# Patient Record
Sex: FEMALE | Race: WHITE | NOT HISPANIC OR LATINO
[De-identification: names, ages, dates, MRNs, and addresses within clinical notes are randomized per-mention and may not be internally consistent; named-entity substitution may affect disease eponyms.]

---

## 2017-05-14 ENCOUNTER — FORM ENCOUNTER (OUTPATIENT)
Age: 67
End: 2017-05-14

## 2017-05-15 ENCOUNTER — APPOINTMENT (OUTPATIENT)
Dept: ULTRASOUND IMAGING | Facility: IMAGING CENTER | Age: 67
End: 2017-05-15

## 2017-05-15 ENCOUNTER — APPOINTMENT (OUTPATIENT)
Dept: UROLOGY | Facility: CLINIC | Age: 67
End: 2017-05-15

## 2017-05-15 ENCOUNTER — OUTPATIENT (OUTPATIENT)
Dept: OUTPATIENT SERVICES | Facility: HOSPITAL | Age: 67
LOS: 1 days | End: 2017-05-15
Payer: MEDICARE

## 2017-05-15 VITALS
SYSTOLIC BLOOD PRESSURE: 91 MMHG | RESPIRATION RATE: 17 BRPM | HEIGHT: 62 IN | BODY MASS INDEX: 27.6 KG/M2 | DIASTOLIC BLOOD PRESSURE: 59 MMHG | HEART RATE: 97 BPM | WEIGHT: 150 LBS | TEMPERATURE: 98 F

## 2017-05-15 DIAGNOSIS — N20.0 CALCULUS OF KIDNEY: ICD-10-CM

## 2017-05-15 DIAGNOSIS — N18.9 CHRONIC KIDNEY DISEASE, UNSPECIFIED: ICD-10-CM

## 2017-05-15 PROCEDURE — 76770 US EXAM ABDO BACK WALL COMP: CPT

## 2018-05-14 ENCOUNTER — APPOINTMENT (OUTPATIENT)
Dept: UROLOGY | Facility: CLINIC | Age: 68
End: 2018-05-14
Payer: MEDICARE

## 2018-05-14 ENCOUNTER — OUTPATIENT (OUTPATIENT)
Dept: OUTPATIENT SERVICES | Facility: HOSPITAL | Age: 68
LOS: 1 days | End: 2018-05-14
Payer: MEDICARE

## 2018-05-14 VITALS
SYSTOLIC BLOOD PRESSURE: 199 MMHG | DIASTOLIC BLOOD PRESSURE: 112 MMHG | HEIGHT: 62 IN | HEART RATE: 73 BPM | TEMPERATURE: 97.9 F | RESPIRATION RATE: 17 BRPM | BODY MASS INDEX: 27.6 KG/M2 | WEIGHT: 150 LBS

## 2018-05-14 DIAGNOSIS — R82.99 OTHER ABNORMAL FINDINGS IN URINE: ICD-10-CM

## 2018-05-14 DIAGNOSIS — E21.3 HYPERPARATHYROIDISM, UNSPECIFIED: ICD-10-CM

## 2018-05-14 DIAGNOSIS — N20.0 CALCULUS OF KIDNEY: ICD-10-CM

## 2018-05-14 DIAGNOSIS — R35.0 FREQUENCY OF MICTURITION: ICD-10-CM

## 2018-05-14 PROCEDURE — 76775 US EXAM ABDO BACK WALL LIM: CPT

## 2018-05-14 PROCEDURE — 99214 OFFICE O/P EST MOD 30 MIN: CPT | Mod: 25

## 2018-05-14 PROCEDURE — 76775 US EXAM ABDO BACK WALL LIM: CPT | Mod: 26

## 2018-05-17 DIAGNOSIS — N20.0 CALCULUS OF KIDNEY: ICD-10-CM

## 2018-05-17 DIAGNOSIS — R82.99 OTHER ABNORMAL FINDINGS IN URINE: ICD-10-CM

## 2018-05-17 DIAGNOSIS — N18.9 CHRONIC KIDNEY DISEASE, UNSPECIFIED: ICD-10-CM

## 2018-05-17 DIAGNOSIS — E21.3 HYPERPARATHYROIDISM, UNSPECIFIED: ICD-10-CM

## 2019-05-15 ENCOUNTER — APPOINTMENT (OUTPATIENT)
Dept: UROLOGY | Facility: CLINIC | Age: 69
End: 2019-05-15

## 2024-03-26 DIAGNOSIS — Z00.00 ENCOUNTER FOR GENERAL ADULT MEDICAL EXAMINATION W/OUT ABNORMAL FINDINGS: ICD-10-CM

## 2024-04-02 ENCOUNTER — APPOINTMENT (OUTPATIENT)
Dept: CT IMAGING | Facility: CLINIC | Age: 74
End: 2024-04-02
Payer: MEDICARE

## 2024-04-02 PROCEDURE — 74176 CT ABD & PELVIS W/O CONTRAST: CPT

## 2024-04-08 ENCOUNTER — INPATIENT (INPATIENT)
Facility: HOSPITAL | Age: 74
LOS: 22 days | Discharge: HOME HEALTH | DRG: 684 | End: 2024-05-01
Attending: INTERNAL MEDICINE | Admitting: UROLOGY
Payer: MEDICARE

## 2024-04-08 VITALS
RESPIRATION RATE: 18 BRPM | DIASTOLIC BLOOD PRESSURE: 86 MMHG | OXYGEN SATURATION: 92 % | SYSTOLIC BLOOD PRESSURE: 134 MMHG | HEART RATE: 130 BPM | TEMPERATURE: 98 F

## 2024-04-08 DIAGNOSIS — N18.9 CHRONIC KIDNEY DISEASE, UNSPECIFIED: ICD-10-CM

## 2024-04-08 LAB
ALBUMIN SERPL ELPH-MCNC: 3.8 G/DL — SIGNIFICANT CHANGE UP (ref 3.3–5)
ALP SERPL-CCNC: 95 U/L — SIGNIFICANT CHANGE UP (ref 40–120)
ALT FLD-CCNC: 31 U/L — SIGNIFICANT CHANGE UP (ref 10–45)
ANION GAP SERPL CALC-SCNC: 18 MMOL/L — HIGH (ref 5–17)
APPEARANCE UR: ABNORMAL
APTT BLD: 25.9 SEC — SIGNIFICANT CHANGE UP (ref 24.5–35.6)
AST SERPL-CCNC: 20 U/L — SIGNIFICANT CHANGE UP (ref 10–40)
BACTERIA # UR AUTO: NEGATIVE /HPF — SIGNIFICANT CHANGE UP
BILIRUB SERPL-MCNC: 0.4 MG/DL — SIGNIFICANT CHANGE UP (ref 0.2–1.2)
BILIRUB UR-MCNC: NEGATIVE — SIGNIFICANT CHANGE UP
BUN SERPL-MCNC: 80 MG/DL — HIGH (ref 7–23)
CALCIUM SERPL-MCNC: 8.6 MG/DL — SIGNIFICANT CHANGE UP (ref 8.4–10.5)
CAST: 3 /LPF — SIGNIFICANT CHANGE UP (ref 0–4)
CHLORIDE SERPL-SCNC: 112 MMOL/L — HIGH (ref 96–108)
CK SERPL-CCNC: 51 U/L — SIGNIFICANT CHANGE UP (ref 25–170)
CO2 SERPL-SCNC: 13 MMOL/L — LOW (ref 22–31)
COLOR SPEC: YELLOW — SIGNIFICANT CHANGE UP
CREAT SERPL-MCNC: 4.73 MG/DL — HIGH (ref 0.5–1.3)
DIFF PNL FLD: ABNORMAL
EGFR: 9 ML/MIN/1.73M2 — LOW
GLUCOSE SERPL-MCNC: 93 MG/DL — SIGNIFICANT CHANGE UP (ref 70–99)
GLUCOSE UR QL: NEGATIVE MG/DL — SIGNIFICANT CHANGE UP
HCT VFR BLD CALC: 38.5 % — SIGNIFICANT CHANGE UP (ref 34.5–45)
HGB BLD-MCNC: 11.6 G/DL — SIGNIFICANT CHANGE UP (ref 11.5–15.5)
INR BLD: 1.07 RATIO — SIGNIFICANT CHANGE UP (ref 0.85–1.18)
KETONES UR-MCNC: NEGATIVE MG/DL — SIGNIFICANT CHANGE UP
LEUKOCYTE ESTERASE UR-ACNC: ABNORMAL
MCHC RBC-ENTMCNC: 29.9 PG — SIGNIFICANT CHANGE UP (ref 27–34)
MCHC RBC-ENTMCNC: 30.1 GM/DL — LOW (ref 32–36)
MCV RBC AUTO: 99.2 FL — SIGNIFICANT CHANGE UP (ref 80–100)
NITRITE UR-MCNC: NEGATIVE — SIGNIFICANT CHANGE UP
NRBC # BLD: 0 /100 WBCS — SIGNIFICANT CHANGE UP (ref 0–0)
PH UR: 6.5 — SIGNIFICANT CHANGE UP (ref 5–8)
PLATELET # BLD AUTO: 148 K/UL — LOW (ref 150–400)
POTASSIUM SERPL-MCNC: 4.6 MMOL/L — SIGNIFICANT CHANGE UP (ref 3.5–5.3)
POTASSIUM SERPL-SCNC: 4.6 MMOL/L — SIGNIFICANT CHANGE UP (ref 3.5–5.3)
PROT SERPL-MCNC: 6.2 G/DL — SIGNIFICANT CHANGE UP (ref 6–8.3)
PROT UR-MCNC: 100 MG/DL
PROTHROM AB SERPL-ACNC: 11.2 SEC — SIGNIFICANT CHANGE UP (ref 9.5–13)
RBC # BLD: 3.88 M/UL — SIGNIFICANT CHANGE UP (ref 3.8–5.2)
RBC # FLD: 14.8 % — HIGH (ref 10.3–14.5)
RBC CASTS # UR COMP ASSIST: 3 /HPF — SIGNIFICANT CHANGE UP (ref 0–4)
REVIEW: SIGNIFICANT CHANGE UP
SODIUM SERPL-SCNC: 143 MMOL/L — SIGNIFICANT CHANGE UP (ref 135–145)
SP GR SPEC: 1.01 — SIGNIFICANT CHANGE UP (ref 1–1.03)
SQUAMOUS # UR AUTO: 15 /HPF — HIGH (ref 0–5)
TROPONIN T, HIGH SENSITIVITY RESULT: 46 NG/L — SIGNIFICANT CHANGE UP (ref 0–51)
UROBILINOGEN FLD QL: 0.2 MG/DL — SIGNIFICANT CHANGE UP (ref 0.2–1)
WBC # BLD: 5.73 K/UL — SIGNIFICANT CHANGE UP (ref 3.8–10.5)
WBC # FLD AUTO: 5.73 K/UL — SIGNIFICANT CHANGE UP (ref 3.8–10.5)
WBC UR QL: 19 /HPF — HIGH (ref 0–5)

## 2024-04-08 PROCEDURE — 99223 1ST HOSP IP/OBS HIGH 75: CPT | Mod: GC

## 2024-04-08 PROCEDURE — 93010 ELECTROCARDIOGRAM REPORT: CPT | Mod: 76

## 2024-04-08 PROCEDURE — 99223 1ST HOSP IP/OBS HIGH 75: CPT

## 2024-04-08 PROCEDURE — 71045 X-RAY EXAM CHEST 1 VIEW: CPT | Mod: 26

## 2024-04-08 PROCEDURE — 93970 EXTREMITY STUDY: CPT | Mod: 26

## 2024-04-08 RX ORDER — HEPARIN SODIUM 5000 [USP'U]/ML
5000 INJECTION INTRAVENOUS; SUBCUTANEOUS EVERY 8 HOURS
Refills: 0 | Status: DISCONTINUED | OUTPATIENT
Start: 2024-04-08 | End: 2024-04-09

## 2024-04-08 RX ORDER — METOPROLOL TARTRATE 50 MG
25 TABLET ORAL
Refills: 0 | Status: DISCONTINUED | OUTPATIENT
Start: 2024-04-08 | End: 2024-04-10

## 2024-04-08 RX ORDER — ONDANSETRON 8 MG/1
4 TABLET, FILM COATED ORAL EVERY 6 HOURS
Refills: 0 | Status: DISCONTINUED | OUTPATIENT
Start: 2024-04-08 | End: 2024-05-01

## 2024-04-08 RX ORDER — METOPROLOL TARTRATE 50 MG
5 TABLET ORAL ONCE
Refills: 0 | Status: COMPLETED | OUTPATIENT
Start: 2024-04-08 | End: 2024-04-08

## 2024-04-08 RX ORDER — ACETAMINOPHEN 500 MG
650 TABLET ORAL EVERY 6 HOURS
Refills: 0 | Status: DISCONTINUED | OUTPATIENT
Start: 2024-04-08 | End: 2024-05-01

## 2024-04-08 RX ORDER — SENNA PLUS 8.6 MG/1
2 TABLET ORAL AT BEDTIME
Refills: 0 | Status: DISCONTINUED | OUTPATIENT
Start: 2024-04-08 | End: 2024-05-01

## 2024-04-08 RX ADMIN — Medication 5 MILLIGRAM(S): at 21:10

## 2024-04-08 RX ADMIN — HEPARIN SODIUM 5000 UNIT(S): 5000 INJECTION INTRAVENOUS; SUBCUTANEOUS at 21:10

## 2024-04-08 RX ADMIN — Medication 25 MILLIGRAM(S): at 21:43

## 2024-04-08 NOTE — H&P ADULT - HISTORY OF PRESENT ILLNESS
74yo F with PMHx of nephrolithiasis and HTN presents with complaint of BL LE swelling for past few months. Pt reports has been getting progressively worse and associated with some BLE soreness. Takes amlodipine for HTN. Otherwise in normal state of health. Denies fever/chills, nausea/vomiting, abd pain, flank pain, dysuria, hematuria, decreased UOP or other acute complaints.  72yo F with PMHx of nephrolithiasis and HTN presents with complaint of BL LE swelling for past few weeks. Pt reports has been getting progressively worse and associated with some BLE soreness. Takes amlodipine for HTN. Otherwise in normal state of health. Denies fever/chills, nausea/vomiting, abd pain, flank pain, dysuria, hematuria, decreased UOP or other acute complaints.

## 2024-04-08 NOTE — CONSULT NOTE ADULT - ASSESSMENT
EKG:   AFib 120s, no ischemic changes    Echo:   2009  1. Mitral annular calcification, and calcified mitral  leaflets with normal diastolic opening. Mild-moderate  mitral regurgitation.  2. Mildly calcified trileaflet aortic valve with normal  opening. Mild aortic regurgitation.  3. Moderate left atrial enlargement.  4. Normal left ventricular internal dimensions and wall  thicknesses.  5. Normal left ventricular systolic function. No segmental  wall motion abnormalities. EF=65%.  6. Normal right ventricular size and systolic function.  7. Mild-moderate tricuspid regurgitation. Estimated  pulmonary artery systolic pressure equals 35 mm Hg,  assuming right atrial pressure equals 10  mm Hg, consistent  with borderline pulmonary hypertension.    Imaging: Personally Reviewed    Interpretation of Telemetry: AFib at rate 90s    73F PMHx nephrolithiasis, HTN admitted with new onset bl LE swelling and renal dysfunction. Cardiology consulted for asymptomatic new-onset AFib w/ RVR. hsTrop wnl. TTE pending. Ddx for new-onset LE edema includes CHF although nephrotic syndrome is more concerning given new-onset renal dysfunction. Unclear cause of new-onset AFib, will need TTE to evaluate for structural causes but may be in setting of acute stressor and volume overload.     Recommendations:   - s/p metop 5mg IV x1  - start metoprolol tartrate 25mg BID, hold for SBP <100  - TTE in the AM  - send repeat hsTrop/CKMB, proBNP  - would send urine albumin, protein, and creatinine to evaluate for nephrotic syndrome, consider renal consult      Lita Wesotn MD  PGY-4, Cardiology  Available on TEAMS    For all new consults  [email or web address]  Login: [email or web address]     EKG:   AFib 120s, no ischemic changes    Echo:   2009  1. Mitral annular calcification, and calcified mitral  leaflets with normal diastolic opening. Mild-moderate  mitral regurgitation.  2. Mildly calcified trileaflet aortic valve with normal  opening. Mild aortic regurgitation.  3. Moderate left atrial enlargement.  4. Normal left ventricular internal dimensions and wall  thicknesses.  5. Normal left ventricular systolic function. No segmental  wall motion abnormalities. EF=65%.  6. Normal right ventricular size and systolic function.  7. Mild-moderate tricuspid regurgitation. Estimated  pulmonary artery systolic pressure equals 35 mm Hg,  assuming right atrial pressure equals 10  mm Hg, consistent  with borderline pulmonary hypertension.    Imaging: Personally Reviewed    Interpretation of Telemetry: AFib at rate 90s    73F PMHx nephrolithiasis, HTN admitted with new onset bl LE swelling and renal dysfunction. Cardiology consulted for asymptomatic new-onset AFib w/ RVR. hsTrop wnl. TTE pending. Ddx for new-onset LE edema includes CHF although nephrotic syndrome is more concerning given new-onset renal dysfunction. Unclear cause of new-onset AFib, will need TTE to evaluate for structural causes but may be in setting of acute stressor and volume overload.     Recommendations:   - s/p metop 5mg IV x1  - start metoprolol tartrate 25mg BID, hold for SBP <100  - TTE in the AM  - send repeat hsTrop/CKMB, proBNP  - CHADSVASC 4, consider initiation of AC for stroke prevention once not planning for procedures  - would send urine albumin, protein, and creatinine to evaluate for nephrotic syndrome, consider renal consult      Lita Weston MD  PGY-4, Cardiology  Available on TEAMS    For all new consults  [email or web address]  Login: [email or web address]     EKG:   AFib 120s, no ischemic changes    Echo:   2009  1. Mitral annular calcification, and calcified mitral  leaflets with normal diastolic opening. Mild-moderate  mitral regurgitation.  2. Mildly calcified trileaflet aortic valve with normal  opening. Mild aortic regurgitation.  3. Moderate left atrial enlargement.  4. Normal left ventricular internal dimensions and wall  thicknesses.  5. Normal left ventricular systolic function. No segmental  wall motion abnormalities. EF=65%.  6. Normal right ventricular size and systolic function.  7. Mild-moderate tricuspid regurgitation. Estimated  pulmonary artery systolic pressure equals 35 mm Hg,  assuming right atrial pressure equals 10  mm Hg, consistent  with borderline pulmonary hypertension.      73F PMHx nephrolithiasis, HTN admitted with new onset bl LE swelling and renal dysfunction. Cardiology consulted for asymptomatic new-onset AFib w/ RVR. hsTrop wnl. TTE pending. Ddx for new-onset LE edema includes CHF although nephrotic syndrome is more concerning given new-onset renal dysfunction. Unclear cause of new-onset AFib, will need TTE to evaluate for structural causes but may be in setting of acute stressor and volume overload.     Recommendations:   -increase metoprolol to 25mg q6h for rate control, goal <110   -check TTE   -will consider /DCCV as it is not clearly known how long she has been in atrial fibrillation   -given elevated CHADSVASC, would start AC with apixaban 5mg BID pending insurance coverage   -would not give amiodarone given unknown duration of atrial fibrillation     Please see attending attestation for final recommendations.          EKG:   AFib 120s, no ischemic changes    Echo:   2009  1. Mitral annular calcification, and calcified mitral  leaflets with normal diastolic opening. Mild-moderate  mitral regurgitation.  2. Mildly calcified trileaflet aortic valve with normal  opening. Mild aortic regurgitation.  3. Moderate left atrial enlargement.  4. Normal left ventricular internal dimensions and wall  thicknesses.  5. Normal left ventricular systolic function. No segmental  wall motion abnormalities. EF=65%.  6. Normal right ventricular size and systolic function.  7. Mild-moderate tricuspid regurgitation. Estimated  pulmonary artery systolic pressure equals 35 mm Hg,  assuming right atrial pressure equals 10  mm Hg, consistent  with borderline pulmonary hypertension.      73F PMHx nephrolithiasis, HTN admitted with new onset bl LE swelling and renal dysfunction. Cardiology consulted for asymptomatic new-onset AFib w/ RVR. hsTrop wnl. TTE pending. Ddx for new-onset LE edema includes CHF although nephrotic syndrome is more concerning given new-onset renal dysfunction. Unclear cause of new-onset AFib, will need TTE to evaluate for structural causes but may be in setting of acute stressor and volume overload.     Recommendations:   -increase metoprolol to 25mg q6h for rate control, goal <110   -check TTE   -given elevated CHADSVASC, would start AC with heparin pending procedures   -check TSH   -would not give amiodarone given unknown duration of atrial fibrillation     Please see attending attestation for final recommendations.

## 2024-04-08 NOTE — H&P ADULT - ATTENDING COMMENTS
As above  Uzair LE swelling, lethargic  NICO on CKD  CT shows minimal change in stone burden compared to CT from 2015  Cardiology and Medicine evaluation  May need nephrology evaluation as well

## 2024-04-08 NOTE — H&P ADULT - NSHPLABSRESULTS_GEN_ALL_CORE
LABS PENDING    Cr 3/26: 4.84  Urine 3/29: >100K CNS    CT 4/2: IMPRESSION:  Atrophic kidneys with multiple bilateral nonobstructing stones as well as renal cortical calcifications. No hydronephrosis.    Small right pleural effusion.    Nonspecific gallbladder wall thickening without associated inflammation or radiopaque stones. Nonemergent right upper quadrant ultrasound can be performed for further characterization if clinically indicated.

## 2024-04-08 NOTE — H&P ADULT - NSICDXPASTMEDICALHX_GEN_ALL_CORE_FT
PAST MEDICAL HISTORY:  Acute Renal Failure 9/2009    Collar Bone Fracture     Kidney Stone removal 3/15/2011    Renal Calculus     Rib Fractures     Ureteral Calculus     Wrist Fracture CYNTHIA

## 2024-04-08 NOTE — PATIENT PROFILE ADULT - FALL HARM RISK - UNIVERSAL INTERVENTIONS
Bed in lowest position, wheels locked, appropriate side rails in place/Call bell, personal items and telephone in reach/Instruct patient to call for assistance before getting out of bed or chair/Non-slip footwear when patient is out of bed/[place] to call system/Physically safe environment - no spills, clutter or unnecessary equipment/Purposeful Proactive Rounding/Room/bathroom lighting operational, light cord in reach

## 2024-04-08 NOTE — H&P ADULT - NSHPPHYSICALEXAM_GEN_ALL_CORE
ICU Vital Signs Last 24 Hrs  T(C): 36.8 (08 Apr 2024 15:50), Max: 36.8 (08 Apr 2024 15:50)  T(F): 98.3 (08 Apr 2024 15:50), Max: 98.3 (08 Apr 2024 15:50)  HR: 130 (08 Apr 2024 15:50) (130 - 130)  BP: 134/86 (08 Apr 2024 15:50) (134/86 - 134/86)  RR: 18 (08 Apr 2024 15:50) (18 - 18)  SpO2: 92% (08 Apr 2024 15:50) (92% - 92%)    O2 Parameters below as of 08 Apr 2024 15:50  Patient On (Oxygen Delivery Method): room air    Gen: NAD  Resp: NO respiratory distress  Abd: soft, nontender, nondistended. no CVAT   MSK: BLE swelling

## 2024-04-08 NOTE — H&P ADULT - ASSESSMENT
72yo F with PMHx of nephrolithiasis and HTN presents with complaint of BL LE swelling for past few months. CT outpatient shows atrophic kidneys with multiple BL nonobstructing stones as well as renal cortical calcification. No hydronephrosis. Cr 4.84 on outpatient labs. Urine Cx 3/26 with >100K CNS.    admit to Dr. Moreno  check labs, send urine culture  medicine consult  check EKG  check LE dopplers   72yo F with PMHx of nephrolithiasis and HTN presents with complaint of BL LE swelling for past few weeks. CT outpatient shows atrophic kidneys with multiple BL nonobstructing stones as well as renal cortical calcification. No hydronephrosis. Cr 4.84 on outpatient labs. Urine Cx 3/26 with >100K CNS.    admit to Dr. Moreno  check labs, send urine culture  medicine consult  check EKG  check LE dopplers

## 2024-04-08 NOTE — H&P ADULT - NSICDXPASTSURGICALHX_GEN_ALL_CORE_FT
PAST SURGICAL HISTORY:  C Section     Percutaneous Stone Extraction Right    S/P Left Percutaneuos Stone extraction 01/26/2010, 04/20/2010

## 2024-04-08 NOTE — CONSULT NOTE ADULT - SUBJECTIVE AND OBJECTIVE BOX
CARDIOLOGY FELLOW CONSULT NOTE    HPI:  73F PMHx nephrolithiasis, HTN admitted with 2 weeks of bl LE swelling and newly reduced renal function with multiple nonobstructive renal stones on CT. EP consulted for new-onset AFib w/ RVR noted on admission EKG. Patient reports asymptomatic, denies dizziness/lightheadedness, CP, SOB, palpitations. Denies any cardiac history. Received metoprolol 5mg IV x1 for AFib at rates 130s with improvement to 110s.         PMHx:   Renal Calculus    Ureteral Calculus    Acute Renal Failure    Wrist Fracture    Collar Bone Fracture    Rib Fractures    Kidney Stone removal        PSHx:   C Section    Percutaneous Stone Extraction    S/P Left Percutaneuos Stone extraction        Allergies:  No Known Allergies      Home Meds:  Amlodipine    Current Medications:   acetaminophen     Tablet .. 650 milliGRAM(s) Oral every 6 hours PRN  amoxicillin  250 milliGRAM(s)/clavulanate 1 Tablet(s) Oral every 12 hours  heparin   Injectable 5000 Unit(s) SubCutaneous every 8 hours  metoprolol tartrate 25 milliGRAM(s) Oral two times a day  ondansetron Injectable 4 milliGRAM(s) IV Push every 6 hours PRN  senna 2 Tablet(s) Oral at bedtime PRN      FAMILY HISTORY: No cardiac hx      Social History:  Smoking History: denies  Alcohol Use: occasional wine  Drug Use: denies    REVIEW OF SYSTEMS:  CONSTITUTIONAL: No weakness, fevers or chills  EYES/ENT: No visual changes;  No dysphagia  NECK: No pain or stiffness  RESPIRATORY: No cough, wheezing, hemoptysis; No shortness of breath  CARDIOVASCULAR: No chest pain or palpitations; No lower extremity edema  GASTROINTESTINAL: No abdominal or epigastric pain. No nausea, vomiting, or hematemesis; No diarrhea or constipation. No melena or hematochezia.  BACK: No back pain  GENITOURINARY: No dysuria, frequency or hematuria  NEUROLOGICAL: No numbness or weakness  SKIN: No itching, burning, rashes, or lesions   All other review of systems is negative unless indicated above.    Physical Exam:  T(F): 98.3 (04-08), Max: 98.3 (04-08)  HR: 130 (04-08) (130 - 130)  BP: 134/86 (04-08) (134/86 - 134/86)  RR: 18 (04-08)  SpO2: 92% (04-08)  GEN: comfortable appearing, lying in bed in NAD  HEENT: NCAT, MMM  CV: Regular S1, S2, no m/r/g  RESP: CTAB  ABD: Soft, NTND, +BS  EXT: 3+ bl pitting LE edema to the knees, WWP, pulses palpable throughout  NEURO: No focal deficits, AOx3  SKIN:  No rashes    Labs: Personally reviewed                        11.6   5.73  )-----------( 148      ( 08 Apr 2024 18:31 )             38.5     04-08    143  |  112<H>  |  80<H>  ----------------------------<  93  4.6   |  13<L>  |  4.73<H>    Ca    8.6      08 Apr 2024 18:41    TPro  6.2  /  Alb  3.8  /  TBili  0.4  /  DBili  x   /  AST  20  /  ALT  31  /  AlkPhos  95  04-08    PT/INR - ( 08 Apr 2024 18:31 )   PT: 11.2 sec;   INR: 1.07 ratio         PTT - ( 08 Apr 2024 18:31 )  PTT:25.9 sec    CARDIAC MARKERS ( 08 Apr 2024 18:41 )  46 ng/L / x     / x     / 51 U/L / x     / x                       CARDIOLOGY FELLOW CONSULT NOTE    HPI:  73F PMHx nephrolithiasis, HTN admitted with 2 weeks of bl LE swelling and newly reduced renal function with multiple nonobstructive renal stones on CT. EP consulted for new-onset AFib w/ RVR noted on admission EKG. Patient reports asymptomatic, denies dizziness/lightheadedness, CP, SOB, palpitations. Denies any cardiac history. Received metoprolol 5mg IV x1 for AFib at rates 130s with improvement to 110s.     Telemetry this AM notable for persistent atrial fibrillation with ventricular rates ranging . Patient denies palpitations, dizziness, nausea, vomiting, and lightheadedness. No known history of atrial fibrillation, but has not followed with a PCP for many years. Has never seen a Cardiologist. No hx of bleeding.         PMHx:   Renal Calculus  Ureteral Calculus  Acute Renal Failure  Wrist Fracture  Collar Bone Fracture  Rib Fractures  Kidney Stone removal        PSHx:   C Section  Percutaneous Stone Extraction  S/P Left Percutaneuos Stone extraction        Allergies:  No Known Allergies      Home Meds:  Amlodipine    Current Medications:   acetaminophen     Tablet .. 650 milliGRAM(s) Oral every 6 hours PRN  amoxicillin  250 milliGRAM(s)/clavulanate 1 Tablet(s) Oral every 12 hours  heparin   Injectable 5000 Unit(s) SubCutaneous every 8 hours  metoprolol tartrate 25 milliGRAM(s) Oral two times a day  ondansetron Injectable 4 milliGRAM(s) IV Push every 6 hours PRN  senna 2 Tablet(s) Oral at bedtime PRN      FAMILY HISTORY: No cardiac hx      Social History:  Smoking History: denies  Alcohol Use: occasional wine  Drug Use: denies    REVIEW OF SYSTEMS:  CONSTITUTIONAL: No weakness, fevers or chills  EYES/ENT: No visual changes;  No dysphagia  NECK: No pain or stiffness  RESPIRATORY: No cough, wheezing, hemoptysis; No shortness of breath  CARDIOVASCULAR: No chest pain or palpitations; No lower extremity edema  GASTROINTESTINAL: No abdominal or epigastric pain. No nausea, vomiting, or hematemesis; No diarrhea or constipation. No melena or hematochezia.  BACK: No back pain  GENITOURINARY: No dysuria, frequency or hematuria  NEUROLOGICAL: No numbness or weakness  SKIN: No itching, burning, rashes, or lesions   All other review of systems is negative unless indicated above.    Physical Exam:  T(F): 98.3 (04-08), Max: 98.3 (04-08)  HR: 130 (04-08) (130 - 130)  BP: 134/86 (04-08) (134/86 - 134/86)  RR: 18 (04-08)  SpO2: 92% (04-08)    GEN: comfortable appearing, lying in bed in NAD  HEENT: NCAT, MMM  CV: tachycardic, irregular, no m/r/g  RESP: CTAB  ABD: Soft, NTND, +BS  EXT: 3+ bl pitting LE edema to the knees, WWP, pulses palpable throughout  NEURO: No focal deficits, AOx3  SKIN:  No rashes    Labs: Personally reviewed                        11.6   5.73  )-----------( 148      ( 08 Apr 2024 18:31 )             38.5     04-08    143  |  112<H>  |  80<H>  ----------------------------<  93  4.6   |  13<L>  |  4.73<H>    Ca    8.6      08 Apr 2024 18:41    TPro  6.2  /  Alb  3.8  /  TBili  0.4  /  DBili  x   /  AST  20  /  ALT  31  /  AlkPhos  95  04-08    PT/INR - ( 08 Apr 2024 18:31 )   PT: 11.2 sec;   INR: 1.07 ratio         PTT - ( 08 Apr 2024 18:31 )  PTT:25.9 sec    CARDIAC MARKERS ( 08 Apr 2024 18:41 )  46 ng/L / x     / x     / 51 U/L / x     / x

## 2024-04-08 NOTE — CHART NOTE - NSCHARTNOTEFT_GEN_A_CORE
HR in 130s. EKG obtained. Shows AF with RVR,  Pt seen and examined. Reports feeling well. Denies cp, sob, palpitations, dizziness, acute pain or other acute complaints. Denies cardiac history or cardiologist. No family history of cardiac disease. No past arrhythmias.      Vital Signs Last 24 Hrs  T(C): 36.8 (08 Apr 2024 15:50), Max: 36.8 (08 Apr 2024 15:50)  T(F): 98.3 (08 Apr 2024 15:50), Max: 98.3 (08 Apr 2024 15:50)  HR: 130 (08 Apr 2024 15:50) (130 - 130)  BP: 134/86 (08 Apr 2024 15:50) (134/86 - 134/86)  RR: 18 (08 Apr 2024 15:50) (18 - 18)  SpO2: 92% (08 Apr 2024 15:50) (92% - 92%)    O2 Parameters below as of 08 Apr 2024 15:50  Patient On (Oxygen Delivery Method): room air    Gen: NAD  Resp: No respiratory distress  Abd: soft  MSK: BLE edema    Pt seen and examined with fellow Dr. Serra    EKG: AF with RVR                          11.6   5.73  )-----------( 148      ( 08 Apr 2024 18:31 )             38.5     04-08    143  |  112<H>  |  80<H>  ----------------------------<  93  4.6   |  13<L>  |  4.73<H>    Ca    8.6      08 Apr 2024 18:41    TPro  6.2  /  Alb  3.8  /  TBili  0.4  /  DBili  x   /  AST  20  /  ALT  31  /  AlkPhos  95  04-08    Troponin T, High Sensitivity (04.08.24 @ 18:41)    Troponin T, High Sensitivity Result: 46: *  *  Rapid upward or downward changes in high-sensitivity troponin levels  suggest acute myocardial injury. Renal impairment may cause sustained  troponin elevations.  Normal: <6 - 14 ng/L  Indeterminate: 15-51 ng/L  Elevated: > 51 ng/L  See http://labs/test/TROPTHS on the [facility] intranet for more  information ng/L    A/P: 74yo F with PMHx of CKD, HTN, nephrolithiasis admitted for workup of BLE swelling found to be in AF with RVR.    - 5mg metoprolol IVP x 1 given  - cardiology consult called -> recommended giving additional dose of metoprolol 25mg PO  - telemetry  - repeat labs to trend troponin at 12AM  - TTE ordered  - BL dopplers from earlier negative for DVT  - f/u CXR  - follow up cardiology recommendations regarding AC

## 2024-04-09 DIAGNOSIS — N19 UNSPECIFIED KIDNEY FAILURE: ICD-10-CM

## 2024-04-09 DIAGNOSIS — E87.20 ACIDOSIS, UNSPECIFIED: ICD-10-CM

## 2024-04-09 LAB
ALBUMIN SERPL ELPH-MCNC: 3.7 G/DL — SIGNIFICANT CHANGE UP (ref 3.3–5)
ALP SERPL-CCNC: 92 U/L — SIGNIFICANT CHANGE UP (ref 40–120)
ALT FLD-CCNC: 30 U/L — SIGNIFICANT CHANGE UP (ref 10–45)
ANION GAP SERPL CALC-SCNC: 17 MMOL/L — SIGNIFICANT CHANGE UP (ref 5–17)
ANION GAP SERPL CALC-SCNC: 20 MMOL/L — HIGH (ref 5–17)
AST SERPL-CCNC: 19 U/L — SIGNIFICANT CHANGE UP (ref 10–40)
BASE EXCESS BLDV CALC-SCNC: -14 MMOL/L — LOW (ref -2–3)
BILIRUB SERPL-MCNC: 0.3 MG/DL — SIGNIFICANT CHANGE UP (ref 0.2–1.2)
BUN SERPL-MCNC: 81 MG/DL — HIGH (ref 7–23)
BUN SERPL-MCNC: 84 MG/DL — HIGH (ref 7–23)
CA-I SERPL-SCNC: 1.15 MMOL/L — SIGNIFICANT CHANGE UP (ref 1.15–1.33)
CALCIUM SERPL-MCNC: 7.8 MG/DL — LOW (ref 8.4–10.5)
CALCIUM SERPL-MCNC: 8.3 MG/DL — LOW (ref 8.4–10.5)
CHLORIDE BLDV-SCNC: 109 MMOL/L — HIGH (ref 96–108)
CHLORIDE SERPL-SCNC: 111 MMOL/L — HIGH (ref 96–108)
CHLORIDE SERPL-SCNC: 118 MMOL/L — HIGH (ref 96–108)
CK MB BLD-MCNC: 6.8 % — HIGH (ref 0–3.5)
CK MB CFR SERPL CALC: 2.5 NG/ML — SIGNIFICANT CHANGE UP (ref 0–3.8)
CK MB CFR SERPL CALC: 2.5 NG/ML — SIGNIFICANT CHANGE UP (ref 0–3.8)
CK SERPL-CCNC: 37 U/L — SIGNIFICANT CHANGE UP (ref 25–170)
CK SERPL-CCNC: 43 U/L — SIGNIFICANT CHANGE UP (ref 25–170)
CO2 BLDV-SCNC: 16 MMOL/L — LOW (ref 22–26)
CO2 SERPL-SCNC: 13 MMOL/L — LOW (ref 22–31)
CO2 SERPL-SCNC: <10 MMOL/L — CRITICAL LOW (ref 22–31)
CREAT ?TM UR-MCNC: 35 MG/DL — SIGNIFICANT CHANGE UP
CREAT SERPL-MCNC: 4.72 MG/DL — HIGH (ref 0.5–1.3)
CREAT SERPL-MCNC: 4.77 MG/DL — HIGH (ref 0.5–1.3)
EGFR: 9 ML/MIN/1.73M2 — LOW
EGFR: 9 ML/MIN/1.73M2 — LOW
GAS PNL BLDA: SIGNIFICANT CHANGE UP
GAS PNL BLDV: 139 MMOL/L — SIGNIFICANT CHANGE UP (ref 136–145)
GAS PNL BLDV: SIGNIFICANT CHANGE UP
GAS PNL BLDV: SIGNIFICANT CHANGE UP
GLUCOSE BLDV-MCNC: 100 MG/DL — HIGH (ref 70–99)
GLUCOSE SERPL-MCNC: 102 MG/DL — HIGH (ref 70–99)
GLUCOSE SERPL-MCNC: 92 MG/DL — SIGNIFICANT CHANGE UP (ref 70–99)
HCO3 BLDV-SCNC: 15 MMOL/L — LOW (ref 22–29)
HCT VFR BLD CALC: 39.1 % — SIGNIFICANT CHANGE UP (ref 34.5–45)
HCT VFR BLDA CALC: 35 % — SIGNIFICANT CHANGE UP (ref 34.5–46.5)
HCV AB S/CO SERPL IA: 0.05 S/CO — SIGNIFICANT CHANGE UP (ref 0–0.99)
HCV AB SERPL-IMP: SIGNIFICANT CHANGE UP
HGB BLD CALC-MCNC: 11.6 G/DL — LOW (ref 11.7–16.1)
HGB BLD-MCNC: 11.3 G/DL — LOW (ref 11.5–15.5)
LACTATE BLDV-MCNC: 1.3 MMOL/L — SIGNIFICANT CHANGE UP (ref 0.5–2)
MCHC RBC-ENTMCNC: 28.9 GM/DL — LOW (ref 32–36)
MCHC RBC-ENTMCNC: 30 PG — SIGNIFICANT CHANGE UP (ref 27–34)
MCV RBC AUTO: 103.7 FL — HIGH (ref 80–100)
NRBC # BLD: 0 /100 WBCS — SIGNIFICANT CHANGE UP (ref 0–0)
NT-PROBNP SERPL-SCNC: HIGH PG/ML (ref 0–300)
NT-PROBNP SERPL-SCNC: HIGH PG/ML (ref 0–300)
PCO2 BLDV: 44 MMHG — HIGH (ref 39–42)
PH BLDV: 7.13 — CRITICAL LOW (ref 7.32–7.43)
PLATELET # BLD AUTO: 142 K/UL — LOW (ref 150–400)
PO2 BLDV: 28 MMHG — SIGNIFICANT CHANGE UP (ref 25–45)
POTASSIUM BLDV-SCNC: 4.3 MMOL/L — SIGNIFICANT CHANGE UP (ref 3.5–5.1)
POTASSIUM SERPL-MCNC: 4.2 MMOL/L — SIGNIFICANT CHANGE UP (ref 3.5–5.3)
POTASSIUM SERPL-MCNC: 4.7 MMOL/L — SIGNIFICANT CHANGE UP (ref 3.5–5.3)
POTASSIUM SERPL-SCNC: 4.2 MMOL/L — SIGNIFICANT CHANGE UP (ref 3.5–5.3)
POTASSIUM SERPL-SCNC: 4.7 MMOL/L — SIGNIFICANT CHANGE UP (ref 3.5–5.3)
PROT ?TM UR-MCNC: 40 MG/DL — HIGH (ref 0–12)
PROT SERPL-MCNC: 6 G/DL — SIGNIFICANT CHANGE UP (ref 6–8.3)
PROT/CREAT UR-RTO: 1.1 RATIO — HIGH (ref 0–0.2)
RBC # BLD: 3.77 M/UL — LOW (ref 3.8–5.2)
RBC # FLD: 14.6 % — HIGH (ref 10.3–14.5)
SAO2 % BLDV: 44.9 % — LOW (ref 67–88)
SODIUM SERPL-SCNC: 141 MMOL/L — SIGNIFICANT CHANGE UP (ref 135–145)
SODIUM SERPL-SCNC: 145 MMOL/L — SIGNIFICANT CHANGE UP (ref 135–145)
T4 AB SER-ACNC: 5.6 UG/DL — SIGNIFICANT CHANGE UP (ref 4.6–12)
TROPONIN T, HIGH SENSITIVITY RESULT: 41 NG/L — SIGNIFICANT CHANGE UP (ref 0–51)
TROPONIN T, HIGH SENSITIVITY RESULT: 56 NG/L — HIGH (ref 0–51)
TSH SERPL-MCNC: 0.65 UIU/ML — SIGNIFICANT CHANGE UP (ref 0.27–4.2)
WBC # BLD: 4.78 K/UL — SIGNIFICANT CHANGE UP (ref 3.8–10.5)
WBC # FLD AUTO: 4.78 K/UL — SIGNIFICANT CHANGE UP (ref 3.8–10.5)

## 2024-04-09 PROCEDURE — 76770 US EXAM ABDO BACK WALL COMP: CPT | Mod: 26

## 2024-04-09 PROCEDURE — 93010 ELECTROCARDIOGRAM REPORT: CPT

## 2024-04-09 PROCEDURE — 99223 1ST HOSP IP/OBS HIGH 75: CPT

## 2024-04-09 PROCEDURE — 99233 SBSQ HOSP IP/OBS HIGH 50: CPT

## 2024-04-09 PROCEDURE — 99233 SBSQ HOSP IP/OBS HIGH 50: CPT | Mod: GC

## 2024-04-09 RX ORDER — HEPARIN SODIUM 5000 [USP'U]/ML
2500 INJECTION INTRAVENOUS; SUBCUTANEOUS EVERY 6 HOURS
Refills: 0 | Status: DISCONTINUED | OUTPATIENT
Start: 2024-04-09 | End: 2024-04-25

## 2024-04-09 RX ORDER — HEPARIN SODIUM 5000 [USP'U]/ML
5500 INJECTION INTRAVENOUS; SUBCUTANEOUS EVERY 6 HOURS
Refills: 0 | Status: DISCONTINUED | OUTPATIENT
Start: 2024-04-09 | End: 2024-04-25

## 2024-04-09 RX ORDER — HEPARIN SODIUM 5000 [USP'U]/ML
INJECTION INTRAVENOUS; SUBCUTANEOUS
Qty: 25000 | Refills: 0 | Status: DISCONTINUED | OUTPATIENT
Start: 2024-04-09 | End: 2024-04-25

## 2024-04-09 RX ORDER — BUMETANIDE 0.25 MG/ML
2 INJECTION INTRAMUSCULAR; INTRAVENOUS EVERY 12 HOURS
Refills: 0 | Status: DISCONTINUED | OUTPATIENT
Start: 2024-04-09 | End: 2024-04-12

## 2024-04-09 RX ORDER — BUMETANIDE 0.25 MG/ML
2 INJECTION INTRAMUSCULAR; INTRAVENOUS ONCE
Refills: 0 | Status: COMPLETED | OUTPATIENT
Start: 2024-04-09 | End: 2024-04-09

## 2024-04-09 RX ORDER — SODIUM BICARBONATE 1 MEQ/ML
1950 SYRINGE (ML) INTRAVENOUS THREE TIMES A DAY
Refills: 0 | Status: DISCONTINUED | OUTPATIENT
Start: 2024-04-09 | End: 2024-04-15

## 2024-04-09 RX ORDER — SODIUM BICARBONATE 1 MEQ/ML
1300 SYRINGE (ML) INTRAVENOUS THREE TIMES A DAY
Refills: 0 | Status: DISCONTINUED | OUTPATIENT
Start: 2024-04-09 | End: 2024-04-09

## 2024-04-09 RX ADMIN — Medication 1 TABLET(S): at 19:46

## 2024-04-09 RX ADMIN — Medication 1 TABLET(S): at 05:18

## 2024-04-09 RX ADMIN — Medication 25 MILLIGRAM(S): at 19:46

## 2024-04-09 RX ADMIN — Medication 25 MILLIGRAM(S): at 05:23

## 2024-04-09 RX ADMIN — HEPARIN SODIUM 5000 UNIT(S): 5000 INJECTION INTRAVENOUS; SUBCUTANEOUS at 13:54

## 2024-04-09 RX ADMIN — Medication 1950 MILLIGRAM(S): at 22:21

## 2024-04-09 RX ADMIN — HEPARIN SODIUM 5000 UNIT(S): 5000 INJECTION INTRAVENOUS; SUBCUTANEOUS at 05:18

## 2024-04-09 RX ADMIN — BUMETANIDE 2 MILLIGRAM(S): 0.25 INJECTION INTRAMUSCULAR; INTRAVENOUS at 13:54

## 2024-04-09 NOTE — PROGRESS NOTE ADULT - SUBJECTIVE AND OBJECTIVE BOX
Subjective:  Afib with RVR overnight, 5mg of metoprolol IV given and cardiology called - recommended metoprolol 25 BID. BNP and troponin elevated.   Patient seen and examined at bedside with urology team during morning rounds. Patient feels well, C/o leg edema discomfort. denies fever/chills, CP, SOB. Offers no other complaints.    Exam:  GEN: comfortable appearing, sitting in chair in NAD  HEENT: NCAT   RESP: nonlabored respirations on RA  ABD: Soft, NTND, +BS  EXT: 2+ bl pitting LE edema to the knees, WWP, pulses palpable throughout  NEURO: No focal deficits, AOx3  SKIN:  No rashes        T(C): 36.3 (04-09-24 @ 05:17), Max: 36.8 (04-08-24 @ 15:50)  HR: 67 (04-09-24 @ 05:17) (67 - 130)  BP: 129/75 (04-09-24 @ 05:17) (129/75 - 140/88)  RR: 18 (04-09-24 @ 05:17) (18 - 18)  SpO2: 97% (04-09-24 @ 05:17) (92% - 100%)      04-08-24 @ 07:01  -  04-09-24 @ 07:00  --------------------------------------------------------  IN: 360 mL / OUT: 0 mL / NET: 360 mL        LABS:  04-09 @ 00:34 Creatine 43 U/L [25 - 170]  04-08 @ 18:41 Creatine 51 U/L [25 - 170]  cret                        11.6   5.73  )-----------( 148      ( 08 Apr 2024 18:31 )             38.5     04-08    143  |  112<H>  |  80<H>  ----------------------------<  93  4.6   |  13<L>  |  4.73<H>    Ca    8.6      08 Apr 2024 18:41    TPro  6.2  /  Alb  3.8  /  TBili  0.4  /  DBili  x   /  AST  20  /  ALT  31  /  AlkPhos  95  04-08    PT/INR - ( 08 Apr 2024 18:31 )   PT: 11.2 sec;   INR: 1.07 ratio         PTT - ( 08 Apr 2024 18:31 )  PTT:25.9 sec      acetaminophen     Tablet .. 650 milliGRAM(s) Oral every 6 hours PRN  amoxicillin  250 milliGRAM(s)/clavulanate 1 Tablet(s) Oral every 12 hours  heparin   Injectable 5000 Unit(s) SubCutaneous every 8 hours  metoprolol tartrate 25 milliGRAM(s) Oral two times a day  ondansetron Injectable 4 milliGRAM(s) IV Push every 6 hours PRN  senna 2 Tablet(s) Oral at bedtime PRN

## 2024-04-09 NOTE — CONSULT NOTE ADULT - PROBLEM SELECTOR RECOMMENDATION 9
Pt. with renal failure in the setting of fluid overload. On review of [place], SCr noted to be persistently elevated from 0400-0958. SCr was elevated at 1.79 on 5/4/2011. SCr initially during this admission was elevated at 4.73 (4/8), and remains elevated at 4.77 today. No interim labs available for review but pt. likely has underlying CKD. UA shows proteinuria and evidence of infection (although 15 epithelial cells). No renal imaging available for review. Recommend IV Bumex 2 mg BID. Check HBsAg, HCV Ab, HIV, serum free light chains, and SIFE. Obtain kidney sonogram and echocardiogram. Monitor labs and urine output. Avoid nephrotoxins. Dose medications as per eGFR. Pt. with renal failure in the setting of fluid overload. On review of [place], SCr noted to be persistently elevated from 0514-7266. SCr was elevated at 1.79 on 5/4/2011. SCr initially during this admission was elevated at 4.73 (4/8), and remains elevated at 4.77 today. No interim labs available for review but pt. likely has underlying CKD. UA shows proteinuria and evidence of infection (although 15 epithelial cells). No renal imaging available for review. Recommend IV Bumex 2 mg BID. Check UPCR, HBsAg, HCV Ab, HIV, serum free light chains, and SIFE. Obtain kidney sonogram and echocardiogram. Monitor labs and urine output. Avoid nephrotoxins. Dose medications as per eGFR.

## 2024-04-09 NOTE — CONSULT NOTE ADULT - ASSESSMENT
73F PMHx nephrolithiasis, HTN admitted with 2 weeks of bl LE swelling and newly reduced renal function with multiple nonobstructive renal stones on CT. EP consulted for new-onset AFib w/ RVR noted on admission EKG. Patient reports asymptomatic, denies dizziness/lightheadedness, CP, SOB, palpitations. Denies any cardiac history. Received metoprolol 5mg IV x1 for AFib at rates 130s with improvement to 110s.       Afib witgh  73F PMHx nephrolithiasis, HTN admitted with 2 weeks of bl LE swelling and newly reduced renal function with multiple nonobstructive renal stones on CT. EP consulted for new-onset AFib w/ RVR noted on admission EKG. Patient reports asymptomatic, denies dizziness/lightheadedness, CP, SOB, palpitations. Denies any cardiac history. Received metoprolol 5mg IV x1 for AFib at rates 130s with improvement to 110s.       Afib with rvr     EP eval appreciated   GHeparin drip   Follow up Echo     NICO on CKD   Metabolic Acidosis   Renal consulted   Bicarb drip

## 2024-04-09 NOTE — PROGRESS NOTE ADULT - ASSESSMENT
74yo F with PMHx of nephrolithiasis and HTN presents with complaint of BL LE swelling for past few weeks. CT outpatient shows atrophic kidneys with multiple BL nonobstructing stones as well as renal cortical calcification. No hydronephrosis. Cr 4.84 on outpatient labs. Urine Cx 3/26 with >100K CNS. Patient with Afib with RVR overnight, cardiology consulted. Pending neprhology eval. No acute urology surgical plan at this time.     Plan:  - f/u nephrology consult   - f/u TTE   - continue metoprolol 25 BID, appreciate cardiology recommendations   - regular diet   - continue Augmentin   - monitor vitals/labs/I&Os closely   - DVT ppx, encourage IS

## 2024-04-09 NOTE — CHART NOTE - NSCHARTNOTEFT_GEN_A_CORE
Patient seen and examined @ 11:10. Delay in documentation due to clinical duties.      Notified by RN for bicarb noted to be <10 on AM BMP. Pt seen and examined at bedside, offers no acute complaints.  ABG drawn at bedside by NEIDA Baker, assisted by me.    Nephrology called for consulted last night, and discussed with fellow Dr. Brunson today, to give Bumex 2mg IV BID, as well as start CKD work up, with UA, UPCR, renal ultrasound pending.  Cardiology also called consulted for management of BNP and EP following for afib with RVR.      Of note, after pt seen at bedside, transport came to bring patient to echo, however she refused because she was having visitors. Discussed risks/benefits with the patient, who demonstrates understanding of importance of the test.

## 2024-04-09 NOTE — CHART NOTE - NSCHARTNOTEFT_GEN_A_CORE
D/w with Dr. Lynne. No contraindication to start heparin gtt and d/c SubQ heparin. Monitor for signs of bleeding and f/u PTT. Will endorse primary team in the AM.     Keily Cuba PA-C  Department of Medicine

## 2024-04-09 NOTE — CONSULT NOTE ADULT - PROBLEM SELECTOR RECOMMENDATION 2
Pt. with metabolic acidosis in the setting of renal failure. pH is low at 7.23, and SCO2 decreased to <10. Recommend IV diuresis, as outlined above. Monitor pH, and SCO2.    If you have any questions, please feel free to contact me  Jamari Brunson  Nephrology Fellow  [phone] / [employer] Teams(Preferred)  (After 5pm or on weekends please page the on-call fellow) Pt. with metabolic acidosis in the setting of renal failure. pH is low at 7.23, and SCO2 decreased to <10. Recommend IV diuresis, as outlined above.   Na bicarb 1300 tid   Monitor pH, and SCO2.    If you have any questions, please feel free to contact me  Jamari Brunson  Nephrology Fellow  [phone] / [employer] Teams(Preferred)  (After 5pm or on weekends please page the on-call fellow)

## 2024-04-09 NOTE — CONSULT NOTE ADULT - SUBJECTIVE AND OBJECTIVE BOX
HPI:  72yo F with PMHx of nephrolithiasis and HTN presents with complaint of BL LE swelling for past few weeks. Pt reports has been getting progressively worse and associated with some BLE soreness. Takes amlodipine for HTN. Otherwise in normal state of health. Denies fever/chills, nausea/vomiting, abd pain, flank pain, dysuria, hematuria, decreased UOP or other acute complaints.  (08 Apr 2024 17:00)      PAST MEDICAL & SURGICAL HISTORY:  Renal Calculus      Ureteral Calculus      Acute Renal Failure  9/2009      Wrist Fracture  CYNTHIA      Collar Bone Fracture      Rib Fractures      Kidney Stone removal  3/15/2011      C Section      Percutaneous Stone Extraction  Right      S/P Left Percutaneuos Stone extraction  01/26/2010, 04/20/2010          Review of Systems:   CONSTITUTIONAL: No fever, weight loss, or fatigue  EYES: No eye pain, visual disturbances, or discharge  ENMT:  No difficulty hearing, tinnitus, vertigo; No sinus or throat pain  NECK: No pain or stiffness  BREASTS: No pain, masses, or nipple discharge  RESPIRATORY: No cough, wheezing, chills or hemoptysis; No shortness of breath  CARDIOVASCULAR: No chest pain, palpitations, dizziness, or leg swelling  GASTROINTESTINAL: No abdominal or epigastric pain. No nausea, vomiting, or hematemesis; No diarrhea or constipation. No melena or hematochezia.  GENITOURINARY: No dysuria, frequency, hematuria, or incontinence  NEUROLOGICAL: No headaches, memory loss, loss of strength, numbness, or tremors  SKIN: No itching, burning, rashes, or lesions   LYMPH NODES: No enlarged glands  ENDOCRINE: No heat or cold intolerance; No hair loss  MUSCULOSKELETAL: No joint pain or swelling; No muscle, back, or extremity pain  PSYCHIATRIC: No depression, anxiety, mood swings, or difficulty sleeping  HEME/LYMPH: No easy bruising, or bleeding gums  ALLERY AND IMMUNOLOGIC: No hives or eczema    Allergies    No Known Allergies    Intolerances        Social History:     FAMILY HISTORY:      MEDICATIONS  (STANDING):  amoxicillin  250 milliGRAM(s)/clavulanate 1 Tablet(s) Oral every 12 hours  buMETAnide Injectable 2 milliGRAM(s) IV Push once  heparin   Injectable 5000 Unit(s) SubCutaneous every 8 hours  metoprolol tartrate 25 milliGRAM(s) Oral two times a day    MEDICATIONS  (PRN):  acetaminophen     Tablet .. 650 milliGRAM(s) Oral every 6 hours PRN Temp greater or equal to 38C (100.4F), Mild Pain (1 - 3)  ondansetron Injectable 4 milliGRAM(s) IV Push every 6 hours PRN Nausea and/or Vomiting  senna 2 Tablet(s) Oral at bedtime PRN Constipation        CAPILLARY BLOOD GLUCOSE        I&O's Summary    08 Apr 2024 07:01  -  09 Apr 2024 07:00  --------------------------------------------------------  IN: 360 mL / OUT: 0 mL / NET: 360 mL    09 Apr 2024 07:01  -  09 Apr 2024 13:38  --------------------------------------------------------  IN: 240 mL / OUT: 0 mL / NET: 240 mL        PHYSICAL EXAM:  GENERAL: NAD, well-developed  HEAD:  Atraumatic, Normocephalic  EYES: EOMI, conjunctiva and sclera clear  NECK: Supple, No JVD  CHEST/LUNG: Clear to auscultation bilaterally; No wheeze  HEART: Regular rate and rhythm; No murmurs, rubs, or gallops  ABDOMEN: Soft, Nontender, Nondistended; Bowel sounds present  EXTREMITIES:  2+ Peripheral Pulses, No clubbing, cyanosis, or edema  PSYCH: AAOx3  NEUROLOGY: non-focal  SKIN: No rashes or lesions    LABS:                        11.3   4.78  )-----------( 142      ( 09 Apr 2024 07:25 )             39.1     04-09    145  |  118<H>  |  84<H>  ----------------------------<  92  4.7   |  <10<LL>  |  4.77<H>    Ca    7.8<L>      09 Apr 2024 07:25    TPro  6.2  /  Alb  3.8  /  TBili  0.4  /  DBili  x   /  AST  20  /  ALT  31  /  AlkPhos  95  04-08    PT/INR - ( 08 Apr 2024 18:31 )   PT: 11.2 sec;   INR: 1.07 ratio         PTT - ( 08 Apr 2024 18:31 )  PTT:25.9 sec  CARDIAC MARKERS ( 09 Apr 2024 09:59 )  x     / x     / 37 U/L / x     / 2.5 ng/mL  CARDIAC MARKERS ( 09 Apr 2024 00:34 )  x     / x     / 43 U/L / x     / 2.5 ng/mL  CARDIAC MARKERS ( 08 Apr 2024 18:41 )  x     / x     / 51 U/L / x     / x          Urinalysis Basic - ( 09 Apr 2024 07:25 )    Color: x / Appearance: x / SG: x / pH: x  Gluc: 92 mg/dL / Ketone: x  / Bili: x / Urobili: x   Blood: x / Protein: x / Nitrite: x   Leuk Esterase: x / RBC: x / WBC x   Sq Epi: x / Non Sq Epi: x / Bacteria: x        RADIOLOGY & ADDITIONAL TESTS:    Imaging Personally Reviewed:    Consultant(s) Notes Reviewed:      Care Discussed with Consultants/Other Providers:

## 2024-04-09 NOTE — CONSULT NOTE ADULT - SUBJECTIVE AND OBJECTIVE BOX
[facility] DIVISION OF KIDNEY DISEASES AND HYPERTENSION -- [phone]  -- INITIAL CONSULT NOTE  --------------------------------------------------------------------------------  HPI: 74 yo F with h/o nephrolithiasis, and HTN currently admitted for renal failure with fluid overload. Nephrology was consulted for renal failure. On review of [facility], SCr noted to be persistently elevated from 9812-8411. SCr was elevated at 1.79 on 5/4/2011. SCr initially during this admission was elevated at 4.73 (4/8), and remains elevated at 4.77 today. No interim labs available for review but pt. likely has underlying CKD.    Pt. was seen and evaluated at bedside this morning. Pt. reports BL LE edema worsening over the past couple of weeks. Follows with urology for nephrolithiasis but no recent intervention performed. Pt. denies any prior known history of kidney disease, has never seen a nephrologist in the past. Denies any NSAID use, herbal medication use, or OTC medication use. Denies any blood in urine, or foamy urine. Denies any headaches, fevers/chills, chest pain, SOB, and abdominal pain.    PAST HISTORY  --------------------------------------------------------------------------------  PAST MEDICAL & SURGICAL HISTORY:  Renal Calculus  Ureteral Calculus  Acute Renal Failure  9/2009  Wrist Fracture  CYNTHIA  Collar Bone Fracture  Rib Fractures  Kidney Stone removal  3/15/2011  C Section  Percutaneous Stone Extraction  Right  S/P Left Percutaneuos Stone extraction  01/26/2010, 04/20/2010      FAMILY HISTORY:    PAST SOCIAL HISTORY:    ALLERGIES & MEDICATIONS  --------------------------------------------------------------------------------  Allergies    No Known Allergies    Intolerances      Standing Inpatient Medications  amoxicillin  250 milliGRAM(s)/clavulanate 1 Tablet(s) Oral every 12 hours  buMETAnide Injectable 2 milliGRAM(s) IV Push once  heparin   Injectable 5000 Unit(s) SubCutaneous every 8 hours  metoprolol tartrate 25 milliGRAM(s) Oral two times a day    PRN Inpatient Medications  acetaminophen     Tablet .. 650 milliGRAM(s) Oral every 6 hours PRN  ondansetron Injectable 4 milliGRAM(s) IV Push every 6 hours PRN  senna 2 Tablet(s) Oral at bedtime PRN      REVIEW OF SYSTEMS  --------------------------------------------------------------------------------  See HPI    VITALS/PHYSICAL EXAM  --------------------------------------------------------------------------------  T(C): 36.3 (04-09-24 @ 09:22), Max: 36.8 (04-08-24 @ 15:50)  HR: 100 (04-09-24 @ 09:22) (67 - 130)  BP: 136/85 (04-09-24 @ 09:22) (129/75 - 140/88)  RR: 18 (04-09-24 @ 09:22) (18 - 18)  SpO2: 98% (04-09-24 @ 09:22) (92% - 100%)  Wt(kg): --    04-08-24 @ 07:01  -  04-09-24 @ 07:00  --------------------------------------------------------  IN: 360 mL / OUT: 0 mL / NET: 360 mL    04-09-24 @ 07:01  -  04-09-24 @ 13:45  --------------------------------------------------------  IN: 240 mL / OUT: 0 mL / NET: 240 mL    Physical Exam:  Gen: NAD  HEENT: MMM  Pulm: CTA B/L  CV: S1S2  Abd: Soft, +BS   Ext: +BL LE edema  Neuro: Awake and alert  Skin: Warm and dry  Vascular access: Peripheral IV line    LABS/STUDIES  --------------------------------------------------------------------------------              11.3   4.78  >-----------<  142      [04-09-24 @ 07:25]              39.1     145  |  118  |  84  ----------------------------<  92      [04-09-24 @ 07:25]  4.7   |  <10  |  4.77        Ca     7.8     [04-09-24 @ 07:25]    TPro  6.2  /  Alb  3.8  /  TBili  0.4  /  DBili  x   /  AST  20  /  ALT  31  /  AlkPhos  95  [04-08-24 @ 18:41]    PT/INR: PT 11.2 , INR 1.07       [04-08-24 @ 18:31]  PTT: 25.9       [04-08-24 @ 18:31]    CK 37      [04-09-24 @ 09:59]    Creatinine Trend:  SCr 4.77 [04-09 @ 07:25]  SCr 4.73 [04-08 @ 18:41]    TSH 0.65      [04-09-24 @ 00:27]

## 2024-04-10 LAB
APTT BLD: 26.7 SEC — SIGNIFICANT CHANGE UP (ref 24.5–35.6)
APTT BLD: 48.2 SEC — HIGH (ref 24.5–35.6)
APTT BLD: >200 SEC — CRITICAL HIGH (ref 24.5–35.6)
BASE EXCESS BLDV CALC-SCNC: -13.5 MMOL/L — LOW (ref -2–3)
CA-I SERPL-SCNC: 1.11 MMOL/L — LOW (ref 1.15–1.33)
CHLORIDE BLDV-SCNC: 109 MMOL/L — HIGH (ref 96–108)
CO2 BLDV-SCNC: 15 MMOL/L — LOW (ref 22–26)
GAS PNL BLDV: 141 MMOL/L — SIGNIFICANT CHANGE UP (ref 136–145)
GAS PNL BLDV: SIGNIFICANT CHANGE UP
GLUCOSE BLDV-MCNC: 81 MG/DL — SIGNIFICANT CHANGE UP (ref 70–99)
HBV SURFACE AG SER-ACNC: SIGNIFICANT CHANGE UP
HCO3 BLDV-SCNC: 14 MMOL/L — LOW (ref 22–29)
HCT VFR BLD CALC: 36 % — SIGNIFICANT CHANGE UP (ref 34.5–45)
HCT VFR BLDA CALC: 32 % — LOW (ref 34.5–46.5)
HGB BLD CALC-MCNC: 10.7 G/DL — LOW (ref 11.7–16.1)
HGB BLD-MCNC: 10.6 G/DL — LOW (ref 11.5–15.5)
HIV 1+2 AB+HIV1 P24 AG SERPL QL IA: SIGNIFICANT CHANGE UP
KAPPA LC SER QL IFE: 5.19 MG/DL — HIGH (ref 0.33–1.94)
KAPPA/LAMBDA FREE LIGHT CHAIN RATIO, SERUM: 1.85 RATIO — HIGH (ref 0.26–1.65)
LACTATE BLDV-MCNC: 1 MMOL/L — SIGNIFICANT CHANGE UP (ref 0.5–2)
LAMBDA LC SER QL IFE: 2.81 MG/DL — HIGH (ref 0.57–2.63)
MCHC RBC-ENTMCNC: 29.4 GM/DL — LOW (ref 32–36)
MCHC RBC-ENTMCNC: 29.7 PG — SIGNIFICANT CHANGE UP (ref 27–34)
MCV RBC AUTO: 100.8 FL — HIGH (ref 80–100)
NRBC # BLD: 0 /100 WBCS — SIGNIFICANT CHANGE UP (ref 0–0)
PCO2 BLDV: 39 MMHG — SIGNIFICANT CHANGE UP (ref 39–42)
PH BLDV: 7.17 — CRITICAL LOW (ref 7.32–7.43)
PLATELET # BLD AUTO: 135 K/UL — LOW (ref 150–400)
PO2 BLDV: 29 MMHG — SIGNIFICANT CHANGE UP (ref 25–45)
POTASSIUM BLDV-SCNC: 4 MMOL/L — SIGNIFICANT CHANGE UP (ref 3.5–5.1)
RBC # BLD: 3.57 M/UL — LOW (ref 3.8–5.2)
RBC # FLD: 14.6 % — HIGH (ref 10.3–14.5)
SAO2 % BLDV: 51.3 % — LOW (ref 67–88)
WBC # BLD: 4.74 K/UL — SIGNIFICANT CHANGE UP (ref 3.8–10.5)
WBC # FLD AUTO: 4.74 K/UL — SIGNIFICANT CHANGE UP (ref 3.8–10.5)

## 2024-04-10 PROCEDURE — 99233 SBSQ HOSP IP/OBS HIGH 50: CPT | Mod: GC

## 2024-04-10 PROCEDURE — 99233 SBSQ HOSP IP/OBS HIGH 50: CPT

## 2024-04-10 RX ORDER — METOPROLOL TARTRATE 50 MG
25 TABLET ORAL EVERY 8 HOURS
Refills: 0 | Status: DISCONTINUED | OUTPATIENT
Start: 2024-04-10 | End: 2024-04-13

## 2024-04-10 RX ADMIN — HEPARIN SODIUM 2500 UNIT(S): 5000 INJECTION INTRAVENOUS; SUBCUTANEOUS at 08:33

## 2024-04-10 RX ADMIN — HEPARIN SODIUM 1200 UNIT(S)/HR: 5000 INJECTION INTRAVENOUS; SUBCUTANEOUS at 00:51

## 2024-04-10 RX ADMIN — Medication 1950 MILLIGRAM(S): at 22:35

## 2024-04-10 RX ADMIN — HEPARIN SODIUM 0 UNIT(S)/HR: 5000 INJECTION INTRAVENOUS; SUBCUTANEOUS at 17:06

## 2024-04-10 RX ADMIN — BUMETANIDE 2 MILLIGRAM(S): 0.25 INJECTION INTRAMUSCULAR; INTRAVENOUS at 12:51

## 2024-04-10 RX ADMIN — Medication 1950 MILLIGRAM(S): at 14:35

## 2024-04-10 RX ADMIN — HEPARIN SODIUM 1100 UNIT(S)/HR: 5000 INJECTION INTRAVENOUS; SUBCUTANEOUS at 19:17

## 2024-04-10 RX ADMIN — HEPARIN SODIUM 1200 UNIT(S)/HR: 5000 INJECTION INTRAVENOUS; SUBCUTANEOUS at 07:37

## 2024-04-10 RX ADMIN — Medication 1 TABLET(S): at 06:52

## 2024-04-10 RX ADMIN — HEPARIN SODIUM 1100 UNIT(S)/HR: 5000 INJECTION INTRAVENOUS; SUBCUTANEOUS at 18:15

## 2024-04-10 RX ADMIN — Medication 25 MILLIGRAM(S): at 06:52

## 2024-04-10 RX ADMIN — HEPARIN SODIUM 1100 UNIT(S)/HR: 5000 INJECTION INTRAVENOUS; SUBCUTANEOUS at 22:38

## 2024-04-10 RX ADMIN — Medication 1950 MILLIGRAM(S): at 06:52

## 2024-04-10 RX ADMIN — BUMETANIDE 2 MILLIGRAM(S): 0.25 INJECTION INTRAMUSCULAR; INTRAVENOUS at 00:43

## 2024-04-10 RX ADMIN — Medication 1 TABLET(S): at 17:37

## 2024-04-10 RX ADMIN — HEPARIN SODIUM 1300 UNIT(S)/HR: 5000 INJECTION INTRAVENOUS; SUBCUTANEOUS at 08:10

## 2024-04-10 RX ADMIN — Medication 25 MILLIGRAM(S): at 22:35

## 2024-04-10 NOTE — PROGRESS NOTE ADULT - ASSESSMENT
73F PMHx nephrolithiasis, HTN admitted with 2 weeks of bl LE swelling and newly reduced renal function with multiple nonobstructive renal stones on CT. EP consulted for new-onset AFib w/ RVR noted on admission EKG. Patient reports asymptomatic, denies dizziness/lightheadedness, CP, SOB, palpitations. Denies any cardiac history. Received metoprolol 5mg IV x1 for AFib at rates 130s with improvement to 110s.       Afib with rvr     EP eval appreciated   GHeparin drip   Follow up Echo  Increased Metorprolol ID      NICO on CKD   Metabolic Acidosis   Renal consulted   Recommend IV diuresis, Continue Na bicarb 1300 tid

## 2024-04-10 NOTE — PROGRESS NOTE ADULT - SUBJECTIVE AND OBJECTIVE BOX
[facility] Division of Kidney Diseases & Hypertension  FOLLOW UP NOTE  [phone]--------------------------------------------------------------------------------    Chief Complaint: NICO on CKD vs progressive CKD    24 hour events/subjective: Pt. was seen and evaluated at bedside this morning. Pt. reports feeling well, offers no complaints. Endorses good uop. Denies any headaches, fevers/chills, chest pain, and SOB.    PAST HISTORY  --------------------------------------------------------------------------------  No significant changes to PMH, PSH, FHx, SHx, unless otherwise noted    ALLERGIES & MEDICATIONS  --------------------------------------------------------------------------------  Allergies    No Known Allergies    Intolerances      Standing Inpatient Medications  amoxicillin  250 milliGRAM(s)/clavulanate 1 Tablet(s) Oral every 12 hours  buMETAnide Injectable 2 milliGRAM(s) IV Push every 12 hours  heparin  Infusion.  Unit(s)/Hr IV Continuous <Continuous>  metoprolol tartrate 25 milliGRAM(s) Oral two times a day  sodium bicarbonate 1950 milliGRAM(s) Oral three times a day    PRN Inpatient Medications  acetaminophen     Tablet .. 650 milliGRAM(s) Oral every 6 hours PRN  heparin   Injectable 5500 Unit(s) IV Push every 6 hours PRN  heparin   Injectable 2500 Unit(s) IV Push every 6 hours PRN  ondansetron Injectable 4 milliGRAM(s) IV Push every 6 hours PRN  senna 2 Tablet(s) Oral at bedtime PRN      REVIEW OF SYSTEMS  --------------------------------------------------------------------------------  See HPI    VITALS/PHYSICAL EXAM  --------------------------------------------------------------------------------  T(C): 36.3 (04-10-24 @ 08:57), Max: 36.8 (04-10-24 @ 06:26)  HR: 98 (04-10-24 @ 08:57) (94 - 105)  BP: 118/78 (04-10-24 @ 08:57) (118/78 - 137/93)  RR: 18 (04-10-24 @ 08:57) (18 - 18)  SpO2: 100% (04-10-24 @ 08:57) (93% - 100%)  Wt(kg): --    Weight (kg): 66.6 (04-09-24 @ 22:30)      04-09-24 @ 07:01  -  04-10-24 @ 07:00  --------------------------------------------------------  IN: 1076 mL / OUT: 450 mL / NET: 626 mL    04-10-24 @ 07:01  -  04-10-24 @ 10:41  --------------------------------------------------------  IN: 39 mL / OUT: 250 mL / NET: -211 mL    Physical Exam:  Gen: NAD  HEENT: MMM  Pulm: CTA B/L  CV: S1S2  Abd: Soft, +BS   Ext: +BL LE edema  Neuro: Awake and alert  Skin: Warm and dry  Vascular access: Peripheral IV line    LABS/STUDIES  --------------------------------------------------------------------------------              10.6   4.74  >-----------<  135      [04-10-24 @ 07:11]              36.0     141  |  111  |  81  ----------------------------<  102      [04-09-24 @ 20:21]  4.2   |  13  |  4.72        Ca     8.3     [04-09-24 @ 20:21]    TPro  6.0  /  Alb  3.7  /  TBili  0.3  /  DBili  x   /  AST  19  /  ALT  30  /  AlkPhos  92  [04-09-24 @ 20:21]    PT/INR: PT 11.2 , INR 1.07       [04-08-24 @ 18:31]  PTT: 48.2       [04-10-24 @ 07:11]    CK 37      [04-09-24 @ 09:59]    Creatinine Trend:  SCr 4.72 [04-09 @ 20:21]  SCr 4.77 [04-09 @ 07:25]  SCr 4.73 [04-08 @ 18:41]    Urine Creatinine 35      [04-09-24 @ 22:41]  Urine Protein 40      [04-09-24 @ 22:41]    TSH 0.65      [04-09-24 @ 00:27]    HBsAg Nonreact      [04-10-24 @ 07:11]  HCV 0.05, Nonreact      [04-09-24 @ 07:25]  HIV Nonreact      [04-09-24 @ 20:21]

## 2024-04-10 NOTE — PROGRESS NOTE ADULT - SUBJECTIVE AND OBJECTIVE BOX
HPI:  74yo F with PMHx of nephrolithiasis and HTN presents with complaint of BL LE swelling for past few weeks. Pt reports has been getting progressively worse and associated with some BLE soreness. Takes amlodipine for HTN. Otherwise in normal state of health. Denies fever/chills, nausea/vomiting, abd pain, flank pain, dysuria, hematuria, decreased UOP or other acute complaints.  (08 Apr 2024 17:00)      PAST MEDICAL & SURGICAL HISTORY:  Renal Calculus      Ureteral Calculus      Acute Renal Failure  9/2009      Wrist Fracture  CNYTHIA      Collar Bone Fracture      Rib Fractures      Kidney Stone removal  3/15/2011      C Section      Percutaneous Stone Extraction  Right      S/P Left Percutaneuos Stone extraction  01/26/2010, 04/20/2010          Review of Systems:   CONSTITUTIONAL: No fever, weight loss, or fatigue  EYES: No eye pain, visual disturbances, or discharge  ENMT:  No difficulty hearing, tinnitus, vertigo; No sinus or throat pain  NECK: No pain or stiffness  BREASTS: No pain, masses, or nipple discharge  RESPIRATORY: No cough, wheezing, chills or hemoptysis; No shortness of breath  CARDIOVASCULAR: No chest pain, palpitations, dizziness, or leg swelling  GASTROINTESTINAL: No abdominal or epigastric pain. No nausea, vomiting, or hematemesis; No diarrhea or constipation. No melena or hematochezia.  GENITOURINARY: No dysuria, frequency, hematuria, or incontinence  NEUROLOGICAL: No headaches, memory loss, loss of strength, numbness, or tremors  SKIN: No itching, burning, rashes, or lesions   LYMPH NODES: No enlarged glands  ENDOCRINE: No heat or cold intolerance; No hair loss  MUSCULOSKELETAL: No joint pain or swelling; No muscle, back, or extremity pain  PSYCHIATRIC: No depression, anxiety, mood swings, or difficulty sleeping  HEME/LYMPH: No easy bruising, or bleeding gums  ALLERY AND IMMUNOLOGIC: No hives or eczema    Allergies    No Known Allergies    Intolerances        Social History:     FAMILY HISTORY:      MEDICATIONS  (STANDING):  amoxicillin  250 milliGRAM(s)/clavulanate 1 Tablet(s) Oral every 12 hours  buMETAnide Injectable 2 milliGRAM(s) IV Push once  heparin   Injectable 5000 Unit(s) SubCutaneous every 8 hours  metoprolol tartrate 25 milliGRAM(s) Oral two times a day    MEDICATIONS  (PRN):  acetaminophen     Tablet .. 650 milliGRAM(s) Oral every 6 hours PRN Temp greater or equal to 38C (100.4F), Mild Pain (1 - 3)  ondansetron Injectable 4 milliGRAM(s) IV Push every 6 hours PRN Nausea and/or Vomiting  senna 2 Tablet(s) Oral at bedtime PRN Constipation        CAPILLARY BLOOD GLUCOSE        I&O's Summary    08 Apr 2024 07:01  -  09 Apr 2024 07:00  --------------------------------------------------------  IN: 360 mL / OUT: 0 mL / NET: 360 mL    09 Apr 2024 07:01  -  09 Apr 2024 13:38  --------------------------------------------------------  IN: 240 mL / OUT: 0 mL / NET: 240 mL        PHYSICAL EXAM:  GENERAL: NAD, well-developed  HEAD:  Atraumatic, Normocephalic  EYES: EOMI, conjunctiva and sclera clear  NECK: Supple, No JVD  CHEST/LUNG: Clear to auscultation bilaterally; No wheeze  HEART: Regular rate and rhythm; No murmurs, rubs, or gallops  ABDOMEN: Soft, Nontender, Nondistended; Bowel sounds present  EXTREMITIES:  2+ Peripheral Pulses, No clubbing, cyanosis, or edema  PSYCH: AAOx3  NEUROLOGY: non-focal  SKIN: No rashes or lesions    LABS:                        11.3   4.78  )-----------( 142      ( 09 Apr 2024 07:25 )             39.1     04-09    145  |  118<H>  |  84<H>  ----------------------------<  92  4.7   |  <10<LL>  |  4.77<H>    Ca    7.8<L>      09 Apr 2024 07:25    TPro  6.2  /  Alb  3.8  /  TBili  0.4  /  DBili  x   /  AST  20  /  ALT  31  /  AlkPhos  95  04-08    PT/INR - ( 08 Apr 2024 18:31 )   PT: 11.2 sec;   INR: 1.07 ratio         PTT - ( 08 Apr 2024 18:31 )  PTT:25.9 sec  CARDIAC MARKERS ( 09 Apr 2024 09:59 )  x     / x     / 37 U/L / x     / 2.5 ng/mL  CARDIAC MARKERS ( 09 Apr 2024 00:34 )  x     / x     / 43 U/L / x     / 2.5 ng/mL  CARDIAC MARKERS ( 08 Apr 2024 18:41 )  x     / x     / 51 U/L / x     / x          Urinalysis Basic - ( 09 Apr 2024 07:25 )    Color: x / Appearance: x / SG: x / pH: x  Gluc: 92 mg/dL / Ketone: x  / Bili: x / Urobili: x   Blood: x / Protein: x / Nitrite: x   Leuk Esterase: x / RBC: x / WBC x   Sq Epi: x / Non Sq Epi: x / Bacteria: x        RADIOLOGY & ADDITIONAL TESTS:    Imaging Personally Reviewed:    Consultant(s) Notes Reviewed:      Care Discussed with Consultants/Other Providers:

## 2024-04-10 NOTE — PROGRESS NOTE ADULT - SUBJECTIVE AND OBJECTIVE BOX
INCOMPLETE    Patient seen and examined at bedside.    Overnight Events:     REVIEW OF SYSTEMS:  as above.        Current Meds:  acetaminophen     Tablet .. 650 milliGRAM(s) Oral every 6 hours PRN  amoxicillin  250 milliGRAM(s)/clavulanate 1 Tablet(s) Oral every 12 hours  buMETAnide Injectable 2 milliGRAM(s) IV Push every 12 hours  heparin   Injectable 2500 Unit(s) IV Push every 6 hours PRN  heparin   Injectable 5500 Unit(s) IV Push every 6 hours PRN  heparin  Infusion.  Unit(s)/Hr IV Continuous <Continuous>  metoprolol tartrate 25 milliGRAM(s) Oral two times a day  ondansetron Injectable 4 milliGRAM(s) IV Push every 6 hours PRN  senna 2 Tablet(s) Oral at bedtime PRN  sodium bicarbonate 1950 milliGRAM(s) Oral three times a day      Vitals:  T(F): 98.2 (04-10), Max: 98.2 (04-10)  HR: 94 (04-10) (94 - 105)  BP: 122/84 (04-10) (119/76 - 137/93)  RR: 18 (04-10)  SpO2: 99% (04-10)  I&O's Summary    09 Apr 2024 07:01  -  10 Apr 2024 07:00  --------------------------------------------------------  IN: 1076 mL / OUT: 450 mL / NET: 626 mL        Physical Exam:  GEN: comfortable appearing, lying in bed in NAD  HEENT: NCAT, MMM  CV: tachycardic, irregular, no m/r/g  RESP: CTAB  ABD: Soft, NTND, +BS  EXT: 3+ bl pitting LE edema to the knees, WWP, pulses palpable throughout  NEURO: No focal deficits, AOx3  SKIN:  No rashes                          10.6   4.74  )-----------( 135      ( 10 Apr 2024 07:11 )             36.0     04-09    141  |  111<H>  |  81<H>  ----------------------------<  102<H>  4.2   |  13<L>  |  4.72<H>    Ca    8.3<L>      09 Apr 2024 20:21    TPro  6.0  /  Alb  3.7  /  TBili  0.3  /  DBili  x   /  AST  19  /  ALT  30  /  AlkPhos  92  04-09    PT/INR - ( 08 Apr 2024 18:31 )   PT: 11.2 sec;   INR: 1.07 ratio         PTT - ( 10 Apr 2024 07:11 )  PTT:48.2 sec  CARDIAC MARKERS ( 09 Apr 2024 09:59 )  x     / x     / 37 U/L / x     / 2.5 ng/mL  CARDIAC MARKERS ( 09 Apr 2024 00:34 )  x     / x     / 43 U/L / x     / 2.5 ng/mL  CARDIAC MARKERS ( 08 Apr 2024 18:41 )  x     / x     / 51 U/L / x     / x            EKG:   AFib 120s, no ischemic changes    Echo:   2009  1. Mitral annular calcification, and calcified mitral  leaflets with normal diastolic opening. Mild-moderate  mitral regurgitation.  2. Mildly calcified trileaflet aortic valve with normal  opening. Mild aortic regurgitation.  3. Moderate left atrial enlargement.  4. Normal left ventricular internal dimensions and wall  thicknesses.  5. Normal left ventricular systolic function. No segmental  wall motion abnormalities. EF=65%.  6. Normal right ventricular size and systolic function.  7. Mild-moderate tricuspid regurgitation. Estimated  pulmonary artery systolic pressure equals 35 mm Hg,  assuming right atrial pressure equals 10  mm Hg, consistent  with borderline pulmonary hypertension.   Patient seen and examined at bedside.    Overnight Events:   JULISA   Remains in atrial fibrillation on telemetry. HR appears to be better controlled in the 90s.   Denies any acute complaints this AM. No chest pain, SOB, palpitations, diaphoresis, nausea, vomiting, and dizziness.   Pending TTE.   Now on heparin gtt.     REVIEW OF SYSTEMS:  as above.        Current Meds:  acetaminophen     Tablet .. 650 milliGRAM(s) Oral every 6 hours PRN  amoxicillin  250 milliGRAM(s)/clavulanate 1 Tablet(s) Oral every 12 hours  buMETAnide Injectable 2 milliGRAM(s) IV Push every 12 hours  heparin   Injectable 2500 Unit(s) IV Push every 6 hours PRN  heparin   Injectable 5500 Unit(s) IV Push every 6 hours PRN  heparin  Infusion.  Unit(s)/Hr IV Continuous <Continuous>  metoprolol tartrate 25 milliGRAM(s) Oral two times a day  ondansetron Injectable 4 milliGRAM(s) IV Push every 6 hours PRN  senna 2 Tablet(s) Oral at bedtime PRN  sodium bicarbonate 1950 milliGRAM(s) Oral three times a day      Vitals:  T(F): 98.2 (04-10), Max: 98.2 (04-10)  HR: 94 (04-10) (94 - 105)  BP: 122/84 (04-10) (119/76 - 137/93)  RR: 18 (04-10)  SpO2: 99% (04-10)  I&O's Summary    09 Apr 2024 07:01  -  10 Apr 2024 07:00  --------------------------------------------------------  IN: 1076 mL / OUT: 450 mL / NET: 626 mL        Physical Exam:  GEN: comfortable appearing, lying in bed in NAD  HEENT: NCAT, MMM  CV: tachycardic, irregular, no m/r/g  RESP: CTAB  ABD: Soft, NTND, +BS  EXT: 3+ bl pitting LE edema to the knees, WWP, pulses palpable throughout  NEURO: No focal deficits, AOx3  SKIN:  No rashes                          10.6   4.74  )-----------( 135      ( 10 Apr 2024 07:11 )             36.0     04-09    141  |  111<H>  |  81<H>  ----------------------------<  102<H>  4.2   |  13<L>  |  4.72<H>    Ca    8.3<L>      09 Apr 2024 20:21    TPro  6.0  /  Alb  3.7  /  TBili  0.3  /  DBili  x   /  AST  19  /  ALT  30  /  AlkPhos  92  04-09    PT/INR - ( 08 Apr 2024 18:31 )   PT: 11.2 sec;   INR: 1.07 ratio         PTT - ( 10 Apr 2024 07:11 )  PTT:48.2 sec  CARDIAC MARKERS ( 09 Apr 2024 09:59 )  x     / x     / 37 U/L / x     / 2.5 ng/mL  CARDIAC MARKERS ( 09 Apr 2024 00:34 )  x     / x     / 43 U/L / x     / 2.5 ng/mL  CARDIAC MARKERS ( 08 Apr 2024 18:41 )  x     / x     / 51 U/L / x     / x            EKG:   AFib 120s, no ischemic changes    Echo:   2009  1. Mitral annular calcification, and calcified mitral  leaflets with normal diastolic opening. Mild-moderate  mitral regurgitation.  2. Mildly calcified trileaflet aortic valve with normal  opening. Mild aortic regurgitation.  3. Moderate left atrial enlargement.  4. Normal left ventricular internal dimensions and wall  thicknesses.  5. Normal left ventricular systolic function. No segmental  wall motion abnormalities. EF=65%.  6. Normal right ventricular size and systolic function.  7. Mild-moderate tricuspid regurgitation. Estimated  pulmonary artery systolic pressure equals 35 mm Hg,  assuming right atrial pressure equals 10  mm Hg, consistent  with borderline pulmonary hypertension.

## 2024-04-10 NOTE — PROGRESS NOTE ADULT - ASSESSMENT
73F PMHx nephrolithiasis, HTN admitted with new onset bl LE swelling and renal dysfunction. Cardiology consulted for asymptomatic new-onset AFib w/ RVR. hsTrop wnl. TTE pending. Ddx for new-onset LE edema includes CHF although nephrotic syndrome is more concerning given new-onset renal dysfunction. Unclear cause of new-onset AFib, will need TTE to evaluate for structural causes but may be in setting of acute stressor and volume overload.     Recommendations:   -increase metoprolol to 25mg q8h for rate control, goal <110   -TTE pending    -given elevated CHADSVASC, maintain on heparin gtt for full AC   -would not give amiodarone given unknown duration of atrial fibrillation     Please see attending attestation for final recommendations.    73F PMHx nephrolithiasis, HTN admitted with new onset bl LE swelling and renal dysfunction. Cardiology consulted for asymptomatic new-onset AFib w/ RVR. hsTrop wnl. TTE pending.     Overall patient initially came in with volume overload likely in the setting of renal dysfunction that appears to be acute on chronic. She has no known heart failure, but has not been following regularly with a PCP. Renal on board to assist with etiology of renal disease. Given normal albumin and relatively low urine P/Cr I think nephrotic syndrome is less likely. Regardless, her atrial fibrillation is new this admission. Given that she is asymptomatic, it is unclear how long she has had it. Would recommend managing conservatively until she is medical stable from an acid base standpoint and the etiology of her renal disease is known.      Recommendations:   -increase metoprolol to 25mg q8h for rate control, goal <110   -TTE pending    -given elevated CHADSVASC, maintain on heparin gtt for full AC   -would not give amiodarone given unknown duration of atrial fibrillation     Please see attending attestation for final recommendations.     Dl Pruett MD  Cardiology Fellow

## 2024-04-10 NOTE — PROGRESS NOTE ADULT - PROBLEM SELECTOR PLAN 2
Pt. with metabolic acidosis in the setting of renal failure. pH remains low at 7.17, and SCO2 was low but improved to 13 yesterday. Recommend IV diuresis, as outlined above.   Continue Na bicarb 1300 tid   Monitor pH, and SCO2.    If you have any questions, please feel free to contact me  Jamari Brunson  Nephrology Fellow  [phone] / [employer] Teams(Preferred)  (After 5pm or on weekends please page the on-call fellow).

## 2024-04-10 NOTE — PROGRESS NOTE ADULT - PROBLEM SELECTOR PLAN 1
Pt. with renal failure in the setting of fluid overload. On review of [place], SCr noted to be persistently elevated from [id number]. SCr was elevated at 1.79 on 5/4/2011. SCr initially during this admission was elevated at 4.73 (4/8), and remains elevated/stable at 4.72 yesterday (4/9). No interim labs available for review but pt. likely has underlying CKD. Labs from today are pending. Documented urine output is 450 cc over the past 24 hours. UA showed proteinuria and evidence of infection (although 15 epithelial cells). UPCR is elevated at 1.1. Kidney sonogram showed evidence of medical renal disease, BL non-obstructing stones, and BL renal cysts. Recommend to continue IV Bumex 2 mg BID. Labs pending result: HBsAg, HCV Ab, HIV, serum free light chains, and SIFE. Monitor labs and urine output. Avoid nephrotoxins. Dose medications as per eGFR.

## 2024-04-11 ENCOUNTER — RESULT REVIEW (OUTPATIENT)
Age: 74
End: 2024-04-11

## 2024-04-11 LAB
-  AMOXICILLIN/CLAVULANIC ACID: SIGNIFICANT CHANGE UP
-  AMOXICILLIN/CLAVULANIC ACID: SIGNIFICANT CHANGE UP
-  AMPICILLIN/SULBACTAM: SIGNIFICANT CHANGE UP
-  AMPICILLIN/SULBACTAM: SIGNIFICANT CHANGE UP
-  AMPICILLIN: SIGNIFICANT CHANGE UP
-  AMPICILLIN: SIGNIFICANT CHANGE UP
-  AZTREONAM: SIGNIFICANT CHANGE UP
-  AZTREONAM: SIGNIFICANT CHANGE UP
-  CEFAZOLIN: SIGNIFICANT CHANGE UP
-  CEFAZOLIN: SIGNIFICANT CHANGE UP
-  CEFEPIME: SIGNIFICANT CHANGE UP
-  CEFEPIME: SIGNIFICANT CHANGE UP
-  CEFOXITIN: SIGNIFICANT CHANGE UP
-  CEFOXITIN: SIGNIFICANT CHANGE UP
-  CEFTRIAXONE: SIGNIFICANT CHANGE UP
-  CEFTRIAXONE: SIGNIFICANT CHANGE UP
-  CEFUROXIME: SIGNIFICANT CHANGE UP
-  CIPROFLOXACIN: SIGNIFICANT CHANGE UP
-  CIPROFLOXACIN: SIGNIFICANT CHANGE UP
-  ERTAPENEM: SIGNIFICANT CHANGE UP
-  ERTAPENEM: SIGNIFICANT CHANGE UP
-  GENTAMICIN: SIGNIFICANT CHANGE UP
-  GENTAMICIN: SIGNIFICANT CHANGE UP
-  IMIPENEM: SIGNIFICANT CHANGE UP
-  LEVOFLOXACIN: SIGNIFICANT CHANGE UP
-  LEVOFLOXACIN: SIGNIFICANT CHANGE UP
-  MEROPENEM: SIGNIFICANT CHANGE UP
-  MEROPENEM: SIGNIFICANT CHANGE UP
-  NITROFURANTOIN: SIGNIFICANT CHANGE UP
-  NITROFURANTOIN: SIGNIFICANT CHANGE UP
-  PIPERACILLIN/TAZOBACTAM: SIGNIFICANT CHANGE UP
-  PIPERACILLIN/TAZOBACTAM: SIGNIFICANT CHANGE UP
-  TOBRAMYCIN: SIGNIFICANT CHANGE UP
-  TOBRAMYCIN: SIGNIFICANT CHANGE UP
-  TRIMETHOPRIM/SULFAMETHOXAZOLE: SIGNIFICANT CHANGE UP
-  TRIMETHOPRIM/SULFAMETHOXAZOLE: SIGNIFICANT CHANGE UP
ANION GAP SERPL CALC-SCNC: 18 MMOL/L — HIGH (ref 5–17)
APTT BLD: 149.7 SEC — CRITICAL HIGH (ref 24.5–35.6)
APTT BLD: 47.7 SEC — HIGH (ref 24.5–35.6)
APTT BLD: >200 SEC — CRITICAL HIGH (ref 24.5–35.6)
BASE EXCESS BLDV CALC-SCNC: -8.2 MMOL/L — LOW (ref -2–3)
BUN SERPL-MCNC: 82 MG/DL — HIGH (ref 7–23)
CALCIUM SERPL-MCNC: 7.8 MG/DL — LOW (ref 8.4–10.5)
CHLORIDE SERPL-SCNC: 113 MMOL/L — HIGH (ref 96–108)
CO2 BLDV-SCNC: 21 MMOL/L — LOW (ref 22–26)
CO2 SERPL-SCNC: 16 MMOL/L — LOW (ref 22–31)
CREAT SERPL-MCNC: 4.65 MG/DL — HIGH (ref 0.5–1.3)
CULTURE RESULTS: ABNORMAL
EGFR: 9 ML/MIN/1.73M2 — LOW
GAS PNL BLDV: SIGNIFICANT CHANGE UP
GLUCOSE SERPL-MCNC: 105 MG/DL — HIGH (ref 70–99)
HCO3 BLDV-SCNC: 19 MMOL/L — LOW (ref 22–29)
HCT VFR BLD CALC: 34.3 % — LOW (ref 34.5–45)
HGB BLD-MCNC: 10.2 G/DL — LOW (ref 11.5–15.5)
MAGNESIUM SERPL-MCNC: 2 MG/DL — SIGNIFICANT CHANGE UP (ref 1.6–2.6)
MCHC RBC-ENTMCNC: 29.3 PG — SIGNIFICANT CHANGE UP (ref 27–34)
MCHC RBC-ENTMCNC: 29.7 GM/DL — LOW (ref 32–36)
MCV RBC AUTO: 98.6 FL — SIGNIFICANT CHANGE UP (ref 80–100)
METHOD TYPE: SIGNIFICANT CHANGE UP
METHOD TYPE: SIGNIFICANT CHANGE UP
NRBC # BLD: 0 /100 WBCS — SIGNIFICANT CHANGE UP (ref 0–0)
ORGANISM # SPEC MICROSCOPIC CNT: ABNORMAL
PCO2 BLDV: 46 MMHG — HIGH (ref 39–42)
PH BLDV: 7.23 — LOW (ref 7.32–7.43)
PHOSPHATE SERPL-MCNC: 4.9 MG/DL — HIGH (ref 2.5–4.5)
PLATELET # BLD AUTO: 142 K/UL — LOW (ref 150–400)
PO2 BLDV: 26 MMHG — SIGNIFICANT CHANGE UP (ref 25–45)
POTASSIUM SERPL-MCNC: 4.1 MMOL/L — SIGNIFICANT CHANGE UP (ref 3.5–5.3)
POTASSIUM SERPL-SCNC: 4.1 MMOL/L — SIGNIFICANT CHANGE UP (ref 3.5–5.3)
RBC # BLD: 3.48 M/UL — LOW (ref 3.8–5.2)
RBC # FLD: 14.5 % — SIGNIFICANT CHANGE UP (ref 10.3–14.5)
SAO2 % BLDV: 46.8 % — LOW (ref 67–88)
SODIUM SERPL-SCNC: 147 MMOL/L — HIGH (ref 135–145)
SPECIMEN SOURCE: SIGNIFICANT CHANGE UP
WBC # BLD: 4.49 K/UL — SIGNIFICANT CHANGE UP (ref 3.8–10.5)
WBC # FLD AUTO: 4.49 K/UL — SIGNIFICANT CHANGE UP (ref 3.8–10.5)

## 2024-04-11 PROCEDURE — 99232 SBSQ HOSP IP/OBS MODERATE 35: CPT | Mod: GC

## 2024-04-11 PROCEDURE — 93306 TTE W/DOPPLER COMPLETE: CPT | Mod: 26

## 2024-04-11 PROCEDURE — 99233 SBSQ HOSP IP/OBS HIGH 50: CPT

## 2024-04-11 RX ADMIN — Medication 25 MILLIGRAM(S): at 22:09

## 2024-04-11 RX ADMIN — HEPARIN SODIUM 0 UNIT(S)/HR: 5000 INJECTION INTRAVENOUS; SUBCUTANEOUS at 11:51

## 2024-04-11 RX ADMIN — HEPARIN SODIUM 900 UNIT(S)/HR: 5000 INJECTION INTRAVENOUS; SUBCUTANEOUS at 02:21

## 2024-04-11 RX ADMIN — Medication 1950 MILLIGRAM(S): at 05:40

## 2024-04-11 RX ADMIN — Medication 1 TABLET(S): at 05:40

## 2024-04-11 RX ADMIN — Medication 1950 MILLIGRAM(S): at 13:36

## 2024-04-11 RX ADMIN — HEPARIN SODIUM 800 UNIT(S)/HR: 5000 INJECTION INTRAVENOUS; SUBCUTANEOUS at 20:39

## 2024-04-11 RX ADMIN — Medication 25 MILLIGRAM(S): at 05:40

## 2024-04-11 RX ADMIN — HEPARIN SODIUM 2500 UNIT(S): 5000 INJECTION INTRAVENOUS; SUBCUTANEOUS at 20:55

## 2024-04-11 RX ADMIN — HEPARIN SODIUM 700 UNIT(S)/HR: 5000 INJECTION INTRAVENOUS; SUBCUTANEOUS at 13:24

## 2024-04-11 RX ADMIN — Medication 1950 MILLIGRAM(S): at 22:09

## 2024-04-11 RX ADMIN — HEPARIN SODIUM 0 UNIT(S)/HR: 5000 INJECTION INTRAVENOUS; SUBCUTANEOUS at 01:18

## 2024-04-11 RX ADMIN — Medication 1 TABLET(S): at 18:05

## 2024-04-11 RX ADMIN — Medication 25 MILLIGRAM(S): at 14:42

## 2024-04-11 RX ADMIN — BUMETANIDE 2 MILLIGRAM(S): 0.25 INJECTION INTRAMUSCULAR; INTRAVENOUS at 13:36

## 2024-04-11 RX ADMIN — BUMETANIDE 2 MILLIGRAM(S): 0.25 INJECTION INTRAMUSCULAR; INTRAVENOUS at 00:07

## 2024-04-11 NOTE — PROGRESS NOTE ADULT - PROBLEM SELECTOR PLAN 2
Pt. with metabolic acidosis in the setting of renal failure.  Recommend IV diuresis, as outlined above.   Continue Na bicarb 1300 tid   Monitor pH, and SCO2.  Please send labs today

## 2024-04-11 NOTE — PROGRESS NOTE ADULT - ASSESSMENT
73F PMHx nephrolithiasis, HTN admitted with new onset bl LE swelling and renal dysfunction. Cardiology consulted for asymptomatic new-onset AFib w/ RVR. hsTrop wnl. TTE pending.     Overall patient initially came in with volume overload likely in the setting of renal dysfunction that appears to be acute on chronic. She has no known heart failure, but has not been following regularly with a PCP. Renal on board to assist with etiology of renal disease. Regardless, her atrial fibrillation is new this admission. Given that she is asymptomatic, it is unclear how long she has had it. Would recommend managing conservatively until she is medical stable from an acid base standpoint and the etiology of her renal disease is known.      Recommendations:   -increase metoprolol to 25mg q8h for rate control, goal <110. Rates appear better controlled today   -TTE pending    -given elevated CHADSVASC, maintain on heparin gtt for full AC at this time until she can be placed on oral AC  -would not give amiodarone given unknown duration of atrial fibrillation to avoid chemical cardioversion 73F PMHx nephrolithiasis, HTN admitted with new onset bl LE swelling and renal dysfunction. Cardiology consulted for asymptomatic new-onset AFib w/ RVR. hsTrop wnl. TTE pending.     Overall patient initially came in with volume overload likely in the setting of renal dysfunction that appears to be acute on chronic. She has no known heart failure, but has not been following regularly with a PCP. Renal on board to assist with etiology of renal disease. Regardless, her atrial fibrillation is new this admission. Given that she is asymptomatic, it is unclear how long she has had it. Would recommend managing conservatively until she is medical stable from an acid base standpoint and the etiology of her renal disease is known.      Recommendations:   -increase metoprolol to 25mg q8h for rate control, goal <110. Rates appear better controlled today   -TTE pending    -given elevated CHADSVASC, maintain on heparin gtt for full AC at this time until she can be placed on oral AC  -would not give amiodarone given unknown duration of atrial fibrillation to avoid chemical cardioversion    Addendum:  Pt with LVEF 37%, severe MR, moderate TR, severely dilated LA/RA.  Cardiology consult called.  Will need optimization prior to rhythm control strategy noting her new dx of HFrEF and severe MR.  If pt is to undergo  for MR workup, could consider DCCV + amiodarone at the time for AF management.

## 2024-04-11 NOTE — PROVIDER CONTACT NOTE (CRITICAL VALUE NOTIFICATION) - ASSESSMENT
Pt VSS, Pt has no complaints
Pt has no complaints, VSS
A&Ox4. No active s/s of bleeding. Patient resting comfortably.
A&Ox4. No complaints of pain or discomfort.
Pt AxO4. Pt on tele in AFIB. Pt denies HA, dizziness, CP, arm pain, jaw pain, SOB, difficulty breathing.

## 2024-04-11 NOTE — PROGRESS NOTE ADULT - SUBJECTIVE AND OBJECTIVE BOX
[facility] DIVISION OF KIDNEY DISEASES AND HYPERTENSION --    24 hour events/subjective:    pt doing ok    PAST HISTORY  --------------------------------------------------------------------------------  No significant changes to PMH, PSH, FHx, SHx, unless otherwise noted    ALLERGIES & MEDICATIONS  --------------------------------------------------------------------------------  Allergies    No Known Allergies    Intolerances      Standing Inpatient Medications  amoxicillin  250 milliGRAM(s)/clavulanate 1 Tablet(s) Oral every 12 hours  buMETAnide Injectable 2 milliGRAM(s) IV Push every 12 hours  heparin  Infusion.  Unit(s)/Hr IV Continuous <Continuous>  metoprolol tartrate 25 milliGRAM(s) Oral every 8 hours  sodium bicarbonate 1950 milliGRAM(s) Oral three times a day    PRN Inpatient Medications  acetaminophen     Tablet .. 650 milliGRAM(s) Oral every 6 hours PRN  heparin   Injectable 2500 Unit(s) IV Push every 6 hours PRN  heparin   Injectable 5500 Unit(s) IV Push every 6 hours PRN  ondansetron Injectable 4 milliGRAM(s) IV Push every 6 hours PRN  senna 2 Tablet(s) Oral at bedtime PRN      REVIEW OF SYSTEMS  --------------------------------------------------------------------------------    All other systems were reviewed and are negative, except as noted.    VITALS/PHYSICAL EXAM  --------------------------------------------------------------------------------  T(C): 36.3 (04-11-24 @ 09:16), Max: 36.7 (04-10-24 @ 17:06)  HR: 100 (04-11-24 @ 09:16) (100 - 108)  BP: 115/84 (04-11-24 @ 09:16) (112/61 - 137/83)  RR: 16 (04-11-24 @ 09:16) (16 - 18)  SpO2: 98% (04-11-24 @ 09:16) (97% - 99%)  Wt(kg): --    Weight (kg): 66.6 (04-09-24 @ 22:30)      04-10-24 @ 07:01  -  04-11-24 @ 07:00  --------------------------------------------------------  IN: 826 mL / OUT: 800 mL / NET: 26 mL    04-11-24 @ 07:01  -  04-11-24 @ 10:48  --------------------------------------------------------  IN: 180 mL / OUT: 0 mL / NET: 180 mL      Physical Exam:  	Gen: NAD  	Pulm: CTA B/L  	CV: RRR, S1S2  	Abd: +BS, soft, nontender/nondistended  	LE: Warm, FROM,  edema  	Skin: Warm, without rashes      LABS/STUDIES  --------------------------------------------------------------------------------              10.2   4.49  >-----------<  142      [04-11-24 @ 09:44]              34.3     141  |  111  |  81  ----------------------------<  102      [04-09-24 @ 20:21]  4.2   |  13  |  4.72        Ca     8.3     [04-09-24 @ 20:21]    TPro  6.0  /  Alb  3.7  /  TBili  0.3  /  DBili  x   /  AST  19  /  ALT  30  /  AlkPhos  92  [04-09-24 @ 20:21]      PTT: 149.7      [04-11-24 @ 00:15]      Creatinine Trend:  SCr 4.72 [04-09 @ 20:21]  SCr 4.77 [04-09 @ 07:25]  SCr 4.73 [04-08 @ 18:41]    Urinalysis - [04-09-24 @ 20:21]      Color  / Appearance  / SG  / pH       Gluc 102 / Ketone   / Bili  / Urobili        Blood  / Protein  / Leuk Est  / Nitrite       RBC  / WBC  / Hyaline  / Gran  / Sq Epi  / Non Sq Epi  / Bacteria     Urine Creatinine 35      [04-09-24 @ 22:41]  Urine Protein 40      [04-09-24 @ 22:41]    TSH 0.65      [04-09-24 @ 00:27]    HBsAg Nonreact      [04-10-24 @ 07:11]  HCV 0.05, Nonreact      [04-09-24 @ 07:25]  HIV Nonreact      [04-09-24 @ 20:21]    Free Light Chains: kappa 5.19, lambda 2.81, ratio = 1.85      [04-10 @ 07:11]

## 2024-04-11 NOTE — PROVIDER CONTACT NOTE (CRITICAL VALUE NOTIFICATION) - PERSON GIVING RESULT:
Joseph blandon
Flores Ferguson/ Bárbara
Randall Andrew
Jairon Perez/ NAHUM
Joseph Granger
Tommy Cary - Bárbara
Joseph Manriquez [facility]

## 2024-04-11 NOTE — PROVIDER CONTACT NOTE (CRITICAL VALUE NOTIFICATION) - ACTION/TREATMENT ORDERED:
Provider notified. Remote tele monitoring continued.
Follow full anticoag nomogram
NEIDA Lagunas made aware. Heparin rate adjusted per nomogram.
NEIDA Bryan made aware, pH noted, they increased sodium bicarb dose already, continue to monitor.
Hold hep gtt for 1 hr. drop rate by 2ml/hr, new rate will be 11cc/hr
NEIDA Gay made aware, nephro consulted
NEIDA Hoskins made aware. Sodium bicarb dose increased.

## 2024-04-11 NOTE — PROGRESS NOTE ADULT - SUBJECTIVE AND OBJECTIVE BOX
HPI:  72yo F with PMHx of nephrolithiasis and HTN presents with complaint of BL LE swelling for past few weeks. Pt reports has been getting progressively worse and associated with some BLE soreness. Takes amlodipine for HTN. Otherwise in normal state of health. Denies fever/chills, nausea/vomiting, abd pain, flank pain, dysuria, hematuria, decreased UOP or other acute complaints.  (08 Apr 2024 17:00)    No events   PAST MEDICAL & SURGICAL HISTORY:  Renal Calculus      Ureteral Calculus      Acute Renal Failure  9/2009      Wrist Fracture  CYNTHIA      Collar Bone Fracture      Rib Fractures      Kidney Stone removal  3/15/2011      C Section      Percutaneous Stone Extraction  Right      S/P Left Percutaneuos Stone extraction  01/26/2010, 04/20/2010          Review of Systems:   CONSTITUTIONAL: No fever, weight loss, or fatigue  EYES: No eye pain, visual disturbances, or discharge  ENMT:  No difficulty hearing, tinnitus, vertigo; No sinus or throat pain  NECK: No pain or stiffness  BREASTS: No pain, masses, or nipple discharge  RESPIRATORY: No cough, wheezing, chills or hemoptysis; No shortness of breath  CARDIOVASCULAR: No chest pain, palpitations, dizziness, or leg swelling  GASTROINTESTINAL: No abdominal or epigastric pain. No nausea, vomiting, or hematemesis; No diarrhea or constipation. No melena or hematochezia.  GENITOURINARY: No dysuria, frequency, hematuria, or incontinence  NEUROLOGICAL: No headaches, memory loss, loss of strength, numbness, or tremors  SKIN: No itching, burning, rashes, or lesions   LYMPH NODES: No enlarged glands  ENDOCRINE: No heat or cold intolerance; No hair loss  MUSCULOSKELETAL: No joint pain or swelling; No muscle, back, or extremity pain  PSYCHIATRIC: No depression, anxiety, mood swings, or difficulty sleeping  HEME/LYMPH: No easy bruising, or bleeding gums  ALLERY AND IMMUNOLOGIC: No hives or eczema    Allergies    No Known Allergies    Intolerances        Social History:     FAMILY HISTORY:      MEDICATIONS  (STANDING):  amoxicillin  250 milliGRAM(s)/clavulanate 1 Tablet(s) Oral every 12 hours  buMETAnide Injectable 2 milliGRAM(s) IV Push once  heparin   Injectable 5000 Unit(s) SubCutaneous every 8 hours  metoprolol tartrate 25 milliGRAM(s) Oral two times a day    MEDICATIONS  (PRN):  acetaminophen     Tablet .. 650 milliGRAM(s) Oral every 6 hours PRN Temp greater or equal to 38C (100.4F), Mild Pain (1 - 3)  ondansetron Injectable 4 milliGRAM(s) IV Push every 6 hours PRN Nausea and/or Vomiting  senna 2 Tablet(s) Oral at bedtime PRN Constipation        CAPILLARY BLOOD GLUCOSE        I&O's Summary    08 Apr 2024 07:01  -  09 Apr 2024 07:00  --------------------------------------------------------  IN: 360 mL / OUT: 0 mL / NET: 360 mL    09 Apr 2024 07:01  -  09 Apr 2024 13:38  --------------------------------------------------------  IN: 240 mL / OUT: 0 mL / NET: 240 mL        PHYSICAL EXAM:  GENERAL: NAD, well-developed  HEAD:  Atraumatic, Normocephalic  EYES: EOMI, conjunctiva and sclera clear  NECK: Supple, No JVD  CHEST/LUNG: Clear to auscultation bilaterally; No wheeze  HEART: Regular rate and rhythm; No murmurs, rubs, or gallops  ABDOMEN: Soft, Nontender, Nondistended; Bowel sounds present  EXTREMITIES:  2+ Peripheral Pulses, No clubbing, cyanosis, or edema  PSYCH: AAOx3  NEUROLOGY: non-focal  SKIN: No rashes or lesions    LABS:                        11.3   4.78  )-----------( 142      ( 09 Apr 2024 07:25 )             39.1     04-09    145  |  118<H>  |  84<H>  ----------------------------<  92  4.7   |  <10<LL>  |  4.77<H>    Ca    7.8<L>      09 Apr 2024 07:25    TPro  6.2  /  Alb  3.8  /  TBili  0.4  /  DBili  x   /  AST  20  /  ALT  31  /  AlkPhos  95  04-08    PT/INR - ( 08 Apr 2024 18:31 )   PT: 11.2 sec;   INR: 1.07 ratio         PTT - ( 08 Apr 2024 18:31 )  PTT:25.9 sec  CARDIAC MARKERS ( 09 Apr 2024 09:59 )  x     / x     / 37 U/L / x     / 2.5 ng/mL  CARDIAC MARKERS ( 09 Apr 2024 00:34 )  x     / x     / 43 U/L / x     / 2.5 ng/mL  CARDIAC MARKERS ( 08 Apr 2024 18:41 )  x     / x     / 51 U/L / x     / x          Urinalysis Basic - ( 09 Apr 2024 07:25 )    Color: x / Appearance: x / SG: x / pH: x  Gluc: 92 mg/dL / Ketone: x  / Bili: x / Urobili: x   Blood: x / Protein: x / Nitrite: x   Leuk Esterase: x / RBC: x / WBC x   Sq Epi: x / Non Sq Epi: x / Bacteria: x        RADIOLOGY & ADDITIONAL TESTS:    Imaging Personally Reviewed:    Consultant(s) Notes Reviewed:      Care Discussed with Consultants/Other Providers:

## 2024-04-11 NOTE — PROVIDER CONTACT NOTE (CRITICAL VALUE NOTIFICATION) - BACKGROUND
PMH: HTN, nephrolithiasis
Patient admitted for b/l LE swelling.
PMH: nephrolithiasis, HTN  Pt came in for LE swelling and kidney stones
Patient admitted for b/l LE swelling.
Pt admitted for BL LE edema

## 2024-04-11 NOTE — PROVIDER CONTACT NOTE (CRITICAL VALUE NOTIFICATION) - RECOMMENDATIONS
Notify provider.
Notify provider. Remote tele monitoring. Repeat Troponin.
Follow full anticoag nomogram, pause for 1hr and decrease to 7ml/hr
Notify provider.
Notify Provider

## 2024-04-11 NOTE — PROGRESS NOTE ADULT - SUBJECTIVE AND OBJECTIVE BOX
24H hour events: Pt resting comfortably this AM, no acute complaints. AF rates 90s, very briefly at times up to 120-140s    MEDICATIONS:  buMETAnide Injectable 2 milliGRAM(s) IV Push every 12 hours  heparin   Injectable 5500 Unit(s) IV Push every 6 hours PRN  heparin   Injectable 2500 Unit(s) IV Push every 6 hours PRN  heparin  Infusion.  Unit(s)/Hr IV Continuous <Continuous>  metoprolol tartrate 25 milliGRAM(s) Oral every 8 hours    amoxicillin  250 milliGRAM(s)/clavulanate 1 Tablet(s) Oral every 12 hours      acetaminophen     Tablet .. 650 milliGRAM(s) Oral every 6 hours PRN  ondansetron Injectable 4 milliGRAM(s) IV Push every 6 hours PRN    senna 2 Tablet(s) Oral at bedtime PRN      sodium bicarbonate 1950 milliGRAM(s) Oral three times a day      REVIEW OF SYSTEMS:  See HPI, otherwise ROS negative.    PHYSICAL EXAM:  T(C): 36.7 (04-11-24 @ 05:38), Max: 36.7 (04-10-24 @ 17:06)  HR: 100 (04-11-24 @ 05:38) (100 - 108)  BP: 137/83 (04-11-24 @ 05:38) (112/61 - 137/83)  RR: 18 (04-11-24 @ 05:38) (18 - 18)  SpO2: 98% (04-11-24 @ 05:38) (97% - 99%)  Wt(kg): --  I&O's Summary    10 Apr 2024 07:01  -  11 Apr 2024 07:00  --------------------------------------------------------  IN: 826 mL / OUT: 800 mL / NET: 26 mL        Appearance: Alert. NAD	  HEENT:   NC/AT	  Cardiovascular: +S1S2 irregular  Respiratory: CTA B/L	  Psychiatry: A & O x 3, Mood & affect appropriate  Gastrointestinal:  Soft	  Skin: No rashes	  Neurologic: Non-focal  Extremities: + edema BLE      LABS:	 	    CBC Full  -  ( 10 Apr 2024 07:11 )  WBC Count : 4.74 K/uL  Hemoglobin : 10.6 g/dL  Hematocrit : 36.0 %  Platelet Count - Automated : 135 K/uL  Mean Cell Volume : 100.8 fl  Mean Cell Hemoglobin : 29.7 pg  Mean Cell Hemoglobin Concentration : 29.4 gm/dL  Auto Neutrophil # : x  Auto Lymphocyte # : x  Auto Monocyte # : x  Auto Eosinophil # : x  Auto Basophil # : x  Auto Neutrophil % : x  Auto Lymphocyte % : x  Auto Monocyte % : x  Auto Eosinophil % : x  Auto Basophil % : x    04-09    141  |  111<H>  |  81<H>  ----------------------------<  102<H>  4.2   |  13<L>  |  4.72<H>    Ca    8.3<L>      09 Apr 2024 20:21    TPro  6.0  /  Alb  3.7  /  TBili  0.3  /  DBili  x   /  AST  19  /  ALT  30  /  AlkPhos  92  04-09      proBNP: Pro-Brain Natriuretic Peptide (04.09.24 @ 09:59)    Pro-Brain Natriuretic Peptide: 59056 pg/mL    TSH: Thyroid Stimulating Hormone, Serum (04.09.24 @ 00:27)    Thyroid Stimulating Hormone, Serum: 0.65 uIU/mL            TELEMETRY: AF 90s, briefly up to 120-140s at times  	      	  ASSESSMENT/PLAN:

## 2024-04-11 NOTE — PROGRESS NOTE ADULT - PROBLEM SELECTOR PLAN 1
Pt. with renal failure in the setting of fluid overload. On review of [place], SCr noted to be persistently elevated from 1104-6867. SCr was elevated at 1.79 on 5/4/2011. SCr initially during this admission was elevated at 4.73 (4/8), and remains elevated/stable at 4.72 yesterday (4/9). No interim labs available for review but pt. likely has underlying CKD. Labs from today are pending. Documented urine output is 450 cc over the past 24 hours. UA showed proteinuria and evidence of infection (although 15 epithelial cells). UPCR is elevated at 1.1. Kidney sonogram showed evidence of medical renal disease, BL non-obstructing stones, and BL renal cysts. Recommend to continue IV Bumex 2 mg BID. Labs pending result: HBsAg, HCV Ab, HIV, serum free light chains, and SIFE. Monitor labs and urine output. Avoid nephrotoxins. Dose medications as per eGFR.

## 2024-04-12 LAB
ALBUMIN SERPL ELPH-MCNC: 3.6 G/DL — SIGNIFICANT CHANGE UP (ref 3.3–5)
ALP SERPL-CCNC: 78 U/L — SIGNIFICANT CHANGE UP (ref 40–120)
ALT FLD-CCNC: 18 U/L — SIGNIFICANT CHANGE UP (ref 10–45)
ANION GAP SERPL CALC-SCNC: 17 MMOL/L — SIGNIFICANT CHANGE UP (ref 5–17)
ANION GAP SERPL CALC-SCNC: 18 MMOL/L — HIGH (ref 5–17)
APTT BLD: 69.6 SEC — HIGH (ref 24.5–35.6)
APTT BLD: 97.2 SEC — HIGH (ref 24.5–35.6)
AST SERPL-CCNC: 9 U/L — LOW (ref 10–40)
BASOPHILS # BLD AUTO: 0.06 K/UL — SIGNIFICANT CHANGE UP (ref 0–0.2)
BASOPHILS NFR BLD AUTO: 1.1 % — SIGNIFICANT CHANGE UP (ref 0–2)
BILIRUB SERPL-MCNC: 0.4 MG/DL — SIGNIFICANT CHANGE UP (ref 0.2–1.2)
BUN SERPL-MCNC: 76 MG/DL — HIGH (ref 7–23)
BUN SERPL-MCNC: 79 MG/DL — HIGH (ref 7–23)
CALCIUM SERPL-MCNC: 7.7 MG/DL — LOW (ref 8.4–10.5)
CALCIUM SERPL-MCNC: 7.8 MG/DL — LOW (ref 8.4–10.5)
CHLORIDE SERPL-SCNC: 108 MMOL/L — SIGNIFICANT CHANGE UP (ref 96–108)
CHLORIDE SERPL-SCNC: 109 MMOL/L — HIGH (ref 96–108)
CO2 SERPL-SCNC: 18 MMOL/L — LOW (ref 22–31)
CO2 SERPL-SCNC: 18 MMOL/L — LOW (ref 22–31)
CREAT SERPL-MCNC: 4.62 MG/DL — HIGH (ref 0.5–1.3)
CREAT SERPL-MCNC: 4.7 MG/DL — HIGH (ref 0.5–1.3)
EGFR: 9 ML/MIN/1.73M2 — LOW
EGFR: 9 ML/MIN/1.73M2 — LOW
EOSINOPHIL # BLD AUTO: 0.13 K/UL — SIGNIFICANT CHANGE UP (ref 0–0.5)
EOSINOPHIL NFR BLD AUTO: 2.4 % — SIGNIFICANT CHANGE UP (ref 0–6)
GLUCOSE SERPL-MCNC: 103 MG/DL — HIGH (ref 70–99)
GLUCOSE SERPL-MCNC: 107 MG/DL — HIGH (ref 70–99)
HCT VFR BLD CALC: 35.3 % — SIGNIFICANT CHANGE UP (ref 34.5–45)
HGB BLD-MCNC: 11.1 G/DL — LOW (ref 11.5–15.5)
IMM GRANULOCYTES NFR BLD AUTO: 0.4 % — SIGNIFICANT CHANGE UP (ref 0–0.9)
LYMPHOCYTES # BLD AUTO: 1.34 K/UL — SIGNIFICANT CHANGE UP (ref 1–3.3)
LYMPHOCYTES # BLD AUTO: 24.7 % — SIGNIFICANT CHANGE UP (ref 13–44)
MAGNESIUM SERPL-MCNC: 1.9 MG/DL — SIGNIFICANT CHANGE UP (ref 1.6–2.6)
MCHC RBC-ENTMCNC: 30.2 PG — SIGNIFICANT CHANGE UP (ref 27–34)
MCHC RBC-ENTMCNC: 31.4 GM/DL — LOW (ref 32–36)
MCV RBC AUTO: 96.2 FL — SIGNIFICANT CHANGE UP (ref 80–100)
MONOCYTES # BLD AUTO: 0.47 K/UL — SIGNIFICANT CHANGE UP (ref 0–0.9)
MONOCYTES NFR BLD AUTO: 8.7 % — SIGNIFICANT CHANGE UP (ref 2–14)
NEUTROPHILS # BLD AUTO: 3.4 K/UL — SIGNIFICANT CHANGE UP (ref 1.8–7.4)
NEUTROPHILS NFR BLD AUTO: 62.7 % — SIGNIFICANT CHANGE UP (ref 43–77)
NRBC # BLD: 0 /100 WBCS — SIGNIFICANT CHANGE UP (ref 0–0)
PHOSPHATE SERPL-MCNC: 4.7 MG/DL — HIGH (ref 2.5–4.5)
PLATELET # BLD AUTO: 181 K/UL — SIGNIFICANT CHANGE UP (ref 150–400)
POTASSIUM SERPL-MCNC: 4.1 MMOL/L — SIGNIFICANT CHANGE UP (ref 3.5–5.3)
POTASSIUM SERPL-MCNC: 4.3 MMOL/L — SIGNIFICANT CHANGE UP (ref 3.5–5.3)
POTASSIUM SERPL-SCNC: 4.1 MMOL/L — SIGNIFICANT CHANGE UP (ref 3.5–5.3)
POTASSIUM SERPL-SCNC: 4.3 MMOL/L — SIGNIFICANT CHANGE UP (ref 3.5–5.3)
PROT SERPL-MCNC: 5.9 G/DL — LOW (ref 6–8.3)
RBC # BLD: 3.67 M/UL — LOW (ref 3.8–5.2)
RBC # FLD: 14.5 % — SIGNIFICANT CHANGE UP (ref 10.3–14.5)
SODIUM SERPL-SCNC: 143 MMOL/L — SIGNIFICANT CHANGE UP (ref 135–145)
SODIUM SERPL-SCNC: 145 MMOL/L — SIGNIFICANT CHANGE UP (ref 135–145)
WBC # BLD: 5.42 K/UL — SIGNIFICANT CHANGE UP (ref 3.8–10.5)
WBC # FLD AUTO: 5.42 K/UL — SIGNIFICANT CHANGE UP (ref 3.8–10.5)

## 2024-04-12 PROCEDURE — 99233 SBSQ HOSP IP/OBS HIGH 50: CPT

## 2024-04-12 PROCEDURE — 99221 1ST HOSP IP/OBS SF/LOW 40: CPT

## 2024-04-12 PROCEDURE — 99232 SBSQ HOSP IP/OBS MODERATE 35: CPT | Mod: GC

## 2024-04-12 RX ORDER — BUMETANIDE 0.25 MG/ML
2 INJECTION INTRAMUSCULAR; INTRAVENOUS
Qty: 20 | Refills: 0 | Status: DISCONTINUED | OUTPATIENT
Start: 2024-04-12 | End: 2024-04-20

## 2024-04-12 RX ORDER — METOPROLOL TARTRATE 50 MG
2.5 TABLET ORAL ONCE
Refills: 0 | Status: COMPLETED | OUTPATIENT
Start: 2024-04-12 | End: 2024-04-12

## 2024-04-12 RX ORDER — HYDRALAZINE HCL 50 MG
25 TABLET ORAL THREE TIMES A DAY
Refills: 0 | Status: DISCONTINUED | OUTPATIENT
Start: 2024-04-12 | End: 2024-04-13

## 2024-04-12 RX ORDER — ISOSORBIDE DINITRATE 5 MG/1
10 TABLET ORAL THREE TIMES A DAY
Refills: 0 | Status: DISCONTINUED | OUTPATIENT
Start: 2024-04-12 | End: 2024-04-23

## 2024-04-12 RX ADMIN — Medication 2.5 MILLIGRAM(S): at 00:31

## 2024-04-12 RX ADMIN — BUMETANIDE 10 MG/HR: 0.25 INJECTION INTRAMUSCULAR; INTRAVENOUS at 10:24

## 2024-04-12 RX ADMIN — HEPARIN SODIUM 800 UNIT(S)/HR: 5000 INJECTION INTRAVENOUS; SUBCUTANEOUS at 03:05

## 2024-04-12 RX ADMIN — Medication 25 MILLIGRAM(S): at 14:51

## 2024-04-12 RX ADMIN — Medication 25 MILLIGRAM(S): at 21:33

## 2024-04-12 RX ADMIN — Medication 1950 MILLIGRAM(S): at 14:51

## 2024-04-12 RX ADMIN — HEPARIN SODIUM 800 UNIT(S)/HR: 5000 INJECTION INTRAVENOUS; SUBCUTANEOUS at 10:56

## 2024-04-12 RX ADMIN — Medication 1950 MILLIGRAM(S): at 05:41

## 2024-04-12 RX ADMIN — HEPARIN SODIUM 800 UNIT(S)/HR: 5000 INJECTION INTRAVENOUS; SUBCUTANEOUS at 19:52

## 2024-04-12 RX ADMIN — Medication 25 MILLIGRAM(S): at 05:41

## 2024-04-12 RX ADMIN — BUMETANIDE 2 MILLIGRAM(S): 0.25 INJECTION INTRAMUSCULAR; INTRAVENOUS at 00:08

## 2024-04-12 RX ADMIN — Medication 1950 MILLIGRAM(S): at 21:33

## 2024-04-12 NOTE — PROGRESS NOTE ADULT - SUBJECTIVE AND OBJECTIVE BOX
INCOMPLETE      Patient seen and examined at bedside.    Overnight Events:     REVIEW OF SYSTEMS:  CONSTITUTIONAL: No weakness, fevers or chills  EYES/ENT: No visual changes;  No dysphagia  NECK: No pain or stiffness  RESPIRATORY: No cough, wheezing, hemoptysis; No shortness of breath  CARDIOVASCULAR: No chest pain or palpitations; No lower extremity edema  GASTROINTESTINAL: No abdominal or epigastric pain. No nausea, vomiting, or hematemesis; No diarrhea or constipation. No melena or hematochezia.  BACK: No back pain  GENITOURINARY: No dysuria, frequency or hematuria  NEUROLOGICAL: No numbness or weakness  SKIN: No itching, burning, rashes, or lesions   All other review of systems is negative unless indicated above.            Current Meds:  acetaminophen     Tablet .. 650 milliGRAM(s) Oral every 6 hours PRN  buMETAnide Injectable 2 milliGRAM(s) IV Push every 12 hours  heparin   Injectable 5500 Unit(s) IV Push every 6 hours PRN  heparin   Injectable 2500 Unit(s) IV Push every 6 hours PRN  heparin  Infusion.  Unit(s)/Hr IV Continuous <Continuous>  metoprolol tartrate 25 milliGRAM(s) Oral every 8 hours  ondansetron Injectable 4 milliGRAM(s) IV Push every 6 hours PRN  senna 2 Tablet(s) Oral at bedtime PRN  sodium bicarbonate 1950 milliGRAM(s) Oral three times a day      Vitals:  T(F): 97.5 (04-12), Max: 97.8 (04-12)  HR: 90 (04-12) (90 - 117)  BP: 126/84 (04-12) (110/80 - 136/78)  RR: 18 (04-12)  SpO2: 97% (04-12)  I&O's Summary    11 Apr 2024 07:01  -  12 Apr 2024 07:00  --------------------------------------------------------  IN: 1301 mL / OUT: 750 mL / NET: 551 mL        Physical Exam:  Appearance: No acute distress; well appearing  Eyes: PERRL, EOMI, pink conjunctiva  HEENT: Normal oral mucosa  Cardiovascular: RRR, S1, S2, no murmurs, rubs, or gallops; no edema; no JVD  Respiratory: Clear to auscultation bilaterally  Gastrointestinal: soft, non-tender, non-distended with normal bowel sounds  Musculoskeletal: No clubbing; no joint deformity   Neurologic: Non-focal  Lymphatic: No lymphadenopathy  Psychiatry: AAOx3, mood & affect appropriate  Skin: No rashes, ecchymoses, or cyanosis                          10.2   4.49  )-----------( 142      ( 11 Apr 2024 09:44 )             34.3     04-12    143  |  108  |  79<H>  ----------------------------<  107<H>  4.1   |  18<L>  |  4.70<H>    Ca    7.7<L>      12 Apr 2024 01:04  Phos  4.7     04-12  Mg     1.9     04-12      PTT - ( 12 Apr 2024 02:47 )  PTT:97.2 sec          TTE 4/11  CONCLUSIONS:   1. Left ventricular systolic function is moderately decreased with an ejection fraction of 37 % by Garcia's method of disks.   2. Analysis of left ventricular diastolic function and filling pressure is made challenging by the presence of severe mitral regurgitation.   3. Normal right ventricular cavity size, with normal wall thickness, and normal systolic function.   4. The left atrium is severely dilated.   5. The right atrium is severely dilated.   6. Severe mitral regurgitation.   7. Moderate tricuspid regurgitation.   8. Estimatedpulmonary artery systolic pressure is 41 mmHg.   9. No prior echocardiogram is available for comparison.     Patient seen and examined at bedside.    Overnight Events:   NAEO   Telemetry: atrial fibrillation with HR mainly in 110s. Lots of episodes of RVR overnight with HR 140s.   Denies chest pain, palpitations, nausea, vomiting, diaphoresis, and syncope.     REVIEW OF SYSTEMS:  as above.     Current Meds:  acetaminophen     Tablet .. 650 milliGRAM(s) Oral every 6 hours PRN  buMETAnide Injectable 2 milliGRAM(s) IV Push every 12 hours  heparin   Injectable 5500 Unit(s) IV Push every 6 hours PRN  heparin   Injectable 2500 Unit(s) IV Push every 6 hours PRN  heparin  Infusion.  Unit(s)/Hr IV Continuous <Continuous>  metoprolol tartrate 25 milliGRAM(s) Oral every 8 hours  ondansetron Injectable 4 milliGRAM(s) IV Push every 6 hours PRN  senna 2 Tablet(s) Oral at bedtime PRN  sodium bicarbonate 1950 milliGRAM(s) Oral three times a day      Vitals:  T(F): 97.5 (04-12), Max: 97.8 (04-12)  HR: 90 (04-12) (90 - 117)  BP: 126/84 (04-12) (110/80 - 136/78)  RR: 18 (04-12)  SpO2: 97% (04-12)  I&O's Summary    11 Apr 2024 07:01  -  12 Apr 2024 07:00  --------------------------------------------------------  IN: 1301 mL / OUT: 750 mL / NET: 551 mL        Physical Exam:  Appearance: No acute distress; well appearing  Cardiovascular: tachycardic, irregular, S1, S2, no murmurs, rubs, or gallops; no edema; no JVD  Respiratory: Clear to auscultation bilaterally  Gastrointestinal: soft, non-tender, non-distended with normal bowel sounds  Musculoskeletal: 3+ BLE edema   Neurologic: Non-focal  Lymphatic: No lymphadenopathy  Psychiatry: AAOx3, mood & affect appropriate  Skin: Bruising on L anterior shin                           10.2   4.49  )-----------( 142      ( 11 Apr 2024 09:44 )             34.3     04-12    143  |  108  |  79<H>  ----------------------------<  107<H>  4.1   |  18<L>  |  4.70<H>    Ca    7.7<L>      12 Apr 2024 01:04  Phos  4.7     04-12  Mg     1.9     04-12      PTT - ( 12 Apr 2024 02:47 )  PTT:97.2 sec          TTE 4/11  CONCLUSIONS:   1. Left ventricular systolic function is moderately decreased with an ejection fraction of 37 % by Garcia's method of disks.   2. Analysis of left ventricular diastolic function and filling pressure is made challenging by the presence of severe mitral regurgitation.   3. Normal right ventricular cavity size, with normal wall thickness, and normal systolic function.   4. The left atrium is severely dilated.   5. The right atrium is severely dilated.   6. Severe mitral regurgitation.   7. Moderate tricuspid regurgitation.   8. Estimatedpulmonary artery systolic pressure is 41 mmHg.   9. No prior echocardiogram is available for comparison.

## 2024-04-12 NOTE — CONSULT NOTE ADULT - ATTENDING COMMENTS
73F PMHx nephrolithiasis s/p multiple procedures, HTN admitted with new onset bl LE swelling and renal dysfunction. Cardiology consulted for asymptomatic new-onset AFib w/ RVR. Trop wnl. TTE -EF 37%, severe MR, normal RV size, function, an wall thickness. Dilated LA and RA, TR, pulm artery P 41mm Hg, no recent prior echo available.  Ddx for new-onset LE edema includes CHF although nephrotic syndrome is more concerning given new-onset renal dysfunction. Unclear cause of new-onset AFib, will  evaluate for structural causes but may be in setting of acute stressor and volume overload.     Etiology of the severe MR is unclear at this time (primary vs secondary) - will  diuresis w/Bumex 2mg/hr and start hydralazine/isordil for afterload reduction in setting of her CKD  Will c/w  metoprolol  25mg q6h for medical rate control, goal <110   Once better diuresed and off-loaded, will schedule for a TTE to assess the MR and rule out MARY clot  C/w hep gtt for the Afib - appreciate EP input
Await echo  agree with AC (IV heparin for now)  will consider for rhythm control when optimized
NICO on CKD vs progressive CKD advanced.  Has poor medical followup  Send serologies  Renal imaging  Acidosis Na bicarb replacement   Edema: IV diuresis and Echo      Kari Villalobos MD  Off: [phone]  contact me on teams    (After 5 pm or on weekends please page the on-call fellow/attending, can check [email or web address] for schedule. Login is dominick pierre, schedule under [facility] medicine, psych, derm)

## 2024-04-12 NOTE — CONSULT NOTE ADULT - ASSESSMENT
73F PMHx nephrolithiasis s/p multiple procedures, HTN admitted with new onset bl LE swelling and renal dysfunction. Cardiology consulted for asymptomatic new-onset AFib w/ RVR. Trop wnl. TTE -EF 37%, severe MR, normal RV size, function, an wall thickness. Dilated LA and RA, TR, pulm artery P 41mm Hg, no recent prior echo available.  Ddx for new-onset LE edema includes CHF although nephrotic syndrome is more concerning given new-onset renal dysfunction. Unclear cause of new-onset AFib, will  evaluate for structural causes but may be in setting of acute stressor and volume overload.     Recommendations:   -volume optimization  by diuresis w/Bumex 2mg/hr  -afterload reduction w/hydralazine 25 mg TID and isosorbide dinitrate 10   mg TID  -c/w metoprolol  25mg q6h for medical rate control, goal <110   -f/u  TTE when HR is controlled  -given elevated CHADSVASC, would start continue AC with heparin pending procedures   -proceed w/ischemic workup once net even and HR controlled  -would not give amiodarone given unknown duration of atrial fibrillation     Please see attending attestation for final recommendations.          Nsaids Counseling: NSAID Counseling: I discussed with the patient that NSAIDs should be taken with food. Prolonged use of NSAIDs can result in the development of stomach ulcers.  Patient advised to stop taking NSAIDs if abdominal pain occurs.  The patient verbalized understanding of the proper use and possible adverse effects of NSAIDs.  All of the patient's questions and concerns were addressed.

## 2024-04-12 NOTE — PROGRESS NOTE ADULT - PROBLEM SELECTOR PLAN 2
Pt. with metabolic acidosis in the setting of renal failure. SCO2 remains low but improved to 18. Recommend IV diuresis, as outlined above.   Continue Na bicarb 1300 tid   Monitor pH, and SCO2.    If you have any questions, please feel free to contact me  Jamari Brunson  Nephrology Fellow  [phone] / [employer] Teams(Preferred)  (After 5pm or on weekends please page the on-call fellow).

## 2024-04-12 NOTE — PROGRESS NOTE ADULT - PROBLEM SELECTOR PLAN 1
Pt. with renal failure in the setting of fluid overload. On review of [place], SCr noted to be persistently elevated from 0144-2371. SCr was elevated at 1.79 on 5/4/2011. SCr initially during this admission was elevated at 4.73 (4/8), and remains elevated/stable at 4.62 today. No interim labs available for review but pt. likely has underlying CKD. Documented urine output is 750 cc over the past 24 hours. UA showed proteinuria and evidence of infection (although 15 epithelial cells). UPCR is elevated at 1.1. Kidney sonogram showed evidence of medical renal disease, BL non-obstructing stones, and BL renal cysts. Recommend to start IV Bumex infusion at 2 mg/hr. Serologic workup thus far: HBsAg, HCV Ab, and HIV are non-reactive. Kappa/Lambda free light chain ratio is mildly elevated at 1.85 (acceptable range for degree of CKD). SIFE is pending result. Monitor labs and urine output. Avoid nephrotoxins. Dose medications as per eGFR. Pt. with renal failure in the setting of fluid overload. On review of [place], SCr noted to be persistently elevated from 2630-6404. SCr was elevated at 1.79 on 5/4/2011. SCr initially during this admission was elevated at 4.73 (4/8), and remains elevated/stable at 4.62 today. No interim labs available for review but pt. likely has underlying CKD. Documented urine output is 750 cc over the past 24 hours. UA showed proteinuria and evidence of infection (although 15 epithelial cells). UPCR is elevated at 1.1. Kidney sonogram showed evidence of medical renal disease, BL non-obstructing stones, and BL renal cysts.   Recommend to start IV Bumex infusion at 2 mg/hr. Serologic workup thus far: HBsAg, HCV Ab, and HIV are non-reactive. Kappa/Lambda free light chain ratio is mildly elevated at 1.85 (acceptable range for degree of CKD). SIFE is pending result. Monitor labs and urine output. Avoid nephrotoxins. Dose medications as per eGFR.

## 2024-04-12 NOTE — PROGRESS NOTE ADULT - SUBJECTIVE AND OBJECTIVE BOX
[facility] Division of Kidney Diseases & Hypertension  FOLLOW UP NOTE  [phone]--------------------------------------------------------------------------------    Chief Complaint: NICO on CKD vs progressive CKD    24 hour events/subjective: Pt. was seen and evaluated at bedside this morning. Pt. reports feeling well, offers no complaints. Endorses good uop. Denies any headaches, fevers/chills, chest pain, and SOB.    PAST HISTORY  --------------------------------------------------------------------------------  No significant changes to PMH, PSH, FHx, SHx, unless otherwise noted    ALLERGIES & MEDICATIONS  --------------------------------------------------------------------------------  Allergies    No Known Allergies    Intolerances    Standing Inpatient Medications  buMETAnide Infusion 2 mG/Hr IV Continuous <Continuous>  heparin  Infusion.  Unit(s)/Hr IV Continuous <Continuous>  metoprolol tartrate 25 milliGRAM(s) Oral every 8 hours  sodium bicarbonate 1950 milliGRAM(s) Oral three times a day    PRN Inpatient Medications  acetaminophen     Tablet .. 650 milliGRAM(s) Oral every 6 hours PRN  heparin   Injectable 5500 Unit(s) IV Push every 6 hours PRN  heparin   Injectable 2500 Unit(s) IV Push every 6 hours PRN  ondansetron Injectable 4 milliGRAM(s) IV Push every 6 hours PRN  senna 2 Tablet(s) Oral at bedtime PRN      REVIEW OF SYSTEMS  --------------------------------------------------------------------------------  See HPI    VITALS/PHYSICAL EXAM  --------------------------------------------------------------------------------  T(C): 36.2 (04-12-24 @ 09:26), Max: 36.6 (04-12-24 @ 00:05)  HR: 81 (04-12-24 @ 09:26) (81 - 117)  BP: 137/96 (04-12-24 @ 09:26) (110/80 - 137/96)  RR: 18 (04-12-24 @ 09:26) (16 - 18)  SpO2: 97% (04-12-24 @ 09:26) (94% - 98%)  Wt(kg): --    04-11-24 @ 07:01  -  04-12-24 @ 07:00  --------------------------------------------------------  IN: 1301 mL / OUT: 750 mL / NET: 551 mL      Physical Exam:  Gen: NAD  HEENT: MMM  Pulm: CTA B/L  CV: S1S2  Abd: Soft, +BS   Ext: +BL LE edema  Neuro: Awake and alert  Skin: Warm and dry  Vascular access: Peripheral IV line    LABS/STUDIES  --------------------------------------------------------------------------------              11.1   5.42  >-----------<  181      [04-12-24 @ 10:26]              35.3     145  |  109  |  76  ----------------------------<  103      [04-12-24 @ 10:26]  4.3   |  18  |  4.62        Ca     7.8     [04-12-24 @ 10:26]      Mg     1.9     [04-12-24 @ 01:04]      Phos  4.7     [04-12-24 @ 01:04]    TPro  5.9  /  Alb  3.6  /  TBili  0.4  /  DBili  x   /  AST  9   /  ALT  18  /  AlkPhos  78  [04-12-24 @ 10:26]      PTT: 69.6       [04-12-24 @ 10:26]      Creatinine Trend:  SCr 4.62 [04-12 @ 10:26]  SCr 4.70 [04-12 @ 01:04]  SCr 4.65 [04-11 @ 09:43]  SCr 4.72 [04-09 @ 20:21]  SCr 4.77 [04-09 @ 07:25]    Urine Creatinine 35      [04-09-24 @ 22:41]  Urine Protein 40      [04-09-24 @ 22:41]    TSH 0.65      [04-09-24 @ 00:27]    HBsAg Nonreact      [04-10-24 @ 07:11]  HCV 0.05, Nonreact      [04-09-24 @ 07:25]  HIV Nonreact      [04-09-24 @ 20:21]    Free Light Chains: kappa 5.19, lambda 2.81, ratio = 1.85      [04-10 @ 07:11]

## 2024-04-12 NOTE — CONSULT NOTE ADULT - SUBJECTIVE AND OBJECTIVE BOX
Cardiology consult note:  Authored by Dr. wSathi Knight  Patient is a 73y old  Female who presents with a chief complaint of BL LE swelling (12 Apr 2024 11:55)        OVERNIGHT EVENTS: pt w/AFib 's, then A fib w/RVR at 140's, lopressor 2.5 given, HR improved to 90's.    SUBJECTIVE: Patient was seen and examined at bedside this morning.   Denies any nausea/vomiting/diarrhea, headache, shortness of breath, abdominal pain or chest pain/palpitations.   Patient responding appropriately to questions and able to make needs known.   Vital signs/imaging/labs/telemetry events reviewed.   Pt is feeling well. Tolerating PO.  Stating she is concerned about bilateral LE swelling for the last 2 months.    All other ROS neg except as above       MEDICATIONS  (STANDING):  buMETAnide Infusion 2 mG/Hr (10 mL/Hr) IV Continuous <Continuous>  heparin  Infusion.  Unit(s)/Hr (12 mL/Hr) IV Continuous <Continuous>  metoprolol tartrate 25 milliGRAM(s) Oral every 8 hours  sodium bicarbonate 1950 milliGRAM(s) Oral three times a day    MEDICATIONS  (PRN):  acetaminophen     Tablet .. 650 milliGRAM(s) Oral every 6 hours PRN Temp greater or equal to 38C (100.4F), Mild Pain (1 - 3)  heparin   Injectable 5500 Unit(s) IV Push every 6 hours PRN For aPTT less than 40  heparin   Injectable 2500 Unit(s) IV Push every 6 hours PRN For aPTT between 40 - 57  ondansetron Injectable 4 milliGRAM(s) IV Push every 6 hours PRN Nausea and/or Vomiting  senna 2 Tablet(s) Oral at bedtime PRN Constipation      CAPILLARY BLOOD GLUCOSE        I&O's Summary    11 Apr 2024 07:01  -  12 Apr 2024 07:00  --------------------------------------------------------  IN: 1301 mL / OUT: 750 mL / NET: 551 mL        PHYSICAL EXAM:  Vital Signs Last 24 Hrs  T(C): 36.2 (12 Apr 2024 09:26), Max: 36.6 (12 Apr 2024 00:05)  T(F): 97.1 (12 Apr 2024 09:26), Max: 97.8 (12 Apr 2024 00:05)  HR: 81 (12 Apr 2024 09:26) (81 - 117)  BP: 137/96 (12 Apr 2024 09:26) (110/80 - 137/96)  BP(mean): --  RR: 18 (12 Apr 2024 09:26) (16 - 18)  SpO2: 97% (12 Apr 2024 09:26) (94% - 98%)    Parameters below as of 12 Apr 2024 09:26  Patient On (Oxygen Delivery Method): room air        PHYSICAL EXAM:     GENERAL: NAD  HEAD:  Atraumatic, Normocephalic  EYES: EOMI, conjunctiva and sclera clear, no nystagmus noted  ENT: Moist mucous membranes  CHEST/LUNG: normal work of breathing, Clear to auscultation bilaterally; No rales, rhonchi, wheezing, or rubs. Unlabored respirations  HEART:  irregular rhythm, tachycardic; No murmurs, rubs, or gallops, normal S1/S2  ABDOMEN: normal bowel sounds; Soft, nontender, nondistended, no organomegaly   EXTREMITIES:  +4 edema in b/l LE, 2+ Peripheral Pulses, brisk capillary refill. No clubbing, cyanosis  MSK: No gross deformities noted, ROM wnl   Neurological:  A&Ox3, no focal deficits   SKIN: warm and dry, extensive subcutaneous ecchymoses in all 4 extremitites  PSYCH: Normal mood, affect         LABS:                        11.1   5.42  )-----------( 181      ( 12 Apr 2024 10:26 )             35.3     04-12    145  |  109<H>  |  76<H>  ----------------------------<  103<H>  4.3   |  18<L>  |  4.62<H>    Ca    7.8<L>      12 Apr 2024 10:26  Phos  4.7     04-12  Mg     1.9     04-12    TPro  5.9<L>  /  Alb  3.6  /  TBili  0.4  /  DBili  x   /  AST  9<L>  /  ALT  18  /  AlkPhos  78  04-12    PTT - ( 12 Apr 2024 10:26 )  PTT:69.6 sec      EKG:   AFib 120s, no ischemic changes    Echo:   2009  1. Mitral annular calcification, and calcified mitral  leaflets with normal diastolic opening. Mild-moderate  mitral regurgitation.  2. Mildly calcified trileaflet aortic valve with normal  opening. Mild aortic regurgitation.  3. Moderate left atrial enlargement.  4. Normal left ventricular internal dimensions and wall  thicknesses.  5. Normal left ventricular systolic function. No segmental  wall motion abnormalities. EF=65%.  6. Normal right ventricular size and systolic function.  7. Mild-moderate tricuspid regurgitation. Estimated  pulmonary artery systolic pressure equals 35 mm Hg,  assuming right atrial pressure equals 10  mm Hg, consistent  with borderline pulmonary hypertension.    Tele Reviewed:    RADIOLOGY & ADDITIONAL TESTS:  Results Reviewed: yes  Imaging Personally Reviewed: yes  Electrocardiogram Personally Reviewed: yes

## 2024-04-12 NOTE — PHARMACOTHERAPY INTERVENTION NOTE - COMMENTS
Recommended to discontinuing amoxicillin-clavulanic acid and observing off antibiotics in a patient growing 10,000-49,000 cfu ESBL E. coli and Morganella morganii in the urine. Patient presents asymptomatic, afebrile, with no leukocytosis and no catheter in place. Susceptibility showed Morganella moranii was resistant to amoxicillin-clavulanic acid.      Molly Luevano, PharmD   Clinical Pharmacy Specialist, Infectious Diseases  Tele-Antimicrobial Stewardship Program (Tele-ASP)  Tele-ASP Phone: [phone]

## 2024-04-12 NOTE — PROGRESS NOTE ADULT - ASSESSMENT
73F PMHx nephrolithiasis, HTN admitted with 2 weeks of bl LE swelling and newly reduced renal function with multiple nonobstructive renal stones on CT. EP consulted for new-onset AFib w/ RVR noted on admission EKG. Patient reports asymptomatic, denies dizziness/lightheadedness, CP, SOB, palpitations. Denies any cardiac history. Received metoprolol 5mg IV x1 for AFib at rates 130s with improvement to 110s.       Afib with rvr     EP eval appreciated   Heparin drip   Follow up Echo  TTE -EF 37%, severe MR, normal RV size, function, an wall thickness. Dilated LA and RA, TR, pulm artery P 41mm Hg  Increased Metorprolol as per cards         NICO on CKD   Metabolic Acidosis   Renal consulted   Recommend IV diuresis, Continue Na bicarb 1300 tid

## 2024-04-12 NOTE — PROGRESS NOTE ADULT - SUBJECTIVE AND OBJECTIVE BOX
HPI:  74yo F with PMHx of nephrolithiasis and HTN presents with complaint of BL LE swelling for past few weeks. Pt reports has been getting progressively worse and associated with some BLE soreness. Takes amlodipine for HTN. Otherwise in normal state of health. Denies fever/chills, nausea/vomiting, abd pain, flank pain, dysuria, hematuria, decreased UOP or other acute complaints.  (08 Apr 2024 17:00)    No events   PAST MEDICAL & SURGICAL HISTORY:  Renal Calculus      Ureteral Calculus      Acute Renal Failure  9/2009      Wrist Fracture  CYNTHIA      Collar Bone Fracture      Rib Fractures      Kidney Stone removal  3/15/2011      C Section      Percutaneous Stone Extraction  Right      S/P Left Percutaneuos Stone extraction  01/26/2010, 04/20/2010          Review of Systems:   CONSTITUTIONAL: No fever, weight loss, or fatigue  EYES: No eye pain, visual disturbances, or discharge  ENMT:  No difficulty hearing, tinnitus, vertigo; No sinus or throat pain  NECK: No pain or stiffness  BREASTS: No pain, masses, or nipple discharge  RESPIRATORY: No cough, wheezing, chills or hemoptysis; No shortness of breath  CARDIOVASCULAR: No chest pain, palpitations, dizziness, or leg swelling  GASTROINTESTINAL: No abdominal or epigastric pain. No nausea, vomiting, or hematemesis; No diarrhea or constipation. No melena or hematochezia.  GENITOURINARY: No dysuria, frequency, hematuria, or incontinence  NEUROLOGICAL: No headaches, memory loss, loss of strength, numbness, or tremors  SKIN: No itching, burning, rashes, or lesions   LYMPH NODES: No enlarged glands  ENDOCRINE: No heat or cold intolerance; No hair loss  MUSCULOSKELETAL: No joint pain or swelling; No muscle, back, or extremity pain  PSYCHIATRIC: No depression, anxiety, mood swings, or difficulty sleeping  HEME/LYMPH: No easy bruising, or bleeding gums  ALLERY AND IMMUNOLOGIC: No hives or eczema    Allergies    No Known Allergies    Intolerances        Social History:     FAMILY HISTORY:      MEDICATIONS  (STANDING):  amoxicillin  250 milliGRAM(s)/clavulanate 1 Tablet(s) Oral every 12 hours  buMETAnide Injectable 2 milliGRAM(s) IV Push once  heparin   Injectable 5000 Unit(s) SubCutaneous every 8 hours  metoprolol tartrate 25 milliGRAM(s) Oral two times a day    MEDICATIONS  (PRN):  acetaminophen     Tablet .. 650 milliGRAM(s) Oral every 6 hours PRN Temp greater or equal to 38C (100.4F), Mild Pain (1 - 3)  ondansetron Injectable 4 milliGRAM(s) IV Push every 6 hours PRN Nausea and/or Vomiting  senna 2 Tablet(s) Oral at bedtime PRN Constipation        CAPILLARY BLOOD GLUCOSE        I&O's Summary    08 Apr 2024 07:01  -  09 Apr 2024 07:00  --------------------------------------------------------  IN: 360 mL / OUT: 0 mL / NET: 360 mL    09 Apr 2024 07:01  -  09 Apr 2024 13:38  --------------------------------------------------------  IN: 240 mL / OUT: 0 mL / NET: 240 mL        PHYSICAL EXAM:  GENERAL: NAD, well-developed  HEAD:  Atraumatic, Normocephalic  EYES: EOMI, conjunctiva and sclera clear  NECK: Supple, No JVD  CHEST/LUNG: Clear to auscultation bilaterally; No wheeze  HEART: Regular rate and rhythm; No murmurs, rubs, or gallops  ABDOMEN: Soft, Nontender, Nondistended; Bowel sounds present  EXTREMITIES:  2+ Peripheral Pulses, No clubbing, cyanosis, or edema  PSYCH: AAOx3  NEUROLOGY: non-focal  SKIN: No rashes or lesions    LABS:                        11.3   4.78  )-----------( 142      ( 09 Apr 2024 07:25 )             39.1     04-09    145  |  118<H>  |  84<H>  ----------------------------<  92  4.7   |  <10<LL>  |  4.77<H>    Ca    7.8<L>      09 Apr 2024 07:25    TPro  6.2  /  Alb  3.8  /  TBili  0.4  /  DBili  x   /  AST  20  /  ALT  31  /  AlkPhos  95  04-08    PT/INR - ( 08 Apr 2024 18:31 )   PT: 11.2 sec;   INR: 1.07 ratio         PTT - ( 08 Apr 2024 18:31 )  PTT:25.9 sec  CARDIAC MARKERS ( 09 Apr 2024 09:59 )  x     / x     / 37 U/L / x     / 2.5 ng/mL  CARDIAC MARKERS ( 09 Apr 2024 00:34 )  x     / x     / 43 U/L / x     / 2.5 ng/mL  CARDIAC MARKERS ( 08 Apr 2024 18:41 )  x     / x     / 51 U/L / x     / x          Urinalysis Basic - ( 09 Apr 2024 07:25 )    Color: x / Appearance: x / SG: x / pH: x  Gluc: 92 mg/dL / Ketone: x  / Bili: x / Urobili: x   Blood: x / Protein: x / Nitrite: x   Leuk Esterase: x / RBC: x / WBC x   Sq Epi: x / Non Sq Epi: x / Bacteria: x        RADIOLOGY & ADDITIONAL TESTS:    Imaging Personally Reviewed:    Consultant(s) Notes Reviewed:      Care Discussed with Consultants/Other Providers:

## 2024-04-12 NOTE — PROGRESS NOTE ADULT - ASSESSMENT
73F PMHx nephrolithiasis, HTN admitted with new onset bl LE swelling and renal dysfunction. Cardiology consulted for asymptomatic new-onset AFib w/ RVR. hsTrop wnl. TTE pending.     Overall patient initially came in with volume overload likely in the setting of renal dysfunction that appears to be acute on chronic. She has no known heart failure, but has not been following regularly with a PCP. Renal on board to assist with etiology of renal disease. Regardless, her atrial fibrillation is new this admission. Given that she is asymptomatic, it is unclear how long she has had it. Would recommend managing conservatively until she is medical stable from an acid base standpoint and the etiology of her renal disease is known.      TTE: LVEF 37%, severe MR, moderate TR, severely dilated LA/RA    Recommendations:   -increase metoprolol to 25mg q8h for rate control, goal <110. Rates appear better controlled today   -given elevated CHADSVASC, maintain on heparin gtt for full AC at this time until she can be placed on oral AC  -would not give amiodarone given unknown duration of atrial fibrillation to avoid chemical cardioversion    If pt is to undergo  for MR workup, could consider DCCV + amiodarone at the time for AF management.  Will need optimization prior to rhythm control strategy noting her new dx of HFrEF and severe MR.    Please see attending attestation for final recommendations.     Dl Pruett MD  Cardiology Fellow      73F PMHx nephrolithiasis, HTN admitted with new onset bl LE swelling and renal dysfunction. Cardiology consulted for asymptomatic new-onset AFib w/ RVR.     Overall patient initially came in with volume overload thought it initially to be related to renal dysfunction, but recent TTE shows significantly reduction in LV systolic function and severe MR that is also contributing. She will need further medical management of her newly reduced EF and work up for the etiology by General Cardiology.     Regardless, her atrial fibrillation is new this admission. Given that she is asymptomatic, it is unclear how long she has had it. Would recommend managing conservatively as we have not cleared the MARY and given that she still requires volume optimization, would defer cardioversion.     Recommendations:   -increase metoprolol to 25mg q6h for rate control, goal <110  -given elevated CHADSVASC, maintain on heparin gtt for full AC at this time until she can be placed on oral AC  -would not give amiodarone given unknown duration of atrial fibrillation to avoid chemical cardioversion    If pt is to undergo  for MR workup, could consider DCCV + amiodarone at the time for AF management.  Will need optimization prior to rhythm control strategy noting her new dx of HFrEF and severe MR.    At this time, we will sign off. Please call if you have any questions.     Please see attending attestation for final recommendations.     Dl Pruett MD  Cardiology Fellow

## 2024-04-13 LAB
ALBUMIN SERPL ELPH-MCNC: 3.8 G/DL — SIGNIFICANT CHANGE UP (ref 3.3–5)
ALP SERPL-CCNC: 79 U/L — SIGNIFICANT CHANGE UP (ref 40–120)
ALT FLD-CCNC: 15 U/L — SIGNIFICANT CHANGE UP (ref 10–45)
ANION GAP SERPL CALC-SCNC: 21 MMOL/L — HIGH (ref 5–17)
APTT BLD: 57.1 SEC — HIGH (ref 24.5–35.6)
APTT BLD: 66.2 SEC — HIGH (ref 24.5–35.6)
APTT BLD: 72.7 SEC — HIGH (ref 24.5–35.6)
AST SERPL-CCNC: 13 U/L — SIGNIFICANT CHANGE UP (ref 10–40)
BASOPHILS # BLD AUTO: 0.05 K/UL — SIGNIFICANT CHANGE UP (ref 0–0.2)
BASOPHILS NFR BLD AUTO: 1 % — SIGNIFICANT CHANGE UP (ref 0–2)
BILIRUB SERPL-MCNC: 0.3 MG/DL — SIGNIFICANT CHANGE UP (ref 0.2–1.2)
BUN SERPL-MCNC: 77 MG/DL — HIGH (ref 7–23)
CALCIUM SERPL-MCNC: 8.2 MG/DL — LOW (ref 8.4–10.5)
CHLORIDE SERPL-SCNC: 102 MMOL/L — SIGNIFICANT CHANGE UP (ref 96–108)
CO2 SERPL-SCNC: 19 MMOL/L — LOW (ref 22–31)
CREAT SERPL-MCNC: 5.07 MG/DL — HIGH (ref 0.5–1.3)
EGFR: 8 ML/MIN/1.73M2 — LOW
EOSINOPHIL # BLD AUTO: 0.11 K/UL — SIGNIFICANT CHANGE UP (ref 0–0.5)
EOSINOPHIL NFR BLD AUTO: 2.1 % — SIGNIFICANT CHANGE UP (ref 0–6)
GLUCOSE SERPL-MCNC: 112 MG/DL — HIGH (ref 70–99)
HCT VFR BLD CALC: 38.8 % — SIGNIFICANT CHANGE UP (ref 34.5–45)
HGB BLD-MCNC: 11.3 G/DL — LOW (ref 11.5–15.5)
IMM GRANULOCYTES NFR BLD AUTO: 0.2 % — SIGNIFICANT CHANGE UP (ref 0–0.9)
LYMPHOCYTES # BLD AUTO: 0.96 K/UL — LOW (ref 1–3.3)
LYMPHOCYTES # BLD AUTO: 18.7 % — SIGNIFICANT CHANGE UP (ref 13–44)
MCHC RBC-ENTMCNC: 29.1 GM/DL — LOW (ref 32–36)
MCHC RBC-ENTMCNC: 29.1 PG — SIGNIFICANT CHANGE UP (ref 27–34)
MCV RBC AUTO: 100 FL — SIGNIFICANT CHANGE UP (ref 80–100)
MONOCYTES # BLD AUTO: 0.46 K/UL — SIGNIFICANT CHANGE UP (ref 0–0.9)
MONOCYTES NFR BLD AUTO: 8.9 % — SIGNIFICANT CHANGE UP (ref 2–14)
NEUTROPHILS # BLD AUTO: 3.55 K/UL — SIGNIFICANT CHANGE UP (ref 1.8–7.4)
NEUTROPHILS NFR BLD AUTO: 69.1 % — SIGNIFICANT CHANGE UP (ref 43–77)
NRBC # BLD: 0 /100 WBCS — SIGNIFICANT CHANGE UP (ref 0–0)
ORGANISM # SPEC MICROSCOPIC CNT: ABNORMAL
ORGANISM # SPEC MICROSCOPIC CNT: ABNORMAL
PLATELET # BLD AUTO: 146 K/UL — LOW (ref 150–400)
POTASSIUM SERPL-MCNC: 3.9 MMOL/L — SIGNIFICANT CHANGE UP (ref 3.5–5.3)
POTASSIUM SERPL-SCNC: 3.9 MMOL/L — SIGNIFICANT CHANGE UP (ref 3.5–5.3)
PROT SERPL-MCNC: 6.3 G/DL — SIGNIFICANT CHANGE UP (ref 6–8.3)
RBC # BLD: 3.88 M/UL — SIGNIFICANT CHANGE UP (ref 3.8–5.2)
RBC # FLD: 14.5 % — SIGNIFICANT CHANGE UP (ref 10.3–14.5)
SODIUM SERPL-SCNC: 142 MMOL/L — SIGNIFICANT CHANGE UP (ref 135–145)
WBC # BLD: 5.14 K/UL — SIGNIFICANT CHANGE UP (ref 3.8–10.5)
WBC # FLD AUTO: 5.14 K/UL — SIGNIFICANT CHANGE UP (ref 3.8–10.5)

## 2024-04-13 PROCEDURE — 99232 SBSQ HOSP IP/OBS MODERATE 35: CPT | Mod: GC

## 2024-04-13 PROCEDURE — 99233 SBSQ HOSP IP/OBS HIGH 50: CPT | Mod: GC

## 2024-04-13 RX ORDER — OXYCODONE HYDROCHLORIDE 5 MG/1
5 TABLET ORAL ONCE
Refills: 0 | Status: DISCONTINUED | OUTPATIENT
Start: 2024-04-13 | End: 2024-04-13

## 2024-04-13 RX ORDER — METOPROLOL TARTRATE 50 MG
37.5 TABLET ORAL EVERY 8 HOURS
Refills: 0 | Status: DISCONTINUED | OUTPATIENT
Start: 2024-04-13 | End: 2024-04-14

## 2024-04-13 RX ADMIN — Medication 650 MILLIGRAM(S): at 13:49

## 2024-04-13 RX ADMIN — Medication 650 MILLIGRAM(S): at 13:19

## 2024-04-13 RX ADMIN — Medication 25 MILLIGRAM(S): at 13:20

## 2024-04-13 RX ADMIN — HEPARIN SODIUM 900 UNIT(S)/HR: 5000 INJECTION INTRAVENOUS; SUBCUTANEOUS at 19:46

## 2024-04-13 RX ADMIN — HEPARIN SODIUM 900 UNIT(S)/HR: 5000 INJECTION INTRAVENOUS; SUBCUTANEOUS at 08:20

## 2024-04-13 RX ADMIN — BUMETANIDE 10 MG/HR: 0.25 INJECTION INTRAMUSCULAR; INTRAVENOUS at 21:27

## 2024-04-13 RX ADMIN — BUMETANIDE 10 MG/HR: 0.25 INJECTION INTRAMUSCULAR; INTRAVENOUS at 01:36

## 2024-04-13 RX ADMIN — HEPARIN SODIUM 900 UNIT(S)/HR: 5000 INJECTION INTRAVENOUS; SUBCUTANEOUS at 16:18

## 2024-04-13 RX ADMIN — Medication 1950 MILLIGRAM(S): at 21:58

## 2024-04-13 RX ADMIN — Medication 1950 MILLIGRAM(S): at 06:40

## 2024-04-13 RX ADMIN — ISOSORBIDE DINITRATE 10 MILLIGRAM(S): 5 TABLET ORAL at 06:38

## 2024-04-13 RX ADMIN — Medication 25 MILLIGRAM(S): at 06:40

## 2024-04-13 RX ADMIN — Medication 37.5 MILLIGRAM(S): at 21:58

## 2024-04-13 RX ADMIN — OXYCODONE HYDROCHLORIDE 5 MILLIGRAM(S): 5 TABLET ORAL at 16:16

## 2024-04-13 RX ADMIN — ISOSORBIDE DINITRATE 10 MILLIGRAM(S): 5 TABLET ORAL at 21:58

## 2024-04-13 RX ADMIN — Medication 25 MILLIGRAM(S): at 06:41

## 2024-04-13 RX ADMIN — ISOSORBIDE DINITRATE 10 MILLIGRAM(S): 5 TABLET ORAL at 12:20

## 2024-04-13 RX ADMIN — OXYCODONE HYDROCHLORIDE 5 MILLIGRAM(S): 5 TABLET ORAL at 16:46

## 2024-04-13 RX ADMIN — HEPARIN SODIUM 900 UNIT(S)/HR: 5000 INJECTION INTRAVENOUS; SUBCUTANEOUS at 23:25

## 2024-04-13 RX ADMIN — Medication 1950 MILLIGRAM(S): at 13:20

## 2024-04-13 NOTE — PROGRESS NOTE ADULT - ASSESSMENT
73F PMHx nephrolithiasis s/p multiple procedures, HTN admitted with new onset bl LE swelling and renal dysfunction. Cardiology consulted for asymptomatic new-onset AFib w/ RVR and ADHF.     #ADHF:  - remains volume overloaded  - c/w bumex 2mg/hr  - BB as below  - c/w hydralazine 25 mg TID and isosorbide dinitrate 10mg TID  - other GDMT limited by current renal function  - proceed w/ischemic workup once net even and HR controlled  - monitor tele  - replete K4Mg2  - strict I/O's, daily weights    #AF:  - rates remain elevated, likely worsened by volume overload  - increase metoprolol to 50mg q8hr  - c/w hep gtt for AC  - would not give amiodarone given unknown duration of atrial fibrillation     #MR:  - repeat TTE once euvolemic to reassess    Constantine Chambers MD  Cardiology Fellow - PGY5    Please check [email or web address]: [email or web address] for cardiology service schedule and contact information.    73F PMHx nephrolithiasis s/p multiple procedures, HTN admitted with new onset bl LE swelling and renal dysfunction. Cardiology consulted for asymptomatic new-onset AFib w/ RVR and ADHF.     #ADHF:  - remains volume overloaded  - c/w bumex 2mg/hr  - BB as below  - c/w hydralazine 25 mg TID and isosorbide dinitrate 10mg TID  - other GDMT limited by current renal function  - proceed w/ischemic workup once net even and HR controlled  - monitor tele  - replete K4Mg2  - strict I/O's, daily weights    #AF:  - rates remain elevated, likely worsened by volume overload  - increase metoprolol to 37.5mg q8hr  - c/w hep gtt for AC  - would not give amiodarone given unknown duration of atrial fibrillation     #MR:  - repeat TTE once euvolemic to reassess    Constantine Chambers MD  Cardiology Fellow - PGY5    Please check [email or web address]: [email or web address] for cardiology service schedule and contact information.

## 2024-04-13 NOTE — PROGRESS NOTE ADULT - SUBJECTIVE AND OBJECTIVE BOX
Cardiology Progress Note  ------------------------------------------------------------------------------------------  SUBJECTIVE:   - Tele:   - No events overnight. Denies CP, SOB or Palpitations.     -------------------------------------------------------------------------------------------  VS:  T(F): 97.2 (04-13), Max: 98.3 (04-12)  HR: 117 (04-13) (78 - 117)  BP: 114/73 (04-13) (114/73 - 131/93)  RR: 18 (04-13)  SpO2: 94% (04-13)  I&O's Summary    12 Apr 2024 07:01  -  13 Apr 2024 07:00  --------------------------------------------------------  IN: 1114 mL / OUT: 650 mL / NET: 464 mL    13 Apr 2024 07:01  -  13 Apr 2024 10:32  --------------------------------------------------------  IN: 180 mL / OUT: 0 mL / NET: 180 mL      PHYSICAL EXAM:  GENERAL: NAD  HEAD: Atraumatic, Normocephalic.  ENT: Moist mucous membranes.  NECK: Supple, No JVD.  CHEST/LUNG: Clear to auscultation bilaterally; No rales, rhonchi, wheezing, or rubs. Unlabored respirations.  HEART: Regular rate and rhythm; No murmurs, rubs, or gallops.  ABDOMEN: Bowel sounds present; Soft, Nontender, Nondistended.   EXTREMITIES: No edema. 2+ Peripheral Pulses, brisk capillary refill. No clubbing or cyanosis.     -------------------------------------------------------------------------------------------  LABS:                          11.3   5.14  )-----------( 146      ( 13 Apr 2024 06:57 )             38.8     04-13    142  |  102  |  77<H>  ----------------------------<  112<H>  3.9   |  19<L>  |  5.07<H>    Ca    8.2<L>      13 Apr 2024 07:00  Phos  4.7     04-12  Mg     1.9     04-12    TPro  6.3  /  Alb  3.8  /  TBili  0.3  /  DBili  x   /  AST  13  /  ALT  15  /  AlkPhos  79  04-13    PTT - ( 13 Apr 2024 07:01 )  PTT:57.1 sec  CARDIAC MARKERS ( 09 Apr 2024 09:59 )  41 ng/L / x     / x     / 37 U/L / x     / 2.5 ng/mL  CARDIAC MARKERS ( 09 Apr 2024 00:34 )  56 ng/L / x     / x     / 43 U/L / x     / 2.5 ng/mL  CARDIAC MARKERS ( 08 Apr 2024 18:41 )  46 ng/L / x     / x     / 51 U/L / x     / x                -------------------------------------------------------------------------------------------  Meds:  acetaminophen     Tablet .. 650 milliGRAM(s) Oral every 6 hours PRN  buMETAnide Infusion 2 mG/Hr IV Continuous <Continuous>  heparin   Injectable 5500 Unit(s) IV Push every 6 hours PRN  heparin   Injectable 2500 Unit(s) IV Push every 6 hours PRN  heparin  Infusion.  Unit(s)/Hr IV Continuous <Continuous>  hydrALAZINE 25 milliGRAM(s) Oral three times a day  isosorbide   dinitrate Tablet (ISORDIL) 10 milliGRAM(s) Oral three times a day  metoprolol tartrate 25 milliGRAM(s) Oral every 8 hours  ondansetron Injectable 4 milliGRAM(s) IV Push every 6 hours PRN  senna 2 Tablet(s) Oral at bedtime PRN  sodium bicarbonate 1950 milliGRAM(s) Oral three times a day    -------------------------------------------------------------------------------------------  Cardiovascular Diagnostic Testing:    ECG:     Echo:     Stress Testing:    Cath:    -------------------------------------------------------------------------------------------   Cardiology Progress Note  ------------------------------------------------------------------------------------------  SUBJECTIVE:   - Tele: AF rates 100's-130's, PVC's  - No events overnight. Denies CP, SOB or Palpitations.     -------------------------------------------------------------------------------------------  VS:  T(F): 97.2 (04-13), Max: 98.3 (04-12)  HR: 117 (04-13) (78 - 117)  BP: 114/73 (04-13) (114/73 - 131/93)  RR: 18 (04-13)  SpO2: 94% (04-13)  I&O's Summary    12 Apr 2024 07:01  -  13 Apr 2024 07:00  --------------------------------------------------------  IN: 1114 mL / OUT: 650 mL / NET: 464 mL    13 Apr 2024 07:01  -  13 Apr 2024 10:32  --------------------------------------------------------  IN: 180 mL / OUT: 0 mL / NET: 180 mL      PHYSICAL EXAM:  GENERAL: NAD  HEAD: Atraumatic, Normocephalic.  ENT: Moist mucous membranes.  NECK: Supple, No JVD.  CHEST/LUNG: +crackles at bases b/l  HEART: irregularly irregular; No murmurs, rubs, or gallops.  ABDOMEN: Bowel sounds present; Soft, Nontender, Nondistended.   EXTREMITIES: 2+ edema up to upper shins b/l. 2+ Peripheral Pulses, brisk capillary refill. No clubbing or cyanosis.     -------------------------------------------------------------------------------------------  LABS:                          11.3   5.14  )-----------( 146      ( 13 Apr 2024 06:57 )             38.8     04-13    142  |  102  |  77<H>  ----------------------------<  112<H>  3.9   |  19<L>  |  5.07<H>    Ca    8.2<L>      13 Apr 2024 07:00  Phos  4.7     04-12  Mg     1.9     04-12    TPro  6.3  /  Alb  3.8  /  TBili  0.3  /  DBili  x   /  AST  13  /  ALT  15  /  AlkPhos  79  04-13    PTT - ( 13 Apr 2024 07:01 )  PTT:57.1 sec  CARDIAC MARKERS ( 09 Apr 2024 09:59 )  41 ng/L / x     / x     / 37 U/L / x     / 2.5 ng/mL  CARDIAC MARKERS ( 09 Apr 2024 00:34 )  56 ng/L / x     / x     / 43 U/L / x     / 2.5 ng/mL  CARDIAC MARKERS ( 08 Apr 2024 18:41 )  46 ng/L / x     / x     / 51 U/L / x     / x                -------------------------------------------------------------------------------------------  Meds:  acetaminophen     Tablet .. 650 milliGRAM(s) Oral every 6 hours PRN  buMETAnide Infusion 2 mG/Hr IV Continuous <Continuous>  heparin   Injectable 5500 Unit(s) IV Push every 6 hours PRN  heparin   Injectable 2500 Unit(s) IV Push every 6 hours PRN  heparin  Infusion.  Unit(s)/Hr IV Continuous <Continuous>  hydrALAZINE 25 milliGRAM(s) Oral three times a day  isosorbide   dinitrate Tablet (ISORDIL) 10 milliGRAM(s) Oral three times a day  metoprolol tartrate 25 milliGRAM(s) Oral every 8 hours  ondansetron Injectable 4 milliGRAM(s) IV Push every 6 hours PRN  senna 2 Tablet(s) Oral at bedtime PRN  sodium bicarbonate 1950 milliGRAM(s) Oral three times a day    -------------------------------------------------------------------------------------------  TTE 4/11/24:  CONCLUSIONS:      1. Left ventricular systolic function is moderately decreased with an ejection fraction of 37 % by Garcia's method of disks.   2. Analysis of left ventricular diastolic function and filling pressure is made challenging by the presence of severe mitral regurgitation.   3. Normal right ventricular cavity size, with normal wall thickness, and normal systolic function.   4. The left atrium is severely dilated.   5. The right atrium is severely dilated.   6. Severe mitral regurgitation.   7. Moderate tricuspid regurgitation.   8. Estimatedpulmonary artery systolic pressure is 41 mmHg.   9. No prior echocardiogram is available for comparison.      -------------------------------------------------------------------------------------------

## 2024-04-13 NOTE — PROGRESS NOTE ADULT - PROBLEM SELECTOR PLAN 2
Pt. with metabolic acidosis in the setting of renal failure. SCO2 remains low but improved to 19 today. Recommend IV diuresis, as outlined above.   Continue Na bicarb 1300 tid   Monitor pH, and SCO2.    If you have any questions, please feel free to contact me  Jamari Brunson  Nephrology Fellow  [phone] / [employer] Teams(Preferred)  (After 5pm or on weekends please page the on-call fellow).

## 2024-04-13 NOTE — PROGRESS NOTE ADULT - PROBLEM SELECTOR PLAN 1
Pt. with renal failure in the setting of fluid overload. On review of [place], SCr noted to be persistently elevated from [id number]. SCr was elevated at 1.79 on 5/4/2011. SCr initially during this admission was elevated at 4.73 (4/8), and remains elevated/stable at 4.62 today. No interim labs available for review but pt. likely has underlying CKD. Documented urine output is 750 cc over the past 24 hours. UA showed proteinuria and evidence of infection (although 15 epithelial cells). UPCR is elevated at 1.1. Kidney sonogram showed evidence of medical renal disease, BL non-obstructing stones, and BL renal cysts.   Recommend to continue IV Bumex infusion at 2 mg/hr. Serologic workup thus far: HBsAg, HCV Ab, and HIV are non-reactive. Kappa/Lambda free light chain ratio is mildly elevated at 1.85 (acceptable range for degree of CKD). SIFE is pending result. Monitor labs and urine output. Avoid nephrotoxins. Dose medications as per eGFR.

## 2024-04-13 NOTE — PROGRESS NOTE ADULT - SUBJECTIVE AND OBJECTIVE BOX
[facility] Division of Kidney Diseases & Hypertension  FOLLOW UP NOTE  [phone]--------------------------------------------------------------------------------    Chief Complaint: NICO on CKD vs progressive CKD    24 hour events/subjective: Pt. was seen and evaluated at bedside this morning. Pt. reports feeling well, offers no complaints. Endorses good uop. Denies any headaches, fevers/chills, chest pain, and SOB.    PAST HISTORY  --------------------------------------------------------------------------------  No significant changes to PMH, PSH, FHx, SHx, unless otherwise noted    ALLERGIES & MEDICATIONS  --------------------------------------------------------------------------------  Allergies    No Known Allergies    Intolerances      Standing Inpatient Medications  buMETAnide Infusion 2 mG/Hr IV Continuous <Continuous>  heparin  Infusion.  Unit(s)/Hr IV Continuous <Continuous>  hydrALAZINE 25 milliGRAM(s) Oral three times a day  isosorbide   dinitrate Tablet (ISORDIL) 10 milliGRAM(s) Oral three times a day  metoprolol tartrate 25 milliGRAM(s) Oral every 8 hours  sodium bicarbonate 1950 milliGRAM(s) Oral three times a day    PRN Inpatient Medications  acetaminophen     Tablet .. 650 milliGRAM(s) Oral every 6 hours PRN  heparin   Injectable 5500 Unit(s) IV Push every 6 hours PRN  heparin   Injectable 2500 Unit(s) IV Push every 6 hours PRN  ondansetron Injectable 4 milliGRAM(s) IV Push every 6 hours PRN  senna 2 Tablet(s) Oral at bedtime PRN      REVIEW OF SYSTEMS  --------------------------------------------------------------------------------  See HPI    VITALS/PHYSICAL EXAM  --------------------------------------------------------------------------------  T(C): 36.2 (04-13-24 @ 09:18), Max: 36.8 (04-12-24 @ 13:29)  HR: 117 (04-13-24 @ 09:18) (78 - 117)  BP: 114/73 (04-13-24 @ 09:18) (114/73 - 131/93)  RR: 18 (04-13-24 @ 09:18) (18 - 18)  SpO2: 94% (04-13-24 @ 09:18) (94% - 100%)  Wt(kg): --    04-12-24 @ 07:01  -  04-13-24 @ 07:00  --------------------------------------------------------  IN: 1114 mL / OUT: 650 mL / NET: 464 mL    04-13-24 @ 07:01  -  04-13-24 @ 11:33  --------------------------------------------------------  IN: 180 mL / OUT: 0 mL / NET: 180 mL    Physical Exam:  Gen: NAD  HEENT: MMM  Pulm: CTA B/L  CV: S1S2  Abd: Soft, +BS   Ext: +BL LE edema  Neuro: Awake and alert  Skin: Warm and dry  Vascular access: Peripheral IV line    LABS/STUDIES  --------------------------------------------------------------------------------              11.3   5.14  >-----------<  146      [04-13-24 @ 06:57]              38.8     142  |  102  |  77  ----------------------------<  112      [04-13-24 @ 07:00]  3.9   |  19  |  5.07        Ca     8.2     [04-13-24 @ 07:00]      Mg     1.9     [04-12-24 @ 01:04]      Phos  4.7     [04-12-24 @ 01:04]    TPro  6.3  /  Alb  3.8  /  TBili  0.3  /  DBili  x   /  AST  13  /  ALT  15  /  AlkPhos  79  [04-13-24 @ 07:00]      PTT: 57.1       [04-13-24 @ 07:01]    Creatinine Trend:  SCr 5.07 [04-13 @ 07:00]  SCr 4.62 [04-12 @ 10:26]  SCr 4.70 [04-12 @ 01:04]  SCr 4.65 [04-11 @ 09:43]  SCr 4.72 [04-09 @ 20:21]    Urine Creatinine 35      [04-09-24 @ 22:41]  Urine Protein 40      [04-09-24 @ 22:41]    TSH 0.65      [04-09-24 @ 00:27]    HBsAg Nonreact      [04-10-24 @ 07:11]  HCV 0.05, Nonreact      [04-09-24 @ 07:25]  HIV Nonreact      [04-09-24 @ 20:21]    Free Light Chains: kappa 5.19, lambda 2.81, ratio = 1.85      [04-10 @ 07:11]

## 2024-04-14 LAB
ANION GAP SERPL CALC-SCNC: 22 MMOL/L — HIGH (ref 5–17)
APTT BLD: 80.6 SEC — HIGH (ref 24.5–35.6)
BUN SERPL-MCNC: 75 MG/DL — HIGH (ref 7–23)
CALCIUM SERPL-MCNC: 8.1 MG/DL — LOW (ref 8.4–10.5)
CHLORIDE SERPL-SCNC: 99 MMOL/L — SIGNIFICANT CHANGE UP (ref 96–108)
CO2 SERPL-SCNC: 22 MMOL/L — SIGNIFICANT CHANGE UP (ref 22–31)
CREAT SERPL-MCNC: 4.88 MG/DL — HIGH (ref 0.5–1.3)
EGFR: 9 ML/MIN/1.73M2 — LOW
GLUCOSE SERPL-MCNC: 102 MG/DL — HIGH (ref 70–99)
HCT VFR BLD CALC: 36.9 % — SIGNIFICANT CHANGE UP (ref 34.5–45)
HGB BLD-MCNC: 11.3 G/DL — LOW (ref 11.5–15.5)
INR BLD: 1.14 RATIO — SIGNIFICANT CHANGE UP (ref 0.85–1.18)
MCHC RBC-ENTMCNC: 29.6 PG — SIGNIFICANT CHANGE UP (ref 27–34)
MCHC RBC-ENTMCNC: 30.6 GM/DL — LOW (ref 32–36)
MCV RBC AUTO: 96.6 FL — SIGNIFICANT CHANGE UP (ref 80–100)
NRBC # BLD: 0 /100 WBCS — SIGNIFICANT CHANGE UP (ref 0–0)
PLATELET # BLD AUTO: 189 K/UL — SIGNIFICANT CHANGE UP (ref 150–400)
POTASSIUM SERPL-MCNC: 3.7 MMOL/L — SIGNIFICANT CHANGE UP (ref 3.5–5.3)
POTASSIUM SERPL-SCNC: 3.7 MMOL/L — SIGNIFICANT CHANGE UP (ref 3.5–5.3)
PROTHROM AB SERPL-ACNC: 11.9 SEC — SIGNIFICANT CHANGE UP (ref 9.5–13)
RBC # BLD: 3.82 M/UL — SIGNIFICANT CHANGE UP (ref 3.8–5.2)
RBC # FLD: 14.3 % — SIGNIFICANT CHANGE UP (ref 10.3–14.5)
SODIUM SERPL-SCNC: 143 MMOL/L — SIGNIFICANT CHANGE UP (ref 135–145)
WBC # BLD: 8.86 K/UL — SIGNIFICANT CHANGE UP (ref 3.8–10.5)
WBC # FLD AUTO: 8.86 K/UL — SIGNIFICANT CHANGE UP (ref 3.8–10.5)

## 2024-04-14 PROCEDURE — 99233 SBSQ HOSP IP/OBS HIGH 50: CPT

## 2024-04-14 PROCEDURE — 99232 SBSQ HOSP IP/OBS MODERATE 35: CPT | Mod: GC

## 2024-04-14 RX ORDER — METOPROLOL TARTRATE 50 MG
50 TABLET ORAL EVERY 8 HOURS
Refills: 0 | Status: DISCONTINUED | OUTPATIENT
Start: 2024-04-14 | End: 2024-04-15

## 2024-04-14 RX ORDER — OXYCODONE HYDROCHLORIDE 5 MG/1
5 TABLET ORAL ONCE
Refills: 0 | Status: DISCONTINUED | OUTPATIENT
Start: 2024-04-14 | End: 2024-04-14

## 2024-04-14 RX ADMIN — ISOSORBIDE DINITRATE 10 MILLIGRAM(S): 5 TABLET ORAL at 05:19

## 2024-04-14 RX ADMIN — Medication 50 MILLIGRAM(S): at 19:01

## 2024-04-14 RX ADMIN — Medication 1950 MILLIGRAM(S): at 05:19

## 2024-04-14 RX ADMIN — BUMETANIDE 10 MG/HR: 0.25 INJECTION INTRAMUSCULAR; INTRAVENOUS at 06:02

## 2024-04-14 RX ADMIN — Medication 1950 MILLIGRAM(S): at 21:51

## 2024-04-14 RX ADMIN — ISOSORBIDE DINITRATE 10 MILLIGRAM(S): 5 TABLET ORAL at 21:51

## 2024-04-14 RX ADMIN — OXYCODONE HYDROCHLORIDE 5 MILLIGRAM(S): 5 TABLET ORAL at 14:54

## 2024-04-14 RX ADMIN — HEPARIN SODIUM 900 UNIT(S)/HR: 5000 INJECTION INTRAVENOUS; SUBCUTANEOUS at 07:59

## 2024-04-14 RX ADMIN — OXYCODONE HYDROCHLORIDE 5 MILLIGRAM(S): 5 TABLET ORAL at 13:54

## 2024-04-14 RX ADMIN — Medication 37.5 MILLIGRAM(S): at 05:19

## 2024-04-14 RX ADMIN — ISOSORBIDE DINITRATE 10 MILLIGRAM(S): 5 TABLET ORAL at 13:54

## 2024-04-14 RX ADMIN — Medication 1950 MILLIGRAM(S): at 13:55

## 2024-04-14 NOTE — PROGRESS NOTE ADULT - SUBJECTIVE AND OBJECTIVE BOX
[facility] Division of Kidney Diseases & Hypertension  FOLLOW UP NOTE  [phone]--------------------------------------------------------------------------------    Chief Complaint: NICO on CKD vs progressive CKD    24 hour events/subjective: Pt. was seen and evaluated at bedside this morning. Pt. reports feeling well, offers no complaints. Endorses good uop. Denies any headaches, fevers/chills, chest pain, and SOB.      PAST HISTORY  --------------------------------------------------------------------------------  No significant changes to PMH, PSH, FHx, SHx, unless otherwise noted    ALLERGIES & MEDICATIONS  --------------------------------------------------------------------------------  Allergies    No Known Allergies    Intolerances      Standing Inpatient Medications  buMETAnide Infusion 2 mG/Hr IV Continuous <Continuous>  heparin  Infusion.  Unit(s)/Hr IV Continuous <Continuous>  isosorbide   dinitrate Tablet (ISORDIL) 10 milliGRAM(s) Oral three times a day  metoprolol tartrate 50 milliGRAM(s) Oral every 8 hours  sodium bicarbonate 1950 milliGRAM(s) Oral three times a day    PRN Inpatient Medications  acetaminophen     Tablet .. 650 milliGRAM(s) Oral every 6 hours PRN  heparin   Injectable 5500 Unit(s) IV Push every 6 hours PRN  heparin   Injectable 2500 Unit(s) IV Push every 6 hours PRN  ondansetron Injectable 4 milliGRAM(s) IV Push every 6 hours PRN  senna 2 Tablet(s) Oral at bedtime PRN      REVIEW OF SYSTEMS  --------------------------------------------------------------------------------  See HPI    VITALS/PHYSICAL EXAM  --------------------------------------------------------------------------------  T(C): 36.9 (04-14-24 @ 08:41), Max: 36.9 (04-14-24 @ 08:41)  HR: 97 (04-14-24 @ 08:41) (97 - 127)  BP: 119/88 (04-14-24 @ 08:41) (97/56 - 133/91)  RR: 18 (04-14-24 @ 08:41) (18 - 18)  SpO2: 95% (04-14-24 @ 08:41) (95% - 98%)  Wt(kg): --      04-13-24 @ 07:01  -  04-14-24 @ 07:00  --------------------------------------------------------  IN: 958 mL / OUT: 900 mL / NET: 58 mL    04-14-24 @ 07:01  -  04-14-24 @ 10:54  --------------------------------------------------------  IN: 160 mL / OUT: 300 mL / NET: -140 mL    Physical Exam:  Gen: NAD  HEENT: MMM  Pulm: CTA B/L  CV: S1S2  Abd: Soft, +BS   Ext: +BL LE edema  Neuro: Awake and alert  Skin: Warm and dry  Vascular access: Peripheral IV line    LABS/STUDIES  --------------------------------------------------------------------------------              11.3   8.86  >-----------<  189      [04-14-24 @ 07:18]              36.9     143  |  99  |  75  ----------------------------<  102      [04-14-24 @ 07:20]  3.7   |  22  |  4.88        Ca     8.1     [04-14-24 @ 07:20]    TPro  6.3  /  Alb  3.8  /  TBili  0.3  /  DBili  x   /  AST  13  /  ALT  15  /  AlkPhos  79  [04-13-24 @ 07:00]    PT/INR: PT 11.9 , INR 1.14       [04-14-24 @ 07:18]  PTT: 80.6       [04-14-24 @ 07:18]    Creatinine Trend:  SCr 4.88 [04-14 @ 07:20]  SCr 5.07 [04-13 @ 07:00]  SCr 4.62 [04-12 @ 10:26]  SCr 4.70 [04-12 @ 01:04]  SCr 4.65 [04-11 @ 09:43]    Urine Creatinine 35      [04-09-24 @ 22:41]  Urine Protein 40      [04-09-24 @ 22:41]    TSH 0.65      [04-09-24 @ 00:27]    HBsAg Nonreact      [04-10-24 @ 07:11]  HCV 0.05, Nonreact      [04-09-24 @ 07:25]  HIV Nonreact      [04-09-24 @ 20:21]    Free Light Chains: kappa 5.19, lambda 2.81, ratio = 1.85      [04-10 @ 07:11]

## 2024-04-14 NOTE — PROGRESS NOTE ADULT - SUBJECTIVE AND OBJECTIVE BOX
24H hour events: remains with RVR to AF.  On Bumex gtt    MEDICATIONS:  buMETAnide Infusion 2 mG/Hr IV Continuous <Continuous>  heparin   Injectable 5500 Unit(s) IV Push every 6 hours PRN  heparin   Injectable 2500 Unit(s) IV Push every 6 hours PRN  heparin  Infusion.  Unit(s)/Hr IV Continuous <Continuous>  isosorbide   dinitrate Tablet (ISORDIL) 10 milliGRAM(s) Oral three times a day  metoprolol tartrate 37.5 milliGRAM(s) Oral every 8 hours        acetaminophen     Tablet .. 650 milliGRAM(s) Oral every 6 hours PRN  ondansetron Injectable 4 milliGRAM(s) IV Push every 6 hours PRN    senna 2 Tablet(s) Oral at bedtime PRN      sodium bicarbonate 1950 milliGRAM(s) Oral three times a day      REVIEW OF SYSTEMS:  Complete 10point ROS negative.    PHYSICAL EXAM:  T(C): 36.4 (04-14-24 @ 05:14), Max: 36.4 (04-13-24 @ 17:00)  HR: 108 (04-14-24 @ 05:14) (107 - 127)  BP: 133/91 (04-14-24 @ 05:14) (97/56 - 133/91)  RR: 18 (04-14-24 @ 05:14) (18 - 18)  SpO2: 95% (04-14-24 @ 05:14) (94% - 98%)  Wt(kg): --  I&O's Summary    13 Apr 2024 07:01  -  14 Apr 2024 07:00  --------------------------------------------------------  IN: 958 mL / OUT: 900 mL / NET: 58 mL        Appearance: Normal	  HEENT:   Normal oral mucosa, PERRL, EOMI	  Lymphatic: No lymphadenopathy  Cardiovascular: tachycardia, irregularly irregular, +++ edema  Respiratory: Lungs clear to auscultation	  Psychiatry: A & O x 3, Mood & affect appropriate  Gastrointestinal:  Soft, Non-tender, + BS	  Skin: No rashes, No ecchymoses, No cyanosis	  Neurologic: Non-focal  Extremities: Normal range of motion,   Vascular: Peripheral pulses palpable 2+ bilaterally        LABS:	 	    CBC Full  -  ( 13 Apr 2024 06:57 )  WBC Count : 5.14 K/uL  Hemoglobin : 11.3 g/dL  Hematocrit : 38.8 %  Platelet Count - Automated : 146 K/uL  Mean Cell Volume : 100.0 fl  Mean Cell Hemoglobin : 29.1 pg  Mean Cell Hemoglobin Concentration : 29.1 gm/dL  Auto Neutrophil # : 3.55 K/uL  Auto Lymphocyte # : 0.96 K/uL  Auto Monocyte # : 0.46 K/uL  Auto Eosinophil # : 0.11 K/uL  Auto Basophil # : 0.05 K/uL  Auto Neutrophil % : 69.1 %  Auto Lymphocyte % : 18.7 %  Auto Monocyte % : 8.9 %  Auto Eosinophil % : 2.1 %  Auto Basophil % : 1.0 %    04-13    142  |  102  |  77<H>  ----------------------------<  112<H>  3.9   |  19<L>  |  5.07<H>  04-12    145  |  109<H>  |  76<H>  ----------------------------<  103<H>  4.3   |  18<L>  |  4.62<H>    Ca    8.2<L>      13 Apr 2024 07:00  Ca    7.8<L>      12 Apr 2024 10:26    TPro  6.3  /  Alb  3.8  /  TBili  0.3  /  DBili  x   /  AST  13  /  ALT  15  /  AlkPhos  79  04-13  TPro  5.9<L>  /  Alb  3.6  /  TBili  0.4  /  DBili  x   /  AST  9<L>  /  ALT  18  /  AlkPhos  78  04-12      TELEMETRY: 	  AF with RVR        	  ASSESSMENT/PLAN: 	    73 year old woman with congestive heart failure in the setting of severe LV systolic function and mitral regurgitation.  She was found to have AF with RVR.  Clinically improved, but remains volume overloaded.  Given the lack of overt symptoms and unclear duration of atrial fibrillation it is certainly possible that there is a tachy component to her myopathy and also potentiation of functional mitral regurgitation.  As such it may be reasonable to proceed with  guided cardioversion and amio to see she can have favorable remodelling.  Coronary artery disease is also in the differential, but there is certainly concern for worsening renal function from contrast used during coronary angiography.  Regardless, I would not attempt cardioversion until optimized.      - diureses as per Cardiology  - increase metoprolol to 50 mg q8 hours  - continue anticoagulation (currently on IV heparin

## 2024-04-14 NOTE — PROGRESS NOTE ADULT - PROBLEM SELECTOR PLAN 2
Pt. with metabolic acidosis in the setting of renal failure. SCO2 improved to 22 today. Recommend IV diuresis, as outlined above.   Continue Na bicarb 1300 tid   Monitor pH, and SCO2.    If you have any questions, please feel free to contact me  Jamari Brunson  Nephrology Fellow  [phone] / [employer] Teams(Preferred)  (After 5pm or on weekends please page the on-call fellow).

## 2024-04-14 NOTE — PROGRESS NOTE ADULT - PROBLEM SELECTOR PLAN 1
Pt. with renal failure in the setting of fluid overload. On review of [place], SCr noted to be persistently elevated from [id number]. SCr was elevated at 1.79 on 5/4/2011. SCr initially during this admission was elevated at 4.73 (4/8), and remains elevated/stable at 4.88 today. No interim labs available for review but pt. likely has underlying CKD. Documented urine output is 900 cc over the past 24 hours, although not all voids recorded. UA showed proteinuria and evidence of infection (although 15 epithelial cells). UPCR is elevated at 1.1. Kidney sonogram showed evidence of medical renal disease, BL non-obstructing stones, and BL renal cysts.   Recommend to continue IV Bumex infusion at 2 mg/hr. Consider urinary catheter placement for accurate I/O measurements. Serologic workup thus far: HBsAg, HCV Ab, and HIV are non-reactive. Kappa/Lambda free light chain ratio is mildly elevated at 1.85 (acceptable range for degree of CKD). SIFE is pending result. Monitor labs and urine output. Avoid nephrotoxins. Dose medications as per eGFR.

## 2024-04-15 LAB
ANION GAP SERPL CALC-SCNC: 18 MMOL/L — HIGH (ref 5–17)
ANION GAP SERPL CALC-SCNC: 23 MMOL/L — HIGH (ref 5–17)
APTT BLD: 68.7 SEC — HIGH (ref 24.5–35.6)
BUN SERPL-MCNC: 72 MG/DL — HIGH (ref 7–23)
BUN SERPL-MCNC: 73 MG/DL — HIGH (ref 7–23)
CALCIUM SERPL-MCNC: 7.8 MG/DL — LOW (ref 8.4–10.5)
CALCIUM SERPL-MCNC: 7.8 MG/DL — LOW (ref 8.4–10.5)
CHLORIDE SERPL-SCNC: 95 MMOL/L — LOW (ref 96–108)
CHLORIDE SERPL-SCNC: 96 MMOL/L — SIGNIFICANT CHANGE UP (ref 96–108)
CO2 SERPL-SCNC: 24 MMOL/L — SIGNIFICANT CHANGE UP (ref 22–31)
CO2 SERPL-SCNC: 30 MMOL/L — SIGNIFICANT CHANGE UP (ref 22–31)
CREAT SERPL-MCNC: 4.82 MG/DL — HIGH (ref 0.5–1.3)
CREAT SERPL-MCNC: 4.95 MG/DL — HIGH (ref 0.5–1.3)
EGFR: 9 ML/MIN/1.73M2 — LOW
EGFR: 9 ML/MIN/1.73M2 — LOW
GLUCOSE SERPL-MCNC: 110 MG/DL — HIGH (ref 70–99)
GLUCOSE SERPL-MCNC: 91 MG/DL — SIGNIFICANT CHANGE UP (ref 70–99)
HCT VFR BLD CALC: 36.5 % — SIGNIFICANT CHANGE UP (ref 34.5–45)
HGB BLD-MCNC: 11 G/DL — LOW (ref 11.5–15.5)
MAGNESIUM SERPL-MCNC: 1.8 MG/DL — SIGNIFICANT CHANGE UP (ref 1.6–2.6)
MCHC RBC-ENTMCNC: 29.4 PG — SIGNIFICANT CHANGE UP (ref 27–34)
MCHC RBC-ENTMCNC: 30.1 GM/DL — LOW (ref 32–36)
MCV RBC AUTO: 97.6 FL — SIGNIFICANT CHANGE UP (ref 80–100)
NRBC # BLD: 0 /100 WBCS — SIGNIFICANT CHANGE UP (ref 0–0)
PHOSPHATE SERPL-MCNC: 4.5 MG/DL — SIGNIFICANT CHANGE UP (ref 2.5–4.5)
PLATELET # BLD AUTO: 176 K/UL — SIGNIFICANT CHANGE UP (ref 150–400)
POTASSIUM SERPL-MCNC: 4 MMOL/L — SIGNIFICANT CHANGE UP (ref 3.5–5.3)
POTASSIUM SERPL-MCNC: 4.3 MMOL/L — SIGNIFICANT CHANGE UP (ref 3.5–5.3)
POTASSIUM SERPL-SCNC: 4 MMOL/L — SIGNIFICANT CHANGE UP (ref 3.5–5.3)
POTASSIUM SERPL-SCNC: 4.3 MMOL/L — SIGNIFICANT CHANGE UP (ref 3.5–5.3)
RBC # BLD: 3.74 M/UL — LOW (ref 3.8–5.2)
RBC # FLD: 14.1 % — SIGNIFICANT CHANGE UP (ref 10.3–14.5)
SODIUM SERPL-SCNC: 143 MMOL/L — SIGNIFICANT CHANGE UP (ref 135–145)
SODIUM SERPL-SCNC: 143 MMOL/L — SIGNIFICANT CHANGE UP (ref 135–145)
WBC # BLD: 10.01 K/UL — SIGNIFICANT CHANGE UP (ref 3.8–10.5)
WBC # FLD AUTO: 10.01 K/UL — SIGNIFICANT CHANGE UP (ref 3.8–10.5)

## 2024-04-15 PROCEDURE — 99233 SBSQ HOSP IP/OBS HIGH 50: CPT | Mod: GC

## 2024-04-15 PROCEDURE — 99223 1ST HOSP IP/OBS HIGH 75: CPT

## 2024-04-15 PROCEDURE — 99232 SBSQ HOSP IP/OBS MODERATE 35: CPT | Mod: GC

## 2024-04-15 RX ORDER — SODIUM BICARBONATE 1 MEQ/ML
1300 SYRINGE (ML) INTRAVENOUS
Refills: 0 | Status: DISCONTINUED | OUTPATIENT
Start: 2024-04-15 | End: 2024-04-16

## 2024-04-15 RX ORDER — OXYCODONE HYDROCHLORIDE 5 MG/1
5 TABLET ORAL ONCE
Refills: 0 | Status: DISCONTINUED | OUTPATIENT
Start: 2024-04-15 | End: 2024-04-15

## 2024-04-15 RX ORDER — MAGNESIUM SULFATE 500 MG/ML
1 VIAL (ML) INJECTION ONCE
Refills: 0 | Status: COMPLETED | OUTPATIENT
Start: 2024-04-15 | End: 2024-04-15

## 2024-04-15 RX ORDER — METOPROLOL TARTRATE 50 MG
50 TABLET ORAL EVERY 6 HOURS
Refills: 0 | Status: DISCONTINUED | OUTPATIENT
Start: 2024-04-15 | End: 2024-04-24

## 2024-04-15 RX ADMIN — Medication 100 GRAM(S): at 14:16

## 2024-04-15 RX ADMIN — BUMETANIDE 10 MG/HR: 0.25 INJECTION INTRAMUSCULAR; INTRAVENOUS at 22:09

## 2024-04-15 RX ADMIN — BUMETANIDE 10 MG/HR: 0.25 INJECTION INTRAMUSCULAR; INTRAVENOUS at 12:32

## 2024-04-15 RX ADMIN — Medication 1950 MILLIGRAM(S): at 05:50

## 2024-04-15 RX ADMIN — Medication 50 MILLIGRAM(S): at 14:16

## 2024-04-15 RX ADMIN — HEPARIN SODIUM 900 UNIT(S)/HR: 5000 INJECTION INTRAVENOUS; SUBCUTANEOUS at 07:49

## 2024-04-15 RX ADMIN — HEPARIN SODIUM 900 UNIT(S)/HR: 5000 INJECTION INTRAVENOUS; SUBCUTANEOUS at 20:17

## 2024-04-15 RX ADMIN — OXYCODONE HYDROCHLORIDE 5 MILLIGRAM(S): 5 TABLET ORAL at 20:29

## 2024-04-15 RX ADMIN — ISOSORBIDE DINITRATE 10 MILLIGRAM(S): 5 TABLET ORAL at 05:50

## 2024-04-15 RX ADMIN — Medication 50 MILLIGRAM(S): at 20:31

## 2024-04-15 RX ADMIN — Medication 1300 MILLIGRAM(S): at 18:21

## 2024-04-15 RX ADMIN — ISOSORBIDE DINITRATE 10 MILLIGRAM(S): 5 TABLET ORAL at 22:33

## 2024-04-15 RX ADMIN — BUMETANIDE 10 MG/HR: 0.25 INJECTION INTRAMUSCULAR; INTRAVENOUS at 05:51

## 2024-04-15 RX ADMIN — Medication 50 MILLIGRAM(S): at 02:34

## 2024-04-15 RX ADMIN — ISOSORBIDE DINITRATE 10 MILLIGRAM(S): 5 TABLET ORAL at 14:16

## 2024-04-15 RX ADMIN — HEPARIN SODIUM 900 UNIT(S)/HR: 5000 INJECTION INTRAVENOUS; SUBCUTANEOUS at 08:58

## 2024-04-15 RX ADMIN — OXYCODONE HYDROCHLORIDE 5 MILLIGRAM(S): 5 TABLET ORAL at 20:59

## 2024-04-15 NOTE — PROGRESS NOTE ADULT - SUBJECTIVE AND OBJECTIVE BOX
INCOMPLETE    Patient seen and examined at bedside.    Overnight Events:   NAEO   Telemetry: atrial fibrillation with HR mainly in 110s. HR 90-120s. 4 beats WCT.   On bumex gtt for diuresis.   Denies chest pain, palpitations, nausea, vomiting, diaphoresis, and syncope.     REVIEW OF SYSTEMS:  as above.       Current Meds:  acetaminophen     Tablet .. 650 milliGRAM(s) Oral every 6 hours PRN  buMETAnide Infusion 2 mG/Hr IV Continuous <Continuous>  heparin   Injectable 5500 Unit(s) IV Push every 6 hours PRN  heparin   Injectable 2500 Unit(s) IV Push every 6 hours PRN  heparin  Infusion.  Unit(s)/Hr IV Continuous <Continuous>  isosorbide   dinitrate Tablet (ISORDIL) 10 milliGRAM(s) Oral three times a day  metoprolol tartrate 50 milliGRAM(s) Oral every 8 hours  ondansetron Injectable 4 milliGRAM(s) IV Push every 6 hours PRN  senna 2 Tablet(s) Oral at bedtime PRN  sodium bicarbonate 1300 milliGRAM(s) Oral two times a day      Vitals:  T(F): 97.9 (04-15), Max: 98.4 (04-14)  HR: 90 (04-15) (81 - 119)  BP: 100/69 (04-15) (100/69 - 124/92)  RR: 18 (04-15)  SpO2: 95% (04-15)  I&O's Summary    14 Apr 2024 07:01  -  15 Apr 2024 07:00  --------------------------------------------------------  IN: 1519 mL / OUT: 1850 mL / NET: -331 mL        Physical Exam:  Appearance: No acute distress; well appearing  Cardiovascular: tachycardic, irregular, S1, S2, no murmurs, rubs, or gallops; no edema; no JVD  Respiratory: Clear to auscultation bilaterally  Gastrointestinal: soft, non-tender, non-distended with normal bowel sounds  Musculoskeletal: 3+ BLE edema   Neurologic: Non-focal  Lymphatic: No lymphadenopathy  Psychiatry: AAOx3, mood & affect appropriate  Skin: Bruising on L anterior shin                           11.0   10.01 )-----------( 176      ( 15 Apr 2024 07:30 )             36.5     04-15    143  |  96  |  72<H>  ----------------------------<  91  4.0   |  24  |  4.82<H>    Ca    7.8<L>      15 Apr 2024 07:26  Phos  4.5     04-15  Mg     1.8     04-15      PT/INR - ( 14 Apr 2024 07:18 )   PT: 11.9 sec;   INR: 1.14 ratio         PTT - ( 15 Apr 2024 07:30 )  PTT:68.7 sec        TTE 4/11  CONCLUSIONS:   1. Left ventricular systolic function is moderately decreased with an ejection fraction of 37 % by Garcia's method of disks.   2. Analysis of left ventricular diastolic function and filling pressure is made challenging by the presence of severe mitral regurgitation.   3. Normal right ventricular cavity size, with normal wall thickness, and normal systolic function.   4. The left atrium is severely dilated.   5. The right atrium is severely dilated.   6. Severe mitral regurgitation.   7. Moderate tricuspid regurgitation.   8. Estimatedpulmonary artery systolic pressure is 41 mmHg.   9. No prior echocardiogram is available for comparison.     INCOMPLETE    Patient seen and examined at bedside.    Overnight Events:   NAEO   Telemetry: atrial fibrillation with HR mainly in 120s. -140s. intermittent PVCs   On bumex gtt for diuresis.   Denies chest pain, palpitations, nausea, vomiting, diaphoresis, and syncope.     REVIEW OF SYSTEMS:  as above.       Current Meds:  acetaminophen     Tablet .. 650 milliGRAM(s) Oral every 6 hours PRN  buMETAnide Infusion 2 mG/Hr IV Continuous <Continuous>  heparin   Injectable 5500 Unit(s) IV Push every 6 hours PRN  heparin   Injectable 2500 Unit(s) IV Push every 6 hours PRN  heparin  Infusion.  Unit(s)/Hr IV Continuous <Continuous>  isosorbide   dinitrate Tablet (ISORDIL) 10 milliGRAM(s) Oral three times a day  metoprolol tartrate 50 milliGRAM(s) Oral every 8 hours  ondansetron Injectable 4 milliGRAM(s) IV Push every 6 hours PRN  senna 2 Tablet(s) Oral at bedtime PRN  sodium bicarbonate 1300 milliGRAM(s) Oral two times a day      Vitals:  T(F): 97.9 (04-15), Max: 98.4 (04-14)  HR: 90 (04-15) (81 - 119)  BP: 100/69 (04-15) (100/69 - 124/92)  RR: 18 (04-15)  SpO2: 95% (04-15)  I&O's Summary    14 Apr 2024 07:01  -  15 Apr 2024 07:00  --------------------------------------------------------  IN: 1519 mL / OUT: 1850 mL / NET: -331 mL        Physical Exam:  Appearance: No acute distress; well appearing  Cardiovascular: tachycardic, irregular, S1, S2, no murmurs, rubs, or gallops; no edema; no JVD  Respiratory: Clear to auscultation bilaterally  Gastrointestinal: soft, non-tender, non-distended with normal bowel sounds  Musculoskeletal: 3+ BLE edema   Neurologic: Non-focal  Lymphatic: No lymphadenopathy  Psychiatry: AAOx3, mood & affect appropriate  Skin: Bruising on L anterior shin                           11.0   10.01 )-----------( 176      ( 15 Apr 2024 07:30 )             36.5     04-15    143  |  96  |  72<H>  ----------------------------<  91  4.0   |  24  |  4.82<H>    Ca    7.8<L>      15 Apr 2024 07:26  Phos  4.5     04-15  Mg     1.8     04-15      PT/INR - ( 14 Apr 2024 07:18 )   PT: 11.9 sec;   INR: 1.14 ratio         PTT - ( 15 Apr 2024 07:30 )  PTT:68.7 sec        TTE 4/11  CONCLUSIONS:   1. Left ventricular systolic function is moderately decreased with an ejection fraction of 37 % by Garcia's method of disks.   2. Analysis of left ventricular diastolic function and filling pressure is made challenging by the presence of severe mitral regurgitation.   3. Normal right ventricular cavity size, with normal wall thickness, and normal systolic function.   4. The left atrium is severely dilated.   5. The right atrium is severely dilated.   6. Severe mitral regurgitation.   7. Moderate tricuspid regurgitation.   8. Estimatedpulmonary artery systolic pressure is 41 mmHg.   9. No prior echocardiogram is available for comparison.

## 2024-04-15 NOTE — PROGRESS NOTE ADULT - SUBJECTIVE AND OBJECTIVE BOX
*Incomplete Note*    Patient seen and examined at bedside.    Overnight Events: No acute events overnight. Denies chest pain or SOB    Telemetry:    REVIEW OF SYSTEMS:  Constitutional:     [ x] negative [ ] fevers [ ] chills [ ] weight loss [ ] weight gain  HEENT:                  [ x] negative [ ] dry eyes [ ] eye irritation [ ] postnasal drip [ ] nasal congestion  CV:                         [x ] negative  [ ] chest pain [ ] orthopnea [ ] palpitations [ ] murmur  Resp:                     [ x] negative [ ] cough [ ] shortness of breath [ ] dyspnea [ ] wheezing [ ] sputum [ ]hemoptysis  GI:                          [ x] negative [ ] nausea [ ] vomiting [ ] diarrhea [ ] constipation [ ] abd pain [ ] dysphagia   :                        [ x] negative [ ] dysuria [ ] nocturia [ ] hematuria [ ] increased urinary frequency  Musculoskeletal: [ x] negative [ ] back pain [ ] myalgias [ ] arthralgias [ ] fracture  Skin:                       [ x] negative [ ] rash [ ] itch  Neurological:        [ x] negative [ ] headache [ ] dizziness [ ] syncope [ ] weakness [ ] numbness  Psychiatric:           [ x] negative [ ] anxiety [ ] depression  Endocrine:            [ x] negative [ ] diabetes [ ] thyroid problem  Heme/Lymph:      [ x] negative [ ] anemia [ ] bleeding problem  Allergic/Immune: [x ] negative [ ] itchy eyes [ ] nasal discharge [ ] hives [ ] angioedema    [x ] All other systems negative  [ ] Unable to assess ROS due to    Current Meds:  acetaminophen     Tablet .. 650 milliGRAM(s) Oral every 6 hours PRN  buMETAnide Infusion 2 mG/Hr IV Continuous <Continuous>  heparin   Injectable 2500 Unit(s) IV Push every 6 hours PRN  heparin   Injectable 5500 Unit(s) IV Push every 6 hours PRN  heparin  Infusion.  Unit(s)/Hr IV Continuous <Continuous>  isosorbide   dinitrate Tablet (ISORDIL) 10 milliGRAM(s) Oral three times a day  metoprolol tartrate 50 milliGRAM(s) Oral every 8 hours  ondansetron Injectable 4 milliGRAM(s) IV Push every 6 hours PRN  senna 2 Tablet(s) Oral at bedtime PRN  sodium bicarbonate 1950 milliGRAM(s) Oral three times a day      PAST MEDICAL & SURGICAL HISTORY:  Renal Calculus      Ureteral Calculus      Acute Renal Failure  9/2009      Wrist Fracture  CYNTHIA      Collar Bone Fracture      Rib Fractures      Kidney Stone removal  3/15/2011      C Section      Percutaneous Stone Extraction  Right      S/P Left Percutaneuos Stone extraction  01/26/2010, 04/20/2010          Vitals:  T(F): 97.5 (04-15), Max: 98.5 (04-14)  HR: 109 (04-15) (81 - 119)  BP: 108/71 (04-15) (107/70 - 124/92)  RR: 18 (04-15)  SpO2: 96% (04-15)  I&O's Summary    14 Apr 2024 07:01  -  15 Apr 2024 07:00  --------------------------------------------------------  IN: 1519 mL / OUT: 1850 mL / NET: -331 mL        Physical Exam:  Appearance: No acute distress; well appearing  Eyes: PERRL, EOMI, pink conjunctiva  HENT: Normal oral mucosa  Cardiovascular: RRR, S1, S2, no murmurs, rubs, or gallops; no edema; no JVD  Respiratory: Clear to auscultation bilaterally  Gastrointestinal: soft, non-tender, non-distended with normal bowel sounds  Musculoskeletal: No clubbing; no joint deformity   Neurologic: Non-focal  Lymphatic: No lymphadenopathy  Psychiatry: AAOx3, mood & affect appropriate  Skin: No rashes, ecchymoses, or cyanosis                          11.3   8.86  )-----------( 189      ( 14 Apr 2024 07:18 )             36.9     04-14    143  |  99  |  75<H>  ----------------------------<  102<H>  3.7   |  22  |  4.88<H>    Ca    8.1<L>      14 Apr 2024 07:20      PT/INR - ( 14 Apr 2024 07:18 )   PT: 11.9 sec;   INR: 1.14 ratio         PTT - ( 14 Apr 2024 07:18 )  PTT:80.6 sec                 Patient seen and examined at bedside.    Overnight Events: No acute events overnight. Denies chest pain or SOB    Telemetry: AFRVR 120-140s. 4 beats WCT.     REVIEW OF SYSTEMS:  Constitutional:     [ x] negative [ ] fevers [ ] chills [ ] weight loss [ ] weight gain  HEENT:                  [ x] negative [ ] dry eyes [ ] eye irritation [ ] postnasal drip [ ] nasal congestion  CV:                         [x ] negative  [ ] chest pain [ ] orthopnea [ ] palpitations [ ] murmur  Resp:                     [ x] negative [ ] cough [ ] shortness of breath [ ] dyspnea [ ] wheezing [ ] sputum [ ]hemoptysis  GI:                          [ x] negative [ ] nausea [ ] vomiting [ ] diarrhea [ ] constipation [ ] abd pain [ ] dysphagia   :                        [ x] negative [ ] dysuria [ ] nocturia [ ] hematuria [ ] increased urinary frequency  Musculoskeletal: [ x] negative [ ] back pain [ ] myalgias [ ] arthralgias [ ] fracture  Skin:                       [ x] negative [ ] rash [ ] itch  Neurological:        [ x] negative [ ] headache [ ] dizziness [ ] syncope [ ] weakness [ ] numbness  Psychiatric:           [ x] negative [ ] anxiety [ ] depression  Endocrine:            [ x] negative [ ] diabetes [ ] thyroid problem  Heme/Lymph:      [ x] negative [ ] anemia [ ] bleeding problem  Allergic/Immune: [x ] negative [ ] itchy eyes [ ] nasal discharge [ ] hives [ ] angioedema    [x ] All other systems negative  [ ] Unable to assess ROS due to    Current Meds:  acetaminophen     Tablet .. 650 milliGRAM(s) Oral every 6 hours PRN  buMETAnide Infusion 2 mG/Hr IV Continuous <Continuous>  heparin   Injectable 2500 Unit(s) IV Push every 6 hours PRN  heparin   Injectable 5500 Unit(s) IV Push every 6 hours PRN  heparin  Infusion.  Unit(s)/Hr IV Continuous <Continuous>  isosorbide   dinitrate Tablet (ISORDIL) 10 milliGRAM(s) Oral three times a day  metoprolol tartrate 50 milliGRAM(s) Oral every 8 hours  ondansetron Injectable 4 milliGRAM(s) IV Push every 6 hours PRN  senna 2 Tablet(s) Oral at bedtime PRN  sodium bicarbonate 1950 milliGRAM(s) Oral three times a day      PAST MEDICAL & SURGICAL HISTORY:  Renal Calculus      Ureteral Calculus      Acute Renal Failure  9/2009      Wrist Fracture  CYNTHIA      Collar Bone Fracture      Rib Fractures      Kidney Stone removal  3/15/2011      C Section      Percutaneous Stone Extraction  Right      S/P Left Percutaneuos Stone extraction  01/26/2010, 04/20/2010          Vitals:  T(F): 97.5 (04-15), Max: 98.5 (04-14)  HR: 109 (04-15) (81 - 119)  BP: 108/71 (04-15) (107/70 - 124/92)  RR: 18 (04-15)  SpO2: 96% (04-15)  I&O's Summary    14 Apr 2024 07:01  -  15 Apr 2024 07:00  --------------------------------------------------------  IN: 1519 mL / OUT: 1850 mL / NET: -331 mL        Physical Exam:  Appearance: No acute distress; well appearing  Eyes: PERRL, EOMI, pink conjunctiva  HENT: Normal oral mucosa  Cardiovascular: RRR, S1, S2, no murmurs, rubs, or gallops; no edema; no JVD  Respiratory: Clear to auscultation bilaterally  Gastrointestinal: soft, non-tender, non-distended with normal bowel sounds  Musculoskeletal: No clubbing; no joint deformity   Neurologic: Non-focal  Lymphatic: No lymphadenopathy  Psychiatry: AAOx3, mood & affect appropriate  Skin: No rashes, ecchymoses, or cyanosis                          11.3   8.86  )-----------( 189      ( 14 Apr 2024 07:18 )             36.9     04-14    143  |  99  |  75<H>  ----------------------------<  102<H>  3.7   |  22  |  4.88<H>    Ca    8.1<L>      14 Apr 2024 07:20      PT/INR - ( 14 Apr 2024 07:18 )   PT: 11.9 sec;   INR: 1.14 ratio         PTT - ( 14 Apr 2024 07:18 )  PTT:80.6 sec                 Patient seen and examined at bedside.    Overnight Events: No acute events overnight. Denies chest pain or SOB    Telemetry: AF -140s. 4 beats WCT.     REVIEW OF SYSTEMS:  Constitutional:     [ x] negative [ ] fevers [ ] chills [ ] weight loss [ ] weight gain  HEENT:                  [ x] negative [ ] dry eyes [ ] eye irritation [ ] postnasal drip [ ] nasal congestion  CV:                         [x ] negative  [ ] chest pain [ ] orthopnea [ ] palpitations [ ] murmur  Resp:                     [ x] negative [ ] cough [ ] shortness of breath [ ] dyspnea [ ] wheezing [ ] sputum [ ]hemoptysis  GI:                          [ x] negative [ ] nausea [ ] vomiting [ ] diarrhea [ ] constipation [ ] abd pain [ ] dysphagia   :                        [ x] negative [ ] dysuria [ ] nocturia [ ] hematuria [ ] increased urinary frequency  Musculoskeletal: [ x] negative [ ] back pain [ ] myalgias [ ] arthralgias [ ] fracture  Skin:                       [ x] negative [ ] rash [ ] itch  Neurological:        [ x] negative [ ] headache [ ] dizziness [ ] syncope [ ] weakness [ ] numbness  Psychiatric:           [ x] negative [ ] anxiety [ ] depression  Endocrine:            [ x] negative [ ] diabetes [ ] thyroid problem  Heme/Lymph:      [ x] negative [ ] anemia [ ] bleeding problem  Allergic/Immune: [x ] negative [ ] itchy eyes [ ] nasal discharge [ ] hives [ ] angioedema    [x ] All other systems negative  [ ] Unable to assess ROS due to    Current Meds:  acetaminophen     Tablet .. 650 milliGRAM(s) Oral every 6 hours PRN  buMETAnide Infusion 2 mG/Hr IV Continuous <Continuous>  heparin   Injectable 2500 Unit(s) IV Push every 6 hours PRN  heparin   Injectable 5500 Unit(s) IV Push every 6 hours PRN  heparin  Infusion.  Unit(s)/Hr IV Continuous <Continuous>  isosorbide   dinitrate Tablet (ISORDIL) 10 milliGRAM(s) Oral three times a day  metoprolol tartrate 50 milliGRAM(s) Oral every 8 hours  ondansetron Injectable 4 milliGRAM(s) IV Push every 6 hours PRN  senna 2 Tablet(s) Oral at bedtime PRN  sodium bicarbonate 1950 milliGRAM(s) Oral three times a day      PAST MEDICAL & SURGICAL HISTORY:  Renal Calculus  Ureteral Calculus  Acute Renal Failure 9/2009  Wrist Fracture  Collar Bone Fracture  Rib Fractures  Kidney Stone removal 3/15/2011  C Section  Percutaneous Stone Extraction Right  S/P Left Percutaneuos Stone extraction 01/26/2010, 04/20/2010    Vitals:  T(F): 97.5 (04-15), Max: 98.5 (04-14)  HR: 109 (04-15) (81 - 119)  BP: 108/71 (04-15) (107/70 - 124/92)  RR: 18 (04-15)  SpO2: 96% (04-15)      I&O's Summary  14 Apr 2024 07:01  -  15 Apr 2024 07:00  --------------------------------------------------------  IN: 1519 mL / OUT: 1850 mL / NET: -331 mL        Physical Exam:  Appearance: No acute distress; well appearing  Eyes: PERRL, EOMI, pink conjunctiva  HENT: Normal oral mucosa  Cardiovascular: RRR, S1, S2, no murmurs, rubs, or gallops; no edema; no JVD  Respiratory: Clear to auscultation bilaterally  Gastrointestinal: soft, non-tender, non-distended with normal bowel sounds  Musculoskeletal: No clubbing; no joint deformity   Neurologic: Non-focal  Lymphatic: No lymphadenopathy  Psychiatry: AAOx3, mood & affect appropriate  Skin: No rashes, ecchymoses, or cyanosis        LABS                      11.3   8.86  )-----------( 189      ( 14 Apr 2024 07:18 )             36.9     04-14  143  |  99  |  75<H>  ----------------------------<  102<H>  3.7   |  22  |  4.88<H>    Ca    8.1<L>      14 Apr 2024 07:20    PT/INR - ( 14 Apr 2024 07:18 )   PT: 11.9 sec;   INR: 1.14 ratio    PTT - ( 14 Apr 2024 07:18 )  PTT:80.6 sec

## 2024-04-15 NOTE — PROGRESS NOTE ADULT - PROBLEM SELECTOR PLAN 2
Pt. with metabolic acidosis in the setting of renal failure. SCO2 improved to 24 today. Recommend IV diuresis, as outlined above.   Decrease sodium bicarbonate to 1300 mg BID.  Monitor pH, and SCO2.    If you have any questions, please feel free to contact me  Jamari Brunson  Nephrology Fellow  [phone] / [employer] Teams(Preferred)  (After 5pm or on weekends please page the on-call fellow).

## 2024-04-15 NOTE — PROGRESS NOTE ADULT - PROBLEM SELECTOR PLAN 1
Pt. with renal failure in the setting of fluid overload. On review of [facility], SCr noted to be persistently elevated from 1317-0359. SCr was elevated at 1.79 on 5/4/2011. SCr initially during this admission was elevated at 4.73 (4/8), and remains elevated/stable at 4.88 today. No interim labs available for review but pt. likely has underlying CKD. Documented urine output is ~1.9L over the past 24 hours. UA showed proteinuria and evidence of infection (although 15 epithelial cells). UPCR is elevated at 1.1. Kidney sonogram showed evidence of medical renal disease, BL non-obstructing stones, and BL renal cysts.   Recommend to continue IV diuresis per cardiology. Serologic workup thus far: HBsAg, HCV Ab, and HIV are non-reactive. Kappa/Lambda free light chain ratio is mildly elevated at 1.85 (acceptable range for degree of CKD). SIFE is pending result. Monitor labs and urine output. Avoid nephrotoxins. Dose medications as per eGFR.

## 2024-04-15 NOTE — PROGRESS NOTE ADULT - SUBJECTIVE AND OBJECTIVE BOX
[facility] Division of Kidney Diseases & Hypertension  FOLLOW UP NOTE  [phone]--------------------------------------------------------------------------------    Chief Complaint: NICO on CKD vs progressive CKD    24 hour events/subjective: Pt. was seen and evaluated at bedside this morning. Pt. reports feeling well, offers no complaints. Endorses good uop. Denies any headaches, fevers/chills, chest pain, and SOB.    PAST HISTORY  --------------------------------------------------------------------------------  No significant changes to PMH, PSH, FHx, SHx, unless otherwise noted    ALLERGIES & MEDICATIONS  --------------------------------------------------------------------------------  Allergies    No Known Allergies    Intolerances      Standing Inpatient Medications  buMETAnide Infusion 2 mG/Hr IV Continuous <Continuous>  heparin  Infusion.  Unit(s)/Hr IV Continuous <Continuous>  isosorbide   dinitrate Tablet (ISORDIL) 10 milliGRAM(s) Oral three times a day  metoprolol tartrate 50 milliGRAM(s) Oral every 8 hours  sodium bicarbonate 1950 milliGRAM(s) Oral three times a day    PRN Inpatient Medications  acetaminophen     Tablet .. 650 milliGRAM(s) Oral every 6 hours PRN  heparin   Injectable 5500 Unit(s) IV Push every 6 hours PRN  heparin   Injectable 2500 Unit(s) IV Push every 6 hours PRN  ondansetron Injectable 4 milliGRAM(s) IV Push every 6 hours PRN  senna 2 Tablet(s) Oral at bedtime PRN      REVIEW OF SYSTEMS  --------------------------------------------------------------------------------  See HPI    VITALS/PHYSICAL EXAM  --------------------------------------------------------------------------------  T(C): 36.6 (04-15-24 @ 10:31), Max: 36.9 (04-14-24 @ 17:02)  HR: 90 (04-15-24 @ 10:31) (81 - 119)  BP: 100/69 (04-15-24 @ 10:31) (100/69 - 124/92)  RR: 18 (04-15-24 @ 10:31) (18 - 18)  SpO2: 95% (04-15-24 @ 10:31) (95% - 97%)  Wt(kg): --    04-14-24 @ 07:01  -  04-15-24 @ 07:00  --------------------------------------------------------  IN: 1519 mL / OUT: 1850 mL / NET: -331 mL    Physical Exam:  Gen: NAD  HEENT: MMM  Pulm: CTA B/L  CV: S1S2  Abd: Soft, +BS   Ext: +BL LE edema (improving)  Neuro: Awake and alert  Skin: Warm and dry  Vascular access: Peripheral IV line    LABS/STUDIES  --------------------------------------------------------------------------------              11.0   10.01 >-----------<  176      [04-15-24 @ 07:30]              36.5     143  |  96  |  72  ----------------------------<  91      [04-15-24 @ 07:26]  4.0   |  24  |  4.82        Ca     7.8     [04-15-24 @ 07:26]      Mg     1.8     [04-15-24 @ 07:26]      Phos  4.5     [04-15-24 @ 07:26]      PT/INR: PT 11.9 , INR 1.14       [04-14-24 @ 07:18]  PTT: 68.7       [04-15-24 @ 07:30]    Creatinine Trend:  SCr 4.82 [04-15 @ 07:26]  SCr 4.88 [04-14 @ 07:20]  SCr 5.07 [04-13 @ 07:00]  SCr 4.62 [04-12 @ 10:26]  SCr 4.70 [04-12 @ 01:04]    Urine Creatinine 35      [04-09-24 @ 22:41]  Urine Protein 40      [04-09-24 @ 22:41]    TSH 0.65      [04-09-24 @ 00:27]    HBsAg Nonreact      [04-10-24 @ 07:11]  HCV 0.05, Nonreact      [04-09-24 @ 07:25]  HIV Nonreact      [04-09-24 @ 20:21]    Free Light Chains: kappa 5.19, lambda 2.81, ratio = 1.85      [04-10 @ 07:11]

## 2024-04-15 NOTE — PROGRESS NOTE ADULT - ASSESSMENT
73F PMHx nephrolithiasis s/p multiple procedures, HTN admitted with new onset bl LE swelling and renal dysfunction. Cardiology consulted for asymptomatic new-onset AFib w/ RVR and ADHF.     #ADHF:  - remains volume overloaded  - c/w bumex 2mg/hr  - BB as below  - c/w hydralazine 25 mg TID and isosorbide dinitrate 10mg TID  - other GDMT limited by current renal function  - proceed w/ischemic workup once net even and HR controlled  - monitor tele  - replete K4Mg2  - strict I/O's, daily weights    #AF:  - rates remain elevated, likely worsened by volume overload  - increase metoprolol to 50 mg q8hr  - c/w hep gtt for AC  - would not give amiodarone given unknown duration of atrial fibrillation     #MR:  - repeat TTE once euvolemic to reassess      73F PMHx nephrolithiasis s/p multiple procedures, HTN admitted with new onset bl LE swelling and renal dysfunction. Cardiology consulted for asymptomatic new-onset AFib w/ RVR and ADHF.     #ADHF:  - remains volume overloaded  - c/w bumex 2mg/hr for diuresis   - BB as below  - c/w hydralazine 25 mg TID and isosorbide dinitrate 10mg TID  - other GDMT limited by current renal function  - proceed w/ischemic workup once net even and HR controlled  - monitor tele  - replete K>4 Mg>2  - strict I/O's, daily weights    #AF:  - rates remain elevated, likely worsened by volume overload  - metoprolol to 50 mg q8hr, increase to 50 q6h or 75 q8h for HR <110  - c/w hep gtt for AC  - would not give amiodarone given unknown duration of atrial fibrillation     #MR:  - repeat TTE once euvolemic to reassess      73F PMHx nephrolithiasis s/p multiple procedures and HTN.  Admitted with new onset bl LE swelling and renal dysfunction.   Cardiology consulted for asymptomatic new-onset AFib w/ RVR and ADHF.     #ADHF:  - remains volume overloaded  - c/w Bumex 2mg/hr for diuresis   - BB as below  - c/w hydralazine 25 mg TID and isosorbide dinitrate 10mg TID  - other GDMT limited by current renal function  - proceed w/ischemic workup once net even and HR controlled  - monitor tele  - replete K>4 Mg>2  - strict I/O's, daily weights    #AF:  - rates remain elevated, likely worsened by volume overload  - metoprolol to 50 mg q8hr, increase to 50 q6h or 75 q8h for HR <110  - c/w hep gtt for AC  - would not give amiodarone given unknown duration of atrial fibrillation     #MR:  - repeat TTE once euvolemic to reassess       Segundo Ann M.D.  Cardiology fellow     Plan discussed with cardiology fellow.  Patient seen and examined.  Hx., exam and labs as above.  I agree with the assessment and recommendations, which I have reviewed and edited where appropriate.  Zack Moore M.D.  Cardiology Attending, Consult Service    For Cardiology consults and questions, all Cardiology service information can be found 24/7 on [email or web address] - use password: [email or web address] to log in.

## 2024-04-15 NOTE — PROGRESS NOTE ADULT - SUBJECTIVE AND OBJECTIVE BOX
HPI:  72yo F with PMHx of nephrolithiasis and HTN presents with complaint of BL LE swelling for past few weeks. Pt reports has been getting progressively worse and associated with some BLE soreness. Takes amlodipine for HTN. Otherwise in normal state of health. Denies fever/chills, nausea/vomiting, abd pain, flank pain, dysuria, hematuria, decreased UOP or other acute complaints.  (08 Apr 2024 17:00)    No events   PAST MEDICAL & SURGICAL HISTORY:  Renal Calculus      Ureteral Calculus      Acute Renal Failure  9/2009      Wrist Fracture  CYNTHIA      Collar Bone Fracture      Rib Fractures      Kidney Stone removal  3/15/2011      C Section      Percutaneous Stone Extraction  Right      S/P Left Percutaneuos Stone extraction  01/26/2010, 04/20/2010          Review of Systems:   CONSTITUTIONAL: No fever, weight loss, or fatigue  EYES: No eye pain, visual disturbances, or discharge  ENMT:  No difficulty hearing, tinnitus, vertigo; No sinus or throat pain  NECK: No pain or stiffness  BREASTS: No pain, masses, or nipple discharge  RESPIRATORY: No cough, wheezing, chills or hemoptysis; No shortness of breath  CARDIOVASCULAR: No chest pain, palpitations, dizziness, or leg swelling  GASTROINTESTINAL: No abdominal or epigastric pain. No nausea, vomiting, or hematemesis; No diarrhea or constipation. No melena or hematochezia.  GENITOURINARY: No dysuria, frequency, hematuria, or incontinence  NEUROLOGICAL: No headaches, memory loss, loss of strength, numbness, or tremors  SKIN: No itching, burning, rashes, or lesions   LYMPH NODES: No enlarged glands  ENDOCRINE: No heat or cold intolerance; No hair loss  MUSCULOSKELETAL: No joint pain or swelling; No muscle, back, or extremity pain  PSYCHIATRIC: No depression, anxiety, mood swings, or difficulty sleeping  HEME/LYMPH: No easy bruising, or bleeding gums  ALLERY AND IMMUNOLOGIC: No hives or eczema    Allergies    No Known Allergies    Intolerances        Social History:     FAMILY HISTORY:      MEDICATIONS  (STANDING):  amoxicillin  250 milliGRAM(s)/clavulanate 1 Tablet(s) Oral every 12 hours  buMETAnide Injectable 2 milliGRAM(s) IV Push once  heparin   Injectable 5000 Unit(s) SubCutaneous every 8 hours  metoprolol tartrate 25 milliGRAM(s) Oral two times a day    MEDICATIONS  (PRN):  acetaminophen     Tablet .. 650 milliGRAM(s) Oral every 6 hours PRN Temp greater or equal to 38C (100.4F), Mild Pain (1 - 3)  ondansetron Injectable 4 milliGRAM(s) IV Push every 6 hours PRN Nausea and/or Vomiting  senna 2 Tablet(s) Oral at bedtime PRN Constipation        CAPILLARY BLOOD GLUCOSE        I&O's Summary    08 Apr 2024 07:01  -  09 Apr 2024 07:00  --------------------------------------------------------  IN: 360 mL / OUT: 0 mL / NET: 360 mL    09 Apr 2024 07:01  -  09 Apr 2024 13:38  --------------------------------------------------------  IN: 240 mL / OUT: 0 mL / NET: 240 mL        PHYSICAL EXAM:  GENERAL: NAD, well-developed  HEAD:  Atraumatic, Normocephalic  EYES: EOMI, conjunctiva and sclera clear  NECK: Supple, No JVD  CHEST/LUNG: Clear to auscultation bilaterally; No wheeze  HEART: Regular rate and rhythm; No murmurs, rubs, or gallops  ABDOMEN: Soft, Nontender, Nondistended; Bowel sounds present  EXTREMITIES:  2+ Peripheral Pulses, No clubbing, cyanosis, or edema  PSYCH: AAOx3  NEUROLOGY: non-focal  SKIN: No rashes or lesions                          11.0   10.01 )-----------( 176      ( 15 Apr 2024 07:30 )             36.5             PT/INR - ( 14 Apr 2024 07:18 )   PT: 11.9 sec;   INR: 1.14 ratio         PTT - ( 15 Apr 2024 07:30 )  PTT:68.7 sec  143|95|73<110  4.3|30|4.95  7.8,--,--  04-15 @ 21:46  143|96|72<91  4.0|24|4.82  7.8,1.8,4.5  04-15 @ 07:26        RADIOLOGY & ADDITIONAL TESTS:    Imaging Personally Reviewed:    Consultant(s) Notes Reviewed:      Care Discussed with Consultants/Other Providers:

## 2024-04-15 NOTE — PROGRESS NOTE ADULT - ASSESSMENT
73F PMHx nephrolithiasis, HTN admitted with new onset bl LE swelling and renal dysfunction. Cardiology consulted for asymptomatic new-onset AFib w/ RVR.     Overall patient initially came in with volume overload thought it initially to be related to renal dysfunction, but recent TTE shows significantly reduction in LV systolic function and severe MR that is also contributing. She will need further medical management of her newly reduced EF and work up for the etiology by General Cardiology.     Regardless, her atrial fibrillation is new this admission. Given that she is asymptomatic, it is unclear how long she has had it. Would recommend managing conservatively as we have not cleared the MARY and given that she still requires volume optimization, would defer cardioversion.     Recommendations:   -increase metoprolol to 25mg q6h for rate control, goal <110  -given elevated CHADSVASC, maintain on heparin gtt for full AC at this time until she can be placed on oral AC  -would not give amiodarone given unknown duration of atrial fibrillation to avoid chemical cardioversion    If pt is to undergo  for MR workup, could consider DCCV + amiodarone at the time for AF management.  Will need optimization prior to rhythm control strategy noting her new dx of HFrEF and severe MR.    At this time, we will sign off. Please call if you have any questions.     Please see attending attestation for final recommendations.     Dl Pruett MD  Cardiology Fellow      73F PMHx nephrolithiasis, HTN admitted with new onset bl LE swelling and renal dysfunction. Cardiology consulted for asymptomatic new-onset AFib w/ RVR.     Overall patient initially came in with volume overload thought it initially to be related to renal dysfunction, but recent TTE shows significantly reduction in LV systolic function and severe MR that is also contributing. She will need further medical management of her newly reduced EF and work up for the etiology by General Cardiology.     Regardless, her atrial fibrillation is new this admission. Given that she is asymptomatic, it is unclear how long she has had it. Would recommend managing conservatively as we have not cleared the MARY and given that she still requires volume optimization, would defer cardioversion.     Recommendations:   -increase metoprolol to 25mg q6h for rate control, goal <110  -continue amio gtt   -would not give amiodarone given unknown duration of atrial fibrillation to avoid chemical cardioversion    If pt is to undergo  for MR workup, could consider DCCV + amiodarone at the time for AF management.  Will need optimization prior to rhythm control strategy noting her new dx of HFrEF and severe MR.    At this time, we will sign off. Please call if you have any questions.     Please see attending attestation for final recommendations.     Dl Pruett MD  Cardiology Fellow

## 2024-04-16 LAB
ANION GAP SERPL CALC-SCNC: 17 MMOL/L — SIGNIFICANT CHANGE UP (ref 5–17)
APTT BLD: 73.5 SEC — HIGH (ref 24.5–35.6)
BUN SERPL-MCNC: 75 MG/DL — HIGH (ref 7–23)
CALCIUM SERPL-MCNC: 8.1 MG/DL — LOW (ref 8.4–10.5)
CHLORIDE SERPL-SCNC: 93 MMOL/L — LOW (ref 96–108)
CO2 SERPL-SCNC: 30 MMOL/L — SIGNIFICANT CHANGE UP (ref 22–31)
CREAT SERPL-MCNC: 5.07 MG/DL — HIGH (ref 0.5–1.3)
EGFR: 8 ML/MIN/1.73M2 — LOW
GLUCOSE SERPL-MCNC: 115 MG/DL — HIGH (ref 70–99)
HCT VFR BLD CALC: 37.6 % — SIGNIFICANT CHANGE UP (ref 34.5–45)
HGB BLD-MCNC: 11.2 G/DL — LOW (ref 11.5–15.5)
INTERPRETATION SERPL IFE-IMP: SIGNIFICANT CHANGE UP
MAGNESIUM SERPL-MCNC: 2 MG/DL — SIGNIFICANT CHANGE UP (ref 1.6–2.6)
MCHC RBC-ENTMCNC: 29.3 PG — SIGNIFICANT CHANGE UP (ref 27–34)
MCHC RBC-ENTMCNC: 29.8 GM/DL — LOW (ref 32–36)
MCV RBC AUTO: 98.4 FL — SIGNIFICANT CHANGE UP (ref 80–100)
NRBC # BLD: 0 /100 WBCS — SIGNIFICANT CHANGE UP (ref 0–0)
NT-PROBNP SERPL-SCNC: HIGH PG/ML (ref 0–300)
PHOSPHATE SERPL-MCNC: 5.2 MG/DL — HIGH (ref 2.5–4.5)
PLATELET # BLD AUTO: 163 K/UL — SIGNIFICANT CHANGE UP (ref 150–400)
POTASSIUM SERPL-MCNC: 4 MMOL/L — SIGNIFICANT CHANGE UP (ref 3.5–5.3)
POTASSIUM SERPL-SCNC: 4 MMOL/L — SIGNIFICANT CHANGE UP (ref 3.5–5.3)
RBC # BLD: 3.82 M/UL — SIGNIFICANT CHANGE UP (ref 3.8–5.2)
RBC # FLD: 13.7 % — SIGNIFICANT CHANGE UP (ref 10.3–14.5)
SODIUM SERPL-SCNC: 140 MMOL/L — SIGNIFICANT CHANGE UP (ref 135–145)
WBC # BLD: 8.12 K/UL — SIGNIFICANT CHANGE UP (ref 3.8–10.5)
WBC # FLD AUTO: 8.12 K/UL — SIGNIFICANT CHANGE UP (ref 3.8–10.5)

## 2024-04-16 PROCEDURE — 99233 SBSQ HOSP IP/OBS HIGH 50: CPT | Mod: GC

## 2024-04-16 PROCEDURE — 99232 SBSQ HOSP IP/OBS MODERATE 35: CPT | Mod: GC

## 2024-04-16 RX ADMIN — BUMETANIDE 10 MG/HR: 0.25 INJECTION INTRAMUSCULAR; INTRAVENOUS at 07:46

## 2024-04-16 RX ADMIN — Medication 1300 MILLIGRAM(S): at 05:21

## 2024-04-16 RX ADMIN — ISOSORBIDE DINITRATE 10 MILLIGRAM(S): 5 TABLET ORAL at 05:21

## 2024-04-16 RX ADMIN — HEPARIN SODIUM 900 UNIT(S)/HR: 5000 INJECTION INTRAVENOUS; SUBCUTANEOUS at 19:01

## 2024-04-16 RX ADMIN — HEPARIN SODIUM 900 UNIT(S)/HR: 5000 INJECTION INTRAVENOUS; SUBCUTANEOUS at 07:45

## 2024-04-16 RX ADMIN — ISOSORBIDE DINITRATE 10 MILLIGRAM(S): 5 TABLET ORAL at 14:09

## 2024-04-16 RX ADMIN — Medication 50 MILLIGRAM(S): at 19:34

## 2024-04-16 RX ADMIN — ISOSORBIDE DINITRATE 10 MILLIGRAM(S): 5 TABLET ORAL at 21:35

## 2024-04-16 RX ADMIN — Medication 50 MILLIGRAM(S): at 14:09

## 2024-04-16 RX ADMIN — Medication 50 MILLIGRAM(S): at 01:41

## 2024-04-16 RX ADMIN — Medication 50 MILLIGRAM(S): at 09:20

## 2024-04-16 NOTE — PROGRESS NOTE ADULT - ASSESSMENT
73F PMHx nephrolithiasis s/p multiple procedures and HTN.  Admitted with new onset bl LE swelling and renal dysfunction.   Cardiology consulted for asymptomatic new-onset AFib w/ RVR and ADHF.     #ADHF:  - remains volume overloaded  - c/w Bumex 2mg/hr for diuresis, will consider adding metolazone  - BB as below  - c/w hydralazine 25 mg TID and isosorbide dinitrate 10mg TID  - other GDMT limited by current renal function  - proceed w/ischemic workup once net even and HR controlled  - monitor tele  - replete K>4 Mg>2  - strict I/O's, daily weights    #AF:  - rates remain elevated, likely worsened by volume overload  - metoprolol to 50 q6h HR <110  - c/w hep gtt for AC  - would not give amiodarone given unknown duration of atrial fibrillation     #MR:  - repeat TTE once euvolemic to reassess       Segundo Ann M.D.  Cardiology fellow     73F PMHx nephrolithiasis s/p multiple procedures and HTN.  Admitted with new onset bl LE swelling and renal dysfunction.   Cardiology consulted for asymptomatic new-onset AFib w/ RVR and ADHF.     #ADHF:  - remains volume overloaded  - c/w Bumex 2mg/hr for diuresis, would defer to nephrology for additional recs for volume management; can consider metolazone to augment diuresis  - BB as below  - c/w hydralazine 25 mg TID and isosorbide dinitrate 10mg TID  - other GDMT limited by current renal function  - proceed w/ischemic workup once net even and HR controlled  - monitor tele  - replete K>4 Mg>2  - strict I/O's, daily weights    #AF:  - rates remain elevated, likely worsened by volume overload  - metoprolol to 50 q6h HR <110  - c/w hep gtt for AC  - would not give amiodarone given unknown duration of atrial fibrillation     #MR:  - repeat TTE once euvolemic to reassess       Segundo Ann M.D.  Cardiology fellow     73F PMHx nephrolithiasis s/p multiple procedures and HTN.  Admitted with new onset bl LE swelling and renal dysfunction.   Cardiology consulted for asymptomatic new-onset AFib w/ RVR and ADHF.     #ADHF:  - remains volume overloaded  - c/w Bumex 2mg/hr for diuresis, would defer to nephrology for additional recs for volume management; would give PO metolazone 2 mg x1 to augment diuresis  - BB as below  - c/w hydralazine 25 mg TID and isosorbide dinitrate 10mg TID  - other GDMT limited by current renal function  - proceed w/ischemic workup once net even and HR controlled  - monitor tele  - replete K>4 Mg>2  - strict I/O's, daily weights    #AF:  - rates remain elevated, likely worsened by volume overload  - metoprolol to 50 q6h HR <110  - c/w hep gtt for AC  - would not give amiodarone given unknown duration of atrial fibrillation     #MR:  - repeat TTE once euvolemic to reassess       Segundo Ann M.D.  Cardiology fellow     73F PMHx nephrolithiasis s/p multiple procedures and HTN.  Admitted with new onset bl LE swelling and renal dysfunction.   Cardiology consulted for asymptomatic new-onset AFib w/ RVR and ADHF.       #ADHF:  - remains volume overloaded  - c/w Bumex 2mg/hr for diuresis, would defer to nephrology for additional recs for volume management; would give PO metolazone 2 mg x1 to augment diuresis  - BB as below  - c/w hydralazine 25 mg TID and isosorbide dinitrate 10mg TID  - other GDMT limited by current renal function  - proceed w/ischemic workup once net even and HR controlled  - monitor tele  - replete K>4 Mg>2  - strict I/O's, daily weights    #AF:  - rates remain elevated, likely worsened by volume overload  - metoprolol to 50 q6h HR <110  - c/w hep gtt for AC  - would not give amiodarone given unknown duration of atrial fibrillation     #MR:  - repeat TTE once euvolemic to reassess       Segundo Ann M.D.  Cardiology fellow      Plan discussed with cardiology fellow.  Patient seen and examined.  Hx., exam and labs as above.  I agree with the assessment and recommendations, which I have reviewed and edited where appropriate.  Zack Moore M.D.  Cardiology Attending, Consult Service    For Cardiology consults and questions, all Cardiology service information can be found 24/7 on [email or web address] - use password: [email or web address] to log in.

## 2024-04-16 NOTE — PROGRESS NOTE ADULT - SUBJECTIVE AND OBJECTIVE BOX
*Incomplete Note*    Patient seen and examined at bedside.    Overnight Events: No acute events overnight. Denies chest pain or SOB    Telemetry: -140s    REVIEW OF SYSTEMS:  Constitutional:     [ x] negative [ ] fevers [ ] chills [ ] weight loss [ ] weight gain  HEENT:                  [ x] negative [ ] dry eyes [ ] eye irritation [ ] postnasal drip [ ] nasal congestion  CV:                         [x ] negative  [ ] chest pain [ ] orthopnea [ ] palpitations [ ] murmur  Resp:                     [ x] negative [ ] cough [ ] shortness of breath [ ] dyspnea [ ] wheezing [ ] sputum [ ]hemoptysis  GI:                          [ x] negative [ ] nausea [ ] vomiting [ ] diarrhea [ ] constipation [ ] abd pain [ ] dysphagia   :                        [ x] negative [ ] dysuria [ ] nocturia [ ] hematuria [ ] increased urinary frequency  Musculoskeletal: [ x] negative [ ] back pain [ ] myalgias [ ] arthralgias [ ] fracture  Skin:                       [ x] negative [ ] rash [ ] itch  Neurological:        [ x] negative [ ] headache [ ] dizziness [ ] syncope [ ] weakness [ ] numbness  Psychiatric:           [ x] negative [ ] anxiety [ ] depression  Endocrine:            [ x] negative [ ] diabetes [ ] thyroid problem  Heme/Lymph:      [ x] negative [ ] anemia [ ] bleeding problem  Allergic/Immune: [x ] negative [ ] itchy eyes [ ] nasal discharge [ ] hives [ ] angioedema    [x ] All other systems negative  [ ] Unable to assess ROS due to    Current Meds:  acetaminophen     Tablet .. 650 milliGRAM(s) Oral every 6 hours PRN  buMETAnide Infusion 2 mG/Hr IV Continuous <Continuous>  heparin   Injectable 5500 Unit(s) IV Push every 6 hours PRN  heparin   Injectable 2500 Unit(s) IV Push every 6 hours PRN  heparin  Infusion.  Unit(s)/Hr IV Continuous <Continuous>  isosorbide   dinitrate Tablet (ISORDIL) 10 milliGRAM(s) Oral three times a day  metoprolol tartrate 50 milliGRAM(s) Oral every 6 hours  ondansetron Injectable 4 milliGRAM(s) IV Push every 6 hours PRN  senna 2 Tablet(s) Oral at bedtime PRN  sodium bicarbonate 1300 milliGRAM(s) Oral two times a day      PAST MEDICAL & SURGICAL HISTORY:  Renal Calculus      Ureteral Calculus      Acute Renal Failure  9/2009      Wrist Fracture  CYNTHIA      Collar Bone Fracture      Rib Fractures      Kidney Stone removal  3/15/2011      C Section      Percutaneous Stone Extraction  Right      S/P Left Percutaneuos Stone extraction  01/26/2010, 04/20/2010          Vitals:  T(F): 97.4 (04-16), Max: 98 (04-15)  HR: 102 (04-16) (75 - 114)  BP: 99/64 (04-16) (97/66 - 135/60)  RR: 18 (04-16)  SpO2: 95% (04-16)  I&O's Summary    15 Apr 2024 07:01  -  16 Apr 2024 07:00  --------------------------------------------------------  IN: 1068 mL / OUT: 1000 mL / NET: 68 mL        Physical Exam:  Appearance: No acute distress; well appearing  Eyes: PERRL, EOMI, pink conjunctiva  HENT: Normal oral mucosa  Cardiovascular: RRR, S1, S2, no murmurs, rubs, or gallops; no edema; no JVD  Respiratory: Clear to auscultation bilaterally  Gastrointestinal: soft, non-tender, non-distended with normal bowel sounds  Musculoskeletal: No clubbing; no joint deformity   Neurologic: Non-focal  Lymphatic: No lymphadenopathy  Psychiatry: AAOx3, mood & affect appropriate  Skin: No rashes, ecchymoses, or cyanosis                          11.0   10.01 )-----------( 176      ( 15 Apr 2024 07:30 )             36.5     04-15    143  |  95<L>  |  73<H>  ----------------------------<  110<H>  4.3   |  30  |  4.95<H>    Ca    7.8<L>      15 Apr 2024 21:46  Phos  4.5     04-15  Mg     1.8     04-15      PT/INR - ( 14 Apr 2024 07:18 )   PT: 11.9 sec;   INR: 1.14 ratio         PTT - ( 15 Apr 2024 07:30 )  PTT:68.7 sec                 Patient seen and examined at bedside.    Overnight Events: No acute events overnight. Denies chest pain or SOB    Telemetry: -140s    REVIEW OF SYSTEMS:  Constitutional:     [ x] negative [ ] fevers [ ] chills [ ] weight loss [ ] weight gain  HEENT:                  [ x] negative [ ] dry eyes [ ] eye irritation [ ] postnasal drip [ ] nasal congestion  CV:                         [x ] negative  [ ] chest pain [ ] orthopnea [ ] palpitations [ ] murmur  Resp:                     [ x] negative [ ] cough [ ] shortness of breath [ ] dyspnea [ ] wheezing [ ] sputum [ ]hemoptysis  GI:                          [ x] negative [ ] nausea [ ] vomiting [ ] diarrhea [ ] constipation [ ] abd pain [ ] dysphagia   :                        [ x] negative [ ] dysuria [ ] nocturia [ ] hematuria [ ] increased urinary frequency  Musculoskeletal: [ x] negative [ ] back pain [ ] myalgias [ ] arthralgias [ ] fracture  Skin:                       [ x] negative [ ] rash [ ] itch  Neurological:        [ x] negative [ ] headache [ ] dizziness [ ] syncope [ ] weakness [ ] numbness  Psychiatric:           [ x] negative [ ] anxiety [ ] depression  Endocrine:            [ x] negative [ ] diabetes [ ] thyroid problem  Heme/Lymph:      [ x] negative [ ] anemia [ ] bleeding problem  Allergic/Immune: [x ] negative [ ] itchy eyes [ ] nasal discharge [ ] hives [ ] angioedema    [x ] All other systems negative  [ ] Unable to assess ROS due to    Current Meds:  acetaminophen     Tablet .. 650 milliGRAM(s) Oral every 6 hours PRN  buMETAnide Infusion 2 mG/Hr IV Continuous <Continuous>  heparin   Injectable 5500 Unit(s) IV Push every 6 hours PRN  heparin   Injectable 2500 Unit(s) IV Push every 6 hours PRN  heparin  Infusion.  Unit(s)/Hr IV Continuous <Continuous>  isosorbide   dinitrate Tablet (ISORDIL) 10 milliGRAM(s) Oral three times a day  metoprolol tartrate 50 milliGRAM(s) Oral every 6 hours  ondansetron Injectable 4 milliGRAM(s) IV Push every 6 hours PRN  senna 2 Tablet(s) Oral at bedtime PRN  sodium bicarbonate 1300 milliGRAM(s) Oral two times a day      PAST MEDICAL & SURGICAL HISTORY:  Renal Calculus      Ureteral Calculus      Acute Renal Failure  9/2009      Wrist Fracture  CYNTHIA      Collar Bone Fracture      Rib Fractures      Kidney Stone removal  3/15/2011      C Section      Percutaneous Stone Extraction  Right      S/P Left Percutaneuos Stone extraction  01/26/2010, 04/20/2010          Vitals:  T(F): 97.4 (04-16), Max: 98 (04-15)  HR: 102 (04-16) (75 - 114)  BP: 99/64 (04-16) (97/66 - 135/60)  RR: 18 (04-16)  SpO2: 95% (04-16)  I&O's Summary    15 Apr 2024 07:01  -  16 Apr 2024 07:00  --------------------------------------------------------  IN: 1068 mL / OUT: 1000 mL / NET: 68 mL        Physical Exam:  Appearance: No acute distress; well appearing  Eyes: PERRL, EOMI, pink conjunctiva  HENT: Normal oral mucosa  Cardiovascular: RRR, S1, S2, no murmurs, rubs, or gallops; no edema; no JVD  Respiratory: Clear to auscultation bilaterally  Gastrointestinal: soft, non-tender, non-distended with normal bowel sounds  Musculoskeletal: No clubbing; no joint deformity   Neurologic: Non-focal  Lymphatic: No lymphadenopathy  Psychiatry: AAOx3, mood & affect appropriate  Skin: No rashes, ecchymoses, or cyanosis                          11.0   10.01 )-----------( 176      ( 15 Apr 2024 07:30 )             36.5     04-15    143  |  95<L>  |  73<H>  ----------------------------<  110<H>  4.3   |  30  |  4.95<H>    Ca    7.8<L>      15 Apr 2024 21:46  Phos  4.5     04-15  Mg     1.8     04-15      PT/INR - ( 14 Apr 2024 07:18 )   PT: 11.9 sec;   INR: 1.14 ratio         PTT - ( 15 Apr 2024 07:30 )  PTT:68.7 sec                 Patient seen and examined at bedside.    Overnight Events: No acute events overnight. Denies chest pain or SOB    Telemetry: -140s    REVIEW OF SYSTEMS:  Constitutional:     [ x] negative [ ] fevers [ ] chills [ ] weight loss [ ] weight gain  HEENT:                  [ x] negative [ ] dry eyes [ ] eye irritation [ ] postnasal drip [ ] nasal congestion  CV:                         [x ] negative  [ ] chest pain [ ] orthopnea [ ] palpitations [ ] murmur  Resp:                     [ x] negative [ ] cough [ ] shortness of breath [ ] dyspnea [ ] wheezing [ ] sputum [ ]hemoptysis  GI:                          [ x] negative [ ] nausea [ ] vomiting [ ] diarrhea [ ] constipation [ ] abd pain [ ] dysphagia   :                        [ x] negative [ ] dysuria [ ] nocturia [ ] hematuria [ ] increased urinary frequency  Musculoskeletal: [ x] negative [ ] back pain [ ] myalgias [ ] arthralgias [ ] fracture  Skin:                       [ x] negative [ ] rash [ ] itch  Neurological:        [ x] negative [ ] headache [ ] dizziness [ ] syncope [ ] weakness [ ] numbness  Psychiatric:           [ x] negative [ ] anxiety [ ] depression  Endocrine:            [ x] negative [ ] diabetes [ ] thyroid problem  Heme/Lymph:      [ x] negative [ ] anemia [ ] bleeding problem  Allergic/Immune: [x ] negative [ ] itchy eyes [ ] nasal discharge [ ] hives [ ] angioedema  [x ] All other systems negative      Current Meds:  acetaminophen     Tablet .. 650 milliGRAM(s) Oral every 6 hours PRN  buMETAnide Infusion 2 mG/Hr IV Continuous <Continuous>  heparin   Injectable 5500 Unit(s) IV Push every 6 hours PRN  heparin   Injectable 2500 Unit(s) IV Push every 6 hours PRN  heparin  Infusion.  Unit(s)/Hr IV Continuous <Continuous>  isosorbide   dinitrate Tablet (ISORDIL) 10 milliGRAM(s) Oral three times a day  metoprolol tartrate 50 milliGRAM(s) Oral every 6 hours  ondansetron Injectable 4 milliGRAM(s) IV Push every 6 hours PRN  senna 2 Tablet(s) Oral at bedtime PRN  sodium bicarbonate 1300 milliGRAM(s) Oral two times a day      PAST MEDICAL & SURGICAL HISTORY:  Renal Calculus  Acute Renal Failure 9/2009  Wrist Fracture CYNTHIA  Collar Bone Fracture  Rib Fractures  C Section  S/P Left Percutaneuos Stone extraction 01/26/2010, 04/20/2010      Vitals:  T(F): 97.4 (04-16), Max: 98 (04-15)  HR: 102 (04-16) (75 - 114)  BP: 99/64 (04-16) (97/66 - 135/60)  RR: 18 (04-16)  SpO2: 95% (04-16)    I&O's Summary  15 Apr 2024 07:01  -  16 Apr 2024 07:00  --------------------------------------------------------  IN: 1068 mL / OUT: 1000 mL / NET: 68 mL        Physical Exam:  Appearance: No acute distress; well appearing  Eyes: PERRL, EOMI, pink conjunctiva  HENT: Normal oral mucosa  Cardiovascular: RRR, S1, S2, no murmurs, rubs, or gallops; no edema; no JVD  Respiratory: Clear to auscultation bilaterally  Gastrointestinal: soft, non-tender, non-distended with normal bowel sounds  Musculoskeletal: No clubbing; no joint deformity   Neurologic: Non-focal  Lymphatic: No lymphadenopathy  Psychiatry: AAOx3, mood & affect appropriate  Skin: No rashes, ecchymoses, or cyanosis                          11.0   10.01 )-----------( 176      ( 15 Apr 2024 07:30 )             36.5     04-15    143  |  95<L>  |  73<H>  ----------------------------<  110<H>  4.3   |  30  |  4.95<H>    Ca    7.8<L>      15 Apr 2024 21:46  Phos  4.5     04-15  Mg     1.8     04-15      PT/INR - ( 14 Apr 2024 07:18 )   PT: 11.9 sec;   INR: 1.14 ratio    PTT - ( 15 Apr 2024 07:30 )  PTT:68.7 sec

## 2024-04-16 NOTE — PROGRESS NOTE ADULT - ASSESSMENT
73F PMHx nephrolithiasis, HTN admitted with 2 weeks of bl LE swelling and newly reduced renal function with multiple nonobstructive renal stones on CT. EP consulted for new-onset AFib w/ RVR noted on admission EKG. Patient reports asymptomatic, denies dizziness/lightheadedness, CP, SOB, palpitations. Denies any cardiac history. Received metoprolol 5mg IV x1 for AFib at rates 130s with improvement to 110s.       Afib with rvr   EP eval appreciated   Heparin drip   Follow up Echo  TTE -EF 37%, severe MR, normal RV size, function, an wall thickness. Dilated LA and RA, TR, pulm artery P 41mm Hg  Increased Metorprolol as per cards   metolazone x 1 dose given         NICO on CKD   Metabolic Acidosis   Renal consulted   off sod bicarb     dispo: c/w heparin GTT and bumex GTT

## 2024-04-16 NOTE — PROGRESS NOTE ADULT - SUBJECTIVE AND OBJECTIVE BOX
[facility] Division of Kidney Diseases & Hypertension  FOLLOW UP NOTE  [phone]--------------------------------------------------------------------------------    Chief Complaint: NICO on CKD vs progressive CKD    24 hour events/subjective: Pt. was seen and evaluated at bedside this morning. Pt. reports feeling well, offers no complaints. Endorses good uop. Denies any headaches, fevers/chills, chest pain, and SOB.    PAST HISTORY  --------------------------------------------------------------------------------  No significant changes to PMH, PSH, FHx, SHx, unless otherwise noted    ALLERGIES & MEDICATIONS  --------------------------------------------------------------------------------  Allergies    No Known Allergies    Intolerances      Standing Inpatient Medications  buMETAnide Infusion 2 mG/Hr IV Continuous <Continuous>  heparin  Infusion.  Unit(s)/Hr IV Continuous <Continuous>  isosorbide   dinitrate Tablet (ISORDIL) 10 milliGRAM(s) Oral three times a day  metoprolol tartrate 50 milliGRAM(s) Oral every 6 hours  sodium bicarbonate 1300 milliGRAM(s) Oral two times a day    PRN Inpatient Medications  acetaminophen     Tablet .. 650 milliGRAM(s) Oral every 6 hours PRN  heparin   Injectable 5500 Unit(s) IV Push every 6 hours PRN  heparin   Injectable 2500 Unit(s) IV Push every 6 hours PRN  ondansetron Injectable 4 milliGRAM(s) IV Push every 6 hours PRN  senna 2 Tablet(s) Oral at bedtime PRN      REVIEW OF SYSTEMS  --------------------------------------------------------------------------------  See HPI    VITALS/PHYSICAL EXAM  --------------------------------------------------------------------------------  T(C): 36.7 (04-16-24 @ 09:04), Max: 36.7 (04-15-24 @ 17:25)  HR: 105 (04-16-24 @ 09:04) (75 - 114)  BP: 105/72 (04-16-24 @ 09:04) (97/66 - 135/60)  RR: 18 (04-16-24 @ 09:04) (18 - 18)  SpO2: 96% (04-16-24 @ 09:04) (94% - 99%)  Wt(kg): --    04-15-24 @ 07:01  -  04-16-24 @ 07:00  --------------------------------------------------------  IN: 1536 mL / OUT: 1100 mL / NET: 436 mL    04-16-24 @ 07:01  -  04-16-24 @ 09:42  --------------------------------------------------------  IN: 180 mL / OUT: 100 mL / NET: 80 mL    Physical Exam:  Gen: NAD  HEENT: MMM  Pulm: CTA B/L  CV: S1S2  Abd: Soft, +BS   Ext: +BL LE edema (improving)  Neuro: Awake and alert  Skin: Warm and dry  Vascular access: Peripheral IV line    LABS/STUDIES  --------------------------------------------------------------------------------              11.2   8.12  >-----------<  163      [04-16-24 @ 06:53]              37.6     140  |  93  |  75  ----------------------------<  115      [04-16-24 @ 06:54]  4.0   |  30  |  5.07        Ca     8.1     [04-16-24 @ 06:54]      Mg     2.0     [04-16-24 @ 06:54]      Phos  5.2     [04-16-24 @ 06:54]    PTT: 73.5       [04-16-24 @ 06:54]    Creatinine Trend:  SCr 5.07 [04-16 @ 06:54]  SCr 4.95 [04-15 @ 21:46]  SCr 4.82 [04-15 @ 07:26]  SCr 4.88 [04-14 @ 07:20]  SCr 5.07 [04-13 @ 07:00]    Urine Creatinine 35      [04-09-24 @ 22:41]  Urine Protein 40      [04-09-24 @ 22:41]    HBsAg Nonreact      [04-10-24 @ 07:11]  HIV Nonreact      [04-09-24 @ 20:21]    Free Light Chains: kappa 5.19, lambda 2.81, ratio = 1.85      [04-10 @ 07:11]  Immunofixation Serum:   Weak  IgG Kappa Band Identified    Reference Range: None Detected      [04-10-24 @ 07:11]

## 2024-04-16 NOTE — PROGRESS NOTE ADULT - PROBLEM SELECTOR PLAN 2
Pt. with metabolic acidosis in the setting of renal failure. SCO2 increased to 30 today. Discontinue PO sodium bicarbonate.  Monitor pH, and SCO2.    If you have any questions, please feel free to contact me  Jamari Brunson  Nephrology Fellow  [phone] / [employer] Teams(Preferred)  (After 5pm or on weekends please page the on-call fellow).

## 2024-04-16 NOTE — PROGRESS NOTE ADULT - SUBJECTIVE AND OBJECTIVE BOX
Patient is a 73y old  Female who presents with a chief complaint of BL LE swelling (16 Apr 2024 09:41)      INTERVAL HPI/OVERNIGHT EVENTS: seen and examined   T(C): 36.6 (04-16-24 @ 21:30), Max: 36.7 (04-16-24 @ 09:04)  HR: 112 (04-16-24 @ 21:30) (96 - 112)  BP: 100/69 (04-16-24 @ 21:30) (95/61 - 108/72)  RR: 18 (04-16-24 @ 21:30) (18 - 18)  SpO2: 96% (04-16-24 @ 21:30) (94% - 97%)  Wt(kg): --  I&O's Summary    15 Apr 2024 07:01  -  16 Apr 2024 07:00  --------------------------------------------------------  IN: 1536 mL / OUT: 1100 mL / NET: 436 mL    16 Apr 2024 07:01  -  16 Apr 2024 23:50  --------------------------------------------------------  IN: 493 mL / OUT: 900 mL / NET: -407 mL        PAST MEDICAL & SURGICAL HISTORY:  Renal Calculus      Ureteral Calculus      Acute Renal Failure  9/2009      Wrist Fracture  CYNTHIA      Collar Bone Fracture      Rib Fractures      Kidney Stone removal  3/15/2011      C Section      Percutaneous Stone Extraction  Right      S/P Left Percutaneuos Stone extraction  01/26/2010, 04/20/2010          SOCIAL HISTORY  Alcohol:  Tobacco:  Illicit substance use:    FAMILY HISTORY:    REVIEW OF SYSTEMS:  CONSTITUTIONAL: No fever, weight loss, or fatigue  EYES: No eye pain, visual disturbances, or discharge  ENMT:  No difficulty hearing, tinnitus, vertigo; No sinus or throat pain  NECK: No pain or stiffness  RESPIRATORY: No cough, wheezing, chills or hemoptysis; No shortness of breath  CARDIOVASCULAR: No chest pain, palpitations, dizziness, or leg swelling  GASTROINTESTINAL: No abdominal or epigastric pain. No nausea, vomiting, or hematemesis; No diarrhea or constipation. No melena or hematochezia.  GENITOURINARY: No dysuria, frequency, hematuria, or incontinence  NEUROLOGICAL: No headaches, memory loss, loss of strength, numbness, or tremors  SKIN: No itching, burning, rashes, or lesions   LYMPH NODES: No enlarged glands  ENDOCRINE: No heat or cold intolerance; No hair loss  MUSCULOSKELETAL: No joint pain or swelling; No muscle, back, or extremity pain  PSYCHIATRIC: No depression, anxiety, mood swings, or difficulty sleeping  HEME/LYMPH: No easy bruising, or bleeding gums  ALLERY AND IMMUNOLOGIC: No hives or eczema    RADIOLOGY & ADDITIONAL TESTS:    Imaging Personally Reviewed:  [ ] YES  [ ] NO    Consultant(s) Notes Reviewed:  [ ] YES  [ ] NO    PHYSICAL EXAM:  GENERAL: NAD, well-groomed, well-developed  HEAD:  Atraumatic, Normocephalic  EYES: EOMI, PERRLA, conjunctiva and sclera clear  ENMT: No tonsillar erythema, exudates, or enlargement; Moist mucous membranes, Good dentition, No lesions  NECK: Supple, No JVD, Normal thyroid  NERVOUS SYSTEM:  Alert & Oriented X3, Good concentration; Motor Strength 5/5 B/L upper and lower extremities; DTRs 2+ intact and symmetric  CHEST/LUNG: Clear to percussion bilaterally; No rales, rhonchi, wheezing, or rubs  HEART: Regular rate and rhythm; No murmurs, rubs, or gallops  ABDOMEN: Soft, Nontender, Nondistended; Bowel sounds present  EXTREMITIES:  2+ Peripheral Pulses, No clubbing, cyanosis, or edema  LYMPH: No lymphadenopathy noted  SKIN: No rashes or lesions    LABS:                        11.2   8.12  )-----------( 163      ( 16 Apr 2024 06:53 )             37.6     04-16    140  |  93<L>  |  75<H>  ----------------------------<  115<H>  4.0   |  30  |  5.07<H>    Ca    8.1<L>      16 Apr 2024 06:54  Phos  5.2     04-16  Mg     2.0     04-16      PTT - ( 16 Apr 2024 06:54 )  PTT:73.5 sec  Urinalysis Basic - ( 16 Apr 2024 06:54 )    Color: x / Appearance: x / SG: x / pH: x  Gluc: 115 mg/dL / Ketone: x  / Bili: x / Urobili: x   Blood: x / Protein: x / Nitrite: x   Leuk Esterase: x / RBC: x / WBC x   Sq Epi: x / Non Sq Epi: x / Bacteria: x      CAPILLARY BLOOD GLUCOSE            Urinalysis Basic - ( 16 Apr 2024 06:54 )    Color: x / Appearance: x / SG: x / pH: x  Gluc: 115 mg/dL / Ketone: x  / Bili: x / Urobili: x   Blood: x / Protein: x / Nitrite: x   Leuk Esterase: x / RBC: x / WBC x   Sq Epi: x / Non Sq Epi: x / Bacteria: x        MEDICATIONS  (STANDING):  buMETAnide Infusion 2 mG/Hr (10 mL/Hr) IV Continuous <Continuous>  heparin  Infusion.  Unit(s)/Hr (12 mL/Hr) IV Continuous <Continuous>  isosorbide   dinitrate Tablet (ISORDIL) 10 milliGRAM(s) Oral three times a day  metoprolol tartrate 50 milliGRAM(s) Oral every 6 hours    MEDICATIONS  (PRN):  acetaminophen     Tablet .. 650 milliGRAM(s) Oral every 6 hours PRN Temp greater or equal to 38C (100.4F), Mild Pain (1 - 3)  heparin   Injectable 2500 Unit(s) IV Push every 6 hours PRN For aPTT between 40 - 57  heparin   Injectable 5500 Unit(s) IV Push every 6 hours PRN For aPTT less than 40  ondansetron Injectable 4 milliGRAM(s) IV Push every 6 hours PRN Nausea and/or Vomiting  senna 2 Tablet(s) Oral at bedtime PRN Constipation      Care Discussed with Consultants/Other Providers [ ] YES  [ ] NO

## 2024-04-16 NOTE — PROGRESS NOTE ADULT - PROBLEM SELECTOR PLAN 1
Pt. with renal failure in the setting of fluid overload. On review of [place], SCr noted to be persistently elevated from 8425-9933. SCr was elevated at 1.79 on 5/4/2011. SCr initially during this admission was elevated at 4.73 (4/8), and remains elevated/stable at 5.07 today. No interim labs available for review but pt. likely has underlying CKD. Documented urine output is ~1.1L over the past 24 hours. UA showed proteinuria and evidence of infection (although 15 epithelial cells). UPCR is elevated at 1.1. Kidney sonogram showed evidence of medical renal disease, BL non-obstructing stones, and BL renal cysts.   IV diuresis per cardiology. Serologic workup thus far: HBsAg, HCV Ab, and HIV are non-reactive. Kappa/Lambda free light chain ratio is mildly elevated at 1.85 (acceptable range for degree of CKD). SIFE is pending result. Monitor labs and urine output. Avoid nephrotoxins. Dose medications as per eGFR.

## 2024-04-17 ENCOUNTER — APPOINTMENT (OUTPATIENT)
Dept: UROLOGY | Facility: CLINIC | Age: 74
End: 2024-04-17

## 2024-04-17 LAB
ANION GAP SERPL CALC-SCNC: 22 MMOL/L — HIGH (ref 5–17)
ANION GAP SERPL CALC-SCNC: 23 MMOL/L — HIGH (ref 5–17)
APTT BLD: 67.9 SEC — HIGH (ref 24.5–35.6)
BUN SERPL-MCNC: 82 MG/DL — HIGH (ref 7–23)
BUN SERPL-MCNC: 84 MG/DL — HIGH (ref 7–23)
CALCIUM SERPL-MCNC: 8.4 MG/DL — SIGNIFICANT CHANGE UP (ref 8.4–10.5)
CALCIUM SERPL-MCNC: 8.7 MG/DL — SIGNIFICANT CHANGE UP (ref 8.4–10.5)
CHLORIDE SERPL-SCNC: 89 MMOL/L — LOW (ref 96–108)
CHLORIDE SERPL-SCNC: 91 MMOL/L — LOW (ref 96–108)
CO2 SERPL-SCNC: 25 MMOL/L — SIGNIFICANT CHANGE UP (ref 22–31)
CO2 SERPL-SCNC: 26 MMOL/L — SIGNIFICANT CHANGE UP (ref 22–31)
CREAT SERPL-MCNC: 5.35 MG/DL — HIGH (ref 0.5–1.3)
CREAT SERPL-MCNC: 5.5 MG/DL — HIGH (ref 0.5–1.3)
EGFR: 8 ML/MIN/1.73M2 — LOW
EGFR: 8 ML/MIN/1.73M2 — LOW
GLUCOSE SERPL-MCNC: 117 MG/DL — HIGH (ref 70–99)
GLUCOSE SERPL-MCNC: 193 MG/DL — HIGH (ref 70–99)
HCT VFR BLD CALC: 36.6 % — SIGNIFICANT CHANGE UP (ref 34.5–45)
HGB BLD-MCNC: 11.3 G/DL — LOW (ref 11.5–15.5)
MAGNESIUM SERPL-MCNC: 2.1 MG/DL — SIGNIFICANT CHANGE UP (ref 1.6–2.6)
MCHC RBC-ENTMCNC: 30.1 PG — SIGNIFICANT CHANGE UP (ref 27–34)
MCHC RBC-ENTMCNC: 30.9 GM/DL — LOW (ref 32–36)
MCV RBC AUTO: 97.3 FL — SIGNIFICANT CHANGE UP (ref 80–100)
NRBC # BLD: 0 /100 WBCS — SIGNIFICANT CHANGE UP (ref 0–0)
PHOSPHATE SERPL-MCNC: 5.7 MG/DL — HIGH (ref 2.5–4.5)
PLATELET # BLD AUTO: 155 K/UL — SIGNIFICANT CHANGE UP (ref 150–400)
POTASSIUM SERPL-MCNC: 3.8 MMOL/L — SIGNIFICANT CHANGE UP (ref 3.5–5.3)
POTASSIUM SERPL-MCNC: 3.9 MMOL/L — SIGNIFICANT CHANGE UP (ref 3.5–5.3)
POTASSIUM SERPL-SCNC: 3.8 MMOL/L — SIGNIFICANT CHANGE UP (ref 3.5–5.3)
POTASSIUM SERPL-SCNC: 3.9 MMOL/L — SIGNIFICANT CHANGE UP (ref 3.5–5.3)
RBC # BLD: 3.76 M/UL — LOW (ref 3.8–5.2)
RBC # FLD: 13.5 % — SIGNIFICANT CHANGE UP (ref 10.3–14.5)
SODIUM SERPL-SCNC: 137 MMOL/L — SIGNIFICANT CHANGE UP (ref 135–145)
SODIUM SERPL-SCNC: 139 MMOL/L — SIGNIFICANT CHANGE UP (ref 135–145)
WBC # BLD: 7.61 K/UL — SIGNIFICANT CHANGE UP (ref 3.8–10.5)
WBC # FLD AUTO: 7.61 K/UL — SIGNIFICANT CHANGE UP (ref 3.8–10.5)

## 2024-04-17 PROCEDURE — 99232 SBSQ HOSP IP/OBS MODERATE 35: CPT | Mod: GC

## 2024-04-17 PROCEDURE — 99233 SBSQ HOSP IP/OBS HIGH 50: CPT | Mod: GC

## 2024-04-17 RX ORDER — DIGOXIN 250 MCG
250 TABLET ORAL ONCE
Refills: 0 | Status: COMPLETED | OUTPATIENT
Start: 2024-04-17 | End: 2024-04-17

## 2024-04-17 RX ORDER — DIGOXIN 250 MCG
125 TABLET ORAL ONCE
Refills: 0 | Status: COMPLETED | OUTPATIENT
Start: 2024-04-17 | End: 2024-04-17

## 2024-04-17 RX ADMIN — Medication 650 MILLIGRAM(S): at 06:04

## 2024-04-17 RX ADMIN — HEPARIN SODIUM 900 UNIT(S)/HR: 5000 INJECTION INTRAVENOUS; SUBCUTANEOUS at 19:43

## 2024-04-17 RX ADMIN — ISOSORBIDE DINITRATE 10 MILLIGRAM(S): 5 TABLET ORAL at 14:30

## 2024-04-17 RX ADMIN — Medication 125 MICROGRAM(S): at 10:20

## 2024-04-17 RX ADMIN — HEPARIN SODIUM 900 UNIT(S)/HR: 5000 INJECTION INTRAVENOUS; SUBCUTANEOUS at 08:46

## 2024-04-17 RX ADMIN — Medication 650 MILLIGRAM(S): at 06:34

## 2024-04-17 RX ADMIN — ISOSORBIDE DINITRATE 10 MILLIGRAM(S): 5 TABLET ORAL at 21:37

## 2024-04-17 RX ADMIN — BUMETANIDE 10 MG/HR: 0.25 INJECTION INTRAMUSCULAR; INTRAVENOUS at 12:58

## 2024-04-17 RX ADMIN — Medication 50 MILLIGRAM(S): at 03:38

## 2024-04-17 RX ADMIN — ISOSORBIDE DINITRATE 10 MILLIGRAM(S): 5 TABLET ORAL at 06:04

## 2024-04-17 RX ADMIN — HEPARIN SODIUM 900 UNIT(S)/HR: 5000 INJECTION INTRAVENOUS; SUBCUTANEOUS at 07:40

## 2024-04-17 RX ADMIN — Medication 50 MILLIGRAM(S): at 18:37

## 2024-04-17 RX ADMIN — BUMETANIDE 10 MG/HR: 0.25 INJECTION INTRAMUSCULAR; INTRAVENOUS at 03:08

## 2024-04-17 RX ADMIN — Medication 50 MILLIGRAM(S): at 10:20

## 2024-04-17 RX ADMIN — Medication 50 MILLIGRAM(S): at 21:37

## 2024-04-17 RX ADMIN — HEPARIN SODIUM 900 UNIT(S)/HR: 5000 INJECTION INTRAVENOUS; SUBCUTANEOUS at 02:29

## 2024-04-17 RX ADMIN — Medication 250 MICROGRAM(S): at 18:13

## 2024-04-17 NOTE — DIETITIAN INITIAL EVALUATION ADULT - OTHER CALCULATIONS
Fluid needs deferred to team. Energy and protein needs based on IBW of 110lb in consideration of ? accuracy of current wt (edema and diuretics).

## 2024-04-17 NOTE — CHART NOTE - NSCHARTNOTEFT_GEN_A_CORE
73F PMHx nephrolithiasis, HTN admitted with 2 weeks of bl LE swelling and newly reduced renal function with multiple nonobstructive renal stones on CT. EP consulted for new-onset AFib w/ RVR noted on admission EKG. Patient reports asymptomatic, denies dizziness/lightheadedness, CP, SOB, palpitations. Denies any cardiac history. Received metoprolol 5mg IV x1 for AFib at rates 130s with improvement to 110s.   Pt currently on Metoprolol 50mg q6hrs.     Notified by RN, pt received Metoprolol 50mg 3hrs before due time.  Pt seen and evaluated at bedside, hemodynamically stable. Pt made aware and all questions answered.  Will monitor for any adverse events.  Will continue with telemetry and monitor VS q4hrs.   RN updated on plan of care.     Vital Signs Last 24 Hrs  T(C): 36.3 (17 Apr 2024 21:20), Max: 36.3 (17 Apr 2024 01:34)  T(F): 97.4 (17 Apr 2024 21:20), Max: 97.4 (17 Apr 2024 05:55)  HR: 88 (17 Apr 2024 23:15) (73 - 105)  BP: 96/60 (17 Apr 2024 23:15) (96/60 - 131/90)  BP(mean): --  RR: 18 (17 Apr 2024 21:20) (18 - 18)  SpO2: 96% (17 Apr 2024 21:20) (94% - 96%)    Parameters below as of 17 Apr 2024 21:20  Patient On (Oxygen Delivery Method): room air    Attending to follow in the am.  Will endorse to day ACP.     Emelin Reyes [place], NP  Medicine Department   Spectralink [id number]

## 2024-04-17 NOTE — DIETITIAN INITIAL EVALUATION ADULT - PERTINENT LABORATORY DATA
04-17    139  |  91<L>  |  82<H>  ----------------------------<  117<H>  3.9   |  26  |  5.35<H>    Ca    8.7      17 Apr 2024 07:20  Phos  5.7     04-17  Mg     2.1     04-17

## 2024-04-17 NOTE — PROGRESS NOTE ADULT - SUBJECTIVE AND OBJECTIVE BOX
*Incomplete Note*    Patient seen and examined at bedside.    Overnight Events: No acute events overnight. Denies chest pain or SOB    Telemetry: -130s    REVIEW OF SYSTEMS:  Constitutional:     [ x] negative [ ] fevers [ ] chills [ ] weight loss [ ] weight gain  HEENT:                  [ x] negative [ ] dry eyes [ ] eye irritation [ ] postnasal drip [ ] nasal congestion  CV:                         [x ] negative  [ ] chest pain [ ] orthopnea [ ] palpitations [ ] murmur  Resp:                     [ x] negative [ ] cough [ ] shortness of breath [ ] dyspnea [ ] wheezing [ ] sputum [ ]hemoptysis  GI:                          [ x] negative [ ] nausea [ ] vomiting [ ] diarrhea [ ] constipation [ ] abd pain [ ] dysphagia   :                        [ x] negative [ ] dysuria [ ] nocturia [ ] hematuria [ ] increased urinary frequency  Musculoskeletal: [ x] negative [ ] back pain [ ] myalgias [ ] arthralgias [ ] fracture  Skin:                       [ x] negative [ ] rash [ ] itch  Neurological:        [ x] negative [ ] headache [ ] dizziness [ ] syncope [ ] weakness [ ] numbness  Psychiatric:           [ x] negative [ ] anxiety [ ] depression  Endocrine:            [ x] negative [ ] diabetes [ ] thyroid problem  Heme/Lymph:      [ x] negative [ ] anemia [ ] bleeding problem  Allergic/Immune: [x ] negative [ ] itchy eyes [ ] nasal discharge [ ] hives [ ] angioedema    [x ] All other systems negative  [ ] Unable to assess ROS due to    Current Meds:  acetaminophen     Tablet .. 650 milliGRAM(s) Oral every 6 hours PRN  buMETAnide Infusion 2 mG/Hr IV Continuous <Continuous>  heparin   Injectable 5500 Unit(s) IV Push every 6 hours PRN  heparin   Injectable 2500 Unit(s) IV Push every 6 hours PRN  heparin  Infusion.  Unit(s)/Hr IV Continuous <Continuous>  isosorbide   dinitrate Tablet (ISORDIL) 10 milliGRAM(s) Oral three times a day  metoprolol tartrate 50 milliGRAM(s) Oral every 6 hours  ondansetron Injectable 4 milliGRAM(s) IV Push every 6 hours PRN  senna 2 Tablet(s) Oral at bedtime PRN      PAST MEDICAL & SURGICAL HISTORY:  Renal Calculus      Ureteral Calculus      Acute Renal Failure  9/2009      Wrist Fracture  CYNTHIA      Collar Bone Fracture      Rib Fractures      Kidney Stone removal  3/15/2011      C Section      Percutaneous Stone Extraction  Right      S/P Left Percutaneuos Stone extraction  01/26/2010, 04/20/2010          Vitals:  T(F): 97.4 (04-17), Max: 98.1 (04-16)  HR: 73 (04-17) (73 - 112)  BP: 108/77 (04-17) (95/61 - 108/77)  RR: 18 (04-17)  SpO2: 95% (04-17)  I&O's Summary    16 Apr 2024 07:01  -  17 Apr 2024 07:00  --------------------------------------------------------  IN: 784 mL / OUT: 1550 mL / NET: -766 mL        Physical Exam:  Appearance: No acute distress; well appearing  Eyes: PERRL, EOMI, pink conjunctiva  HENT: Normal oral mucosa  Cardiovascular: RRR, S1, S2, no murmurs, rubs, or gallops; no edema; no JVD  Respiratory: Clear to auscultation bilaterally  Gastrointestinal: soft, non-tender, non-distended with normal bowel sounds  Musculoskeletal: No clubbing; no joint deformity   Neurologic: Non-focal  Lymphatic: No lymphadenopathy  Psychiatry: AAOx3, mood & affect appropriate  Skin: No rashes, ecchymoses, or cyanosis                          11.2   8.12  )-----------( 163      ( 16 Apr 2024 06:53 )             37.6     04-16    140  |  93<L>  |  75<H>  ----------------------------<  115<H>  4.0   |  30  |  5.07<H>    Ca    8.1<L>      16 Apr 2024 06:54  Phos  5.2     04-16  Mg     2.0     04-16      PTT - ( 16 Apr 2024 06:54 )  PTT:73.5 sec                 Patient seen and examined at bedside.    Overnight Events: No acute events overnight. Denies chest pain or SOB    Telemetry: -130s    REVIEW OF SYSTEMS:  Constitutional:     [ x] negative [ ] fevers [ ] chills [ ] weight loss [ ] weight gain  HEENT:                  [ x] negative [ ] dry eyes [ ] eye irritation [ ] postnasal drip [ ] nasal congestion  CV:                         [x ] negative  [ ] chest pain [ ] orthopnea [ ] palpitations [ ] murmur  Resp:                     [ x] negative [ ] cough [ ] shortness of breath [ ] dyspnea [ ] wheezing [ ] sputum [ ]hemoptysis  GI:                          [ x] negative [ ] nausea [ ] vomiting [ ] diarrhea [ ] constipation [ ] abd pain [ ] dysphagia   :                        [ x] negative [ ] dysuria [ ] nocturia [ ] hematuria [ ] increased urinary frequency  Musculoskeletal: [ x] negative [ ] back pain [ ] myalgias [ ] arthralgias [ ] fracture  Skin:                       [ x] negative [ ] rash [ ] itch  Neurological:        [ x] negative [ ] headache [ ] dizziness [ ] syncope [ ] weakness [ ] numbness  Psychiatric:           [ x] negative [ ] anxiety [ ] depression  Endocrine:            [ x] negative [ ] diabetes [ ] thyroid problem  Heme/Lymph:      [ x] negative [ ] anemia [ ] bleeding problem  Allergic/Immune: [x ] negative [ ] itchy eyes [ ] nasal discharge [ ] hives [ ] angioedema    [x ] All other systems negative  [ ] Unable to assess ROS due to    Current Meds:  acetaminophen     Tablet .. 650 milliGRAM(s) Oral every 6 hours PRN  buMETAnide Infusion 2 mG/Hr IV Continuous <Continuous>  heparin   Injectable 5500 Unit(s) IV Push every 6 hours PRN  heparin   Injectable 2500 Unit(s) IV Push every 6 hours PRN  heparin  Infusion.  Unit(s)/Hr IV Continuous <Continuous>  isosorbide   dinitrate Tablet (ISORDIL) 10 milliGRAM(s) Oral three times a day  metoprolol tartrate 50 milliGRAM(s) Oral every 6 hours  ondansetron Injectable 4 milliGRAM(s) IV Push every 6 hours PRN  senna 2 Tablet(s) Oral at bedtime PRN      PAST MEDICAL & SURGICAL HISTORY:  Renal Calculus      Ureteral Calculus      Acute Renal Failure  9/2009      Wrist Fracture  CYNTHIA      Collar Bone Fracture      Rib Fractures      Kidney Stone removal  3/15/2011      C Section      Percutaneous Stone Extraction  Right      S/P Left Percutaneuos Stone extraction  01/26/2010, 04/20/2010          Vitals:  T(F): 97.4 (04-17), Max: 98.1 (04-16)  HR: 73 (04-17) (73 - 112)  BP: 108/77 (04-17) (95/61 - 108/77)  RR: 18 (04-17)  SpO2: 95% (04-17)  I&O's Summary    16 Apr 2024 07:01  -  17 Apr 2024 07:00  --------------------------------------------------------  IN: 784 mL / OUT: 1550 mL / NET: -766 mL        Physical Exam:  Appearance: No acute distress; well appearing  Eyes: PERRL, EOMI, pink conjunctiva  HENT: Normal oral mucosa  Cardiovascular: RRR, S1, S2, no murmurs, rubs, or gallops JVP up to mid neck, 2-3+ pitting b/l LE edema  Respiratory: Clear to auscultation bilaterally  Gastrointestinal: soft, non-tender, non-distended with normal bowel sounds  Musculoskeletal: No clubbing; no joint deformity   Neurologic: Non-focal  Lymphatic: No lymphadenopathy  Psychiatry: AAOx3, mood & affect appropriate  Skin: No rashes, ecchymoses, or cyanosis                          11.2   8.12  )-----------( 163      ( 16 Apr 2024 06:53 )             37.6     04-16    140  |  93<L>  |  75<H>  ----------------------------<  115<H>  4.0   |  30  |  5.07<H>    Ca    8.1<L>      16 Apr 2024 06:54  Phos  5.2     04-16  Mg     2.0     04-16      PTT - ( 16 Apr 2024 06:54 )  PTT:73.5 sec                 Patient seen and examined at bedside.    Overnight Events: No acute events overnight. Denies chest pain or SOB    Telemetry: -130s    REVIEW OF SYSTEMS:  Constitutional:     [ x] negative [ ] fevers [ ] chills [ ] weight loss [ ] weight gain  HEENT:                  [ x] negative [ ] dry eyes [ ] eye irritation [ ] postnasal drip [ ] nasal congestion  CV:                         [x ] negative  [ ] chest pain [ ] orthopnea [ ] palpitations [ ] murmur  Resp:                     [ x] negative [ ] cough [ ] shortness of breath [ ] dyspnea [ ] wheezing [ ] sputum [ ]hemoptysis  GI:                          [ x] negative [ ] nausea [ ] vomiting [ ] diarrhea [ ] constipation [ ] abd pain [ ] dysphagia   :                        [ x] negative [ ] dysuria [ ] nocturia [ ] hematuria [ ] increased urinary frequency  Musculoskeletal: [ x] negative [ ] back pain [ ] myalgias [ ] arthralgias [ ] fracture  Skin:                       [ x] negative [ ] rash [ ] itch  Neurological:        [ x] negative [ ] headache [ ] dizziness [ ] syncope [ ] weakness [ ] numbness  Psychiatric:           [ x] negative [ ] anxiety [ ] depression  Endocrine:            [ x] negative [ ] diabetes [ ] thyroid problem  Heme/Lymph:      [ x] negative [ ] anemia [ ] bleeding problem  Allergic/Immune: [x ] negative [ ] itchy eyes [ ] nasal discharge [ ] hives [ ] angioedema      Current Meds:  acetaminophen     Tablet .. 650 milliGRAM(s) Oral every 6 hours PRN  buMETAnide Infusion 2 mG/Hr IV Continuous <Continuous>  heparin   Injectable 5500 Unit(s) IV Push every 6 hours PRN  heparin   Injectable 2500 Unit(s) IV Push every 6 hours PRN  heparin  Infusion.  Unit(s)/Hr IV Continuous <Continuous>  isosorbide   dinitrate Tablet (ISORDIL) 10 milliGRAM(s) Oral three times a day  metoprolol tartrate 50 milliGRAM(s) Oral every 6 hours  ondansetron Injectable 4 milliGRAM(s) IV Push every 6 hours PRN  senna 2 Tablet(s) Oral at bedtime PRN      PAST MEDICAL & SURGICAL HISTORY:  Renal Calculus  Acute Renal Failure 9/2009   Wrist Fracture CYNTHIA  Collar Bone Fracture  Rib Fractures  C Section      Vitals:  T(F): 97.4 (04-17), Max: 98.1 (04-16)  HR: 73 (04-17) (73 - 112)  BP: 108/77 (04-17) (95/61 - 108/77)  RR: 18 (04-17)  SpO2: 95% (04-17)    I&O's Summary    16 Apr 2024 07:01  -  17 Apr 2024 07:00  --------------------------------------------------------  IN: 784 mL / OUT: 1550 mL / NET: -766 mL        Physical Exam:  Appearance: No acute distress; well appearing  Eyes: PERRL, EOMI, pink conjunctiva  HENT: Normal oral mucosa  Cardiovascular: RRR, S1, S2, no murmurs, rubs, or gallops JVP up to mid neck, 2-3+ pitting b/l LE edema  Respiratory: Clear to auscultation bilaterally  Gastrointestinal: soft, non-tender, non-distended with normal bowel sounds  Musculoskeletal: No clubbing; no joint deformity   Neurologic: Non-focal  Lymphatic: No lymphadenopathy  Psychiatry: AAOx3, mood & affect appropriate  Skin: No rashes, ecchymoses, or cyanosis      LABS                      11.2   8.12  )-----------( 163      ( 16 Apr 2024 06:53 )             37.6     04-16  140  |  93<L>  |  75<H>  ----------------------------<  115<H>  4.0   |  30  |  5.07<H>    Ca    8.1<L>      16 Apr 2024 06:54  Phos  5.2     04-16  Mg     2.0     04-16    PTT - ( 16 Apr 2024 06:54 )  PTT:73.5 sec      TTE W or WO Ultrasound Enhancing Agent (04.11.24 @ 12:39)   CONCLUSIONS:   1. Left ventricular systolic function is moderately decreased with an ejection fraction of 37 % by Garcia's method of disks.   2. Analysis of left ventricular diastolic function and filling pressure is made challenging by the presence of severe mitral regurgitation.   3. Normal right ventricular cavity size, with normal wall thickness, and normal systolic function.   4. The left atrium is severely dilated.   5. The right atrium is severely dilated.   6. Severe mitral regurgitation.   7. Moderate tricuspid regurgitation.   8. Estimatedpulmonary artery systolic pressure is 41 mmHg.   9. No prior echocardiogram is available for comparison.

## 2024-04-17 NOTE — DIETITIAN INITIAL EVALUATION ADULT - OTHER INFO
-- s/p Metolazone, on Bumex and 1200ml fluid restriction   -- Pt with abnormal renal indices and hyperphosphatemia, nephrology following for NICO on CKD vs progressive CKD. Per chart, "Kidney sonogram showed evidence of medical renal disease, BL non-obstructing stones, and BL renal cysts." s/p NaHCO3, will continue to monitor labs.   -- GI: on Zofran and Senna

## 2024-04-17 NOTE — DIETITIAN INITIAL EVALUATION ADULT - REASON INDICATOR FOR ASSESSMENT
Pt seen for consult for nutrition assessment/education. Information obtained from pt, RN, electronic medical record. Chart reviewed, events noted.

## 2024-04-17 NOTE — DIETITIAN INITIAL EVALUATION ADULT - PHYSCIAL ASSESSMENT
Ht per pt 62"    Drug Dosing Weight (kg): 66.6 (2024 22:30)  Daily Weight in k.2 ( @ 08:02), 61.7 (04-15 @ 17:25), 62.7 ( @ 08:41)- both bed scale and standing wt     UBW: ~140-150lb, reports most likely gain some wt due to edema prior to admission   Wt history from previous RD notes: no history    Wt history per Hugh HIE: 68kg (17)      ** Weight fluctuations expected in setting of fluid shifts with edema and diuretics. RD will continue to monitor wt trends.     IBW: 110lb, 134% IBW

## 2024-04-17 NOTE — DIETITIAN INITIAL EVALUATION ADULT - PERTINENT MEDS FT
MEDICATIONS  (STANDING):  buMETAnide Infusion 2 mG/Hr (10 mL/Hr) IV Continuous <Continuous>  heparin  Infusion.  Unit(s)/Hr (12 mL/Hr) IV Continuous <Continuous>  isosorbide   dinitrate Tablet (ISORDIL) 10 milliGRAM(s) Oral three times a day  metoprolol tartrate 50 milliGRAM(s) Oral every 6 hours    MEDICATIONS  (PRN):  acetaminophen     Tablet .. 650 milliGRAM(s) Oral every 6 hours PRN Temp greater or equal to 38C (100.4F), Mild Pain (1 - 3)  heparin   Injectable 5500 Unit(s) IV Push every 6 hours PRN For aPTT less than 40  heparin   Injectable 2500 Unit(s) IV Push every 6 hours PRN For aPTT between 40 - 57  ondansetron Injectable 4 milliGRAM(s) IV Push every 6 hours PRN Nausea and/or Vomiting  senna 2 Tablet(s) Oral at bedtime PRN Constipation

## 2024-04-17 NOTE — DIETITIAN INITIAL EVALUATION ADULT - ORAL INTAKE PTA/DIET HISTORY
Pt was eating well with no changes in appetite. Pt was not following therapeutic diet; eats well-balanced meals. Confirms no known food allergies. Denies Hx of chewing or swallowing issues. Denies GI distress. Denies micronutrient or nutrient supplement use.  Pt was eating well with no changes in appetite. Pt was not following therapeutic diet at home. Confirms no known food allergies. Denies Hx of chewing or swallowing issues. Denies GI distress. Denies micronutrient or nutrient supplement use.

## 2024-04-17 NOTE — DIETITIAN INITIAL EVALUATION ADULT - ENERGY INTAKE
Adequate (%) - Pt reports good appetite/PO intake ~75% or more of foods provided since admission, was on regular diet from 4/8 to 4/15, changed to DASH 1200ml fluid restriction since 4/15. Pt is aware of menu ordering procedures in house w/ menu provided, encourage pt to order as needed to encourage PO intake while in house. Food preferences obtained, will honor as able.  Pt reports good appetite/PO intake ~75% or more of foods provided since admission, was on regular diet from 4/8 to 4/15, changed to DASH 1200ml fluid restriction since 4/15. Pt is aware of menu ordering procedures in house with updated menu provided, encourage pt to order foods as needed to ensure adequate PO intake while in house. Observed with snacks at bedside upon visit.

## 2024-04-17 NOTE — DIETITIAN INITIAL EVALUATION ADULT - CONTINUE CURRENT NUTRITION CARE PLAN
Continue DASH diet as tolerated, fluids per team. RD will continue to monitor renal indices and adjust diet as needed./yes

## 2024-04-17 NOTE — DIETITIAN INITIAL EVALUATION ADULT - NS FNS DIET ORDER
Diet, Regular:   DASH/TLC {Sodium & Cholesterol Restricted} (DASH)  1200mL Fluid Restriction ([id number])  Low Sodium (04-15-24 @ 13:24) [Active]

## 2024-04-17 NOTE — PROGRESS NOTE ADULT - PROBLEM SELECTOR PLAN 2
Pt. with metabolic acidosis in the setting of renal failure. SCO2 improved to 26 today.  Monitor pH, and SCO2.    If you have any questions, please feel free to contact me  Jamari Brunson  Nephrology Fellow  [phone] / [employer] Teams(Preferred)  (After 5pm or on weekends please page the on-call fellow).

## 2024-04-17 NOTE — PROGRESS NOTE ADULT - SUBJECTIVE AND OBJECTIVE BOX
Patient is a 73y old  Female who presents with a chief complaint of Chronic kidney disease     (17 Apr 2024 13:06)      INTERVAL HPI/OVERNIGHT EVENTS: seen and examined   T(C): 36.3 (04-17-24 @ 21:20), Max: 36.3 (04-17-24 @ 01:34)  HR: 88 (04-17-24 @ 23:15) (73 - 105)  BP: 96/60 (04-17-24 @ 23:15) (96/60 - 131/90)  RR: 18 (04-17-24 @ 21:20) (18 - 18)  SpO2: 96% (04-17-24 @ 21:20) (94% - 96%)  Wt(kg): --  I&O's Summary    16 Apr 2024 07:01  -  17 Apr 2024 07:00  --------------------------------------------------------  IN: 844 mL / OUT: 1550 mL / NET: -706 mL    17 Apr 2024 07:01  -  17 Apr 2024 23:37  --------------------------------------------------------  IN: 948 mL / OUT: 400 mL / NET: 548 mL        PAST MEDICAL & SURGICAL HISTORY:  Renal Calculus      Ureteral Calculus      Acute Renal Failure  9/2009      Wrist Fracture  CYNTHIA      Collar Bone Fracture      Rib Fractures      Kidney Stone removal  3/15/2011      C Section      Percutaneous Stone Extraction  Right      S/P Left Percutaneuos Stone extraction  01/26/2010, 04/20/2010          SOCIAL HISTORY  Alcohol:  Tobacco:  Illicit substance use:    FAMILY HISTORY:    REVIEW OF SYSTEMS:  CONSTITUTIONAL: No fever, weight loss, or fatigue  EYES: No eye pain, visual disturbances, or discharge  ENMT:  No difficulty hearing, tinnitus, vertigo; No sinus or throat pain  NECK: No pain or stiffness  RESPIRATORY: No cough, wheezing, chills or hemoptysis; No shortness of breath  CARDIOVASCULAR: No chest pain, palpitations, dizziness, or leg swelling  GASTROINTESTINAL: No abdominal or epigastric pain. No nausea, vomiting, or hematemesis; No diarrhea or constipation. No melena or hematochezia.  GENITOURINARY: No dysuria, frequency, hematuria, or incontinence  NEUROLOGICAL: No headaches, memory loss, loss of strength, numbness, or tremors  SKIN: No itching, burning, rashes, or lesions   LYMPH NODES: No enlarged glands  ENDOCRINE: No heat or cold intolerance; No hair loss  MUSCULOSKELETAL: No joint pain or swelling; No muscle, back, or extremity pain  PSYCHIATRIC: No depression, anxiety, mood swings, or difficulty sleeping  HEME/LYMPH: No easy bruising, or bleeding gums  ALLERY AND IMMUNOLOGIC: No hives or eczema    RADIOLOGY & ADDITIONAL TESTS:    Imaging Personally Reviewed:  [ ] YES  [ ] NO    Consultant(s) Notes Reviewed:  [ ] YES  [ ] NO    PHYSICAL EXAM:  GENERAL: NAD, well-groomed, well-developed  HEAD:  Atraumatic, Normocephalic  EYES: EOMI, PERRLA, conjunctiva and sclera clear  ENMT: No tonsillar erythema, exudates, or enlargement; Moist mucous membranes, Good dentition, No lesions  NECK: Supple, No JVD, Normal thyroid  NERVOUS SYSTEM:  Alert & Oriented X3, Good concentration; Motor Strength 5/5 B/L upper and lower extremities; DTRs 2+ intact and symmetric  CHEST/LUNG: Clear to percussion bilaterally; No rales, rhonchi, wheezing, or rubs  HEART: Regular rate and rhythm; No murmurs, rubs, or gallops  ABDOMEN: Soft, Nontender, Nondistended; Bowel sounds present  EXTREMITIES:  2+ Peripheral Pulses, No clubbing, cyanosis, or edema  LYMPH: No lymphadenopathy noted  SKIN: No rashes or lesions    LABS:                        11.3   7.61  )-----------( 155      ( 17 Apr 2024 07:19 )             36.6     04-17    137  |  89<L>  |  84<H>  ----------------------------<  193<H>  3.8   |  25  |  5.50<H>    Ca    8.4      17 Apr 2024 19:34  Phos  5.7     04-17  Mg     2.1     04-17      PTT - ( 17 Apr 2024 07:19 )  PTT:67.9 sec  Urinalysis Basic - ( 17 Apr 2024 19:34 )    Color: x / Appearance: x / SG: x / pH: x  Gluc: 193 mg/dL / Ketone: x  / Bili: x / Urobili: x   Blood: x / Protein: x / Nitrite: x   Leuk Esterase: x / RBC: x / WBC x   Sq Epi: x / Non Sq Epi: x / Bacteria: x      CAPILLARY BLOOD GLUCOSE            Urinalysis Basic - ( 17 Apr 2024 19:34 )    Color: x / Appearance: x / SG: x / pH: x  Gluc: 193 mg/dL / Ketone: x  / Bili: x / Urobili: x   Blood: x / Protein: x / Nitrite: x   Leuk Esterase: x / RBC: x / WBC x   Sq Epi: x / Non Sq Epi: x / Bacteria: x        MEDICATIONS  (STANDING):  buMETAnide Infusion 2 mG/Hr (10 mL/Hr) IV Continuous <Continuous>  heparin  Infusion.  Unit(s)/Hr (12 mL/Hr) IV Continuous <Continuous>  isosorbide   dinitrate Tablet (ISORDIL) 10 milliGRAM(s) Oral three times a day  metoprolol tartrate 50 milliGRAM(s) Oral every 6 hours    MEDICATIONS  (PRN):  acetaminophen     Tablet .. 650 milliGRAM(s) Oral every 6 hours PRN Temp greater or equal to 38C (100.4F), Mild Pain (1 - 3)  heparin   Injectable 2500 Unit(s) IV Push every 6 hours PRN For aPTT between 40 - 57  heparin   Injectable 5500 Unit(s) IV Push every 6 hours PRN For aPTT less than 40  ondansetron Injectable 4 milliGRAM(s) IV Push every 6 hours PRN Nausea and/or Vomiting  senna 2 Tablet(s) Oral at bedtime PRN Constipation      Care Discussed with Consultants/Other Providers [ ] YES  [ ] NO

## 2024-04-17 NOTE — PROGRESS NOTE ADULT - ASSESSMENT
73F PMHx nephrolithiasis, HTN admitted with 2 weeks of bl LE swelling and newly reduced renal function with multiple nonobstructive renal stones on CT. EP consulted for new-onset AFib w/ RVR noted on admission EKG. Patient reports asymptomatic, denies dizziness/lightheadedness, CP, SOB, palpitations. Denies any cardiac history. Received metoprolol 5mg IV x1 for AFib at rates 130s with improvement to 110s.     CHF :   ac on ch systolic heart failure   on bumex GTT   cardio following     Afib with rvr   EP eval appreciated   Heparin drip   c/w lopressor , dig loading today   cardio following     NICO on CKD stage 4  Metabolic Acidosis   Renal consulted   off sod bicarb     B/L nonobstructive kidney stones :  -stable     dispo: c/w heparin GTT and bumex GTT

## 2024-04-17 NOTE — DIETITIAN INITIAL EVALUATION ADULT - EDUCATION DIETARY MODIFICATIONS
Discussed DASH diet education. Reviewed foods high in Na and cholesterol to avoid. Reviewed ways to decrease Na in your diet, discussed meal and snack options, tips for eating out. Pt declines nutrition education material at present. Pt made aware RD remains available upon request and will follow-up per protocol./teach back/(1) partially meets; needs review/practice/verbalization

## 2024-04-17 NOTE — PROGRESS NOTE ADULT - ASSESSMENT
73F PMHx nephrolithiasis s/p multiple procedures and HTN.  Admitted with new onset bl LE swelling and renal dysfunction.   Cardiology consulted for asymptomatic new-onset AFib w/ RVR and ADHF.       #ADHF:  - remains volume overloaded  - c/w Bumex 2mg/hr for diuresis, would defer to nephrology for additional recs for volume management; s/p PO metolazone 2 mg 4/16 to augment diuresis  - BB as below  - c/w hydralazine 25 mg TID and isosorbide dinitrate 10mg TID  - other GDMT limited by current renal function  - proceed w/ischemic workup once net even and HR controlled  - monitor tele  - replete K>4 Mg>2  - strict I/O's, daily weights    #AF:  - rates remain elevated, likely worsened by volume overload  - metoprolol to 50 q6h HR <110  - add digoxin PO 0.125 mg qd  - c/w hep gtt for AC  - would not give amiodarone given unknown duration of atrial fibrillation     #MR:  - repeat TTE once euvolemic to reassess       Segundo Ann M.D.  Cardiology fellow        73F PMHx nephrolithiasis s/p multiple procedures and HTN.  Admitted with new onset bl LE swelling and renal dysfunction.   Cardiology consulted for asymptomatic new-onset AFib w/ RVR and ADHF.       #ADHF:  - remains volume overloaded  - c/w Bumex 2mg/hr for diuresis, would defer to nephrology for additional recs for volume management; s/p PO metolazone 2 mg 4/16 to augment diuresis  - BB as below  - c/w isosorbide dinitrate 10mg TID, would add hydralazine 25 mg TID as tolerated  - other GDMT limited by current renal function  - proceed w/ischemic workup once net even and HR controlled  - monitor tele  - replete K>4 Mg>2  - strict I/O's, daily standing weights    #AF:  - rates remain elevated, likely worsened by volume overload  - metoprolol to 50 q6h HR <110  - s/p digoxin PO 0.125 mg, would administer additional loading dose of  mcg x1  - c/w hep gtt for AC  - would not give amiodarone given unknown duration of atrial fibrillation     #MR:  - repeat TTE once euvolemic to reassess       Segundo Ann M.D.  Cardiology fellow        73F PMHx nephrolithiasis s/p multiple procedures and HTN.  Admitted with new onset bl LE swelling and renal dysfunction.   Cardiology consulted for asymptomatic new-onset AFib w/ RVR and ADHF.       #ADHF:  - remains volume overloaded  - c/w Bumex 2mg/hr for diuresis, would defer to nephrology for additional recs for volume management; s/p PO metolazone 2 mg 4/16 to augment diuresis  - BB as below  - c/w isosorbide dinitrate 10mg TID; would add hydralazine 25 mg TID as tolerated for afterload reduction in setting of severe MR and LV dysfunction.  Other GDMT is limited by current renal function  - eventual ischemic workup once net even and HR controlled  - monitor tele  - replete K>4 Mg>2  - strict I/O's, daily standing weights    #AF:  - rates remain elevated, likely worsened by volume overload  - metoprolol to 50 q6h HR <110  - s/p digoxin PO 0.125 mg, would administer additional loading dose of  mcg x1  - c/w hep gtt for AC  - would not give amiodarone given unknown duration of atrial fibrillation     #MR:  - repeat TTE once euvolemic to reassess       Segundo Ann M.D.  Cardiology fellow      Plan discussed with cardiology fellow.  Patient seen and examined.  Hx., exam and labs as above.  I agree with the assessment and recommendations, which I have reviewed and edited where appropriate.  Zack Moore M.D.  Cardiology Attending, Consult Service    For Cardiology consults and questions, all Cardiology service information can be found 24/7 on [email or web address] - use password: [email or web address] to log in.     73F PMHx nephrolithiasis s/p multiple procedures and HTN.  Admitted with new onset bl LE swelling and renal dysfunction.   Cardiology consulted for asymptomatic new-onset AFib w/ RVR and ADHF.       #ADHF:  - remains volume overloaded  - c/w Bumex 2mg/hr for diuresis, would defer to nephrology for additional recs for volume management; s/p PO metolazone 2 mg 4/16 to augment diuresis  - BB as below  - c/w isosorbide dinitrate 10mg TID; would add hydralazine 25 mg TID as tolerated for afterload reduction in setting of severe MR and LV dysfunction.  Other GDMT is limited by current renal function  - eventual ischemic workup once net even and HR controlled  - monitor tele  - replete K>4 Mg>2  - strict I/O's, daily standing weights    #AF:  - rates remain elevated, likely worsened by volume overload  - metoprolol to 50 q6h HR <110  - s/p digoxin PO 0.125 mg, would administer additional loading dose of  mcg x1.  Do not check digoxin levels while giving loading doses.  - c/w hep gtt for AC  - would not give amiodarone given unknown duration of atrial fibrillation     #MR:  - repeat TTE once euvolemic to reassess       Segundo Ann M.D.  Cardiology fellow      Plan discussed with cardiology fellow.  Patient seen and examined.  Hx., exam and labs as above.  I agree with the assessment and recommendations, which I have reviewed and edited where appropriate.  Zack Moore M.D.  Cardiology Attending, Consult Service    For Cardiology consults and questions, all Cardiology service information can be found 24/7 on [email or web address] - use password: [email or web address] to log in.

## 2024-04-17 NOTE — PROGRESS NOTE ADULT - SUBJECTIVE AND OBJECTIVE BOX
[facility] [facility] Division of Kidney Diseases & Hypertension  FOLLOW UP NOTE  [phone]--------------------------------------------------------------------------------    Chief Complaint: NICO on CKD vs progressive CKD    24 hour events/subjective: Pt. was seen and evaluated at bedside this morning. Pt. reports feeling well, offers no complaints. Endorses good uop. Denies any headaches, fevers/chills, chest pain, and SOB.      PAST HISTORY  --------------------------------------------------------------------------------  No significant changes to PMH, PSH, FHx, SHx, unless otherwise noted    ALLERGIES & MEDICATIONS  --------------------------------------------------------------------------------  Allergies    No Known Allergies    Intolerances      Standing Inpatient Medications  buMETAnide Infusion 2 mG/Hr IV Continuous <Continuous>  digoxin     Tablet 125 MICROGram(s) Oral once  heparin  Infusion.  Unit(s)/Hr IV Continuous <Continuous>  isosorbide   dinitrate Tablet (ISORDIL) 10 milliGRAM(s) Oral three times a day  metoprolol tartrate 50 milliGRAM(s) Oral every 6 hours    PRN Inpatient Medications  acetaminophen     Tablet .. 650 milliGRAM(s) Oral every 6 hours PRN  heparin   Injectable 5500 Unit(s) IV Push every 6 hours PRN  heparin   Injectable 2500 Unit(s) IV Push every 6 hours PRN  ondansetron Injectable 4 milliGRAM(s) IV Push every 6 hours PRN  senna 2 Tablet(s) Oral at bedtime PRN      REVIEW OF SYSTEMS  --------------------------------------------------------------------------------  See HPI    VITALS/PHYSICAL EXAM  --------------------------------------------------------------------------------  T(C): 36.3 (04-17-24 @ 05:55), Max: 36.7 (04-16-24 @ 17:07)  HR: 73 (04-17-24 @ 05:55) (73 - 112)  BP: 108/77 (04-17-24 @ 05:55) (95/61 - 108/77)  RR: 18 (04-17-24 @ 05:55) (18 - 18)  SpO2: 95% (04-17-24 @ 05:55) (95% - 97%)  Wt(kg): --    04-16-24 @ 07:01  -  04-17-24 @ 07:00  --------------------------------------------------------  IN: 844 mL / OUT: 1550 mL / NET: -706 mL    Physical Exam:  Gen: NAD  HEENT: MMM  Pulm: CTA B/L  CV: S1S2  Abd: Soft, +BS   Ext: +BL LE edema (improving slowly)  Neuro: Awake and alert  Skin: Warm and dry  Vascular access: Peripheral IV line    LABS/STUDIES  --------------------------------------------------------------------------------              11.3   7.61  >-----------<  155      [04-17-24 @ 07:19]              36.6     139  |  91  |  82  ----------------------------<  117      [04-17-24 @ 07:20]  3.9   |  26  |  5.35        Ca     8.7     [04-17-24 @ 07:20]      Mg     2.1     [04-17-24 @ 07:20]      Phos  5.7     [04-17-24 @ 07:20]    PTT: 67.9       [04-17-24 @ 07:19]    Creatinine Trend:  SCr 5.35 [04-17 @ 07:20]  SCr 5.07 [04-16 @ 06:54]  SCr 4.95 [04-15 @ 21:46]  SCr 4.82 [04-15 @ 07:26]  SCr 4.88 [04-14 @ 07:20]

## 2024-04-17 NOTE — DIETITIAN INITIAL EVALUATION ADULT - ADD RECOMMEND
-- Monitor PO intake, GI tolerance, skin integrity, labs, weight, and bowel movement regularity.   -- Will continue to honor food and beverage preferences within diet restriction of patient in an effort to maximize level of nutrient intake.  -- Assist with meals PRN and encourage PO intake.

## 2024-04-17 NOTE — CHART NOTE - NSCHARTNOTEFT_GEN_A_CORE
HPI:  74yo F with PMHx of nephrolithiasis and HTN presents with complaint of BL LE swelling for past few weeks. Pt reports has been getting progressively worse and associated with some BLE soreness. Takes amlodipine for HTN. Otherwise in normal state of health. Denies fever/chills, nausea/vomiting, abd pain, flank pain, dysuria, hematuria, decreased UOP or other acute complaints.    Digoxin 125mcg x 1 dose ordered this am as per Cardiology fellow Segundo Ann.   Pt endorse to day ACP.     Emelin Reyes [place], NP  Medicine Department   Spectralink [id number]

## 2024-04-17 NOTE — PROGRESS NOTE ADULT - PROBLEM SELECTOR PLAN 1
Pt. with renal failure in the setting of fluid overload. On review of [place], SCr noted to be persistently elevated from [id number]. SCr was elevated at 1.79 on 5/4/2011. SCr initially during this admission was elevated at 4.73 (4/8), and increased to 5.35 today. No interim labs available for review but pt. likely has underlying CKD. Documented urine output is ~1.1L over the past 24 hours. UA showed proteinuria and evidence of infection (although 15 epithelial cells). UPCR is elevated at 1.1. Kidney sonogram showed evidence of medical renal disease, BL non-obstructing stones, and BL renal cysts.   Pt. currently on IV Bumex infusion. Consider additional PO Metolazone today. Serologic workup thus far: HBsAg, HCV Ab, and HIV are non-reactive. Kappa/Lambda free light chain ratio is mildly elevated at 1.85 (acceptable range for degree of CKD). Weak IgG kappa band noted on SIFE. Monitor labs and urine output. Avoid nephrotoxins. Dose medications as per eGFR.

## 2024-04-18 LAB
ADD ON TEST-SPECIMEN IN LAB: SIGNIFICANT CHANGE UP
ANION GAP SERPL CALC-SCNC: 20 MMOL/L — HIGH (ref 5–17)
ANION GAP SERPL CALC-SCNC: 23 MMOL/L — HIGH (ref 5–17)
APPEARANCE UR: CLEAR — SIGNIFICANT CHANGE UP
APTT BLD: 69.9 SEC — HIGH (ref 24.5–35.6)
BACTERIA # UR AUTO: NEGATIVE /HPF — SIGNIFICANT CHANGE UP
BILIRUB UR-MCNC: NEGATIVE — SIGNIFICANT CHANGE UP
BUN SERPL-MCNC: 82 MG/DL — HIGH (ref 7–23)
BUN SERPL-MCNC: 87 MG/DL — HIGH (ref 7–23)
CALCIUM SERPL-MCNC: 8.6 MG/DL — SIGNIFICANT CHANGE UP (ref 8.4–10.5)
CALCIUM SERPL-MCNC: 8.7 MG/DL — SIGNIFICANT CHANGE UP (ref 8.4–10.5)
CAST: 0 /LPF — SIGNIFICANT CHANGE UP (ref 0–4)
CHLORIDE SERPL-SCNC: 88 MMOL/L — LOW (ref 96–108)
CHLORIDE SERPL-SCNC: 91 MMOL/L — LOW (ref 96–108)
CHLORIDE UR-SCNC: 85 MMOL/L — SIGNIFICANT CHANGE UP
CO2 SERPL-SCNC: 22 MMOL/L — SIGNIFICANT CHANGE UP (ref 22–31)
CO2 SERPL-SCNC: 31 MMOL/L — SIGNIFICANT CHANGE UP (ref 22–31)
COLOR SPEC: YELLOW — SIGNIFICANT CHANGE UP
CREAT SERPL-MCNC: 5.24 MG/DL — HIGH (ref 0.5–1.3)
CREAT SERPL-MCNC: 5.56 MG/DL — HIGH (ref 0.5–1.3)
DIFF PNL FLD: ABNORMAL
EGFR: 8 ML/MIN/1.73M2 — LOW
EGFR: 8 ML/MIN/1.73M2 — LOW
GLUCOSE SERPL-MCNC: 189 MG/DL — HIGH (ref 70–99)
GLUCOSE SERPL-MCNC: 87 MG/DL — SIGNIFICANT CHANGE UP (ref 70–99)
GLUCOSE UR QL: NEGATIVE MG/DL — SIGNIFICANT CHANGE UP
HCT VFR BLD CALC: 45.1 % — HIGH (ref 34.5–45)
HGB BLD-MCNC: 13.4 G/DL — SIGNIFICANT CHANGE UP (ref 11.5–15.5)
KETONES UR-MCNC: NEGATIVE MG/DL — SIGNIFICANT CHANGE UP
LEUKOCYTE ESTERASE UR-ACNC: ABNORMAL
MCHC RBC-ENTMCNC: 29.7 GM/DL — LOW (ref 32–36)
MCHC RBC-ENTMCNC: 30.5 PG — SIGNIFICANT CHANGE UP (ref 27–34)
MCV RBC AUTO: 102.5 FL — HIGH (ref 80–100)
NITRITE UR-MCNC: NEGATIVE — SIGNIFICANT CHANGE UP
NRBC # BLD: 0 /100 WBCS — SIGNIFICANT CHANGE UP (ref 0–0)
NT-PROBNP SERPL-SCNC: HIGH PG/ML (ref 0–300)
PH UR: 7.5 — SIGNIFICANT CHANGE UP (ref 5–8)
PLATELET # BLD AUTO: 142 K/UL — LOW (ref 150–400)
POTASSIUM SERPL-MCNC: 3.8 MMOL/L — SIGNIFICANT CHANGE UP (ref 3.5–5.3)
POTASSIUM SERPL-MCNC: 4.9 MMOL/L — SIGNIFICANT CHANGE UP (ref 3.5–5.3)
POTASSIUM SERPL-SCNC: 3.8 MMOL/L — SIGNIFICANT CHANGE UP (ref 3.5–5.3)
POTASSIUM SERPL-SCNC: 4.9 MMOL/L — SIGNIFICANT CHANGE UP (ref 3.5–5.3)
POTASSIUM UR-SCNC: 14 MMOL/L — SIGNIFICANT CHANGE UP
PROT UR-MCNC: 30 MG/DL
RBC # BLD: 4.4 M/UL — SIGNIFICANT CHANGE UP (ref 3.8–5.2)
RBC # FLD: 13.2 % — SIGNIFICANT CHANGE UP (ref 10.3–14.5)
RBC CASTS # UR COMP ASSIST: 0 /HPF — SIGNIFICANT CHANGE UP (ref 0–4)
REVIEW: SIGNIFICANT CHANGE UP
SODIUM SERPL-SCNC: 136 MMOL/L — SIGNIFICANT CHANGE UP (ref 135–145)
SODIUM SERPL-SCNC: 139 MMOL/L — SIGNIFICANT CHANGE UP (ref 135–145)
SODIUM UR-SCNC: 115 MMOL/L — SIGNIFICANT CHANGE UP
SP GR SPEC: 1.01 — SIGNIFICANT CHANGE UP (ref 1–1.03)
SQUAMOUS # UR AUTO: 3 /HPF — SIGNIFICANT CHANGE UP (ref 0–5)
UROBILINOGEN FLD QL: 0.2 MG/DL — SIGNIFICANT CHANGE UP (ref 0.2–1)
WBC # BLD: 8.08 K/UL — SIGNIFICANT CHANGE UP (ref 3.8–10.5)
WBC # FLD AUTO: 8.08 K/UL — SIGNIFICANT CHANGE UP (ref 3.8–10.5)
WBC UR QL: 3 /HPF — SIGNIFICANT CHANGE UP (ref 0–5)

## 2024-04-18 PROCEDURE — 99233 SBSQ HOSP IP/OBS HIGH 50: CPT | Mod: GC

## 2024-04-18 PROCEDURE — 99232 SBSQ HOSP IP/OBS MODERATE 35: CPT | Mod: GC

## 2024-04-18 RX ORDER — DIGOXIN 250 MCG
250 TABLET ORAL ONCE
Refills: 0 | Status: COMPLETED | OUTPATIENT
Start: 2024-04-18 | End: 2024-04-19

## 2024-04-18 RX ORDER — HYDRALAZINE HCL 50 MG
25 TABLET ORAL EVERY 8 HOURS
Refills: 0 | Status: DISCONTINUED | OUTPATIENT
Start: 2024-04-18 | End: 2024-04-21

## 2024-04-18 RX ADMIN — Medication 50 MILLIGRAM(S): at 04:34

## 2024-04-18 RX ADMIN — HEPARIN SODIUM 900 UNIT(S)/HR: 5000 INJECTION INTRAVENOUS; SUBCUTANEOUS at 19:50

## 2024-04-18 RX ADMIN — HEPARIN SODIUM 900 UNIT(S)/HR: 5000 INJECTION INTRAVENOUS; SUBCUTANEOUS at 08:51

## 2024-04-18 RX ADMIN — Medication 650 MILLIGRAM(S): at 16:41

## 2024-04-18 RX ADMIN — BUMETANIDE 10 MG/HR: 0.25 INJECTION INTRAMUSCULAR; INTRAVENOUS at 06:25

## 2024-04-18 RX ADMIN — BUMETANIDE 10 MG/HR: 0.25 INJECTION INTRAMUSCULAR; INTRAVENOUS at 18:58

## 2024-04-18 RX ADMIN — Medication 25 MILLIGRAM(S): at 15:16

## 2024-04-18 RX ADMIN — Medication 50 MILLIGRAM(S): at 22:35

## 2024-04-18 RX ADMIN — ISOSORBIDE DINITRATE 10 MILLIGRAM(S): 5 TABLET ORAL at 05:26

## 2024-04-18 RX ADMIN — Medication 50 MILLIGRAM(S): at 11:26

## 2024-04-18 RX ADMIN — ISOSORBIDE DINITRATE 10 MILLIGRAM(S): 5 TABLET ORAL at 22:35

## 2024-04-18 RX ADMIN — HEPARIN SODIUM 900 UNIT(S)/HR: 5000 INJECTION INTRAVENOUS; SUBCUTANEOUS at 05:26

## 2024-04-18 RX ADMIN — Medication 25 MILLIGRAM(S): at 22:36

## 2024-04-18 RX ADMIN — ISOSORBIDE DINITRATE 10 MILLIGRAM(S): 5 TABLET ORAL at 13:28

## 2024-04-18 RX ADMIN — HEPARIN SODIUM 900 UNIT(S)/HR: 5000 INJECTION INTRAVENOUS; SUBCUTANEOUS at 07:27

## 2024-04-18 RX ADMIN — Medication 50 MILLIGRAM(S): at 17:41

## 2024-04-18 RX ADMIN — Medication 650 MILLIGRAM(S): at 17:41

## 2024-04-18 RX ADMIN — BUMETANIDE 10 MG/HR: 0.25 INJECTION INTRAMUSCULAR; INTRAVENOUS at 08:22

## 2024-04-18 NOTE — PROVIDER CONTACT NOTE (MEDICATION) - ACTION/TREATMENT ORDERED:
NP aware. Per NP recheck BP and BP for next 3hrs, then resume q4h vitals. NP aware. Per NP recheck BP and HR for next 3hrs, then resume q4h vitals.

## 2024-04-18 NOTE — PROGRESS NOTE ADULT - PROBLEM SELECTOR PLAN 1
Pt. with renal failure in the setting of fluid overload. On review of [place], SCr noted to be persistently elevated from 0340-0269. SCr was elevated at 1.79 on 5/4/2011. SCr initially during this admission was elevated at 4.73 (4/8), and increased to 5.24 today. No interim labs available for review but pt. likely has underlying CKD. Documented urine output is ~1.35L over the past 24 hours. UA showed proteinuria and evidence of infection (although 15 epithelial cells). UPCR is elevated at 1.1. Kidney sonogram showed evidence of medical renal disease, BL non-obstructing stones, and BL renal cysts.   Pt. currently on IV Bumex infusion. Consider additional PO Metolazone today. Serologic workup thus far: HBsAg, HCV Ab, and HIV are non-reactive. Kappa/Lambda free light chain ratio is mildly elevated at 1.85 (acceptable range for degree of CKD). Weak IgG kappa band noted on SIFE. Monitor labs and urine output. Avoid nephrotoxins. Dose medications as per eGFR.

## 2024-04-18 NOTE — PROGRESS NOTE ADULT - SUBJECTIVE AND OBJECTIVE BOX
*Incomplete Note*    Patient seen and examined at bedside.    Overnight Events: No acute events overnight. Denies chest pain or SOB    Telemetry:    REVIEW OF SYSTEMS:  Constitutional:     [ x] negative [ ] fevers [ ] chills [ ] weight loss [ ] weight gain  HEENT:                  [ x] negative [ ] dry eyes [ ] eye irritation [ ] postnasal drip [ ] nasal congestion  CV:                         [x ] negative  [ ] chest pain [ ] orthopnea [ ] palpitations [ ] murmur  Resp:                     [ x] negative [ ] cough [ ] shortness of breath [ ] dyspnea [ ] wheezing [ ] sputum [ ]hemoptysis  GI:                          [ x] negative [ ] nausea [ ] vomiting [ ] diarrhea [ ] constipation [ ] abd pain [ ] dysphagia   :                        [ x] negative [ ] dysuria [ ] nocturia [ ] hematuria [ ] increased urinary frequency  Musculoskeletal: [ x] negative [ ] back pain [ ] myalgias [ ] arthralgias [ ] fracture  Skin:                       [ x] negative [ ] rash [ ] itch  Neurological:        [ x] negative [ ] headache [ ] dizziness [ ] syncope [ ] weakness [ ] numbness  Psychiatric:           [ x] negative [ ] anxiety [ ] depression  Endocrine:            [ x] negative [ ] diabetes [ ] thyroid problem  Heme/Lymph:      [ x] negative [ ] anemia [ ] bleeding problem  Allergic/Immune: [x ] negative [ ] itchy eyes [ ] nasal discharge [ ] hives [ ] angioedema    [x ] All other systems negative  [ ] Unable to assess ROS due to    Current Meds:  acetaminophen     Tablet .. 650 milliGRAM(s) Oral every 6 hours PRN  buMETAnide Infusion 2 mG/Hr IV Continuous <Continuous>  heparin   Injectable 2500 Unit(s) IV Push every 6 hours PRN  heparin   Injectable 5500 Unit(s) IV Push every 6 hours PRN  heparin  Infusion.  Unit(s)/Hr IV Continuous <Continuous>  isosorbide   dinitrate Tablet (ISORDIL) 10 milliGRAM(s) Oral three times a day  metoprolol tartrate 50 milliGRAM(s) Oral every 6 hours  ondansetron Injectable 4 milliGRAM(s) IV Push every 6 hours PRN  senna 2 Tablet(s) Oral at bedtime PRN      PAST MEDICAL & SURGICAL HISTORY:  Renal Calculus      Ureteral Calculus      Acute Renal Failure  9/2009      Wrist Fracture  CYNTHIA      Collar Bone Fracture      Rib Fractures      Kidney Stone removal  3/15/2011      C Section      Percutaneous Stone Extraction  Right      S/P Left Percutaneuos Stone extraction  01/26/2010, 04/20/2010          Vitals:  T(F): 98.2 (04-18), Max: 98.2 (04-18)  HR: 89 (04-18) (70 - 105)  BP: 114/78 (04-18) (96/60 - 131/90)  RR: 18 (04-18)  SpO2: 95% (04-18)  I&O's Summary    17 Apr 2024 07:01  -  18 Apr 2024 07:00  --------------------------------------------------------  IN: 948 mL / OUT: 1350 mL / NET: -402 mL        Physical Exam:  Appearance: No acute distress; well appearing  Eyes: PERRL, EOMI, pink conjunctiva  HENT: Normal oral mucosa  Cardiovascular: RRR, S1, S2, no murmurs, rubs, or gallops; no edema; no JVD  Respiratory: Clear to auscultation bilaterally  Gastrointestinal: soft, non-tender, non-distended with normal bowel sounds  Musculoskeletal: No clubbing; no joint deformity   Neurologic: Non-focal  Lymphatic: No lymphadenopathy  Psychiatry: AAOx3, mood & affect appropriate  Skin: No rashes, ecchymoses, or cyanosis                          11.3   7.61  )-----------( 155      ( 17 Apr 2024 07:19 )             36.6     04-17    137  |  89<L>  |  84<H>  ----------------------------<  193<H>  3.8   |  25  |  5.50<H>    Ca    8.4      17 Apr 2024 19:34  Phos  5.7     04-17  Mg     2.1     04-17      PTT - ( 17 Apr 2024 07:19 )  PTT:67.9 sec                 Patient seen and examined at bedside.    Overnight Events: No acute events overnight. Denies chest pain or SOB    Telemetry: AF     REVIEW OF SYSTEMS:  Constitutional:     [ x] negative [ ] fevers [ ] chills [ ] weight loss [ ] weight gain  HEENT:                  [ x] negative [ ] dry eyes [ ] eye irritation [ ] postnasal drip [ ] nasal congestion  CV:                         [x ] negative  [ ] chest pain [ ] orthopnea [ ] palpitations [ ] murmur  Resp:                     [ x] negative [ ] cough [ ] shortness of breath [ ] dyspnea [ ] wheezing [ ] sputum [ ]hemoptysis  GI:                          [ x] negative [ ] nausea [ ] vomiting [ ] diarrhea [ ] constipation [ ] abd pain [ ] dysphagia   :                        [ x] negative [ ] dysuria [ ] nocturia [ ] hematuria [ ] increased urinary frequency  Musculoskeletal: [ x] negative [ ] back pain [ ] myalgias [ ] arthralgias [ ] fracture  Skin:                       [ x] negative [ ] rash [ ] itch  Neurological:        [ x] negative [ ] headache [ ] dizziness [ ] syncope [ ] weakness [ ] numbness  Psychiatric:           [ x] negative [ ] anxiety [ ] depression  Endocrine:            [ x] negative [ ] diabetes [ ] thyroid problem  Heme/Lymph:      [ x] negative [ ] anemia [ ] bleeding problem  Allergic/Immune: [x ] negative [ ] itchy eyes [ ] nasal discharge [ ] hives [ ] angioedema    [x ] All other systems negative  [ ] Unable to assess ROS due to    Current Meds:  acetaminophen     Tablet .. 650 milliGRAM(s) Oral every 6 hours PRN  buMETAnide Infusion 2 mG/Hr IV Continuous <Continuous>  heparin   Injectable 2500 Unit(s) IV Push every 6 hours PRN  heparin   Injectable 5500 Unit(s) IV Push every 6 hours PRN  heparin  Infusion.  Unit(s)/Hr IV Continuous <Continuous>  isosorbide   dinitrate Tablet (ISORDIL) 10 milliGRAM(s) Oral three times a day  metoprolol tartrate 50 milliGRAM(s) Oral every 6 hours  ondansetron Injectable 4 milliGRAM(s) IV Push every 6 hours PRN  senna 2 Tablet(s) Oral at bedtime PRN      PAST MEDICAL & SURGICAL HISTORY:  Renal Calculus      Ureteral Calculus      Acute Renal Failure  9/2009      Wrist Fracture  CYNTHIA      Collar Bone Fracture      Rib Fractures      Kidney Stone removal  3/15/2011      C Section      Percutaneous Stone Extraction  Right      S/P Left Percutaneuos Stone extraction  01/26/2010, 04/20/2010          Vitals:  T(F): 98.2 (04-18), Max: 98.2 (04-18)  HR: 89 (04-18) (70 - 105)  BP: 114/78 (04-18) (96/60 - 131/90)  RR: 18 (04-18)  SpO2: 95% (04-18)  I&O's Summary    17 Apr 2024 07:01  -  18 Apr 2024 07:00  --------------------------------------------------------  IN: 948 mL / OUT: 1350 mL / NET: -402 mL        Physical Exam:  Appearance: No acute distress; well appearing  Eyes: PERRL, EOMI, pink conjunctiva  HENT: Normal oral mucosa  Cardiovascular: RRR, S1, S2, no murmurs, rubs, or gallops; no edema; no JVD  Respiratory: Clear to auscultation bilaterally  Gastrointestinal: soft, non-tender, non-distended with normal bowel sounds  Musculoskeletal: No clubbing; no joint deformity   Neurologic: Non-focal  Lymphatic: No lymphadenopathy  Psychiatry: AAOx3, mood & affect appropriate  Skin: No rashes, ecchymoses, or cyanosis                          11.3   7.61  )-----------( 155      ( 17 Apr 2024 07:19 )             36.6     04-17    137  |  89<L>  |  84<H>  ----------------------------<  193<H>  3.8   |  25  |  5.50<H>    Ca    8.4      17 Apr 2024 19:34  Phos  5.7     04-17  Mg     2.1     04-17      PTT - ( 17 Apr 2024 07:19 )  PTT:67.9 sec                 Patient seen and examined at bedside.    Overnight Events: No acute events overnight. Denies chest pain or SOB    Telemetry: AF     REVIEW OF SYSTEMS:  Constitutional:     [ x] negative [ ] fevers [ ] chills [ ] weight loss [ ] weight gain  HEENT:                  [ x] negative [ ] dry eyes [ ] eye irritation [ ] postnasal drip [ ] nasal congestion  CV:                         [x ] negative  [ ] chest pain [ ] orthopnea [ ] palpitations [ ] murmur  Resp:                     [ x] negative [ ] cough [ ] shortness of breath [ ] dyspnea [ ] wheezing [ ] sputum [ ]hemoptysis  GI:                          [ x] negative [ ] nausea [ ] vomiting [ ] diarrhea [ ] constipation [ ] abd pain [ ] dysphagia   :                        [ x] negative [ ] dysuria [ ] nocturia [ ] hematuria [ ] increased urinary frequency  Musculoskeletal: [ x] negative [ ] back pain [ ] myalgias [ ] arthralgias [ ] fracture  Skin:                       [ x] negative [ ] rash [ ] itch  Neurological:        [ x] negative [ ] headache [ ] dizziness [ ] syncope [ ] weakness [ ] numbness  Psychiatric:           [ x] negative [ ] anxiety [ ] depression  Endocrine:            [ x] negative [ ] diabetes [ ] thyroid problem  Heme/Lymph:      [ x] negative [ ] anemia [ ] bleeding problem  Allergic/Immune: [x ] negative [ ] itchy eyes [ ] nasal discharge [ ] hives [ ] angioedema      Current Meds:  acetaminophen     Tablet .. 650 milliGRAM(s) Oral every 6 hours PRN  buMETAnide Infusion 2 mG/Hr IV Continuous <Continuous>  heparin   Injectable 2500 Unit(s) IV Push every 6 hours PRN  heparin   Injectable 5500 Unit(s) IV Push every 6 hours PRN  heparin  Infusion.  Unit(s)/Hr IV Continuous <Continuous>  isosorbide   dinitrate Tablet (ISORDIL) 10 milliGRAM(s) Oral three times a day  metoprolol tartrate 50 milliGRAM(s) Oral every 6 hours  ondansetron Injectable 4 milliGRAM(s) IV Push every 6 hours PRN  senna 2 Tablet(s) Oral at bedtime PRN      PAST MEDICAL & SURGICAL HISTORY:  Renal Calculus      Ureteral Calculus      Acute Renal Failure  9/2009      Wrist Fracture  CYNTHIA      Collar Bone Fracture      Rib Fractures      Kidney Stone removal  3/15/2011      C Section      Percutaneous Stone Extraction  Right      S/P Left Percutaneuos Stone extraction  01/26/2010, 04/20/2010          Vitals:  T(F): 98.2 (04-18), Max: 98.2 (04-18)  HR: 89 (04-18) (70 - 105)  BP: 114/78 (04-18) (96/60 - 131/90)  RR: 18 (04-18)  SpO2: 95% (04-18)    I&O's Summary  17 Apr 2024 07:01  -  18 Apr 2024 07:00  --------------------------------------------------------  IN: 948 mL / OUT: 1350 mL / NET: -402 mL        Physical Exam:  Appearance: No acute distress; well appearing  Eyes: PERRL, EOMI, pink conjunctiva  HENT: Normal oral mucosa  Cardiovascular: RRR, S1, S2, no murmurs, rubs, or gallops; no edema; no JVD  Respiratory: Clear to auscultation bilaterally  Gastrointestinal: soft, non-tender, non-distended with normal bowel sounds  Musculoskeletal: No clubbing; no joint deformity   Neurologic: Non-focal  Lymphatic: No lymphadenopathy  Psychiatry: AAOx3, mood & affect appropriate  Skin: No rashes, ecchymoses, or cyanosis      LABS:                     11.3   7.61  )-----------( 155      ( 17 Apr 2024 07:19 )             36.6     04-17  137  |  89<L>  |  84<H>  ----------------------------<  193<H>  3.8   |  25  |  5.50<H>    Ca    8.4      17 Apr 2024 19:34  Phos  5.7     04-17  Mg     2.1     04-17    PTT - ( 17 Apr 2024 07:19 )  PTT:67.9 sec

## 2024-04-18 NOTE — PROGRESS NOTE ADULT - PROBLEM SELECTOR PLAN 2
Pt. with metabolic acidosis in the setting of renal failure. SCO2 improved to 22 today.  Monitor pH, and SCO2.    If you have any questions, please feel free to contact me  Jamari Brunson  Nephrology Fellow  [phone] / [employer] Teams(Preferred)  (After 5pm or on weekends please page the on-call fellow).

## 2024-04-18 NOTE — PROVIDER CONTACT NOTE (MEDICATION) - BACKGROUND
Pt admitted on 4/8 with complaints of Bilateral Lower extremity edema. On admission pt Dx with new onset of A-fib.

## 2024-04-18 NOTE — PROGRESS NOTE ADULT - SUBJECTIVE AND OBJECTIVE BOX
[facility] Division of Kidney Diseases & Hypertension  FOLLOW UP NOTE  [phone]--------------------------------------------------------------------------------    Chief Complaint: NICO on CKD vs progressive CKD    24 hour events/subjective: Pt. was seen and evaluated at bedside this morning. Pt. reports feeling well, offers no complaints. Endorses good uop. Denies any headaches, fevers/chills, chest pain, and SOB.    PAST HISTORY  --------------------------------------------------------------------------------  No significant changes to PMH, PSH, FHx, SHx, unless otherwise noted    ALLERGIES & MEDICATIONS  --------------------------------------------------------------------------------  Allergies    No Known Allergies    Intolerances    Standing Inpatient Medications  buMETAnide Infusion 2 mG/Hr IV Continuous <Continuous>  heparin  Infusion.  Unit(s)/Hr IV Continuous <Continuous>  isosorbide   dinitrate Tablet (ISORDIL) 10 milliGRAM(s) Oral three times a day  metoprolol tartrate 50 milliGRAM(s) Oral every 6 hours    PRN Inpatient Medications  acetaminophen     Tablet .. 650 milliGRAM(s) Oral every 6 hours PRN  heparin   Injectable 5500 Unit(s) IV Push every 6 hours PRN  heparin   Injectable 2500 Unit(s) IV Push every 6 hours PRN  ondansetron Injectable 4 milliGRAM(s) IV Push every 6 hours PRN  senna 2 Tablet(s) Oral at bedtime PRN      REVIEW OF SYSTEMS  --------------------------------------------------------------------------------  See HPI    VITALS/PHYSICAL EXAM  --------------------------------------------------------------------------------  T(C): 36.8 (04-18-24 @ 05:20), Max: 36.8 (04-18-24 @ 05:20)  HR: 89 (04-18-24 @ 05:20) (70 - 105)  BP: 114/78 (04-18-24 @ 05:20) (96/60 - 131/90)  RR: 18 (04-18-24 @ 05:20) (18 - 18)  SpO2: 95% (04-18-24 @ 05:20) (94% - 97%)  Wt(kg): --    04-17-24 @ 07:01  -  04-18-24 @ 07:00  --------------------------------------------------------  IN: 1416 mL / OUT: 1350 mL / NET: 66 mL    04-18-24 @ 07:01  -  04-18-24 @ 09:39  --------------------------------------------------------  IN: 0 mL / OUT: 300 mL / NET: -300 mL    Physical Exam:  Gen: NAD  HEENT: MMM  Pulm: CTA B/L  CV: S1S2  Abd: Soft, +BS   Ext: +BL LE edema  Neuro: Awake and alert  Skin: Warm and dry  Vascular access: Peripheral IV line    LABS/STUDIES  --------------------------------------------------------------------------------              13.4   8.08  >-----------<  142      [04-18-24 @ 07:25]              45.1     136  |  91  |  82  ----------------------------<  87      [04-18-24 @ 07:25]  4.9   |  22  |  5.24        Ca     8.6     [04-18-24 @ 07:25]      Mg     2.1     [04-17-24 @ 07:20]      Phos  5.7     [04-17-24 @ 07:20]    PTT: 69.9       [04-18-24 @ 07:25]    Creatinine Trend:  SCr 5.24 [04-18 @ 07:25]  SCr 5.50 [04-17 @ 19:34]  SCr 5.35 [04-17 @ 07:20]  SCr 5.07 [04-16 @ 06:54]  SCr 4.95 [04-15 @ 21:46]

## 2024-04-18 NOTE — PROGRESS NOTE ADULT - ASSESSMENT
73F PMHx nephrolithiasis s/p multiple procedures and HTN.  Admitted with new onset bl LE swelling and renal dysfunction.   Cardiology consulted for asymptomatic new-onset AFib w/ RVR and ADHF.       #ADHF:  - remains volume overloaded  - c/w Bumex 2mg/hr for diuresis, would defer to nephrology for additional recs for volume management; s/p PO metolazone 2 mg 4/16 to augment diuresis  - BB as below  - c/w isosorbide dinitrate 10mg TID; would add hydralazine 25 mg TID as tolerated for afterload reduction in setting of severe MR and LV dysfunction.  Other GDMT is limited by current renal function  - eventual ischemic workup once net even and HR controlled  - monitor tele  - replete K>4 Mg>2  - strict I/O's, daily standing weights    #AF:  - rates remain elevated, likely worsened by volume overload  - metoprolol to 50 q6h HR <110  - s/p digoxin PO 0.125 mg,  mcg x1 4/17.  Do not check digoxin levels while giving loading doses.  - c/w hep gtt for AC  - would not give amiodarone given unknown duration of atrial fibrillation     #MR:  - repeat TTE once euvolemic to reassess       Segundo Ann M.D.  Cardiology fellow           73F PMHx nephrolithiasis s/p multiple procedures and HTN.  Admitted with new onset bl LE swelling and renal dysfunction.   Cardiology consulted for asymptomatic new-onset AFib w/ RVR and ADHF.       #ADHF:  - remains volume overloaded  - c/w Bumex 2mg/hr for diuresis, would defer to nephrology for additional recs for volume management; s/p PO metolazone 2 mg 4/16 to augment diuresis  - BB as below  - c/w isosorbide dinitrate 10mg TID; would add hydralazine 25 mg TID as tolerated for afterload reduction in setting of severe MR and LV dysfunction.  Other GDMT is limited by current renal function  - eventual ischemic workup once net even and HR controlled  - monitor tele  - replete K>4 Mg>2  - strict I/O's, daily standing weights    #AF:  - rates remain elevated, likely worsened by volume overload  - metoprolol to 50 q6h HR <110  - s/p digoxin PO 0.125 mg,  mcg x1 4/17.  would repeat 250 mg x1 IV digoxin today. Do not check digoxin levels while giving loading doses.  - c/w hep gtt for AC  - would not give amiodarone given unknown duration of atrial fibrillation     #MR:  - repeat TTE once euvolemic to reassess       Segundo Ann M.D.  Cardiology fellow           73F PMHx nephrolithiasis s/p multiple procedures and HTN.  Admitted with new onset bl LE swelling and renal dysfunction.   Cardiology consulted for asymptomatic new-onset AFib w/ RVR and ADHF.       #ADHF:  - remains volume overloaded  - c/w Bumex 2mg/hr for diuresis, would defer to nephrology for additional recs for volume management; s/p PO metolazone 2 mg 4/16 to augment diuresis  - BB as below  - c/w isosorbide dinitrate 10mg TID; would add hydralazine 25 mg TID as tolerated for afterload reduction in setting of severe MR and LV dysfunction.  Other GDMT is limited by current renal function  - eventual ischemic workup once net even and HR controlled  - monitor tele  - replete K>4 Mg>2  - strict I/O's, daily standing weights    #AF:  - rates remain elevated, likely worsened by volume overload  - metoprolol to 50 q6h HR <110  - s/p digoxin PO 0.125 mg,  mcg x1 4/17.  would repeat 250 mcg x1 IV digoxin today. Do not check digoxin levels while giving loading doses.  - c/w hep gtt for AC  - would not give amiodarone given unknown duration of atrial fibrillation     #MR:  - repeat TTE once euvolemic to reassess degree of MR       Discussed with RADHA Ann M.D.  Cardiology fellow     Plan discussed with cardiology fellow.  Patient seen and examined.  Hx., exam and labs as above.  I agree with the assessment and recommendations, which I have reviewed and edited where appropriate.  Zack Moore M.D.  Cardiology Attending, Consult Service    For Cardiology consults and questions, all Cardiology service information can be found 24/7 on [email or web address] - use password: [email or web address] to log in.

## 2024-04-18 NOTE — PROGRESS NOTE ADULT - SUBJECTIVE AND OBJECTIVE BOX
Patient is a 73y old  Female who presents with a chief complaint of Chronic kidney disease     (17 Apr 2024 13:06)      INTERVAL HPI/OVERNIGHT EVENTS: seen and examined       T(C): 36.2 (04-18-24 @ 17:27), Max: 36.7 (04-18-24 @ 10:37)  HR: 82 (04-18-24 @ 17:27) (82 - 91)  BP: 96/67 (04-18-24 @ 17:27) (96/67 - 101/78)  RR: 18 (04-18-24 @ 17:27) (18 - 18)  SpO2: 96% (04-18-24 @ 17:27) (95% - 96%)        MEDICATIONS  (STANDING):  buMETAnide Infusion 2 mG/Hr (10 mL/Hr) IV Continuous <Continuous>  digoxin  Injectable 250 MICROGram(s) IV Push once  heparin  Infusion.  Unit(s)/Hr (12 mL/Hr) IV Continuous <Continuous>  hydrALAZINE 25 milliGRAM(s) Oral every 8 hours  isosorbide   dinitrate Tablet (ISORDIL) 10 milliGRAM(s) Oral three times a day  metoprolol tartrate 50 milliGRAM(s) Oral every 6 hours    MEDICATIONS  (PRN):  acetaminophen     Tablet .. 650 milliGRAM(s) Oral every 6 hours PRN Temp greater or equal to 38C (100.4F), Mild Pain (1 - 3)  heparin   Injectable 5500 Unit(s) IV Push every 6 hours PRN For aPTT less than 40  heparin   Injectable 2500 Unit(s) IV Push every 6 hours PRN For aPTT between 40 - 57  ondansetron Injectable 4 milliGRAM(s) IV Push every 6 hours PRN Nausea and/or Vomiting  senna 2 Tablet(s) Oral at bedtime PRN Constipation              PAST MEDICAL & SURGICAL HISTORY:  Renal Calculus      Ureteral Calculus      Acute Renal Failure  9/2009      Wrist Fracture  CYNTHIA      Collar Bone Fracture      Rib Fractures      Kidney Stone removal  3/15/2011      C Section      Percutaneous Stone Extraction  Right      S/P Left Percutaneuos Stone extraction  01/26/2010, 04/20/2010          SOCIAL HISTORY  Alcohol:  Tobacco:  Illicit substance use:    FAMILY HISTORY:    REVIEW OF SYSTEMS:  CONSTITUTIONAL: No fever, weight loss, or fatigue  EYES: No eye pain, visual disturbances, or discharge  ENMT:  No difficulty hearing, tinnitus, vertigo; No sinus or throat pain  NECK: No pain or stiffness  RESPIRATORY: No cough, wheezing, chills or hemoptysis; No shortness of breath  CARDIOVASCULAR: No chest pain, palpitations, dizziness, or leg swelling  GASTROINTESTINAL: No abdominal or epigastric pain. No nausea, vomiting, or hematemesis; No diarrhea or constipation. No melena or hematochezia.  GENITOURINARY: No dysuria, frequency, hematuria, or incontinence  NEUROLOGICAL: No headaches, memory loss, loss of strength, numbness, or tremors  SKIN: No itching, burning, rashes, or lesions   LYMPH NODES: No enlarged glands  ENDOCRINE: No heat or cold intolerance; No hair loss  MUSCULOSKELETAL: No joint pain or swelling; No muscle, back, or extremity pain  PSYCHIATRIC: No depression, anxiety, mood swings, or difficulty sleeping  HEME/LYMPH: No easy bruising, or bleeding gums  ALLERY AND IMMUNOLOGIC: No hives or eczema    RADIOLOGY & ADDITIONAL TESTS:    Imaging Personally Reviewed:  [ ] YES  [ ] NO    Consultant(s) Notes Reviewed:  [ ] YES  [ ] NO    PHYSICAL EXAM:  GENERAL: NAD, well-groomed, well-developed  HEAD:  Atraumatic, Normocephalic  EYES: EOMI, PERRLA, conjunctiva and sclera clear  ENMT: No tonsillar erythema, exudates, or enlargement; Moist mucous membranes, Good dentition, No lesions  NECK: Supple, No JVD, Normal thyroid  NERVOUS SYSTEM:  Alert & Oriented X3, Good concentration; Motor Strength 5/5 B/L upper and lower extremities; DTRs 2+ intact and symmetric  CHEST/LUNG: Clear to percussion bilaterally; No rales, rhonchi, wheezing, or rubs  HEART: Regular rate and rhythm; No murmurs, rubs, or gallops  ABDOMEN: Soft, Nontender, Nondistended; Bowel sounds present  EXTREMITIES:  2+ Peripheral Pulses, No clubbing, cyanosis, or edema  SKIN: No rashes or lesions                          13.4   8.08  )-----------( 142      ( 18 Apr 2024 07:25 )             45.1             PTT - ( 18 Apr 2024 07:25 )  PTT:69.9 sec  136|91|82<87  4.9|22|5.24  8.6,--,--  04-18 @ 07:25  137|89|84<193  3.8|25|5.50  8.4,--,--  04-17 @ 19:34

## 2024-04-19 ENCOUNTER — RESULT REVIEW (OUTPATIENT)
Age: 74
End: 2024-04-19

## 2024-04-19 LAB
ANION GAP SERPL CALC-SCNC: 21 MMOL/L — HIGH (ref 5–17)
APTT BLD: 119 SEC — HIGH (ref 24.5–35.6)
APTT BLD: 29.8 SEC — SIGNIFICANT CHANGE UP (ref 24.5–35.6)
APTT BLD: 90.1 SEC — HIGH (ref 24.5–35.6)
BASE EXCESS BLDV CALC-SCNC: 8.2 MMOL/L — HIGH (ref -2–3)
BUN SERPL-MCNC: 94 MG/DL — HIGH (ref 7–23)
CALCIUM SERPL-MCNC: 9.4 MG/DL — SIGNIFICANT CHANGE UP (ref 8.4–10.5)
CHLORIDE SERPL-SCNC: 87 MMOL/L — LOW (ref 96–108)
CO2 BLDV-SCNC: 36 MMOL/L — HIGH (ref 22–26)
CO2 SERPL-SCNC: 28 MMOL/L — SIGNIFICANT CHANGE UP (ref 22–31)
CREAT SERPL-MCNC: 5.64 MG/DL — HIGH (ref 0.5–1.3)
EGFR: 7 ML/MIN/1.73M2 — LOW
GAS PNL BLDV: SIGNIFICANT CHANGE UP
GLUCOSE SERPL-MCNC: 130 MG/DL — HIGH (ref 70–99)
HCO3 BLDV-SCNC: 34 MMOL/L — HIGH (ref 22–29)
HCT VFR BLD CALC: 39.6 % — SIGNIFICANT CHANGE UP (ref 34.5–45)
HGB BLD-MCNC: 12.2 G/DL — SIGNIFICANT CHANGE UP (ref 11.5–15.5)
MCHC RBC-ENTMCNC: 30 PG — SIGNIFICANT CHANGE UP (ref 27–34)
MCHC RBC-ENTMCNC: 30.8 GM/DL — LOW (ref 32–36)
MCV RBC AUTO: 97.3 FL — SIGNIFICANT CHANGE UP (ref 80–100)
NRBC # BLD: 0 /100 WBCS — SIGNIFICANT CHANGE UP (ref 0–0)
PCO2 BLDV: 53 MMHG — HIGH (ref 39–42)
PH BLDV: 7.42 — SIGNIFICANT CHANGE UP (ref 7.32–7.43)
PLATELET # BLD AUTO: 188 K/UL — SIGNIFICANT CHANGE UP (ref 150–400)
PO2 BLDV: 64 MMHG — HIGH (ref 25–45)
POTASSIUM SERPL-MCNC: 4 MMOL/L — SIGNIFICANT CHANGE UP (ref 3.5–5.3)
POTASSIUM SERPL-SCNC: 4 MMOL/L — SIGNIFICANT CHANGE UP (ref 3.5–5.3)
RBC # BLD: 4.07 M/UL — SIGNIFICANT CHANGE UP (ref 3.8–5.2)
RBC # FLD: 13.2 % — SIGNIFICANT CHANGE UP (ref 10.3–14.5)
SAO2 % BLDV: 93 % — HIGH (ref 67–88)
SODIUM SERPL-SCNC: 136 MMOL/L — SIGNIFICANT CHANGE UP (ref 135–145)
WBC # BLD: 8.39 K/UL — SIGNIFICANT CHANGE UP (ref 3.8–10.5)
WBC # FLD AUTO: 8.39 K/UL — SIGNIFICANT CHANGE UP (ref 3.8–10.5)

## 2024-04-19 PROCEDURE — 76770 US EXAM ABDO BACK WALL COMP: CPT | Mod: 26

## 2024-04-19 PROCEDURE — 99233 SBSQ HOSP IP/OBS HIGH 50: CPT | Mod: GC

## 2024-04-19 PROCEDURE — 93321 DOPPLER ECHO F-UP/LMTD STD: CPT | Mod: 26

## 2024-04-19 PROCEDURE — 93308 TTE F-UP OR LMTD: CPT | Mod: 26

## 2024-04-19 RX ORDER — DIGOXIN 250 MCG
125 TABLET ORAL EVERY OTHER DAY
Refills: 0 | Status: DISCONTINUED | OUTPATIENT
Start: 2024-04-19 | End: 2024-04-25

## 2024-04-19 RX ADMIN — ISOSORBIDE DINITRATE 10 MILLIGRAM(S): 5 TABLET ORAL at 21:47

## 2024-04-19 RX ADMIN — HEPARIN SODIUM 1200 UNIT(S)/HR: 5000 INJECTION INTRAVENOUS; SUBCUTANEOUS at 19:25

## 2024-04-19 RX ADMIN — HEPARIN SODIUM 1200 UNIT(S)/HR: 5000 INJECTION INTRAVENOUS; SUBCUTANEOUS at 09:18

## 2024-04-19 RX ADMIN — Medication 25 MILLIGRAM(S): at 14:43

## 2024-04-19 RX ADMIN — Medication 25 MILLIGRAM(S): at 05:28

## 2024-04-19 RX ADMIN — ISOSORBIDE DINITRATE 10 MILLIGRAM(S): 5 TABLET ORAL at 14:43

## 2024-04-19 RX ADMIN — Medication 50 MILLIGRAM(S): at 04:39

## 2024-04-19 RX ADMIN — Medication 50 MILLIGRAM(S): at 09:26

## 2024-04-19 RX ADMIN — HEPARIN SODIUM 1100 UNIT(S)/HR: 5000 INJECTION INTRAVENOUS; SUBCUTANEOUS at 22:53

## 2024-04-19 RX ADMIN — Medication 650 MILLIGRAM(S): at 12:11

## 2024-04-19 RX ADMIN — Medication 125 MICROGRAM(S): at 14:43

## 2024-04-19 RX ADMIN — ISOSORBIDE DINITRATE 10 MILLIGRAM(S): 5 TABLET ORAL at 05:28

## 2024-04-19 RX ADMIN — HEPARIN SODIUM 1200 UNIT(S)/HR: 5000 INJECTION INTRAVENOUS; SUBCUTANEOUS at 15:37

## 2024-04-19 RX ADMIN — Medication 25 MILLIGRAM(S): at 21:46

## 2024-04-19 RX ADMIN — HEPARIN SODIUM 900 UNIT(S)/HR: 5000 INJECTION INTRAVENOUS; SUBCUTANEOUS at 07:48

## 2024-04-19 RX ADMIN — BUMETANIDE 10 MG/HR: 0.25 INJECTION INTRAMUSCULAR; INTRAVENOUS at 04:38

## 2024-04-19 RX ADMIN — Medication 50 MILLIGRAM(S): at 21:46

## 2024-04-19 RX ADMIN — Medication 50 MILLIGRAM(S): at 15:50

## 2024-04-19 RX ADMIN — Medication 250 MICROGRAM(S): at 05:26

## 2024-04-19 NOTE — PROGRESS NOTE ADULT - ASSESSMENT
73F PMHx nephrolithiasis, HTN admitted with 2 weeks of bl LE swelling and newly reduced renal function with multiple nonobstructive renal stones on CT. EP consulted for new-onset AFib w/ RVR noted on admission EKG. Patient reports asymptomatic, denies dizziness/lightheadedness, CP, SOB, palpitations. Denies any cardiac history. Received metoprolol 5mg IV x1 for AFib at rates 130s with improvement to 110s.     CHF :   ac on ch diastolic heart failure   on bumex GTT   cardio following     Afib with rvr   EP eval appreciated   Heparin drip   c/w lopressor , digoxin   cardio following     NICO on CKD stage 4  Metabolic Acidosis   Renal consulted   off sod bicarb     B/L nonobstructive kidney stones :  -stable     dispo: c/w heparin GTT and bumex GTT

## 2024-04-19 NOTE — PROGRESS NOTE ADULT - ASSESSMENT
73F PMHx nephrolithiasis s/p multiple procedures and HTN.  Admitted with new onset bl LE swelling and renal dysfunction.   Cardiology consulted for asymptomatic new-onset AFib w/ RVR and ADHF.       #ADHF:  - remains volume overloaded  - c/w Bumex 2mg/hr for diuresis, would defer to nephrology for additional recs for volume management; s/p PO metolazone 2 mg 4/16. 5mg 4/18 to augment diuresis  - BB as below  - c/w isosorbide dinitrate 10mg TID, hydralazine 25 mg TID as tolerated for afterload reduction in setting of severe MR and LV dysfunction.  Other GDMT is limited by current renal function  - eventual ischemic workup once net even and HR controlled  - monitor tele  - replete K>4 Mg>2  - strict I/O's, daily standing weights    #AF:  - rates improved   - metoprolol to 50 q6h HR <110  - s/p digoxin PO 0.125 mg,  mcg x1 4/17,  mcg x1 4/18. start digoxin 0.125mg PO today, then every other day.    - c/w hep gtt for AC  - would not give amiodarone given unknown duration of atrial fibrillation     #MR:  - order repeat TTE to reassess degree of MR     Lorena Lopez PGY1          73F PMHx nephrolithiasis s/p multiple procedures and HTN.  Admitted with new onset bl LE swelling and renal dysfunction.   Cardiology consulted for asymptomatic new-onset AFib w/ RVR and ADHF.       #1.  ADHF:  - remains volume overloaded  - c/w Bumex 2mg/hr for diuresis, would defer to nephrology for additional recs for volume management; s/p PO metolazone 2 mg 4/16. 5mg 4/18 to augment diuresis  - BB as below  - c/w isosorbide dinitrate 10mg TID, hydralazine 25 mg TID as tolerated for afterload reduction in setting of severe MR and LV dysfunction.  Other GDMT is limited by current renal function  - eventual ischemic workup once euvolemic and HR controlled  - monitor tele  - replete K>4 Mg>2  - strict I/O's, daily standing weights    #2.  AF:  - rates improving   - metoprolol to 50 q6h HR <110  - s/p digoxin PO 0.125 mg,  mcg x1 4/17,  mcg x1 4/18. Give digoxin 0.125mg PO today, thereafter digoxin 0.125 mg. every other day.    - c/w hep gtt for AC  - would not give amiodarone given unknown duration of atrial fibrillation    #3.  MR:  - order repeat TTE to reassess degree of MR, now that ventricular rate is better controlled.       Lorena Lopez M.D. PGY1  Cardiology resident    Plan discussed with cardiology fellow and resident.  Patient seen and examined.  Hx., exam and labs as above.  I agree with the assessment and recommendations, which I have reviewed and edited where appropriate.  Zack Moore M.D.  Cardiology Attending, Consult Service    For Cardiology consults and questions, all Cardiology service information can be found 24/7 on [email or web address] - use password: [email or web address] to log in.

## 2024-04-19 NOTE — PROGRESS NOTE ADULT - SUBJECTIVE AND OBJECTIVE BOX
Patient is a 73y old  Female who presents with a chief complaint of BL LE swelling (19 Apr 2024 07:59)      INTERVAL HPI/OVERNIGHT EVENTS: seen and examined, NAD    T(C): 36.7 (04-19-24 @ 21:01), Max: 36.7 (04-19-24 @ 05:07)  HR: 89 (04-19-24 @ 21:01) (79 - 96)  BP: 104/63 (04-19-24 @ 21:01) (98/62 - 106/72)  RR: 17 (04-19-24 @ 21:01) (16 - 18)  SpO2: 97% (04-19-24 @ 21:01) (95% - 99%)  Wt(kg): --  I&O's Summary    18 Apr 2024 07:01  -  19 Apr 2024 07:00  --------------------------------------------------------  IN: 1446 mL / OUT: 1400 mL / NET: 46 mL    19 Apr 2024 07:01  -  20 Apr 2024 00:16  --------------------------------------------------------  IN: 807 mL / OUT: 200 mL / NET: 607 mL        PAST MEDICAL & SURGICAL HISTORY:  Renal Calculus      Ureteral Calculus      Acute Renal Failure  9/2009      Wrist Fracture  CYNTHIA      Collar Bone Fracture      Rib Fractures      Kidney Stone removal  3/15/2011      C Section      Percutaneous Stone Extraction  Right      S/P Left Percutaneuos Stone extraction  01/26/2010, 04/20/2010          SOCIAL HISTORY  Alcohol:  Tobacco:  Illicit substance use:    FAMILY HISTORY:    REVIEW OF SYSTEMS:  CONSTITUTIONAL: No fever, weight loss, or fatigue  EYES: No eye pain, visual disturbances, or discharge  ENMT:  No difficulty hearing, tinnitus, vertigo; No sinus or throat pain  NECK: No pain or stiffness  RESPIRATORY: No cough, wheezing, chills or hemoptysis; No shortness of breath  CARDIOVASCULAR: No chest pain, palpitations, dizziness, or leg swelling  GASTROINTESTINAL: No abdominal or epigastric pain. No nausea, vomiting, or hematemesis; No diarrhea or constipation. No melena or hematochezia.  GENITOURINARY: No dysuria, frequency, hematuria, or incontinence  NEUROLOGICAL: No headaches, memory loss, loss of strength, numbness, or tremors  SKIN: No itching, burning, rashes, or lesions   LYMPH NODES: No enlarged glands  ENDOCRINE: No heat or cold intolerance; No hair loss  MUSCULOSKELETAL: No joint pain or swelling; No muscle, back, or extremity pain  PSYCHIATRIC: No depression, anxiety, mood swings, or difficulty sleeping  HEME/LYMPH: No easy bruising, or bleeding gums  ALLERY AND IMMUNOLOGIC: No hives or eczema    RADIOLOGY & ADDITIONAL TESTS:    Imaging Personally Reviewed:  [ ] YES  [ ] NO    Consultant(s) Notes Reviewed:  [ ] YES  [ ] NO    PHYSICAL EXAM:  GENERAL: NAD, well-groomed, well-developed  HEAD:  Atraumatic, Normocephalic  EYES: EOMI, PERRLA, conjunctiva and sclera clear  ENMT: No tonsillar erythema, exudates, or enlargement; Moist mucous membranes, Good dentition, No lesions  NECK: Supple, No JVD, Normal thyroid  NERVOUS SYSTEM:  Alert & Oriented X3, Good concentration; Motor Strength 5/5 B/L upper and lower extremities; DTRs 2+ intact and symmetric  CHEST/LUNG: Clear to percussion bilaterally; No rales, rhonchi, wheezing, or rubs  HEART: Regular rate and rhythm; No murmurs, rubs, or gallops  ABDOMEN: Soft, Nontender, Nondistended; Bowel sounds present  EXTREMITIES:  2+ Peripheral Pulses, No clubbing, cyanosis, or edema  LYMPH: No lymphadenopathy noted  SKIN: No rashes or lesions    LABS:                        12.2   8.39  )-----------( 188      ( 19 Apr 2024 08:43 )             39.6     04-19    136  |  87<L>  |  94<H>  ----------------------------<  130<H>  4.0   |  28  |  5.64<H>    Ca    9.4      19 Apr 2024 16:05      PTT - ( 19 Apr 2024 21:54 )  PTT:119.0 sec  Urinalysis Basic - ( 19 Apr 2024 16:05 )    Color: x / Appearance: x / SG: x / pH: x  Gluc: 130 mg/dL / Ketone: x  / Bili: x / Urobili: x   Blood: x / Protein: x / Nitrite: x   Leuk Esterase: x / RBC: x / WBC x   Sq Epi: x / Non Sq Epi: x / Bacteria: x      CAPILLARY BLOOD GLUCOSE            Urinalysis Basic - ( 19 Apr 2024 16:05 )    Color: x / Appearance: x / SG: x / pH: x  Gluc: 130 mg/dL / Ketone: x  / Bili: x / Urobili: x   Blood: x / Protein: x / Nitrite: x   Leuk Esterase: x / RBC: x / WBC x   Sq Epi: x / Non Sq Epi: x / Bacteria: x        MEDICATIONS  (STANDING):  buMETAnide Infusion 2 mG/Hr (10 mL/Hr) IV Continuous <Continuous>  digoxin     Tablet 125 MICROGram(s) Oral every other day  heparin  Infusion.  Unit(s)/Hr (12 mL/Hr) IV Continuous <Continuous>  hydrALAZINE 25 milliGRAM(s) Oral every 8 hours  isosorbide   dinitrate Tablet (ISORDIL) 10 milliGRAM(s) Oral three times a day  metoprolol tartrate 50 milliGRAM(s) Oral every 6 hours    MEDICATIONS  (PRN):  acetaminophen     Tablet .. 650 milliGRAM(s) Oral every 6 hours PRN Temp greater or equal to 38C (100.4F), Mild Pain (1 - 3)  heparin   Injectable 2500 Unit(s) IV Push every 6 hours PRN For aPTT between 40 - 57  heparin   Injectable 5500 Unit(s) IV Push every 6 hours PRN For aPTT less than 40  ondansetron Injectable 4 milliGRAM(s) IV Push every 6 hours PRN Nausea and/or Vomiting  senna 2 Tablet(s) Oral at bedtime PRN Constipation      Care Discussed with Consultants/Other Providers [ ] YES  [ ] NO

## 2024-04-19 NOTE — PROGRESS NOTE ADULT - SUBJECTIVE AND OBJECTIVE BOX
Patient seen and examined at bedside.    Overnight Events: No acute events     Review Of Systems: No chest pain, shortness of breath, or palpitations            Current Meds:  acetaminophen     Tablet .. 650 milliGRAM(s) Oral every 6 hours PRN  buMETAnide Infusion 2 mG/Hr IV Continuous <Continuous>  heparin   Injectable 2500 Unit(s) IV Push every 6 hours PRN  heparin   Injectable 5500 Unit(s) IV Push every 6 hours PRN  heparin  Infusion.  Unit(s)/Hr IV Continuous <Continuous>  hydrALAZINE 25 milliGRAM(s) Oral every 8 hours  isosorbide   dinitrate Tablet (ISORDIL) 10 milliGRAM(s) Oral three times a day  metoprolol tartrate 50 milliGRAM(s) Oral every 6 hours  ondansetron Injectable 4 milliGRAM(s) IV Push every 6 hours PRN  senna 2 Tablet(s) Oral at bedtime PRN      Vitals:  T(F): 97.1 (04-19), Max: 98 (04-18)  HR: 95 (04-19) (82 - 95)  BP: 100/63 (04-19) (96/67 - 103/69)  RR: 18 (04-19)  SpO2: 97% (04-19)  I&O's Summary    18 Apr 2024 07:01  -  19 Apr 2024 07:00  --------------------------------------------------------  IN: 1446 mL / OUT: 1400 mL / NET: 46 mL        Physical Exam:  Appearance: No acute distress; well appearing  Eyes: PERRL, EOMI, pink conjunctiva  HEENT: Normal oral mucosa  Cardiovascular: RRR, S1, S2, no murmurs, rubs, or gallops; no edema; no JVD  Respiratory: Clear to auscultation bilaterally  Gastrointestinal: soft, non-tender, non-distended with normal bowel sounds  Musculoskeletal: No clubbing; no joint deformity   Neurologic: Non-focal  Lymphatic: No lymphadenopathy  Psychiatry: AAOx3, mood & affect appropriate  Skin: No rashes, ecchymoses, or cyanosis                          13.4   8.08  )-----------( 142      ( 18 Apr 2024 07:25 )             45.1     04-18    139  |  88<L>  |  87<H>  ----------------------------<  189<H>  3.8   |  31  |  5.56<H>    Ca    8.7      18 Apr 2024 19:14      PTT - ( 18 Apr 2024 07:25 )  PTT:69.9 sec              New ECG(s): Personally reviewed    Echo:    Stress Testing:     Cath:    Imaging:    Interpretation of Telemetry:   Overnight Events/Subjective: No acute events overnight. Denied any chest pain, sob, palpitations.     Current Meds:  acetaminophen     Tablet .. 650 milliGRAM(s) Oral every 6 hours PRN  buMETAnide Infusion 2 mG/Hr IV Continuous <Continuous>  heparin   Injectable 2500 Unit(s) IV Push every 6 hours PRN  heparin   Injectable 5500 Unit(s) IV Push every 6 hours PRN  heparin  Infusion.  Unit(s)/Hr IV Continuous <Continuous>  hydrALAZINE 25 milliGRAM(s) Oral every 8 hours  isosorbide   dinitrate Tablet (ISORDIL) 10 milliGRAM(s) Oral three times a day  metoprolol tartrate 50 milliGRAM(s) Oral every 6 hours  ondansetron Injectable 4 milliGRAM(s) IV Push every 6 hours PRN  senna 2 Tablet(s) Oral at bedtime PRN    Vitals:  T(F): 97.1 (04-19), Max: 98 (04-18)  HR: 95 (04-19) (82 - 95)  BP: 100/63 (04-19) (96/67 - 103/69)  RR: 18 (04-19)  SpO2: 97% (04-19)    Tele: AF      I&O's Summary    18 Apr 2024 07:01  -  19 Apr 2024 07:00  --------------------------------------------------------  IN: 1446 mL / OUT: 1400 mL / NET: 46 mL    Physical Exam:  Appearance: No acute distress  Eyes: PERRL, EOMI, pink conjunctiva  HEENT: Normal oral mucosa  Cardiovascular: irregularly irregular   Respiratory: Clear to auscultation bilaterally  Gastrointestinal: soft, non-tender, non-distended with normal bowel sounds  Musculoskeletal: able to move all extremities, b/l LE pitting edema, LLE 2-3+ pitting edema to knee, RLE 1-2+ pitting edema long term up calves.   Neurologic: Non-focal  Psychiatry: AAOx3, mood & affect appropriate               13.4   8.08  )-----------( 142      ( 18 Apr 2024 07:25 )             45.1     04-18    139  |  88<L>  |  87<H>  ----------------------------<  189<H>  3.8   |  31  |  5.56<H>    Ca    8.7      18 Apr 2024 19:14    PTT - ( 18 Apr 2024 07:25 )  PTT:69.9 sec     Overnight Events/Subjective: No acute events overnight. Denied any chest pain, sob, palpitations.     Current Meds:  acetaminophen     Tablet .. 650 milliGRAM(s) Oral every 6 hours PRN  buMETAnide Infusion 2 mG/Hr IV Continuous <Continuous>  heparin   Injectable 2500 Unit(s) IV Push every 6 hours PRN  heparin   Injectable 5500 Unit(s) IV Push every 6 hours PRN  heparin  Infusion.  Unit(s)/Hr IV Continuous <Continuous>  hydrALAZINE 25 milliGRAM(s) Oral every 8 hours  isosorbide   dinitrate Tablet (ISORDIL) 10 milliGRAM(s) Oral three times a day  metoprolol tartrate 50 milliGRAM(s) Oral every 6 hours  ondansetron Injectable 4 milliGRAM(s) IV Push every 6 hours PRN  senna 2 Tablet(s) Oral at bedtime PRN    Vitals:  T(F): 97.1 (04-19), Max: 98 (04-18)  HR: 95 (04-19) (82 - 95)  BP: 100/63 (04-19) (96/67 - 103/69)  RR: 18 (04-19)  SpO2: 97% (04-19)      Physical Exam:  Appearance: No acute distress  Eyes: PERRL, EOMI, pink conjunctiva  HEENT: Normal oral mucosa  Cardiovascular: irregularly irregular   Respiratory: Clear to auscultation bilaterally  Gastrointestinal: soft, non-tender, non-distended with normal bowel sounds  Musculoskeletal: able to move all extremities, b/l LE pitting edema, LLE 2-3+ pitting edema to knee, RLE 1-2+ pitting edema group home up calves.   Neurologic: Non-focal  Psychiatry: AAOx3, mood & affect appropriate      Tele: AF      I&O's Summary    18 Apr 2024 07:01  -  19 Apr 2024 07:00  --------------------------------------------------------  IN: 1446 mL / OUT: 1400 mL / NET: 46 mL          LABS             13.4   8.08  )-----------( 142      ( 18 Apr 2024 07:25 )             45.1     04-18  139  |  88<L>  |  87<H>  ----------------------------<  189<H>  3.8   |  31  |  5.56<H>    Ca    8.7      18 Apr 2024 19:14    PTT - ( 18 Apr 2024 07:25 )  PTT:69.9 sec

## 2024-04-20 LAB
ANION GAP SERPL CALC-SCNC: 22 MMOL/L — HIGH (ref 5–17)
APTT BLD: 106.1 SEC — HIGH (ref 24.5–35.6)
APTT BLD: 120.6 SEC — CRITICAL HIGH (ref 24.5–35.6)
APTT BLD: 95.5 SEC — HIGH (ref 24.5–35.6)
BUN SERPL-MCNC: 101 MG/DL — HIGH (ref 7–23)
CALCIUM SERPL-MCNC: 9.4 MG/DL — SIGNIFICANT CHANGE UP (ref 8.4–10.5)
CHLORIDE SERPL-SCNC: 85 MMOL/L — LOW (ref 96–108)
CO2 SERPL-SCNC: 29 MMOL/L — SIGNIFICANT CHANGE UP (ref 22–31)
CREAT SERPL-MCNC: 6.02 MG/DL — HIGH (ref 0.5–1.3)
EGFR: 7 ML/MIN/1.73M2 — LOW
GLUCOSE SERPL-MCNC: 103 MG/DL — HIGH (ref 70–99)
HCT VFR BLD CALC: 41.7 % — SIGNIFICANT CHANGE UP (ref 34.5–45)
HGB BLD-MCNC: 12.8 G/DL — SIGNIFICANT CHANGE UP (ref 11.5–15.5)
MCHC RBC-ENTMCNC: 29.3 PG — SIGNIFICANT CHANGE UP (ref 27–34)
MCHC RBC-ENTMCNC: 30.7 GM/DL — LOW (ref 32–36)
MCV RBC AUTO: 95.4 FL — SIGNIFICANT CHANGE UP (ref 80–100)
NRBC # BLD: 0 /100 WBCS — SIGNIFICANT CHANGE UP (ref 0–0)
PLATELET # BLD AUTO: 183 K/UL — SIGNIFICANT CHANGE UP (ref 150–400)
POTASSIUM SERPL-MCNC: 4 MMOL/L — SIGNIFICANT CHANGE UP (ref 3.5–5.3)
POTASSIUM SERPL-SCNC: 4 MMOL/L — SIGNIFICANT CHANGE UP (ref 3.5–5.3)
RBC # BLD: 4.37 M/UL — SIGNIFICANT CHANGE UP (ref 3.8–5.2)
RBC # FLD: 12.9 % — SIGNIFICANT CHANGE UP (ref 10.3–14.5)
SODIUM SERPL-SCNC: 136 MMOL/L — SIGNIFICANT CHANGE UP (ref 135–145)
WBC # BLD: 8.04 K/UL — SIGNIFICANT CHANGE UP (ref 3.8–10.5)
WBC # FLD AUTO: 8.04 K/UL — SIGNIFICANT CHANGE UP (ref 3.8–10.5)

## 2024-04-20 PROCEDURE — 99233 SBSQ HOSP IP/OBS HIGH 50: CPT

## 2024-04-20 RX ORDER — ALBUMIN HUMAN 25 %
100 VIAL (ML) INTRAVENOUS ONCE
Refills: 0 | Status: COMPLETED | OUTPATIENT
Start: 2024-04-20 | End: 2024-04-20

## 2024-04-20 RX ORDER — ACETAMINOPHEN 500 MG
1000 TABLET ORAL ONCE
Refills: 0 | Status: COMPLETED | OUTPATIENT
Start: 2024-04-20 | End: 2024-04-20

## 2024-04-20 RX ORDER — BUMETANIDE 0.25 MG/ML
2 INJECTION INTRAMUSCULAR; INTRAVENOUS
Refills: 0 | Status: DISCONTINUED | OUTPATIENT
Start: 2024-04-20 | End: 2024-04-21

## 2024-04-20 RX ORDER — OXYCODONE HYDROCHLORIDE 5 MG/1
2.5 TABLET ORAL ONCE
Refills: 0 | Status: DISCONTINUED | OUTPATIENT
Start: 2024-04-20 | End: 2024-04-20

## 2024-04-20 RX ADMIN — Medication 650 MILLIGRAM(S): at 12:18

## 2024-04-20 RX ADMIN — Medication 50 MILLIGRAM(S): at 09:23

## 2024-04-20 RX ADMIN — Medication 650 MILLIGRAM(S): at 11:18

## 2024-04-20 RX ADMIN — Medication 25 MILLIGRAM(S): at 09:23

## 2024-04-20 RX ADMIN — ISOSORBIDE DINITRATE 10 MILLIGRAM(S): 5 TABLET ORAL at 13:47

## 2024-04-20 RX ADMIN — HEPARIN SODIUM 900 UNIT(S)/HR: 5000 INJECTION INTRAVENOUS; SUBCUTANEOUS at 19:32

## 2024-04-20 RX ADMIN — Medication 400 MILLIGRAM(S): at 18:48

## 2024-04-20 RX ADMIN — OXYCODONE HYDROCHLORIDE 2.5 MILLIGRAM(S): 5 TABLET ORAL at 20:32

## 2024-04-20 RX ADMIN — Medication 1000 MILLIGRAM(S): at 19:18

## 2024-04-20 RX ADMIN — HEPARIN SODIUM 900 UNIT(S)/HR: 5000 INJECTION INTRAVENOUS; SUBCUTANEOUS at 14:44

## 2024-04-20 RX ADMIN — ISOSORBIDE DINITRATE 10 MILLIGRAM(S): 5 TABLET ORAL at 21:55

## 2024-04-20 RX ADMIN — Medication 50 MILLIGRAM(S): at 05:10

## 2024-04-20 RX ADMIN — HEPARIN SODIUM 1000 UNIT(S)/HR: 5000 INJECTION INTRAVENOUS; SUBCUTANEOUS at 06:36

## 2024-04-20 RX ADMIN — Medication 50 MILLIGRAM(S): at 21:55

## 2024-04-20 RX ADMIN — Medication 50 MILLILITER(S): at 18:15

## 2024-04-20 RX ADMIN — ISOSORBIDE DINITRATE 10 MILLIGRAM(S): 5 TABLET ORAL at 05:10

## 2024-04-20 RX ADMIN — HEPARIN SODIUM 1000 UNIT(S)/HR: 5000 INJECTION INTRAVENOUS; SUBCUTANEOUS at 07:27

## 2024-04-20 RX ADMIN — OXYCODONE HYDROCHLORIDE 2.5 MILLIGRAM(S): 5 TABLET ORAL at 21:02

## 2024-04-20 NOTE — PROGRESS NOTE ADULT - SUBJECTIVE AND OBJECTIVE BOX
Patient is a 73y old  Female who presents with a chief complaint of BL LE swelling (19 Apr 2024 07:59)      INTERVAL HPI/OVERNIGHT EVENTS: seen and examined, NAD    T(C): 36.3 (04-20-24 @ 21:02), Max: 36.5 (04-20-24 @ 17:09)  HR: 73 (04-20-24 @ 21:02) (66 - 85)  BP: 110/70 (04-20-24 @ 21:02) (89/52 - 117/69)  RR: 18 (04-20-24 @ 21:02) (18 - 18)  SpO2: 100% (04-20-24 @ 21:02) (94% - 100%)    MEDICATIONS  (STANDING):  buMETAnide Injectable 2 milliGRAM(s) IV Push two times a day  digoxin     Tablet 125 MICROGram(s) Oral every other day  heparin  Infusion.  Unit(s)/Hr (12 mL/Hr) IV Continuous <Continuous>  hydrALAZINE 25 milliGRAM(s) Oral every 8 hours  isosorbide   dinitrate Tablet (ISORDIL) 10 milliGRAM(s) Oral three times a day  metoprolol tartrate 50 milliGRAM(s) Oral every 6 hours    MEDICATIONS  (PRN):  acetaminophen     Tablet .. 650 milliGRAM(s) Oral every 6 hours PRN Temp greater or equal to 38C (100.4F), Mild Pain (1 - 3)  heparin   Injectable 2500 Unit(s) IV Push every 6 hours PRN For aPTT between 40 - 57  heparin   Injectable 5500 Unit(s) IV Push every 6 hours PRN For aPTT less than 40  ondansetron Injectable 4 milliGRAM(s) IV Push every 6 hours PRN Nausea and/or Vomiting  senna 2 Tablet(s) Oral at bedtime PRN Constipation            PAST MEDICAL & SURGICAL HISTORY:  Renal Calculus      Ureteral Calculus      Acute Renal Failure  9/2009      Wrist Fracture  CYNTHIA      Collar Bone Fracture      Rib Fractures      Kidney Stone removal  3/15/2011      C Section      Percutaneous Stone Extraction  Right      S/P Left Percutaneuos Stone extraction  01/26/2010, 04/20/2010          SOCIAL HISTORY  Alcohol:  Tobacco:  Illicit substance use:    FAMILY HISTORY:    REVIEW OF SYSTEMS:  CONSTITUTIONAL: No fever, weight loss, or fatigue  EYES: No eye pain, visual disturbances, or discharge  ENMT:  No difficulty hearing, tinnitus, vertigo; No sinus or throat pain  NECK: No pain or stiffness  RESPIRATORY: No cough, wheezing, chills or hemoptysis; No shortness of breath  CARDIOVASCULAR: No chest pain, palpitations, dizziness, or leg swelling  GASTROINTESTINAL: No abdominal or epigastric pain. No nausea, vomiting, or hematemesis; No diarrhea or constipation. No melena or hematochezia.  GENITOURINARY: No dysuria, frequency, hematuria, or incontinence  NEUROLOGICAL: No headaches, memory loss, loss of strength, numbness, or tremors  SKIN: No itching, burning, rashes, or lesions   LYMPH NODES: No enlarged glands  ENDOCRINE: No heat or cold intolerance; No hair loss  MUSCULOSKELETAL: No joint pain or swelling; No muscle, back, or extremity pain  PSYCHIATRIC: No depression, anxiety, mood swings, or difficulty sleeping  HEME/LYMPH: No easy bruising, or bleeding gums  ALLERY AND IMMUNOLOGIC: No hives or eczema    RADIOLOGY & ADDITIONAL TESTS:    Imaging Personally Reviewed:  [ ] YES  [ ] NO    Consultant(s) Notes Reviewed:  [ ] YES  [ ] NO    PHYSICAL EXAM:  GENERAL: NAD, well-groomed, well-developed  HEAD:  Atraumatic, Normocephalic  ENMT: No tonsillar erythema, exudates, or enlargement; Moist mucous membranes, Good dentition, No lesions  NECK: Supple, No JVD, Normal thyroid  NERVOUS SYSTEM:  Alert & Oriented X3, Good concentration; Motor Strength 5/5 B/L upper and lower extremities; DTRs 2+ intact and symmetric  CHEST/LUNG: Clear to percussion bilaterally; No rales, rhonchi, wheezing, or rubs  HEART: Regular rate and rhythm; No murmurs, rubs, or gallops  ABDOMEN: Soft, Nontender, Nondistended; Bowel sounds present  EXTREMITIES:  2+ Peripheral Pulses, No clubbing, cyanosis, or edema  LYMPH: No lymphadenopathy noted  SKIN: No rashes or lesions                          12.8   8.04  )-----------( 183      ( 20 Apr 2024 05:58 )             41.7             PTT - ( 20 Apr 2024 20:10 )  PTT:95.5 sec  136|85|101<103  4.0|29|6.02  9.4,--,--  04-20 @ 05:58

## 2024-04-20 NOTE — PROGRESS NOTE ADULT - ASSESSMENT
73F PMHx nephrolithiasis, HTN admitted with 2 weeks of bl LE swelling and newly reduced renal function with multiple nonobstructive renal stones on CT. EP consulted for new-onset AFib w/ RVR noted on admission EKG. Patient reports asymptomatic, denies dizziness/lightheadedness, CP, SOB, palpitations. Denies any cardiac history. Received metoprolol 5mg IV x1 for AFib at rates 130s with improvement to 110s.     CHF :   ac on ch diastolic heart failure   on bumex GTT   cardio following     Afib with rvr   EP eval appreciated   Heparin drip   c/w lopressor , digoxin   cardio following     NICO on CKD stage 4  Metabolic Acidosis   Renal consulted   off sod bicarb     B/L nonobstructive kidney stones :  -stable     dispo: c/w heparin GTT and bumex GTT      73F PMHx nephrolithiasis, HTN admitted with 2 weeks of bl LE swelling and newly reduced renal function with multiple nonobstructive renal stones on CT. EP consulted for new-onset AFib w/ RVR noted on admission EKG. Patient reports asymptomatic, denies dizziness/lightheadedness, CP, SOB, palpitations. Denies any cardiac history. Received metoprolol 5mg IV x1 for AFib at rates 130s with improvement to 110s.     CHF :   ac on ch diastolic heart failure   on bumex GTT s/p metalazone   Hold Metalazone for now     cardio following     Afib with rvr   EP eval appreciated   Heparin drip   c/w lopressor , digoxin   cardio following     NICO on CKD stage 4  Metabolic Acidosis   Renal consulted   off sod bicarb     B/L nonobstructive kidney stones :  -stable     dispo: c/w heparin GTT and bumex GTT

## 2024-04-20 NOTE — PROGRESS NOTE ADULT - PROBLEM SELECTOR PLAN 1
Pt. with renal failure in the setting of fluid overload. On review of [place], SCr noted to be persistently elevated from 0828-4328. SCr was elevated at 1.79 on 5/4/2011. SCr initially during this admission was elevated at 4.73 (4/8), and increased to 5.24 today. No interim labs available for review but pt. likely has underlying CKD. Documented urine output is ~1.35L over the past 24 hours. UA showed proteinuria and evidence of infection (although 15 epithelial cells). UPCR is elevated at 1.1. Kidney sonogram showed evidence of medical renal disease, BL non-obstructing stones, and BL renal cysts.     -Pt. currently on IV Bumex infusion, s/p metolazone yesterday afternoon    -would defer additional metolazone today in setting of worsening azotemia    -rising serum bicarb noted (see below).  May need to consider switching bumex to intermittent dosing     -UO incompletely recorded as patient not saving all voids, weight is significantly down since admission    - Serologic workup thus far: HBsAg, HCV Ab, and HIV are non-reactive. Kappa/Lambda free light chain ratio is mildly elevated at 1.85 (acceptable range for degree of CKD). Weak IgG kappa band noted on SIFE. Monitor labs and urine output. Avoid nephrotoxins. Dose medications as per eGFR. Pt. with renal failure in the setting of fluid overload. On review of [place], SCr noted to be persistently elevated from 1172-1657. SCr was elevated at 1.79 on 5/4/2011. SCr initially during this admission was elevated at 4.73 (4/8), and increased to 5.24 today. No interim labs available for review but pt. likely has underlying CKD. Documented urine output is ~1.35L over the past 24 hours. UA showed proteinuria and evidence of infection (although 15 epithelial cells). UPCR is elevated at 1.1. Kidney sonogram showed evidence of medical renal disease, BL non-obstructing stones, and BL renal cysts.     -Pt. currently on IV Bumex infusion, s/p metolazone yesterday afternoon    -would defer additional metolazone today in setting of worsening azotemia    -rising serum bicarb noted (see below).  May need to consider switching bumex to intermittent dosing     -UO incompletely recorded as patient not saving all voids, weight is significantly down since admission    - Serologic workup thus far: HBsAg, HCV Ab, and HIV are non-reactive. Kappa/Lambda free light chain ratio is mildly elevated at 1.85 (acceptable range for degree of CKD). Weak IgG kappa band noted on SIFE.     -Renal sono noted with mild R hydro, no L hydro, nonobstructive calculi, renal cysts, with no retention    Monitor labs and urine output. Avoid nephrotoxins. Dose medications as per eGFR.

## 2024-04-20 NOTE — PROGRESS NOTE ADULT - SUBJECTIVE AND OBJECTIVE BOX
[facility] DIVISION OF KIDNEY DISEASE AND HYPERTENSION  [phone]    RENAL FOLLOW UP NOTE  --------------------------------------------------------------------------------  Patient seen and examined this morning, OOB to chair.  Several voids not saved recorded O/N thus urine output incompletely measured    PAST HISTORY  --------------------------------------------------------------------------------  No significant changes to PMH, PSH, FHx, SHx, unless otherwise noted    ALLERGIES & MEDICATIONS  --------------------------------------------------------------------------------  Allergies    No Known Allergies    Intolerances      Standing Inpatient Medications  buMETAnide Infusion 2 mG/Hr IV Continuous <Continuous>  digoxin     Tablet 125 MICROGram(s) Oral every other day  heparin  Infusion.  Unit(s)/Hr IV Continuous <Continuous>  hydrALAZINE 25 milliGRAM(s) Oral every 8 hours  isosorbide   dinitrate Tablet (ISORDIL) 10 milliGRAM(s) Oral three times a day  metoprolol tartrate 50 milliGRAM(s) Oral every 6 hours    PRN Inpatient Medications  acetaminophen     Tablet .. 650 milliGRAM(s) Oral every 6 hours PRN  heparin   Injectable 2500 Unit(s) IV Push every 6 hours PRN  heparin   Injectable 5500 Unit(s) IV Push every 6 hours PRN  ondansetron Injectable 4 milliGRAM(s) IV Push every 6 hours PRN  senna 2 Tablet(s) Oral at bedtime PRN      FOCUSED REVIEW OF SYSTEMS  --------------------------------------------------------------------------------  improved breathing, denies cough  denies CP/palpitations  denies oliguria/dysuria      VITALS/PHYSICAL EXAM  --------------------------------------------------------------------------------  T(C): 36.2 (04-20-24 @ 08:57), Max: 36.7 (04-19-24 @ 18:15)  HR: 76 (04-20-24 @ 08:57) (76 - 96)  BP: 107/72 (04-20-24 @ 08:57) (97/60 - 121/83)  RR: 18 (04-20-24 @ 08:57) (17 - 18)  SpO2: 94% (04-20-24 @ 08:57) (94% - 97%)  Wt(kg): --        04-19-24 @ 07:01  -  04-20-24 @ 07:00  --------------------------------------------------------  IN: 1052 mL / OUT: 850 mL / NET: 202 mL    04-20-24 @ 07:01  -  04-20-24 @ 12:49  --------------------------------------------------------  IN: 0 mL / OUT: 200 mL / NET: -200 mL      Physical Exam:  	Gen: NAD, OOB to chair  	Pulm: CTA B/L ant/lat fields  	CV: RRR, S1S2  	Abd: +BS, soft, nontender/nondistended  	: No suprapubic tenderness.  no connor          Extremity: b/L LE pitting edema to upper shins  	Neuro: A&O x 3, no asterixix      LABS/STUDIES  --------------------------------------------------------------------------------              12.8   8.04  >-----------<  183      [04-20-24 @ 05:58]              41.7     136  |  85  |  101  ----------------------------<  103      [04-20-24 @ 05:58]  4.0   |  29  |  6.02        Ca     9.4     [04-20-24 @ 05:58]        PTT: 106.1      [04-20-24 @ 05:58]        Creatinine Trend:  SCr 6.02 [04-20 @ 05:58]  SCr 5.64 [04-19 @ 16:05]  SCr 5.56 [04-18 @ 19:14]  SCr 5.24 [04-18 @ 07:25]  SCr 5.50 [04-17 @ 19:34]              Urinalysis - [04-20-24 @ 05:58]      Color  / Appearance  / SG  / pH       Gluc 103 / Ketone   / Bili  / Urobili        Blood  / Protein  / Leuk Est  / Nitrite       RBC  / WBC  / Hyaline  / Gran  / Sq Epi  / Non Sq Epi  / Bacteria     Urine Sodium 115      [04-18-24 @ 10:59]  Urine Potassium 14      [04-18-24 @ 10:59]  Urine Chloride 85      [04-18-24 @ 10:59]    TSH 0.65      [04-09-24 @ 00:27]    Free Light Chains: kappa 5.19, lambda 2.81, ratio = 1.85      [04-10 @ 07:11]  Immunofixation Serum:   Weak  IgG Kappa Band Identified      Reference Range: None Detected      [04-10-24 @ 07:11]

## 2024-04-20 NOTE — CHART NOTE - NSCHARTNOTEFT_GEN_A_CORE
Asked to evaluate Pt for hypotension , seen and evaluate  found lying supine in bed  with spouse at bedside , Denied any dizziness , SOB , chest discomfort , nuasea, vomiting or diarrhea . She endorses pain  and stiffness in her LLE       Vital Signs Last 24 Hrs  T(C): 36.5 (20 Apr 2024 17:09), Max: 36.7 (19 Apr 2024 18:15)  T(F): 97.7 (20 Apr 2024 17:09), Max: 98.1 (19 Apr 2024 18:15)  HR: 85 (20 Apr 2024 17:09) (66 - 95)  BP: 89/52 (20 Apr 2024 17:33) (89/52 - 121/83)  BP(mean): --  RR: 18 (20 Apr 2024 17:09) (17 - 18)  SpO2: 95% (20 Apr 2024 17:09) (94% - 99%)    Parameters below as of 20 Apr 2024 17:09  Patient On (Oxygen Delivery Method): room air        Physical Exam:  General: WN/WD NAD  Neurology: A&Ox3, nonfocal, SANCHES x 4  Head:  Normocephalic, atraumatic  Respiratory: CTA B/L  CV: RRR, S1S2, no murmur  Abdominal: Soft, NT, ND no palpable mass  MSK: +3 edema , full ROM  of UE and LE  ,   Labs:                          12.8   8.04  )-----------( 183      ( 20 Apr 2024 05:58 )             41.7     04-20    136  |  85<L>  |  101<H>  ----------------------------<  103<H>  4.0   |  29  |  6.02<H>    Ca    9.4      20 Apr 2024 05:58              Radiology:    74yo F with PMHx of nephrolithiasis and HTN presents with complaint of BL LE swelling for past few weeks. Pt reports has been getting progressively worse and associated with some BLE soreness. Takes amlodipine for HTN. Otherwise in normal state of health. Denies fever/chills, nausea/vomiting, abd pain, flank pain, dysuria, hematuria, decreased UOP or other acute complaints.  (08 Apr 2024 17:00)    Assessment & Plan : 73F PMHx nephrolithiasis s/p multiple procedures and HTN Admitted with new onset bl LE swelling and renal dysfunction  >  >  >  >    Silvana Blanton NP-C   #[id number] Asked to evaluate Pt for hypotension , seen and evaluate  found lying supine in bed  with spouse at bedside , Denied any dizziness , SOB , chest discomfort , nuasea, vomiting or diarrhea . She endorses pain  and stiffness in her LLE       Vital Signs Last 24 Hrs  T(C): 36.5 (20 Apr 2024 17:09), Max: 36.7 (19 Apr 2024 18:15)  T(F): 97.7 (20 Apr 2024 17:09), Max: 98.1 (19 Apr 2024 18:15)  HR: 85 (20 Apr 2024 17:09) (66 - 95)  BP: 89/52 (20 Apr 2024 17:33) (89/52 - 121/83)  BP(mean): --  RR: 18 (20 Apr 2024 17:09) (17 - 18)  SpO2: 95% (20 Apr 2024 17:09) (94% - 99%)    Parameters below as of 20 Apr 2024 17:09  Patient On (Oxygen Delivery Method): room air        Physical Exam:  General: WN/WD NAD  Neurology: A&Ox3, nonfocal, SANCHES x 4  Head:  Normocephalic, atraumatic  Respiratory: CTA B/L  CV: RRR, S1S2, no murmur  Abdominal: Soft, NT, ND no palpable mass  MSK: +3 edema , full ROM  of UE and LE  ,   Labs:                          12.8   8.04  )-----------( 183      ( 20 Apr 2024 05:58 )             41.7     04-20    136  |  85<L>  |  101<H>  ----------------------------<  103<H>  4.0   |  29  |  6.02<H>    Ca    9.4      20 Apr 2024 05:58              Radiology:    72yo F with PMHx of nephrolithiasis and HTN presents with complaint of BL LE swelling for past few weeks. Pt reports has been getting progressively worse and associated with some BLE soreness. Takes amlodipine for HTN. Otherwise in normal state of health. Denies fever/chills, nausea/vomiting, abd pain, flank pain, dysuria, hematuria, decreased UOP or other acute complaints.  (08 Apr 2024 17:00)    Assessment & Plan : 73F PMHx nephrolithiasis s/p multiple procedures and HTN Admitted with new onset bl LE swelling and renal dysfunction , ADHF now with hypotension and LLE  pain   > Pt with no c/o dizziness , will add  Albumin x 1 dose , Hold  betablocker , hydralazine and  Bumex   >  Tele with occasional PVCS   continue to monitor   >  LLE pain and cramps  due to fluid shift , on full AC   >  >    Shinamol Harvinder NP-C   #[id number] Asked to evaluate Pt for hypotension , seen and evaluate  found lying supine in bed  with spouse at bedside , Denied any dizziness , SOB , chest discomfort , nuasea, vomiting or diarrhea . She endorses pain  and stiffness in her LLE       Vital Signs Last 24 Hrs  T(C): 36.5 (20 Apr 2024 17:09), Max: 36.7 (19 Apr 2024 18:15)  T(F): 97.7 (20 Apr 2024 17:09), Max: 98.1 (19 Apr 2024 18:15)  HR: 85 (20 Apr 2024 17:09) (66 - 95)  BP: 89/52 (20 Apr 2024 17:33) (89/52 - 121/83)  BP(mean): --  RR: 18 (20 Apr 2024 17:09) (17 - 18)  SpO2: 95% (20 Apr 2024 17:09) (94% - 99%)    Parameters below as of 20 Apr 2024 17:09  Patient On (Oxygen Delivery Method): room air        Physical Exam:  General: WN/WD NAD  Neurology: A&Ox3, nonfocal, SANCHES x 4  Head:  Normocephalic, atraumatic  Respiratory: CTA B/L  CV: RRR, S1S2, no murmur  Abdominal: Soft, NT, ND no palpable mass  MSK: +3 edema , full ROM  of UE and LE  ,   Labs:                          12.8   8.04  )-----------( 183      ( 20 Apr 2024 05:58 )             41.7     04-20    136  |  85<L>  |  101<H>  ----------------------------<  103<H>  4.0   |  29  |  6.02<H>    Ca    9.4      20 Apr 2024 05:58              Radiology:        Assessment & Plan : 73F PMHx nephrolithiasis s/p multiple procedures and HTN Admitted with new onset bl LE swelling and renal dysfunction , ADHF now with hypotension and LLE  pain   > Pt with no c/o dizziness , will add  Albumin x 1 dose , Hold  betablocker , hydralazine and  Bumex   >  Tele with occasional PVCS   continue to monitor   >  LLE pain and cramps  ?  due to fluid shift  neg  Patricia's  Test , Pt  on full AC   > follow  Vitals q 4 , low threshold for RRT   > DR ricketts and Renal attending  updated and agrees with the Plan .     Silvana Blanton NP-C   #[id number]

## 2024-04-20 NOTE — PROGRESS NOTE ADULT - PROBLEM SELECTOR PLAN 2
Pt. with metabolic acidosis in the setting of renal failure, now resolved with SCO2 now 29--trending to alkalosis  -continue bumex gtt, defer additional metolazone for now  -if bicarb continues to rise, would consider d/c bumex gtt and switch to intermittent dosing  Monitor pH, and SCO2.

## 2024-04-21 LAB
ANION GAP SERPL CALC-SCNC: 23 MMOL/L — HIGH (ref 5–17)
APTT BLD: 81.9 SEC — HIGH (ref 24.5–35.6)
APTT BLD: 86.7 SEC — HIGH (ref 24.5–35.6)
BASE EXCESS BLDV CALC-SCNC: 7 MMOL/L — HIGH (ref -2–3)
BUN SERPL-MCNC: 103 MG/DL — HIGH (ref 7–23)
CALCIUM SERPL-MCNC: 9 MG/DL — SIGNIFICANT CHANGE UP (ref 8.4–10.5)
CHLORIDE SERPL-SCNC: 85 MMOL/L — LOW (ref 96–108)
CO2 BLDV-SCNC: 35 MMOL/L — HIGH (ref 22–26)
CO2 SERPL-SCNC: 29 MMOL/L — SIGNIFICANT CHANGE UP (ref 22–31)
CREAT SERPL-MCNC: 6.17 MG/DL — HIGH (ref 0.5–1.3)
EGFR: 7 ML/MIN/1.73M2 — LOW
GLUCOSE SERPL-MCNC: 106 MG/DL — HIGH (ref 70–99)
HCO3 BLDV-SCNC: 33 MMOL/L — HIGH (ref 22–29)
HCT VFR BLD CALC: 37.6 % — SIGNIFICANT CHANGE UP (ref 34.5–45)
HGB BLD-MCNC: 11.7 G/DL — SIGNIFICANT CHANGE UP (ref 11.5–15.5)
MCHC RBC-ENTMCNC: 30.2 PG — SIGNIFICANT CHANGE UP (ref 27–34)
MCHC RBC-ENTMCNC: 31.1 GM/DL — LOW (ref 32–36)
MCV RBC AUTO: 96.9 FL — SIGNIFICANT CHANGE UP (ref 80–100)
NRBC # BLD: 0 /100 WBCS — SIGNIFICANT CHANGE UP (ref 0–0)
NT-PROBNP SERPL-SCNC: HIGH PG/ML (ref 0–300)
PCO2 BLDV: 54 MMHG — HIGH (ref 39–42)
PH BLDV: 7.4 — SIGNIFICANT CHANGE UP (ref 7.32–7.43)
PLATELET # BLD AUTO: 156 K/UL — SIGNIFICANT CHANGE UP (ref 150–400)
PO2 BLDV: 51 MMHG — HIGH (ref 25–45)
POTASSIUM SERPL-MCNC: 3.7 MMOL/L — SIGNIFICANT CHANGE UP (ref 3.5–5.3)
POTASSIUM SERPL-SCNC: 3.7 MMOL/L — SIGNIFICANT CHANGE UP (ref 3.5–5.3)
RBC # BLD: 3.88 M/UL — SIGNIFICANT CHANGE UP (ref 3.8–5.2)
RBC # FLD: 13 % — SIGNIFICANT CHANGE UP (ref 10.3–14.5)
SAO2 % BLDV: 83.2 % — SIGNIFICANT CHANGE UP (ref 67–88)
SODIUM SERPL-SCNC: 137 MMOL/L — SIGNIFICANT CHANGE UP (ref 135–145)
WBC # BLD: 7.45 K/UL — SIGNIFICANT CHANGE UP (ref 3.8–10.5)
WBC # FLD AUTO: 7.45 K/UL — SIGNIFICANT CHANGE UP (ref 3.8–10.5)

## 2024-04-21 PROCEDURE — 99232 SBSQ HOSP IP/OBS MODERATE 35: CPT

## 2024-04-21 PROCEDURE — 99232 SBSQ HOSP IP/OBS MODERATE 35: CPT | Mod: GC

## 2024-04-21 RX ADMIN — Medication 50 MILLIGRAM(S): at 21:40

## 2024-04-21 RX ADMIN — Medication 125 MICROGRAM(S): at 12:34

## 2024-04-21 RX ADMIN — HEPARIN SODIUM 900 UNIT(S)/HR: 5000 INJECTION INTRAVENOUS; SUBCUTANEOUS at 19:59

## 2024-04-21 RX ADMIN — ISOSORBIDE DINITRATE 10 MILLIGRAM(S): 5 TABLET ORAL at 05:12

## 2024-04-21 RX ADMIN — Medication 50 MILLIGRAM(S): at 12:34

## 2024-04-21 RX ADMIN — ISOSORBIDE DINITRATE 10 MILLIGRAM(S): 5 TABLET ORAL at 21:39

## 2024-04-21 RX ADMIN — ISOSORBIDE DINITRATE 10 MILLIGRAM(S): 5 TABLET ORAL at 12:34

## 2024-04-21 RX ADMIN — HEPARIN SODIUM 900 UNIT(S)/HR: 5000 INJECTION INTRAVENOUS; SUBCUTANEOUS at 07:47

## 2024-04-21 RX ADMIN — HEPARIN SODIUM 900 UNIT(S)/HR: 5000 INJECTION INTRAVENOUS; SUBCUTANEOUS at 08:48

## 2024-04-21 RX ADMIN — Medication 50 MILLIGRAM(S): at 05:12

## 2024-04-21 RX ADMIN — BUMETANIDE 2 MILLIGRAM(S): 0.25 INJECTION INTRAMUSCULAR; INTRAVENOUS at 08:45

## 2024-04-21 NOTE — PROGRESS NOTE ADULT - SUBJECTIVE AND OBJECTIVE BOX
Patient is a 73y old  Female who presents with a chief complaint of BL LE swelling (19 Apr 2024 07:59)      INTERVAL HPI/OVERNIGHT EVENTS: seen and examined, NAD    T(C): 36.7 (04-21-24 @ 13:48), Max: 36.7 (04-21-24 @ 09:28)  HR: 81 (04-21-24 @ 13:48) (71 - 81)  BP: 95/57 (04-21-24 @ 13:48) (95/57 - 103/69)  RR: 18 (04-21-24 @ 13:48) (18 - 18)  SpO2: 96% (04-21-24 @ 13:48) (95% - 96%)      MEDICATIONS  (STANDING):  digoxin     Tablet 125 MICROGram(s) Oral every other day  heparin  Infusion.  Unit(s)/Hr (12 mL/Hr) IV Continuous <Continuous>  isosorbide   dinitrate Tablet (ISORDIL) 10 milliGRAM(s) Oral three times a day  metoprolol tartrate 50 milliGRAM(s) Oral every 6 hours    MEDICATIONS  (PRN):  acetaminophen     Tablet .. 650 milliGRAM(s) Oral every 6 hours PRN Temp greater or equal to 38C (100.4F), Mild Pain (1 - 3)  heparin   Injectable 2500 Unit(s) IV Push every 6 hours PRN For aPTT between 40 - 57  heparin   Injectable 5500 Unit(s) IV Push every 6 hours PRN For aPTT less than 40  ondansetron Injectable 4 milliGRAM(s) IV Push every 6 hours PRN Nausea and/or Vomiting  senna 2 Tablet(s) Oral at bedtime PRN Constipation          PAST MEDICAL & SURGICAL HISTORY:  Renal Calculus      Ureteral Calculus      Acute Renal Failure  9/2009      Wrist Fracture  CYNTHIA      Collar Bone Fracture      Rib Fractures      Kidney Stone removal  3/15/2011      C Section      Percutaneous Stone Extraction  Right      S/P Left Percutaneuos Stone extraction  01/26/2010, 04/20/2010          SOCIAL HISTORY  Alcohol:  Tobacco:  Illicit substance use:    FAMILY HISTORY:    REVIEW OF SYSTEMS:  CONSTITUTIONAL: No fever, weight loss, or fatigue  EYES: No eye pain, visual disturbances, or discharge  ENMT:  No difficulty hearing, tinnitus, vertigo; No sinus or throat pain  NECK: No pain or stiffness  RESPIRATORY: No cough, wheezing, chills or hemoptysis; No shortness of breath  CARDIOVASCULAR: No chest pain, palpitations, dizziness, or leg swelling  GASTROINTESTINAL: No abdominal or epigastric pain. No nausea, vomiting, or hematemesis; No diarrhea or constipation. No melena or hematochezia.  GENITOURINARY: No dysuria, frequency, hematuria, or incontinence  NEUROLOGICAL: No headaches, memory loss, loss of strength, numbness, or tremors  SKIN: No itching, burning, rashes, or lesions   LYMPH NODES: No enlarged glands  ENDOCRINE: No heat or cold intolerance; No hair loss  MUSCULOSKELETAL: No joint pain or swelling; No muscle, back, or extremity pain  PSYCHIATRIC: No depression, anxiety, mood swings, or difficulty sleeping  HEME/LYMPH: No easy bruising, or bleeding gums  ALLERY AND IMMUNOLOGIC: No hives or eczema    RADIOLOGY & ADDITIONAL TESTS:    Imaging Personally Reviewed:  [ ] YES  [ ] NO    Consultant(s) Notes Reviewed:  [ ] YES  [ ] NO    PHYSICAL EXAM:  GENERAL: NAD, well-groomed, well-developed  HEAD:  Atraumatic, Normocephalic  ENMT: No tonsillar erythema, exudates, or enlargement; Moist mucous membranes, Good dentition, No lesions  NECK: Supple, No JVD, Normal thyroid  NERVOUS SYSTEM:  Alert & Oriented X3, Good concentration; Motor Strength 5/5 B/L upper and lower extremities; DTRs 2+ intact and symmetric  CHEST/LUNG: Clear to percussion bilaterally; No rales, rhonchi, wheezing, or rubs  HEART: Regular rate and rhythm; No murmurs, rubs, or gallops  ABDOMEN: Soft, Nontender, Nondistended; Bowel sounds present  EXTREMITIES: edema present  LYMPH: No lymphadenopathy noted  SKIN: No rashes or lesions                          12.8   8.04  )-----------( 183      ( 20 Apr 2024 05:58 )             41.7             PTT - ( 20 Apr 2024 20:10 )  PTT:95.5 sec  136|85|101<103  4.0|29|6.02  9.4,--,--  04-20 @ 05:58

## 2024-04-21 NOTE — PROGRESS NOTE ADULT - PROBLEM SELECTOR PLAN 1
Pt. with renal failure in the setting of fluid overload. On review of [place], SCr noted to be persistently elevated from [id number]. SCr was elevated at 1.79 on 5/4/2011. SCr initially during this admission was elevated at 4.73 (4/8), and increased to 6.17 today. No interim labs available for review but pt. likely has underlying CKD. Documented urine output is ~900 over the past 24 hours, however pt non complaint with monitoring. UA showed proteinuria and evidence of infection (although 15 epithelial cells). UPCR is elevated at 1.1. Kidney sonogram showed evidence of medical renal disease, BL non-obstructing stones, and BL renal cysts.     -Pt. currently on IV Bumex infusion, s/p metolazone yesterday afternoon    -would defer additional metolazone today in setting of worsening azotemia    -rising serum bicarb noted (see below).  May need to consider switching bumex to intermittent dosing     -UO incompletely recorded as patient not saving all voids, weight is significantly down since admission    - Serologic workup thus far: HBsAg, HCV Ab, and HIV are non-reactive. Kappa/Lambda free light chain ratio is mildly elevated at 1.85 (acceptable range for degree of CKD). Weak IgG kappa band noted on SIFE.     -Renal sono noted with mild R hydro, no L hydro, nonobstructive calculi, renal cysts, with no retention    Monitor labs and urine output. Avoid nephrotoxins. Dose medications as per eGFR. Pt. with renal failure in the setting of fluid overload. On review of [place], SCr noted to be persistently elevated from 3216-8531. SCr was elevated at 1.79 on 5/4/2011. SCr initially during this admission was elevated at 4.73 (4/8), and increased to 6.17 today. No interim labs available for review but pt. likely has underlying CKD. Documented urine output is ~900 over the past 24 hours, however pt non complaint with monitoring. UA showed proteinuria and evidence of infection (although 15 epithelial cells). UPCR is elevated at 1.1. Kidney sonogram showed evidence of medical renal disease, BL non-obstructing stones, and BL renal cysts.     -bumex held last night due to hypotension.  intermittent dosing resumed today    -patient reports she continues to make good urine.  please attempt strict I/O    -patient increasingly at risk of needing renal replacement therapy.  Currently with K+WNL, stable respiratory status and mentating well, thus no urgent indication    - Serologic workup thus far: HBsAg, HCV Ab, and HIV are non-reactive. Kappa/Lambda free light chain ratio is mildly elevated at 1.85 (acceptable range for degree of CKD). Weak IgG kappa band noted on SIFE.     -Renal sono noted with mild R hydro, no L hydro, nonobstructive calculi, renal cysts, with no retention    Monitor labs and urine output. Avoid nephrotoxins. Dose medications as per eGFR.

## 2024-04-21 NOTE — PROGRESS NOTE ADULT - SUBJECTIVE AND OBJECTIVE BOX
[facility] Division of Kidney Diseases & Hypertension  FOLLOW UP NOTE  [phone]--------------------------------------------------------------------------------  Chief Complaint:Chronic kidney disease    24 hour events/subjective: Patient seen and examined this morning, no fresh complaints. reports feeling well. Reports urinating as per usual.     PAST HISTORY  --------------------------------------------------------------------------------  No significant changes to PMH, PSH, FHx, SHx, unless otherwise noted    ALLERGIES & MEDICATIONS  --------------------------------------------------------------------------------  Allergies    No Known Allergies    Intolerances    Standing Inpatient Medications  buMETAnide Injectable 2 milliGRAM(s) IV Push two times a day  digoxin     Tablet 125 MICROGram(s) Oral every other day  heparin  Infusion.  Unit(s)/Hr IV Continuous <Continuous>  hydrALAZINE 25 milliGRAM(s) Oral every 8 hours  isosorbide   dinitrate Tablet (ISORDIL) 10 milliGRAM(s) Oral three times a day  metoprolol tartrate 50 milliGRAM(s) Oral every 6 hours    PRN Inpatient Medications  acetaminophen     Tablet .. 650 milliGRAM(s) Oral every 6 hours PRN  heparin   Injectable 2500 Unit(s) IV Push every 6 hours PRN  heparin   Injectable 5500 Unit(s) IV Push every 6 hours PRN  ondansetron Injectable 4 milliGRAM(s) IV Push every 6 hours PRN  senna 2 Tablet(s) Oral at bedtime PRN    REVIEW OF SYSTEMS  --------------------------------------------------------------------------------  Gen: no fever  Respiratory: no sob  CV: no cp  GI: no pain, no n/v  : no complaints  MSK: no pain    All other systems were reviewed and are negative, except as noted.    VITALS/PHYSICAL EXAM  --------------------------------------------------------------------------------  T(C): 36.7 (04-21-24 @ 09:28), Max: 36.7 (04-21-24 @ 09:28)  HR: 71 (04-21-24 @ 09:28) (66 - 85)  BP: 103/69 (04-21-24 @ 09:28) (89/52 - 117/69)  RR: 18 (04-21-24 @ 09:28) (18 - 18)  SpO2: 95% (04-21-24 @ 09:28) (94% - 100%)  Wt(kg): --    04-20-24 @ 07:01  -  04-21-24 @ 07:00  --------------------------------------------------------  IN: 508 mL / OUT: 900 mL / NET: -392 mL    Physical Exam:  	Gen: NAD  	Pulm: CTA B/L ant/lat fields  	CV: RRR, S1S2  	Abd: +BS, soft, nontender/nondistended  	: No suprapubic tenderness.  no connor              Extremity: b/L LE pitting edema to upper shins  	Neuro: A&O x 3    LABS/STUDIES  --------------------------------------------------------------------------------              11.7   7.45  >-----------<  156      [04-21-24 @ 07:32]              37.6     137  |  85  |  103  ----------------------------<  106      [04-21-24 @ 07:32]  3.7   |  29  |  6.17        Ca     9.0     [04-21-24 @ 07:32]    PTT: 81.9       [04-21-24 @ 07:32]    Creatinine Trend:  SCr 6.17 [04-21 @ 07:32]  SCr 6.02 [04-20 @ 05:58]  SCr 5.64 [04-19 @ 16:05]  SCr 5.56 [04-18 @ 19:14]  SCr 5.24 [04-18 @ 07:25]    Urine Sodium 115      [04-18-24 @ 10:59]  Urine Potassium 14      [04-18-24 @ 10:59]  Urine Chloride 85      [04-18-24 @ 10:59]

## 2024-04-21 NOTE — PROGRESS NOTE ADULT - SUBJECTIVE AND OBJECTIVE BOX
Overnight Events:    - Hypotension (89/52) and LLE pain last night   - Given Albumin, beta blocker, hydralazine and Bumex held   - [place], PVCs on tele     Review Of Systems: No chest pain, shortness of breath, or palpitations            Current Meds:  acetaminophen     Tablet .. 650 milliGRAM(s) Oral every 6 hours PRN  buMETAnide Injectable 2 milliGRAM(s) IV Push two times a day  digoxin     Tablet 125 MICROGram(s) Oral every other day  heparin   Injectable 5500 Unit(s) IV Push every 6 hours PRN  heparin   Injectable 2500 Unit(s) IV Push every 6 hours PRN  heparin  Infusion.  Unit(s)/Hr IV Continuous <Continuous>  hydrALAZINE 25 milliGRAM(s) Oral every 8 hours  isosorbide   dinitrate Tablet (ISORDIL) 10 milliGRAM(s) Oral three times a day  metoprolol tartrate 50 milliGRAM(s) Oral every 6 hours  ondansetron Injectable 4 milliGRAM(s) IV Push every 6 hours PRN  senna 2 Tablet(s) Oral at bedtime PRN      Vitals:  T(F): 97.8 (04-21), Max: 97.8 (04-21)  HR: 77 (04-21) (66 - 85)  BP: 96/63 (04-21) (89/52 - 117/69)  RR: 18 (04-21)  SpO2: 95% (04-21)  I&O's Summary    20 Apr 2024 07:01  -  21 Apr 2024 07:00  --------------------------------------------------------  IN: 508 mL / OUT: 900 mL / NET: -392 mL        Physical Exam:    Appearance: No acute distress; well appearing  Eyes: pink conjunctiva  HEENT: AT/NC   Cardiovascular: Irregular rhythm, regular rate, S1, S2, no murmurs, rubs, or gallops; 1+ LE edema; no JVD  Respiratory: Clear to auscultation bilaterally  Gastrointestinal: soft, non-tender, non-distended   Musculoskeletal: No clubbing; no joint deformity   Neurologic: Normal speech, no facial asymmetry  Psychiatry: AAOx2, mood & affect appropriate  Skin: No rashes, ecchymoses, or cyanosis of exposed skin                           11.7   7.45  )-----------( 156      ( 21 Apr 2024 07:32 )             37.6     04-21    137  |  85<L>  |  103<H>  ----------------------------<  106<H>  3.7   |  29  |  6.17<H>    Ca    9.0      21 Apr 2024 07:32      PTT - ( 21 Apr 2024 07:32 )  PTT:81.9 sec

## 2024-04-21 NOTE — PROGRESS NOTE ADULT - ASSESSMENT
73F PMHx nephrolithiasis s/p multiple procedures, CKD and HTN. Admitted with new onset bl LE swelling and renal dysfunction.  Cardiology consulted for asymptomatic new-onset AFib w/ RVR and ADHF.       #1.  ADHF:  - remains volume overloaded although appears volume status appears to have improved   - On Bumex 2mg IV BID for diuresis, would defer to nephrology for additional recs for volume management; s/p PO metolazone   - BB as below  - c/w isosorbide dinitrate 10mg TID, hydralazine 25 mg TID as tolerated for afterload reduction in setting of severe MR and LV dysfunction.  Other GDMT is limited by current renal function  - eventual ischemic workup once euvolemic and HR controlled  - monitor tele  - replete K>4 Mg>2  - strict I/O's, daily standing weights    #2.  AF:  - rate controlled   - metoprolol  50 q6h HR <110  - s/p digoxin PO 0.125 mg,  mcg x1 4/17,  mcg x1 4/18. Continue digoxin 0.125 mg. every other day.    - c/w hep gtt for AC  - would not give amiodarone given unknown duration of atrial fibrillation    #3.  MR:  - repeat TTE to reassess degree of MR, now that ventricular rate is better controlled --> 4/19: LVEF 55 to 60%, normal RV function, trace MR (significantly improved compared to echo 4/11)        Dorcas Pearce MD  Cardiology Fellow     Recommendations are preliminary until cosigned by attending     Please refer to [email or web address] password: [email or web address]" for updated cardiology service schedule and contact information.      73F PMHx nephrolithiasis s/p multiple procedures, CKD and HTN. Admitted with new onset bl LE swelling and renal dysfunction.  Cardiology consulted for asymptomatic new-onset AFib w/ RVR and ADHF.       #1.  ADHF:  - remains volume overloaded although appears volume status appears to have improved   - On Bumex 2mg IV BID for diuresis, would defer to nephrology for additional recs for volume management; s/p PO metolazone   - BB as below  - c/w isosorbide dinitrate 10mg TID, hydralazine 25 mg TID as tolerated for afterload reduction in setting of severe MR and LV dysfunction.  Other GDMT is limited by current renal function  - consider eventual ischemic workup once euvolemic and HR controlled  - monitor tele  - replete K>4 Mg>2  - strict I/O's, daily standing weights    #2.  AF:  - rate controlled   - metoprolol  50 q6h HR <110  - s/p digoxin PO 0.125 mg,  mcg x1 4/17,  mcg x1 4/18. Continue digoxin 0.125 mg. every other day.    - c/w hep gtt for AC  - would not give amiodarone given unknown duration of atrial fibrillation    #3.  MR:  - repeat TTE to reassess degree of MR, now that ventricular rate is better controlled --> 4/19: LVEF 55 to 60%, normal RV function, trace MR (significantly improved compared to echo 4/11)        Dorcas Pearce MD  Cardiology Fellow     Recommendations are preliminary until cosigned by attending     Please refer to [email or web address] password: "[email or web address]" for updated cardiology service schedule and contact information.

## 2024-04-21 NOTE — PROGRESS NOTE ADULT - PROBLEM SELECTOR PLAN 2
Pt. with metabolic acidosis in the setting of renal failure, now resolved with SCO2 now 29--trending to alkalosis  -continue bumex gtt, defer additional metolazone for now  -if bicarb continues to rise, would consider d/c bumex gtt and switch to intermittent dosing  Monitor pH, and SCO2.    If you have any questions, please feel free to contact me  Albertina Frederick  Nephrology Fellow  [employer]/Page [phone]  (After 5pm or on weekends please page the on-call fellow) Pt. with metabolic acidosis in the setting of renal failure, now resolved with SCO2 now 29--trending to alkalosis  -bumex gtt switched to intermittent dosing.    If you have any questions, please feel free to contact me  Albertina Frederick  Nephrology Fellow  [employer]/Page [phone]  (After 5pm or on weekends please page the on-call fellow)

## 2024-04-21 NOTE — PROGRESS NOTE ADULT - ASSESSMENT
73F PMHx nephrolithiasis, HTN admitted with 2 weeks of bl LE swelling and newly reduced renal function with multiple nonobstructive renal stones on CT. EP consulted for new-onset AFib w/ RVR noted on admission EKG. Patient reports asymptomatic, denies dizziness/lightheadedness, CP, SOB, palpitations. Denies any cardiac history. Received metoprolol 5mg IV x1 for AFib at rates 130s with improvement to 110s.       NICO on CKD stage 4  Hypotension   Over Diuresis s   Metabolic Acidosis   Renal consulted   off sod bicarb       May need HD as per renal   Will follow Renal function off Bumex drip/ Bumex        CHF : HOLD BUMEX DRIP   BUMEX IV      cardio following     Afib with rvr   EP eval appreciated   Heparin drip   c/w lopressor , digoxin   cardio following     NICO on CKD stage 4  Metabolic Acidosis   Renal consulted   off sod bicarb     B/L nonobstructive kidney stones :  -stable     dispo: c/w heparin GTT

## 2024-04-22 LAB
ANION GAP SERPL CALC-SCNC: 23 MMOL/L — HIGH (ref 5–17)
APTT BLD: 71.4 SEC — HIGH (ref 24.5–35.6)
BUN SERPL-MCNC: 104 MG/DL — HIGH (ref 7–23)
CALCIUM SERPL-MCNC: 9.3 MG/DL — SIGNIFICANT CHANGE UP (ref 8.4–10.5)
CHLORIDE SERPL-SCNC: 88 MMOL/L — LOW (ref 96–108)
CO2 SERPL-SCNC: 27 MMOL/L — SIGNIFICANT CHANGE UP (ref 22–31)
CREAT SERPL-MCNC: 6.48 MG/DL — HIGH (ref 0.5–1.3)
EGFR: 6 ML/MIN/1.73M2 — LOW
GLUCOSE SERPL-MCNC: 86 MG/DL — SIGNIFICANT CHANGE UP (ref 70–99)
HCT VFR BLD CALC: 37.1 % — SIGNIFICANT CHANGE UP (ref 34.5–45)
HGB BLD-MCNC: 11.7 G/DL — SIGNIFICANT CHANGE UP (ref 11.5–15.5)
MAGNESIUM SERPL-MCNC: 2.4 MG/DL — SIGNIFICANT CHANGE UP (ref 1.6–2.6)
MCHC RBC-ENTMCNC: 30.5 PG — SIGNIFICANT CHANGE UP (ref 27–34)
MCHC RBC-ENTMCNC: 31.5 GM/DL — LOW (ref 32–36)
MCV RBC AUTO: 96.9 FL — SIGNIFICANT CHANGE UP (ref 80–100)
NRBC # BLD: 0 /100 WBCS — SIGNIFICANT CHANGE UP (ref 0–0)
PHOSPHATE SERPL-MCNC: 7 MG/DL — HIGH (ref 2.5–4.5)
PLATELET # BLD AUTO: 137 K/UL — LOW (ref 150–400)
POTASSIUM SERPL-MCNC: 3.9 MMOL/L — SIGNIFICANT CHANGE UP (ref 3.5–5.3)
POTASSIUM SERPL-SCNC: 3.9 MMOL/L — SIGNIFICANT CHANGE UP (ref 3.5–5.3)
RBC # BLD: 3.83 M/UL — SIGNIFICANT CHANGE UP (ref 3.8–5.2)
RBC # FLD: 12.9 % — SIGNIFICANT CHANGE UP (ref 10.3–14.5)
SODIUM SERPL-SCNC: 138 MMOL/L — SIGNIFICANT CHANGE UP (ref 135–145)
WBC # BLD: 7.47 K/UL — SIGNIFICANT CHANGE UP (ref 3.8–10.5)
WBC # FLD AUTO: 7.47 K/UL — SIGNIFICANT CHANGE UP (ref 3.8–10.5)

## 2024-04-22 PROCEDURE — 99233 SBSQ HOSP IP/OBS HIGH 50: CPT

## 2024-04-22 PROCEDURE — 99232 SBSQ HOSP IP/OBS MODERATE 35: CPT | Mod: GC

## 2024-04-22 RX ORDER — SODIUM CHLORIDE 9 MG/ML
1000 INJECTION INTRAMUSCULAR; INTRAVENOUS; SUBCUTANEOUS
Refills: 0 | Status: DISCONTINUED | OUTPATIENT
Start: 2024-04-22 | End: 2024-05-01

## 2024-04-22 RX ADMIN — HEPARIN SODIUM 900 UNIT(S)/HR: 5000 INJECTION INTRAVENOUS; SUBCUTANEOUS at 08:27

## 2024-04-22 RX ADMIN — ISOSORBIDE DINITRATE 10 MILLIGRAM(S): 5 TABLET ORAL at 05:13

## 2024-04-22 RX ADMIN — HEPARIN SODIUM 900 UNIT(S)/HR: 5000 INJECTION INTRAVENOUS; SUBCUTANEOUS at 19:53

## 2024-04-22 RX ADMIN — HEPARIN SODIUM 900 UNIT(S)/HR: 5000 INJECTION INTRAVENOUS; SUBCUTANEOUS at 07:38

## 2024-04-22 RX ADMIN — ISOSORBIDE DINITRATE 10 MILLIGRAM(S): 5 TABLET ORAL at 22:07

## 2024-04-22 RX ADMIN — Medication 50 MILLIGRAM(S): at 05:13

## 2024-04-22 RX ADMIN — SODIUM CHLORIDE 60 MILLILITER(S): 9 INJECTION INTRAMUSCULAR; INTRAVENOUS; SUBCUTANEOUS at 12:27

## 2024-04-22 RX ADMIN — Medication 50 MILLIGRAM(S): at 22:07

## 2024-04-22 RX ADMIN — Medication 50 MILLIGRAM(S): at 09:59

## 2024-04-22 NOTE — PROGRESS NOTE ADULT - ASSESSMENT
73F PMHx nephrolithiasis, HTN admitted with 2 weeks of bl LE swelling and newly reduced renal function with multiple nonobstructive renal stones on CT. EP consulted for new-onset AFib w/ RVR noted on admission EKG. Patient reports asymptomatic, denies dizziness/lightheadedness, CP, SOB, palpitations. Denies any cardiac history. Received metoprolol 5mg IV x1 for AFib at rates 130s with improvement to 110s.       NICO on CKD stage 4  Hypotension   Over Diuresis s   Metabolic Acidosis   Renal consulted   off sod bicarb   Off Diueretics   Gentle Hydration as per renal as of now       May need HD as per renal   Will follow Renal function off Bumex drip/ Bumex        CHF : HOLD BUMEX DRIP   BUMEX IV      cardio following     Afib with rvr   EP eval appreciated   Heparin drip   c/w lopressor , digoxin   cardio following     NICO on CKD stage 4  Metabolic Acidosis   Renal consulted   off sod bicarb     B/L nonobstructive kidney stones :  -stable     dispo: c/w heparin GTT

## 2024-04-22 NOTE — PROGRESS NOTE ADULT - SUBJECTIVE AND OBJECTIVE BOX
Patient is a 73y old  Female who presents with a chief complaint of BL LE swelling (19 Apr 2024 07:59)      INTERVAL HPI/OVERNIGHT EVENTS: seen and examined, NAD      T(C): 36.4 (04-22-24 @ 13:31), Max: 36.6 (04-22-24 @ 05:05)  HR: 73 (04-22-24 @ 15:44) (64 - 73)  BP: 93/59 (04-22-24 @ 15:44) (88/51 - 104/65)  RR: 18 (04-22-24 @ 13:31) (18 - 18)  SpO2: 94% (04-22-24 @ 13:31) (94% - 94%)      MEDICATIONS  (STANDING):  digoxin     Tablet 125 MICROGram(s) Oral every other day  heparin  Infusion.  Unit(s)/Hr (12 mL/Hr) IV Continuous <Continuous>  isosorbide   dinitrate Tablet (ISORDIL) 10 milliGRAM(s) Oral three times a day  metoprolol tartrate 50 milliGRAM(s) Oral every 6 hours  sodium chloride 0.9%. 1000 milliLiter(s) (60 mL/Hr) IV Continuous <Continuous>    MEDICATIONS  (PRN):  acetaminophen     Tablet .. 650 milliGRAM(s) Oral every 6 hours PRN Temp greater or equal to 38C (100.4F), Mild Pain (1 - 3)  heparin   Injectable 5500 Unit(s) IV Push every 6 hours PRN For aPTT less than 40  heparin   Injectable 2500 Unit(s) IV Push every 6 hours PRN For aPTT between 40 - 57  ondansetron Injectable 4 milliGRAM(s) IV Push every 6 hours PRN Nausea and/or Vomiting  senna 2 Tablet(s) Oral at bedtime PRN Constipation        PAST MEDICAL & SURGICAL HISTORY:  Renal Calculus      Ureteral Calculus      Acute Renal Failure  9/2009      Wrist Fracture  CYNTHIA      Collar Bone Fracture      Rib Fractures      Kidney Stone removal  3/15/2011      C Section      Percutaneous Stone Extraction  Right      S/P Left Percutaneuos Stone extraction  01/26/2010, 04/20/2010          SOCIAL HISTORY  Alcohol:  Tobacco:  Illicit substance use:    FAMILY HISTORY:    REVIEW OF SYSTEMS:  CONSTITUTIONAL: No fever, weight loss, or fatigue  EYES: No eye pain, visual disturbances, or discharge  ENMT:  No difficulty hearing, tinnitus, vertigo; No sinus or throat pain  NECK: No pain or stiffness  RESPIRATORY: No cough, wheezing, chills or hemoptysis; No shortness of breath  CARDIOVASCULAR: No chest pain, palpitations, dizziness, or leg swelling  GASTROINTESTINAL: No abdominal or epigastric pain. No nausea, vomiting, or hematemesis; No diarrhea or constipation. No melena or hematochezia.  GENITOURINARY: No dysuria, frequency, hematuria, or incontinence  NEUROLOGICAL: No headaches, memory loss, loss of strength, numbness, or tremors  SKIN: No itching, burning, rashes, or lesions   LYMPH NODES: No enlarged glands  ENDOCRINE: No heat or cold intolerance; No hair loss  MUSCULOSKELETAL: No joint pain or swelling; No muscle, back, or extremity pain  PSYCHIATRIC: No depression, anxiety, mood swings, or difficulty sleeping  HEME/LYMPH: No easy bruising, or bleeding gums  ALLERY AND IMMUNOLOGIC: No hives or eczema    RADIOLOGY & ADDITIONAL TESTS:    Imaging Personally Reviewed:  [ ] YES  [ ] NO    Consultant(s) Notes Reviewed:  [ ] YES  [ ] NO    PHYSICAL EXAM:  GENERAL: NAD, well-groomed, well-developed  HEAD:  Atraumatic, Normocephalic  ENMT: No tonsillar erythema, exudates, or enlargement; Moist mucous membranes, Good dentition, No lesions  NECK: Supple, No JVD, Normal thyroid  NERVOUS SYSTEM:  Alert & Oriented X3, Good concentration; Motor Strength 5/5 B/L upper and lower extremities; DTRs 2+ intact and symmetric  CHEST/LUNG: Clear to percussion bilaterally; No rales, rhonchi, wheezing, or rubs  HEART: Regular rate and rhythm; No murmurs, rubs, or gallops  ABDOMEN: Soft, Nontender, Nondistended; Bowel sounds present  EXTREMITIES: edema present  LYMPH: No lymphadenopathy noted  SKIN: No rashes or lesions                              11.7   7.47  )-----------( 137      ( 22 Apr 2024 07:03 )             37.1             PTT - ( 22 Apr 2024 07:06 )  PTT:71.4 sec  138|88|104<86  3.9|27|6.48  9.3,2.4,7.0  04-22 @ 07:03

## 2024-04-22 NOTE — PROGRESS NOTE ADULT - NS ATTEND AMEND GEN_ALL_CORE FT
Still with active acute renal failure and rising creatinine, now receiving fluid boluses. WIll plan for DCCV with clinically stable.
Echo noted... severe MR, LV dysfunction    will need medical optimization and likely  guided DCCV.  Hopefully there is a tachy component to the myopathy that will improve and with remodelling maybe even less MR.  Cardiology consult called.

## 2024-04-22 NOTE — PROGRESS NOTE ADULT - SUBJECTIVE AND OBJECTIVE BOX
[facility] DIVISION OF KIDNEY DISEASES AND HYPERTENSION -- FOLLOW UP NOTE  --------------------------------------------------------------------------------  Chief Complaint:    24 hour events/subjective:        PAST HISTORY  --------------------------------------------------------------------------------  No significant changes to PMH, PSH, FHx, SHx, unless otherwise noted    ALLERGIES & MEDICATIONS  --------------------------------------------------------------------------------  Allergies    No Known Allergies    Intolerances      Standing Inpatient Medications  digoxin     Tablet 125 MICROGram(s) Oral every other day  heparin  Infusion.  Unit(s)/Hr IV Continuous <Continuous>  isosorbide   dinitrate Tablet (ISORDIL) 10 milliGRAM(s) Oral three times a day  metoprolol tartrate 50 milliGRAM(s) Oral every 6 hours  sodium chloride 0.9%. 1000 milliLiter(s) IV Continuous <Continuous>    PRN Inpatient Medications  acetaminophen     Tablet .. 650 milliGRAM(s) Oral every 6 hours PRN  heparin   Injectable 5500 Unit(s) IV Push every 6 hours PRN  heparin   Injectable 2500 Unit(s) IV Push every 6 hours PRN  ondansetron Injectable 4 milliGRAM(s) IV Push every 6 hours PRN  senna 2 Tablet(s) Oral at bedtime PRN      REVIEW OF SYSTEMS  --------------------------------------------------------------------------------  Gen: No weight changes, fatigue, fevers/chills, weakness  Skin: No rashes  Head/Eyes/Ears/Mouth: No headache; Normal hearing; Normal vision w/o blurriness; No sinus pain/discomfort, sore throat  Respiratory: No dyspnea, cough, wheezing, hemoptysis  CV: No chest pain, PND, orthopnea  GI: No abdominal pain, diarrhea, constipation, nausea, vomiting, melena, hematochezia  : No increased frequency, dysuria, hematuria, nocturia  MSK: No joint pain/swelling; no back pain; no edema  Neuro: No dizziness/lightheadedness, weakness, seizures, numbness, tingling  Heme: No easy bruising or bleeding  Endo: No heat/cold intolerance  Psych: No significant nervousness, anxiety, stress, depression    All other systems were reviewed and are negative, except as noted.    VITALS/PHYSICAL EXAM  --------------------------------------------------------------------------------  T(C): 36.4 (04-22-24 @ 09:27), Max: 37.2 (04-22-24 @ 01:17)  HR: 68 (04-22-24 @ 09:27) (60 - 81)  BP: 104/65 (04-22-24 @ 09:27) (95/57 - 104/65)  RR: 18 (04-22-24 @ 09:27) (18 - 18)  SpO2: 94% (04-22-24 @ 09:27) (94% - 96%)  Wt(kg): --        04-21-24 @ 07:01  -  04-22-24 @ 07:00  --------------------------------------------------------  IN: 1305 mL / OUT: 650 mL / NET: 655 mL      Physical Exam:  	Gen: NAD, well-appearing  	HEENT: PERRL, supple neck, clear oropharynx  	Pulm: CTA B/L  	CV: RRR, S1S2; no rub  	Back: No spinal or CVA tenderness; no sacral edema  	Abd: +BS, soft, nontender/nondistended  	: No suprapubic tenderness  	UE: Warm, FROM, no clubbing, intact strength; no edema; no asterixis  	LE: Warm, FROM, no clubbing, intact strength; no edema  	Neuro: No focal deficits, intact gait  	Psych: Normal affect and mood  	Skin: Warm, without rashes  	Vascular access:    LABS/STUDIES  --------------------------------------------------------------------------------              11.7   7.47  >-----------<  137      [04-22-24 @ 07:03]              37.1     138  |  88  |  104  ----------------------------<  86      [04-22-24 @ 07:03]  3.9   |  27  |  6.48        Ca     9.3     [04-22-24 @ 07:03]      Mg     2.4     [04-22-24 @ 07:03]      Phos  7.0     [04-22-24 @ 07:03]        PTT: 71.4       [04-22-24 @ 07:06]      Creatinine Trend:  SCr 6.48 [04-22 @ 07:03]  SCr 6.17 [04-21 @ 07:32]  SCr 6.02 [04-20 @ 05:58]  SCr 5.64 [04-19 @ 16:05]  SCr 5.56 [04-18 @ 19:14]    Urinalysis - [04-22-24 @ 07:03]      Color  / Appearance  / SG  / pH       Gluc 86 / Ketone   / Bili  / Urobili        Blood  / Protein  / Leuk Est  / Nitrite       RBC  / WBC  / Hyaline  / Gran  / Sq Epi  / Non Sq Epi  / Bacteria     Urine Sodium 115      [04-18-24 @ 10:59]  Urine Potassium 14      [04-18-24 @ 10:59]  Urine Chloride 85      [04-18-24 @ 10:59]    TSH 0.65      [04-09-24 @ 00:27]    HBsAg Nonreact      [04-10-24 @ 07:11]  HCV 0.05, Nonreact      [04-09-24 @ 07:25]  HIV Nonreact      [04-09-24 @ 20:21]    Free Light Chains: kappa 5.19, lambda 2.81, ratio = 1.85      [04-10 @ 07:11]  Immunofixation Serum:   Weak  IgG Kappa Band Identified      Reference Range: None Detected      [04-10-24 @ 07:11]

## 2024-04-22 NOTE — PROGRESS NOTE ADULT - ASSESSMENT
73 year old Female PMHx nephrolithiasis, HTN admitted with new onset bl LE swelling and renal dysfunction. Cardiology consulted for asymptomatic new-onset AFib w/ RVR  and ADHF. Overall patient initially came in with volume overload thought it initially to be related to renal dysfunction, Initial echo revealed significantly reduction in LV systolic function and severe MR.  Repeat TTE 4/19: LVEF 55 to 60%, normal RV function, trace MR (significantly improved compared to echo 4/11). EP recalled today for evaluation for /DCCV.       1.  Persistent AFib, rate controlled.  Unclear when AFib initiated   2.  ADHF, LVEF improved to 55-60%  3.  Trace MR on repeat echo  4.  NICO on CKD, creatinine 6.48 today     EP recalled today for evaluation for /DCCV, patient is in NICO on CKD with creatinine 6.48 requiring IV hydration post diuresis  - currently on metoprolol to 50mg q6h for rate control, AF rates 70-90's   - continue to avoid antiarrythmic given unknown duration of atrial fibrillation to avoid chemical cardioversion  - On IV Bumex intermittently due to hemodynamics, last dose 4/21  - currently on IV Heparin for A/C   - currently on digoxin 0.125 mg every other day  - monitor tele  - maintain K>4 Mg>2  - EP will sign off as patient is not optimized for /DCCV in setting of NICO on CKD, please recall toward end of hospitalization once patient is medically optimized for /DCCV.  Nephrology following closely.    SHAQ Mcintyre AGACNP-BC  [phone]   - strict I/O's, daily standing weights

## 2024-04-22 NOTE — PROGRESS NOTE ADULT - ASSESSMENT
73F PMHx nephrolithiasis s/p multiple procedures, CKD and HTN. Admitted with new onset bl LE swelling and renal dysfunction.  Cardiology consulted for asymptomatic new-onset AFib w/ RVR and ADHF.       #1.  ADHF:  - remains volume overloaded although appears volume status appears to have improved   - On Bumex 2mg IV BID for diuresis, would defer to nephrology for additional recs for volume management; s/p PO metolazone   - BB as below  - c/w isosorbide dinitrate 10mg TID, hydralazine 25 mg TID as tolerated for afterload reduction in setting of severe MR and LV dysfunction.  Other GDMT is limited by current renal function  - consider eventual ischemic workup once euvolemic and HR controlled  - monitor tele  - replete K>4 Mg>2  - strict I/O's, daily standing weights    #2.  AF:  - rate controlled   - metoprolol  50 q6h HR <110  - s/p digoxin PO 0.125 mg,  mcg x1 4/17,  mcg x1 4/18. Continue digoxin 0.125 mg. every other day.    - c/w hep gtt for AC  - would not give amiodarone given unknown duration of atrial fibrillation    #3.  MR:  - repeat TTE to reassess degree of MR, now that ventricular rate is better controlled --> 4/19: LVEF 55 to 60%, normal RV function, trace MR (significantly improved compared to echo 4/11)           73F PMHx nephrolithiasis s/p multiple procedures, CKD and HTN. Admitted with new onset bl LE swelling and renal dysfunction.  Cardiology consulted for asymptomatic new-onset AFib w/ RVR and ADHF.       #1.  ADHF:  - remains volume overloaded although appears volume status appears to have improved   - On IV Bumex intermittently due to hemodynamics, would defer to nephrology for additional recs for volume management; s/p PO metolazone   - BB as below  - c/w isosorbide dinitrate 10mg TID, hydralazine 25 mg TID as tolerated for afterload reduction in setting of severe MR and LV dysfunction.  Other GDMT is limited by current renal function  - consider eventual ischemic workup once euvolemic and HR controlled  - monitor tele  - replete K>4 Mg>2  - strict I/O's, daily standing weights    #2.  AF:  - rate controlled   - metoprolol  50 q6h HR <110  - continue digoxin 0.125 mg. every other day; s/p digoxin PO 0.125 mg,  mcg x1 4/17,  mcg x1 4/18  - c/w hep gtt for AC  - would not give amiodarone given unknown duration of atrial fibrillation  - will reach out to EP for further recs given improvement in volume status and rates    #3.  MR: likely 2/2 volume overload and HFrEF  - repeat TTE 4/19: LVEF 55 to 60%, normal RV function, trace MR (significantly improved compared to echo 4/11)           73F PMHx nephrolithiasis s/p multiple procedures, CKD and HTN. Admitted with new onset bl LE swelling and renal dysfunction.  Cardiology consulted for asymptomatic new-onset AFib w/ RVR and ADHF.       #1.  ADHF:  - vvolume status appears to have improved from the weekend, LE edema improved and   - On IV Bumex intermittently due to hemodynamics, last dose 4/21; would defer to nephrology for additional recs for volume management; s/p PO metolazone   - BB as below  - c/w isosorbide dinitrate 10mg TID, hydralazine 25 mg TID as tolerated for afterload reduction in setting of severe MR and LV dysfunction.  Other GDMT is limited by current renal function  - consider eventual ischemic workup once euvolemic and HR controlled  - monitor tele  - replete K>4 Mg>2  - strict I/O's, daily standing weights    #2.  AF:  - rate controlled   - metoprolol  50 q6h HR <110  - continue digoxin 0.125 mg. every other day; s/p digoxin PO 0.125 mg,  mcg x1 4/17,  mcg x1 4/18  - c/w hep gtt for AC  - would not give amiodarone given unknown duration of atrial fibrillation  - will reach out to EP for further recs given improvement in volume status and rates    #3.  MR: likely 2/2 volume overload and HFrEF  - repeat TTE 4/19: LVEF 55 to 60%, normal RV function, trace MR (significantly improved compared to echo 4/11)           73F PMHx nephrolithiasis s/p multiple procedures, CKD and HTN. Admitted with new onset bl LE swelling and renal dysfunction.  Cardiology consulted for asymptomatic new-onset AFib w/ RVR and ADHF.       #1.  ADHF:  - vvolume status appears to have improved from the weekend, LE edema improved and   - On IV Bumex intermittently due to hemodynamics, last dose 4/21; would defer to nephrology for additional recs for volume management; s/p PO metolazone   - BB as below  - c/w isosorbide dinitrate 10mg TID, hydralazine 25 mg TID as tolerated for afterload reduction in setting of severe MR and LV dysfunction.  Other GDMT is limited by current renal function  - ischemic workup once euvolemic and HR controlled, can be deferred to outpatient after management of AF   - monitor tele  - replete K>4 Mg>2  - strict I/O's, daily standing weights    #2.  AF:  - rate controlled   - metoprolol  50 q6h HR <110  - continue digoxin 0.125 mg. every other day; s/p digoxin PO 0.125 mg,  mcg x1 4/17,  mcg x1 4/18  - c/w hep gtt for AC  - would not give amiodarone given unknown duration of atrial fibrillation  - will reach out to EP for further recs given improvement in volume status and rates    #3.  MR: likely 2/2 volume overload and HFrEF  - repeat TTE 4/19: LVEF 55 to 60%, normal RV function, trace MR (significantly improved compared to echo 4/11)           73F PMHx nephrolithiasis s/p multiple procedures, CKD and HTN. Admitted with new onset bl LE swelling and renal dysfunction.  Cardiology consulted for asymptomatic new-onset AFib w/ RVR and ADHF.       #1.  ADHF:  - volume status appears to have improved from the weekend, LE edema improved and without JVD, HDS on RA   - On IV Bumex intermittently due to hemodynamics, last dose 4/21; would defer to nephrology for additional recs for volume management; s/p PO metolazone   - BB as below  - c/w isosorbide dinitrate 10mg TID, hydralazine 25 mg TID as tolerated for afterload reduction in setting of severe MR and LV dysfunction.  Other GDMT is limited by current renal function  - ischemic workup once euvolemic and HR controlled, can be deferred to outpatient pending further management of AF   - monitor tele  - replete K>4 Mg>2  - strict I/O's, daily standing weights    #2.  AF:  - rate controlled   - metoprolol  50 q6h HR <110  - continue digoxin 0.125 mg. every other day; s/p digoxin PO 0.125 mg,  mcg x1 4/17,  mcg x1 4/18  - c/w hep gtt for AC  - would not give amiodarone given unknown duration of atrial fibrillation  - will reach out to EP for further recs given improvement in volume status and rates    #3.  MR: likely 2/2 volume overload and HFrEF  - repeat TTE 4/19: LVEF 55 to 60%, normal RV function, trace MR (significantly improved compared to echo 4/11)

## 2024-04-22 NOTE — PROGRESS NOTE ADULT - SUBJECTIVE AND OBJECTIVE BOX
24H hour events: Patient remains in AFib rate controlled 70-90's, asymptomatic.  Patient is currently receiving IVF for elevated creatinine.     MEDICATIONS:  digoxin     Tablet 125 MICROGram(s) Oral every other day  heparin   Injectable 2500 Unit(s) IV Push every 6 hours PRN  heparin   Injectable 5500 Unit(s) IV Push every 6 hours PRN  heparin  Infusion.  Unit(s)/Hr IV Continuous <Continuous>  isosorbide   dinitrate Tablet (ISORDIL) 10 milliGRAM(s) Oral three times a day  metoprolol tartrate 50 milliGRAM(s) Oral every 6 hours    acetaminophen     Tablet .. 650 milliGRAM(s) Oral every 6 hours PRN  ondansetron Injectable 4 milliGRAM(s) IV Push every 6 hours PRN    senna 2 Tablet(s) Oral at bedtime PRN    sodium chloride 0.9%. 1000 milliLiter(s) IV Continuous <Continuous>    REVIEW OF SYSTEMS:  Complete 12point ROS negative.    PHYSICAL EXAM:  T(C): 36.4 (04-22-24 @ 17:16), Max: 37.2 (04-22-24 @ 01:17)  HR: 76 (04-22-24 @ 17:16) (64 - 78)  BP: 95/60 (04-22-24 @ 17:16) (88/51 - 104/65)  RR: 18 (04-22-24 @ 17:16) (18 - 18)  SpO2: 94% (04-22-24 @ 17:16) (94% - 95%)    21 Apr 2024 07:01  -  22 Apr 2024 07:00  --------------------------------------------------------  IN: 1305 mL / OUT: 650 mL / NET: 655 mL    22 Apr 2024 07:01  -  22 Apr 2024 17:35  --------------------------------------------------------  IN: 1050 mL / OUT: 450 mL / NET: 600 mL    Appearance: Normal	  HEENT:   Normal oral mucosa, PERRL, EOMI	  Cardiovascular: Normal S1 S2, irregular.  No JVD, No murmurs, No edema  Respiratory: Lungs clear to auscultation	  Psychiatry: A & O x 3, Mood & affect appropriate  Gastrointestinal:  Soft, Non-tender, + BS	  Skin: No rashes, No ecchymoses, No cyanosis	  Extremities: Normal range of motion, No clubbing, cyanosis or edema  Vascular: Peripheral pulses palpable 2+ bilaterally      LABS:	 	    CBC Full  -  ( 22 Apr 2024 07:03 )  WBC Count : 7.47 K/uL  Hemoglobin : 11.7 g/dL  Hematocrit : 37.1 %  Platelet Count - Automated : 137 K/uL  Mean Cell Volume : 96.9 fl  Mean Cell Hemoglobin : 30.5 pg  Mean Cell Hemoglobin Concentration : 31.5 gm/dL  Auto Neutrophil # : x  Auto Lymphocyte # : x  Auto Monocyte # : x  Auto Eosinophil # : x  Auto Basophil # : x  Auto Neutrophil % : x  Auto Lymphocyte % : x  Auto Monocyte % : x  Auto Eosinophil % : x  Auto Basophil % : x    04-22    138  |  88<L>  |  104<H>  ----------------------------<  86  3.9   |  27  |  6.48<H>  04-21    137  |  85<L>  |  103<H>  ----------------------------<  106<H>  3.7   |  29  |  6.17<H>    Ca    9.3      22 Apr 2024 07:03  Ca    9.0      21 Apr 2024 07:32  Phos  7.0     04-22  Mg     2.4     04-22    TELEMETRY: 	  AFib 70's- 90's     TTE:     TRANSTHORACIC ECHOCARDIOGRAM REPORT  ________________________________________________________________________________                                      _______       Pt. Name:       ADRIENNE ZUÑIGA ISSA  Study Date:    4/19/2024  MRN:            [id number]            YOB: 1950  Accession #:    [id number]           Age:           73 years  Account#:       [id number]        Gender:        F  Heart Rate:     86 bpm              Height:        62.00 in (157.48 cm)  Rhythm:         atrial fibrillation Weight:        147.00 lb (66.68 kg)  Blood Pressure: 104/70 mmHg         BSA/BMI:       1.68 m² / 26.89 kg/m²  ________________________________________________________________________________________  Referring Physician:    [id number] Krystal Lynne  Interpreting Physician: Virginia Patel  Primary Sonographer:    Lita Patiño [facility]    CPT:                ECHO TTE W CON FU LTD - .m;LIMITED SPECTRAL -                      04121.m;DEFINITY ECHO CONTRAST PER ML WASTED -                     .m;DEFINITY ECHO CONTRAST PER ML - .m  Indication(s):      Heart failure, unspecified - I50.9  Procedure:          Limited transthoracic echocardiogram.  Ordering Location:  [facility]  Admission Status:   Inpatient  Contrast Injection: Verbal consent was obtained for injection of Ultrasonic                      Enhancing Agent following a discussion of risks and                      benefits.                      Endocardial visualization enhanced with 2 ml of Definity                   Ultrasound enhancing agent (Lot#:[id number] Exp.Date:03/2025                      Discarded Dose:8ml).  UEA Reaction:       Patient had no adverse reaction after injection of                      Ultrasound Enhancing Agent.  Study Information:  Image quality for this study is less than ideal.     CONCLUSIONS:      1. Left ventricular cavity is normal in size. Left ventricular wall thickness is normal. Left ventricular systolic function is normal with an ejection fraction visually estimated at 55 to 60 %. There are no regional wall motion abnormalities seen.   2. Analysis of left ventricular diastolic function and filling pressure is made challenging by the presence of atrial fibrillation.   3. There is no evidence of a left ventricular thrombus.   4. Normal right ventricular cavity size and normal systolic function.   5. The left atrium is severely dilated.   6. The right atrium is dilated.   7. Normal left and right atrial size.   8. No significant valvular disease.   9. Estimated pulmonary artery systolic pressure is 22 mmHg.  10. No pericardial effusion seen.    ________________________________________________________________________________________  FINDINGS:     Left Ventricle:  The left ventricular cavity is normal in size. Left ventricular wall thickness is normal. Left ventricular systolic function is normal with an ejection fraction visually estimated at 55 to 60%. There are no regional wall motion abnormalities seen. Analysis of left ventricular diastolic function and filling pressure is made challenging by the presence of atrial fibrillation. No left ventricular hypertrophy. There is no evidence of a left ventricular thrombus.     Right Ventricle:  The right ventricular cavity is normal in size and normal systolic function. Tricuspid annular plane systolic excursion (TAPSE) is 1.6 cm (normal >=1.7 cm).     Left Atrium:  The left atrium is severely dilated.     Right Atrium:  The right atrium is dilated.     Interatrial Septum:  The interatrial septum was not well visualized.     Aortic Valve:  The aortic valve is tricuspid with normal leaflet excursion. There is no aortic valve stenosis. There is no evidenceof aortic regurgitation.     Mitral Valve:  Structurally normal mitral valve with normal leaflet excursion. There is no mitral valve stenosis. There is trace mitral regurgitation.     Tricuspid Valve:  Structurally normal tricuspid valve with normal leaflet excursion. There is mild to moderate tricuspid regurgitation. Estimated pulmonary artery systolic pressure is 22 mmHg.     Pulmonic Valve:  Structurally normal pulmonic valve with normal leaflet excursion. There is trace pulmonic regurgitation.     Aorta:  The aortic root and proximal aorta are not well visualized. The aortic root appears normal in size.     Pericardium:  No pericardial effusion seen.     Systemic Veins:  The inferior vena cava is normal in size (normal <2.1cm) with normalinspiratory collapse (normal >50%) consistent with normal right atrial pressure (~3, range 0-5mmHg).  ____________________________________________________________________  QUANTITATIVE DATA:  Left Ventricle Measurements: (Indexed to BSA)     IVSd (2D):   0.8 cm  LVPWd (2D):  0.9 cm  LVIDd (2D):  4.6 cm  LVIDs (2D):  3.5 cm  LV Mass:     133 g  79.4 g/m²  Visualized LV EF%: 55 to 60%     e' lateral: 12.40 cm/s  e' medial:  8.97 cm/s    Right Ventricle Measurements:     TAPSE: 1.6 cm    Tricuspid Valve Measurements:     TR Vmax:          2.2 m/s  TR Peak Gradient: 18.8 mmHg  RA Pressure:      3 mmHg  PASP:             22 mmHg    Electronically signedon 4/19/2024 at 3:15:40 PM by Virginia Patel     *** Final ***

## 2024-04-22 NOTE — PROGRESS NOTE ADULT - SUBJECTIVE AND OBJECTIVE BOX
*Incomplete Note*    Patient seen and examined at bedside.    Overnight Events: No acute events overnight. Denies chest pain or SOB    Telemetry:    REVIEW OF SYSTEMS:  Constitutional:     [ x] negative [ ] fevers [ ] chills [ ] weight loss [ ] weight gain  HEENT:                  [ x] negative [ ] dry eyes [ ] eye irritation [ ] postnasal drip [ ] nasal congestion  CV:                         [x ] negative  [ ] chest pain [ ] orthopnea [ ] palpitations [ ] murmur  Resp:                     [ x] negative [ ] cough [ ] shortness of breath [ ] dyspnea [ ] wheezing [ ] sputum [ ]hemoptysis  GI:                          [ x] negative [ ] nausea [ ] vomiting [ ] diarrhea [ ] constipation [ ] abd pain [ ] dysphagia   :                        [ x] negative [ ] dysuria [ ] nocturia [ ] hematuria [ ] increased urinary frequency  Musculoskeletal: [ x] negative [ ] back pain [ ] myalgias [ ] arthralgias [ ] fracture  Skin:                       [ x] negative [ ] rash [ ] itch  Neurological:        [ x] negative [ ] headache [ ] dizziness [ ] syncope [ ] weakness [ ] numbness  Psychiatric:           [ x] negative [ ] anxiety [ ] depression  Endocrine:            [ x] negative [ ] diabetes [ ] thyroid problem  Heme/Lymph:      [ x] negative [ ] anemia [ ] bleeding problem  Allergic/Immune: [x ] negative [ ] itchy eyes [ ] nasal discharge [ ] hives [ ] angioedema    [x ] All other systems negative  [ ] Unable to assess ROS due to    Current Meds:  acetaminophen     Tablet .. 650 milliGRAM(s) Oral every 6 hours PRN  digoxin     Tablet 125 MICROGram(s) Oral every other day  heparin   Injectable 5500 Unit(s) IV Push every 6 hours PRN  heparin   Injectable 2500 Unit(s) IV Push every 6 hours PRN  heparin  Infusion.  Unit(s)/Hr IV Continuous <Continuous>  isosorbide   dinitrate Tablet (ISORDIL) 10 milliGRAM(s) Oral three times a day  metoprolol tartrate 50 milliGRAM(s) Oral every 6 hours  ondansetron Injectable 4 milliGRAM(s) IV Push every 6 hours PRN  senna 2 Tablet(s) Oral at bedtime PRN      PAST MEDICAL & SURGICAL HISTORY:  Renal Calculus      Ureteral Calculus      Acute Renal Failure  9/2009      Wrist Fracture  CYNTHIA      Collar Bone Fracture      Rib Fractures      Kidney Stone removal  3/15/2011      C Section      Percutaneous Stone Extraction  Right      S/P Left Percutaneuos Stone extraction  01/26/2010, 04/20/2010          Vitals:  T(F): 97.9 (04-22), Max: 98.9 (04-22)  HR: 64 (04-22) (60 - 81)  BP: 101/66 (04-22) (95/57 - 104/60)  RR: 18 (04-22)  SpO2: 94% (04-22)  I&O's Summary    20 Apr 2024 07:01  -  21 Apr 2024 07:00  --------------------------------------------------------  IN: 508 mL / OUT: 900 mL / NET: -392 mL    21 Apr 2024 07:01  -  22 Apr 2024 06:29  --------------------------------------------------------  IN: 1305 mL / OUT: 650 mL / NET: 655 mL        Physical Exam:  Appearance: No acute distress; well appearing  Eyes: PERRL, EOMI, pink conjunctiva  HENT: Normal oral mucosa  Cardiovascular: RRR, S1, S2, no murmurs, rubs, or gallops; no edema; no JVD  Respiratory: Clear to auscultation bilaterally  Gastrointestinal: soft, non-tender, non-distended with normal bowel sounds  Musculoskeletal: No clubbing; no joint deformity   Neurologic: Non-focal  Lymphatic: No lymphadenopathy  Psychiatry: AAOx3, mood & affect appropriate  Skin: No rashes, ecchymoses, or cyanosis                          11.7   7.45  )-----------( 156      ( 21 Apr 2024 07:32 )             37.6     04-21    137  |  85<L>  |  103<H>  ----------------------------<  106<H>  3.7   |  29  |  6.17<H>    Ca    9.0      21 Apr 2024 07:32      PTT - ( 21 Apr 2024 07:32 )  PTT:81.9 sec      CONCLUSIONS:      1. Left ventricular cavity is normal in size. Left ventricular wall thickness is normal. Left ventricular systolic function is normal with an ejection fraction visually estimated at 55 to 60 %. There are no regional wall motion abnormalities seen.   2. Analysis of left ventricular diastolic function and filling pressure is made challenging by the presence of atrial fibrillation.   3. There is no evidence of a left ventricular thrombus.   4. Normal right ventricular cavity size and normal systolic function.   5. The left atrium is severely dilated.   6. The right atrium is dilated.   7. Normal left and right atrial size.   8. No significant valvular disease.   9. Estimated pulmonary artery systolic pressure is 22 mmHg.  10. No pericardial effusion seen.    ________________________________________________________________________________________  FINDINGS:     Left Ventricle:  The left ventricular cavity is normal in size. Left ventricular wall thickness is normal. Left ventricular systolic function is normal with an ejection fraction visually estimated at 55 to 60%. There are no regional wall motion abnormalities seen. Analysis of left ventricular diastolic function and filling pressure is made challenging by the presence of atrial fibrillation. No left ventricular hypertrophy. There is no evidence of a left ventricular thrombus.     Right Ventricle:  The right ventricular cavity is normal in size and normal systolic function. Tricuspid annular plane systolic excursion (TAPSE) is 1.6 cm (normal >=1.7 cm).     Left Atrium:  The left atrium is severely dilated.     Right Atrium:  The right atrium is dilated.     Interatrial Septum:  The interatrial septum was not well visualized.     Aortic Valve:  The aortic valve is tricuspid with normal leaflet excursion. There is no aortic valve stenosis. There is no evidenceof aortic regurgitation.     Mitral Valve:  Structurally normal mitral valve with normal leaflet excursion. There is no mitral valve stenosis. There is trace mitral regurgitation.     Tricuspid Valve:  Structurally normal tricuspid valve with normal leaflet excursion. There is mild to moderate tricuspid regurgitation. Estimated pulmonary artery systolic pressure is 22 mmHg.     Pulmonic Valve:  Structurally normal pulmonic valve with normal leaflet excursion. There is trace pulmonic regurgitation.     Aorta:  The aortic root and proximal aorta are not well visualized. The aortic root appears normal in size.     Pericardium:  No pericardial effusion seen.     Systemic Veins:  The inferior vena cava is normal in size (normal <2.1cm) with normalinspiratory collapse (normal >50%) consistent with normal right atrial pressure (~3, range 0-5mmHg).  ____________________________________________________________________  QUANTITATIVE DATA:  Left Ventricle Measurements: (Indexed to BSA)     IVSd (2D):   0.8 cm  LVPWd (2D):  0.9 cm  LVIDd (2D):  4.6 cm  LVIDs (2D):  3.5 cm  LV Mass:     133 g  79.4 g/m²  Visualized LV EF%: 55 to 60%     e' lateral: 12.40 cm/s  e' medial:  8.97 cm/s       Right Ventricle Measurements:     TAPSE: 1.6 cm       Tricuspid Valve Measurements:     TR Vmax:          2.2 m/s  TR Peak Gradient: 18.8 mmHg  RA Pressure:      3 mmHg  PASP:             22 mmHg    ________________________________________________________________________________________  Electronically signedon 4/19/2024 at 3:15:40 PM by Virginia Patel              Patient seen and examined at bedside.    Overnight Events: No acute events overnight. Denies chest pain or SOB    Telemetry: AF 60-80    REVIEW OF SYSTEMS:  Constitutional:     [ x] negative [ ] fevers [ ] chills [ ] weight loss [ ] weight gain  HEENT:                  [ x] negative [ ] dry eyes [ ] eye irritation [ ] postnasal drip [ ] nasal congestion  CV:                         [x ] negative  [ ] chest pain [ ] orthopnea [ ] palpitations [ ] murmur  Resp:                     [ x] negative [ ] cough [ ] shortness of breath [ ] dyspnea [ ] wheezing [ ] sputum [ ]hemoptysis  GI:                          [ x] negative [ ] nausea [ ] vomiting [ ] diarrhea [ ] constipation [ ] abd pain [ ] dysphagia   :                        [ x] negative [ ] dysuria [ ] nocturia [ ] hematuria [ ] increased urinary frequency  Musculoskeletal: [ x] negative [ ] back pain [ ] myalgias [ ] arthralgias [ ] fracture  Skin:                       [ x] negative [ ] rash [ ] itch  Neurological:        [ x] negative [ ] headache [ ] dizziness [ ] syncope [ ] weakness [ ] numbness  Psychiatric:           [ x] negative [ ] anxiety [ ] depression  Endocrine:            [ x] negative [ ] diabetes [ ] thyroid problem  Heme/Lymph:      [ x] negative [ ] anemia [ ] bleeding problem  Allergic/Immune: [x ] negative [ ] itchy eyes [ ] nasal discharge [ ] hives [ ] angioedema    [x ] All other systems negative  [ ] Unable to assess ROS due to    Current Meds:  acetaminophen     Tablet .. 650 milliGRAM(s) Oral every 6 hours PRN  digoxin     Tablet 125 MICROGram(s) Oral every other day  heparin   Injectable 5500 Unit(s) IV Push every 6 hours PRN  heparin   Injectable 2500 Unit(s) IV Push every 6 hours PRN  heparin  Infusion.  Unit(s)/Hr IV Continuous <Continuous>  isosorbide   dinitrate Tablet (ISORDIL) 10 milliGRAM(s) Oral three times a day  metoprolol tartrate 50 milliGRAM(s) Oral every 6 hours  ondansetron Injectable 4 milliGRAM(s) IV Push every 6 hours PRN  senna 2 Tablet(s) Oral at bedtime PRN      PAST MEDICAL & SURGICAL HISTORY:  Renal Calculus      Ureteral Calculus      Acute Renal Failure  9/2009      Wrist Fracture  CYNTHIA      Collar Bone Fracture      Rib Fractures      Kidney Stone removal  3/15/2011      C Section      Percutaneous Stone Extraction  Right      S/P Left Percutaneuos Stone extraction  01/26/2010, 04/20/2010          Vitals:  T(F): 97.9 (04-22), Max: 98.9 (04-22)  HR: 64 (04-22) (60 - 81)  BP: 101/66 (04-22) (95/57 - 104/60)  RR: 18 (04-22)  SpO2: 94% (04-22)  I&O's Summary    20 Apr 2024 07:01  -  21 Apr 2024 07:00  --------------------------------------------------------  IN: 508 mL / OUT: 900 mL / NET: -392 mL    21 Apr 2024 07:01  -  22 Apr 2024 06:29  --------------------------------------------------------  IN: 1305 mL / OUT: 650 mL / NET: 655 mL        Physical Exam:  Appearance: No acute distress; well appearing  Eyes: PERRL, EOMI, pink conjunctiva  HENT: Normal oral mucosa  Cardiovascular: RRR, S1, S2, no murmurs, rubs, or gallops; no edema; no JVD  Respiratory: Clear to auscultation bilaterally  Gastrointestinal: soft, non-tender, non-distended with normal bowel sounds  Musculoskeletal: No clubbing; no joint deformity   Neurologic: Non-focal  Lymphatic: No lymphadenopathy  Psychiatry: AAOx3, mood & affect appropriate  Skin: No rashes, ecchymoses, or cyanosis                          11.7   7.45  )-----------( 156      ( 21 Apr 2024 07:32 )             37.6     04-21    137  |  85<L>  |  103<H>  ----------------------------<  106<H>  3.7   |  29  |  6.17<H>    Ca    9.0      21 Apr 2024 07:32      PTT - ( 21 Apr 2024 07:32 )  PTT:81.9 sec      CONCLUSIONS:      1. Left ventricular cavity is normal in size. Left ventricular wall thickness is normal. Left ventricular systolic function is normal with an ejection fraction visually estimated at 55 to 60 %. There are no regional wall motion abnormalities seen.   2. Analysis of left ventricular diastolic function and filling pressure is made challenging by the presence of atrial fibrillation.   3. There is no evidence of a left ventricular thrombus.   4. Normal right ventricular cavity size and normal systolic function.   5. The left atrium is severely dilated.   6. The right atrium is dilated.   7. Normal left and right atrial size.   8. No significant valvular disease.   9. Estimated pulmonary artery systolic pressure is 22 mmHg.  10. No pericardial effusion seen.    ________________________________________________________________________________________  FINDINGS:     Left Ventricle:  The left ventricular cavity is normal in size. Left ventricular wall thickness is normal. Left ventricular systolic function is normal with an ejection fraction visually estimated at 55 to 60%. There are no regional wall motion abnormalities seen. Analysis of left ventricular diastolic function and filling pressure is made challenging by the presence of atrial fibrillation. No left ventricular hypertrophy. There is no evidence of a left ventricular thrombus.     Right Ventricle:  The right ventricular cavity is normal in size and normal systolic function. Tricuspid annular plane systolic excursion (TAPSE) is 1.6 cm (normal >=1.7 cm).     Left Atrium:  The left atrium is severely dilated.     Right Atrium:  The right atrium is dilated.     Interatrial Septum:  The interatrial septum was not well visualized.     Aortic Valve:  The aortic valve is tricuspid with normal leaflet excursion. There is no aortic valve stenosis. There is no evidenceof aortic regurgitation.     Mitral Valve:  Structurally normal mitral valve with normal leaflet excursion. There is no mitral valve stenosis. There is trace mitral regurgitation.     Tricuspid Valve:  Structurally normal tricuspid valve with normal leaflet excursion. There is mild to moderate tricuspid regurgitation. Estimated pulmonary artery systolic pressure is 22 mmHg.     Pulmonic Valve:  Structurally normal pulmonic valve with normal leaflet excursion. There is trace pulmonic regurgitation.     Aorta:  The aortic root and proximal aorta are not well visualized. The aortic root appears normal in size.     Pericardium:  No pericardial effusion seen.     Systemic Veins:  The inferior vena cava is normal in size (normal <2.1cm) with normalinspiratory collapse (normal >50%) consistent with normal right atrial pressure (~3, range 0-5mmHg).  ____________________________________________________________________  QUANTITATIVE DATA:  Left Ventricle Measurements: (Indexed to BSA)     IVSd (2D):   0.8 cm  LVPWd (2D):  0.9 cm  LVIDd (2D):  4.6 cm  LVIDs (2D):  3.5 cm  LV Mass:     133 g  79.4 g/m²  Visualized LV EF%: 55 to 60%     e' lateral: 12.40 cm/s  e' medial:  8.97 cm/s       Right Ventricle Measurements:     TAPSE: 1.6 cm       Tricuspid Valve Measurements:     TR Vmax:          2.2 m/s  TR Peak Gradient: 18.8 mmHg  RA Pressure:      3 mmHg  PASP:             22 mmHg    ________________________________________________________________________________________  Electronically signedon 4/19/2024 at 3:15:40 PM by Virginia Patel

## 2024-04-23 LAB
ANION GAP SERPL CALC-SCNC: 23 MMOL/L — HIGH (ref 5–17)
APTT BLD: 87.7 SEC — HIGH (ref 24.5–35.6)
BUN SERPL-MCNC: 101 MG/DL — HIGH (ref 7–23)
CALCIUM SERPL-MCNC: 9 MG/DL — SIGNIFICANT CHANGE UP (ref 8.4–10.5)
CHLORIDE SERPL-SCNC: 92 MMOL/L — LOW (ref 96–108)
CO2 SERPL-SCNC: 25 MMOL/L — SIGNIFICANT CHANGE UP (ref 22–31)
CREAT SERPL-MCNC: 6.53 MG/DL — HIGH (ref 0.5–1.3)
EGFR: 6 ML/MIN/1.73M2 — LOW
GLUCOSE SERPL-MCNC: 105 MG/DL — HIGH (ref 70–99)
HCT VFR BLD CALC: 38.9 % — SIGNIFICANT CHANGE UP (ref 34.5–45)
HGB BLD-MCNC: 11.3 G/DL — LOW (ref 11.5–15.5)
MAGNESIUM SERPL-MCNC: 2.4 MG/DL — SIGNIFICANT CHANGE UP (ref 1.6–2.6)
MCHC RBC-ENTMCNC: 28.6 PG — SIGNIFICANT CHANGE UP (ref 27–34)
MCHC RBC-ENTMCNC: 29 GM/DL — LOW (ref 32–36)
MCV RBC AUTO: 98.5 FL — SIGNIFICANT CHANGE UP (ref 80–100)
NRBC # BLD: 0 /100 WBCS — SIGNIFICANT CHANGE UP (ref 0–0)
PHOSPHATE SERPL-MCNC: 7.4 MG/DL — HIGH (ref 2.5–4.5)
PLATELET # BLD AUTO: 138 K/UL — LOW (ref 150–400)
POTASSIUM SERPL-MCNC: 3.7 MMOL/L — SIGNIFICANT CHANGE UP (ref 3.5–5.3)
POTASSIUM SERPL-SCNC: 3.7 MMOL/L — SIGNIFICANT CHANGE UP (ref 3.5–5.3)
RBC # BLD: 3.95 M/UL — SIGNIFICANT CHANGE UP (ref 3.8–5.2)
RBC # FLD: 12.6 % — SIGNIFICANT CHANGE UP (ref 10.3–14.5)
SODIUM SERPL-SCNC: 140 MMOL/L — SIGNIFICANT CHANGE UP (ref 135–145)
WBC # BLD: 6.38 K/UL — SIGNIFICANT CHANGE UP (ref 3.8–10.5)
WBC # FLD AUTO: 6.38 K/UL — SIGNIFICANT CHANGE UP (ref 3.8–10.5)

## 2024-04-23 PROCEDURE — 99233 SBSQ HOSP IP/OBS HIGH 50: CPT

## 2024-04-23 PROCEDURE — 99232 SBSQ HOSP IP/OBS MODERATE 35: CPT | Mod: GC

## 2024-04-23 RX ORDER — SODIUM CHLORIDE 9 MG/ML
1000 INJECTION INTRAMUSCULAR; INTRAVENOUS; SUBCUTANEOUS
Refills: 0 | Status: DISCONTINUED | OUTPATIENT
Start: 2024-04-23 | End: 2024-05-01

## 2024-04-23 RX ORDER — POTASSIUM CHLORIDE 20 MEQ
20 PACKET (EA) ORAL ONCE
Refills: 0 | Status: DISCONTINUED | OUTPATIENT
Start: 2024-04-23 | End: 2024-04-23

## 2024-04-23 RX ADMIN — HEPARIN SODIUM 900 UNIT(S)/HR: 5000 INJECTION INTRAVENOUS; SUBCUTANEOUS at 07:41

## 2024-04-23 RX ADMIN — Medication 50 MILLIGRAM(S): at 21:01

## 2024-04-23 RX ADMIN — HEPARIN SODIUM 900 UNIT(S)/HR: 5000 INJECTION INTRAVENOUS; SUBCUTANEOUS at 19:15

## 2024-04-23 RX ADMIN — Medication 50 MILLIGRAM(S): at 06:11

## 2024-04-23 RX ADMIN — SODIUM CHLORIDE 75 MILLILITER(S): 9 INJECTION INTRAMUSCULAR; INTRAVENOUS; SUBCUTANEOUS at 13:42

## 2024-04-23 RX ADMIN — ISOSORBIDE DINITRATE 10 MILLIGRAM(S): 5 TABLET ORAL at 06:11

## 2024-04-23 RX ADMIN — Medication 125 MICROGRAM(S): at 12:46

## 2024-04-23 RX ADMIN — Medication 50 MILLIGRAM(S): at 10:05

## 2024-04-23 RX ADMIN — HEPARIN SODIUM 900 UNIT(S)/HR: 5000 INJECTION INTRAVENOUS; SUBCUTANEOUS at 07:44

## 2024-04-23 NOTE — PROVIDER CONTACT NOTE (OTHER) - SITUATION
Patient  while ambulating.
Tele Event: Pt had 4 beats WCT
pt HR intermittently increasing to 130s-140s on tele
Afib 120-150s.
pt hypotensive/7 beats wide complex

## 2024-04-23 NOTE — PHYSICAL THERAPY INITIAL EVALUATION ADULT - DIAGNOSIS, PT EVAL
Pt presents w/ functional deficits that include strength, balance, and general deconditioning that impact pt's ability to perform functional mobility

## 2024-04-23 NOTE — PHYSICAL THERAPY INITIAL EVALUATION ADULT - [id number]_GEN_A_CORE
Pt will require a rolling walker at home due to their diagnosis of decreased strength and balance to help complete MRADLs (mobility related aides of daily living)/rolling walker

## 2024-04-23 NOTE — PROVIDER CONTACT NOTE (OTHER) - RECOMMENDATIONS
Hold BP meds, EKG
Notify provider.
Notify provider.
Inform provider, EKG?
LANA Mclain notified and made aware, vital signs obtained and all other vitals stable

## 2024-04-23 NOTE — PROGRESS NOTE ADULT - SUBJECTIVE AND OBJECTIVE BOX
Patient is a 73y old  Female who presents with a chief complaint of BL LE swelling (19 Apr 2024 07:59)      INTERVAL HPI/OVERNIGHT EVENTS: seen and examined, NAD      T(C): 36.7 (04-23-24 @ 20:49), Max: 36.7 (04-23-24 @ 20:49)  HR: 72 (04-23-24 @ 20:49) (70 - 77)  BP: 119/74 (04-23-24 @ 20:49) (90/54 - 119/74)  RR: 18 (04-23-24 @ 20:49) (18 - 18)  SpO2: 96% (04-23-24 @ 20:49) (95% - 97%)      MEDICATIONS  (STANDING):  digoxin     Tablet 125 MICROGram(s) Oral every other day  heparin  Infusion.  Unit(s)/Hr (12 mL/Hr) IV Continuous <Continuous>  metoprolol tartrate 50 milliGRAM(s) Oral every 6 hours  sodium chloride 0.9%. 1000 milliLiter(s) (60 mL/Hr) IV Continuous <Continuous>  sodium chloride 0.9%. 1000 milliLiter(s) (75 mL/Hr) IV Continuous <Continuous>    MEDICATIONS  (PRN):  acetaminophen     Tablet .. 650 milliGRAM(s) Oral every 6 hours PRN Temp greater or equal to 38C (100.4F), Mild Pain (1 - 3)  heparin   Injectable 2500 Unit(s) IV Push every 6 hours PRN For aPTT between 40 - 57  heparin   Injectable 5500 Unit(s) IV Push every 6 hours PRN For aPTT less than 40  ondansetron Injectable 4 milliGRAM(s) IV Push every 6 hours PRN Nausea and/or Vomiting  senna 2 Tablet(s) Oral at bedtime PRN Constipation        PAST MEDICAL & SURGICAL HISTORY:  Renal Calculus      Ureteral Calculus      Acute Renal Failure  9/2009      Wrist Fracture  CYNTHIA      Collar Bone Fracture      Rib Fractures      Kidney Stone removal  3/15/2011      C Section      Percutaneous Stone Extraction  Right      S/P Left Percutaneuos Stone extraction  01/26/2010, 04/20/2010          SOCIAL HISTORY  Alcohol:  Tobacco:  Illicit substance use:    FAMILY HISTORY:    REVIEW OF SYSTEMS:  CONSTITUTIONAL: No fever, weight loss, or fatigue  EYES: No eye pain, visual disturbances, or discharge  ENMT:  No difficulty hearing, tinnitus, vertigo; No sinus or throat pain  NECK: No pain or stiffness  RESPIRATORY: No cough, wheezing, chills or hemoptysis; No shortness of breath  CARDIOVASCULAR: No chest pain, palpitations, dizziness, or leg swelling  GASTROINTESTINAL: No abdominal or epigastric pain. No nausea, vomiting, or hematemesis; No diarrhea or constipation. No melena or hematochezia.  GENITOURINARY: No dysuria, frequency, hematuria, or incontinence  NEUROLOGICAL: No headaches, memory loss, loss of strength, numbness, or tremors  SKIN: No itching, burning, rashes, or lesions   LYMPH NODES: No enlarged glands  ENDOCRINE: No heat or cold intolerance; No hair loss  MUSCULOSKELETAL: No joint pain or swelling; No muscle, back, or extremity pain  PSYCHIATRIC: No depression, anxiety, mood swings, or difficulty sleeping  HEME/LYMPH: No easy bruising, or bleeding gums  ALLERY AND IMMUNOLOGIC: No hives or eczema    RADIOLOGY & ADDITIONAL TESTS:    Imaging Personally Reviewed:  [ ] YES  [ ] NO    Consultant(s) Notes Reviewed:  [ ] YES  [ ] NO    PHYSICAL EXAM:  GENERAL: NAD, well-groomed, well-developed  HEAD:  Atraumatic, Normocephalic  ENMT: No tonsillar erythema, exudates, or enlargement; Moist mucous membranes, Good dentition, No lesions  NECK: Supple, No JVD, Normal thyroid  NERVOUS SYSTEM:  Alert & Oriented X3, Good concentration; Motor Strength 5/5 B/L upper and lower extremities; DTRs 2+ intact and symmetric  CHEST/LUNG: Clear to percussion bilaterally; No rales, rhonchi, wheezing, or rubs  HEART: Regular rate and rhythm; No murmurs, rubs, or gallops  ABDOMEN: Soft, Nontender, Nondistended; Bowel sounds present  EXTREMITIES: edema present  LYMPH: No lymphadenopathy noted  SKIN: No rashes or lesions                                11.3   6.38  )-----------( 138      ( 23 Apr 2024 07:30 )             38.9             PTT - ( 23 Apr 2024 07:31 )  PTT:87.7 sec  140|92|101<105  3.7|25|6.53  9.0,2.4,7.4  04-23 @ 07:33

## 2024-04-23 NOTE — PROVIDER CONTACT NOTE (OTHER) - ASSESSMENT
Pt on tele monitor, HR ranging from 100s and increasing to 130s-140s at times
Pt is AOx4, at baseline. Pt denies any chest pain, palpitations, SOB, headache, n/v, or dizziness. Pt states she has been ambulating multiple times through the night to the bathroom d/t frequent urination while on a bumex gtt. /77, , temp 97.2 (ora), RR 18, SpO2 96% on room air. Pt did remain asymptomatic. Pt currently remains in afib often tachy d/t ambulating frequently, but nonsustained.
A&Ox4. VS as per flowsheet. Patient denies chest pain and SOB. Patient resting comfortably in chair.
A&Ox4. VS per flowsheet. Patient denies chest pain and SOB. Patient on heparin drip. Patient resting comfortably in bed.
Pt hypotensive 90/54, HR 70, asx. 7 beats of wide complex on remote tele, asx. PA Earnestine More made aware

## 2024-04-23 NOTE — PROGRESS NOTE ADULT - PROBLEM SELECTOR PLAN 1
Pt. with renal failure in the setting of fluid overload. On review of [place], SCr noted to be persistently elevated from [phone]. SCr was elevated at 1.79 on 5/4/2011. SCr initially during this admission was elevated at 4.73 (4/8), and increased to 6.53 today. No interim labs available for review but pt. likely has underlying CKD. Documented urine output is ~800 over the past 24 hours, however pt non complaint with monitoring. UA showed proteinuria and evidence of infection (although 15 epithelial cells). UPCR is elevated at 1.1. Kidney sonogram showed evidence of medical renal disease, BL non-obstructing stones, and BL renal cysts.     -bumex held from 4/21 due to hypotension, and resolution of edema.    -patient reports she continues to make good urine.  please attempt strict I/O    - Serologic workup thus far: HBsAg, HCV Ab, and HIV are non-reactive. Kappa/Lambda free light chain ratio is mildly elevated at 1.85 (acceptable range for degree of CKD). Weak IgG kappa band noted on SIFE.     -Renal sono noted with mild R hydro, no L hydro, nonobstructive calculi, renal cysts, with no retention    Monitor labs and urine output. Avoid nephrotoxins. Dose medications as per eGFR.

## 2024-04-23 NOTE — PHYSICAL THERAPY INITIAL EVALUATION ADULT - GAIT TRAINING, PT EVAL
pt will ambulate 200 ft (I) w/ least restrictive device in 4ks to improve overall functional mobility.

## 2024-04-23 NOTE — PROVIDER CONTACT NOTE (OTHER) - REASON
Tele Event: Pt had 4 beats WCT
Tele Event-  while ambulating
pt HR intermittently increasing to 130s-140s on tele
pt hypotensive/7 beats wide complex
Tele Event- afib 120-150s

## 2024-04-23 NOTE — PHYSICAL THERAPY INITIAL EVALUATION ADULT - PERTINENT HX OF CURRENT PROBLEM, REHAB EVAL
72yo F with PMHx of nephrolithiasis and HTN presents with complaint of BL LE swelling for past few weeks. Pt reports has been getting progressively worse and associated with some BLE soreness. Takes amlodipine for HTN. Otherwise in normal state of health. Denies fever/chills, nausea/vomiting, abd pain, flank pain, dysuria, hematuria, decreased UOP or other acute complaints. US Kidney and bladder 4/19: Mild right hydronephrosis. No left hydronephrosis. Bilateral nonobstructing renal calculi or calcifications. Bilaterally increased renal echogenicity, suggestive of medical renal disease. Numerous renal cysts bilaterally. Urinary bladder post void residual volume of 52 mL. VA duplex lower ext vein scan 4/8: No evidence of deep venous thrombosis in either lower extremity. Subcutaneous edema is visualized bilaterally. CXR 4/8: New rounded right perihilar opacity, possibly a prominent pulmonary artery or adenopathy. Small right effusion and/or atelectasis. 74yo F with PMHx of nephrolithiasis and HTN presents with complaint of B/L LE swelling for past few weeks. Pt reports has been getting progressively worse and associated with some BLE soreness. Takes amlodipine for HTN. Otherwise in normal state of health. Denies fever/chills, nausea/vomiting, abd pain, flank pain, dysuria, hematuria, decreased UOP or other acute complaints. US Kidney and bladder 4/19: Mild right hydronephrosis. No left hydronephrosis. Bilateral nonobstructing renal calculi or calcifications. Bilaterally increased renal echogenicity, suggestive of medical renal disease. Numerous renal cysts bilaterally. Urinary bladder post void residual volume of 52 mL. VA duplex lower ext vein scan 4/8: No evidence of deep venous thrombosis in either lower extremity. Subcutaneous edema is visualized bilaterally. CXR 4/8: New rounded right perihilar opacity, possibly a prominent pulmonary artery or adenopathy. Small right effusion and/or atelectasis.

## 2024-04-23 NOTE — PHYSICAL THERAPY INITIAL EVALUATION ADULT - ACTIVE RANGE OF MOTION EXAMINATION, REHAB EVAL
keke. upper extremity Active ROM was WNL (within normal limits)/bilateral lower extremity Active ROM was WNL (within normal limits)

## 2024-04-23 NOTE — PROVIDER CONTACT NOTE (OTHER) - BACKGROUND
Pt is admitted for chronic kidney disease
Pt was admitted on 4/8 with B/L LE edema for 2 weeks
pt adm w BLLE swelling, afib RVR
Patient admitted for b/l LE swelling
Patient admitted for b/l LE swelling.

## 2024-04-23 NOTE — PROGRESS NOTE ADULT - SUBJECTIVE AND OBJECTIVE BOX
[facility] Division of Kidney Diseases & Hypertension  FOLLOW UP NOTE  [phone]--------------------------------------------------------------------------------    Chief Complaint: Chronic kidney disease    24 hour events/subjective: Patient seen and examined this morning, Reports feeling well, offers no complaints. Continues to have good urine output. Denies any headaches, fevers/chills, chest pain, SOB, abdominal pain, and LE edema.    PAST HISTORY  --------------------------------------------------------------------------------  No significant changes to PMH, PSH, FHx, SHx, unless otherwise noted    ALLERGIES & MEDICATIONS  --------------------------------------------------------------------------------  Allergies    No Known Allergies    Intolerances    Standing Inpatient Medications  digoxin     Tablet 125 MICROGram(s) Oral every other day  heparin  Infusion.  Unit(s)/Hr IV Continuous <Continuous>  isosorbide   dinitrate Tablet (ISORDIL) 10 milliGRAM(s) Oral three times a day  metoprolol tartrate 50 milliGRAM(s) Oral every 6 hours  sodium chloride 0.9%. 1000 milliLiter(s) IV Continuous <Continuous>    PRN Inpatient Medications  acetaminophen     Tablet .. 650 milliGRAM(s) Oral every 6 hours PRN  heparin   Injectable 2500 Unit(s) IV Push every 6 hours PRN  heparin   Injectable 5500 Unit(s) IV Push every 6 hours PRN  ondansetron Injectable 4 milliGRAM(s) IV Push every 6 hours PRN  senna 2 Tablet(s) Oral at bedtime PRN      REVIEW OF SYSTEMS  --------------------------------------------------------------------------------  See HPI    VITALS/PHYSICAL EXAM  --------------------------------------------------------------------------------  T(C): 36.6 (04-23-24 @ 09:55), Max: 36.6 (04-22-24 @ 21:31)  HR: 77 (04-23-24 @ 09:55) (66 - 88)  BP: 106/72 (04-23-24 @ 09:55) (88/51 - 119/79)  RR: 18 (04-23-24 @ 09:55) (18 - 18)  SpO2: 96% (04-23-24 @ 09:55) (94% - 97%)  Wt(kg): --    04-22-24 @ 07:01  -  04-23-24 @ 07:00  --------------------------------------------------------  IN: 2067 mL / OUT: 850 mL / NET: 1217 mL    Physical Exam:  Gen: NAD  Pulm: CTA B/L ant/lat fields  CV: RRR, S1S2  Abd: +BS, soft, nontender/nondistended  : No suprapubic tenderness.  no connor  Extremity: BL LE edema resolved  Neuro: A&O x 3    LABS/STUDIES  --------------------------------------------------------------------------------              11.3   6.38  >-----------<  138      [04-23-24 @ 07:30]              38.9     140  |  92  |  101  ----------------------------<  105      [04-23-24 @ 07:33]  3.7   |  25  |  6.53        Ca     9.0     [04-23-24 @ 07:33]      Mg     2.4     [04-23-24 @ 07:33]      Phos  7.4     [04-23-24 @ 07:33]    PTT: 87.7       [04-23-24 @ 07:31]    Creatinine Trend:  SCr 6.53 [04-23 @ 07:33]  SCr 6.48 [04-22 @ 07:03]  SCr 6.17 [04-21 @ 07:32]  SCr 6.02 [04-20 @ 05:58]  SCr 5.64 [04-19 @ 16:05]    Urine Sodium 115      [04-18-24 @ 10:59]  Urine Potassium 14      [04-18-24 @ 10:59]  Urine Chloride 85      [04-18-24 @ 10:59]

## 2024-04-23 NOTE — PHYSICAL THERAPY INITIAL EVALUATION ADULT - PATIENT/FAMILY/SIGNIFICANT OTHER GOALS STATEMENT, PT EVAL
Assessment & Plan     Cough  Upper respiratory tract infection, unspecified type  Moderate persistent asthma with acute exacerbation  Patient is a 54 female presents to clinic due to URI symptoms ongoing for 4 days as well as wheezing and asthma flare.  Patient has been treating symptoms with albuterol without relief.  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  COVID-19 PCR in process at this time through [email or web address].  Vital signs normal.  Physical exam significant for faint wheezing diffusely.  Symptoms are likely due to viral URI including possibly COVID-19.  Patient prescribed steroid taper due to flareup of asthma.  Discussed OTC treatment options for URIs.  Discussed symptoms that warrant urgent/emergent follow-up as well as return precautions.  - predniSONE (DELTASONE) 20 MG tablet; Take 3 tabs by mouth daily x 3 days, then 2 tabs daily x 3 days, then 1 tab daily x 3 days, then 1/2 tab daily x 3 days.     See Patient Instructions    Return in about 1 week (around 1/25/2022), or if symptoms worsen or fail to improve.    Mariaa Milian PA-C  [facility] SYBIL Knox is a 54 year old who presents for the following health issues     HPI     Acute Illness   Acute illness concerns: Cough  Onset: 4 days of symptoms starting with rhinorrhea and sneezing. She notes symptoms feel similar to prior URI's with asthma flare. She has been using Albuterol for treatment-last used this morning. Patient also notes it feels like she cannot take a full breath. She completed saliva PCR COVID19  test at school today but is still waiting for results.    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  Physical Exam  Vitals and nursing note reviewed.   Constitutional:       General: She is not in acute distress.     Appearance: Normal appearance.   HENT:      Head: Normocephalic and atraumatic.      Nose: No congestion or rhinorrhea.      Mouth/Throat:      Mouth: Mucous membranes are moist.      Pharynx: Oropharynx is clear. No oropharyngeal exudate or posterior oropharyngeal erythema.   Eyes:      Extraocular Movements: Extraocular movements intact.      Pupils: Pupils are equal, round, and reactive to light.   Cardiovascular:      Rate and Rhythm: Normal rate and regular rhythm.      Heart sounds: Normal heart sounds.   Pulmonary:      Effort: Pulmonary effort is normal. No respiratory distress (Able to speak in full sentences).      Breath sounds: Wheezing (Faint, diffuse) present. No rhonchi or rales.   Musculoskeletal:         General: Normal range of motion.      Cervical back: Normal range of motion.   Lymphadenopathy:      Cervical: No cervical adenopathy.   Skin:     General: Skin is warm and dry.   Neurological:      General: No focal deficit present.      Mental Status: She is alert.   Psychiatric:         Mood and Affect: Mood normal.         Behavior: Behavior normal.                         Return home

## 2024-04-23 NOTE — PROVIDER CONTACT NOTE (OTHER) - ACTION/TREATMENT ORDERED:
Tera Molina NP made aware. IV metoprolol 2.5mg given, labs sent. No further interventions at this time.
Isosorbide dc'ed by NEIDA More, per NEIDA More hold metoprolol.
NP ordered Mg and Phos labs to morning BMP. Administer next dose of oral metoprolol.
NEIDA Lagunas made aware. No interventions at this time.
no further interventions

## 2024-04-23 NOTE — PROGRESS NOTE ADULT - PROBLEM SELECTOR PLAN 2
Pt. with metabolic acidosis in the setting of renal failure, now resolved with SCO2 now 25. Bumex was discontinued  -Monitor SCO2.    If you have any questions, please feel free to contact me  Jamari Brunson  Nephrology Fellow  [phone] / [employer] Teams(Preferred)  (After 5pm or on weekends please page the on-call fellow)

## 2024-04-23 NOTE — PHYSICAL THERAPY INITIAL EVALUATION ADULT - PLANNED THERAPY INTERVENTIONS, PT EVAL
STAIR GOAL: Pt will negotiate 10 stairs (I) in 4 wks to improve overall functional mobility./balance training/gait training

## 2024-04-23 NOTE — PROGRESS NOTE ADULT - ASSESSMENT
73F PMHx nephrolithiasis s/p multiple procedures, CKD and HTN. Admitted with new onset bl LE swelling and renal dysfunction.  Cardiology consulted for asymptomatic new-onset AFib w/ RVR and ADHF.       #1.  ADHF:  - volume status appears to have improved from the weekend, LE edema improved and without JVD, HDS on RA   - s/p IV Bumex intermittently due to hemodynamics, last dose 4/21; would defer to nephrology for additional recs for volume management; s/p PO metolazone   - BB as below  - c/w isosorbide dinitrate 10mg TID, hydralazine 25mg TID as tolerated for afterload reduction in setting of severe MR and LV dysfunction.  Other GDMT is limited by current renal function  - ischemic workup once euvolemic and HR controlled, can be deferred to outpatient pending further management of AF   - monitor tele  - replete K>4 Mg>2  - strict I/O's, daily standing weights    #2.  AF:  - rate controlled   - c/w metoprolol tartrate 50mg q6h w goal HR <110  - c/w digoxin 125mcg every other day (s/p digoxin PO 0.125 mg,  mcg x1 4/17,  mcg x1 4/18)  - c/w hep gtt for AC  - would not give amiodarone given unknown duration of atrial fibrillation  - will reach out to EP for further recs for possible /DCCV given improvement in volume status and rates    #3.  MR: likely 2/2 volume overload and HFrEF  - repeat TTE 4/19: LVEF 55 to 60%, normal RV function, trace MR (significantly improved compared to echo 4/11)  - would like to reassess MR with possible upcoming

## 2024-04-23 NOTE — PHYSICAL THERAPY INITIAL EVALUATION ADULT - BALANCE TRAINING, PT EVAL
Pt will perform standing dynamic tasks w/ good balance in 4wks to improve overall functional mobility.

## 2024-04-24 LAB
ANION GAP SERPL CALC-SCNC: 21 MMOL/L — HIGH (ref 5–17)
APTT BLD: 79.1 SEC — HIGH (ref 24.5–35.6)
BUN SERPL-MCNC: 103 MG/DL — HIGH (ref 7–23)
CALCIUM SERPL-MCNC: 9.3 MG/DL — SIGNIFICANT CHANGE UP (ref 8.4–10.5)
CHLORIDE SERPL-SCNC: 97 MMOL/L — SIGNIFICANT CHANGE UP (ref 96–108)
CO2 SERPL-SCNC: 25 MMOL/L — SIGNIFICANT CHANGE UP (ref 22–31)
CREAT SERPL-MCNC: 5.71 MG/DL — HIGH (ref 0.5–1.3)
EGFR: 7 ML/MIN/1.73M2 — LOW
GLUCOSE SERPL-MCNC: 109 MG/DL — HIGH (ref 70–99)
HCT VFR BLD CALC: 39.7 % — SIGNIFICANT CHANGE UP (ref 34.5–45)
HGB BLD-MCNC: 11.8 G/DL — SIGNIFICANT CHANGE UP (ref 11.5–15.5)
MAGNESIUM SERPL-MCNC: 2.3 MG/DL — SIGNIFICANT CHANGE UP (ref 1.6–2.6)
MCHC RBC-ENTMCNC: 29.2 PG — SIGNIFICANT CHANGE UP (ref 27–34)
MCHC RBC-ENTMCNC: 29.7 GM/DL — LOW (ref 32–36)
MCV RBC AUTO: 98.3 FL — SIGNIFICANT CHANGE UP (ref 80–100)
NRBC # BLD: 0 /100 WBCS — SIGNIFICANT CHANGE UP (ref 0–0)
PHOSPHATE SERPL-MCNC: 6.3 MG/DL — HIGH (ref 2.5–4.5)
PLATELET # BLD AUTO: 139 K/UL — LOW (ref 150–400)
POTASSIUM SERPL-MCNC: 3.9 MMOL/L — SIGNIFICANT CHANGE UP (ref 3.5–5.3)
POTASSIUM SERPL-SCNC: 3.9 MMOL/L — SIGNIFICANT CHANGE UP (ref 3.5–5.3)
RBC # BLD: 4.04 M/UL — SIGNIFICANT CHANGE UP (ref 3.8–5.2)
RBC # FLD: 12.5 % — SIGNIFICANT CHANGE UP (ref 10.3–14.5)
SODIUM SERPL-SCNC: 143 MMOL/L — SIGNIFICANT CHANGE UP (ref 135–145)
WBC # BLD: 9.82 K/UL — SIGNIFICANT CHANGE UP (ref 3.8–10.5)
WBC # FLD AUTO: 9.82 K/UL — SIGNIFICANT CHANGE UP (ref 3.8–10.5)

## 2024-04-24 PROCEDURE — 99233 SBSQ HOSP IP/OBS HIGH 50: CPT

## 2024-04-24 PROCEDURE — 99232 SBSQ HOSP IP/OBS MODERATE 35: CPT | Mod: GC

## 2024-04-24 RX ORDER — METOPROLOL TARTRATE 50 MG
100 TABLET ORAL
Refills: 0 | Status: DISCONTINUED | OUTPATIENT
Start: 2024-04-24 | End: 2024-04-25

## 2024-04-24 RX ORDER — HYDRALAZINE HCL 50 MG
10 TABLET ORAL THREE TIMES A DAY
Refills: 0 | Status: DISCONTINUED | OUTPATIENT
Start: 2024-04-24 | End: 2024-04-28

## 2024-04-24 RX ADMIN — Medication 10 MILLIGRAM(S): at 21:53

## 2024-04-24 RX ADMIN — Medication 50 MILLIGRAM(S): at 05:39

## 2024-04-24 RX ADMIN — HEPARIN SODIUM 900 UNIT(S)/HR: 5000 INJECTION INTRAVENOUS; SUBCUTANEOUS at 07:08

## 2024-04-24 RX ADMIN — Medication 100 MILLIGRAM(S): at 18:58

## 2024-04-24 RX ADMIN — HEPARIN SODIUM 900 UNIT(S)/HR: 5000 INJECTION INTRAVENOUS; SUBCUTANEOUS at 19:38

## 2024-04-24 RX ADMIN — Medication 50 MILLIGRAM(S): at 10:05

## 2024-04-24 RX ADMIN — Medication 10 MILLIGRAM(S): at 16:29

## 2024-04-24 NOTE — CHART NOTE - NSCHARTNOTEFT_GEN_A_CORE
Patient will require a rolling walker at home due to their dx of acute heart failure to help complete MRADLs.    Alma Rosa Leach PA-C  Department of Medicine

## 2024-04-24 NOTE — PROGRESS NOTE ADULT - ASSESSMENT
73F PMHx nephrolithiasis s/p multiple procedures, CKD and HTN. Admitted with new onset bl LE swelling and renal dysfunction.  Cardiology consulted for asymptomatic new-onset AFib w/ RVR and ADHF.       #1.  ADHF:  - volume status appears to have improved from the weekend, LE edema improved and without JVD, HDS on RA   - s/p IV Bumex intermittently due to hemodynamics, last dose 4/21; would defer to nephrology for additional recs for volume management; s/p PO metolazone   - BB as below   - isordil and hydralazine held yesterday iso hypotension. Would restart hydralazine tid at lower dose and then try to add on isordil as tolerated for afterload reduction in setting of severe MR and LV dysfunction.  Other GDMT is limited by current renal function  - ischemic workup once euvolemic and HR controlled, can be deferred to outpatient pending further management of AF   - monitor tele  - replete K>4 Mg>2  - strict I/O's, daily standing weights    #2.  AF:  - rate controlled   - switch lopressor to Toprol (metoprolol succinate) 100mg bid w goal HR <110  - c/w digoxin 125mcg every other day (s/p digoxin PO 0.125 mg,  mcg x1 4/17,  mcg x1 4/18)  - c/w hep gtt for AC  - would not give amiodarone given unknown duration of atrial fibrillation  - will f/u with EP for further recs for possible /DCCV before discharge given improvement in volume status and rates    #3.  MR: likely 2/2 volume overload and HFrEF  - repeat TTE 4/19: LVEF 55 to 60%, normal RV function, trace MR (significantly improved compared to echo 4/11)  - would like to reassess MR with possible upcoming

## 2024-04-24 NOTE — PROGRESS NOTE ADULT - SUBJECTIVE AND OBJECTIVE BOX
[facility] Division of Kidney Diseases & Hypertension  FOLLOW UP NOTE  [phone]--------------------------------------------------------------------------------    Chief Complaint: Chronic kidney disease    24 hour events/subjective: Patient seen and examined this morning, Reports feeling well, offers no complaints. Continues to have good urine output. Denies any headaches, fevers/chills, chest pain, SOB, abdominal pain, and LE edema.      PAST HISTORY  --------------------------------------------------------------------------------  No significant changes to PMH, PSH, FHx, SHx, unless otherwise noted    ALLERGIES & MEDICATIONS  --------------------------------------------------------------------------------  Allergies    No Known Allergies    Intolerances      Standing Inpatient Medications  digoxin     Tablet 125 MICROGram(s) Oral every other day  heparin  Infusion.  Unit(s)/Hr IV Continuous <Continuous>  metoprolol tartrate 50 milliGRAM(s) Oral every 6 hours  sodium chloride 0.9%. 1000 milliLiter(s) IV Continuous <Continuous>  sodium chloride 0.9%. 1000 milliLiter(s) IV Continuous <Continuous>    PRN Inpatient Medications  acetaminophen     Tablet .. 650 milliGRAM(s) Oral every 6 hours PRN  heparin   Injectable 2500 Unit(s) IV Push every 6 hours PRN  heparin   Injectable 5500 Unit(s) IV Push every 6 hours PRN  ondansetron Injectable 4 milliGRAM(s) IV Push every 6 hours PRN  senna 2 Tablet(s) Oral at bedtime PRN      REVIEW OF SYSTEMS  --------------------------------------------------------------------------------  See HPI    VITALS/PHYSICAL EXAM  --------------------------------------------------------------------------------  T(C): 36.6 (04-24-24 @ 05:36), Max: 36.7 (04-23-24 @ 20:49)  HR: 79 (04-24-24 @ 05:36) (67 - 79)  BP: 129/77 (04-24-24 @ 05:36) (90/54 - 147/83)  RR: 18 (04-24-24 @ 05:36) (18 - 18)  SpO2: 96% (04-24-24 @ 05:36) (95% - 97%)  Wt(kg): --    04-23-24 @ 07:01  -  04-24-24 @ 07:00  --------------------------------------------------------  IN: 2541 mL / OUT: 1200 mL / NET: 1341 mL    Physical Exam:  Gen: NAD  Pulm: CTA B/L ant/lat fields  CV: RRR, S1S2  Abd: +BS, soft, nontender/nondistended  : No suprapubic tenderness.  no connor  Extremity: BL LE edema resolved  Neuro: A&O x 3    LABS/STUDIES  --------------------------------------------------------------------------------              11.8   9.82  >-----------<  139      [04-24-24 @ 07:53]              39.7     143  |  97  |  103  ----------------------------<  109      [04-24-24 @ 07:53]  3.9   |  25  |  5.71        Ca     9.3     [04-24-24 @ 07:53]      Mg     2.3     [04-24-24 @ 07:53]      Phos  6.3     [04-24-24 @ 07:53]    PTT: 79.1       [04-24-24 @ 07:53]    Creatinine Trend:  SCr 5.71 [04-24 @ 07:53]  SCr 6.53 [04-23 @ 07:33]  SCr 6.48 [04-22 @ 07:03]  SCr 6.17 [04-21 @ 07:32]  SCr 6.02 [04-20 @ 05:58]    Urine Sodium 115      [04-18-24 @ 10:59]  Urine Potassium 14      [04-18-24 @ 10:59]  Urine Chloride 85      [04-18-24 @ 10:59]

## 2024-04-24 NOTE — PROGRESS NOTE ADULT - SUBJECTIVE AND OBJECTIVE BOX
Patient seen and examined at bedside.    Interval Events: Was hypotensive yesterday afternoon, hydral and isordil were d/c'd. No acute events overnight. Denies chest pain or SOB    Telemetry: Appearing to be SVT around 21:30.    REVIEW OF SYSTEMS:  Constitutional:     [ x] negative [ ] fevers [ ] chills [ ] weight loss [ ] weight gain  HEENT:                  [ x] negative [ ] dry eyes [ ] eye irritation [ ] postnasal drip [ ] nasal congestion  CV:                         [x ] negative  [ ] chest pain [ ] orthopnea [ ] palpitations [ ] murmur  Resp:                     [ x] negative [ ] cough [ ] shortness of breath [ ] dyspnea [ ] wheezing [ ] sputum [ ]hemoptysis  GI:                          [ x] negative [ ] nausea [ ] vomiting [ ] diarrhea [ ] constipation [ ] abd pain [ ] dysphagia   :                        [ x] negative [ ] dysuria [ ] nocturia [ ] hematuria [ ] increased urinary frequency  Musculoskeletal: [ x] negative [ ] back pain [ ] myalgias [ ] arthralgias [ ] fracture  Skin:                       [ x] negative [ ] rash [ ] itch  Neurological:        [ x] negative [ ] headache [ ] dizziness [ ] syncope [ ] weakness [ ] numbness  Psychiatric:           [ x] negative [ ] anxiety [ ] depression  Endocrine:            [ x] negative [ ] diabetes [ ] thyroid problem  Heme/Lymph:      [ x] negative [ ] anemia [ ] bleeding problem  Allergic/Immune: [x ] negative [ ] itchy eyes [ ] nasal discharge [ ] hives [ ] angioedema    [x ] All other systems negative  [ ] Unable to assess ROS due to    Current Meds:  acetaminophen     Tablet .. 650 milliGRAM(s) Oral every 6 hours PRN  digoxin     Tablet 125 MICROGram(s) Oral every other day  heparin   Injectable 2500 Unit(s) IV Push every 6 hours PRN  heparin   Injectable 5500 Unit(s) IV Push every 6 hours PRN  heparin  Infusion.  Unit(s)/Hr IV Continuous <Continuous>  metoprolol tartrate 50 milliGRAM(s) Oral every 6 hours  ondansetron Injectable 4 milliGRAM(s) IV Push every 6 hours PRN  senna 2 Tablet(s) Oral at bedtime PRN  sodium chloride 0.9%. 1000 milliLiter(s) IV Continuous <Continuous>  sodium chloride 0.9%. 1000 milliLiter(s) IV Continuous <Continuous>      PAST MEDICAL & SURGICAL HISTORY:  Renal Calculus      Ureteral Calculus      Acute Renal Failure  9/2009      Wrist Fracture  CYNTHIA      Collar Bone Fracture      Rib Fractures      Kidney Stone removal  3/15/2011      C Section      Percutaneous Stone Extraction  Right      S/P Left Percutaneuos Stone extraction  01/26/2010, 04/20/2010          Vitals:  T(F): 98 (04-24), Max: 98 (04-23)  HR: 72 (04-24) (67 - 79)  BP: 118/71 (04-24) (90/54 - 147/83)  RR: 18 (04-24)  SpO2: 96% (04-24)  I&O's Summary    23 Apr 2024 07:01  -  24 Apr 2024 07:00  --------------------------------------------------------  IN: 2541 mL / OUT: 1200 mL / NET: 1341 mL        Physical Exam:  Appearance: No acute distress; well appearing  Eyes: PERRL, EOMI, pink conjunctiva  HENT: Normal oral mucosa  Cardiovascular: RRR, S1, S2, no murmurs, rubs, or gallops; no edema; no JVD  Respiratory: Clear to auscultation bilaterally  Gastrointestinal: soft, non-tender, non-distended with normal bowel sounds  Musculoskeletal: No clubbing; no joint deformity   Neurologic: Non-focal  Lymphatic: No lymphadenopathy  Psychiatry: AAOx3, mood & affect appropriate  Skin: No rashes, ecchymoses, or cyanosis                          11.8   9.82  )-----------( 139      ( 24 Apr 2024 07:53 )             39.7     04-24    143  |  97  |  103<H>  ----------------------------<  109<H>  3.9   |  25  |  5.71<H>    Ca    9.3      24 Apr 2024 07:53  Phos  6.3     04-24  Mg     2.3     04-24      PTT - ( 24 Apr 2024 07:53 )  PTT:79.1 sec    < from: TTE W or WO Ultrasound Enhancing Agent (04.19.24 @ 13:53) >  CONCLUSIONS:      1. Left ventricular cavity is normal in size. Left ventricular wall thickness is normal. Left ventricular systolic function is normal with an ejection fraction visually estimated at 55 to 60 %. There are no regional wall motion abnormalities seen.   2. Analysis of left ventricular diastolic function and filling pressure is made challenging by the presence of atrial fibrillation.   3. There is no evidence of a left ventricular thrombus.   4. Normal right ventricular cavity size and normal systolic function.   5. The left atrium is severely dilated.   6. The right atrium is dilated.   7. Normal left and right atrial size.   8. No significant valvular disease.   9. Estimated pulmonary artery systolic pressure is 22 mmHg.  10. No pericardial effusion seen.    ________________________________________________________________________________________  FINDINGS:     Left Ventricle:  The left ventricular cavity is normal in size. Left ventricular wall thickness is normal. Left ventricular systolic function is normal with an ejection fraction visually estimated at 55 to 60%. There are no regional wall motion abnormalities seen. Analysis of left ventricular diastolic function and filling pressure is made challenging by the presence of atrial fibrillation. No left ventricular hypertrophy. There is no evidence of a left ventricular thrombus.     Right Ventricle:  The right ventricular cavity is normal in size and normal systolic function. Tricuspid annular plane systolic excursion (TAPSE) is 1.6 cm (normal >=1.7 cm).     Left Atrium:  The left atrium is severely dilated.     Right Atrium:  The right atrium is dilated.     Interatrial Septum:  The interatrial septum was not well visualized.     Aortic Valve:  The aortic valve is tricuspid with normal leaflet excursion. There is no aortic valve stenosis. There is no evidenceof aortic regurgitation.     Mitral Valve:  Structurally normal mitral valve with normal leaflet excursion. There is no mitral valve stenosis. There is trace mitral regurgitation.     Tricuspid Valve:  Structurally normal tricuspid valve with normal leaflet excursion. There is mild to moderate tricuspid regurgitation. Estimated pulmonary artery systolic pressure is 22 mmHg.     Pulmonic Valve:  Structurally normal pulmonic valve with normal leaflet excursion. There is trace pulmonic regurgitation.     Aorta:  The aortic root and proximal aorta are not well visualized. The aortic root appears normal in size.     Pericardium:  No pericardial effusion seen.     Systemic Veins:  The inferior vena cava is normal in size (normal <2.1cm) with normalinspiratory collapse (normal >50%) consistent with normal right atrial pressure (~3, range 0-5mmHg).  ____________________________________________________________________  QUANTITATIVE DATA:  Left Ventricle Measurements: (Indexed to BSA)     IVSd (2D):   0.8 cm  LVPWd (2D):  0.9 cm  LVIDd (2D):  4.6 cm  LVIDs (2D):  3.5 cm  LV Mass:     133 g  79.4 g/m²  Visualized LV EF%: 55 to 60%     e' lateral: 12.40 cm/s  e' medial:  8.97 cm/s       Right Ventricle Measurements:     TAPSE: 1.6 cm       Tricuspid Valve Measurements:     TR Vmax:          2.2 m/s  TR Peak Gradient: 18.8 mmHg  RA Pressure:      3 mmHg  PASP:             22 mmHg    ________________________________________________________________________________________  Electronically signedon 4/19/2024 at 3:15:40 PM by Virginia Patel    < end of copied text >

## 2024-04-24 NOTE — PROGRESS NOTE ADULT - PROBLEM SELECTOR PLAN 2
Pt. with metabolic acidosis in the setting of renal failure, now resolved with SCO2 now 25. Bumex was discontinued  -Monitor SCO2.    If you have any questions, please feel free to contact me  Jamari Brunson  Nephrology Fellow  [phone] / [employer] Teams(Preferred)  (After 5pm or on weekends please page the on-call fellow)

## 2024-04-24 NOTE — PROGRESS NOTE ADULT - SUBJECTIVE AND OBJECTIVE BOX
Patient is a 73y old  Female who presents with a chief complaint of BL LE swelling (24 Apr 2024 10:20)      INTERVAL HPI/OVERNIGHT EVENTS:  T(C): 36.6 (04-24-24 @ 21:07), Max: 36.7 (04-24-24 @ 10:16)  HR: 66 (04-24-24 @ 21:07) (57 - 87)  BP: 126/81 (04-24-24 @ 21:07) (118/71 - 147/83)  RR: 18 (04-24-24 @ 21:07) (18 - 18)  SpO2: 94% (04-24-24 @ 21:07) (94% - 98%)  Wt(kg): --  I&O's Summary    23 Apr 2024 07:01  -  24 Apr 2024 07:00  --------------------------------------------------------  IN: 2541 mL / OUT: 1200 mL / NET: 1341 mL    24 Apr 2024 07:01  -  24 Apr 2024 23:57  --------------------------------------------------------  IN: 588 mL / OUT: 1300 mL / NET: -712 mL        PAST MEDICAL & SURGICAL HISTORY:  Renal Calculus      Ureteral Calculus      Acute Renal Failure  9/2009      Wrist Fracture  CYNTHIA      Collar Bone Fracture      Rib Fractures      Kidney Stone removal  3/15/2011      C Section      Percutaneous Stone Extraction  Right      S/P Left Percutaneuos Stone extraction  01/26/2010, 04/20/2010          SOCIAL HISTORY  Alcohol:  Tobacco:  Illicit substance use:    FAMILY HISTORY:    REVIEW OF SYSTEMS:  CONSTITUTIONAL: No fever, weight loss, or fatigue  EYES: No eye pain, visual disturbances, or discharge  ENMT:  No difficulty hearing, tinnitus, vertigo; No sinus or throat pain  NECK: No pain or stiffness  RESPIRATORY: No cough, wheezing, chills or hemoptysis; No shortness of breath  CARDIOVASCULAR: No chest pain, palpitations, dizziness, or leg swelling  GASTROINTESTINAL: No abdominal or epigastric pain. No nausea, vomiting, or hematemesis; No diarrhea or constipation. No melena or hematochezia.  GENITOURINARY: No dysuria, frequency, hematuria, or incontinence  NEUROLOGICAL: No headaches, memory loss, loss of strength, numbness, or tremors  SKIN: No itching, burning, rashes, or lesions   LYMPH NODES: No enlarged glands  ENDOCRINE: No heat or cold intolerance; No hair loss  MUSCULOSKELETAL: No joint pain or swelling; No muscle, back, or extremity pain  PSYCHIATRIC: No depression, anxiety, mood swings, or difficulty sleeping  HEME/LYMPH: No easy bruising, or bleeding gums  ALLERY AND IMMUNOLOGIC: No hives or eczema    RADIOLOGY & ADDITIONAL TESTS:    Imaging Personally Reviewed:  [ ] YES  [ ] NO    Consultant(s) Notes Reviewed:  [ ] YES  [ ] NO    PHYSICAL EXAM:  GENERAL: NAD, well-groomed, well-developed  HEAD:  Atraumatic, Normocephalic  EYES: EOMI, PERRLA, conjunctiva and sclera clear  ENMT: No tonsillar erythema, exudates, or enlargement; Moist mucous membranes, Good dentition, No lesions  NECK: Supple, No JVD, Normal thyroid  NERVOUS SYSTEM:  Alert & Oriented X3, Good concentration; Motor Strength 5/5 B/L upper and lower extremities; DTRs 2+ intact and symmetric  CHEST/LUNG: Clear to percussion bilaterally; No rales, rhonchi, wheezing, or rubs  HEART: Regular rate and rhythm; No murmurs, rubs, or gallops  ABDOMEN: Soft, Nontender, Nondistended; Bowel sounds present  EXTREMITIES:  2+ Peripheral Pulses, No clubbing, cyanosis, or edema  LYMPH: No lymphadenopathy noted  SKIN: No rashes or lesions    LABS:                        11.8   9.82  )-----------( 139      ( 24 Apr 2024 07:53 )             39.7     04-24    143  |  97  |  103<H>  ----------------------------<  109<H>  3.9   |  25  |  5.71<H>    Ca    9.3      24 Apr 2024 07:53  Phos  6.3     04-24  Mg     2.3     04-24      PTT - ( 24 Apr 2024 07:53 )  PTT:79.1 sec  Urinalysis Basic - ( 24 Apr 2024 07:53 )    Color: x / Appearance: x / SG: x / pH: x  Gluc: 109 mg/dL / Ketone: x  / Bili: x / Urobili: x   Blood: x / Protein: x / Nitrite: x   Leuk Esterase: x / RBC: x / WBC x   Sq Epi: x / Non Sq Epi: x / Bacteria: x      CAPILLARY BLOOD GLUCOSE            Urinalysis Basic - ( 24 Apr 2024 07:53 )    Color: x / Appearance: x / SG: x / pH: x  Gluc: 109 mg/dL / Ketone: x  / Bili: x / Urobili: x   Blood: x / Protein: x / Nitrite: x   Leuk Esterase: x / RBC: x / WBC x   Sq Epi: x / Non Sq Epi: x / Bacteria: x        MEDICATIONS  (STANDING):  digoxin     Tablet 125 MICROGram(s) Oral every other day  heparin  Infusion.  Unit(s)/Hr (12 mL/Hr) IV Continuous <Continuous>  hydrALAZINE 10 milliGRAM(s) Oral three times a day  metoprolol succinate  milliGRAM(s) Oral two times a day  sodium chloride 0.9%. 1000 milliLiter(s) (60 mL/Hr) IV Continuous <Continuous>  sodium chloride 0.9%. 1000 milliLiter(s) (75 mL/Hr) IV Continuous <Continuous>    MEDICATIONS  (PRN):  acetaminophen     Tablet .. 650 milliGRAM(s) Oral every 6 hours PRN Temp greater or equal to 38C (100.4F), Mild Pain (1 - 3)  heparin   Injectable 2500 Unit(s) IV Push every 6 hours PRN For aPTT between 40 - 57  heparin   Injectable 5500 Unit(s) IV Push every 6 hours PRN For aPTT less than 40  ondansetron Injectable 4 milliGRAM(s) IV Push every 6 hours PRN Nausea and/or Vomiting  senna 2 Tablet(s) Oral at bedtime PRN Constipation      Care Discussed with Consultants/Other Providers [ ] YES  [ ] NO

## 2024-04-24 NOTE — PROGRESS NOTE ADULT - PROBLEM SELECTOR PLAN 1
Pt. with renal failure in the setting of fluid overload. On review of [facility], SCr noted to be persistently elevated from 5343-9590. SCr was elevated at 1.79 on 5/4/2011. SCr initially during this admission was elevated at 4.73 (4/8), and peaked at 6.53 yesterday (4/23). Pt. received IVFs. SCr decreased to 5.71 today. No interim labs available for review but pt. likely has underlying CKD. Documented urine output is 1.2L over the past 24 hours. UA showed proteinuria and evidence of infection (although 15 epithelial cells). UPCR is elevated at 1.1. Kidney sonogram showed evidence of medical renal disease, BL non-obstructing stones, and BL renal cysts.     -bumex held from 4/21 due to hypotension, and resolution of edema.    -patient reports she continues to make good urine.  please attempt strict I/O    - Serologic workup thus far: HBsAg, HCV Ab, and HIV are non-reactive. Kappa/Lambda free light chain ratio is mildly elevated at 1.85 (acceptable range for degree of CKD). Weak IgG kappa band noted on SIFE.     -Renal sono noted with mild R hydro, no L hydro, nonobstructive calculi, renal cysts, with no retention    Monitor labs and urine output. Avoid nephrotoxins. Dose medications as per eGFR.

## 2024-04-25 LAB
ALBUMIN SERPL ELPH-MCNC: 4.2 G/DL
ALP BLD-CCNC: 100 U/L
ALT SERPL-CCNC: 29 U/L
ANION GAP SERPL CALC-SCNC: 17 MMOL/L
ANION GAP SERPL CALC-SCNC: 21 MMOL/L — HIGH (ref 5–17)
APTT BLD: 71.9 SEC — HIGH (ref 24.5–35.6)
AST SERPL-CCNC: 14 U/L
BACTERIA UR CULT: ABNORMAL
BASOPHILS # BLD AUTO: 0.07 K/UL
BASOPHILS NFR BLD AUTO: 1.3 %
BILIRUB SERPL-MCNC: 0.4 MG/DL
BUN SERPL-MCNC: 102 MG/DL — HIGH (ref 7–23)
BUN SERPL-MCNC: 82 MG/DL
CALCIUM SERPL-MCNC: 8.3 MG/DL
CALCIUM SERPL-MCNC: 9.9 MG/DL — SIGNIFICANT CHANGE UP (ref 8.4–10.5)
CHLORIDE SERPL-SCNC: 114 MMOL/L
CHLORIDE SERPL-SCNC: 96 MMOL/L — SIGNIFICANT CHANGE UP (ref 96–108)
CHOLEST SERPL-MCNC: 165 MG/DL
CHOLEST/HDLC SERPL: 3.5 RATIO
CO2 SERPL-SCNC: 11 MMOL/L
CO2 SERPL-SCNC: 27 MMOL/L — SIGNIFICANT CHANGE UP (ref 22–31)
CREAT SERPL-MCNC: 4.84 MG/DL
CREAT SERPL-MCNC: 6.05 MG/DL — HIGH (ref 0.5–1.3)
EGFR: 7 ML/MIN/1.73M2 — LOW
EGFR: 9 ML/MIN/1.73M2
EOSINOPHIL # BLD AUTO: 0.08 K/UL
EOSINOPHIL NFR BLD AUTO: 1.4 %
ESTIMATED AVERAGE GLUCOSE: 97 MG/DL
GLUCOSE SERPL-MCNC: 117 MG/DL — HIGH (ref 70–99)
GLUCOSE SERPL-MCNC: 99 MG/DL
HBA1C MFR BLD HPLC: 5 %
HCT VFR BLD CALC: 36.7 %
HCT VFR BLD CALC: 42 % — SIGNIFICANT CHANGE UP (ref 34.5–45)
HDLC SERPL-MCNC: 47 MG/DL
HGB BLD-MCNC: 11.1 G/DL
HGB BLD-MCNC: 12.6 G/DL — SIGNIFICANT CHANGE UP (ref 11.5–15.5)
IMM GRANULOCYTES NFR BLD AUTO: 0.4 %
LDLC SERPL CALC-MCNC: 95 MG/DL
LYMPHOCYTES # BLD AUTO: 0.98 K/UL
LYMPHOCYTES NFR BLD AUTO: 17.6 %
MAGNESIUM SERPL-MCNC: 2.4 MG/DL — SIGNIFICANT CHANGE UP (ref 1.6–2.6)
MAN DIFF?: NORMAL
MCHC RBC-ENTMCNC: 29.3 PG — SIGNIFICANT CHANGE UP (ref 27–34)
MCHC RBC-ENTMCNC: 30 GM/DL — LOW (ref 32–36)
MCHC RBC-ENTMCNC: 30.2 GM/DL
MCHC RBC-ENTMCNC: 30.8 PG
MCV RBC AUTO: 101.9 FL
MCV RBC AUTO: 97.7 FL — SIGNIFICANT CHANGE UP (ref 80–100)
MONOCYTES # BLD AUTO: 0.41 K/UL
MONOCYTES NFR BLD AUTO: 7.3 %
NEUTROPHILS # BLD AUTO: 4.02 K/UL
NEUTROPHILS NFR BLD AUTO: 72 %
NONHDLC SERPL-MCNC: 118 MG/DL
NRBC # BLD: 0 /100 WBCS — SIGNIFICANT CHANGE UP (ref 0–0)
PHOSPHATE SERPL-MCNC: 7 MG/DL — HIGH (ref 2.5–4.5)
PLATELET # BLD AUTO: 146 K/UL — LOW (ref 150–400)
PLATELET # BLD AUTO: 172 K/UL
POTASSIUM SERPL-MCNC: 4 MMOL/L — SIGNIFICANT CHANGE UP (ref 3.5–5.3)
POTASSIUM SERPL-SCNC: 4 MMOL/L — SIGNIFICANT CHANGE UP (ref 3.5–5.3)
POTASSIUM SERPL-SCNC: 5.1 MMOL/L
PROT SERPL-MCNC: 6 G/DL
RBC # BLD: 3.6 M/UL
RBC # BLD: 4.3 M/UL — SIGNIFICANT CHANGE UP (ref 3.8–5.2)
RBC # FLD: 12.5 % — SIGNIFICANT CHANGE UP (ref 10.3–14.5)
RBC # FLD: 14.9 %
SODIUM SERPL-SCNC: 143 MMOL/L
SODIUM SERPL-SCNC: 144 MMOL/L — SIGNIFICANT CHANGE UP (ref 135–145)
TRIGL SERPL-MCNC: 130 MG/DL
WBC # BLD: 8.37 K/UL — SIGNIFICANT CHANGE UP (ref 3.8–10.5)
WBC # FLD AUTO: 5.58 K/UL
WBC # FLD AUTO: 8.37 K/UL — SIGNIFICANT CHANGE UP (ref 3.8–10.5)

## 2024-04-25 PROCEDURE — 99232 SBSQ HOSP IP/OBS MODERATE 35: CPT | Mod: GC

## 2024-04-25 PROCEDURE — 99233 SBSQ HOSP IP/OBS HIGH 50: CPT

## 2024-04-25 RX ORDER — PHENYLEPHRINE-SHARK LIVER OIL-MINERAL OIL-PETROLATUM RECTAL OINTMENT
1 OINTMENT (GRAM) RECTAL
Refills: 0 | Status: DISCONTINUED | OUTPATIENT
Start: 2024-04-25 | End: 2024-05-01

## 2024-04-25 RX ORDER — APIXABAN 2.5 MG/1
5 TABLET, FILM COATED ORAL
Refills: 0 | Status: DISCONTINUED | OUTPATIENT
Start: 2024-04-25 | End: 2024-05-01

## 2024-04-25 RX ADMIN — Medication 10 MILLIGRAM(S): at 23:11

## 2024-04-25 RX ADMIN — Medication 10 MILLIGRAM(S): at 05:32

## 2024-04-25 RX ADMIN — PHENYLEPHRINE-SHARK LIVER OIL-MINERAL OIL-PETROLATUM RECTAL OINTMENT 1 APPLICATION(S): at 23:12

## 2024-04-25 RX ADMIN — Medication 100 MILLIGRAM(S): at 18:20

## 2024-04-25 RX ADMIN — Medication 125 MICROGRAM(S): at 12:16

## 2024-04-25 RX ADMIN — APIXABAN 5 MILLIGRAM(S): 2.5 TABLET, FILM COATED ORAL at 18:20

## 2024-04-25 RX ADMIN — Medication 100 MILLIGRAM(S): at 05:32

## 2024-04-25 NOTE — PROGRESS NOTE ADULT - SUBJECTIVE AND OBJECTIVE BOX
Patient is a 73y old  Female who presents with a chief complaint of BL LE swelling (25 Apr 2024 10:31)      INTERVAL HPI/OVERNIGHT EVENTS:  T(C): 36.2 (04-25-24 @ 18:03), Max: 37.1 (04-25-24 @ 05:25)  HR: 75 (04-25-24 @ 18:03) (66 - 77)  BP: 115/78 (04-25-24 @ 18:03) (115/78 - 128/80)  RR: 18 (04-25-24 @ 18:03) (18 - 18)  SpO2: 95% (04-25-24 @ 18:03) (94% - 97%)  Wt(kg): --  I&O's Summary    24 Apr 2024 07:01  -  25 Apr 2024 07:00  --------------------------------------------------------  IN: 936 mL / OUT: 1800 mL / NET: -864 mL    25 Apr 2024 07:01  -  25 Apr 2024 20:14  --------------------------------------------------------  IN: 72 mL / OUT: 700 mL / NET: -628 mL        PAST MEDICAL & SURGICAL HISTORY:  Renal Calculus      Ureteral Calculus      Acute Renal Failure  9/2009      Wrist Fracture  CYNTHIA      Collar Bone Fracture      Rib Fractures      Kidney Stone removal  3/15/2011      C Section      Percutaneous Stone Extraction  Right      S/P Left Percutaneuos Stone extraction  01/26/2010, 04/20/2010          SOCIAL HISTORY  Alcohol:  Tobacco:  Illicit substance use:    FAMILY HISTORY:    REVIEW OF SYSTEMS:  CONSTITUTIONAL: No fever, weight loss, or fatigue  EYES: No eye pain, visual disturbances, or discharge  ENMT:  No difficulty hearing, tinnitus, vertigo; No sinus or throat pain  NECK: No pain or stiffness  RESPIRATORY: No cough, wheezing, chills or hemoptysis; No shortness of breath  CARDIOVASCULAR: No chest pain, palpitations, dizziness, or leg swelling  GASTROINTESTINAL: No abdominal or epigastric pain. No nausea, vomiting, or hematemesis; No diarrhea or constipation. No melena or hematochezia.  GENITOURINARY: No dysuria, frequency, hematuria, or incontinence  NEUROLOGICAL: No headaches, memory loss, loss of strength, numbness, or tremors  SKIN: No itching, burning, rashes, or lesions   LYMPH NODES: No enlarged glands  ENDOCRINE: No heat or cold intolerance; No hair loss  MUSCULOSKELETAL: No joint pain or swelling; No muscle, back, or extremity pain  PSYCHIATRIC: No depression, anxiety, mood swings, or difficulty sleeping  HEME/LYMPH: No easy bruising, or bleeding gums  ALLERY AND IMMUNOLOGIC: No hives or eczema    RADIOLOGY & ADDITIONAL TESTS:    Imaging Personally Reviewed:  [ ] YES  [ ] NO    Consultant(s) Notes Reviewed:  [ ] YES  [ ] NO    PHYSICAL EXAM:  GENERAL: NAD, well-groomed, well-developed  HEAD:  Atraumatic, Normocephalic  EYES: EOMI, PERRLA, conjunctiva and sclera clear  ENMT: No tonsillar erythema, exudates, or enlargement; Moist mucous membranes, Good dentition, No lesions  NECK: Supple, No JVD, Normal thyroid  NERVOUS SYSTEM:  Alert & Oriented X3, Good concentration; Motor Strength 5/5 B/L upper and lower extremities; DTRs 2+ intact and symmetric  CHEST/LUNG: Clear to percussion bilaterally; No rales, rhonchi, wheezing, or rubs  HEART: Regular rate and rhythm; No murmurs, rubs, or gallops  ABDOMEN: Soft, Nontender, Nondistended; Bowel sounds present  EXTREMITIES:  2+ Peripheral Pulses, No clubbing, cyanosis, or edema  LYMPH: No lymphadenopathy noted  SKIN: No rashes or lesions    LABS:                        12.6   8.37  )-----------( 146      ( 25 Apr 2024 08:10 )             42.0     04-25    144  |  96  |  102<H>  ----------------------------<  117<H>  4.0   |  27  |  6.05<H>    Ca    9.9      25 Apr 2024 08:10  Phos  7.0     04-25  Mg     2.4     04-25      PTT - ( 25 Apr 2024 08:10 )  PTT:71.9 sec  Urinalysis Basic - ( 25 Apr 2024 08:10 )    Color: x / Appearance: x / SG: x / pH: x  Gluc: 117 mg/dL / Ketone: x  / Bili: x / Urobili: x   Blood: x / Protein: x / Nitrite: x   Leuk Esterase: x / RBC: x / WBC x   Sq Epi: x / Non Sq Epi: x / Bacteria: x      CAPILLARY BLOOD GLUCOSE            Urinalysis Basic - ( 25 Apr 2024 08:10 )    Color: x / Appearance: x / SG: x / pH: x  Gluc: 117 mg/dL / Ketone: x  / Bili: x / Urobili: x   Blood: x / Protein: x / Nitrite: x   Leuk Esterase: x / RBC: x / WBC x   Sq Epi: x / Non Sq Epi: x / Bacteria: x        MEDICATIONS  (STANDING):  apixaban 5 milliGRAM(s) Oral two times a day  hemorrhoidal Ointment 1 Application(s) Rectal two times a day  hydrALAZINE 10 milliGRAM(s) Oral three times a day  sodium chloride 0.9%. 1000 milliLiter(s) (60 mL/Hr) IV Continuous <Continuous>  sodium chloride 0.9%. 1000 milliLiter(s) (75 mL/Hr) IV Continuous <Continuous>    MEDICATIONS  (PRN):  acetaminophen     Tablet .. 650 milliGRAM(s) Oral every 6 hours PRN Temp greater or equal to 38C (100.4F), Mild Pain (1 - 3)  ondansetron Injectable 4 milliGRAM(s) IV Push every 6 hours PRN Nausea and/or Vomiting  senna 2 Tablet(s) Oral at bedtime PRN Constipation      Care Discussed with Consultants/Other Providers [ ] YES  [ ] NO Patient is a 73y old  Female who presents with a chief complaint of BL LE swelling (25 Apr 2024 10:31)      INTERVAL HPI/OVERNIGHT EVENTS: seen and examined   T(C): 36.2 (04-25-24 @ 18:03), Max: 37.1 (04-25-24 @ 05:25)  HR: 75 (04-25-24 @ 18:03) (66 - 77)  BP: 115/78 (04-25-24 @ 18:03) (115/78 - 128/80)  RR: 18 (04-25-24 @ 18:03) (18 - 18)  SpO2: 95% (04-25-24 @ 18:03) (94% - 97%)  Wt(kg): --  I&O's Summary    24 Apr 2024 07:01  -  25 Apr 2024 07:00  --------------------------------------------------------  IN: 936 mL / OUT: 1800 mL / NET: -864 mL    25 Apr 2024 07:01  -  25 Apr 2024 20:14  --------------------------------------------------------  IN: 72 mL / OUT: 700 mL / NET: -628 mL        PAST MEDICAL & SURGICAL HISTORY:  Renal Calculus      Ureteral Calculus      Acute Renal Failure  9/2009      Wrist Fracture  CYNTHIA      Collar Bone Fracture      Rib Fractures      Kidney Stone removal  3/15/2011      C Section      Percutaneous Stone Extraction  Right      S/P Left Percutaneuos Stone extraction  01/26/2010, 04/20/2010          SOCIAL HISTORY  Alcohol:  Tobacco:  Illicit substance use:    FAMILY HISTORY:    REVIEW OF SYSTEMS:  CONSTITUTIONAL: No fever, weight loss, or fatigue  EYES: No eye pain, visual disturbances, or discharge  ENMT:  No difficulty hearing, tinnitus, vertigo; No sinus or throat pain  NECK: No pain or stiffness  RESPIRATORY: No cough, wheezing, chills or hemoptysis; No shortness of breath  CARDIOVASCULAR: No chest pain, palpitations, dizziness, or leg swelling  GASTROINTESTINAL: No abdominal or epigastric pain. No nausea, vomiting, or hematemesis; No diarrhea or constipation. No melena or hematochezia.  GENITOURINARY: No dysuria, frequency, hematuria, or incontinence  NEUROLOGICAL: No headaches, memory loss, loss of strength, numbness, or tremors  SKIN: No itching, burning, rashes, or lesions   LYMPH NODES: No enlarged glands  ENDOCRINE: No heat or cold intolerance; No hair loss  MUSCULOSKELETAL: No joint pain or swelling; No muscle, back, or extremity pain  PSYCHIATRIC: No depression, anxiety, mood swings, or difficulty sleeping  HEME/LYMPH: No easy bruising, or bleeding gums  ALLERY AND IMMUNOLOGIC: No hives or eczema    RADIOLOGY & ADDITIONAL TESTS:    Imaging Personally Reviewed:  [ ] YES  [ ] NO    Consultant(s) Notes Reviewed:  [ ] YES  [ ] NO    PHYSICAL EXAM:  GENERAL: NAD, well-groomed, well-developed  HEAD:  Atraumatic, Normocephalic  EYES: EOMI, PERRLA, conjunctiva and sclera clear  ENMT: No tonsillar erythema, exudates, or enlargement; Moist mucous membranes, Good dentition, No lesions  NECK: Supple, No JVD, Normal thyroid  NERVOUS SYSTEM:  Alert & Oriented X3, Good concentration; Motor Strength 5/5 B/L upper and lower extremities; DTRs 2+ intact and symmetric  CHEST/LUNG: Clear to percussion bilaterally; No rales, rhonchi, wheezing, or rubs  HEART: Regular rate and rhythm; No murmurs, rubs, or gallops  ABDOMEN: Soft, Nontender, Nondistended; Bowel sounds present  EXTREMITIES:  2+ Peripheral Pulses, No clubbing, cyanosis, or edema  LYMPH: No lymphadenopathy noted  SKIN: No rashes or lesions    LABS:                        12.6   8.37  )-----------( 146      ( 25 Apr 2024 08:10 )             42.0     04-25    144  |  96  |  102<H>  ----------------------------<  117<H>  4.0   |  27  |  6.05<H>    Ca    9.9      25 Apr 2024 08:10  Phos  7.0     04-25  Mg     2.4     04-25      PTT - ( 25 Apr 2024 08:10 )  PTT:71.9 sec  Urinalysis Basic - ( 25 Apr 2024 08:10 )    Color: x / Appearance: x / SG: x / pH: x  Gluc: 117 mg/dL / Ketone: x  / Bili: x / Urobili: x   Blood: x / Protein: x / Nitrite: x   Leuk Esterase: x / RBC: x / WBC x   Sq Epi: x / Non Sq Epi: x / Bacteria: x      CAPILLARY BLOOD GLUCOSE            Urinalysis Basic - ( 25 Apr 2024 08:10 )    Color: x / Appearance: x / SG: x / pH: x  Gluc: 117 mg/dL / Ketone: x  / Bili: x / Urobili: x   Blood: x / Protein: x / Nitrite: x   Leuk Esterase: x / RBC: x / WBC x   Sq Epi: x / Non Sq Epi: x / Bacteria: x        MEDICATIONS  (STANDING):  apixaban 5 milliGRAM(s) Oral two times a day  hemorrhoidal Ointment 1 Application(s) Rectal two times a day  hydrALAZINE 10 milliGRAM(s) Oral three times a day  sodium chloride 0.9%. 1000 milliLiter(s) (60 mL/Hr) IV Continuous <Continuous>  sodium chloride 0.9%. 1000 milliLiter(s) (75 mL/Hr) IV Continuous <Continuous>    MEDICATIONS  (PRN):  acetaminophen     Tablet .. 650 milliGRAM(s) Oral every 6 hours PRN Temp greater or equal to 38C (100.4F), Mild Pain (1 - 3)  ondansetron Injectable 4 milliGRAM(s) IV Push every 6 hours PRN Nausea and/or Vomiting  senna 2 Tablet(s) Oral at bedtime PRN Constipation      Care Discussed with Consultants/Other Providers [ ] YES  [ ] NO

## 2024-04-25 NOTE — PROGRESS NOTE ADULT - ASSESSMENT
73F PMHx nephrolithiasis, HTN admitted with 2 weeks of bl LE swelling and newly reduced renal function with multiple nonobstructive renal stones on CT. EP consulted for new-onset AFib w/ RVR noted on admission EKG. Patient reports asymptomatic, denies dizziness/lightheadedness, CP, SOB, palpitations. Denies any cardiac history. Received metoprolol 5mg IV x1 for AFib at rates 130s with improvement to 110s.       NICO on CKD stage 4  Hypotension   Over Diuresis s   Metabolic Acidosis   Renal consulted   off sod bicarb   Off Diueretics   Gentle Hydration as per renal as of now       May need HD as per renal   Will follow Renal function off Bumex drip/ Bumex        CHF : HOLD BUMEX DRIP   BUMEX IV      cardio following     Afib with rvr   EP eval appreciated   started on eliquis   c/w lopressor , digoxin   cardio following     NICO on CKD stage 4  Metabolic Acidosis   Renal consulted   off sod bicarb     B/L nonobstructive kidney stones :  -stable     dispo: started on eliquis , scheduled for DCCV in am

## 2024-04-25 NOTE — PROGRESS NOTE ADULT - SUBJECTIVE AND OBJECTIVE BOX
[facility] Division of Kidney Diseases & Hypertension  FOLLOW UP NOTE  [phone]--------------------------------------------------------------------------------    Chief Complaint: Chronic kidney disease    24 hour events/subjective: Patient seen and examined this morning, Reports feeling well, offers no complaints. Continues to have good urine output. Denies any headaches, fevers/chills, chest pain, SOB, abdominal pain, and LE edema.      PAST HISTORY  --------------------------------------------------------------------------------  No significant changes to PMH, PSH, FHx, SHx, unless otherwise noted    ALLERGIES & MEDICATIONS  --------------------------------------------------------------------------------  Allergies    No Known Allergies    Intolerances    Standing Inpatient Medications  digoxin     Tablet 125 MICROGram(s) Oral every other day  heparin  Infusion.  Unit(s)/Hr IV Continuous <Continuous>  hydrALAZINE 10 milliGRAM(s) Oral three times a day  metoprolol succinate  milliGRAM(s) Oral two times a day  sodium chloride 0.9%. 1000 milliLiter(s) IV Continuous <Continuous>  sodium chloride 0.9%. 1000 milliLiter(s) IV Continuous <Continuous>    PRN Inpatient Medications  acetaminophen     Tablet .. 650 milliGRAM(s) Oral every 6 hours PRN  heparin   Injectable 2500 Unit(s) IV Push every 6 hours PRN  heparin   Injectable 5500 Unit(s) IV Push every 6 hours PRN  ondansetron Injectable 4 milliGRAM(s) IV Push every 6 hours PRN  senna 2 Tablet(s) Oral at bedtime PRN      REVIEW OF SYSTEMS  --------------------------------------------------------------------------------  See HPI    VITALS/PHYSICAL EXAM  --------------------------------------------------------------------------------  T(C): 36.7 (04-25-24 @ 08:31), Max: 37.1 (04-25-24 @ 05:25)  HR: 72 (04-25-24 @ 08:31) (57 - 87)  BP: 128/80 (04-25-24 @ 08:31) (118/71 - 133/87)  RR: 18 (04-25-24 @ 08:31) (18 - 18)  SpO2: 94% (04-25-24 @ 08:31) (94% - 98%)  Wt(kg): --  Height (cm): 157.5 (04-24-24 @ 14:30)    04-24-24 @ 07:01  -  04-25-24 @ 07:00  --------------------------------------------------------  IN: 936 mL / OUT: 1800 mL / NET: -864 mL    Physical Exam:  Gen: NAD  Pulm: CTA B/L ant/lat fields  CV: RRR, S1S2  Abd: +BS, soft, nontender/nondistended  : No suprapubic tenderness.  no connor  Extremity: BL LE edema resolved  Neuro: A&O x 3    LABS/STUDIES  --------------------------------------------------------------------------------              12.6   8.37  >-----------<  146      [04-25-24 @ 08:10]              42.0     144  |  96  |  102  ----------------------------<  117      [04-25-24 @ 08:10]  4.0   |  27  |  6.05        Ca     9.9     [04-25-24 @ 08:10]      Mg     2.4     [04-25-24 @ 08:10]      Phos  7.0     [04-25-24 @ 08:10]    PTT: 71.9       [04-25-24 @ 08:10]    Creatinine Trend:  SCr 6.05 [04-25 @ 08:10]  SCr 5.71 [04-24 @ 07:53]  SCr 6.53 [04-23 @ 07:33]  SCr 6.48 [04-22 @ 07:03]  SCr 6.17 [04-21 @ 07:32]    Urine Sodium 115      [04-18-24 @ 10:59]  Urine Potassium 14      [04-18-24 @ 10:59]  Urine Chloride 85      [04-18-24 @ 10:59]

## 2024-04-25 NOTE — PROGRESS NOTE ADULT - SUBJECTIVE AND OBJECTIVE BOX
Patient seen and examined at bedside.    Interval Events: Hydral restarted. No acute events overnight. Denies chest pain or SOB    Telemetry: no significant events    REVIEW OF SYSTEMS:  Constitutional:     [ x] negative [ ] fevers [ ] chills [ ] weight loss [ ] weight gain  HEENT:                  [ x] negative [ ] dry eyes [ ] eye irritation [ ] postnasal drip [ ] nasal congestion  CV:                         [x ] negative  [ ] chest pain [ ] orthopnea [ ] palpitations [ ] murmur  Resp:                     [ x] negative [ ] cough [ ] shortness of breath [ ] dyspnea [ ] wheezing [ ] sputum [ ]hemoptysis  GI:                          [ x] negative [ ] nausea [ ] vomiting [ ] diarrhea [ ] constipation [ ] abd pain [ ] dysphagia   :                        [ x] negative [ ] dysuria [ ] nocturia [ ] hematuria [ ] increased urinary frequency  Musculoskeletal: [ x] negative [ ] back pain [ ] myalgias [ ] arthralgias [ ] fracture  Skin:                       [ x] negative [ ] rash [ ] itch  Neurological:        [ x] negative [ ] headache [ ] dizziness [ ] syncope [ ] weakness [ ] numbness  Psychiatric:           [ x] negative [ ] anxiety [ ] depression  Endocrine:            [ x] negative [ ] diabetes [ ] thyroid problem  Heme/Lymph:      [ x] negative [ ] anemia [ ] bleeding problem  Allergic/Immune: [x ] negative [ ] itchy eyes [ ] nasal discharge [ ] hives [ ] angioedema    [x ] All other systems negative  [ ] Unable to assess ROS due     Current Meds:  acetaminophen     Tablet .. 650 milliGRAM(s) Oral every 6 hours PRN  digoxin     Tablet 125 MICROGram(s) Oral every other day  heparin   Injectable 2500 Unit(s) IV Push every 6 hours PRN  heparin   Injectable 5500 Unit(s) IV Push every 6 hours PRN  heparin  Infusion.  Unit(s)/Hr IV Continuous <Continuous>  hydrALAZINE 10 milliGRAM(s) Oral three times a day  metoprolol succinate  milliGRAM(s) Oral two times a day  ondansetron Injectable 4 milliGRAM(s) IV Push every 6 hours PRN  senna 2 Tablet(s) Oral at bedtime PRN  sodium chloride 0.9%. 1000 milliLiter(s) IV Continuous <Continuous>  sodium chloride 0.9%. 1000 milliLiter(s) IV Continuous <Continuous>      PAST MEDICAL & SURGICAL HISTORY:  Renal Calculus      Ureteral Calculus      Acute Renal Failure  9/2009      Wrist Fracture  CYNTHIA      Collar Bone Fracture      Rib Fractures      Kidney Stone removal  3/15/2011      C Section      Percutaneous Stone Extraction  Right      S/P Left Percutaneuos Stone extraction  01/26/2010, 04/20/2010          Vitals:  T(F): 98.1 (04-25), Max: 98.8 (04-25)  HR: 72 (04-25) (57 - 87)  BP: 128/80 (04-25) (118/76 - 133/87)  RR: 18 (04-25)  SpO2: 94% (04-25)  I&O's Summary    24 Apr 2024 07:01  -  25 Apr 2024 07:00  --------------------------------------------------------  IN: 936 mL / OUT: 1800 mL / NET: -864 mL        Physical Exam:  Appearance: No acute distress; well appearing  Eyes: PERRL, EOMI, pink conjunctiva  HENT: Normal oral mucosa  Cardiovascular: RRR, S1, S2, no murmurs, rubs, or gallops; no edema; no JVD  Respiratory: Clear to auscultation bilaterally  Gastrointestinal: soft, non-tender, non-distended with normal bowel sounds  Musculoskeletal: No clubbing; no joint deformity   Neurologic: Non-focal  Lymphatic: No lymphadenopathy  Psychiatry: AAOx3, mood & affect appropriate  Skin: No rashes, ecchymoses, or cyanosis                          12.6   8.37  )-----------( 146      ( 25 Apr 2024 08:10 )             42.0     04-25    144  |  96  |  102<H>  ----------------------------<  117<H>  4.0   |  27  |  6.05<H>    Ca    9.9      25 Apr 2024 08:10  Phos  7.0     04-25  Mg     2.4     04-25      PTT - ( 25 Apr 2024 08:10 )  PTT:71.9 sec      < from: TTE W or WO Ultrasound Enhancing Agent (04.19.24 @ 13:53) >  CONCLUSIONS:      1. Left ventricular cavity is normal in size. Left ventricular wall thickness is normal. Left ventricular systolic function is normal with an ejection fraction visually estimated at 55 to 60 %. There are no regional wall motion abnormalities seen.   2. Analysis of left ventricular diastolic function and filling pressure is made challenging by the presence of atrial fibrillation.   3. There is no evidence of a left ventricular thrombus.   4. Normal right ventricular cavity size and normal systolic function.   5. The left atrium is severely dilated.   6. The right atrium is dilated.   7. Normal left and right atrial size.   8. No significant valvular disease.   9. Estimated pulmonary artery systolic pressure is 22 mmHg.  10. No pericardial effusion seen.    ________________________________________________________________________________________  FINDINGS:     Left Ventricle:  The left ventricular cavity is normal in size. Left ventricular wall thickness is normal. Left ventricular systolic function is normal with an ejection fraction visually estimated at 55 to 60%. There are no regional wall motion abnormalities seen. Analysis of left ventricular diastolic function and filling pressure is made challenging by the presence of atrial fibrillation. No left ventricular hypertrophy. There is no evidence of a left ventricular thrombus.     Right Ventricle:  The right ventricular cavity is normal in size and normal systolic function. Tricuspid annular plane systolic excursion (TAPSE) is 1.6 cm (normal >=1.7 cm).     Left Atrium:  The left atrium is severely dilated.     Right Atrium:  The right atrium is dilated.     Interatrial Septum:  The interatrial septum was not well visualized.     Aortic Valve:  The aortic valve is tricuspid with normal leaflet excursion. There is no aortic valve stenosis. There is no evidenceof aortic regurgitation.     Mitral Valve:  Structurally normal mitral valve with normal leaflet excursion. There is no mitral valve stenosis. There is trace mitral regurgitation.     Tricuspid Valve:  Structurally normal tricuspid valve with normal leaflet excursion. There is mild to moderate tricuspid regurgitation. Estimated pulmonary artery systolic pressure is 22 mmHg.     Pulmonic Valve:  Structurally normal pulmonic valve with normal leaflet excursion. There is trace pulmonic regurgitation.     Aorta:  The aortic root and proximal aorta are not well visualized. The aortic root appears normal in size.     Pericardium:  No pericardial effusion seen.     Systemic Veins:  The inferior vena cava is normal in size (normal <2.1cm) with normalinspiratory collapse (normal >50%) consistent with normal right atrial pressure (~3, range 0-5mmHg).  ____________________________________________________________________  QUANTITATIVE DATA:  Left Ventricle Measurements: (Indexed to BSA)     IVSd (2D):   0.8 cm  LVPWd (2D):  0.9 cm  LVIDd (2D):  4.6 cm  LVIDs (2D):  3.5 cm  LV Mass:     133 g  79.4 g/m²  Visualized LV EF%: 55 to 60%     e' lateral: 12.40 cm/s  e' medial:  8.97 cm/s       Right Ventricle Measurements:     TAPSE: 1.6 cm       Tricuspid Valve Measurements:     TR Vmax:          2.2 m/s  TR Peak Gradient: 18.8 mmHg  RA Pressure:      3 mmHg  PASP:             22 mmHg    ________________________________________________________________________________________  Electronically signedon 4/19/2024 at 3:15:40 PM by Virginia Patel    < end of copied text >

## 2024-04-25 NOTE — CHART NOTE - NSCHARTNOTEFT_GEN_A_CORE
73 year old Female PMHx nephrolithiasis, HTN admitted with new onset bl LE swelling and renal dysfunction. Cardiology consulted for asymptomatic new-onset AFib w/ RVR  and ADHF. Overall patient initially came in with volume overload thought it initially to be related to renal dysfunction, Initial echo revealed significantly reduction in LV systolic function and severe MR.  Repeat TTE 4/19: LVEF 55 to 60%, normal RV function, trace MR (significantly improved compared to echo 4/11). EP recalled today for evaluation for /DCCV.     1.  Persistent AFib, rate controlled.  Unclear when AFib initiated   2.  ADHF, LVEF improved to 55-60%  3.  Trace MR on repeat echo  4.  NICO on CKD, creatinine 6.48 today       Tele with rate controlled AF  Now off IV Bumex  Please switch AC to Eliquis 5mg BID, discontinue heparin. (Age <80 and Wt >60)  Please keep NPO after MN for /DCCV in AM. Pt must be on uninterrupted AC minimum 30 days post DCCV.   Please hold AM Metoprolol and Digoxin.   Discussed with EP attending and primary team.     - NEIDA Martin  [phone]

## 2024-04-25 NOTE — PROGRESS NOTE ADULT - PROBLEM SELECTOR PLAN 2
Pt. with metabolic acidosis in the setting of renal failure, now resolved with SCO2 now 27. Bumex was discontinued  -Monitor SCO2.    If you have any questions, please feel free to contact me  Jamari Brunson  Nephrology Fellow  [phone] / [employer] Teams(Preferred)  (After 5pm or on weekends please page the on-call fellow)

## 2024-04-25 NOTE — PROGRESS NOTE ADULT - TIME BILLING
Atrial fibrillation  Heart Failure with improved ejection fraction  Mitral regurgitation
Atrial fibrillation  Heart Failure with improved ejection fraction  Mitral regurgitation.
Atrial fibrillation  Heart Failure with improved ejection fraction  Mitral regurgitation.
Atrial fibrillation  Heart Failure with improved ejection fraction

## 2024-04-25 NOTE — PROGRESS NOTE ADULT - PROBLEM SELECTOR PLAN 1
Pt. with renal failure in the setting of fluid overload. On review of [place], SCr noted to be persistently elevated from 4579-0029. SCr was elevated at 1.79 on 5/4/2011. SCr initially during this admission was elevated at 4.73 (4/8), and peaked at 6.53 on 4/23. Pt. received IVFs. SCr remains elevated/stable at 6.05 today. No interim labs available for review but pt. likely has underlying CKD. Documented urine output is 1.8L over the past 24 hours. UA showed proteinuria and evidence of infection (although 15 epithelial cells). UPCR is elevated at 1.1. Kidney sonogram showed evidence of medical renal disease, BL non-obstructing stones, and BL renal cysts.     -bumex held from 4/21 due to hypotension, and resolution of edema.    -patient reports she continues to make good urine.  please attempt strict I/O    - Serologic workup thus far: HBsAg, HCV Ab, and HIV are non-reactive. Kappa/Lambda free light chain ratio is mildly elevated at 1.85 (acceptable range for degree of CKD). Weak IgG kappa band noted on SIFE.     -Renal sono noted with mild R hydro, no L hydro, nonobstructive calculi, renal cysts, with no retention  Monitor labs and urine output. Avoid nephrotoxins. Dose medications as per eGFR.

## 2024-04-25 NOTE — PROGRESS NOTE ADULT - ASSESSMENT
73F PMHx nephrolithiasis s/p multiple procedures, CKD and HTN. Admitted with new onset bl LE swelling and renal dysfunction.  Cardiology consulted for asymptomatic new-onset AFib w/ RVR and ADHF.       #1.  ADHF:  - volume status appears to have improved, LE edema improved and without JVD, HDS on RA   - s/p IV Bumex intermittently due to hemodynamics, last dose 4/21; would defer to nephrology for additional recs for volume management; s/p PO metolazone   - BB as below  - c/w hydralazine PO tid as tolerated. Would restart isordil at low dose as tolerated for afterload reduction in setting of severe MR and LV dysfunction.  Other GDMT is limited by current renal function  - ischemic workup once euvolemic and HR controlled, can be deferred to outpatient pending further management of AF   - monitor tele  - replete K>4 Mg>2  - strict I/O's, daily standing weights    #2.  AF:  - currently rate controlled   - c/w Toprol 100mg bid w goal HR <110  - c/w digoxin 125mcg every other day (s/p digoxin PO 0.125 mg,  mcg x1 4/17,  mcg x1 4/18)  - c/w hep gtt for AC  - would not give amiodarone given unknown duration of atrial fibrillation  - will f/u with EP for further recs for possible /DCCV before discharge given improvement in volume status and rates    #3.  MR: likely 2/2 volume overload and HFrEF  - repeat TTE 4/19: LVEF 55 to 60%, normal RV function, trace MR (significantly improved compared to echo 4/11)  - would like to reassess MR with possible upcoming  73F PMHx nephrolithiasis s/p multiple procedures, CKD and HTN. Admitted with new onset bl LE swelling and renal dysfunction.  Cardiology consulted for asymptomatic new-onset AFib w/ RVR and ADHF.       #1.  ADHF:  - volume status appears to have improved, LE edema improved and without JVD, HDS on RA   - s/p IV Bumex intermittently due to hemodynamics, last dose 4/21; would defer to nephrology for additional recs for volume management; s/p PO metolazone   - BB as below  - c/w hydralazine PO tid as tolerated. Would restart isordil at low dose as tolerated for afterload reduction in setting of severe MR and LV dysfunction.  Other GDMT is limited by current renal function  - ischemic workup once euvolemic and HR controlled, can be deferred to outpatient pending further management of AF   - monitor tele  - replete K>4 Mg>2  - strict I/O's, daily standing weights    #2.  AF:  - currently rate controlled   - c/w Toprol 100mg bid w goal HR <110  - c/w digoxin 125mcg every other day (s/p digoxin PO 0.125 mg,  mcg x1 4/17,  mcg x1 4/18)  - c/w hep gtt for AC  - would not give amiodarone given unknown duration of atrial fibrillation  - would f/u with EP for further recs for possible /DCCV before discharge given improvement in volume status and rates    #3.  MR: likely 2/2 volume overload and HFrEF  - repeat TTE 4/19: LVEF 55 to 60%, normal RV function, trace MR (significantly improved compared to echo 4/11)

## 2024-04-26 ENCOUNTER — RESULT REVIEW (OUTPATIENT)
Age: 74
End: 2024-04-26

## 2024-04-26 LAB
ANION GAP SERPL CALC-SCNC: 21 MMOL/L — HIGH (ref 5–17)
APTT BLD: 30.9 SEC — SIGNIFICANT CHANGE UP (ref 24.5–35.6)
BLD GP AB SCN SERPL QL: NEGATIVE — SIGNIFICANT CHANGE UP
BUN SERPL-MCNC: 101 MG/DL — HIGH (ref 7–23)
CALCIUM SERPL-MCNC: 9.5 MG/DL — SIGNIFICANT CHANGE UP (ref 8.4–10.5)
CHLORIDE SERPL-SCNC: 96 MMOL/L — SIGNIFICANT CHANGE UP (ref 96–108)
CO2 SERPL-SCNC: 23 MMOL/L — SIGNIFICANT CHANGE UP (ref 22–31)
CREAT SERPL-MCNC: 5.8 MG/DL — HIGH (ref 0.5–1.3)
EGFR: 7 ML/MIN/1.73M2 — LOW
GLUCOSE SERPL-MCNC: 97 MG/DL — SIGNIFICANT CHANGE UP (ref 70–99)
HCT VFR BLD CALC: 36.7 % — SIGNIFICANT CHANGE UP (ref 34.5–45)
HGB BLD-MCNC: 11.4 G/DL — LOW (ref 11.5–15.5)
INR BLD: 1.17 RATIO — SIGNIFICANT CHANGE UP (ref 0.85–1.18)
MAGNESIUM SERPL-MCNC: 2.3 MG/DL — SIGNIFICANT CHANGE UP (ref 1.6–2.6)
MCHC RBC-ENTMCNC: 30.1 PG — SIGNIFICANT CHANGE UP (ref 27–34)
MCHC RBC-ENTMCNC: 31.1 GM/DL — LOW (ref 32–36)
MCV RBC AUTO: 96.8 FL — SIGNIFICANT CHANGE UP (ref 80–100)
NRBC # BLD: 0 /100 WBCS — SIGNIFICANT CHANGE UP (ref 0–0)
PHOSPHATE SERPL-MCNC: 6.7 MG/DL — HIGH (ref 2.5–4.5)
PLATELET # BLD AUTO: 130 K/UL — LOW (ref 150–400)
POTASSIUM SERPL-MCNC: 4.1 MMOL/L — SIGNIFICANT CHANGE UP (ref 3.5–5.3)
POTASSIUM SERPL-SCNC: 4.1 MMOL/L — SIGNIFICANT CHANGE UP (ref 3.5–5.3)
PROTHROM AB SERPL-ACNC: 12.8 SEC — SIGNIFICANT CHANGE UP (ref 9.5–13)
RBC # BLD: 3.79 M/UL — LOW (ref 3.8–5.2)
RBC # FLD: 12.3 % — SIGNIFICANT CHANGE UP (ref 10.3–14.5)
RH IG SCN BLD-IMP: POSITIVE — SIGNIFICANT CHANGE UP
SODIUM SERPL-SCNC: 140 MMOL/L — SIGNIFICANT CHANGE UP (ref 135–145)
WBC # BLD: 7.95 K/UL — SIGNIFICANT CHANGE UP (ref 3.8–10.5)
WBC # FLD AUTO: 7.95 K/UL — SIGNIFICANT CHANGE UP (ref 3.8–10.5)

## 2024-04-26 PROCEDURE — 92960 CARDIOVERSION ELECTRIC EXT: CPT

## 2024-04-26 PROCEDURE — 93320 DOPPLER ECHO COMPLETE: CPT | Mod: 26

## 2024-04-26 PROCEDURE — 99232 SBSQ HOSP IP/OBS MODERATE 35: CPT | Mod: GC

## 2024-04-26 PROCEDURE — 76770 US EXAM ABDO BACK WALL COMP: CPT | Mod: 26

## 2024-04-26 PROCEDURE — 93010 ELECTROCARDIOGRAM REPORT: CPT

## 2024-04-26 PROCEDURE — 99233 SBSQ HOSP IP/OBS HIGH 50: CPT

## 2024-04-26 PROCEDURE — 76377 3D RENDER W/INTRP POSTPROCES: CPT | Mod: 26

## 2024-04-26 PROCEDURE — 93325 DOPPLER ECHO COLOR FLOW MAPG: CPT | Mod: 26

## 2024-04-26 PROCEDURE — 93312 ECHO TRANSESOPHAGEAL: CPT | Mod: 26

## 2024-04-26 RX ORDER — AMIODARONE HYDROCHLORIDE 400 MG/1
TABLET ORAL
Refills: 0 | Status: DISCONTINUED | OUTPATIENT
Start: 2024-04-26 | End: 2024-05-01

## 2024-04-26 RX ORDER — AMIODARONE HYDROCHLORIDE 400 MG/1
200 TABLET ORAL
Refills: 0 | Status: CANCELLED | OUTPATIENT
Start: 2024-05-03 | End: 2024-05-01

## 2024-04-26 RX ORDER — AMIODARONE HYDROCHLORIDE 400 MG/1
200 TABLET ORAL DAILY
Refills: 0 | Status: CANCELLED | OUTPATIENT
Start: 2024-05-10 | End: 2024-05-01

## 2024-04-26 RX ORDER — AMIODARONE HYDROCHLORIDE 400 MG/1
400 TABLET ORAL
Refills: 0 | Status: DISCONTINUED | OUTPATIENT
Start: 2024-04-26 | End: 2024-05-01

## 2024-04-26 RX ORDER — METOPROLOL TARTRATE 50 MG
25 TABLET ORAL DAILY
Refills: 0 | Status: DISCONTINUED | OUTPATIENT
Start: 2024-04-26 | End: 2024-05-01

## 2024-04-26 RX ADMIN — AMIODARONE HYDROCHLORIDE 400 MILLIGRAM(S): 400 TABLET ORAL at 17:01

## 2024-04-26 RX ADMIN — APIXABAN 5 MILLIGRAM(S): 2.5 TABLET, FILM COATED ORAL at 17:01

## 2024-04-26 RX ADMIN — PHENYLEPHRINE-SHARK LIVER OIL-MINERAL OIL-PETROLATUM RECTAL OINTMENT 1 APPLICATION(S): at 05:06

## 2024-04-26 RX ADMIN — Medication 10 MILLIGRAM(S): at 22:03

## 2024-04-26 RX ADMIN — APIXABAN 5 MILLIGRAM(S): 2.5 TABLET, FILM COATED ORAL at 05:06

## 2024-04-26 RX ADMIN — Medication 10 MILLIGRAM(S): at 05:06

## 2024-04-26 NOTE — PROGRESS NOTE ADULT - ASSESSMENT
73 year old Female PMHx nephrolithiasis, HTN admitted with new onset bl LE swelling and renal dysfunction. Cardiology consulted for asymptomatic new-onset AFib w/ RVR  and ADHF. Overall patient initially came in with volume overload thought it initially to be related to renal dysfunction, Initial echo revealed significantly reduction in LV systolic function and severe MR.  Repeat TTE 4/19: LVEF 55 to 60%, normal RV function, trace MR (significantly improved compared to echo 4/11). EP recalled 4/25 for evaluation for /DCCV. Pt now off IV diuretic. As per nephrology, pt is likely to eventually need HD, however not in the next 30 days. No contraindications at this time to proceeding with DCCV    1.  Persistent AFib, rate controlled.  Unclear when AFib initiated   2.  ADHF, LVEF improved to 55-60%  3.  Trace MR on repeat echo  4.  NICO on CKD, creatinine 6.48 today     Pt underwent successful  guided DCCV 4/26/24 to NSR with frequent atrial ectopy    Recommendations:  -Continue uninterrupted Eliquis 5mg PO BID  -Pt must be on uninterrupted AC minimum 30 days post DCCV.     -Discussed with EP attending and primary team.    73 year old Female PMHx nephrolithiasis, HTN admitted with new onset bl LE swelling and renal dysfunction. Cardiology consulted for asymptomatic new-onset AFib w/ RVR  and ADHF. Overall patient initially came in with volume overload thought it initially to be related to renal dysfunction, Initial echo revealed significantly reduction in LV systolic function and severe MR.  Repeat TTE 4/19: LVEF 55 to 60%, normal RV function, trace MR (significantly improved compared to echo 4/11). EP recalled 4/25 for evaluation for /DCCV. Pt now off IV diuretic. As per nephrology, pt is likely to eventually need HD, however not in the next 30 days. No contraindications at this time to proceeding with DCCV    1.  Persistent AFib, rate controlled.  Unclear when AFib initiated   2.  ADHF, LVEF improved to 55-60%  3.  Trace MR on repeat echo  4.  NICO on CKD, creatinine 6.48 today     Pt underwent successful  guided DCCV 4/26/24 to NSR with frequent atrial ectopy    Recommendations:  -Continue uninterrupted Eliquis 5mg PO BID for OAC  -Pt must be on uninterrupted AC minimum 30 days post DCCV.   -Will plan to start amiodarone 400mg BID x 1 week, 200mg BID x 1 week, then 200mg daily. Need for outpatient follow up and pulmonary/thyroid/ophthalmologic/hepatic monitoring while on amiodarone d/w patient and her   -Can lower Lopressor 100mg BID to Toprol XL 25mg daily (sinus rates 60-70s)  -Discussed with EP attending and primary team.  -EP will sign off at this time

## 2024-04-26 NOTE — PROGRESS NOTE ADULT - SUBJECTIVE AND OBJECTIVE BOX
Patient seen and examined at bedside.    Overnight Events: No acute events overnight. Denies chest pain or SOB    Telemetry: afib rates 60s-90s, PVCs     REVIEW OF SYSTEMS:  Constitutional:     [ x] negative [ ] fevers [ ] chills [ ] weight loss [ ] weight gain  HEENT:                  [ x] negative [ ] dry eyes [ ] eye irritation [ ] postnasal drip [ ] nasal congestion  CV:                         [x ] negative  [ ] chest pain [ ] orthopnea [ ] palpitations [ ] murmur  Resp:                     [ x] negative [ ] cough [ ] shortness of breath [ ] dyspnea [ ] wheezing [ ] sputum [ ]hemoptysis  GI:                          [ x] negative [ ] nausea [ ] vomiting [ ] diarrhea [ ] constipation [ ] abd pain [ ] dysphagia   :                        [ x] negative [ ] dysuria [ ] nocturia [ ] hematuria [ ] increased urinary frequency  Musculoskeletal: [ x] negative [ ] back pain [ ] myalgias [ ] arthralgias [ ] fracture  Skin:                       [ x] negative [ ] rash [ ] itch  Neurological:        [ x] negative [ ] headache [ ] dizziness [ ] syncope [ ] weakness [ ] numbness  Psychiatric:           [ x] negative [ ] anxiety [ ] depression  Endocrine:            [ x] negative [ ] diabetes [ ] thyroid problem  Heme/Lymph:      [ x] negative [ ] anemia [ ] bleeding problem  Allergic/Immune: [x ] negative [ ] itchy eyes [ ] nasal discharge [ ] hives [ ] angioedema    [x ] All other systems negative  [ ] Unable to assess ROS due to    Current Meds:  acetaminophen     Tablet .. 650 milliGRAM(s) Oral every 6 hours PRN  apixaban 5 milliGRAM(s) Oral two times a day  hemorrhoidal Ointment 1 Application(s) Rectal two times a day  hydrALAZINE 10 milliGRAM(s) Oral three times a day  ondansetron Injectable 4 milliGRAM(s) IV Push every 6 hours PRN  senna 2 Tablet(s) Oral at bedtime PRN  sodium chloride 0.9%. 1000 milliLiter(s) IV Continuous <Continuous>  sodium chloride 0.9%. 1000 milliLiter(s) IV Continuous <Continuous>      PAST MEDICAL & SURGICAL HISTORY:  Renal Calculus      Ureteral Calculus      Acute Renal Failure  9/2009      Wrist Fracture  CYNTHIA      Collar Bone Fracture      Rib Fractures      Kidney Stone removal  3/15/2011      C Section      Percutaneous Stone Extraction  Right      S/P Left Percutaneuos Stone extraction  01/26/2010, 04/20/2010          Vitals:  T(F): 97.4 (04-26), Max: 98.1 (04-25)  HR: 65 (04-26) (65 - 75)  BP: 120/67 (04-26) (115/78 - 134/62)  RR: 17 (04-26)  SpO2: 97% (04-26)  I&O's Summary    25 Apr 2024 07:01  -  26 Apr 2024 07:00  --------------------------------------------------------  IN: 122 mL / OUT: 1450 mL / NET: -1328 mL        Physical Exam:  Appearance: No acute distress; well appearing  Eyes: PERRL, EOMI, pink conjunctiva  HENT: Normal oral mucosa  Cardiovascular: RRR, S1, S2, no murmurs, rubs, or gallops; no edema; no JVD  Respiratory: Clear to auscultation bilaterally  Gastrointestinal: soft, non-tender, non-distended with normal bowel sounds  Musculoskeletal: No clubbing; no joint deformity   Neurologic: Non-focal  Lymphatic: No lymphadenopathy  Psychiatry: AAOx3, mood & affect appropriate  Skin: No rashes, ecchymoses, or cyanosis                          11.4   7.95  )-----------( 130      ( 26 Apr 2024 06:14 )             36.7     04-26    140  |  96  |  101<H>  ----------------------------<  97  4.1   |  23  |  5.80<H>    Ca    9.5      26 Apr 2024 06:14  Phos  6.7     04-26  Mg     2.3     04-26      PT/INR - ( 26 Apr 2024 06:14 )   PT: 12.8 sec;   INR: 1.17 ratio         PTT - ( 26 Apr 2024 06:14 )  PTT:30.9 sec

## 2024-04-26 NOTE — PROGRESS NOTE ADULT - ASSESSMENT
73F PMHx nephrolithiasis s/p multiple procedures, CKD and HTN. Admitted with new onset bl LE swelling and renal dysfunction.  Cardiology consulted for asymptomatic new-onset AFib w/ RVR and ADHF.       #1.  ADHF:  - volume status appears to have improved, LE edema improved and without JVD, HDS on RA   - s/p IV Bumex intermittently due to hemodynamics, last dose 4/21; would defer to nephrology for additional recs for volume management; s/p PO metolazone   - BB as below  - c/w hydralazine PO tid as tolerated. Would restart isordil at low dose as tolerated for afterload reduction in setting of severe MR and LV dysfunction.  Other GDMT is limited by current renal function  - ischemic workup once euvolemic and HR controlled, can be deferred to outpatient pending further management of AF   - monitor tele  - replete K>4 Mg>2  - strict I/O's, daily standing weights    #2.  AF:  - currently rate controlled   - c/w Toprol 100mg bid w goal HR <110  - c/w digoxin 125mcg every other day (s/p digoxin PO 0.125 mg,  mcg x1 4/17,  mcg x1 4/18)  - c/w eliquis 5 mg po BID   - would not give amiodarone given unknown duration of atrial fibrillation  - S/p  guided DCCV to NSR with frequent atrial ectopy. Patient must be on 30 days uninterrupted AC      #3.  MR: likely 2/2 volume overload and HFrEF  - repeat TTE 4/19: LVEF 55 to 60%, normal RV function, trace MR (significantly improved compared to echo 4/11)

## 2024-04-26 NOTE — PROGRESS NOTE ADULT - SUBJECTIVE AND OBJECTIVE BOX
[facility] Division of Kidney Diseases & Hypertension  FOLLOW UP NOTE  [phone]--------------------------------------------------------------------------------    Chief Complaint: Chronic kidney disease    24 hour events/subjective: Patient seen and examined this morning, Reports feeling well, offers no complaints. Continues to have good urine output. Denies any headaches, fevers/chills, chest pain, SOB, abdominal pain, and LE edema.      PAST HISTORY  --------------------------------------------------------------------------------  No significant changes to PMH, PSH, FHx, SHx, unless otherwise noted    ALLERGIES & MEDICATIONS  --------------------------------------------------------------------------------  Allergies    No Known Allergies    Intolerances      Standing Inpatient Medications  apixaban 5 milliGRAM(s) Oral two times a day  hemorrhoidal Ointment 1 Application(s) Rectal two times a day  hydrALAZINE 10 milliGRAM(s) Oral three times a day  sodium chloride 0.9%. 1000 milliLiter(s) IV Continuous <Continuous>  sodium chloride 0.9%. 1000 milliLiter(s) IV Continuous <Continuous>    PRN Inpatient Medications  acetaminophen     Tablet .. 650 milliGRAM(s) Oral every 6 hours PRN  ondansetron Injectable 4 milliGRAM(s) IV Push every 6 hours PRN  senna 2 Tablet(s) Oral at bedtime PRN      REVIEW OF SYSTEMS  --------------------------------------------------------------------------------  See HPI    VITALS/PHYSICAL EXAM  --------------------------------------------------------------------------------  T(C): 36.7 (04-26-24 @ 09:23), Max: 36.7 (04-25-24 @ 13:33)  HR: 74 (04-26-24 @ 09:23) (70 - 75)  BP: 118/75 (04-26-24 @ 09:23) (115/78 - 125/79)  RR: 18 (04-26-24 @ 09:23) (18 - 18)  SpO2: 96% (04-26-24 @ 09:23) (95% - 96%)  Wt(kg): --  Height (cm): 157.5 (04-24-24 @ 14:30)  Weight (kg): 66.6 (04-26-24 @ 09:23)  BMI (kg/m2): 26.8 (04-26-24 @ 09:23)  BSA (m2): 1.68 (04-26-24 @ 09:23)    04-25-24 @ 07:01  -  04-26-24 @ 07:00  --------------------------------------------------------  IN: 122 mL / OUT: 1450 mL / NET: -1328 mL    Physical Exam:  Gen: NAD  Pulm: CTA B/L ant/lat fields  CV: RRR, S1S2  Abd: +BS, soft, nontender/nondistended  : No suprapubic tenderness.  no connor  Extremity: BL LE edema resolved  Neuro: A&O x 3    LABS/STUDIES  --------------------------------------------------------------------------------              11.4   7.95  >-----------<  130      [04-26-24 @ 06:14]              36.7     140  |  96  |  101  ----------------------------<  97      [04-26-24 @ 06:14]  4.1   |  23  |  5.80        Ca     9.5     [04-26-24 @ 06:14]      Mg     2.3     [04-26-24 @ 06:14]      Phos  6.7     [04-26-24 @ 06:14]      PT/INR: PT 12.8 , INR 1.17       [04-26-24 @ 06:14]  PTT: 30.9       [04-26-24 @ 06:14]    Creatinine Trend:  SCr 5.80 [04-26 @ 06:14]  SCr 6.05 [04-25 @ 08:10]  SCr 5.71 [04-24 @ 07:53]  SCr 6.53 [04-23 @ 07:33]  SCr 6.48 [04-22 @ 07:03]

## 2024-04-26 NOTE — PROGRESS NOTE ADULT - PROBLEM SELECTOR PLAN 1
Pt. with renal failure in the setting of fluid overload. On review of [place], SCr noted to be persistently elevated from 1700-8874. SCr was elevated at 1.79 on 5/4/2011. SCr initially during this admission was elevated at 4.73 (4/8), and peaked at 6.53 on 4/23. Pt. received IVFs. SCr remains elevated/stable at 5.80 today. No interim labs available for review but pt. likely has underlying CKD. Documented urine output is ~1.5L over the past 24 hours. UA showed proteinuria and evidence of infection (although 15 epithelial cells). UPCR is elevated at 1.1. Kidney sonogram showed evidence of medical renal disease, BL non-obstructing stones, and BL renal cysts.     -bumex held from 4/21 due to hypotension, and resolution of edema.    -patient reports she continues to make good urine.  please attempt strict I/O    - Serologic workup thus far: HBsAg, HCV Ab, and HIV are non-reactive. Kappa/Lambda free light chain ratio is mildly elevated at 1.85 (acceptable range for degree of CKD). Weak IgG kappa band noted on SIFE.     -Renal sono noted with mild R hydro, no L hydro, nonobstructive calculi, renal cysts, with no retention. Recommend repeat renal sonogram.  Monitor labs and urine output. Avoid nephrotoxins. Dose medications as per eGFR.

## 2024-04-26 NOTE — PROGRESS NOTE ADULT - PROBLEM SELECTOR PLAN 2
Pt. with metabolic acidosis in the setting of renal failure, now resolved with SCO2 now 23. Bumex was discontinued  -Monitor SCO2.    If you have any questions, please feel free to contact me  Jamari Brunson  Nephrology Fellow  [phone] / [employer] Teams(Preferred)  (After 5pm or on weekends please page the on-call fellow)

## 2024-04-26 NOTE — PRE-ANESTHESIA EVALUATION ADULT - NSANTHOSAYNRD_GEN_A_CORE
No. ANNETTA screening performed.  STOP BANG Legend: 0-2 = LOW Risk; 3-4 = INTERMEDIATE Risk; 5-8 = HIGH Risk

## 2024-04-26 NOTE — PRE-ANESTHESIA EVALUATION ADULT - NSANTHOBSERVEDRD_ENT_A_CORE
Schedule lab 9/2017  Exercise  LS Spine xray  Back exercise  Consider physical therapy  jobst  Refer to dermatology for facial lesion  Refer to plastic surg for mass @ skin  Same medications  Follow up 4 months    
No
initiation of breastfeeding/breast milk feeding

## 2024-04-26 NOTE — PROGRESS NOTE ADULT - SUBJECTIVE AND OBJECTIVE BOX
24H hour events: Pt without acute complaints this morning, scheduled for  guided DCCV    MEDICATIONS:  apixaban 5 milliGRAM(s) Oral two times a day  hydrALAZINE 10 milliGRAM(s) Oral three times a day        acetaminophen     Tablet .. 650 milliGRAM(s) Oral every 6 hours PRN  ondansetron Injectable 4 milliGRAM(s) IV Push every 6 hours PRN    senna 2 Tablet(s) Oral at bedtime PRN      hemorrhoidal Ointment 1 Application(s) Rectal two times a day  sodium chloride 0.9%. 1000 milliLiter(s) IV Continuous <Continuous>  sodium chloride 0.9%. 1000 milliLiter(s) IV Continuous <Continuous>      REVIEW OF SYSTEMS:  See HPI, otherwise ROS negative.    PHYSICAL EXAM:  T(C): 36.1 (04-26-24 @ 10:25), Max: 36.7 (04-25-24 @ 13:33)  HR: 66 (04-26-24 @ 11:25) (65 - 75)  BP: 122/56 (04-26-24 @ 11:25) (115/78 - 134/62)  RR: 16 (04-26-24 @ 11:25) (16 - 18)  SpO2: 96% (04-26-24 @ 11:25) (95% - 98%)  Wt(kg): --  I&O's Summary    25 Apr 2024 07:01  -  26 Apr 2024 07:00  --------------------------------------------------------  IN: 122 mL / OUT: 1450 mL / NET: -1328 mL        Appearance: Alert. NAD	  HEENT:   NC/AT	  Cardiovascular: +S1S2 irregular  Respiratory: CTA B/L	  Psychiatry: A & O x 3, Mood & affect appropriate  Gastrointestinal:  Soft	  Skin: No rashes	  Neurologic: Non-focal  Extremities: No edema BLE      LABS:	 	    CBC Full  -  ( 26 Apr 2024 06:14 )  WBC Count : 7.95 K/uL  Hemoglobin : 11.4 g/dL  Hematocrit : 36.7 %  Platelet Count - Automated : 130 K/uL  Mean Cell Volume : 96.8 fl  Mean Cell Hemoglobin : 30.1 pg  Mean Cell Hemoglobin Concentration : 31.1 gm/dL  Auto Neutrophil # : x  Auto Lymphocyte # : x  Auto Monocyte # : x  Auto Eosinophil # : x  Auto Basophil # : x  Auto Neutrophil % : x  Auto Lymphocyte % : x  Auto Monocyte % : x  Auto Eosinophil % : x  Auto Basophil % : x    04-26    140  |  96  |  101<H>  ----------------------------<  97  4.1   |  23  |  5.80<H>  04-25    144  |  96  |  102<H>  ----------------------------<  117<H>  4.0   |  27  |  6.05<H>    Ca    9.5      26 Apr 2024 06:14  Ca    9.9      25 Apr 2024 08:10  Phos  6.7     04-26  Phos  7.0     04-25  Mg     2.3     04-26  Mg     2.4     04-25            TELEMETRY: AF 60-90s  	    ECG:  NSR with PACs and PVCs	      	  ASSESSMENT/PLAN:

## 2024-04-27 LAB
ANION GAP SERPL CALC-SCNC: 18 MMOL/L — HIGH (ref 5–17)
BUN SERPL-MCNC: 103 MG/DL — HIGH (ref 7–23)
CALCIUM SERPL-MCNC: 8.8 MG/DL — SIGNIFICANT CHANGE UP (ref 8.4–10.5)
CHLORIDE SERPL-SCNC: 95 MMOL/L — LOW (ref 96–108)
CO2 SERPL-SCNC: 24 MMOL/L — SIGNIFICANT CHANGE UP (ref 22–31)
CREAT SERPL-MCNC: 6.23 MG/DL — HIGH (ref 0.5–1.3)
EGFR: 7 ML/MIN/1.73M2 — LOW
GLUCOSE SERPL-MCNC: 187 MG/DL — HIGH (ref 70–99)
POTASSIUM SERPL-MCNC: 4.1 MMOL/L — SIGNIFICANT CHANGE UP (ref 3.5–5.3)
POTASSIUM SERPL-SCNC: 4.1 MMOL/L — SIGNIFICANT CHANGE UP (ref 3.5–5.3)
SODIUM SERPL-SCNC: 137 MMOL/L — SIGNIFICANT CHANGE UP (ref 135–145)

## 2024-04-27 PROCEDURE — 99232 SBSQ HOSP IP/OBS MODERATE 35: CPT | Mod: GC

## 2024-04-27 RX ORDER — PANTOPRAZOLE SODIUM 20 MG/1
40 TABLET, DELAYED RELEASE ORAL
Refills: 0 | Status: DISCONTINUED | OUTPATIENT
Start: 2024-04-27 | End: 2024-05-01

## 2024-04-27 RX ADMIN — Medication 650 MILLIGRAM(S): at 10:10

## 2024-04-27 RX ADMIN — APIXABAN 5 MILLIGRAM(S): 2.5 TABLET, FILM COATED ORAL at 05:56

## 2024-04-27 RX ADMIN — Medication 10 MILLIGRAM(S): at 22:11

## 2024-04-27 RX ADMIN — Medication 30 MILLILITER(S): at 10:10

## 2024-04-27 RX ADMIN — Medication 10 MILLIGRAM(S): at 05:56

## 2024-04-27 RX ADMIN — AMIODARONE HYDROCHLORIDE 400 MILLIGRAM(S): 400 TABLET ORAL at 05:55

## 2024-04-27 RX ADMIN — Medication 10 MILLIGRAM(S): at 14:55

## 2024-04-27 RX ADMIN — APIXABAN 5 MILLIGRAM(S): 2.5 TABLET, FILM COATED ORAL at 17:47

## 2024-04-27 RX ADMIN — Medication 650 MILLIGRAM(S): at 10:40

## 2024-04-27 RX ADMIN — Medication 25 MILLIGRAM(S): at 05:56

## 2024-04-27 RX ADMIN — PANTOPRAZOLE SODIUM 40 MILLIGRAM(S): 20 TABLET, DELAYED RELEASE ORAL at 10:10

## 2024-04-27 RX ADMIN — AMIODARONE HYDROCHLORIDE 400 MILLIGRAM(S): 400 TABLET ORAL at 17:46

## 2024-04-27 NOTE — PROGRESS NOTE ADULT - SUBJECTIVE AND OBJECTIVE BOX
Patient is a 73y old  Female who presents with a chief complaint of BL LE swelling (25 Apr 2024 10:31)      INTERVAL HPI/OVERNIGHT EVENTS: seen and examined       T(C): 36.7 (04-27-24 @ 17:02), Max: 36.8 (04-27-24 @ 10:06)  HR: 66 (04-27-24 @ 17:02) (56 - 67)  BP: 106/67 (04-27-24 @ 17:02) (104/68 - 119/70)  RR: 18 (04-27-24 @ 17:02) (17 - 18)  SpO2: 96% (04-27-24 @ 17:02) (96% - 97%)      MEDICATIONS  (STANDING):  aMIOdarone    Tablet 400 milliGRAM(s) Oral two times a day  aMIOdarone    Tablet   Oral   apixaban 5 milliGRAM(s) Oral two times a day  hemorrhoidal Ointment 1 Application(s) Rectal two times a day  hydrALAZINE 10 milliGRAM(s) Oral three times a day  metoprolol succinate ER 25 milliGRAM(s) Oral daily  pantoprazole    Tablet 40 milliGRAM(s) Oral before breakfast  sodium chloride 0.9%. 1000 milliLiter(s) (60 mL/Hr) IV Continuous <Continuous>  sodium chloride 0.9%. 1000 milliLiter(s) (75 mL/Hr) IV Continuous <Continuous>    MEDICATIONS  (PRN):  acetaminophen     Tablet .. 650 milliGRAM(s) Oral every 6 hours PRN Temp greater or equal to 38C (100.4F), Mild Pain (1 - 3)  ondansetron Injectable 4 milliGRAM(s) IV Push every 6 hours PRN Nausea and/or Vomiting  senna 2 Tablet(s) Oral at bedtime PRN Constipation    Wrist Fracture  CYNTHIA      Collar Bone Fracture      Rib Fractures      Kidney Stone removal  3/15/2011      C Section      Percutaneous Stone Extraction  Right      S/P Left Percutaneuos Stone extraction  01/26/2010, 04/20/2010          SOCIAL HISTORY  Alcohol:  Tobacco:  Illicit substance use:    FAMILY HISTORY:    REVIEW OF SYSTEMS:  CONSTITUTIONAL: No fever, weight loss, or fatigue  EYES: No eye pain, visual disturbances, or discharge  ENMT:  No difficulty hearing, tinnitus, vertigo; No sinus or throat pain  NECK: No pain or stiffness  RESPIRATORY: No cough, wheezing, chills or hemoptysis; No shortness of breath  CARDIOVASCULAR: No chest pain, palpitations, dizziness, or leg swelling  GASTROINTESTINAL: No abdominal or epigastric pain. No nausea, vomiting, or hematemesis; No diarrhea or constipation. No melena or hematochezia.  GENITOURINARY: No dysuria, frequency, hematuria, or incontinence  NEUROLOGICAL: No headaches, memory loss, loss of strength, numbness, or tremors  SKIN: No itching, burning, rashes, or lesions   LYMPH NODES: No enlarged glands  ENDOCRINE: No heat or cold intolerance; No hair loss  MUSCULOSKELETAL: No joint pain or swelling; No muscle, back, or extremity pain  PSYCHIATRIC: No depression, anxiety, mood swings, or difficulty sleeping  HEME/LYMPH: No easy bruising, or bleeding gums  ALLERY AND IMMUNOLOGIC: No hives or eczema    RADIOLOGY & ADDITIONAL TESTS:    Imaging Personally Reviewed:  [ ] YES  [ ] NO    Consultant(s) Notes Reviewed:  [ ] YES  [ ] NO    PHYSICAL EXAM:  GENERAL: NAD, well-groomed, well-developed  HEAD:  Atraumatic, Normocephalic  EYES: EOMI, PERRLA, conjunctiva and sclera clear  ENMT: No tonsillar erythema, exudates, or enlargement; Moist mucous membranes, Good dentition, No lesions  NECK: Supple, No JVD, Normal thyroid  NERVOUS SYSTEM:  Alert & Oriented X3, Good concentration; Motor Strength 5/5 B/L upper and lower extremities; DTRs 2+ intact and symmetric  CHEST/LUNG: Clear to percussion bilaterally; No rales, rhonchi, wheezing, or rubs  HEART: Regular rate and rhythm; No murmurs, rubs, or gallops  ABDOMEN: Soft, Nontender, Nondistended; Bowel sounds present  EXTREMITIES:  2+ Peripheral Pulses, No clubbing, cyanosis, or edema  LYMPH: No lymphadenopathy noted  SKIN: No rashes or lesions    LABS:                        12.6   8.37  )-----------( 146      ( 25 Apr 2024 08:10 )             42.0     04-25    144  |  96  |  102<H>  ----------------------------<  117<H>  4.0   |  27  |  6.05<H>    Ca    9.9      25 Apr 2024 08:10  Phos  7.0     04-25  Mg     2.4     04-25      PTT - ( 25 Apr 2024 08:10 )  PTT:71.9 sec  Urinalysis Basic - ( 25 Apr 2024 08:10 )    Color: x / Appearance: x / SG: x / pH: x  Gluc: 117 mg/dL / Ketone: x  / Bili: x / Urobili: x   Blood: x / Protein: x / Nitrite: x   Leuk Esterase: x / RBC: x / WBC x   Sq Epi: x / Non Sq Epi: x / Bacteria: x      CAPILLARY BLOOD GLUCOSE            Urinalysis Basic - ( 25 Apr 2024 08:10 )    Color: x / Appearance: x / SG: x / pH: x  Gluc: 117 mg/dL / Ketone: x  / Bili: x / Urobili: x   Blood: x / Protein: x / Nitrite: x   Leuk Esterase: x / RBC: x / WBC x   Sq Epi: x / Non Sq Epi: x / Bacteria: x        MEDICATIONS  (STANDING):  apixaban 5 milliGRAM(s) Oral two times a day  hemorrhoidal Ointment 1 Application(s) Rectal two times a day  hydrALAZINE 10 milliGRAM(s) Oral three times a day  sodium chloride 0.9%. 1000 milliLiter(s) (60 mL/Hr) IV Continuous <Continuous>  sodium chloride 0.9%. 1000 milliLiter(s) (75 mL/Hr) IV Continuous <Continuous>    MEDICATIONS  (PRN):  acetaminophen     Tablet .. 650 milliGRAM(s) Oral every 6 hours PRN Temp greater or equal to 38C (100.4F), Mild Pain (1 - 3)  ondansetron Injectable 4 milliGRAM(s) IV Push every 6 hours PRN Nausea and/or Vomiting  senna 2 Tablet(s) Oral at bedtime PRN Constipation      Care Discussed with Consultants/Other Providers [ ] YES  [ ] NO

## 2024-04-27 NOTE — PROGRESS NOTE ADULT - ASSESSMENT
73F PMHx nephrolithiasis, HTN admitted with 2 weeks of bl LE swelling and newly reduced renal function with multiple nonobstructive renal stones on CT. EP consulted for new-onset AFib w/ RVR noted on admission EKG. Patient reports asymptomatic, denies dizziness/lightheadedness, CP, SOB, palpitations. Denies any cardiac history. Received metoprolol 5mg IV x1 for AFib at rates 130s with improvement to 110s.       NICO on CKD stage 4  Ectopic beats / cardioversion   On Amiodarone     Hypotension   Over Diuresis s   Metabolic Acidosis   Renal consulted   off sod bicarb   Off Diueretics         May need HD as per renal   Will follow Renal function off Bumex drip/ Bumex        CHF : HOLD BUMEX DRIP   BUMEX IV      cardio following     Afib with rvr   EP eval appreciated   started on eliquis   c/w lopressor , digoxin   cardio following     NICO on CKD stage 4  Metabolic Acidosis   Renal consulted   off sod bicarb     B/L nonobstructive kidney stones :  -stable     dispo: started on eliquis , scheduled for DCCV in am

## 2024-04-27 NOTE — PROGRESS NOTE ADULT - PROBLEM SELECTOR PLAN 2
Pt. with metabolic acidosis in the setting of renal failure, now resolved with SCO2 now 23 on 4/26. No labs today.   -Monitor SCO2.    If you have any questions, please feel free to contact me  Oswaldo Tejeda  Nephrology Fellow  [phone]; Prefer [employer] TEAMS  (After 5pm or on weekends please page the on-call fellow).

## 2024-04-27 NOTE — PROGRESS NOTE ADULT - SUBJECTIVE AND OBJECTIVE BOX
[facility] Division of Kidney Diseases & Hypertension  FOLLOW UP NOTE  [phone]--------------------------------------------------------------------------------    Chief Complaint: Chronic kidney disease    24 hour events/subjective: Patient seen and examined this morning, Reports feeling well, did not interact much during exam. Denies any headaches, fevers/chills, chest pain, SOB, abdominal pain, and LE edema.      PAST HISTORY  --------------------------------------------------------------------------------  No significant changes to PMH, PSH, FHx, SHx, unless otherwise noted    ALLERGIES & MEDICATIONS  --------------------------------------------------------------------------------  Allergies    No Known Allergies    Intolerances      Standing Inpatient Medications  aMIOdarone    Tablet 400 milliGRAM(s) Oral two times a day  aMIOdarone    Tablet   Oral   apixaban 5 milliGRAM(s) Oral two times a day  hemorrhoidal Ointment 1 Application(s) Rectal two times a day  hydrALAZINE 10 milliGRAM(s) Oral three times a day  metoprolol succinate ER 25 milliGRAM(s) Oral daily  pantoprazole    Tablet 40 milliGRAM(s) Oral before breakfast  sodium chloride 0.9%. 1000 milliLiter(s) IV Continuous <Continuous>  sodium chloride 0.9%. 1000 milliLiter(s) IV Continuous <Continuous>    PRN Inpatient Medications  acetaminophen     Tablet .. 650 milliGRAM(s) Oral every 6 hours PRN  ondansetron Injectable 4 milliGRAM(s) IV Push every 6 hours PRN  senna 2 Tablet(s) Oral at bedtime PRN      REVIEW OF SYSTEMS  --------------------------------------------------------------------------------  per above    VITALS/PHYSICAL EXAM  --------------------------------------------------------------------------------  T(C): 36.8 (04-27-24 @ 10:06), Max: 36.8 (04-27-24 @ 10:06)  HR: 62 (04-27-24 @ 10:06) (60 - 72)  BP: 119/70 (04-27-24 @ 10:06) (97/61 - 120/67)  RR: 18 (04-27-24 @ 10:06) (17 - 18)  SpO2: 97% (04-27-24 @ 10:06) (95% - 98%)  Wt(kg): --    Weight (kg): 66.6 (04-26-24 @ 09:23)      04-26-24 @ 07:01  -  04-27-24 @ 07:00  --------------------------------------------------------  IN: 720 mL / OUT: 150 mL / NET: 570 mL      Physical Exam:  Gen: NAD  Pulm: CTA B/L ant/lat fields  CV: RRR, S1S2  Abd: +BS, soft, nontender/nondistended  : No suprapubic tenderness.  no connor  Extremity: BL LE edema resolved  Neuro: A&O x 3      LABS/STUDIES  --------------------------------------------------------------------------------              11.4   7.95  >-----------<  130      [04-26-24 @ 06:14]              36.7     140  |  96  |  101  ----------------------------<  97      [04-26-24 @ 06:14]  4.1   |  23  |  5.80        Ca     9.5     [04-26-24 @ 06:14]      Mg     2.3     [04-26-24 @ 06:14]      Phos  6.7     [04-26-24 @ 06:14]      PT/INR: PT 12.8 , INR 1.17       [04-26-24 @ 06:14]  PTT: 30.9       [04-26-24 @ 06:14]      Creatinine Trend:  SCr 5.80 [04-26 @ 06:14]  SCr 6.05 [04-25 @ 08:10]  SCr 5.71 [04-24 @ 07:53]  SCr 6.53 [04-23 @ 07:33]  SCr 6.48 [04-22 @ 07:03]    Urinalysis - [04-26-24 @ 06:14]      Color  / Appearance  / SG  / pH       Gluc 97 / Ketone   / Bili  / Urobili        Blood  / Protein  / Leuk Est  / Nitrite       RBC  / WBC  / Hyaline  / Gran  / Sq Epi  / Non Sq Epi  / Bacteria

## 2024-04-27 NOTE — PROGRESS NOTE ADULT - PROBLEM SELECTOR PLAN 1
Pt. with renal failure in the setting of fluid overload. On review of [place], SCr noted to be persistently elevated from [id number]. SCr was elevated at 1.79 on 5/4/2011. SCr initially during this admission was elevated at 4.73 (4/8), and peaked at 6.53 on 4/23. Pt. received IVFs. No interim labs available for review but pt. likely has underlying CKD. UA showed proteinuria and evidence of infection (although 15 epithelial cells). UPCR is elevated at 1.1. Kidney sonogram showed evidence of medical renal disease, BL non-obstructing stones, and BL renal cysts. Bumex held on 4/21 for hypotension and resolution of edema.     -BUN/cr improved on 4/26, but no labs for review on 4/27  -Serologic workup thus far: HBsAg, HCV Ab, and HIV are non-reactive. Kappa/Lambda free light chain ratio is mildly elevated at 1.85 (acceptable range for degree of CKD). Weak IgG kappa band noted on SIFE.   Monitor labs and urine output. Avoid nephrotoxins. Dose medications as per eGFR.

## 2024-04-28 LAB
ANION GAP SERPL CALC-SCNC: 21 MMOL/L — HIGH (ref 5–17)
BUN SERPL-MCNC: 104 MG/DL — HIGH (ref 7–23)
CALCIUM SERPL-MCNC: 8.6 MG/DL — SIGNIFICANT CHANGE UP (ref 8.4–10.5)
CHLORIDE SERPL-SCNC: 98 MMOL/L — SIGNIFICANT CHANGE UP (ref 96–108)
CO2 SERPL-SCNC: 22 MMOL/L — SIGNIFICANT CHANGE UP (ref 22–31)
CREAT SERPL-MCNC: 6.36 MG/DL — HIGH (ref 0.5–1.3)
EGFR: 6 ML/MIN/1.73M2 — LOW
GLUCOSE SERPL-MCNC: 118 MG/DL — HIGH (ref 70–99)
POTASSIUM SERPL-MCNC: 4 MMOL/L — SIGNIFICANT CHANGE UP (ref 3.5–5.3)
POTASSIUM SERPL-SCNC: 4 MMOL/L — SIGNIFICANT CHANGE UP (ref 3.5–5.3)
SODIUM SERPL-SCNC: 141 MMOL/L — SIGNIFICANT CHANGE UP (ref 135–145)

## 2024-04-28 PROCEDURE — 99232 SBSQ HOSP IP/OBS MODERATE 35: CPT | Mod: GC

## 2024-04-28 RX ADMIN — Medication 10 MILLIGRAM(S): at 13:45

## 2024-04-28 RX ADMIN — Medication 10 MILLIGRAM(S): at 05:25

## 2024-04-28 RX ADMIN — Medication 25 MILLIGRAM(S): at 05:25

## 2024-04-28 RX ADMIN — PANTOPRAZOLE SODIUM 40 MILLIGRAM(S): 20 TABLET, DELAYED RELEASE ORAL at 05:24

## 2024-04-28 RX ADMIN — AMIODARONE HYDROCHLORIDE 400 MILLIGRAM(S): 400 TABLET ORAL at 17:06

## 2024-04-28 RX ADMIN — APIXABAN 5 MILLIGRAM(S): 2.5 TABLET, FILM COATED ORAL at 05:25

## 2024-04-28 RX ADMIN — Medication 650 MILLIGRAM(S): at 18:05

## 2024-04-28 RX ADMIN — Medication 650 MILLIGRAM(S): at 17:05

## 2024-04-28 RX ADMIN — APIXABAN 5 MILLIGRAM(S): 2.5 TABLET, FILM COATED ORAL at 17:05

## 2024-04-28 RX ADMIN — AMIODARONE HYDROCHLORIDE 400 MILLIGRAM(S): 400 TABLET ORAL at 05:25

## 2024-04-28 NOTE — PROGRESS NOTE ADULT - PROBLEM SELECTOR PROBLEM 1
Kidney failure

## 2024-04-28 NOTE — PROGRESS NOTE ADULT - SUBJECTIVE AND OBJECTIVE BOX
[facility] Division of Kidney Diseases & Hypertension  FOLLOW UP NOTE  [phone]--------------------------------------------------------------------------------    Chief Complaint: Chronic kidney disease    24 hour events/subjective: Patient seen and examined this morning. No more diarrhea. Denies any headaches, fevers/chills, chest pain, SOB, abdominal pain, and LE edema.    PAST HISTORY  --------------------------------------------------------------------------------  No significant changes to PMH, PSH, FHx, SHx, unless otherwise noted    ALLERGIES & MEDICATIONS  --------------------------------------------------------------------------------  Allergies    No Known Allergies    Intolerances      Standing Inpatient Medications  aMIOdarone    Tablet 400 milliGRAM(s) Oral two times a day  aMIOdarone    Tablet   Oral   apixaban 5 milliGRAM(s) Oral two times a day  hemorrhoidal Ointment 1 Application(s) Rectal two times a day  hydrALAZINE 10 milliGRAM(s) Oral three times a day  metoprolol succinate ER 25 milliGRAM(s) Oral daily  pantoprazole    Tablet 40 milliGRAM(s) Oral before breakfast  sodium chloride 0.9%. 1000 milliLiter(s) IV Continuous <Continuous>  sodium chloride 0.9%. 1000 milliLiter(s) IV Continuous <Continuous>    PRN Inpatient Medications  acetaminophen     Tablet .. 650 milliGRAM(s) Oral every 6 hours PRN  ondansetron Injectable 4 milliGRAM(s) IV Push every 6 hours PRN  senna 2 Tablet(s) Oral at bedtime PRN      REVIEW OF SYSTEMS  --------------------------------------------------------------------------------  per above    VITALS/PHYSICAL EXAM  --------------------------------------------------------------------------------  T(C): 36.7 (04-28-24 @ 05:16), Max: 36.9 (04-28-24 @ 01:39)  HR: 61 (04-28-24 @ 05:16) (56 - 67)  BP: 104/69 (04-28-24 @ 05:16) (104/68 - 119/70)  RR: 18 (04-28-24 @ 05:16) (17 - 18)  SpO2: 94% (04-28-24 @ 05:16) (94% - 97%)  Wt(kg): --    Weight (kg): 66.6 (04-26-24 @ 09:23)      04-27-24 @ 07:01  -  04-28-24 @ 07:00  --------------------------------------------------------  IN: 710 mL / OUT: 0 mL / NET: 710 mL    04-28-24 @ 07:01  -  04-28-24 @ 09:04  --------------------------------------------------------  IN: 0 mL / OUT: 100 mL / NET: -100 mL      Physical Exam:  Gen: NAD  Pulm: CTA B/L ant/lat fields  CV: RRR, S1S2  Abd: +BS, soft, nontender/nondistended  : No suprapubic tenderness.  no connor  Extremity: BL LE edema resolved  Neuro: A&O x 3    LABS/STUDIES  --------------------------------------------------------------------------------    141  |  98  |  104  ----------------------------<  118      [04-28-24 @ 07:05]  4.0   |  22  |  6.36        Ca     8.6     [04-28-24 @ 07:05]            Creatinine Trend:  SCr 6.36 [04-28 @ 07:05]  SCr 6.23 [04-27 @ 16:46]  SCr 5.80 [04-26 @ 06:14]  SCr 6.05 [04-25 @ 08:10]  SCr 5.71 [04-24 @ 07:53]    Urinalysis - [04-28-24 @ 07:05]      Color  / Appearance  / SG  / pH       Gluc 118 / Ketone   / Bili  / Urobili        Blood  / Protein  / Leuk Est  / Nitrite       RBC  / WBC  / Hyaline  / Gran  / Sq Epi  / Non Sq Epi  / Bacteria

## 2024-04-28 NOTE — PROGRESS NOTE ADULT - ASSESSMENT
73F PMHx nephrolithiasis, HTN admitted with 2 weeks of bl LE swelling and newly reduced renal function with multiple nonobstructive renal stones on CT. EP consulted for new-onset AFib w/ RVR noted on admission EKG. Patient reports asymptomatic, denies dizziness/lightheadedness, CP, SOB, palpitations. Denies any cardiac history. Received metoprolol 5mg IV x1 for AFib at rates 130s with improvement to 110s.     Afib s/p DCCV    On Amiodarone and Eliquis       NICO on CKD stage 4  Ectopic beats / cardioversion   On Amiodarone     Hypotension resolved   Off Diueresis   Renal consulted   off sod bicarb   Off Diueretics     D/C Hydralazine to avoid hypoperfusion   Cards  renal following         May need HD as per renal   Will follow Renal function off Bumex drip/ Bumex    B/L nonobstructive kidney stones :  -stable

## 2024-04-28 NOTE — PROGRESS NOTE ADULT - PROBLEM SELECTOR PLAN 1
Pt. with renal failure in the setting of fluid overload. On review of [place], SCr noted to be persistently elevated from 8937-3250. SCr was elevated at 1.79 on 5/4/2011. SCr initially during this admission was elevated at 4.73 (4/8), and peaked at 6.53 on 4/23. Pt. received IVFs. No interim labs available for review but pt. likely has underlying CKD. UA showed proteinuria and evidence of infection (although 15 epithelial cells). UPCR is elevated at 1.1. Kidney sonogram showed evidence of medical renal disease, BL non-obstructing stones, and BL renal cysts. Bumex held on 4/21 for hypotension and resolution of edema.     -BUN/cr improved appears to be plateauing, BUN/Cr 104/6.36  -Serologic workup thus far: HBsAg, HCV Ab, and HIV are non-reactive. Kappa/Lambda free light chain ratio is mildly elevated at 1.85 (acceptable range for degree of CKD). Weak IgG kappa band noted on SIFE.   Monitor labs and urine output. Avoid nephrotoxins. Dose medications as per eGFR.

## 2024-04-28 NOTE — PROGRESS NOTE ADULT - PROBLEM SELECTOR PROBLEM 2
Metabolic acidosis

## 2024-04-28 NOTE — PROGRESS NOTE ADULT - SUBJECTIVE AND OBJECTIVE BOX
Patient is a 73y old  Female who presents with a chief complaint of BL LE swelling (25 Apr 2024 10:31)      INTERVAL HPI/OVERNIGHT EVENTS: seen and examined       T(C): 36.4 (04-28-24 @ 17:40), Max: 37.1 (04-28-24 @ 13:28)  HR: 64 (04-28-24 @ 17:40) (58 - 64)  BP: 102/62 (04-28-24 @ 17:40) (100/63 - 106/67)  RR: 18 (04-28-24 @ 17:40) (18 - 18)  SpO2: 97% (04-28-24 @ 17:40) (94% - 97%)      MEDICATIONS  (STANDING):  aMIOdarone    Tablet 400 milliGRAM(s) Oral two times a day  aMIOdarone    Tablet   Oral   apixaban 5 milliGRAM(s) Oral two times a day  hemorrhoidal Ointment 1 Application(s) Rectal two times a day  hydrALAZINE 10 milliGRAM(s) Oral three times a day  metoprolol succinate ER 25 milliGRAM(s) Oral daily  pantoprazole    Tablet 40 milliGRAM(s) Oral before breakfast  sodium chloride 0.9%. 1000 milliLiter(s) (60 mL/Hr) IV Continuous <Continuous>  sodium chloride 0.9%. 1000 milliLiter(s) (75 mL/Hr) IV Continuous <Continuous>    MEDICATIONS  (PRN):  acetaminophen     Tablet .. 650 milliGRAM(s) Oral every 6 hours PRN Temp greater or equal to 38C (100.4F), Mild Pain (1 - 3)  ondansetron Injectable 4 milliGRAM(s) IV Push every 6 hours PRN Nausea and/or Vomiting  senna 2 Tablet(s) Oral at bedtime PRN Constipation      Wrist Fracture  CYNTHIA      Collar Bone Fracture      Rib Fractures      Kidney Stone removal  3/15/2011      C Section      Percutaneous Stone Extraction  Right      S/P Left Percutaneuos Stone extraction  01/26/2010, 04/20/2010          SOCIAL HISTORY  Alcohol:  Tobacco:  Illicit substance use:    FAMILY HISTORY:    REVIEW OF SYSTEMS:  CONSTITUTIONAL: No fever, weight loss, or fatigue  EYES: No eye pain, visual disturbances, or discharge  ENMT:  No difficulty hearing, tinnitus, vertigo; No sinus or throat pain  NECK: No pain or stiffness  RESPIRATORY: No cough, wheezing, chills or hemoptysis; No shortness of breath  CARDIOVASCULAR: No chest pain, palpitations, dizziness, or leg swelling  GASTROINTESTINAL: No abdominal or epigastric pain. No nausea, vomiting, or hematemesis; No diarrhea or constipation. No melena or hematochezia.  GENITOURINARY: No dysuria, frequency, hematuria, or incontinence  NEUROLOGICAL: No headaches, memory loss, loss of strength, numbness, or tremors  SKIN: No itching, burning, rashes, or lesions   LYMPH NODES: No enlarged glands  ENDOCRINE: No heat or cold intolerance; No hair loss  MUSCULOSKELETAL: No joint pain or swelling; No muscle, back, or extremity pain  PSYCHIATRIC: No depression, anxiety, mood swings, or difficulty sleeping  HEME/LYMPH: No easy bruising, or bleeding gums  ALLERY AND IMMUNOLOGIC: No hives or eczema    RADIOLOGY & ADDITIONAL TESTS:    Imaging Personally Reviewed:  [ ] YES  [ ] NO    Consultant(s) Notes Reviewed:  [ ] YES  [ ] NO    PHYSICAL EXAM:  GENERAL: NAD, well-groomed, well-developed  HEAD:  Atraumatic, Normocephalic  EYES: EOMI, PERRLA, conjunctiva and sclera clear  ENMT: No tonsillar erythema, exudates, or enlargement; Moist mucous membranes, Good dentition, No lesions  NECK: Supple, No JVD, Normal thyroid  NERVOUS SYSTEM:  Alert & Oriented X3, Good concentration; Motor Strength 5/5 B/L upper and lower extremities; DTRs 2+ intact and symmetric  CHEST/LUNG: Clear to percussion bilaterally; No rales, rhonchi, wheezing, or rubs  HEART: Regular rate and rhythm; No murmurs, rubs, or gallops  ABDOMEN: Soft, Nontender, Nondistended; Bowel sounds present  EXTREMITIES:  2+ Peripheral Pulses, No clubbing, cyanosis, or edema  LYMPH: No lymphadenopathy noted  SKIN: No rashes or lesions    LABS:                        12.6   8.37  )-----------( 146      ( 25 Apr 2024 08:10 )             42.0     04-25    144  |  96  |  102<H>  ----------------------------<  117<H>  4.0   |  27  |  6.05<H>    Ca    9.9      25 Apr 2024 08:10  Phos  7.0     04-25  Mg     2.4     04-25      PTT - ( 25 Apr 2024 08:10 )  PTT:71.9 sec  Urinalysis Basic - ( 25 Apr 2024 08:10 )    Color: x / Appearance: x / SG: x / pH: x  Gluc: 117 mg/dL / Ketone: x  / Bili: x / Urobili: x   Blood: x / Protein: x / Nitrite: x   Leuk Esterase: x / RBC: x / WBC x   Sq Epi: x / Non Sq Epi: x / Bacteria: x      CAPILLARY BLOOD GLUCOSE            Urinalysis Basic - ( 25 Apr 2024 08:10 )    Color: x / Appearance: x / SG: x / pH: x  Gluc: 117 mg/dL / Ketone: x  / Bili: x / Urobili: x   Blood: x / Protein: x / Nitrite: x   Leuk Esterase: x / RBC: x / WBC x   Sq Epi: x / Non Sq Epi: x / Bacteria: x        MEDICATIONS  (STANDING):  apixaban 5 milliGRAM(s) Oral two times a day  hemorrhoidal Ointment 1 Application(s) Rectal two times a day  hydrALAZINE 10 milliGRAM(s) Oral three times a day  sodium chloride 0.9%. 1000 milliLiter(s) (60 mL/Hr) IV Continuous <Continuous>  sodium chloride 0.9%. 1000 milliLiter(s) (75 mL/Hr) IV Continuous <Continuous>    MEDICATIONS  (PRN):  acetaminophen     Tablet .. 650 milliGRAM(s) Oral every 6 hours PRN Temp greater or equal to 38C (100.4F), Mild Pain (1 - 3)  ondansetron Injectable 4 milliGRAM(s) IV Push every 6 hours PRN Nausea and/or Vomiting  senna 2 Tablet(s) Oral at bedtime PRN Constipation      Care Discussed with Consultants/Other Providers [ ] YES  [ ] NO

## 2024-04-28 NOTE — PROGRESS NOTE ADULT - PROBLEM SELECTOR PLAN 2
Pt. with metabolic acidosis in the setting of renal failure, now resolved with SCO2 now 21 on 4/26. No labs today. Monitor SCO2.    If you have any questions, please feel free to contact me  Oswaldo Tejeda  Nephrology Fellow  [phone]; Prefer [employer] TEAMS  (After 5pm or on weekends please page the on-call fellow).

## 2024-04-29 LAB
ANION GAP SERPL CALC-SCNC: 18 MMOL/L — HIGH (ref 5–17)
BUN SERPL-MCNC: 98 MG/DL — HIGH (ref 7–23)
CALCIUM SERPL-MCNC: 8.7 MG/DL — SIGNIFICANT CHANGE UP (ref 8.4–10.5)
CHLORIDE SERPL-SCNC: 97 MMOL/L — SIGNIFICANT CHANGE UP (ref 96–108)
CO2 SERPL-SCNC: 23 MMOL/L — SIGNIFICANT CHANGE UP (ref 22–31)
CREAT SERPL-MCNC: 6.75 MG/DL — HIGH (ref 0.5–1.3)
EGFR: 6 ML/MIN/1.73M2 — LOW
GLUCOSE SERPL-MCNC: 133 MG/DL — HIGH (ref 70–99)
HCT VFR BLD CALC: 33.8 % — LOW (ref 34.5–45)
HGB BLD-MCNC: 10.3 G/DL — LOW (ref 11.5–15.5)
MAGNESIUM SERPL-MCNC: 2.5 MG/DL — SIGNIFICANT CHANGE UP (ref 1.6–2.6)
MCHC RBC-ENTMCNC: 30.3 PG — SIGNIFICANT CHANGE UP (ref 27–34)
MCHC RBC-ENTMCNC: 30.5 GM/DL — LOW (ref 32–36)
MCV RBC AUTO: 99.4 FL — SIGNIFICANT CHANGE UP (ref 80–100)
NRBC # BLD: 0 /100 WBCS — SIGNIFICANT CHANGE UP (ref 0–0)
PHOSPHATE SERPL-MCNC: 5.7 MG/DL — HIGH (ref 2.5–4.5)
PLATELET # BLD AUTO: 138 K/UL — LOW (ref 150–400)
POTASSIUM SERPL-MCNC: 3.7 MMOL/L — SIGNIFICANT CHANGE UP (ref 3.5–5.3)
POTASSIUM SERPL-SCNC: 3.7 MMOL/L — SIGNIFICANT CHANGE UP (ref 3.5–5.3)
RBC # BLD: 3.4 M/UL — LOW (ref 3.8–5.2)
RBC # FLD: 12.1 % — SIGNIFICANT CHANGE UP (ref 10.3–14.5)
SODIUM SERPL-SCNC: 138 MMOL/L — SIGNIFICANT CHANGE UP (ref 135–145)
WBC # BLD: 6.95 K/UL — SIGNIFICANT CHANGE UP (ref 3.8–10.5)
WBC # FLD AUTO: 6.95 K/UL — SIGNIFICANT CHANGE UP (ref 3.8–10.5)

## 2024-04-29 PROCEDURE — 99232 SBSQ HOSP IP/OBS MODERATE 35: CPT | Mod: GC

## 2024-04-29 RX ORDER — SODIUM CHLORIDE 9 MG/ML
1000 INJECTION INTRAMUSCULAR; INTRAVENOUS; SUBCUTANEOUS
Refills: 0 | Status: DISCONTINUED | OUTPATIENT
Start: 2024-04-29 | End: 2024-05-01

## 2024-04-29 RX ADMIN — AMIODARONE HYDROCHLORIDE 400 MILLIGRAM(S): 400 TABLET ORAL at 17:20

## 2024-04-29 RX ADMIN — APIXABAN 5 MILLIGRAM(S): 2.5 TABLET, FILM COATED ORAL at 05:28

## 2024-04-29 RX ADMIN — AMIODARONE HYDROCHLORIDE 400 MILLIGRAM(S): 400 TABLET ORAL at 05:27

## 2024-04-29 RX ADMIN — APIXABAN 5 MILLIGRAM(S): 2.5 TABLET, FILM COATED ORAL at 17:20

## 2024-04-29 RX ADMIN — PHENYLEPHRINE-SHARK LIVER OIL-MINERAL OIL-PETROLATUM RECTAL OINTMENT 1 APPLICATION(S): at 17:23

## 2024-04-29 RX ADMIN — SODIUM CHLORIDE 75 MILLILITER(S): 9 INJECTION INTRAMUSCULAR; INTRAVENOUS; SUBCUTANEOUS at 15:19

## 2024-04-29 RX ADMIN — PANTOPRAZOLE SODIUM 40 MILLIGRAM(S): 20 TABLET, DELAYED RELEASE ORAL at 05:28

## 2024-04-29 RX ADMIN — Medication 25 MILLIGRAM(S): at 05:28

## 2024-04-29 NOTE — PROGRESS NOTE ADULT - SUBJECTIVE AND OBJECTIVE BOX
[facility] DIVISION OF KIDNEY DISEASES AND HYPERTENSION -- FOLLOW UP NOTE  --------------------------------------------------------------------------------  Chief Complaint:    24 hour events/subjective:        PAST HISTORY  --------------------------------------------------------------------------------  No significant changes to PMH, PSH, FHx, SHx, unless otherwise noted    ALLERGIES & MEDICATIONS  --------------------------------------------------------------------------------  Allergies    No Known Allergies    Intolerances      Standing Inpatient Medications  aMIOdarone    Tablet 400 milliGRAM(s) Oral two times a day  aMIOdarone    Tablet   Oral   apixaban 5 milliGRAM(s) Oral two times a day  hemorrhoidal Ointment 1 Application(s) Rectal two times a day  metoprolol succinate ER 25 milliGRAM(s) Oral daily  pantoprazole    Tablet 40 milliGRAM(s) Oral before breakfast  sodium chloride 0.9%. 1000 milliLiter(s) IV Continuous <Continuous>  sodium chloride 0.9%. 1000 milliLiter(s) IV Continuous <Continuous>    PRN Inpatient Medications  acetaminophen     Tablet .. 650 milliGRAM(s) Oral every 6 hours PRN  ondansetron Injectable 4 milliGRAM(s) IV Push every 6 hours PRN  senna 2 Tablet(s) Oral at bedtime PRN      REVIEW OF SYSTEMS  --------------------------------------------------------------------------------  Gen: No weight changes, fatigue, fevers/chills, weakness  Skin: No rashes  Head/Eyes/Ears/Mouth: No headache; Normal hearing; Normal vision w/o blurriness; No sinus pain/discomfort, sore throat  Respiratory: No dyspnea, cough, wheezing, hemoptysis  CV: No chest pain, PND, orthopnea  GI: No abdominal pain, diarrhea, constipation, nausea, vomiting, melena, hematochezia  : No increased frequency, dysuria, hematuria, nocturia  MSK: No joint pain/swelling; no back pain; no edema  Neuro: No dizziness/lightheadedness, weakness, seizures, numbness, tingling  Heme: No easy bruising or bleeding  Endo: No heat/cold intolerance  Psych: No significant nervousness, anxiety, stress, depression    All other systems were reviewed and are negative, except as noted.    VITALS/PHYSICAL EXAM  --------------------------------------------------------------------------------  T(C): 36.5 (04-29-24 @ 09:05), Max: 36.8 (04-29-24 @ 05:24)  HR: 65 (04-29-24 @ 09:10) (61 - 66)  BP: 112/68 (04-29-24 @ 09:05) (102/62 - 126/73)  RR: 18 (04-29-24 @ 09:05) (18 - 18)  SpO2: 97% (04-29-24 @ 09:05) (95% - 97%)  Wt(kg): --        04-28-24 @ 07:01  -  04-29-24 @ 07:00  --------------------------------------------------------  IN: 480 mL / OUT: 100 mL / NET: 380 mL      Physical Exam:  	Gen: NAD, well-appearing  	HEENT: PERRL, supple neck, clear oropharynx  	Pulm: CTA B/L  	CV: RRR, S1S2; no rub  	Back: No spinal or CVA tenderness; no sacral edema  	Abd: +BS, soft, nontender/nondistended  	: No suprapubic tenderness  	UE: Warm, FROM, no clubbing, intact strength; no edema; no asterixis  	LE: Warm, FROM, no clubbing, intact strength; no edema  	Neuro: No focal deficits, intact gait  	Psych: Normal affect and mood  	Skin: Warm, without rashes  	Vascular access:    LABS/STUDIES  --------------------------------------------------------------------------------              10.3   6.95  >-----------<  138      [04-29-24 @ 07:27]              33.8     138  |  97  |  98  ----------------------------<  133      [04-29-24 @ 07:27]  3.7   |  23  |  6.75        Ca     8.7     [04-29-24 @ 07:27]      Mg     2.5     [04-29-24 @ 07:27]      Phos  5.7     [04-29-24 @ 07:27]            Creatinine Trend:  SCr 6.75 [04-29 @ 07:27]  SCr 6.36 [04-28 @ 07:05]  SCr 6.23 [04-27 @ 16:46]  SCr 5.80 [04-26 @ 06:14]  SCr 6.05 [04-25 @ 08:10]    Urinalysis - [04-29-24 @ 07:27]      Color  / Appearance  / SG  / pH       Gluc 133 / Ketone   / Bili  / Urobili        Blood  / Protein  / Leuk Est  / Nitrite       RBC  / WBC  / Hyaline  / Gran  / Sq Epi  / Non Sq Epi  / Bacteria       TSH 0.65      [04-09-24 @ 00:27]    HBsAg Nonreact      [04-10-24 @ 07:11]  HCV 0.05, Nonreact      [04-09-24 @ 07:25]  HIV Nonreact      [04-09-24 @ 20:21]    Free Light Chains: kappa 5.19, lambda 2.81, ratio = 1.85      [04-10 @ 07:11]  Immunofixation Serum:   Weak  IgG Kappa Band Identified      Reference Range: None Detected      [04-10-24 @ 07:11]

## 2024-04-30 LAB
ANION GAP SERPL CALC-SCNC: 18 MMOL/L — HIGH (ref 5–17)
BUN SERPL-MCNC: 103 MG/DL — HIGH (ref 7–23)
CALCIUM SERPL-MCNC: 8.1 MG/DL — LOW (ref 8.4–10.5)
CHLORIDE SERPL-SCNC: 101 MMOL/L — SIGNIFICANT CHANGE UP (ref 96–108)
CO2 SERPL-SCNC: 21 MMOL/L — LOW (ref 22–31)
CREAT SERPL-MCNC: 6.88 MG/DL — HIGH (ref 0.5–1.3)
EGFR: 6 ML/MIN/1.73M2 — LOW
GLUCOSE SERPL-MCNC: 100 MG/DL — HIGH (ref 70–99)
HCT VFR BLD CALC: 33 % — LOW (ref 34.5–45)
HGB BLD-MCNC: 10 G/DL — LOW (ref 11.5–15.5)
MCHC RBC-ENTMCNC: 30 PG — SIGNIFICANT CHANGE UP (ref 27–34)
MCHC RBC-ENTMCNC: 30.3 GM/DL — LOW (ref 32–36)
MCV RBC AUTO: 99.1 FL — SIGNIFICANT CHANGE UP (ref 80–100)
NRBC # BLD: 0 /100 WBCS — SIGNIFICANT CHANGE UP (ref 0–0)
PLATELET # BLD AUTO: 128 K/UL — LOW (ref 150–400)
POTASSIUM SERPL-MCNC: 4.1 MMOL/L — SIGNIFICANT CHANGE UP (ref 3.5–5.3)
POTASSIUM SERPL-SCNC: 4.1 MMOL/L — SIGNIFICANT CHANGE UP (ref 3.5–5.3)
RBC # BLD: 3.33 M/UL — LOW (ref 3.8–5.2)
RBC # FLD: 12 % — SIGNIFICANT CHANGE UP (ref 10.3–14.5)
SODIUM SERPL-SCNC: 140 MMOL/L — SIGNIFICANT CHANGE UP (ref 135–145)
WBC # BLD: 5.91 K/UL — SIGNIFICANT CHANGE UP (ref 3.8–10.5)
WBC # FLD AUTO: 5.91 K/UL — SIGNIFICANT CHANGE UP (ref 3.8–10.5)

## 2024-04-30 PROCEDURE — 99232 SBSQ HOSP IP/OBS MODERATE 35: CPT | Mod: GC

## 2024-04-30 RX ADMIN — Medication 25 MILLIGRAM(S): at 10:32

## 2024-04-30 RX ADMIN — APIXABAN 5 MILLIGRAM(S): 2.5 TABLET, FILM COATED ORAL at 05:15

## 2024-04-30 RX ADMIN — AMIODARONE HYDROCHLORIDE 400 MILLIGRAM(S): 400 TABLET ORAL at 05:16

## 2024-04-30 RX ADMIN — PHENYLEPHRINE-SHARK LIVER OIL-MINERAL OIL-PETROLATUM RECTAL OINTMENT 1 APPLICATION(S): at 17:35

## 2024-04-30 RX ADMIN — PHENYLEPHRINE-SHARK LIVER OIL-MINERAL OIL-PETROLATUM RECTAL OINTMENT 1 APPLICATION(S): at 05:20

## 2024-04-30 RX ADMIN — APIXABAN 5 MILLIGRAM(S): 2.5 TABLET, FILM COATED ORAL at 17:35

## 2024-04-30 RX ADMIN — AMIODARONE HYDROCHLORIDE 400 MILLIGRAM(S): 400 TABLET ORAL at 17:35

## 2024-04-30 RX ADMIN — PANTOPRAZOLE SODIUM 40 MILLIGRAM(S): 20 TABLET, DELAYED RELEASE ORAL at 05:16

## 2024-04-30 NOTE — PROGRESS NOTE ADULT - ATTENDING SUPERVISION STATEMENT
Fellow
Resident
Fellow
Resident/Fellow
Fellow
Resident/Fellow
Fellow
Resident/Fellow
Fellow
Resident/Fellow

## 2024-04-30 NOTE — PROGRESS NOTE ADULT - ATTENDING COMMENTS
74yo F with PMHx of staghorn nephrolithiasis 2009 presents with BL LE swelling for past few weeks.  SCr persistently elevated from 6356-4954 onwards    1.5 last year  SCr initially during this admission was elevated at 4.73 (4/8), yuri into 6 range now  Had new onset of edema, gradually got better through hospital stay, then suddenly got completely better 2/19, followed by lower BP    This seems to have been increased diuretic effect, with hypotension  Now off diuretics  SCr stabilized    Hydrated Monday, Tuesday, last week- more diarrhea this weekend- restart hydration today    IV NS 75 ml/h stop now  Pt w ATN on top of CKD    SCr has been in the~6s, may recover from ATN to a level of CKD where we can manage wo dialysis, may be OK to DC if SCr stabilizes at this level in next day or two, if SCr rises may need HD- discussed w pt.
72yo F with PMHx of staghorn nephrolithiasis 2009 presents with BL LE swelling for past few weeks.  SCr persistently elevated from 3458-2025 onwards    1.5 last year  SCr initially during this admission was elevated at 4.73 (4/8), yuri into 6 range now  Had new onset of edema, gradually got better through hospoital stay, then suddenly got completely better Friday, followed by lower BP    This seems to have been increased diuretic effect, with hypotension  Now off diuretics    Need to start IVF to restore effective circulating volume    Will hydrate overnight and then reassess  No acute need for HD
74yo F with PMHx of staghorn nephrolithiasis 2009 presents with BL LE swelling for past few weeks.  SCr persistently elevated from 4440-3126 onwards    1.5 last year  SCr initially during this admission was elevated at 4.73 (4/8), yuri into 6 range now  Had new onset of edema, gradually got better through hospital stay, then suddenly got completely better 2/19, followed by lower BP    This seems to have been increased diuretic effect, with hypotension  Now off diuretics  SCr stabilized    Hydrated Monday, Tuesday, last week- more diarrhea this weekend- restart hydration today    IV NS 75 ml/h
LVEF normalized with rate control  EP consult for cardioversion .
As above  Events noted  Pending Echo  Cardiology eval appreciated  nephrology eval pending
Ms. Rodrigues's volume status appears to be improving per the patient and her  and her ventricular rates appear well controlled.    Continue rate control and AC.    Discuss with nephrology if any indication for renal biopsy at this time.    A total of 35 minutes was spent on this patient encounter.
74 y/o woman with nephrolithiasis, HTN admitted with volume overload/ADHF in the setting of AF with RVR.    --Remains with high AF rates on telemetry.  --Still volume overloaded with significant LE edema--continue IV diuresis.  --Strict Is and Os and daily weights.  --EPS follow-up for AF.  --Will follow.
LVEF normalized with rate control  EP consult for cardioversion  NICO, now with improving creatine
Needs significant hemodynamic optimization and volume removal prior to considering rhythm control. Severe MR may be leading to AF. Please call when these issues are improving.
Rate control for now  Will consider for rhythm control when optimized
await echo  will consider for rhtyhm control when optimized.
LVEF normalized with rate control  EP consult for cardioversion
NICO on CKD advanced.  Renal imaging consistent with chronic changes,  BC of her multiple cysts bx would not be advantageous and may not yield any benefit  Acidosis continue Na bicarb replacement   Edema: IV diuresis and Echo and rate control with cards  Check labs today    Kari Villalobos MD  Off: [phone]  contact me on teams    (After 5 pm or on weekends please page the on-call fellow/attending, can check [email or web address] for schedule. Login is dominick pierre, schedule under [facility] medicine, psych, derm)
# NICO/cardiorenal syndrome. Serum Creatinine initially reached a plateau of 5.8 (4/26/24). However, her serum creatinine worsened to 6.2 on 4/27 because she was having diarrhea today. Diarrhea has resolved.   Serum creatinine reached a plateau of 6.3 - no immediate indication for dialysis. We will continue to monitor kidney function daily.     # Medication monitoring encounter: medication dose reviewed. Please dose medications to CrCl ~15    The rest of the recommendations as per fellow's note.    Tiffanie Ureña MD  Attending Nephrologist  [phone] or via [employer] .
74yo F with PMHx of staghorn nephrolithiasis 2009 presents with BL LE swelling for past few weeks.  SCr persistently elevated from 4899-6322 onwards    1.5 last year  SCr initially during this admission was elevated at 4.73 (4/8), and remains elevated/stable today.   Pt. likely has underlying significant CKD.   Continue IV diuresis  Sono smallish kidneys    Multiple cysts, but not present in the past
74yo F with PMHx of staghorn nephrolithiasis presents with BL LE swelling for past few weeks.  SCr persistently elevated from 7171-9075  SCr initially during this admission was elevated at 4.73 (4/8), and remains elevated/stable today.   Pt. likely has underlying CKD.   Continue IV diuresis
NICO on CKD advanced.   Urine protein : 1.1 gm . This is not c/w nephrotic range proteinuria   SIFE : Pending    Kidney function marginally worse  Renal imaging consistent with BL cysts/small stones.+Exophytic hyperattenuating lesion left upper pole  Acidosis continue Na bicarb replacement   Continues to have significant edema: maintain on bumex drip. HR control per cardiology  Daily weights    Rest as per Dr. Boy Obrien MD  O: [phone]  Contact me on teams
NICO on CKD advanced.   Urine protein : 1.1 gm . This is not c/w nephrotic range proteinuria   SIFE : Pending    Kidney function slightly better. Non oliguric  Renal imaging consistent with BL cysts/small stones.+Exophytic hyperattenuating lesion left upper pole  Acidosis stable. Maintain  Na bicarb supplements   Continues to have significant edema: maintain on bumex drip. HR control per cardiology  Daily weights  Change diet to low salt     Rest as per Dr. Boy Obrien MD  O: [phone]  Contact me on teams
NICO on CKD advanced.  Renal imaging consistent with chronic changes,  BC of her multiple cysts bx would not be advantageous and may not yield any benefit  Acidosis continue Na bicarb replacement   Edema: IV diuresis and Echo and rate control with cards    Kari Villalobos MD  Off: [phone]  contact me on teams    (After 5 pm or on weekends please page the on-call fellow/attending, can check [email or web address] for schedule. Login is dominick pierre, schedule under [facility] medicine, psych, derm)
# NICO/cardiorenal syndrome. Serum Creatinine reached a plateau of 5.8 (4/26/24). However, she is having diarrhea today. Please repeat labs today.     # Medication monitoring encounter: medication dose reviewed. Please dose medications to CrCl ~15    The rest of the recommendations as per fellow's note.    Tiffanie Ureña MD  Attending Nephrologist  [phone] or via [employer]
72yo F with PMHx of staghorn nephrolithiasis 2009 presents with BL LE swelling for past few weeks.  SCr persistently elevated from 1505-6724 onwards    1.5 last year  SCr initially during this admission was elevated at 4.73 (4/8), and remains elevated/stable today.   Pt. likely has underlying CKD.   Continue IV diuresis  Edema seems to be slowly improving- but SCr increasing    Repeat UA and U lytes
72yo F with PMHx of staghorn nephrolithiasis 2009 presents with BL LE swelling for past few weeks.  SCr persistently elevated from 3591-0722 onwards    1.5 last year  SCr initially during this admission was elevated at 4.73 (4/8), yuri into 6 range now  Had new onset of edema, gradually got better through hospoital stay, then suddenly got completely better Friday, followed by lower BP    This seems to have been increased diuretic effect, with hypotension  Now off diuretics  Tx w IVF yesterday, tolerated well and seemed to slow rate of SCr rise    Cont IVF today
72yo F with PMHx of staghorn nephrolithiasis 2009 presents with BL LE swelling for past few weeks.  SCr persistently elevated from 5052-0315 onwards    1.5 last year  SCr initially during this admission was elevated at 4.73 (4/8), yuri into 6 range now  Had new onset of edema, gradually got better through hospoital stay, then suddenly got completely better Friday, followed by lower BP    This seems to have been increased diuretic effect, with hypotension  Now off diuretics  SCr stabilized    Hydrated Monday, Tuesday, will hold off again today as eating very well
72yo F with PMHx of staghorn nephrolithiasis 2009 presents with BL LE swelling for past few weeks.  SCr persistently elevated from 7506-0968 onwards    1.5 last year  SCr initially during this admission was elevated at 4.73 (4/8), yuri into 6 range now  Had new onset of edema, gradually got better through hospoital stay, then suddenly got completely better Friday, followed by lower BP    This seems to have been increased diuretic effect, with hypotension  Now off diuretics  Tx w IVF Monday and yesterday, SCr stopped rising and today decreased to 5.71  Pt not uremic, looks better today, will hold off on IVF for a day and observe
72yo F with PMHx of staghorn nephrolithiasis 2009 presents with BL LE swelling for past few weeks.  SCr persistently elevated from 8116-6813 onwards    1.5 last year  SCr initially during this admission was elevated at 4.73 (4/8), yuri into 6 range now  Had new onset of edema, gradually got better through hospoital stay, then suddenly got completely better Friday, followed by lower BP    This seems to have been increased diuretic effect, with hypotension  Now off diuretics  SCr stabilized    Hydrated Monday, Tuesday, will hold off today
74yo F with PMHx of staghorn nephrolithiasis 2009 presents with BL LE swelling for past few weeks.  SCr persistently elevated from 1592-3183 onwards    1.5 last year  SCr initially during this admission was elevated at 4.73 (4/8), and remains elevated/stable today.   Pt. likely has underlying CKD.   Continue IV diuresis    dose metolazone
LVEF normalized with rate control  EP consult for cardioversion
NICO on CKD advanced.  Renal imaging consistent with chronic changes,  BC of her multiple cysts bx would not be advantageous and may not yield any benefit  Acidosis continue Na bicarb replacement   Edema: I spoke to Dr Garcia and she has new dx of cardiomyopathy and valvular disease  Agree with Bumex gtt to optimize diuresis    Kari Villalobos MD  Off: [phone]  contact me on teams    (After 5 pm or on weekends please page the on-call fellow/attending, can check [email or web address] for schedule. Login is dominick pierre, schedule under [facility] medicine, psych, derm)
Patient seen and examined, agree with Fellow A/P as above.  Bumex now on intermittent dosing due to patient hemodynamics.  Serum bicarb stable at 29.  Patient remains at high risk of need renal replacement therapy this admission

## 2024-04-30 NOTE — PROGRESS NOTE ADULT - ASSESSMENT
73F PMHx nephrolithiasis, HTN admitted with 2 weeks of bl LE swelling and newly reduced renal function with multiple nonobstructive renal stones on CT. EP consulted for new-onset AFib w/ RVR noted on admission EKG. Patient reports asymptomatic, denies dizziness/lightheadedness, CP, SOB, palpitations. Denies any cardiac history. Received metoprolol 5mg IV x1 for AFib at rates 130s with improvement to 110s.     CAD / ch afib   Afib s/p DCCV  On Amiodarone and Eliquis     NICO on CKD stage 4   renal following   likely 2/2 ATN   holding HD , if renal fx remain stable around this and improve , will continue to monitor     Hypotension resolved   Off Diueresis   Renal consulted   off sod bicarb   Off Diueretics     May need HD as per renal   Will follow Renal function off Bumex drip/ Bumex    B/L nonobstructive kidney stones :  -stable   dispo: as per renal monitor renal fx for next 24-48 hrs and will decide regarding HD

## 2024-04-30 NOTE — PROGRESS NOTE ADULT - SUBJECTIVE AND OBJECTIVE BOX
[facility] DIVISION OF KIDNEY DISEASES AND HYPERTENSION -- FOLLOW UP NOTE  --------------------------------------------------------------------------------  Chief Complaint:    24 hour events/subjective:        PAST HISTORY  --------------------------------------------------------------------------------  No significant changes to PMH, PSH, FHx, SHx, unless otherwise noted    ALLERGIES & MEDICATIONS  --------------------------------------------------------------------------------  Allergies    No Known Allergies    Intolerances      Standing Inpatient Medications  aMIOdarone    Tablet 400 milliGRAM(s) Oral two times a day  aMIOdarone    Tablet   Oral   apixaban 5 milliGRAM(s) Oral two times a day  hemorrhoidal Ointment 1 Application(s) Rectal two times a day  metoprolol succinate ER 25 milliGRAM(s) Oral daily  pantoprazole    Tablet 40 milliGRAM(s) Oral before breakfast  sodium chloride 0.9%. 1000 milliLiter(s) IV Continuous <Continuous>  sodium chloride 0.9%. 1000 milliLiter(s) IV Continuous <Continuous>  sodium chloride 0.9%. 1000 milliLiter(s) IV Continuous <Continuous>    PRN Inpatient Medications  acetaminophen     Tablet .. 650 milliGRAM(s) Oral every 6 hours PRN  ondansetron Injectable 4 milliGRAM(s) IV Push every 6 hours PRN  senna 2 Tablet(s) Oral at bedtime PRN      REVIEW OF SYSTEMS  --------------------------------------------------------------------------------  Gen: No weight changes, fatigue, fevers/chills, weakness  Skin: No rashes  Head/Eyes/Ears/Mouth: No headache; Normal hearing; Normal vision w/o blurriness; No sinus pain/discomfort, sore throat  Respiratory: No dyspnea, cough, wheezing, hemoptysis  CV: No chest pain, PND, orthopnea  GI: No abdominal pain, diarrhea, constipation, nausea, vomiting, melena, hematochezia  : No increased frequency, dysuria, hematuria, nocturia  MSK: No joint pain/swelling; no back pain; no edema  Neuro: No dizziness/lightheadedness, weakness, seizures, numbness, tingling  Heme: No easy bruising or bleeding  Endo: No heat/cold intolerance  Psych: No significant nervousness, anxiety, stress, depression    All other systems were reviewed and are negative, except as noted.    VITALS/PHYSICAL EXAM  --------------------------------------------------------------------------------  T(C): 36.6 (04-30-24 @ 10:08), Max: 36.6 (04-30-24 @ 01:44)  HR: 67 (04-30-24 @ 10:08) (58 - 67)  BP: 132/82 (04-30-24 @ 10:08) (106/71 - 138/80)  RR: 18 (04-30-24 @ 10:08) (18 - 18)  SpO2: 96% (04-30-24 @ 10:08) (94% - 99%)  Wt(kg): --        04-29-24 @ 07:01  -  04-30-24 @ 07:00  --------------------------------------------------------  IN: 1160 mL / OUT: 0 mL / NET: 1160 mL    04-30-24 @ 07:01  -  04-30-24 @ 12:26  --------------------------------------------------------  IN: 160 mL / OUT: 0 mL / NET: 160 mL      Physical Exam:  	Gen: NAD, well-appearing  	HEENT: PERRL, supple neck, clear oropharynx  	Pulm: CTA B/L  	CV: RRR, S1S2; no rub  	Back: No spinal or CVA tenderness; no sacral edema  	Abd: +BS, soft, nontender/nondistended  	: No suprapubic tenderness  	UE: Warm, FROM, no clubbing, intact strength; no edema; no asterixis  	LE: Warm, FROM, no clubbing, intact strength; no edema  	Neuro: No focal deficits, intact gait  	Psych: Normal affect and mood  	Skin: Warm, without rashes  	Vascular access:    LABS/STUDIES  --------------------------------------------------------------------------------              10.0   5.91  >-----------<  128      [04-30-24 @ 07:32]              33.0     140  |  101  |  103  ----------------------------<  100      [04-30-24 @ 07:32]  4.1   |  21  |  6.88        Ca     8.1     [04-30-24 @ 07:32]      Mg     2.5     [04-29-24 @ 07:27]      Phos  5.7     [04-29-24 @ 07:27]            Creatinine Trend:  SCr 6.88 [04-30 @ 07:32]  SCr 6.75 [04-29 @ 07:27]  SCr 6.36 [04-28 @ 07:05]  SCr 6.23 [04-27 @ 16:46]  SCr 5.80 [04-26 @ 06:14]    Urinalysis - [04-30-24 @ 07:32]      Color  / Appearance  / SG  / pH       Gluc 100 / Ketone   / Bili  / Urobili        Blood  / Protein  / Leuk Est  / Nitrite       RBC  / WBC  / Hyaline  / Gran  / Sq Epi  / Non Sq Epi  / Bacteria       TSH 0.65      [04-09-24 @ 00:27]    HBsAg Nonreact      [04-10-24 @ 07:11]  HCV 0.05, Nonreact      [04-09-24 @ 07:25]  HIV Nonreact      [04-09-24 @ 20:21]    Free Light Chains: kappa 5.19, lambda 2.81, ratio = 1.85      [04-10 @ 07:11]  Immunofixation Serum:   Weak  IgG Kappa Band Identified      Reference Range: None Detected      [04-10-24 @ 07:11]

## 2024-04-30 NOTE — PROGRESS NOTE ADULT - SUBJECTIVE AND OBJECTIVE BOX
Patient is a 73y old  Female who presents with a chief complaint of BL LE swelling (30 Apr 2024 12:26)      INTERVAL HPI/OVERNIGHT EVENTS:  T(C): 36.4 (04-30-24 @ 21:13), Max: 36.6 (04-30-24 @ 01:44)  HR: 68 (04-30-24 @ 21:13) (57 - 68)  BP: 119/79 (04-30-24 @ 21:13) (116/68 - 132/82)  RR: 18 (04-30-24 @ 21:13) (18 - 18)  SpO2: 97% (04-30-24 @ 21:13) (96% - 99%)  Wt(kg): --  I&O's Summary    29 Apr 2024 07:01  -  30 Apr 2024 07:00  --------------------------------------------------------  IN: 1160 mL / OUT: 0 mL / NET: 1160 mL    30 Apr 2024 07:01  -  30 Apr 2024 23:59  --------------------------------------------------------  IN: 1315 mL / OUT: 0 mL / NET: 1315 mL        PAST MEDICAL & SURGICAL HISTORY:  Renal Calculus      Ureteral Calculus      Acute Renal Failure  9/2009      Wrist Fracture  CYNTHIA      Collar Bone Fracture      Rib Fractures      Kidney Stone removal  3/15/2011      C Section      Percutaneous Stone Extraction  Right      S/P Left Percutaneuos Stone extraction  01/26/2010, 04/20/2010          SOCIAL HISTORY  Alcohol:  Tobacco:  Illicit substance use:    FAMILY HISTORY:    REVIEW OF SYSTEMS:  CONSTITUTIONAL: No fever, weight loss, or fatigue  EYES: No eye pain, visual disturbances, or discharge  ENMT:  No difficulty hearing, tinnitus, vertigo; No sinus or throat pain  NECK: No pain or stiffness  RESPIRATORY: No cough, wheezing, chills or hemoptysis; No shortness of breath  CARDIOVASCULAR: No chest pain, palpitations, dizziness, or leg swelling  GASTROINTESTINAL: No abdominal or epigastric pain. No nausea, vomiting, or hematemesis; No diarrhea or constipation. No melena or hematochezia.  GENITOURINARY: No dysuria, frequency, hematuria, or incontinence  NEUROLOGICAL: No headaches, memory loss, loss of strength, numbness, or tremors  SKIN: No itching, burning, rashes, or lesions   LYMPH NODES: No enlarged glands  ENDOCRINE: No heat or cold intolerance; No hair loss  MUSCULOSKELETAL: No joint pain or swelling; No muscle, back, or extremity pain  PSYCHIATRIC: No depression, anxiety, mood swings, or difficulty sleeping  HEME/LYMPH: No easy bruising, or bleeding gums  ALLERY AND IMMUNOLOGIC: No hives or eczema    RADIOLOGY & ADDITIONAL TESTS:    Imaging Personally Reviewed:  [ ] YES  [ ] NO    Consultant(s) Notes Reviewed:  [ ] YES  [ ] NO    PHYSICAL EXAM:  GENERAL: NAD, well-groomed, well-developed  HEAD:  Atraumatic, Normocephalic  EYES: EOMI, PERRLA, conjunctiva and sclera clear  ENMT: No tonsillar erythema, exudates, or enlargement; Moist mucous membranes, Good dentition, No lesions  NECK: Supple, No JVD, Normal thyroid  NERVOUS SYSTEM:  Alert & Oriented X3, Good concentration; Motor Strength 5/5 B/L upper and lower extremities; DTRs 2+ intact and symmetric  CHEST/LUNG: Clear to percussion bilaterally; No rales, rhonchi, wheezing, or rubs  HEART: Regular rate and rhythm; No murmurs, rubs, or gallops  ABDOMEN: Soft, Nontender, Nondistended; Bowel sounds present  EXTREMITIES:  2+ Peripheral Pulses, No clubbing, cyanosis, or edema  LYMPH: No lymphadenopathy noted  SKIN: No rashes or lesions    LABS:                        10.0   5.91  )-----------( 128      ( 30 Apr 2024 07:32 )             33.0     04-30    140  |  101  |  103<H>  ----------------------------<  100<H>  4.1   |  21<L>  |  6.88<H>    Ca    8.1<L>      30 Apr 2024 07:32  Phos  5.7     04-29  Mg     2.5     04-29        Urinalysis Basic - ( 30 Apr 2024 07:32 )    Color: x / Appearance: x / SG: x / pH: x  Gluc: 100 mg/dL / Ketone: x  / Bili: x / Urobili: x   Blood: x / Protein: x / Nitrite: x   Leuk Esterase: x / RBC: x / WBC x   Sq Epi: x / Non Sq Epi: x / Bacteria: x      CAPILLARY BLOOD GLUCOSE            Urinalysis Basic - ( 30 Apr 2024 07:32 )    Color: x / Appearance: x / SG: x / pH: x  Gluc: 100 mg/dL / Ketone: x  / Bili: x / Urobili: x   Blood: x / Protein: x / Nitrite: x   Leuk Esterase: x / RBC: x / WBC x   Sq Epi: x / Non Sq Epi: x / Bacteria: x        MEDICATIONS  (STANDING):  aMIOdarone    Tablet 400 milliGRAM(s) Oral two times a day  aMIOdarone    Tablet   Oral   apixaban 5 milliGRAM(s) Oral two times a day  hemorrhoidal Ointment 1 Application(s) Rectal two times a day  metoprolol succinate ER 25 milliGRAM(s) Oral daily  pantoprazole    Tablet 40 milliGRAM(s) Oral before breakfast  sodium chloride 0.9%. 1000 milliLiter(s) (60 mL/Hr) IV Continuous <Continuous>  sodium chloride 0.9%. 1000 milliLiter(s) (75 mL/Hr) IV Continuous <Continuous>  sodium chloride 0.9%. 1000 milliLiter(s) (75 mL/Hr) IV Continuous <Continuous>    MEDICATIONS  (PRN):  acetaminophen     Tablet .. 650 milliGRAM(s) Oral every 6 hours PRN Temp greater or equal to 38C (100.4F), Mild Pain (1 - 3)  ondansetron Injectable 4 milliGRAM(s) IV Push every 6 hours PRN Nausea and/or Vomiting  senna 2 Tablet(s) Oral at bedtime PRN Constipation      Care Discussed with Consultants/Other Providers [ ] YES  [ ] NO Patient is a 73y old  Female who presents with a chief complaint of BL LE swelling (30 Apr 2024 12:26)      INTERVAL HPI/OVERNIGHT EVENTS: seen and examined   T(C): 36.4 (04-30-24 @ 21:13), Max: 36.6 (04-30-24 @ 01:44)  HR: 68 (04-30-24 @ 21:13) (57 - 68)  BP: 119/79 (04-30-24 @ 21:13) (116/68 - 132/82)  RR: 18 (04-30-24 @ 21:13) (18 - 18)  SpO2: 97% (04-30-24 @ 21:13) (96% - 99%)  Wt(kg): --  I&O's Summary    29 Apr 2024 07:01  -  30 Apr 2024 07:00  --------------------------------------------------------  IN: 1160 mL / OUT: 0 mL / NET: 1160 mL    30 Apr 2024 07:01  -  30 Apr 2024 23:59  --------------------------------------------------------  IN: 1315 mL / OUT: 0 mL / NET: 1315 mL        PAST MEDICAL & SURGICAL HISTORY:  Renal Calculus      Ureteral Calculus      Acute Renal Failure  9/2009      Wrist Fracture  CYNTHIA      Collar Bone Fracture      Rib Fractures      Kidney Stone removal  3/15/2011      C Section      Percutaneous Stone Extraction  Right      S/P Left Percutaneuos Stone extraction  01/26/2010, 04/20/2010          SOCIAL HISTORY  Alcohol:  Tobacco:  Illicit substance use:    FAMILY HISTORY:    REVIEW OF SYSTEMS:  CONSTITUTIONAL: No fever, weight loss, or fatigue  EYES: No eye pain, visual disturbances, or discharge  ENMT:  No difficulty hearing, tinnitus, vertigo; No sinus or throat pain  NECK: No pain or stiffness  RESPIRATORY: No cough, wheezing, chills or hemoptysis; No shortness of breath  CARDIOVASCULAR: No chest pain, palpitations, dizziness, or leg swelling  GASTROINTESTINAL: No abdominal or epigastric pain. No nausea, vomiting, or hematemesis; No diarrhea or constipation. No melena or hematochezia.  GENITOURINARY: No dysuria, frequency, hematuria, or incontinence  NEUROLOGICAL: No headaches, memory loss, loss of strength, numbness, or tremors  SKIN: No itching, burning, rashes, or lesions   LYMPH NODES: No enlarged glands  ENDOCRINE: No heat or cold intolerance; No hair loss  MUSCULOSKELETAL: No joint pain or swelling; No muscle, back, or extremity pain  PSYCHIATRIC: No depression, anxiety, mood swings, or difficulty sleeping  HEME/LYMPH: No easy bruising, or bleeding gums  ALLERY AND IMMUNOLOGIC: No hives or eczema    RADIOLOGY & ADDITIONAL TESTS:    Imaging Personally Reviewed:  [ ] YES  [ ] NO    Consultant(s) Notes Reviewed:  [ ] YES  [ ] NO    PHYSICAL EXAM:  GENERAL: NAD, well-groomed, well-developed  HEAD:  Atraumatic, Normocephalic  EYES: EOMI, PERRLA, conjunctiva and sclera clear  ENMT: No tonsillar erythema, exudates, or enlargement; Moist mucous membranes, Good dentition, No lesions  NECK: Supple, No JVD, Normal thyroid  NERVOUS SYSTEM:  Alert & Oriented X3, Good concentration; Motor Strength 5/5 B/L upper and lower extremities; DTRs 2+ intact and symmetric  CHEST/LUNG: Clear to percussion bilaterally; No rales, rhonchi, wheezing, or rubs  HEART: Regular rate and rhythm; No murmurs, rubs, or gallops  ABDOMEN: Soft, Nontender, Nondistended; Bowel sounds present  EXTREMITIES:  2+ Peripheral Pulses, No clubbing, cyanosis, or edema  LYMPH: No lymphadenopathy noted  SKIN: No rashes or lesions    LABS:                        10.0   5.91  )-----------( 128      ( 30 Apr 2024 07:32 )             33.0     04-30    140  |  101  |  103<H>  ----------------------------<  100<H>  4.1   |  21<L>  |  6.88<H>    Ca    8.1<L>      30 Apr 2024 07:32  Phos  5.7     04-29  Mg     2.5     04-29        Urinalysis Basic - ( 30 Apr 2024 07:32 )    Color: x / Appearance: x / SG: x / pH: x  Gluc: 100 mg/dL / Ketone: x  / Bili: x / Urobili: x   Blood: x / Protein: x / Nitrite: x   Leuk Esterase: x / RBC: x / WBC x   Sq Epi: x / Non Sq Epi: x / Bacteria: x      CAPILLARY BLOOD GLUCOSE            Urinalysis Basic - ( 30 Apr 2024 07:32 )    Color: x / Appearance: x / SG: x / pH: x  Gluc: 100 mg/dL / Ketone: x  / Bili: x / Urobili: x   Blood: x / Protein: x / Nitrite: x   Leuk Esterase: x / RBC: x / WBC x   Sq Epi: x / Non Sq Epi: x / Bacteria: x        MEDICATIONS  (STANDING):  aMIOdarone    Tablet 400 milliGRAM(s) Oral two times a day  aMIOdarone    Tablet   Oral   apixaban 5 milliGRAM(s) Oral two times a day  hemorrhoidal Ointment 1 Application(s) Rectal two times a day  metoprolol succinate ER 25 milliGRAM(s) Oral daily  pantoprazole    Tablet 40 milliGRAM(s) Oral before breakfast  sodium chloride 0.9%. 1000 milliLiter(s) (60 mL/Hr) IV Continuous <Continuous>  sodium chloride 0.9%. 1000 milliLiter(s) (75 mL/Hr) IV Continuous <Continuous>  sodium chloride 0.9%. 1000 milliLiter(s) (75 mL/Hr) IV Continuous <Continuous>    MEDICATIONS  (PRN):  acetaminophen     Tablet .. 650 milliGRAM(s) Oral every 6 hours PRN Temp greater or equal to 38C (100.4F), Mild Pain (1 - 3)  ondansetron Injectable 4 milliGRAM(s) IV Push every 6 hours PRN Nausea and/or Vomiting  senna 2 Tablet(s) Oral at bedtime PRN Constipation      Care Discussed with Consultants/Other Providers [ ] YES  [ ] NO

## 2024-05-01 ENCOUNTER — TRANSCRIPTION ENCOUNTER (OUTPATIENT)
Age: 74
End: 2024-05-01

## 2024-05-01 ENCOUNTER — NON-APPOINTMENT (OUTPATIENT)
Age: 74
End: 2024-05-01

## 2024-05-01 VITALS
RESPIRATION RATE: 18 BRPM | DIASTOLIC BLOOD PRESSURE: 70 MMHG | HEART RATE: 61 BPM | OXYGEN SATURATION: 96 % | SYSTOLIC BLOOD PRESSURE: 113 MMHG | TEMPERATURE: 98 F

## 2024-05-01 LAB
ANION GAP SERPL CALC-SCNC: 18 MMOL/L — HIGH (ref 5–17)
BUN SERPL-MCNC: 100 MG/DL — HIGH (ref 7–23)
CALCIUM SERPL-MCNC: 8.1 MG/DL — LOW (ref 8.4–10.5)
CHLORIDE SERPL-SCNC: 105 MMOL/L — SIGNIFICANT CHANGE UP (ref 96–108)
CO2 SERPL-SCNC: 20 MMOL/L — LOW (ref 22–31)
CREAT SERPL-MCNC: 6.53 MG/DL — HIGH (ref 0.5–1.3)
EGFR: 6 ML/MIN/1.73M2 — LOW
GLUCOSE SERPL-MCNC: 100 MG/DL — HIGH (ref 70–99)
POTASSIUM SERPL-MCNC: 3.9 MMOL/L — SIGNIFICANT CHANGE UP (ref 3.5–5.3)
POTASSIUM SERPL-SCNC: 3.9 MMOL/L — SIGNIFICANT CHANGE UP (ref 3.5–5.3)
SODIUM SERPL-SCNC: 143 MMOL/L — SIGNIFICANT CHANGE UP (ref 135–145)

## 2024-05-01 PROCEDURE — 84133 ASSAY OF URINE POTASSIUM: CPT

## 2024-05-01 PROCEDURE — 97530 THERAPEUTIC ACTIVITIES: CPT

## 2024-05-01 PROCEDURE — 87077 CULTURE AEROBIC IDENTIFY: CPT

## 2024-05-01 PROCEDURE — 85027 COMPLETE CBC AUTOMATED: CPT

## 2024-05-01 PROCEDURE — 76377 3D RENDER W/INTRP POSTPROCES: CPT

## 2024-05-01 PROCEDURE — 82435 ASSAY OF BLOOD CHLORIDE: CPT

## 2024-05-01 PROCEDURE — 83521 IG LIGHT CHAINS FREE EACH: CPT

## 2024-05-01 PROCEDURE — P9047: CPT

## 2024-05-01 PROCEDURE — 84156 ASSAY OF PROTEIN URINE: CPT

## 2024-05-01 PROCEDURE — 92960 CARDIOVERSION ELECTRIC EXT: CPT

## 2024-05-01 PROCEDURE — 80048 BASIC METABOLIC PNL TOTAL CA: CPT

## 2024-05-01 PROCEDURE — 84100 ASSAY OF PHOSPHORUS: CPT

## 2024-05-01 PROCEDURE — 82947 ASSAY GLUCOSE BLOOD QUANT: CPT

## 2024-05-01 PROCEDURE — 83880 ASSAY OF NATRIURETIC PEPTIDE: CPT

## 2024-05-01 PROCEDURE — 82570 ASSAY OF URINE CREATININE: CPT

## 2024-05-01 PROCEDURE — 87389 HIV-1 AG W/HIV-1&-2 AB AG IA: CPT

## 2024-05-01 PROCEDURE — 85730 THROMBOPLASTIN TIME PARTIAL: CPT

## 2024-05-01 PROCEDURE — 82553 CREATINE MB FRACTION: CPT

## 2024-05-01 PROCEDURE — 82550 ASSAY OF CK (CPK): CPT

## 2024-05-01 PROCEDURE — 84436 ASSAY OF TOTAL THYROXINE: CPT

## 2024-05-01 PROCEDURE — 97116 GAIT TRAINING THERAPY: CPT

## 2024-05-01 PROCEDURE — 86900 BLOOD TYPING SEROLOGIC ABO: CPT

## 2024-05-01 PROCEDURE — 76770 US EXAM ABDO BACK WALL COMP: CPT

## 2024-05-01 PROCEDURE — 71045 X-RAY EXAM CHEST 1 VIEW: CPT

## 2024-05-01 PROCEDURE — 87086 URINE CULTURE/COLONY COUNT: CPT

## 2024-05-01 PROCEDURE — 82803 BLOOD GASES ANY COMBINATION: CPT

## 2024-05-01 PROCEDURE — 36415 COLL VENOUS BLD VENIPUNCTURE: CPT

## 2024-05-01 PROCEDURE — 93321 DOPPLER ECHO F-UP/LMTD STD: CPT

## 2024-05-01 PROCEDURE — 86850 RBC ANTIBODY SCREEN: CPT

## 2024-05-01 PROCEDURE — 87340 HEPATITIS B SURFACE AG IA: CPT

## 2024-05-01 PROCEDURE — C8929: CPT

## 2024-05-01 PROCEDURE — 93005 ELECTROCARDIOGRAM TRACING: CPT

## 2024-05-01 PROCEDURE — 84484 ASSAY OF TROPONIN QUANT: CPT

## 2024-05-01 PROCEDURE — C8924: CPT

## 2024-05-01 PROCEDURE — 80053 COMPREHEN METABOLIC PANEL: CPT

## 2024-05-01 PROCEDURE — 86334 IMMUNOFIX E-PHORESIS SERUM: CPT

## 2024-05-01 PROCEDURE — 87186 SC STD MICRODIL/AGAR DIL: CPT

## 2024-05-01 PROCEDURE — 84443 ASSAY THYROID STIM HORMONE: CPT

## 2024-05-01 PROCEDURE — 93325 DOPPLER ECHO COLOR FLOW MAPG: CPT

## 2024-05-01 PROCEDURE — 84295 ASSAY OF SERUM SODIUM: CPT

## 2024-05-01 PROCEDURE — 84132 ASSAY OF SERUM POTASSIUM: CPT

## 2024-05-01 PROCEDURE — 85014 HEMATOCRIT: CPT

## 2024-05-01 PROCEDURE — 86803 HEPATITIS C AB TEST: CPT

## 2024-05-01 PROCEDURE — 85610 PROTHROMBIN TIME: CPT

## 2024-05-01 PROCEDURE — 83605 ASSAY OF LACTIC ACID: CPT

## 2024-05-01 PROCEDURE — 82330 ASSAY OF CALCIUM: CPT

## 2024-05-01 PROCEDURE — 85018 HEMOGLOBIN: CPT

## 2024-05-01 PROCEDURE — C8925: CPT

## 2024-05-01 PROCEDURE — 93320 DOPPLER ECHO COMPLETE: CPT

## 2024-05-01 PROCEDURE — 86901 BLOOD TYPING SEROLOGIC RH(D): CPT

## 2024-05-01 PROCEDURE — 85025 COMPLETE CBC W/AUTO DIFF WBC: CPT

## 2024-05-01 PROCEDURE — 97161 PT EVAL LOW COMPLEX 20 MIN: CPT

## 2024-05-01 PROCEDURE — 82436 ASSAY OF URINE CHLORIDE: CPT

## 2024-05-01 PROCEDURE — 83735 ASSAY OF MAGNESIUM: CPT

## 2024-05-01 PROCEDURE — 81001 URINALYSIS AUTO W/SCOPE: CPT

## 2024-05-01 PROCEDURE — 84300 ASSAY OF URINE SODIUM: CPT

## 2024-05-01 PROCEDURE — 93970 EXTREMITY STUDY: CPT

## 2024-05-01 RX ORDER — METOPROLOL TARTRATE 50 MG
1 TABLET ORAL
Qty: 30 | Refills: 0
Start: 2024-05-01 | End: 2024-05-30

## 2024-05-01 RX ORDER — AMLODIPINE BESYLATE 2.5 MG/1
1 TABLET ORAL
Refills: 0 | DISCHARGE

## 2024-05-01 RX ORDER — APIXABAN 2.5 MG/1
1 TABLET, FILM COATED ORAL
Qty: 60 | Refills: 0 | DISCHARGE
Start: 2024-05-01 | End: 2024-05-30

## 2024-05-01 RX ORDER — APIXABAN 2.5 MG/1
1 TABLET, FILM COATED ORAL
Qty: 60 | Refills: 0
Start: 2024-05-01 | End: 2024-05-30

## 2024-05-01 RX ORDER — AMIODARONE HYDROCHLORIDE 400 MG/1
1 TABLET ORAL
Qty: 52 | Refills: 0
Start: 2024-05-01

## 2024-05-01 RX ADMIN — PANTOPRAZOLE SODIUM 40 MILLIGRAM(S): 20 TABLET, DELAYED RELEASE ORAL at 05:51

## 2024-05-01 RX ADMIN — APIXABAN 5 MILLIGRAM(S): 2.5 TABLET, FILM COATED ORAL at 05:52

## 2024-05-01 RX ADMIN — Medication 25 MILLIGRAM(S): at 05:51

## 2024-05-01 RX ADMIN — PHENYLEPHRINE-SHARK LIVER OIL-MINERAL OIL-PETROLATUM RECTAL OINTMENT 1 APPLICATION(S): at 05:53

## 2024-05-01 RX ADMIN — AMIODARONE HYDROCHLORIDE 400 MILLIGRAM(S): 400 TABLET ORAL at 05:52

## 2024-05-01 NOTE — DISCHARGE NOTE PROVIDER - HOSPITAL COURSE
HPI:  72yo F with PMHx of nephrolithiasis and HTN presents with complaint of BL LE swelling for past few weeks. Pt reports has been getting progressively worse and associated with some BLE soreness. Takes amlodipine for HTN. Otherwise in normal state of health. Denies fever/chills, nausea/vomiting, abd pain, flank pain, dysuria, hematuria, decreased UOP or other acute complaints.  (08 Apr 2024 17:00)    Hospital Course:    Patient admitted for NICO - in the setting of fluid overload, with proteinuria. Nephrology, Cardiology and EP consulted for further evaluation and management. Patient treated for Metabolic Acidosis  which resolved s/p Na Bicarb tabs. Patient found to have New onset Afib continues on Metoprolol, s/p hep gtt, on Eliquis, s/p digoxin 4/17-4/18. Patient s/p TTE and cardioversion on 4/26, now on Amiodarone load and Toprol. Patient also found to have New CHF (EF 35%), s/p bumex gtt and IV bumex. Patient treated for UTI with Augmentin.     Patient is in stable condition, medically cleared for discharge home with home PT by Dr. Lynne.      Important Medication Changes and Reason:  Continue on Eliquis, Amiodarone and Toprol for AFIB.    Active or Pending Issues Requiring Follow-up:  Follow up with PCP  Follow up with Cardiology    Advanced Directives:   [x] Full code  [ ] DNR  [ ] Hospice      Discharge Diagnoses:  NICO  Metabolic Acidosis  AFIB  CHF       HPI:  74yo F with PMHx of nephrolithiasis and HTN presents with complaint of BL LE swelling for past few weeks. Pt reports has been getting progressively worse and associated with some BLE soreness. Takes amlodipine for HTN. Otherwise in normal state of health. Denies fever/chills, nausea/vomiting, abd pain, flank pain, dysuria, hematuria, decreased UOP or other acute complaints.  (08 Apr 2024 17:00)    Hospital Course:    73F PMHx nephrolithiasis, HTN admitted with 2 weeks of bl LE swelling and newly reduced renal function with multiple nonobstructive renal stones on CT. EP consulted for new-onset AFib w/ RVR noted on admission EKG. Patient reports asymptomatic, denies dizziness/lightheadedness, CP, SOB, palpitations. Denies any cardiac history. Received metoprolol 5mg IV x1 for AFib at rates 130s with improvement to 110s.     CAD / ch afib   - currently rate controlled   - s/p digoxin 125mcg   - c/w eliquis 5 mg po BID   - S/p  guided DCCV to NSR with frequent atrial ectopy. Patient must be on 30 days uninterrupted AC    -Continue uninterrupted Eliquis 5mg PO BID for OAC  -Will plan to start amiodarone 400mg BID x 1 week, 200mg BID x 1 week, then 200mg daily. Need for outpatient follow up and pulmonary/thyroid/ophthalmologic/hepatic monitoring while on amiodarone   -Can lower Lopressor 100mg BID to Toprol XL 25mg daily (sinus rates 60-70s)        ADHF:  - volume status appears to have improved, LE edema improved and without JVD, HDS on RA   - s/p IV Bumex intermittently due to hemodynamics, last dose 4/21; would defer to nephrology for additional recs for volume management; s/p PO metolazone   - BB as below  - c/w hydralazine PO tid as tolerated. Would restart isordil at low dose as tolerated for afterload reduction in setting of severe MR and LV dysfunction.  Other GDMT is limited by current renal function  - ischemic workup once euvolemic and HR controlled, can be deferred to outpatient pending further management of AF       NICO on CKD stage 4   renal following   likely 2/2 ATN   holding HD , if renal fx remain stable around this and improve , will continue to monitor   B/L nonobstructive kidney stones :  -stable   dispo: as per renal SCr has been in the~6s,  OK to DC since SCr stabilized, will need to recheck labs as outpatient, Should follow with dr. Elizondo.   Patient is medically cleared and stable for discharge, discussed with .     Patient is in stable condition, medically cleared for discharge home with home PT by Dr. Lynne.      Important Medication Changes and Reason:  Continue on Eliquis, Amiodarone and Toprol for AFIB.    Active or Pending Issues Requiring Follow-up:  Follow up with PCP  Follow up with Cardiology and nephrology     Advanced Directives:   [x] Full code  [ ] DNR  [ ] Hospice      Discharge Diagnoses:  NICO  Metabolic Acidosis  AFIB  CHF       HPI:  74yo F with PMHx of nephrolithiasis and HTN presents with complaint of BL LE swelling for past few weeks. Pt reports has been getting progressively worse and associated with some BLE soreness. Takes amlodipine for HTN. Otherwise in normal state of health. Denies fever/chills, nausea/vomiting, abd pain, flank pain, dysuria, hematuria, decreased UOP or other acute complaints.  (08 Apr 2024 17:00)    Hospital Course:    73F PMHx nephrolithiasis, HTN admitted with 2 weeks of bl LE swelling and newly reduced renal function with multiple nonobstructive renal stones on CT. EP consulted for new-onset AFib w/ RVR noted on admission EKG. Patient reports asymptomatic, denies dizziness/lightheadedness, CP, SOB, palpitations. Denies any cardiac history. Received metoprolol 5mg IV x1 for AFib at rates 130s with improvement to 110s     CAD / ch afib   - currently rate controlled   - s/p digoxin 125mcg   - c/w eliquis 5 mg po BID   - S/p  guided DCCV to NSR with frequent atrial ectopy. Patient must be on 30 days uninterrupted AC    -Continue uninterrupted Eliquis 5mg PO BID for OAC  -Will plan to start amiodarone 400mg BID x 1 week, 200mg BID x 1 week, then 200mg daily. Need for outpatient follow up and pulmonary/thyroid/ophthalmologic/hepatic monitoring while on amiodarone   -Can lower Lopressor 100mg BID to Toprol XL 25mg daily (sinus rates 60-70s)        ADHF:  - volume status appears to have improved, LE edema improved and without JVD, HDS on RA   - s/p IV Bumex intermittently due to hemodynamics, last dose 4/21; would defer to nephrology for additional recs for volume management; s/p PO metolazone   - BB as below  - c/w hydralazine PO tid as tolerated. Would restart isordil at low dose as tolerated for afterload reduction in setting of severe MR and LV dysfunction.  Other GDMT is limited by current renal function  - ischemic workup once euvolemic and HR controlled, can be deferred to outpatient pending further management of AF       NICO on CKD stage 4   renal following   likely 2/2 ATN   holding HD , if renal fx remain stable around this and improve , will continue to monitor   B/L nonobstructive kidney stones :  -stable   dispo: as per renal SCr has been in the~6s,  OK to DC since SCr stabilized, will need to recheck labs as outpatient, Should follow with dr. Elizondo.   Patient is medically cleared and stable for discharge, discussed with .     Patient is in stable condition, medically cleared for discharge home with home PT by Dr. Lynne.      Important Medication Changes and Reason:  Continue on Eliquis, Amiodarone and Toprol for AFIB.    Active or Pending Issues Requiring Follow-up:  Follow up with PCP  Follow up with Cardiology and nephrology     Advanced Directives:   [x] Full code  [ ] DNR  [ ] Hospice      Discharge Diagnoses:  NICO  Metabolic Acidosis  AFIB  CHF

## 2024-05-01 NOTE — DISCHARGE NOTE NURSING/CASE MANAGEMENT/SOCIAL WORK - PATIENT PORTAL LINK FT
You can access the FollowMyHealth Patient Portal offered by [facility] by registering at the following website: [email or web address]. By joining [employer]’s FollowMyHealth portal, you will also be able to view your health information using other applications (apps) compatible with our system.

## 2024-05-01 NOTE — DISCHARGE NOTE PROVIDER - CARE PROVIDERS DIRECT ADDRESSES
,DirectAddress_Unknown ,DirectAddress_Unknown,[email or web address]

## 2024-05-01 NOTE — DISCHARGE NOTE PROVIDER - NSDCFUSCHEDAPPT_GEN_ALL_CORE_FT
Iesha Kumar  [employer] [facility]  NEPHRO [address]  Scheduled Appointment: 05/16/2024

## 2024-05-01 NOTE — DISCHARGE NOTE NURSING/CASE MANAGEMENT/SOCIAL WORK - NSSCTYPOFSERV_GEN_ALL_CORE
Home RN and physical therapy services. RN will contact you to schedule a visit time. Anticipated start of care 5/2.

## 2024-05-01 NOTE — DISCHARGE NOTE PROVIDER - NSDCMRMEDTOKEN_GEN_ALL_CORE_FT
amLODIPine 5 mg oral tablet: 1 tab(s) orally once a day  Rolling Walker: to assist with ambulation   amiodarone 200 mg oral tablet: 1 tab(s) orally 2 times a day Please take 400mg (2 tabs) tonight on 5/1  Take 400mg on 5/2 in morning and evening   Take 400mg on 5/3 in the morning only. Starting 5/3 in the evening take 200mg and continue taking 200mg in the morning and evening for 1 week, then take 200mg daily.  apixaban 5 mg oral tablet: 1 tab(s) orally 2 times a day  metoprolol succinate 25 mg oral tablet, extended release: 1 tab(s) orally once a day

## 2024-05-01 NOTE — DISCHARGE NOTE PROVIDER - NSDCFUADDAPPT_GEN_ALL_CORE_FT
APPTS ARE READY TO BE MADE: [x] YES    Best Family or Patient Contact (if needed):    Additional Information about above appointments (if needed):    1: PCP  2: Cardiology  3:     Other comments or requests:    APPTS ARE READY TO BE MADE: [x] YES    Best Family or Patient Contact (if needed):    Additional Information about above appointments (if needed):    1: PCP  2: Cardiology  3: nephro     Other comments or requests:    APPTS ARE READY TO BE MADE: [x] YES    Best Family or Patient Contact (if needed):    Additional Information about above appointments (if needed):    1: PCP  2: Cardiology  3: nephro     Other comments or requests:   Prior to outreaching the patient, it was visible that the patient has secured a follow up appointment which was not scheduled by our team. Appointment scheduled for 5/16 at 3P at [address] with Violette Kumar     Patient advises they do not want our assistance and prefer to coordinate the [facility] appointments on their own. No information was provided to the patient, however pending the referral to capture potential appointment data once scheduled by the patient.

## 2024-05-01 NOTE — DISCHARGE NOTE NURSING/CASE MANAGEMENT/SOCIAL WORK - NSDCPEFALRISK_GEN_ALL_CORE
For information on Fall & Injury Prevention, visit: [email or web address] OR  [email or web address] OR  https://www.cdc.gov/steadi/patient.html

## 2024-05-01 NOTE — DISCHARGE NOTE PROVIDER - NPI NUMBER (FOR SYSADMIN USE ONLY) :
[[id number]] [[id number]],[[id number]] [[id number]],[[id number]] [[id number]],[[id number]],[[id number]]

## 2024-05-01 NOTE — DISCHARGE NOTE PROVIDER - PROVIDER TOKENS
PROVIDER:[TOKEN:[[id number]],FOLLOWUP:[1 week]] PROVIDER:[TOKEN:[[id number]],FOLLOWUP:[1 week]],PROVIDER:[TOKEN:[[id number]],FOLLOWUP:[1 week]] PROVIDER:[TOKEN:[[id number]],FOLLOWUP:[1 week]],PROVIDER:[TOKEN:[[id number]],FOLLOWUP:[1 week]] PROVIDER:[TOKEN:[[id number]],FOLLOWUP:[1 week]],PROVIDER:[TOKEN:[[id number]],FOLLOWUP:[1 week]],PROVIDER:[TOKEN:[[id number]],FOLLOWUP:[1 week]]

## 2024-05-01 NOTE — DISCHARGE NOTE PROVIDER - CARE PROVIDER_API CALL
Amanda Ellington  Cardiovascular Disease  [address] [place] [address]  Phone: [phone]  Fax: [phone]  Follow Up Time: 1 week   Amanda Ellington  Cardiovascular Disease  [address] [facility], [address]  [place] [address]  Phone: [phone]  Fax: [phone]  Follow Up Time: 1 week    Bassem Elizondo  Nephrology  [address] [place] [address], [address]  [place] [address]  Phone: [phone]  Fax: [phone]  Follow Up Time: 1 week   Bassem Elizondo  Nephrology  [address] [facility], [address]  [place] [address]  Phone: [phone]  Fax: [phone]  Follow Up Time: 1 week    Bruce Granados  Cardiology  [address] [facility], [address]  [place] [address]  Phone: [phone]  Fax: [phone]  Follow Up Time: 1 week   Bassem Elizondo  Nephrology  [address] [facility], [address]  [place] [address]  Phone: [phone]  Fax: [phone]  Follow Up Time: 1 week    Bruce Granados  Cardiology  [address] [facility], [address]  [place] [address]  Phone: [phone]  Fax: [phone]  Follow Up Time: 1 week    Ramez Ojeda.  Cardiac Electrophysiology  [address] [place] [address]  Phone: [phone]  Fax: [phone]  Follow Up Time: 1 week

## 2024-05-01 NOTE — DISCHARGE NOTE PROVIDER - NSDCCPCAREPLAN_GEN_ALL_CORE_FT
PRINCIPAL DISCHARGE DIAGNOSIS  Diagnosis: Acute CHF  Assessment and Plan of Treatment: You were  found to have New CHF (EF 35%) treated with bumex. Follow up with Cardiology.      SECONDARY DISCHARGE DIAGNOSES  Diagnosis: Metabolic acidosis  Assessment and Plan of Treatment: You were treated for Metabolic Acidosis  which resolved with sodium bicarbonate tabs.    Diagnosis: NICO (acute kidney injury)  Assessment and Plan of Treatment: You were admitted for NICO in the setting of fluid overload, with proteinuria which now resolevd. Follow up with PCP.    Diagnosis: Afib  Assessment and Plan of Treatment: You were found to have New onset Afib. Please continue on Metoprolol, Eliquis, and Amiodarone. Follow up with Cardiology.    Diagnosis: UTI (urinary tract infection)  Assessment and Plan of Treatment: You were treated for UTI with Augmentin. Follow up with PCP.     PRINCIPAL DISCHARGE DIAGNOSIS  Diagnosis: Acute CHF  Assessment and Plan of Treatment: You were  found to have New CHF (EF 35%) treated with bumex. Follow up with Cardiology.      SECONDARY DISCHARGE DIAGNOSES  Diagnosis: NICO (acute kidney injury)  Assessment and Plan of Treatment: as per renal SCr has been in the~6s,  you will need to recheck labs as outpatient, Should follow with Dr. Elizondo.   Please follow up with Nephrology office at [phone] in 1 week.     Address:  [address] [place] [address]    Diagnosis: Afib  Assessment and Plan of Treatment: You were found to have New onset Afib. Please continue on Metoprolol, Eliquis, and Amiodarone. Follow up with Cardiology.  Please take 400mg tonight on 5/1  Take 400mg on 5/2 in morning and evening   Take 400mg on 5/3 in the morning only. Starting 5/3 in the evening take 200mg and continue taking 200mg in the morning and evening for 1 week, then take 200mg daily.    Diagnosis: Metabolic acidosis  Assessment and Plan of Treatment: You were treated for Metabolic Acidosis  which resolved with sodium bicarbonate tabs.    Diagnosis: UTI (urinary tract infection)  Assessment and Plan of Treatment: You were treated for UTI with Augmentin. Follow up with PCP.

## 2024-05-01 NOTE — PROGRESS NOTE ADULT - REASON FOR ADMISSION
BL LE swelling

## 2024-05-01 NOTE — DISCHARGE NOTE PROVIDER - NSFOLLOWUPCLINICS_GEN_ALL_ED_FT
[facility] - Primary Care  Primary Care  [address] [place] [address]  Phone: [phone]  Fax:   Follow Up Time: 1 week

## 2024-05-01 NOTE — PROGRESS NOTE ADULT - PROVIDER SPECIALTY LIST ADULT
Cardiology
Electrophysiology
Internal Medicine
Nephrology
Cardiology
Cardiology
Electrophysiology
Internal Medicine
Nephrology
Nephrology
Urology
Cardiology
Electrophysiology
Internal Medicine
Nephrology
Electrophysiology
Electrophysiology
Internal Medicine
Nephrology

## 2024-05-01 NOTE — DISCHARGE NOTE NURSING/CASE MANAGEMENT/SOCIAL WORK - NSDCFUADDAPPT_GEN_ALL_CORE_FT
APPTS ARE READY TO BE MADE: [x] YES    Best Family or Patient Contact (if needed):    Additional Information about above appointments (if needed):    1: PCP  2: Cardiology  3:     Other comments or requests:

## 2024-05-02 ENCOUNTER — NON-APPOINTMENT (OUTPATIENT)
Age: 74
End: 2024-05-02

## 2024-05-02 NOTE — CHART NOTE - NSCHARTNOTESELECT_GEN_ALL_CORE
Event Note
Medication/Event Note
Medication/Event Note
Post-Discharge Note
AF with RVR/Event Note
DME/Event Note
Electrophysiology/Event Note

## 2024-05-02 NOTE — CHART NOTE - NSCHARTNOTEFT_GEN_A_CORE
Post-Discharge Medication Review	  	  Patient's preferred pharmacy was updated in OMR: [facility] in [place]	  	  Caregiver (Luis, ) contacted to offer medication counseling post-discharge. Medication reconciliation completed. Per Caregiver, medications include:	  	  1.	amiodarone 200 mg oral tablet: Take 2 tabs (400 mg) orally twice daily through 5/3, then 1 tab (200 mg) twice daily for 7 days, then 1 tab once daily thereafter (modified dosing schedule due to missed 400 mg dose on 5/2 PM)  2.	apixaban 5 mg oral tablet 1 tab(s) orally 2 times a day  3.	metoprolol succinate 25 mg oral tablet, extended release 1 tab(s) orally once a day   		  Medication name, indication, administration, side effects, and monitoring reviewed for new medications during post discharge counseling visit with Caregiver. Caregiver demonstrated understanding. Counseling offered for all medications.	    Caregiver stated that patient had missed her doses of Eliquis and amiodarone the night prior post-discharge and had only taken 1 tablet of amiodarone this morning (per discharge instructions, patient was to take 2 tabs twice daily until 5/3). Reinforced amiodarone dosing schedule with Caregiver (taking into account missed dose as well), Caregiver expressed understanding and was able to repeat directions back accurately. Also emphasized importance of adherence to Eliquis especially since patient is s/p cardioversion. Caregiver expressed understanding.    Caregiver stated that patient was taking nebivolol prior to admission (not listed on inpatient admission medication reconciliation) and inquired whether she should still be taking this medication. Confirmed with inpatient team who agreed that patient should not be taking nebivolol as she is now taking metoprolol succinate for her new atrial fibrillation and HFrEF. Caregiver made aware. Post-Discharge Medication Review	  	  Patient's preferred pharmacy was updated in OMR: [facility] in [place]	  	  Caregiver (Luis, ) contacted to offer medication counseling post-discharge. Medication reconciliation completed. Per Caregiver, medications include:	  	  1.	amiodarone 200 mg oral tablet: Take 2 tabs (400 mg) orally twice daily through 5/3, then 1 tab (200 mg) twice daily for 7 days, then 1 tab once daily thereafter (modified dosing schedule due to missed 400 mg dose on 5/1 PM)  2.	apixaban 5 mg oral tablet 1 tab(s) orally 2 times a day  3.	metoprolol succinate 25 mg oral tablet, extended release 1 tab(s) orally once a day   		  Medication name, indication, administration, side effects, and monitoring reviewed for new medications during post discharge counseling visit with Caregiver. Caregiver demonstrated understanding. Counseling offered for all medications.	    Caregiver stated that patient had missed her doses of Eliquis and amiodarone the night prior post-discharge and had only taken 1 tablet of amiodarone this morning (per discharge instructions, patient was to take 2 tabs twice daily until 5/3 AM). Reinforced amiodarone dosing schedule with Caregiver (taking into account missed dose as well), Caregiver expressed understanding and was able to repeat directions back accurately. Also emphasized importance of adherence to Eliquis especially since patient is s/p cardioversion. Caregiver expressed understanding.    Caregiver stated that patient was taking nebivolol prior to admission (not listed on inpatient admission medication reconciliation) and inquired whether she should still be taking this medication. Confirmed with inpatient team who agreed that patient should not be taking nebivolol as she is now taking metoprolol succinate for her new atrial fibrillation and HFrEF. Caregiver made aware.

## 2024-05-03 ENCOUNTER — NON-APPOINTMENT (OUTPATIENT)
Age: 74
End: 2024-05-03

## 2024-05-03 RX ORDER — AMIODARONE HYDROCHLORIDE 400 MG/1
0 TABLET ORAL
Refills: 0 | DISCHARGE

## 2024-05-09 ENCOUNTER — APPOINTMENT (OUTPATIENT)
Dept: NEPHROLOGY | Facility: CLINIC | Age: 74
End: 2024-05-09

## 2024-05-10 ENCOUNTER — APPOINTMENT (OUTPATIENT)
Dept: NEPHROLOGY | Facility: CLINIC | Age: 74
End: 2024-05-10
Payer: MEDICARE

## 2024-05-10 VITALS
OXYGEN SATURATION: 98 % | HEART RATE: 65 BPM | DIASTOLIC BLOOD PRESSURE: 93 MMHG | SYSTOLIC BLOOD PRESSURE: 178 MMHG | TEMPERATURE: 97.7 F | HEIGHT: 62 IN | WEIGHT: 135 LBS | BODY MASS INDEX: 24.84 KG/M2

## 2024-05-10 VITALS — SYSTOLIC BLOOD PRESSURE: 156 MMHG | DIASTOLIC BLOOD PRESSURE: 71 MMHG

## 2024-05-10 PROCEDURE — 99214 OFFICE O/P EST MOD 30 MIN: CPT

## 2024-05-10 RX ORDER — AMOXICILLIN AND CLAVULANATE POTASSIUM 875; 125 MG/1; MG/1
875-125 TABLET, COATED ORAL DAILY
Qty: 5 | Refills: 0 | Status: DISCONTINUED | COMMUNITY
Start: 2024-04-03 | End: 2024-05-10

## 2024-05-10 NOTE — HISTORY OF PRESENT ILLNESS
[FreeTextEntry1] : 74 yo woman for CKD Recently DC from [facility] where I saw her for CKD / NICO and new HF She was hospitalized in 2009 for a staghorn calclus and NICO- required HD x 1 On this recent hospital stay she had new edema, HF, SOB, SCr 4-6, no HD done. Since DC she is feeling well   No SOB, good appetite, active

## 2024-05-10 NOTE — ASSESSMENT
[FreeTextEntry1] : 74 yo woman for CKD Recently DC from [facility] where I saw her for CKD / NICO and new HF She was hospitalized in 2009 for a staghorn calclus and NICO- required HD x 1 On this recent hospital stay she had new edema, HF, SOB, SCr 4-6, no HD done. Since DC she is feeling well   No SOB, good appetite, active ----- 1) CKD   Has small atrophic kidneys by imaging   Last hospital cr ~6   She probably had ATN / CKD   Will check labs today   We discussed HD, probably doesn't need now  2) Htn   BP at home has been excellent by the home nurse  3) Card   HF, AF   On eliquis  The total time of preparation for this visit, the visit itself and writing the note was 54 minutes

## 2024-05-10 NOTE — PHYSICAL EXAM
[Sclera] : the sclera and conjunctiva were normal [PERRL With Normal Accommodation] : pupils were equal in size, round, and reactive to light [Extraocular Movements] : extraocular movements were intact [Outer Ear] : the ears and nose were normal in appearance [Oropharynx] : the oropharynx was normal [Neck Appearance] : the appearance of the neck was normal [Neck Cervical Mass (___cm)] : no neck mass was observed [Jugular Venous Distention Increased] : there was no jugular-venous distention [Thyroid Diffuse Enlargement] : the thyroid was not enlarged [Thyroid Nodule] : there were no palpable thyroid nodules [] : no respiratory distress [Auscultation Breath Sounds / Voice Sounds] : lungs were clear to auscultation bilaterally [Heart Rate And Rhythm] : heart rate was normal and rhythm regular [Heart Sounds] : normal S1 and S2 [Heart Sounds Gallop] : no gallops [Murmurs] : no murmurs [Heart Sounds Pericardial Friction Rub] : no pericardial rub [FreeTextEntry1] : 1+ edema

## 2024-05-13 LAB
ALBUMIN SERPL ELPH-MCNC: 4 G/DL
ALP BLD-CCNC: 118 U/L
ALT SERPL-CCNC: 22 U/L
ANION GAP SERPL CALC-SCNC: 16 MMOL/L
AST SERPL-CCNC: 13 U/L
BILIRUB SERPL-MCNC: 0.3 MG/DL
BUN SERPL-MCNC: 68 MG/DL
CALCIUM SERPL-MCNC: 8.2 MG/DL
CHLORIDE SERPL-SCNC: 109 MMOL/L
CO2 SERPL-SCNC: 16 MMOL/L
CREAT SERPL-MCNC: 4.73 MG/DL
EGFR: 9 ML/MIN/1.73M2
GLUCOSE SERPL-MCNC: 81 MG/DL
HCT VFR BLD CALC: 32.5 %
HGB BLD-MCNC: 9.9 G/DL
MCHC RBC-ENTMCNC: 29.9 PG
MCHC RBC-ENTMCNC: 30.5 GM/DL
MCV RBC AUTO: 98.2 FL
PLATELET # BLD AUTO: 154 K/UL
POTASSIUM SERPL-SCNC: 5.3 MMOL/L
PROT SERPL-MCNC: 6.3 G/DL
RBC # BLD: 3.31 M/UL
RBC # FLD: 12.6 %
SODIUM SERPL-SCNC: 141 MMOL/L
WBC # FLD AUTO: 6.33 K/UL

## 2024-05-16 ENCOUNTER — APPOINTMENT (OUTPATIENT)
Dept: NEPHROLOGY | Facility: CLINIC | Age: 74
End: 2024-05-16

## 2024-05-22 ENCOUNTER — APPOINTMENT (OUTPATIENT)
Dept: ELECTROPHYSIOLOGY | Facility: CLINIC | Age: 74
End: 2024-05-22
Payer: MEDICARE

## 2024-05-22 VITALS — SYSTOLIC BLOOD PRESSURE: 200 MMHG | OXYGEN SATURATION: 100 % | HEART RATE: 75 BPM | DIASTOLIC BLOOD PRESSURE: 95 MMHG

## 2024-05-22 DIAGNOSIS — I05.0 RHEUMATIC MITRAL STENOSIS: ICD-10-CM

## 2024-05-22 DIAGNOSIS — N18.9 CHRONIC KIDNEY DISEASE, UNSPECIFIED: ICD-10-CM

## 2024-05-22 PROCEDURE — 93000 ELECTROCARDIOGRAM COMPLETE: CPT

## 2024-05-22 PROCEDURE — 99214 OFFICE O/P EST MOD 30 MIN: CPT

## 2024-05-22 RX ORDER — AMIODARONE HYDROCHLORIDE 200 MG/1
200 TABLET ORAL
Qty: 90 | Refills: 1 | Status: ACTIVE | COMMUNITY
Start: 2024-05-22

## 2024-05-24 ENCOUNTER — NON-APPOINTMENT (OUTPATIENT)
Age: 74
End: 2024-05-24

## 2024-05-24 RX ORDER — AMLODIPINE BESYLATE 5 MG/1
5 TABLET ORAL DAILY
Qty: 30 | Refills: 3 | Status: ACTIVE | COMMUNITY
Start: 2024-05-24 | End: 1900-01-01

## 2024-05-28 PROBLEM — I05.0 MITRAL VALVE STENOSIS, UNSPECIFIED ETIOLOGY: Status: ACTIVE | Noted: 2024-05-22

## 2024-05-28 NOTE — PHYSICAL EXAM
[Well Developed] : well developed [Well Nourished] : well nourished [No Acute Distress] : no acute distress [Normal Venous Pressure] : normal venous pressure [Normal S1, S2] : normal S1, S2 [No Murmur] : no murmur [No Rub] : no rub [No Gallop] : no gallop [Clear Lung Fields] : clear lung fields [Good Air Entry] : good air entry [No Respiratory Distress] : no respiratory distress  [Soft] : abdomen soft [Non Tender] : non-tender [Normal Bowel Sounds] : normal bowel sounds [Normal Gait] : normal gait [No Edema] : no edema [No Cyanosis] : no cyanosis [No Clubbing] : no clubbing [No Varicosities] : no varicosities [No Rash] : no rash [No Skin Lesions] : no skin lesions [Moves all extremities] : moves all extremities [No Focal Deficits] : no focal deficits [Normal Speech] : normal speech [Alert and Oriented] : alert and oriented

## 2024-05-29 NOTE — HISTORY OF PRESENT ILLNESS
[FreeTextEntry1] : This is a 73 year old female with a past medical history of hypertension admitted with new onset bilateral lower edema and renal dysfunction in the setting of acute decompensated heart failure and new-onset atrial fibrillation with rapid ventricular rate. Her initial transthoracic echocardiogram (4/11/2024) revealed a significant reduction in LV systolic function (EF: 37%) and severe MR, however these improved on her repeat echocardiogram on 4/19/2024 (LVEF 55 to 60%, trace MR). Once optimized from a cardiac and renal standpoint, she underwent a successful electrical cardioversion and started on amiodarone. Since hospital discharge, she feels well. She denies dyspnea, chest pain, edema, or palpitations. She has been compliant with her anticoagulation, metoprolol, and amiodarone.

## 2024-05-29 NOTE — END OF VISIT
Faxed to number on the form.    Copy in monthly hold bin. Original sent to scan   [FreeTextEntry3] : I, Dr. Abdullahi Thomas, personally performed the evaluation and management (E/M) services for this established patient who presents today with (a) new problem(s)/exacerbation of (an) existing condition(s).  That E/M includes conducting the examination, assessing all new/exacerbated conditions, and establishing a new plan of care.  Today my fellow, Dr. Nader Ronquillo, was here to observe my evaluation and management services for this new problem/exacerbated condition to be followed going forward. [Time Spent: ___ minutes] : I have spent [unfilled] minutes of time on the encounter.

## 2024-05-29 NOTE — DISCUSSION/SUMMARY
[FreeTextEntry1] : In summary, this is a 73 year old female with recent hospital admission for bilateral lower edema and renal dysfunction in the setting of acute decompensated heart failure and new-onset atrial fibrillation with rapid ventricular rate. Her ejection fraction has since recovered and renal function is at baseline. She was successfully electrically cardioverted and has been maintained on amiodarone and metoprolol. She has been asymptomatic since discharge. We will stop amiodarone for now and continue metoprolol. Additionally, in 4 weeks we will send her a 2-week Holter monitor for further rhythm monitoring. A referral for a general cardiologist was provided.  [EKG obtained to assist in diagnosis and management of assessed problem(s)] : EKG obtained to assist in diagnosis and management of assessed problem(s)

## 2024-05-29 NOTE — CARDIOLOGY SUMMARY
[de-identified] : 4/19/2024 CONCLUSIONS:   1. Left ventricular cavity is normal in size. Left ventricular wall thickness is normal. Left ventricular systolic function is normal with an ejection fraction visually estimated at 55 to 60 %. There are no regional wall motion abnormalities seen.  2. Analysis of left ventricular diastolic function and filling pressure is made challenging by the presence of atrial fibrillation.  3. There is no evidence of a left ventricular thrombus.  4. Normal right ventricular cavity size and normal systolic function.  5. The left atrium is severely dilated.  6. The right atrium is dilated.  7. Normal left and right atrial size.  8. No significant valvular disease.  9. Estimated pulmonary artery systolic pressure is 22 mmHg. 10. No pericardial effusion seen.

## 2024-05-30 ENCOUNTER — NON-APPOINTMENT (OUTPATIENT)
Age: 74
End: 2024-05-30

## 2024-05-30 ENCOUNTER — APPOINTMENT (OUTPATIENT)
Dept: CARDIOLOGY | Facility: CLINIC | Age: 74
End: 2024-05-30
Payer: MEDICARE

## 2024-05-30 VITALS
BODY MASS INDEX: 24.84 KG/M2 | DIASTOLIC BLOOD PRESSURE: 82 MMHG | HEIGHT: 62 IN | SYSTOLIC BLOOD PRESSURE: 160 MMHG | OXYGEN SATURATION: 98 % | HEART RATE: 72 BPM | WEIGHT: 135 LBS

## 2024-05-30 VITALS — DIASTOLIC BLOOD PRESSURE: 82 MMHG | SYSTOLIC BLOOD PRESSURE: 148 MMHG

## 2024-05-30 DIAGNOSIS — I42.9 CARDIOMYOPATHY, UNSPECIFIED: ICD-10-CM

## 2024-05-30 DIAGNOSIS — I48.0 PAROXYSMAL ATRIAL FIBRILLATION: ICD-10-CM

## 2024-05-30 DIAGNOSIS — I34.0 NONRHEUMATIC MITRAL (VALVE) INSUFFICIENCY: ICD-10-CM

## 2024-05-30 PROCEDURE — 93000 ELECTROCARDIOGRAM COMPLETE: CPT

## 2024-05-30 PROCEDURE — 99204 OFFICE O/P NEW MOD 45 MIN: CPT

## 2024-05-30 RX ORDER — METOPROLOL SUCCINATE 50 MG/1
50 TABLET, EXTENDED RELEASE ORAL
Qty: 90 | Refills: 3 | Status: ACTIVE | COMMUNITY
Start: 2024-05-10 | End: 1900-01-01

## 2024-05-30 NOTE — HISTORY OF PRESENT ILLNESS
[FreeTextEntry1] : Aga is a pleasant 73-year-old female here for initial evaluation and posthospitalization follow-up.  Her past medical history is notable for staghorn calculi nephrolithiasis and hypertension.  She was admitted to the hospital on 4/8/2024 with worsening lower extremity swelling.  She was found to be in atrial fibrillation, with a fast ventricular response as well as having volume overload and LV dysfunction.  She was diuresed aggressively with intravenous Bumex and initiated on GDMT.  Her initial echocardiogram suggested an EF of 37% with severe mitral regurgitation and moderate tricuspid regurgitation.  She eventually required a /DCCV.  By this time on 4/26, her EF was 76%, and there was moderate mitral regurgitation.  She was cardioverted to sinus rhythm, and discharged home on amiodarone, Toprol, and Eliquis.  Her creatinine upon discharge was in the 6 range, though has improved to the 4s.  Overall, she feels quite well.  Her breathing is  Overall, she feels quite well.  She has no significant shortness of breath.  Her lower extremity swelling has resolved.  Her blood pressure was elevated, and her nephrologist added amlodipine 5.  Her pressures have now been in the 140 range at home.  She has no chest pain or palpitations.

## 2024-05-30 NOTE — PHYSICAL EXAM
[Negative] : Heme/Lymph [FreeTextEntry9] : +low back [Well Developed] : well developed [Well Nourished] : well nourished [No Acute Distress] : no acute distress [Normal Conjunctiva] : normal conjunctiva [Normal Venous Pressure] : normal venous pressure [No Carotid Bruit] : no carotid bruit [Normal S1, S2] : normal S1, S2 [No Murmur] : no murmur [No Rub] : no rub [No Gallop] : no gallop [Clear Lung Fields] : clear lung fields [Good Air Entry] : good air entry [No Respiratory Distress] : no respiratory distress  [Soft] : abdomen soft [Non Tender] : non-tender [No Masses/organomegaly] : no masses/organomegaly [Normal Bowel Sounds] : normal bowel sounds [Normal Gait] : normal gait [No Edema] : no edema [No Cyanosis] : no cyanosis [No Clubbing] : no clubbing [No Varicosities] : no varicosities [No Rash] : no rash [No Skin Lesions] : no skin lesions [Moves all extremities] : moves all extremities [No Focal Deficits] : no focal deficits [Normal Speech] : normal speech [Alert and Oriented] : alert and oriented [Normal memory] : normal memory

## 2024-05-30 NOTE — DISCUSSION/SUMMARY
[FreeTextEntry1] : Aga had a recent prolonged hospitalization for congestive heart failure and PAF, in the setting of worsening renal function.  Though her initial echocardiogram suggested at least moderate LV dysfunction, her LV improved to normal by discharge.  She eventually required electrical cardioversion and was discharged home in sinus rhythm.  She is doing notably better today.  Her blood pressure is elevated, though has been better overall.  She will continue her amlodipine 5 mg which was recently added, and I have increased her Toprol to 50.  She is off the amiodarone, and remains in sinus rhythm.  Her  will monitor her blood pressure closely at home.  We will repeat an echocardiogram to evaluate her MR.  She will follow-up with her nephrologist regarding her CKD.  She will try to stay active, and limit her salt intake.  We will speak after the above testing, and arrange close follow-up. [EKG obtained to assist in diagnosis and management of assessed problem(s)] : EKG obtained to assist in diagnosis and management of assessed problem(s)

## 2024-06-03 RX ORDER — APIXABAN 5 MG/1
5 TABLET, FILM COATED ORAL
Qty: 180 | Refills: 2 | Status: ACTIVE | COMMUNITY
Start: 2024-05-10

## 2024-06-04 ENCOUNTER — NON-APPOINTMENT (OUTPATIENT)
Age: 74
End: 2024-06-04

## 2024-06-20 ENCOUNTER — APPOINTMENT (OUTPATIENT)
Dept: CARDIOLOGY | Facility: CLINIC | Age: 74
End: 2024-06-20
Payer: MEDICARE

## 2024-06-20 PROCEDURE — 93306 TTE W/DOPPLER COMPLETE: CPT

## 2024-08-02 ENCOUNTER — APPOINTMENT (OUTPATIENT)
Dept: ELECTROPHYSIOLOGY | Facility: CLINIC | Age: 74
End: 2024-08-02

## 2024-08-15 ENCOUNTER — APPOINTMENT (OUTPATIENT)
Dept: NEPHROLOGY | Facility: CLINIC | Age: 74
End: 2024-08-15
Payer: MEDICARE

## 2024-08-15 VITALS
HEIGHT: 62 IN | TEMPERATURE: 97.2 F | SYSTOLIC BLOOD PRESSURE: 133 MMHG | WEIGHT: 131 LBS | DIASTOLIC BLOOD PRESSURE: 69 MMHG | HEART RATE: 81 BPM | BODY MASS INDEX: 24.11 KG/M2 | OXYGEN SATURATION: 98 %

## 2024-08-15 PROCEDURE — 99214 OFFICE O/P EST MOD 30 MIN: CPT

## 2024-08-15 PROCEDURE — G2211 COMPLEX E/M VISIT ADD ON: CPT

## 2024-08-15 NOTE — ASSESSMENT
[FreeTextEntry1] : 74 yo woman for CKD Recently DC from [facility] where I saw her for CKD / NICO and new HF She was hospitalized in 2009 for a staghorn calclus and NICO- required HD x 1 On this recent hospital stay she had new edema, HF, SOB, SCr 4-6, no HD done. Since DC she is feeling well   No SOB, good appetite, active   Inc in metoprolol dose ----- 1) CKD   Has small atrophic kidneys by imaging   Last hospital cr ~6   She probably had ATN / CKD   Will check labs today   We discussed HD, probably doesn't need now  2) Htn   BP fine  3) Card   HF, AF   On eliquis   RRR now  The total time of preparation for this visit, the visit itself and writing the note was 35 minutes

## 2024-08-15 NOTE — HISTORY OF PRESENT ILLNESS
[FreeTextEntry1] : 72 yo woman for CKD Recently DC from [facility] where I saw her for CKD / NICO and new HF She was hospitalized in 2009 for a staghorn calclus and NICO- required HD x 1 On this recent hospital stay she had new edema, HF, SOB, SCr 4-6, no HD done. Since DC she is feeling well   No SOB, good appetite, active   Inc in metoprolol dose

## 2024-08-16 LAB
ALBUMIN SERPL ELPH-MCNC: 4.5 G/DL
ALP BLD-CCNC: 81 U/L
ALT SERPL-CCNC: 5 U/L
ANION GAP SERPL CALC-SCNC: 21 MMOL/L
AST SERPL-CCNC: 8 U/L
BILIRUB SERPL-MCNC: 0.2 MG/DL
BUN SERPL-MCNC: 90 MG/DL
CALCIUM SERPL-MCNC: 7.3 MG/DL
CHLORIDE SERPL-SCNC: 111 MMOL/L
CO2 SERPL-SCNC: 10 MMOL/L
CREAT SERPL-MCNC: 5.53 MG/DL
EGFR: 8 ML/MIN/1.73M2
GLUCOSE SERPL-MCNC: 79 MG/DL
POTASSIUM SERPL-SCNC: 4.6 MMOL/L
PROT SERPL-MCNC: 6.5 G/DL
SODIUM SERPL-SCNC: 142 MMOL/L

## 2024-08-16 RX ORDER — SODIUM BICARBONATE 650 MG/1
650 TABLET ORAL 3 TIMES DAILY
Qty: 90 | Refills: 2 | Status: ACTIVE | COMMUNITY
Start: 2024-08-16 | End: 1900-01-01

## 2024-08-26 DIAGNOSIS — N39.0 URINARY TRACT INFECTION, SITE NOT SPECIFIED: ICD-10-CM

## 2024-08-26 LAB
ANION GAP SERPL CALC-SCNC: 17 MMOL/L
APPEARANCE: ABNORMAL
BACTERIA: ABNORMAL /HPF
BASOPHILS # BLD AUTO: 0.04 K/UL
BASOPHILS NFR BLD AUTO: 0.9 %
BILIRUBIN URINE: ABNORMAL
BLOOD URINE: ABNORMAL
BUN SERPL-MCNC: 78 MG/DL
CALCIUM SERPL-MCNC: 7.8 MG/DL
CAST: 0 /LPF
CHLORIDE SERPL-SCNC: 112 MMOL/L
CO2 SERPL-SCNC: 14 MMOL/L
COLOR: ABNORMAL
CREAT SERPL-MCNC: 4.6 MG/DL
EGFR: 9 ML/MIN/1.73M2
EOSINOPHIL # BLD AUTO: 0.05 K/UL
EOSINOPHIL NFR BLD AUTO: 1.1 %
EPITHELIAL CELLS: 1 /HPF
GLUCOSE QUALITATIVE U: NEGATIVE MG/DL
GLUCOSE SERPL-MCNC: 93 MG/DL
HCT VFR BLD CALC: 26.2 %
HGB BLD-MCNC: 7.8 G/DL
IMM GRANULOCYTES NFR BLD AUTO: 0.4 %
KETONES URINE: NEGATIVE MG/DL
LEUKOCYTE ESTERASE URINE: ABNORMAL
LYMPHOCYTES # BLD AUTO: 0.82 K/UL
LYMPHOCYTES NFR BLD AUTO: 18.4 %
MAN DIFF?: NORMAL
MCHC RBC-ENTMCNC: 29.8 GM/DL
MCHC RBC-ENTMCNC: 30.1 PG
MCV RBC AUTO: 101.2 FL
MICROSCOPIC-UA: NORMAL
MONOCYTES # BLD AUTO: 0.38 K/UL
MONOCYTES NFR BLD AUTO: 8.5 %
NEUTROPHILS # BLD AUTO: 3.15 K/UL
NEUTROPHILS NFR BLD AUTO: 70.7 %
NITRITE URINE: POSITIVE
PH URINE: 5
PLATELET # BLD AUTO: 129 K/UL
POTASSIUM SERPL-SCNC: 4.7 MMOL/L
PROTEIN URINE: 300 MG/DL
RBC # BLD: 2.59 M/UL
RBC # FLD: 15 %
RED BLOOD CELLS URINE: ABNORMAL /HPF
REVIEW: NORMAL
SODIUM SERPL-SCNC: 142 MMOL/L
SPECIFIC GRAVITY URINE: 1.01
URINE CYTOLOGY: NORMAL
UROBILINOGEN URINE: 0.2 MG/DL
WBC # FLD AUTO: 4.46 K/UL
WHITE BLOOD CELLS URINE: 106 /HPF

## 2024-08-26 RX ORDER — AMOXICILLIN AND CLAVULANATE POTASSIUM 250; 125 MG/1; MG/1
250-125 TABLET, FILM COATED ORAL
Qty: 14 | Refills: 0 | Status: ACTIVE | COMMUNITY
Start: 2024-08-26 | End: 1900-01-01

## 2024-08-28 ENCOUNTER — APPOINTMENT (OUTPATIENT)
Dept: CT IMAGING | Facility: CLINIC | Age: 74
End: 2024-08-28
Payer: MEDICARE

## 2024-08-28 PROCEDURE — 93248 EXT ECG>7D<15D REV&INTERPJ: CPT

## 2024-08-28 PROCEDURE — 74176 CT ABD & PELVIS W/O CONTRAST: CPT | Mod: TC

## 2024-09-07 DIAGNOSIS — R31.0 GROSS HEMATURIA: ICD-10-CM

## 2024-09-12 LAB
APPEARANCE: CLEAR
BACTERIA UR CULT: NORMAL
BACTERIA: ABNORMAL /HPF
BILIRUBIN URINE: NEGATIVE
BLOOD URINE: ABNORMAL
CAST: 2 /LPF
COLOR: YELLOW
EPITHELIAL CELLS: 13 /HPF
GLUCOSE QUALITATIVE U: NEGATIVE MG/DL
KETONES URINE: NEGATIVE MG/DL
LEUKOCYTE ESTERASE URINE: ABNORMAL
MICROSCOPIC-UA: NORMAL
NITRITE URINE: NEGATIVE
PH URINE: 6.5
PROTEIN URINE: 100 MG/DL
RED BLOOD CELLS URINE: 1 /HPF
SPECIFIC GRAVITY URINE: 1.01
URINE CYTOLOGY: NORMAL
UROBILINOGEN URINE: 0.2 MG/DL
WHITE BLOOD CELLS URINE: 34 /HPF

## 2024-09-23 ENCOUNTER — APPOINTMENT (OUTPATIENT)
Dept: CARDIOLOGY | Facility: CLINIC | Age: 74
End: 2024-09-23
Payer: MEDICARE

## 2024-09-23 ENCOUNTER — NON-APPOINTMENT (OUTPATIENT)
Age: 74
End: 2024-09-23

## 2024-09-23 VITALS
DIASTOLIC BLOOD PRESSURE: 92 MMHG | HEART RATE: 65 BPM | WEIGHT: 132 LBS | BODY MASS INDEX: 24.29 KG/M2 | SYSTOLIC BLOOD PRESSURE: 170 MMHG | HEIGHT: 62 IN

## 2024-09-23 VITALS — DIASTOLIC BLOOD PRESSURE: 88 MMHG | SYSTOLIC BLOOD PRESSURE: 158 MMHG

## 2024-09-23 DIAGNOSIS — I34.0 NONRHEUMATIC MITRAL (VALVE) INSUFFICIENCY: ICD-10-CM

## 2024-09-23 DIAGNOSIS — I48.0 PAROXYSMAL ATRIAL FIBRILLATION: ICD-10-CM

## 2024-09-23 PROCEDURE — 93000 ELECTROCARDIOGRAM COMPLETE: CPT

## 2024-09-23 PROCEDURE — 99214 OFFICE O/P EST MOD 30 MIN: CPT

## 2024-09-23 NOTE — HISTORY OF PRESENT ILLNESS
[FreeTextEntry1] : Aga is a pleasant 73-year-old female here for evaluation and posthospitalization follow-up.  Her past medical history is notable for staghorn calculi nephrolithiasis and hypertension.  She was admitted to the hospital on 4/8/2024 with worsening lower extremity swelling.  She was found to be in atrial fibrillation, with a fast ventricular response as well as having volume overload and LV dysfunction.  She was diuresed aggressively with intravenous Bumex and initiated on GDMT.  Her initial echocardiogram suggested an EF of 37% with severe mitral regurgitation and moderate tricuspid regurgitation.  She eventually required a /DCCV.  By this time on 4/26, her EF was 76%, and there was moderate mitral regurgitation.  She was cardioverted to sinus rhythm, and discharged home on amiodarone, Toprol, and Eliquis.  Her creatinine upon discharge was in the 6 range, though has improved to the 4s.  Overall, she feels quite well.  Her breathing is  I last saw her in May 2024.  Echocardiogram demonstrated EF of 56% with severe mitral regurgitation and moderately elevated pulmonary pressures.  Blood pressure during this exam was 180. Overall, she feels quite well.  She has no significant shortness of breath.  Her lower extremity swelling is absent today Her blood pressure was elevated, and her nephrologist added amlodipine 5.  Her pressures have reportedly been well controlled.  She has no chest pain or palpitations.  A 2-week monitor demonstrated a 3% AF burden with an average heart rate of 92 bpm.

## 2024-09-23 NOTE — DISCUSSION/SUMMARY
[FreeTextEntry1] : Aga had a recent prolonged hospitalization for congestive heart failure and PAF, in the setting of worsening renal function.  Though her initial echocardiogram suggested at least moderate LV dysfunction, her LV improved to normal by discharge.  She eventually required electrical cardioversion and was discharged home in sinus rhythm.  She is doing notably better today.  Her blood pressure is elevated (stress related?), though has been better overall at home. She is seeing her nephrologist tomorrow, and we will see where the BP and creatinine have settled.  She will continue her home antihypertensives for now. She did have a 3% AF burden on monitor, and has been off amiodarone per notes. We will repeat an echocardiogram to evaluate her MR, and then will decide on either structural evaluation (if MR is severe) or EP evaluation to consider ablation.   She will try to stay active, and limit her salt intake.  We will speak after the above testing, and arrange close follow-up. [EKG obtained to assist in diagnosis and management of assessed problem(s)] : EKG obtained to assist in diagnosis and management of assessed problem(s)

## 2024-09-24 ENCOUNTER — APPOINTMENT (OUTPATIENT)
Dept: NEPHROLOGY | Facility: CLINIC | Age: 74
End: 2024-09-24
Payer: MEDICARE

## 2024-09-24 PROCEDURE — G2211 COMPLEX E/M VISIT ADD ON: CPT

## 2024-09-24 PROCEDURE — 99214 OFFICE O/P EST MOD 30 MIN: CPT

## 2024-09-24 NOTE — ASSESSMENT
[FreeTextEntry1] : 74 yo woman for CKD Recently DC from [facility] where I saw her for CKD / NICO and new HF She was hospitalized in 2009 for a staghorn calclus and NICO- required HD x 1 On this recent hospital stay she had new edema, HF, SOB, SCr 4-6, no HD done. Feels well   Had urine blood- was seen by Dr. Moreno   Notes easy arm bruising- on Eliquis ----- 1) CKD   Has small atrophic kidneys by imaging   Last hospital cr ~6   Since then SCr better- 4.6   No uremic sxs at all    2) Htn   BP fine at home  3) Card   HF, AF   On eliquis   RRR now, 3% of time in A Fib  The total time of preparation for this visit, the visit itself and writing the note was 34 minutes

## 2024-09-24 NOTE — HISTORY OF PRESENT ILLNESS
[FreeTextEntry1] : 74 yo woman for CKD Recently DC from [facility] where I saw her for CKD / NICO and new HF She was hospitalized in 2009 for a staghorn calclus and NICO- required HD x 1 On this recent hospital stay she had new edema, HF, SOB, SCr 4-6, no HD done. Feels well   Had urine blood- was seen by Dr. Moreno   Notes easy arm bruising- on Eliquis

## 2024-09-26 ENCOUNTER — APPOINTMENT (OUTPATIENT)
Dept: CARDIOLOGY | Facility: CLINIC | Age: 74
End: 2024-09-26

## 2024-10-03 ENCOUNTER — RX RENEWAL (OUTPATIENT)
Age: 74
End: 2024-10-03

## 2024-10-23 RX ORDER — FLUCONAZOLE 50 MG/1
50 TABLET ORAL DAILY
Qty: 5 | Refills: 0 | Status: ACTIVE | COMMUNITY
Start: 2024-10-23 | End: 1900-01-01

## 2024-11-07 ENCOUNTER — APPOINTMENT (OUTPATIENT)
Dept: CARDIOLOGY | Facility: CLINIC | Age: 74
End: 2024-11-07
Payer: MEDICARE

## 2024-11-07 PROCEDURE — 93306 TTE W/DOPPLER COMPLETE: CPT

## 2024-11-11 ENCOUNTER — RX RENEWAL (OUTPATIENT)
Age: 74
End: 2024-11-11

## 2024-11-13 ENCOUNTER — APPOINTMENT (OUTPATIENT)
Dept: CARDIOLOGY | Facility: CLINIC | Age: 74
End: 2024-11-13

## 2024-11-21 ENCOUNTER — NON-APPOINTMENT (OUTPATIENT)
Age: 74
End: 2024-11-21

## 2024-11-21 ENCOUNTER — APPOINTMENT (OUTPATIENT)
Dept: NEPHROLOGY | Facility: CLINIC | Age: 74
End: 2024-11-21
Payer: MEDICARE

## 2024-11-21 VITALS
DIASTOLIC BLOOD PRESSURE: 74 MMHG | WEIGHT: 134.48 LBS | OXYGEN SATURATION: 98 % | TEMPERATURE: 97.2 F | SYSTOLIC BLOOD PRESSURE: 160 MMHG | HEIGHT: 62 IN | HEART RATE: 67 BPM | BODY MASS INDEX: 24.75 KG/M2

## 2024-11-21 PROCEDURE — 99214 OFFICE O/P EST MOD 30 MIN: CPT

## 2024-11-21 PROCEDURE — G2211 COMPLEX E/M VISIT ADD ON: CPT

## 2024-11-22 ENCOUNTER — INPATIENT (INPATIENT)
Facility: HOSPITAL | Age: 74
LOS: 1 days | Discharge: HOME | DRG: 683 | End: 2024-11-24
Attending: STUDENT IN AN ORGANIZED HEALTH CARE EDUCATION/TRAINING PROGRAM | Admitting: STUDENT IN AN ORGANIZED HEALTH CARE EDUCATION/TRAINING PROGRAM
Payer: MEDICARE

## 2024-11-22 ENCOUNTER — APPOINTMENT (OUTPATIENT)
Dept: INTERNAL MEDICINE | Facility: CLINIC | Age: 74
End: 2024-11-22

## 2024-11-22 VITALS
SYSTOLIC BLOOD PRESSURE: 169 MMHG | HEART RATE: 87 BPM | RESPIRATION RATE: 18 BRPM | HEIGHT: 62 IN | OXYGEN SATURATION: 97 % | WEIGHT: 130.07 LBS | TEMPERATURE: 98 F | DIASTOLIC BLOOD PRESSURE: 102 MMHG

## 2024-11-22 DIAGNOSIS — N17.9 ACUTE KIDNEY FAILURE, UNSPECIFIED: ICD-10-CM

## 2024-11-22 LAB
ALBUMIN SERPL ELPH-MCNC: 4.4 G/DL
ALBUMIN SERPL ELPH-MCNC: 4.5 G/DL — SIGNIFICANT CHANGE UP (ref 3.3–5)
ALP BLD-CCNC: 114 U/L
ALP SERPL-CCNC: 120 U/L — SIGNIFICANT CHANGE UP (ref 40–120)
ALT FLD-CCNC: 11 U/L — SIGNIFICANT CHANGE UP (ref 10–45)
ALT SERPL-CCNC: 11 U/L
ANION GAP SERPL CALC-SCNC: 24 MMOL/L — HIGH (ref 5–17)
ANION GAP SERPL CALC-SCNC: 28 MMOL/L
APPEARANCE UR: CLEAR — SIGNIFICANT CHANGE UP
AST SERPL-CCNC: 15 U/L — SIGNIFICANT CHANGE UP (ref 10–40)
AST SERPL-CCNC: 18 U/L
BACTERIA # UR AUTO: ABNORMAL /HPF
BILIRUB SERPL-MCNC: 0.2 MG/DL
BILIRUB SERPL-MCNC: 0.3 MG/DL — SIGNIFICANT CHANGE UP (ref 0.2–1.2)
BILIRUB UR-MCNC: NEGATIVE — SIGNIFICANT CHANGE UP
BUN SERPL-MCNC: 90 MG/DL
BUN SERPL-MCNC: 98 MG/DL — HIGH (ref 7–23)
CALCIUM SERPL-MCNC: 7.7 MG/DL
CALCIUM SERPL-MCNC: 7.7 MG/DL — LOW (ref 8.4–10.5)
CAST: 0 /LPF — SIGNIFICANT CHANGE UP (ref 0–4)
CHLORIDE SERPL-SCNC: 105 MMOL/L — SIGNIFICANT CHANGE UP (ref 96–108)
CHLORIDE SERPL-SCNC: 106 MMOL/L
CO2 SERPL-SCNC: 13 MMOL/L — LOW (ref 22–31)
CO2 SERPL-SCNC: 9 MMOL/L
COLOR SPEC: YELLOW — SIGNIFICANT CHANGE UP
CREAT ?TM UR-MCNC: 21 MG/DL — SIGNIFICANT CHANGE UP
CREAT SERPL-MCNC: 5.51 MG/DL — HIGH (ref 0.5–1.3)
CREAT SERPL-MCNC: 5.62 MG/DL
DIFF PNL FLD: ABNORMAL
EGFR: 7 ML/MIN/1.73M2
EGFR: 8 ML/MIN/1.73M2 — LOW
GAS PNL BLDV: SIGNIFICANT CHANGE UP
GLUCOSE SERPL-MCNC: 67 MG/DL
GLUCOSE SERPL-MCNC: 97 MG/DL — SIGNIFICANT CHANGE UP (ref 70–99)
GLUCOSE UR QL: NEGATIVE MG/DL — SIGNIFICANT CHANGE UP
HCT VFR BLD CALC: 30.6 %
HCT VFR BLD CALC: 31.6 % — LOW (ref 34.5–45)
HGB BLD-MCNC: 10.3 G/DL — LOW (ref 11.5–15.5)
HGB BLD-MCNC: 9.7 G/DL
KETONES UR-MCNC: NEGATIVE MG/DL — SIGNIFICANT CHANGE UP
LEUKOCYTE ESTERASE UR-ACNC: ABNORMAL
MAGNESIUM SERPL-MCNC: 2.2 MG/DL — SIGNIFICANT CHANGE UP (ref 1.6–2.6)
MCHC RBC-ENTMCNC: 31.1 PG — SIGNIFICANT CHANGE UP (ref 27–34)
MCHC RBC-ENTMCNC: 31.3 PG
MCHC RBC-ENTMCNC: 31.7 G/DL
MCHC RBC-ENTMCNC: 32.6 G/DL — SIGNIFICANT CHANGE UP (ref 32–36)
MCV RBC AUTO: 95.5 FL — SIGNIFICANT CHANGE UP (ref 80–100)
MCV RBC AUTO: 98.7 FL
NITRITE UR-MCNC: NEGATIVE — SIGNIFICANT CHANGE UP
NRBC # BLD: 0 /100 WBCS — SIGNIFICANT CHANGE UP (ref 0–0)
OSMOLALITY UR: 304 MOS/KG — SIGNIFICANT CHANGE UP (ref 300–900)
PH UR: 6.5 — SIGNIFICANT CHANGE UP (ref 5–8)
PHOSPHATE SERPL-MCNC: 6.4 MG/DL — HIGH (ref 2.5–4.5)
PLATELET # BLD AUTO: 152 K/UL — SIGNIFICANT CHANGE UP (ref 150–400)
PLATELET # BLD AUTO: 159 K/UL
POTASSIUM SERPL-MCNC: 4.5 MMOL/L — SIGNIFICANT CHANGE UP (ref 3.5–5.3)
POTASSIUM SERPL-SCNC: 4.5 MMOL/L — SIGNIFICANT CHANGE UP (ref 3.5–5.3)
POTASSIUM SERPL-SCNC: 5.1 MMOL/L
POTASSIUM UR-SCNC: 16 MMOL/L — SIGNIFICANT CHANGE UP
PROT ?TM UR-MCNC: 348 MG/DL — HIGH (ref 0–12)
PROT SERPL-MCNC: 6.6 G/DL
PROT SERPL-MCNC: 7.2 G/DL — SIGNIFICANT CHANGE UP (ref 6–8.3)
PROT UR-MCNC: 300 MG/DL
PROT/CREAT UR-RTO: 16.6 RATIO — HIGH (ref 0–0.2)
RBC # BLD: 3.1 M/UL
RBC # BLD: 3.31 M/UL — LOW (ref 3.8–5.2)
RBC # FLD: 12.6 % — SIGNIFICANT CHANGE UP (ref 10.3–14.5)
RBC # FLD: 12.9 %
RBC CASTS # UR COMP ASSIST: 6 /HPF — HIGH (ref 0–4)
SODIUM SERPL-SCNC: 142 MMOL/L — SIGNIFICANT CHANGE UP (ref 135–145)
SODIUM SERPL-SCNC: 143 MMOL/L
SODIUM UR-SCNC: 90 MMOL/L — SIGNIFICANT CHANGE UP
SP GR SPEC: 1.01 — SIGNIFICANT CHANGE UP (ref 1–1.03)
SQUAMOUS # UR AUTO: 5 /HPF — SIGNIFICANT CHANGE UP (ref 0–5)
UROBILINOGEN FLD QL: 0.2 MG/DL — SIGNIFICANT CHANGE UP (ref 0.2–1)
WBC # BLD: 5.05 K/UL — SIGNIFICANT CHANGE UP (ref 3.8–10.5)
WBC # FLD AUTO: 4.29 K/UL
WBC # FLD AUTO: 5.05 K/UL — SIGNIFICANT CHANGE UP (ref 3.8–10.5)
WBC UR QL: 20 /HPF — HIGH (ref 0–5)

## 2024-11-22 PROCEDURE — 71045 X-RAY EXAM CHEST 1 VIEW: CPT | Mod: 26

## 2024-11-22 PROCEDURE — 93010 ELECTROCARDIOGRAM REPORT: CPT

## 2024-11-22 PROCEDURE — 99223 1ST HOSP IP/OBS HIGH 75: CPT

## 2024-11-22 PROCEDURE — 99285 EMERGENCY DEPT VISIT HI MDM: CPT | Mod: GC

## 2024-11-22 RX ORDER — SODIUM BICARBONATE 84 MG/ML
0.19 INJECTION, SOLUTION INTRAVENOUS
Qty: 150 | Refills: 0 | Status: DISCONTINUED | OUTPATIENT
Start: 2024-11-22 | End: 2024-11-24

## 2024-11-22 RX ADMIN — SODIUM BICARBONATE 75 MEQ/KG/HR: 84 INJECTION, SOLUTION INTRAVENOUS at 22:42

## 2024-11-22 NOTE — CONSULT NOTE ADULT - SUBJECTIVE AND OBJECTIVE BOX
[facility] DIVISION OF KIDNEY DISEASES AND HYPERTENSION -- [phone]  -- INITIAL CONSULT NOTE  --------------------------------------------------------------------------------  HPI:  73 yo woman with PMH of CKD iso b/l staghorn calculus s/p removal (2009) (follows with Dr. Elizondo), HFpEF with mod to severe MR and moderate pulmonary hypertension, A fib sent from nephrology office due to worsening renal fx.    Outpatient labs done (11/21) showed worsening Cr to 5.62 (from 4.6 in Aug 2024); BUN 90; SCO2 9; AG 28; K 5.1. Patient has been advised about needing dialysis in future and need for AVF during her recent outpatient visit, but patient is not ready.     Patient states she feels completely fine, denies any SOB, urinating at baseline. Has been missing some doses of her bicarb tables, otherwise no complaints.     Nephrology notified by outpatient nephrologist (Dr. Elizondo).       PAST HISTORY  --------------------------------------------------------------------------------  PAST MEDICAL & SURGICAL HISTORY:  Renal Calculus      Ureteral Calculus      Acute Renal Failure  9/2009      Wrist Fracture  CYNTHIA      Collar Bone Fracture      Rib Fractures      Kidney Stone removal  3/15/2011      C Section      Percutaneous Stone Extraction  Right      S/P Left Percutaneuos Stone extraction  01/26/2010, 04/20/2010        FAMILY HISTORY:    PAST SOCIAL HISTORY:    ALLERGIES & MEDICATIONS  --------------------------------------------------------------------------------  Allergies    No Known Allergies    Intolerances      Standing Inpatient Medications    PRN Inpatient Medications      REVIEW OF SYSTEMS  --------------------------------------------------------------------------------  All other systems were reviewed and are negative, except as noted.    VITALS/PHYSICAL EXAM  --------------------------------------------------------------------------------  T(C): 36.4 (11-22-24 @ 15:10), Max: 36.4 (11-22-24 @ 15:10)  HR: 87 (11-22-24 @ 15:10) (87 - 87)  BP: 169/102 (11-22-24 @ 15:10) (169/102 - 169/102)  RR: 18 (11-22-24 @ 15:10) (18 - 18)  SpO2: 97% (11-22-24 @ 15:10) (97% - 97%)  Wt(kg): --  Height (cm): 157.5 (11-22-24 @ 15:10)  Weight (kg): 59 (11-22-24 @ 15:10)  BMI (kg/m2): 23.8 (11-22-24 @ 15:10)  BSA (m2): 1.59 (11-22-24 @ 15:10)        Physical Exam:  	Gen: NAD  	HEENT: Anicteric  	Pulm: CTA B/L  	CV: S1S2+  	Abd: Soft, +BS    	Ext: +LE edema B/L  	Neuro: Awake  	Skin: Warm and dry    LABS/STUDIES  --------------------------------------------------------------------------------                Creatinine Trend:    Urinalysis - [05-01-24 @ 07:49]      Color  / Appearance  / SG  / pH       Gluc 100 / Ketone   / Bili  / Urobili        Blood  / Protein  / Leuk Est  / Nitrite       RBC  / WBC  / Hyaline  / Gran  / Sq Epi  / Non Sq Epi  / Bacteria       HBsAg Nonreact      [04-10-24 @ 07:11]  HCV 0.05, Nonreact      [04-09-24 @ 07:25]  HIV Nonreact      [04-09-24 @ 20:21]    Free Light Chains: kappa 5.19, lambda 2.81, ratio = 1.85      [04-10 @ 07:11]  Immunofixation Serum:   Weak  IgG Kappa Band Identified      Reference Range: None Detected      [04-10-24 @ 07:11]    Tacrolimus  Cyclosporine  Sirolimus  Mycophenolate  BK PCR  CMV PCR  Parvo PCR  EBV PCR [facility] DIVISION OF KIDNEY DISEASES AND HYPERTENSION -- [phone]  -- INITIAL CONSULT NOTE  --------------------------------------------------------------------------------  HPI:  73 yo woman with PMH of CKD iso b/l staghorn calculus s/p removal (2009) (follows with Dr. Elizondo), HFpEF with mod to severe MR and moderate pulmonary hypertension, A fib sent from nephrology office due to worsening renal fx.    Outpatient labs done (11/21) showed worsening Cr to 5.62 (from 4.6 in Aug 2024); BUN 90; SCO2 9; AG 28; K 5.1. Patient has been advised about needing dialysis in future and need for AVF during her recent outpatient visit, but patient is not ready.     Patient states she feels completely fine, denies any SOB, urinating at baseline. Has been missing some doses of her bicarb tables, otherwise no complaints.     Nephrology notified by outpatient nephrologist (Dr. Elizondo) for her worsening acidosis.       PAST HISTORY  --------------------------------------------------------------------------------  PAST MEDICAL & SURGICAL HISTORY:  Renal Calculus      Ureteral Calculus      Acute Renal Failure  9/2009      Wrist Fracture  CYNTHIA      Collar Bone Fracture      Rib Fractures      Kidney Stone removal  3/15/2011      C Section      Percutaneous Stone Extraction  Right      S/P Left Percutaneuos Stone extraction  01/26/2010, 04/20/2010        FAMILY HISTORY:    PAST SOCIAL HISTORY:    ALLERGIES & MEDICATIONS  --------------------------------------------------------------------------------  Allergies    No Known Allergies    Intolerances      Standing Inpatient Medications    PRN Inpatient Medications      REVIEW OF SYSTEMS  --------------------------------------------------------------------------------  All other systems were reviewed and are negative, except as noted.    VITALS/PHYSICAL EXAM  --------------------------------------------------------------------------------  T(C): 36.4 (11-22-24 @ 15:10), Max: 36.4 (11-22-24 @ 15:10)  HR: 87 (11-22-24 @ 15:10) (87 - 87)  BP: 169/102 (11-22-24 @ 15:10) (169/102 - 169/102)  RR: 18 (11-22-24 @ 15:10) (18 - 18)  SpO2: 97% (11-22-24 @ 15:10) (97% - 97%)  Wt(kg): --  Height (cm): 157.5 (11-22-24 @ 15:10)  Weight (kg): 59 (11-22-24 @ 15:10)  BMI (kg/m2): 23.8 (11-22-24 @ 15:10)  BSA (m2): 1.59 (11-22-24 @ 15:10)        Physical Exam:  	Gen: NAD  	HEENT: Anicteric  	Pulm: CTA B/L  	CV: S1S2+  	Abd: Soft, +BS    	Ext: +LE edema B/L  	Neuro: Awake  	Skin: Warm and dry    LABS/STUDIES  --------------------------------------------------------------------------------                Creatinine Trend:    Urinalysis - [05-01-24 @ 07:49]      Color  / Appearance  / SG  / pH       Gluc 100 / Ketone   / Bili  / Urobili        Blood  / Protein  / Leuk Est  / Nitrite       RBC  / WBC  / Hyaline  / Gran  / Sq Epi  / Non Sq Epi  / Bacteria       HBsAg Nonreact      [04-10-24 @ 07:11]  HCV 0.05, Nonreact      [04-09-24 @ 07:25]  HIV Nonreact      [04-09-24 @ 20:21]    Free Light Chains: kappa 5.19, lambda 2.81, ratio = 1.85      [04-10 @ 07:11]  Immunofixation Serum:   Weak  IgG Kappa Band Identified      Reference Range: None Detected      [04-10-24 @ 07:11]    Tacrolimus  Cyclosporine  Sirolimus  Mycophenolate  BK PCR  CMV PCR  Parvo PCR  EBV PCR

## 2024-11-22 NOTE — H&P ADULT - HISTORY OF PRESENT ILLNESS
74y F pmh HTN, afib on apixaban, CKD iso b/l staghorn calculus s/p removal (2009) (follows with Dr. Elizondo),  HFpEF with mod to severe MR and moderate pulmonary hypertension, coming from nephrology office due to worsening renal fx. Outpatient labs done (11/21) showed worsening Cr to 5.62 (from 4.6 in Aug 2024); BUN 90; SCO2 9; AG 28; K 5.1. Patient has been advised about needing dialysis in future and need for AVF during her recent outpatient visit, but patient is not ready.Patient states she feels completely fine.    ROS: Denies HA, CP, SOB, palpitation, N/V/D, fever, cough, chills, dizziness, abm pain, change in bowel or urinary habits   A 10-system ROS was performed and is negative except as noted above and/or in the HPI.    ED: labs consistent decreased renal function, metabolic acidosis. Started on bicarb gtt. Eval'd by Nephro    74y F pmh HTN, afib on apixaban, CKD iso b/l staghorn calculus s/p removal (2009) (follows with Dr. Elizondo),  HFpEF with mod to severe MR and moderate pulmonary hypertension, coming from nephrology office due to worsening renal fx. Outpatient labs done (11/21) showed worsening Cr to 5.62 (from 4.6 in Aug 2024); BUN 90; SCO2 9; AG 28; K 5.1. Patient has been advised about needing dialysis in future and need for AVF during her recent outpatient visit, but patient is not ready.Patient states she feels completely fine.    ROS: Denies HA, CP, SOB, palpitation, N/V/D, fever, cough, chills, dizziness, abm pain, change in bowel or urinary habits   A 10-system ROS was performed and is negative except as noted above and/or in the HPI.    ED: labs c/w decreased renal function, metabolic acidosis. Started on bicarb gtt. Eval'd by Nephro

## 2024-11-22 NOTE — CONSULT NOTE ADULT - ASSESSMENT
75 yo woman with PMH of CKD iso b/l staghorn calculus s/p removal (2009) (follows with Dr. Elizondo), HFpEF with mod to severe MR and moderate pulmonary hypertension, A fib sent from nephrology office due to worsening renal fx.        Advanced CKD; h/o b/l staghorn calculus s/p removal (2009) and HFpEF with mod to severe MR  Metabolic acidosis iso above   -Cr 4.84 (March 2024); 5.5 (Aug 2024); 4.6 (Aug 2024); 5.62 (Nov 21, 2024). CT A/p (Aug 2024) showed bilateral cortical atrophy.  -Pt reports urinating at baseline, no SOB but does have LE edema (which she thinks is at baseline).   Recs:  -Obtain UA, bladder scan  -Obtain CXR, if no c/f volume overload and if repeat labs showing acidosis then start patient on Bibcarb infusion 150 mEq @ 75cc/hr  -Discussed with patient about possible need for dialysis if no improvement with gentle fluids; patient states she is not ready and would like to avoid it as much as possible and as soon as her acidosis gets better will like to go home.       Anemia  -Hgb 9.7 (11/21/24) on outpatient labs  -Check iron studies and ferritin, will give PERLITA if sufficient iron stores       MBD  Check PTH and phos  Goal phos 3.5 - 5.5  Monitor phos daily        Scout Erazo  Nephrology Fellow  Feel free to contact me on TEAMS  After 5 pm and on weekends please contact the on-call Fellow.   75 yo woman with PMH of CKD iso b/l staghorn calculus s/p removal (2009) (follows with Dr. Elizondo), HFpEF with mod to severe MR and moderate pulmonary hypertension, A fib sent from nephrology office due to worsening renal fx.        Advanced CKD; h/o b/l staghorn calculus s/p removal (2009) and HFpEF with mod to severe MR  Metabolic acidosis iso above   -Cr 4.84 (March 2024); 5.5 (Aug 2024); 4.6 (Aug 2024); 5.62 (Nov 21, 2024). CT A/p (Aug 2024) showed bilateral cortical atrophy.  -Pt reports urinating at baseline, no SOB but does have LE edema (which she thinks is at baseline).   Recs:  -Obtain UA, bladder scan  -Obtain CXR, if no c/f volume overload and if repeat labs showing acidosis then start patient on Bibcarb infusion 150 mEq @ 75cc/hr  -Discussed with patient about possible need for dialysis if no improvement with gentle fluids; patient states she is not ready and would like to avoid it as much as possible and as soon as her acidosis gets better will like to go home. Please notify nephrology team if labs show any significant change from her recent outpatient labs.       Anemia  -Hgb 9.7 (11/21/24) on outpatient labs  -Check iron studies and ferritin, will give PERLITA if sufficient iron stores       MBD  Check PTH and phos  Goal phos 3.5 - 5.5  Monitor phos daily        Scout Erazo  Nephrology Fellow  Feel free to contact me on TEAMS  After 5 pm and on weekends please contact the on-call Fellow.

## 2024-11-22 NOTE — ED PROVIDER NOTE - OBJECTIVE STATEMENT
74 year woman with PMH of HTN, afib on apixaban, CKD iso b/l staghorn calculus s/p removal (2009) (follows with Dr. Elizondo),  HFpEF with mod to severe MR and moderate pulmonary hypertension, from nephrology office due to worsening renal fx.  Outpatient labs done (11/21) showed worsening Cr to 5.62 (from 4.6 in Aug 2024); BUN 90; SCO2 9; AG 28; K 5.1. Patient has been advised about needing dialysis in future and need for AVF during her recent outpatient visit, but patient is not ready.   Patient states she feels completely fine, she not had any changes with urinating, no hematuria, no s/s of UTI. Denies any SOB, chest pain, fever, chills    Has been missing some doses of her bicarb tables, otherwise no complaints.

## 2024-11-22 NOTE — ED ADULT NURSE NOTE - NSFALLUNIVINTERV_ED_ALL_ED
28-Dec-2019 09:23 Bed/Stretcher in lowest position, wheels locked, appropriate side rails in place/Call bell, personal items and telephone in reach/Instruct patient to call for assistance before getting out of bed/chair/stretcher/Non-slip footwear applied when patient is off stretcher/[place] to call system/Physically safe environment - no spills, clutter or unnecessary equipment/Purposeful proactive rounding/Room/bathroom lighting operational, light cord in reach

## 2024-11-22 NOTE — ED ADULT NURSE NOTE - OBJECTIVE STATEMENT
74 yr old female with h/o kidney disease and a.fib came in after she had routine blood work done and they were not sure what was off but they stated the "acid" in blood. denies feeling unwell. on assessment a and o x 3 lungs clear abd soft non tender no swelling in extremities no n/v/d no fevers no other complaints or issues.

## 2024-11-22 NOTE — H&P ADULT - ASSESSMENT
74y F pmh HTN, afib on apixaban, CKD iso b/l staghorn calculus s/p removal (2009) (follows with Dr. Elizondo),  HFpEF with mod to severe MR and moderate pulmonary hypertension, coming from nephrology office due to worsening renal fx being admitted

## 2024-11-22 NOTE — ED PROVIDER NOTE - TEMPLATE, MLM
[address] contacted and notified to schedule transport      Patricia Dallas RN  01/11/20 0031  Symptoms

## 2024-11-22 NOTE — H&P ADULT - NSHPLABSRESULTS_GEN_ALL_CORE
10.3   5.05  )-----------( 152      ( 22 Nov 2024 21:13 )             31.6       11-22    142  |  105  |  98[H]  ----------------------------<  97  4.5   |  13[L]  |  5.51[H]    Ca    7.7[L]      22 Nov 2024 21:13  Phos  6.4     11-22  Mg     2.2     11-22    TPro  7.2  /  Alb  4.5  /  TBili  0.3  /  DBili  x   /  AST  15  /  ALT  11  /  AlkPhos  120  11-22    Blood Gas Profile - Venous (11.22.24 @ 21:48)    pH, Venous: 7.19: No collection time indicated, please interpret with caution    pCO2, Venous: 39 mmHg    pO2, Venous: 40 mmHg    HCO3, Venous: 15 mmol/L    Base Excess, Venous: -12.5 mmol/L    Oxygen Saturation, Venous: 62.8 %    Total CO2, Venous: 16 mmol/L    Blood Gas Source Venous: Venous    Urinalysis (11.22.24 @ 21:22)    pH Urine: 6.5    Glucose Qualitative, Urine: Negative mg/dL    Blood, Urine: Small    Color: Yellow    Urine Appearance: Clear    Bilirubin: Negative    Ketone - Urine: Negative mg/dL    Specific Gravity: 1.012    Protein, Urine: 300 mg/dL    Urobilinogen: 0.2 mg/dL    Nitrite: Negative    Leukocyte Esterase Concentration: Trace    - - - - - - - - - - - - - - - - - - - - - - - - - - - - - - - - - - - - - - - - - - - - - - - - - - - -       EKG PERSONALLY REVIEWED:  Afib 112    IMAGES PERSONALLY REVIEWED:     < from: Xray Chest 1 View- PORTABLE-Urgent (Xray Chest 1 View- PORTABLE-Urgent .) (11.22.24 @ 21:08) >  IMPRESSION: No focal consolidation.

## 2024-11-22 NOTE — ED PROVIDER NOTE - PROGRESS NOTE DETAILS
Labs redemonstrate NAGMA and elevated creatinine. CXR negative for congestion, patient with some peripheral edema on exam but otherwise lungs clear. Will start bicarb drip and admit to medicine for further workup of NICO on CKD and NAGMA Labs redemonstrate HAGMA and elevated creatinine. CXR negative for congestion, patient with some peripheral edema on exam but otherwise lungs clear. Will start bicarb drip and admit to medicine for further workup of NICO on CKD and HAGMA

## 2024-11-22 NOTE — ED PROVIDER NOTE - CLINICAL SUMMARY MEDICAL DECISION MAKING FREE TEXT BOX
74 year woman with PMH of HTN, afib on apixaban, CKD iso b/l staghorn calculus s/p removal (2009) (follows with Dr. Elizondo),  HFpEF with mod to severe MR and moderate pulmonary hypertension, from nephrology office due to worsening renal fx. Outpatient labs done (11/21) showed worsening Cr to 5.62 (from 4.6 in Aug 2024); BUN 90; SCO2 9; AG 28; K 5.1. Concern for worsening CKD. Will obtain CMP, CBC, urine analysis, urine lights, VBG, and chest x-ray. 74 year woman with PMH of HTN, afib on apixaban, CKD iso b/l staghorn calculus s/p removal (2009) (follows with Dr. Elizondo),  HFpEF with mod to severe MR and moderate pulmonary hypertension, from nephrology office due to worsening renal fx. Outpatient labs done (11/21) showed worsening Cr to 5.62 (from 4.6 in Aug 2024); BUN 90; SCO2 9; AG 28; K 5.1. Concern for worsening CKD. Will obtain CMP, CBC, urine analysis, urine lights, VBG, and chest x-ray.N nephrology consultation ,admission ZR

## 2024-11-22 NOTE — H&P ADULT - PROBLEM SELECTOR PLAN 1
-Hx/o CKD iso b/l staghorn calculus s/p removal (2009) (follows with Dr. Elizondo)  - Found to have worsening renal function in nephrology. Outpatient labs done (11/21) showed worsening Cr to 5.62 (from 4.6 in Aug 2024); BUN 90; SCO2 9; AG 28; K 5.1  - Here Cr 5.5.   - Also w/ metabolic acidosis   - Started on bicarb gtt   - Outpatient nephro has advised patient on need for dialysis in the future  & AVF during her recent outpatient visit, but patient is not ready, and is declining dialysis  - Trend lab, monitor renal function, renally dose meds  - Nephro consulted, apprec rec. Further management per nephro

## 2024-11-22 NOTE — H&P ADULT - NSHPPHYSICALEXAM_GEN_ALL_CORE
T(C): 36.8 (11-23-24 @ 00:44), Max: 36.8 (11-23-24 @ 00:44)  HR: 100 (11-23-24 @ 00:45) (83 - 100)  BP: 139/84 (11-23-24 @ 00:44) (139/84 - 169/102)  RR: 18 (11-23-24 @ 00:44) (18 - 18)  SpO2: 97% (11-23-24 @ 00:44) (97% - 98%)    CONSTITUTIONAL: Well groomed, no apparent distress  EYES: PERRLA , EOMI  ENMT: MMM. Normal dentition  RESP: No respiratory distress, CTA b/l  CV: +irregular, no peripheral edema  GI: Soft, NTND  MSK: Normal gait, normal pain free ROM x4 extremities   SKIN: Chronic old healing ecchymosis on hand / arm  PSYCH: A+O x 3, mood and affect appropriate

## 2024-11-22 NOTE — CONSULT NOTE ADULT - ATTENDING COMMENTS
Patient seen and examined by me on morning of 11/23.  Sitting at side of bed, lungs CTA b/l, denies uremic symptoms, no asterixis, states she is urinating without difficulties.    Agree with Fellow A/P as above.    Metabolic acidosis noted on admission labs with bicarb of 13, now improved to 15 s/p initiation of bicarb gtt.  Would continue bicarb gtt as ordered for now.  Creatinine also slightly downtrending/stable.    No urgent indication for renal replacement therapy.    Please page the renal fellow on call if further input needed through the day and overnight.  Nephrology to continue to follow with you. Patient seen and examined by me on morning of 11/23.  Sitting at side of bed, lungs CTA b/l, denies uremic symptoms, no asterixis, states she is urinating without difficulties.    Agree with Fellow A/P as above.    Metabolic acidosis noted on admission labs with bicarb of 13, now improved to 15 s/p initiation of bicarb gtt.  Would continue bicarb gtt as ordered for now.  Creatinine also slightly downtrending/stable.    Phos noted to be elevated, will start Renvela 800mg PO 3x/day  Iron studies noted, Tsat borderline.  Start ferrous sulfate 325mg PO daily.  Will also dose Epogen 10,000Units SQ x 1.    No urgent indication for renal replacement therapy.    Please page the renal fellow on call if further input needed through the day and overnight.  Nephrology to continue to follow with you.

## 2024-11-23 DIAGNOSIS — I48.0 PAROXYSMAL ATRIAL FIBRILLATION: ICD-10-CM

## 2024-11-23 DIAGNOSIS — I10 ESSENTIAL (PRIMARY) HYPERTENSION: ICD-10-CM

## 2024-11-23 DIAGNOSIS — D64.9 ANEMIA, UNSPECIFIED: ICD-10-CM

## 2024-11-23 DIAGNOSIS — E87.20 ACIDOSIS, UNSPECIFIED: ICD-10-CM

## 2024-11-23 DIAGNOSIS — I50.32 CHRONIC DIASTOLIC (CONGESTIVE) HEART FAILURE: ICD-10-CM

## 2024-11-23 DIAGNOSIS — N18.4 CHRONIC KIDNEY DISEASE, STAGE 4 (SEVERE): ICD-10-CM

## 2024-11-23 LAB
ANION GAP SERPL CALC-SCNC: 20 MMOL/L — HIGH (ref 5–17)
BUN SERPL-MCNC: 97 MG/DL — HIGH (ref 7–23)
CALCIUM SERPL-MCNC: 7.2 MG/DL — LOW (ref 8.4–10.5)
CALCIUM SERPL-MCNC: 8.1 MG/DL — LOW (ref 8.4–10.5)
CHLORIDE SERPL-SCNC: 108 MMOL/L — SIGNIFICANT CHANGE UP (ref 96–108)
CO2 SERPL-SCNC: 15 MMOL/L — LOW (ref 22–31)
CREAT SERPL-MCNC: 5.46 MG/DL — HIGH (ref 0.5–1.3)
EGFR: 8 ML/MIN/1.73M2 — LOW
FERRITIN SERPL-MCNC: 101 NG/ML — SIGNIFICANT CHANGE UP (ref 13–330)
GAS PNL BLDV: SIGNIFICANT CHANGE UP
GLUCOSE SERPL-MCNC: 99 MG/DL — SIGNIFICANT CHANGE UP (ref 70–99)
HCT VFR BLD CALC: 27.1 % — LOW (ref 34.5–45)
HGB BLD-MCNC: 8.7 G/DL — LOW (ref 11.5–15.5)
INR BLD: 1.07 RATIO — SIGNIFICANT CHANGE UP (ref 0.85–1.16)
IRON SATN MFR SERPL: 20 % — SIGNIFICANT CHANGE UP (ref 14–50)
IRON SATN MFR SERPL: 61 UG/DL — SIGNIFICANT CHANGE UP (ref 30–160)
MAGNESIUM SERPL-MCNC: 2 MG/DL — SIGNIFICANT CHANGE UP (ref 1.6–2.6)
MCHC RBC-ENTMCNC: 30.1 PG — SIGNIFICANT CHANGE UP (ref 27–34)
MCHC RBC-ENTMCNC: 32.1 G/DL — SIGNIFICANT CHANGE UP (ref 32–36)
MCV RBC AUTO: 93.8 FL — SIGNIFICANT CHANGE UP (ref 80–100)
NRBC # BLD: 0 /100 WBCS — SIGNIFICANT CHANGE UP (ref 0–0)
PHOSPHATE SERPL-MCNC: 6 MG/DL — HIGH (ref 2.5–4.5)
PLATELET # BLD AUTO: 132 K/UL — LOW (ref 150–400)
POTASSIUM SERPL-MCNC: 3.9 MMOL/L — SIGNIFICANT CHANGE UP (ref 3.5–5.3)
POTASSIUM SERPL-SCNC: 3.9 MMOL/L — SIGNIFICANT CHANGE UP (ref 3.5–5.3)
PROTHROM AB SERPL-ACNC: 12.3 SEC — SIGNIFICANT CHANGE UP (ref 9.9–13.4)
PTH-INTACT FLD-MCNC: 595 PG/ML — HIGH (ref 15–65)
RBC # BLD: 2.89 M/UL — LOW (ref 3.8–5.2)
RBC # FLD: 12.9 % — SIGNIFICANT CHANGE UP (ref 10.3–14.5)
SODIUM SERPL-SCNC: 143 MMOL/L — SIGNIFICANT CHANGE UP (ref 135–145)
TIBC SERPL-MCNC: 301 UG/DL — SIGNIFICANT CHANGE UP (ref 220–430)
UIBC SERPL-MCNC: 240 UG/DL — SIGNIFICANT CHANGE UP (ref 110–370)
UUN UR-MCNC: 290 MG/DL — SIGNIFICANT CHANGE UP
WBC # BLD: 3.95 K/UL — SIGNIFICANT CHANGE UP (ref 3.8–10.5)
WBC # FLD AUTO: 3.95 K/UL — SIGNIFICANT CHANGE UP (ref 3.8–10.5)

## 2024-11-23 PROCEDURE — 99222 1ST HOSP IP/OBS MODERATE 55: CPT | Mod: GC

## 2024-11-23 PROCEDURE — 99232 SBSQ HOSP IP/OBS MODERATE 35: CPT

## 2024-11-23 RX ORDER — MAGNESIUM, ALUMINUM HYDROXIDE 200-225/5
30 SUSPENSION, ORAL (FINAL DOSE FORM) ORAL EVERY 4 HOURS
Refills: 0 | Status: DISCONTINUED | OUTPATIENT
Start: 2024-11-23 | End: 2024-11-24

## 2024-11-23 RX ORDER — METOPROLOL TARTRATE 100 MG/1
50 TABLET, FILM COATED ORAL DAILY
Refills: 0 | Status: DISCONTINUED | OUTPATIENT
Start: 2024-11-23 | End: 2024-11-24

## 2024-11-23 RX ORDER — ERYTHROPOIETIN 3000 [IU]/ML
10000 INJECTION, SOLUTION INTRAVENOUS; SUBCUTANEOUS ONCE
Refills: 0 | Status: COMPLETED | OUTPATIENT
Start: 2024-11-23 | End: 2024-11-23

## 2024-11-23 RX ORDER — SEVELAMER CARBONATE 800 MG/1
800 TABLET, FILM COATED ORAL
Refills: 0 | Status: DISCONTINUED | OUTPATIENT
Start: 2024-11-23 | End: 2024-11-24

## 2024-11-23 RX ORDER — ONDANSETRON HYDROCHLORIDE 4 MG/1
4 TABLET, FILM COATED ORAL EVERY 8 HOURS
Refills: 0 | Status: DISCONTINUED | OUTPATIENT
Start: 2024-11-23 | End: 2024-11-24

## 2024-11-23 RX ORDER — ACETAMINOPHEN 500MG 500 MG/1
650 TABLET, COATED ORAL EVERY 6 HOURS
Refills: 0 | Status: DISCONTINUED | OUTPATIENT
Start: 2024-11-23 | End: 2024-11-24

## 2024-11-23 RX ORDER — AMLODIPINE BESYLATE 10 MG/1
5 TABLET ORAL DAILY
Refills: 0 | Status: DISCONTINUED | OUTPATIENT
Start: 2024-11-23 | End: 2024-11-24

## 2024-11-23 RX ORDER — INFLUENZA VIRUS VACCINE 15; 15; 15; 15 UG/.5ML; UG/.5ML; UG/.5ML; UG/.5ML
0.5 SUSPENSION INTRAMUSCULAR ONCE
Refills: 0 | Status: DISCONTINUED | OUTPATIENT
Start: 2024-11-23 | End: 2024-11-24

## 2024-11-23 RX ORDER — AMLODIPINE BESYLATE 10 MG/1
1 TABLET ORAL
Refills: 0 | DISCHARGE

## 2024-11-23 RX ORDER — ACETAMINOPHEN, DIPHENHYDRAMINE HCL, PHENYLEPHRINE HCL 325; 25; 5 MG/1; MG/1; MG/1
3 TABLET ORAL AT BEDTIME
Refills: 0 | Status: DISCONTINUED | OUTPATIENT
Start: 2024-11-23 | End: 2024-11-24

## 2024-11-23 RX ORDER — APIXABAN 2.5 MG/1
2.5 TABLET, FILM COATED ORAL
Refills: 0 | Status: DISCONTINUED | OUTPATIENT
Start: 2024-11-23 | End: 2024-11-24

## 2024-11-23 RX ORDER — METOPROLOL TARTRATE 100 MG/1
1 TABLET, FILM COATED ORAL
Refills: 0 | DISCHARGE

## 2024-11-23 RX ORDER — FERROUS SULFATE 325(65) MG
325 TABLET ORAL DAILY
Refills: 0 | Status: DISCONTINUED | OUTPATIENT
Start: 2024-11-23 | End: 2024-11-24

## 2024-11-23 RX ADMIN — SEVELAMER CARBONATE 800 MILLIGRAM(S): 800 TABLET, FILM COATED ORAL at 13:39

## 2024-11-23 RX ADMIN — SEVELAMER CARBONATE 800 MILLIGRAM(S): 800 TABLET, FILM COATED ORAL at 16:53

## 2024-11-23 RX ADMIN — METOPROLOL TARTRATE 50 MILLIGRAM(S): 100 TABLET, FILM COATED ORAL at 05:34

## 2024-11-23 RX ADMIN — APIXABAN 2.5 MILLIGRAM(S): 2.5 TABLET, FILM COATED ORAL at 17:12

## 2024-11-23 RX ADMIN — APIXABAN 2.5 MILLIGRAM(S): 2.5 TABLET, FILM COATED ORAL at 05:34

## 2024-11-23 RX ADMIN — Medication 325 MILLIGRAM(S): at 13:40

## 2024-11-23 RX ADMIN — ERYTHROPOIETIN 10000 UNIT(S): 3000 INJECTION, SOLUTION INTRAVENOUS; SUBCUTANEOUS at 17:25

## 2024-11-23 RX ADMIN — AMLODIPINE BESYLATE 5 MILLIGRAM(S): 10 TABLET ORAL at 05:33

## 2024-11-23 RX ADMIN — Medication 100 GRAM(S): at 09:30

## 2024-11-23 NOTE — PHYSICAL THERAPY INITIAL EVALUATION ADULT - PERTINENT HX OF CURRENT PROBLEM, REHAB EVAL
as per chart review: pmh HTN, afib on apixaban, CKD iso b/l staghorn calculus s/p removal (2009) (follows with Dr. Elizondo),  HFpEF with mod to severe MR and moderate pulmonary hypertension, coming from nephrology office due to worsening renal fx being admitted; Advanced CKD; h/o b/l staghorn calculus s/p removal (2009) and HFpEF with mod to severe MR, Metabolic acidosis iso above  TESTS: 11/22 CXR: no focal consolidation

## 2024-11-23 NOTE — CONSULT NOTE ADULT - ASSESSMENT
74y F pmh HTN, afib on apixaban, CKD iso b/l staghorn calculus s/p removal (2009) (follows with Dr. Elizondo),  HFpEF with mod to severe MR and moderate pulmonary hypertension, coming from nephrology office due to worsening renal fx being admitted           # Stage 4 chronic kidney disease.   ·  Plan: -Hx/o CKD iso b/l staghorn calculus s/p removal (2009) (follows with Dr. Elizondo)  - Found to have worsening renal function in nephrology. Outpatient labs done (11/21) showed worsening Cr to 5.62 (from 4.6 in Aug 2024); BUN 90; SCO2 9; AG 28; K 5.1  - Here Cr 5.5-->5.46  -On Bicarb gtt  -May need RRT      # Paroxysmal atrial fibrillation.   ·  Plan: - Eliquis (renal dose)  - Metoprolol for rate control    # Chronic diastolic heart failure.   ·  Plan: - Clinically euvolemic   - EKG:  Afib  -TTE Normal LV Function Moderate MR

## 2024-11-23 NOTE — CONSULT NOTE ADULT - TIME BILLING
- Review of records, telemetry, vital signs and daily labs.   - General and cardiovascular physical examination.  - Generation of cardiovascular treatment plan and completion of note .  - Coordination of care.      Patient was seen and examined by me on  11/23/2024 ,interim events noted,labs and radiology studies reviewed.  Aki Carias MD,FACC.  [address] [facility].  [phone]

## 2024-11-23 NOTE — CONSULT NOTE ADULT - SUBJECTIVE AND OBJECTIVE BOX
MR:- [id number] :  NAME:-ADRIENNE PARSONS:-    DATE OF SERVICE:11-23-24 @ 08:33    Patient was seen,examined and evaluated  by Aki Carias MD ib92-04-95 @ 08:33 .  ER evaluation, Labs and Hospital course was reviewed,         Covering for [facility] -Jorje Bates, Heidy, Alverto Green Savella, Cohen  Office Number: [phone]      CHIEF COMPLAINT:Renal Failure    HPI:HPI:  74y F pmh HTN, afib on apixaban, CKD iso b/l staghorn calculus s/p removal (2009) (follows with Dr. Elizondo),  HFpEF with mod to severe MR and moderate pulmonary hypertension, coming from nephrology office due to worsening renal fx. Outpatient labs done (11/21) showed worsening Cr to 5.62 (from 4.6 in Aug 2024); BUN 90; SCO2 9; AG 28; K 5.1. Patient has been advised about needing dialysis in future and need for AVF during her recent outpatient visit, but patient is not ready.Patient states she feels completely fine.    ROS: Denies HA, CP, SOB, palpitation, N/V/D, fever, cough, chills, dizziness, abm pain, change in bowel or urinary habits   A 10-system ROS was performed and is negative except as noted above and/or in the HPI.    ED: labs c/w decreased renal function, metabolic acidosis. Started on bicarb gtt. Eval'd by Nephro    (22 Nov 2024 22:50)        CARDIAC HISTORY:  [ ] CAD [ [PCI [ ] CABG [ ] Prior Cath  [x ] Atrial Fibrillation  Devices[ ] PPM [ ] ICD [ ]ILR  Heart Failure [ ] HFrEF [x ] HFpEF    PAST MEDICAL & SURGICAL HISTORY:  Renal Calculus      Ureteral Calculus      Acute Renal Failure  9/2009      Wrist Fracture  CYNTHIA      Collar Bone Fracture      Rib Fractures      Kidney Stone removal  3/15/2011      C Section      Percutaneous Stone Extraction  Right      S/P Left Percutaneuos Stone extraction  01/26/2010, 04/20/2010        Home Medications:   * Patient Currently Takes Medications as of 23-Nov-2024 01:02 documented in Structured Notes  · 	apixaban 5 mg oral tablet: Last Dose Taken:  , 1 tab(s) orally 2 times a day  · 	amiodarone 200 mg oral tablet: Last Dose Taken:  , Take 2 tabs (400 mg) orally twice daily through 5/3, then 1 tab (200 mg) twice daily for 7 days, then 1 tab once daily thereafter  · 	metoprolol succinate 50 mg oral tablet, extended release: Last Dose Taken:  , 1 tab(s) orally once a day  · 	sodium bicarbonate: Last Dose Taken:  , 1 tab(s) orally once a day  · 	amLODIPine 5 mg oral tablet: Last Dose Taken:  , 1 tab(s) orally once a day          MEDICATIONS  (STANDING):  amLODIPine   Tablet 5 milliGRAM(s) Oral daily  apixaban 2.5 milliGRAM(s) Oral two times a day  influenza  Vaccine (HIGH DOSE) 0.5 milliLiter(s) IntraMuscular once  metoprolol succinate ER 50 milliGRAM(s) Oral daily  sodium bicarbonate  Infusion 0.191 mEq/kG/Hr (75 mL/Hr) IV Continuous <Continuous>    MEDICATIONS  (PRN):  acetaminophen     Tablet .. 650 milliGRAM(s) Oral every 6 hours PRN Temp greater or equal to 38C (100.4F), Mild Pain (1 - 3)  aluminum hydroxide/magnesium hydroxide/simethicone Suspension 30 milliLiter(s) Oral every 4 hours PRN Dyspepsia  melatonin 3 milliGRAM(s) Oral at bedtime PRN Insomnia  ondansetron Injectable 4 milliGRAM(s) IV Push every 8 hours PRN Nausea and/or Vomiting      FAMILY HISTORY:    No family history of premature coronary artery disease or sudden cardiac death    SOCIAL HISTORY:  Smoking-[ ] Active  [ ] Former [x ] Non Smoker  Alcohol-[x ] Denies [ ] Social [ ] Daily  Ilicit Drug use-[x Denies [ ] Active user    REVIEW OF SYSTEMS:  Constitutional: [ ] fever, [ ]weight loss, [ x]fatigue   Activity [ ] Bedbound,[x ] Ambulates [x ] Unassisted[ ] Cane/Walker [ ] Assistence.  Effort tolerance:[ ] Excellent [ ] Good [ ] Fair [x ] Poor [ ]  Eyes: [ ] visual changes  Respiratory: [ ]shortness of breath;  [ ] cough, [ ]wheezing, [ ]chills, [ ]hemoptysis  Cardiovascular: [ ] chest pain, [ ]palpitations, [ ]dizziness,  [ ]leg swelling[ ]orthopnea [ ]PND  Gastrointestinal: [ ] abdominal pain, [ ]nausea, [ ]vomiting,  [ ]diarrhea,[ ]constipation  Genitourinary: [ ] dysuria, [ ] hematuria  Neurologic: [ ] headaches [ ] tremors[ ] weakness  Skin: [ ] itching, [ ]burning, [ ] rashes  Endocrine: [ ] heat or cold intolerance  Musculoskeletal: [ ] joint pain or swelling; [ ] muscle, back, or extremity pain  Psychiatric: [ ] depression, [ ]anxiety, [ ]mood swings, or [ ]difficulty sleeping  Hematologic: [ ] easy bruising, [ ] bleeding gums       [ x] All others negative	  [ ] Unable to obtain    Vital Signs Last 24 Hrs  T(C): 36.3 (23 Nov 2024 04:37), Max: 36.8 (23 Nov 2024 00:44)  T(F): 97.3 (23 Nov 2024 04:37), Max: 98.2 (23 Nov 2024 00:44)  HR: 96 (23 Nov 2024 04:37) (83 - 100)  BP: 142/93 (23 Nov 2024 04:37) (139/84 - 169/102)  RR: 18 (23 Nov 2024 04:37) (18 - 18)  SpO2: 96% (23 Nov 2024 04:37) (96% - 98%)    Parameters below as of 23 Nov 2024 04:37  Patient On (Oxygen Delivery Method): room air      I&O's Summary      PHYSICAL EXAM:  General: No acute distress BMI-27  HEENT: EOMI, PERRL[ ] Icteric  Neck: Supple, [ ] JVD  Lungs: Equal air entry bilaterally; [ ] Rales [ ] Rhonchi [ ] Wheezing  Heart: Regular rate and rhythm;[x ] Murmurs-  2 /6 [x Systolic [ ] Diastolic [ ] Radiation,No rubs, or gallops  Abdomen: Nontender, bowel sounds present  Extremities: No clubbing, cyanosis, [ ] edema[ ] Calf tenderness  Nervous system:  Alert & Oriented X3, no focal deficits  Psychiatric: Normal affect  Skin: No rashes or lesions      LABS:  11-23    143  |  108  |  97[H]  ----------------------------<  99  3.9   |  15[L]  |  5.46[H]    Ca    7.2[L]      23 Nov 2024 07:03  Phos  6.0     11-23  Mg     2.0     11-23    TPro  7.2  /  Alb  4.5  /  TBili  0.3  /  DBili  x   /  AST  15  /  ALT  11  /  AlkPhos  120  11-22    Creatinine Trend: 5.46<--, 5.51<--                        8.7    3.95  )-----------( 132      ( 23 Nov 2024 07:04 )             27.1     PT/INR - ( 23 Nov 2024 07:04 )   PT: 12.3 sec;   INR: 1.07 ratio        W or WO Ultrasound Enhancing Agent (04.26.24 @ 09:45) >  CONCLUSIONS:      1. Left ventricular wall thickness is normal. Left ventricular systolic function is normal with an ejection fraction of 76 % by 3D. There are no regional wall motion abnormalities seen.   2. No evidence of left atrial or left atrial appendage thrombus. Ultrasound enhancing agent was utilized to visualize the left atrial appendage.   3. The left atrium is severely dilated.   4. Moderate mitral regurgitation at a blood pressure of 132/71 mmHg. The mitral regurgitant jet is centrally directed. Mechanism of mitral regurgitation: Apryl Type I (normal leaflet motion with dilated annulus). PISA : later pisa radius 0.4, medial 0.37, Alisaing velocity 38.5cm/sec MR VTI 136cm MR Vmax 548 cm/s. The ERO 0.3sqcm and Reg. Vol 38cc. 3D vena contracta area was 0.35sqcm.   5. No pericardial effusion seen.   6. Normal right ventricular cavity size, with normal wall thickness, and normal systolic function.       Xray Chest 1 View- PORTABLE-Urgent (Xray Chest 1 View- PORTABLE-Urgent .) (11.22.24 @ 21:08) >  FINDINGS:    The heart size is borderline.  Mild pulmonary congestion.  No focal consolidation.  No pneumothorax.    IMPRESSION:  No focal consolidation.     ECG   SINUS RHYTHM WITH OCCASIONAL PREMATURE VENTRICULAR COMPLEXES AND PREMATURE ATRIAL COMPLEXES  NONSPECIFIC T WAVE ABNORMALITY  ABNORMAL ECG

## 2024-11-24 VITALS
SYSTOLIC BLOOD PRESSURE: 147 MMHG | DIASTOLIC BLOOD PRESSURE: 94 MMHG | TEMPERATURE: 99 F | RESPIRATION RATE: 18 BRPM | HEART RATE: 107 BPM | OXYGEN SATURATION: 98 %

## 2024-11-24 LAB
ANION GAP SERPL CALC-SCNC: 19 MMOL/L — HIGH (ref 5–17)
BUN SERPL-MCNC: 82 MG/DL — HIGH (ref 7–23)
CALCIUM SERPL-MCNC: 7.3 MG/DL — LOW (ref 8.4–10.5)
CHLORIDE SERPL-SCNC: 106 MMOL/L — SIGNIFICANT CHANGE UP (ref 96–108)
CO2 SERPL-SCNC: 21 MMOL/L — LOW (ref 22–31)
CREAT SERPL-MCNC: 5.47 MG/DL — HIGH (ref 0.5–1.3)
EGFR: 8 ML/MIN/1.73M2 — LOW
GLUCOSE SERPL-MCNC: 110 MG/DL — HIGH (ref 70–99)
HCT VFR BLD CALC: 28.2 % — LOW (ref 34.5–45)
HGB BLD-MCNC: 9.1 G/DL — LOW (ref 11.5–15.5)
MAGNESIUM SERPL-MCNC: 2 MG/DL — SIGNIFICANT CHANGE UP (ref 1.6–2.6)
MCHC RBC-ENTMCNC: 30.6 PG — SIGNIFICANT CHANGE UP (ref 27–34)
MCHC RBC-ENTMCNC: 32.3 G/DL — SIGNIFICANT CHANGE UP (ref 32–36)
MCV RBC AUTO: 94.9 FL — SIGNIFICANT CHANGE UP (ref 80–100)
NRBC # BLD: 0 /100 WBCS — SIGNIFICANT CHANGE UP (ref 0–0)
PHOSPHATE SERPL-MCNC: 5 MG/DL — HIGH (ref 2.5–4.5)
PLATELET # BLD AUTO: 132 K/UL — LOW (ref 150–400)
POTASSIUM SERPL-MCNC: 3.4 MMOL/L — LOW (ref 3.5–5.3)
POTASSIUM SERPL-SCNC: 3.4 MMOL/L — LOW (ref 3.5–5.3)
RBC # BLD: 2.97 M/UL — LOW (ref 3.8–5.2)
RBC # FLD: 12.8 % — SIGNIFICANT CHANGE UP (ref 10.3–14.5)
SODIUM SERPL-SCNC: 146 MMOL/L — HIGH (ref 135–145)
WBC # BLD: 4.55 K/UL — SIGNIFICANT CHANGE UP (ref 3.8–10.5)
WBC # FLD AUTO: 4.55 K/UL — SIGNIFICANT CHANGE UP (ref 3.8–10.5)

## 2024-11-24 PROCEDURE — 82310 ASSAY OF CALCIUM: CPT

## 2024-11-24 PROCEDURE — 84100 ASSAY OF PHOSPHORUS: CPT

## 2024-11-24 PROCEDURE — 82435 ASSAY OF BLOOD CHLORIDE: CPT

## 2024-11-24 PROCEDURE — 82330 ASSAY OF CALCIUM: CPT

## 2024-11-24 PROCEDURE — 85018 HEMOGLOBIN: CPT

## 2024-11-24 PROCEDURE — 84300 ASSAY OF URINE SODIUM: CPT

## 2024-11-24 PROCEDURE — 83540 ASSAY OF IRON: CPT

## 2024-11-24 PROCEDURE — 97161 PT EVAL LOW COMPLEX 20 MIN: CPT

## 2024-11-24 PROCEDURE — 85014 HEMATOCRIT: CPT

## 2024-11-24 PROCEDURE — 83935 ASSAY OF URINE OSMOLALITY: CPT

## 2024-11-24 PROCEDURE — 80048 BASIC METABOLIC PNL TOTAL CA: CPT

## 2024-11-24 PROCEDURE — 93005 ELECTROCARDIOGRAM TRACING: CPT

## 2024-11-24 PROCEDURE — 82803 BLOOD GASES ANY COMBINATION: CPT

## 2024-11-24 PROCEDURE — 84295 ASSAY OF SERUM SODIUM: CPT

## 2024-11-24 PROCEDURE — 82728 ASSAY OF FERRITIN: CPT

## 2024-11-24 PROCEDURE — 85610 PROTHROMBIN TIME: CPT

## 2024-11-24 PROCEDURE — 82947 ASSAY GLUCOSE BLOOD QUANT: CPT

## 2024-11-24 PROCEDURE — 80053 COMPREHEN METABOLIC PANEL: CPT

## 2024-11-24 PROCEDURE — 83970 ASSAY OF PARATHORMONE: CPT

## 2024-11-24 PROCEDURE — 83605 ASSAY OF LACTIC ACID: CPT

## 2024-11-24 PROCEDURE — 83735 ASSAY OF MAGNESIUM: CPT

## 2024-11-24 PROCEDURE — 83550 IRON BINDING TEST: CPT

## 2024-11-24 PROCEDURE — 84540 ASSAY OF URINE/UREA-N: CPT

## 2024-11-24 PROCEDURE — 71045 X-RAY EXAM CHEST 1 VIEW: CPT

## 2024-11-24 PROCEDURE — 84133 ASSAY OF URINE POTASSIUM: CPT

## 2024-11-24 PROCEDURE — 96374 THER/PROPH/DIAG INJ IV PUSH: CPT

## 2024-11-24 PROCEDURE — 85027 COMPLETE CBC AUTOMATED: CPT

## 2024-11-24 PROCEDURE — 99232 SBSQ HOSP IP/OBS MODERATE 35: CPT | Mod: GC

## 2024-11-24 PROCEDURE — 81001 URINALYSIS AUTO W/SCOPE: CPT

## 2024-11-24 PROCEDURE — 82570 ASSAY OF URINE CREATININE: CPT

## 2024-11-24 PROCEDURE — 99285 EMERGENCY DEPT VISIT HI MDM: CPT | Mod: 25

## 2024-11-24 PROCEDURE — 84132 ASSAY OF SERUM POTASSIUM: CPT

## 2024-11-24 PROCEDURE — 84156 ASSAY OF PROTEIN URINE: CPT

## 2024-11-24 PROCEDURE — 99239 HOSP IP/OBS DSCHRG MGMT >30: CPT

## 2024-11-24 RX ORDER — SODIUM BICARBONATE 1 MEQ/ML
1 SYRINGE (ML) INTRAVENOUS
Qty: 90 | Refills: 0 | DISCHARGE
Start: 2024-11-24 | End: 2024-12-23

## 2024-11-24 RX ORDER — SODIUM BICARBONATE 84 MG/ML
1 INJECTION, SOLUTION INTRAVENOUS
Qty: 0 | Refills: 0 | DISCHARGE
Start: 2024-11-24

## 2024-11-24 RX ORDER — LYSINE HCL 500 MG
1 TABLET ORAL
Qty: 30 | Refills: 0
Start: 2024-11-24 | End: 2024-12-23

## 2024-11-24 RX ORDER — SEVELAMER HYDROCHLORIDE 800 MG/1
1 TABLET ORAL
Qty: 90 | Refills: 0 | DISCHARGE
Start: 2024-11-24 | End: 2024-12-23

## 2024-11-24 RX ORDER — SODIUM BICARBONATE 84 MG/ML
650 INJECTION, SOLUTION INTRAVENOUS THREE TIMES A DAY
Refills: 0 | Status: DISCONTINUED | OUTPATIENT
Start: 2024-11-24 | End: 2024-11-24

## 2024-11-24 RX ORDER — FERROUS SULFATE 137(45) MG
1 TABLET, EXTENDED RELEASE ORAL
Qty: 30 | Refills: 0 | DISCHARGE
Start: 2024-11-24 | End: 2024-12-23

## 2024-11-24 RX ORDER — SEVELAMER CARBONATE 800 MG/1
1 TABLET, FILM COATED ORAL
Qty: 0 | Refills: 0 | DISCHARGE
Start: 2024-11-24

## 2024-11-24 RX ORDER — LYSINE HCL 500 MG
1 TABLET ORAL DAILY
Refills: 0 | Status: DISCONTINUED | OUTPATIENT
Start: 2024-11-24 | End: 2024-11-24

## 2024-11-24 RX ORDER — FERROUS SULFATE 325(65) MG
1 TABLET ORAL
Qty: 30 | Refills: 0
Start: 2024-11-24 | End: 2024-12-23

## 2024-11-24 RX ORDER — SEVELAMER CARBONATE 800 MG/1
1 TABLET, FILM COATED ORAL
Qty: 90 | Refills: 0
Start: 2024-11-24 | End: 2024-12-23

## 2024-11-24 RX ORDER — AMIODARONE HYDROCHLORIDE 200 MG/1
1 TABLET ORAL
Qty: 0 | Refills: 0 | DISCHARGE
Start: 2024-11-24

## 2024-11-24 RX ORDER — POTASSIUM CHLORIDE 600 MG/1
40 TABLET, EXTENDED RELEASE ORAL ONCE
Refills: 0 | Status: COMPLETED | OUTPATIENT
Start: 2024-11-24 | End: 2024-11-24

## 2024-11-24 RX ORDER — SODIUM BICARBONATE 84 MG/ML
1 INJECTION, SOLUTION INTRAVENOUS
Refills: 0 | DISCHARGE

## 2024-11-24 RX ORDER — SODIUM BICARBONATE 84 MG/ML
1 INJECTION, SOLUTION INTRAVENOUS
Qty: 90 | Refills: 0
Start: 2024-11-24 | End: 2024-12-23

## 2024-11-24 RX ORDER — LYSINE HCL 500 MG
1 TABLET ORAL
Qty: 0 | Refills: 0 | DISCHARGE
Start: 2024-11-24

## 2024-11-24 RX ORDER — POTASSIUM CHLORIDE 600 MG/1
40 TABLET, EXTENDED RELEASE ORAL EVERY 4 HOURS
Refills: 0 | Status: DISCONTINUED | OUTPATIENT
Start: 2024-11-24 | End: 2024-11-24

## 2024-11-24 RX ADMIN — SODIUM BICARBONATE 650 MILLIGRAM(S): 84 INJECTION, SOLUTION INTRAVENOUS at 13:32

## 2024-11-24 RX ADMIN — AMLODIPINE BESYLATE 5 MILLIGRAM(S): 10 TABLET ORAL at 05:27

## 2024-11-24 RX ADMIN — Medication 325 MILLIGRAM(S): at 11:47

## 2024-11-24 RX ADMIN — Medication 100 GRAM(S): at 09:51

## 2024-11-24 RX ADMIN — APIXABAN 2.5 MILLIGRAM(S): 2.5 TABLET, FILM COATED ORAL at 05:27

## 2024-11-24 RX ADMIN — SEVELAMER CARBONATE 800 MILLIGRAM(S): 800 TABLET, FILM COATED ORAL at 11:46

## 2024-11-24 RX ADMIN — SODIUM BICARBONATE 75 MEQ/KG/HR: 84 INJECTION, SOLUTION INTRAVENOUS at 05:26

## 2024-11-24 RX ADMIN — METOPROLOL TARTRATE 50 MILLIGRAM(S): 100 TABLET, FILM COATED ORAL at 05:27

## 2024-11-24 RX ADMIN — SEVELAMER CARBONATE 800 MILLIGRAM(S): 800 TABLET, FILM COATED ORAL at 08:22

## 2024-11-24 RX ADMIN — POTASSIUM CHLORIDE 40 MILLIEQUIVALENT(S): 600 TABLET, EXTENDED RELEASE ORAL at 11:45

## 2024-11-24 NOTE — PROGRESS NOTE ADULT - TIME BILLING
- Review of records, telemetry, vital signs and daily labs.   - General and cardiovascular physical examination.  - Generation of cardiovascular treatment plan and completion of note .  - Coordination of care.      Patient was seen and examined by me on  11/24/2024 ,interim events noted,labs and radiology studies reviewed.  Aki Carias MD,FACC.  [address] [facility].  [phone]

## 2024-11-24 NOTE — PROGRESS NOTE ADULT - PROBLEM SELECTOR PLAN 3
- Eliquis (renal dose)  - Metoprolol
- confirmed with patient that she usually weighs > 60kg, and has been taking apixaban 5mg BID  --> will c/w apixaban 5mg BID on discharge  - c/w metoprolol succinate 50mg daily   - confirmed with patient that she no longer takes amiodarone

## 2024-11-24 NOTE — PROGRESS NOTE ADULT - PROBLEM SELECTOR PLAN 4
- Clinically euvolemic   - EKG:  Afib  - I&O, daily wt   - C/w Metoprolol
- Clinically euvolemic   - EKG:  Afib  - I&O, daily wt   - C/w Metoprolol

## 2024-11-24 NOTE — DISCHARGE NOTE NURSING/CASE MANAGEMENT/SOCIAL WORK - NSDCPEFALRISK_GEN_ALL_CORE
For information on Fall & Injury Prevention, visit: [email or web address] OR  [email or web address] OR  https://www.cdc.gov/steadi/patient.html

## 2024-11-24 NOTE — DISCHARGE NOTE NURSING/CASE MANAGEMENT/SOCIAL WORK - FINANCIAL ASSISTANCE
[facility] provides services at a reduced cost to those who are determined to be eligible through [facility]’s financial assistance program. Information regarding [facility]’s financial assistance program can be found by going to [email or web address] or by calling [phone].

## 2024-11-24 NOTE — DISCHARGE NOTE PROVIDER - NSFOLLOWUPCLINICS_GEN_ALL_ED_FT
[facility] - Primary Care  Primary Care  [address] [place] [address]  Phone: [phone]  Fax:   Follow Up Time: 1 week

## 2024-11-24 NOTE — PROGRESS NOTE ADULT - ASSESSMENT
73 yo F w/ PMH of CKD iso b/l staghorn calculus s/p removal (2009) (follows with Dr. Elizondo), HFpEF with mod to severe MR and moderate pulmonary hypertension, A fib sent from nephrology office due to worsening renal fx.    Advanced CKD; h/o b/l staghorn calculus s/p removal (2009) and HFpEF with mod to severe MR  Metabolic acidosis iso above   -Cr 4.84 (March 2024); 5.5 (Aug 2024); 4.6 (Aug 2024); 5.62 (Nov 21, 2024). CT A/p (Aug 2024) showed bilateral cortical atrophy.  -Pt reports urinating at baseline, no SOB but does have LE edema (which she thinks is at baseline).     Recs:  - Started on Bibcarb infusion 150 mEq @ 75cc/hr, SCO2 improved to 21 today  - Phos elevated, started on Renvela 800mg PO TID  - Started on ferrous sulfate 325mg PO daily  - Given EPO 10K SQ x1    Anemia  -Hgb 9.1 (11/24/24)  - S/p PERLITA 10LK x1     MBD  Phos 5.0 today  Continue Renvela  -Start on calcium carbonate and vitamin D  Goal phos 3.5 - 5.5  Monitor phos daily    No renal contraindication to d/c. F/u in office with Dr. Elizondo.    If you have any questions, please feel free to contact me  Ruben Lacy  Nephrology Fellow  Page [phone] / [employer] Teams (Preferred)  (After 4pm or on weekends please page the on-call fellow)
74y F pmh HTN, afib on apixaban, CKD iso b/l staghorn calculus s/p removal (2009) (follows with Dr. Elizondo),  HFpEF with mod to severe MR and moderate pulmonary hypertension, coming from nephrology office due to worsening renal fx being admitted           # Stage 4 chronic kidney disease.   ·  Plan: -Hx/o CKD iso b/l staghorn calculus s/p removal (2009) (follows with Dr. Elizondo)  - Found to have worsening renal function in nephrology. Outpatient labs done (11/21) showed worsening Cr to 5.62 (from 4.6 in Aug 2024); BUN 90; SCO2 9; AG 28; K 5.1  - Here Cr 5.5-->5.46 -->5.46  -On Bicarb gtt  -May need RRT      # Paroxysmal atrial fibrillation.   ·  Plan: - Eliquis (renal dose)  - Metoprolol for rate control    # Chronic diastolic heart failure.   ·  Plan: - Clinically euvolemic   - EKG:  Afib  -TTE Normal LV Function Moderate MR  
74y F pmh HTN, afib on apixaban, CKD iso b/l staghorn calculus s/p removal (2009) (follows with Dr. Elizondo),  HFpEF with mod to severe MR and moderate pulmonary hypertension, coming from nephrology office due to worsening renal fx being admitted 
74y F pmh HTN, afib on apixaban, CKD iso b/l staghorn calculus s/p removal (2009) (follows with Dr. Elizondo),  HFpEF with mod to severe MR and moderate pulmonary hypertension, coming from nephrology office due to worsening renal fx being admitted

## 2024-11-24 NOTE — PROGRESS NOTE ADULT - PROBLEM SELECTOR PLAN 2
- ISO worsening renal function   - C/w Bicarb gtt   - trend labs
- ISO worsening renal function   - s/p Bicarb gtt , metabolic acidosis improved --> per nephro rec, d/c bicarb gtt start sodium bicarb 650mg TID

## 2024-11-24 NOTE — PROGRESS NOTE ADULT - PROBLEM SELECTOR PLAN 1
-Hx/o CKD iso b/l staghorn calculus s/p removal (2009) (follows with Dr. Elizondo)  - Found to have worsening renal function in nephrology. Outpatient labs done (11/21) showed worsening Cr to 5.62 (from 4.6 in Aug 2024); BUN 90; SCO2 9; AG 28; K 5.1  - Here Cr 5.5.   - Also w/ metabolic acidosis --> management per below   - Outpatient nephro has advised patient on need for dialysis in the future  & AVF during her recent outpatient visit, but patient is not ready, and is declining dialysis  - Trend lab, monitor renal function, renally dose meds  - Nephro consulted, rec:                                       Phos noted to be elevated, will start Renvela 800mg PO 3x/day                                       Iron studies noted, Tsat borderline.  Start ferrous sulfate 325mg PO daily.  Will also dose Epogen 10,000Units SQ x 1.                                       also noted hypocalcemia, likely 2/2 hyperphosphaemia and hypovitamin D ---> start calcium carbonate 1250mg + vitamin D
-Hx/o CKD iso b/l staghorn calculus s/p removal (2009) (follows with Dr. Elizondo)  - Found to have worsening renal function in nephrology. Outpatient labs done (11/21) showed worsening Cr to 5.62 (from 4.6 in Aug 2024); BUN 90; SCO2 9; AG 28; K 5.1  - Here Cr 5.5.   - Also w/ metabolic acidosis   - c/w bicarb gtt   - Outpatient nephro has advised patient on need for dialysis in the future  & AVF during her recent outpatient visit, but patient is not ready, and is declining dialysis  - Trend lab, monitor renal function, renally dose meds  - Nephro consulted, rec: Continue bicarb gtt until tomorrow's labs resulted, will then reassess                                      Phos noted to be elevated, will start Renvela 800mg PO 3x/day                                       Iron studies noted, Tsat borderline.  Start ferrous sulfate 325mg PO daily.  Will also dose Epogen 10,000Units SQ x 1.

## 2024-11-24 NOTE — DISCHARGE NOTE PROVIDER - CARE PROVIDER_API CALL
Bassem Elizondo  Nephrology  [address] [place] [address], [address]  [place] [address]  Phone: [phone]  Fax: [phone]  Follow Up Time: 1 week

## 2024-11-24 NOTE — DISCHARGE NOTE PROVIDER - NSDCCPCAREPLAN_GEN_ALL_CORE_FT
PRINCIPAL DISCHARGE DIAGNOSIS  Diagnosis: Renal failure (ARF), acute on chronic  Assessment and Plan of Treatment: Avoid taking (NSAIDs) - (ex: Ibuprofen, Advil, Celebrex, Naprosyn)  Avoid taking any nephrotoxic agents (can harm kidneys) - Intravenous contrast for diagnostic testing, combination cold medications.  Have all medications adjusted for your renal function by your Health Care Provider.  Blood pressure control is important.  Take all medication as prescribed.       PRINCIPAL DISCHARGE DIAGNOSIS  Diagnosis: Renal failure (ARF), acute on chronic  Assessment and Plan of Treatment: You have advanced kidney failure, admitted to hospital due to worsened acidosis, you received intravenous bicarbonate supplements. Nephrology team saw you and adjusted your medications. It is important to followup with your nephrologist within one week of discharge and monitor your kidney function closely.   Avoid taking (NSAIDs) - (ex: Ibuprofen, Advil, Celebrex, Naprosyn)  Avoid taking any nephrotoxic agents (can harm kidneys) - Intravenous contrast for diagnostic testing, combination cold medications.  Have all medications adjusted for your renal function by your Health Care Provider.  Blood pressure control is important.  Take all medication as prescribed.

## 2024-11-24 NOTE — PROGRESS NOTE ADULT - PROBLEM SELECTOR PLAN 6
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  Start ferrous sulfate 325mg PO daily.  Will also dose Epogen 10,000Units SQ x 1.    # discharge planning  - discussed with nephrology Dr. Pineda , medically optimized for discharge, will continue all inpatient medications on discharge, need close nephrology outpatient followup   - discussed with patient and , all questions answered  - d/w medicine ACP Gladys Muro
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  Start ferrous sulfate 325mg PO daily.  Will also dose Epogen 10,000Units SQ x 1.

## 2024-11-24 NOTE — PROGRESS NOTE ADULT - ATTENDING COMMENTS
Patient seen and examined with Fellow, case d/w primary med attending via TEAMS.  Renal function stable (CKD 4/5).  Patient euvolemic, feeling well.  Does not wish to proceed with HD planning as she was never 'symptomatic' and her labs have improved with medical intervention.  Medications for d/c planning discussed with primary team as above.  Dr. Elizondo contacted by fellow via email to notify him of patient d/c planning.

## 2024-11-24 NOTE — PROGRESS NOTE ADULT - SUBJECTIVE AND OBJECTIVE BOX
[facility] Division of Kidney Diseases & Hypertension  FOLLOW UP NOTE  [phone]--------------------------------------------------------------------------------  Chief Complaint: Acute renal failure    24 hour events/subjective: Pt. seen and examined, states she feels well. Wants to go home. No fever, chills, CP, SOB, or LE swelling. States she has been urinating a lot.     PAST HISTORY  --------------------------------------------------------------------------------  No significant changes to PMH, PSH, FHx, SHx, unless otherwise noted    ALLERGIES & MEDICATIONS  --------------------------------------------------------------------------------  Allergies    No Known Allergies    Intolerances    Standing Inpatient Medications  amLODIPine   Tablet 5 milliGRAM(s) Oral daily  apixaban 2.5 milliGRAM(s) Oral two times a day  calcium carbonate 1250 mG  + Vitamin D (OsCal 500 + D) 1 Tablet(s) Oral daily  ferrous    sulfate 325 milliGRAM(s) Oral daily  influenza  Vaccine (HIGH DOSE) 0.5 milliLiter(s) IntraMuscular once  metoprolol succinate ER 50 milliGRAM(s) Oral daily  sevelamer carbonate 800 milliGRAM(s) Oral three times a day with meals  sodium bicarbonate 650 milliGRAM(s) Oral three times a day    PRN Inpatient Medications  acetaminophen     Tablet .. 650 milliGRAM(s) Oral every 6 hours PRN  aluminum hydroxide/magnesium hydroxide/simethicone Suspension 30 milliLiter(s) Oral every 4 hours PRN  melatonin 3 milliGRAM(s) Oral at bedtime PRN  ondansetron Injectable 4 milliGRAM(s) IV Push every 8 hours PRN    REVIEW OF SYSTEMS  --------------------------------------------------------------------------------  All other systems were reviewed and are negative, except as noted.    VITALS/PHYSICAL EXAM  --------------------------------------------------------------------------------  T(C): 36.4 (11-24-24 @ 05:11), Max: 37.2 (11-23-24 @ 21:02)  HR: 102 (11-24-24 @ 05:11) (87 - 102)  BP: 151/90 (11-24-24 @ 05:11) (135/81 - 151/90)  RR: 18 (11-24-24 @ 05:11) (18 - 18)  SpO2: 96% (11-24-24 @ 05:11) (95% - 97%)  Wt(kg): --  Height (cm): 157.5 (11-22-24 @ 15:10)  Weight (kg): 59 (11-22-24 @ 15:10)  BMI (kg/m2): 23.8 (11-22-24 @ 15:10)  BSA (m2): 1.59 (11-22-24 @ 15:10)    Physical Exam:  Gen: NAD  HEENT: Anicteric  Pulm: CTA B/L  CV: S1S2+  Abd: Soft, +BS    Ext: +LE edema B/L  Neuro: Awake  Skin: Warm and dry    LABS/STUDIES  --------------------------------------------------------------------------------              9.1    4.55  >-----------<  132      [11-24-24 @ 06:55]              28.2     146  |  106  |  82  ----------------------------<  110      [11-24-24 @ 07:06]  3.4   |  21  |  5.47        Ca     7.3     [11-24-24 @ 07:06]      Mg     2.0     [11-24-24 @ 07:06]      Phos  5.0     [11-24-24 @ 07:06]    TPro  7.2  /  Alb  4.5  /  TBili  0.3  /  DBili  x   /  AST  15  /  ALT  11  /  AlkPhos  120  [11-22-24 @ 21:13]    PT/INR: PT 12.3 , INR 1.07       [11-23-24 @ 07:04]    Creatinine Trend:  SCr 5.47 [11-24 @ 07:06]  SCr 5.46 [11-23 @ 07:03]  SCr 5.51 [11-22 @ 21:13]    Urine Creatinine 21      [11-22-24 @ 21:22]  Urine Protein 348      [11-22-24 @ 21:22]  Urine Sodium 90      [11-22-24 @ 21:22]  Urine Urea Nitrogen 290      [11-22-24 @ 21:22]  Urine Potassium 16      [11-22-24 @ 21:22]  Urine Osmolality 304      [11-22-24 @ 21:22]    Iron 61, TIBC 301, %sat 20      [11-22-24 @ 21:13]  Ferritin 101      [11-22-24 @ 21:13]  PTH -- (Ca 8.1)      [11-22-24 @ 21:13]   595
PROGRESS NOTE:     Patient is a 74y old  Female who presents with a chief complaint of Abnorm labs (24 Nov 2024 13:03)      SUBJECTIVE / OVERNIGHT EVENTS:  Patient seen and evaluated at bedside. Denies SOB at rest, chest pain, palpitations, abdominal pain, nausea/vomiting    ADDITIONAL REVIEW OF SYSTEMS:    MEDICATIONS  (STANDING):  amLODIPine   Tablet 5 milliGRAM(s) Oral daily  apixaban 2.5 milliGRAM(s) Oral two times a day  calcium carbonate 1250 mG  + Vitamin D (OsCal 500 + D) 1 Tablet(s) Oral daily  ferrous    sulfate 325 milliGRAM(s) Oral daily  influenza  Vaccine (HIGH DOSE) 0.5 milliLiter(s) IntraMuscular once  metoprolol succinate ER 50 milliGRAM(s) Oral daily  sevelamer carbonate 800 milliGRAM(s) Oral three times a day with meals  sodium bicarbonate 650 milliGRAM(s) Oral three times a day    MEDICATIONS  (PRN):  acetaminophen     Tablet .. 650 milliGRAM(s) Oral every 6 hours PRN Temp greater or equal to 38C (100.4F), Mild Pain (1 - 3)  aluminum hydroxide/magnesium hydroxide/simethicone Suspension 30 milliLiter(s) Oral every 4 hours PRN Dyspepsia  melatonin 3 milliGRAM(s) Oral at bedtime PRN Insomnia  ondansetron Injectable 4 milliGRAM(s) IV Push every 8 hours PRN Nausea and/or Vomiting      CAPILLARY BLOOD GLUCOSE        I&O's Summary      PHYSICAL EXAM:  Vital Signs Last 24 Hrs  T(C): 37 (24 Nov 2024 14:11), Max: 37.2 (23 Nov 2024 21:02)  T(F): 98.6 (24 Nov 2024 14:11), Max: 99 (23 Nov 2024 21:02)  HR: 107 (24 Nov 2024 14:11) (90 - 107)  BP: 147/94 (24 Nov 2024 14:11) (147/94 - 151/90)  BP(mean): --  RR: 18 (24 Nov 2024 14:11) (18 - 18)  SpO2: 98% (24 Nov 2024 14:11) (95% - 98%)    Parameters below as of 24 Nov 2024 14:11  Patient On (Oxygen Delivery Method): room air        CONSTITUTIONAL: NAD  RESPIRATORY: Normal respiratory effort; lungs are clear to auscultation bilaterally  CARDIOVASCULAR: Regular rate and rhythm, normal S1 and S2, no murmur/rub/gallop; No lower extremity edema  ABDOMEN: Nontender to palpation, normoactive bowel sounds, no rebound/guarding  PSYCH: A+O to person, place, and time; affect appropriate    LABS:                        9.1    4.55  )-----------( 132      ( 24 Nov 2024 06:55 )             28.2     11-24    146[H]  |  106  |  82[H]  ----------------------------<  110[H]  3.4[L]   |  21[L]  |  5.47[H]    Ca    7.3[L]      24 Nov 2024 07:06  Phos  5.0     11-24  Mg     2.0     11-24    TPro  7.2  /  Alb  4.5  /  TBili  0.3  /  DBili  x   /  AST  15  /  ALT  11  /  AlkPhos  120  11-22    PT/INR - ( 23 Nov 2024 07:04 )   PT: 12.3 sec;   INR: 1.07 ratio               Urinalysis Basic - ( 24 Nov 2024 07:06 )    Color: x / Appearance: x / SG: x / pH: x  Gluc: 110 mg/dL / Ketone: x  / Bili: x / Urobili: x   Blood: x / Protein: x / Nitrite: x   Leuk Esterase: x / RBC: x / WBC x   Sq Epi: x / Non Sq Epi: x / Bacteria: x          RADIOLOGY & ADDITIONAL TESTS:  Results Reviewed:   Imaging Personally Reviewed:  Electrocardiogram Personally Reviewed:    COORDINATION OF CARE:  Care Discussed with Consultants/Other Providers [Y/N]:  Prior or Outpatient Records Reviewed [Y/N]:  
MR#[id number]  PATIENT NAME:ELIZABETH PARSONS    DATE OF SERVICE: 11-24-24 @ 08:37  Patient was seen and examined by Aki Carias MD on    11-24-24 @ 08:37 .  Interim events noted.Consultant notes ,Labs,Telemetry reviewed by me       Covering for [facility] -Jorje Bates, Heidy, Alverto Green Savella, Cohen  Office Number: [phone]         HOSPITAL COURSE: HPI:  74y F pmh HTN, afib on apixaban, CKD iso b/l staghorn calculus s/p removal (2009) (follows with Dr. Elizondo),  HFpEF with mod to severe MR and moderate pulmonary hypertension, coming from nephrology office due to worsening renal fx. Outpatient labs done (11/21) showed worsening Cr to 5.62 (from 4.6 in Aug 2024); BUN 90; SCO2 9; AG 28; K 5.1. Patient has been advised about needing dialysis in future and need for AVF during her recent outpatient visit, but patient is not ready.Patient states she feels completely fine.    ROS: Denies HA, CP, SOB, palpitation, N/V/D, fever, cough, chills, dizziness, abm pain, change in bowel or urinary habits   A 10-system ROS was performed and is negative except as noted above and/or in the HPI.    ED: labs c/w decreased renal function, metabolic acidosis. Started on bicarb gtt. Eval'd by Nephro    (22 Nov 2024 22:50)      INTERIM EVENTS:Patient seen at bedside ,interim events noted.  Awake alert is ambulating no dysppnea     PMH -reviewed admission note, no change since admission  HEART FAILURE: Acute[ ]Chronic[ ] Systolic[ ] Diastolic[ ] Combined Systolic and Diastolic[ ]  CAD[ ] CABG[ ] PCI[ ]  DEVICES[ ] PPM[ ] ICD[ ] ILR[ ]  ATRIAL FIBRILLATION[x ] Paroxysmal[ ] Permanent[ ] CHADS2-[4  ]  NICO[ ] CKD1[ ] CKD2[ ] CKD3[x ] CKD4[ ] ESRD[ ]  COPD[ ] HTN[ ]   DM[ ] Type1[ ] Type 2[ ]   CVA[ ] Paresis[ ]    AMBULATION: Assisted[ ] Cane/walker[ ] Independent[x ]    MEDICATIONS  (STANDING):  amLODIPine   Tablet 5 milliGRAM(s) Oral daily  apixaban 2.5 milliGRAM(s) Oral two times a day  ferrous    sulfate 325 milliGRAM(s) Oral daily  influenza  Vaccine (HIGH DOSE) 0.5 milliLiter(s) IntraMuscular once  metoprolol succinate ER 50 milliGRAM(s) Oral daily  sevelamer carbonate 800 milliGRAM(s) Oral three times a day with meals  sodium bicarbonate  Infusion 0.191 mEq/kG/Hr (75 mL/Hr) IV Continuous <Continuous>    MEDICATIONS  (PRN):  acetaminophen     Tablet .. 650 milliGRAM(s) Oral every 6 hours PRN Temp greater or equal to 38C (100.4F), Mild Pain (1 - 3)  aluminum hydroxide/magnesium hydroxide/simethicone Suspension 30 milliLiter(s) Oral every 4 hours PRN Dyspepsia  melatonin 3 milliGRAM(s) Oral at bedtime PRN Insomnia  ondansetron Injectable 4 milliGRAM(s) IV Push every 8 hours PRN Nausea and/or Vomiting            REVIEW OF SYSTEMS:  Constitutional: [ ] fever, [ ]weight loss,  [ x]fatigue [ ]weight gain  Eyes: [ ] visual changes  Respiratory: [ ]shortness of breath;  [ ] cough, [ ]wheezing, [ ]chills, [ ]hemoptysis  Cardiovascular: [ ] chest pain, [ ]palpitations, [ ]dizziness,  [ ]leg swelling[ ]orthopnea[ ]PND  Gastrointestinal: [ ] abdominal pain, [ ]nausea, [ ]vomiting,  [ ]diarrhea [ ]Constipation [ ]Melena  Genitourinary: [ ] dysuria, [ ] hematuria [ ]Zelaya  Neurologic: [ ] headaches [ ] tremors[ ]weakness [ ]Paralysis Right[ ] Left[ ]  Skin: [ ] itching, [ ]burning, [ ] rashes  Endocrine: [ ] heat or cold intolerance  Musculoskeletal: [ ] joint pain or swelling; [ ] muscle, back, or extremity pain  Psychiatric: [ ] depression, [ ]anxiety, [ ]mood swings, or [ ]difficulty sleeping  Hematologic: [ ] easy bruising, [ ] bleeding gums    [ ] All remaining systems negative except as per above.   [ ]Unable to obtain.  [x] No change in ROS since admission      Vital Signs Last 24 Hrs  T(C): 36.4 (24 Nov 2024 05:11), Max: 37.2 (23 Nov 2024 21:02)  T(F): 97.5 (24 Nov 2024 05:11), Max: 99 (23 Nov 2024 21:02)  HR: 102 (24 Nov 2024 05:11) (87 - 102)  BP: 151/90 (24 Nov 2024 05:11) (135/81 - 151/90)  RR: 18 (24 Nov 2024 05:11) (18 - 18)  SpO2: 96% (24 Nov 2024 05:11) (95% - 97%)    Parameters below as of 24 Nov 2024 05:11  Patient On (Oxygen Delivery Method): room air      I&O's Summary      PHYSICAL EXAM:  General: No acute distress BMI-26  HEENT: EOMI, PERRL  Neck: Supple, [ ] JVD  Lungs: Equal air entry bilaterally; [ ] rales [ ] wheezing [ ] rhonchi  Heart: Irregular rate and rhythm; [x ] murmur   2/6 [ x] systolic [ ] diastolic [ ] radiation[ ] rubs [ ]  gallops  Abdomen: Nontender, bowel sounds present  Extremities: No clubbing, cyanosis, [ ] edema [ ]Pulses  equal and intact  Nervous system:  Alert & Oriented X3, no focal deficits  Psychiatric: Normal affect  Skin: No rashes or lesions    LABS:  11-24    146[H]  |  106  |  82[H]  ----------------------------<  110[H]  3.4[L]   |  21[L]  |  5.47[H]    Ca    7.3[L]      24 Nov 2024 07:06  Phos  5.0     11-24  Mg     2.0     11-24    TPro  7.2  /  Alb  4.5  /  TBili  0.3  /  DBili  x   /  AST  15  /  ALT  11  /  AlkPhos  120  11-22    Creatinine Trend: 5.47<--, 5.46<--, 5.51<--                        9.1    4.55  )-----------( 132      ( 24 Nov 2024 06:55 )             28.2     PT/INR - ( 23 Nov 2024 07:04 )   PT: 12.3 sec;   INR: 1.07 ratio             W or WO Ultrasound Enhancing Agent (04.26.24 @ 09:45) >  CONCLUSIONS:      1. Left ventricular wall thickness is normal. Left ventricular systolic function is normal with an ejection fraction of 76 % by 3D. There are no regional wall motion abnormalities seen.   2. No evidence of left atrial or left atrial appendage thrombus. Ultrasound enhancing agent was utilized to visualize the left atrial appendage.   3. The left atrium is severely dilated.   4. Moderate mitral regurgitation at a blood pressure of 132/71 mmHg. The mitral regurgitant jet is centrally directed. Mechanism of mitral regurgitation: Apryl Type I (normal leaflet motion with dilated annulus). PISA : later pisa radius 0.4, medial 0.37, Alisaing velocity 38.5cm/sec MR VTI 136cm MR Vmax 548 cm/s. The ERO 0.3sqcm and Reg. Vol 38cc. 3D vena contracta area was 0.35sqcm.   5. No pericardial effusion seen.   6. Normal right ventricular cavity size, with normal wall thickness, and normal systolic function.       Xray Chest 1 View- PORTABLE-Urgent (Xray Chest 1 View- PORTABLE-Urgent .) (11.22.24 @ 21:08) >  FINDINGS:    The heart size is borderline.  Mild pulmonary congestion.  No focal consolidation.  No pneumothorax.    IMPRESSION:  No focal consolidation.     ECG   SINUS RHYTHM WITH OCCASIONAL PREMATURE VENTRICULAR COMPLEXES AND PREMATURE ATRIAL COMPLEXES  NONSPECIFIC T WAVE ABNORMALITY  ABNORMAL ECG                
PROGRESS NOTE:     Patient is a 74y old  Female who presents with a chief complaint of Abnorm labs (23 Nov 2024 08:33)      SUBJECTIVE / OVERNIGHT EVENTS: Patient seen and evaluated at bedside. Denies SOB at rest, chest pain, palpitations, abdominal pain, nausea/vomiting    ADDITIONAL REVIEW OF SYSTEMS:    MEDICATIONS  (STANDING):  amLODIPine   Tablet 5 milliGRAM(s) Oral daily  apixaban 2.5 milliGRAM(s) Oral two times a day  epoetin zafar (EPOGEN) Injectable 76670 Unit(s) SubCutaneous once  ferrous    sulfate 325 milliGRAM(s) Oral daily  influenza  Vaccine (HIGH DOSE) 0.5 milliLiter(s) IntraMuscular once  metoprolol succinate ER 50 milliGRAM(s) Oral daily  sevelamer carbonate 800 milliGRAM(s) Oral three times a day with meals  sodium bicarbonate  Infusion 0.191 mEq/kG/Hr (75 mL/Hr) IV Continuous <Continuous>    MEDICATIONS  (PRN):  acetaminophen     Tablet .. 650 milliGRAM(s) Oral every 6 hours PRN Temp greater or equal to 38C (100.4F), Mild Pain (1 - 3)  aluminum hydroxide/magnesium hydroxide/simethicone Suspension 30 milliLiter(s) Oral every 4 hours PRN Dyspepsia  melatonin 3 milliGRAM(s) Oral at bedtime PRN Insomnia  ondansetron Injectable 4 milliGRAM(s) IV Push every 8 hours PRN Nausea and/or Vomiting      CAPILLARY BLOOD GLUCOSE        I&O's Summary      PHYSICAL EXAM:  Vital Signs Last 24 Hrs  T(C): 36.7 (23 Nov 2024 13:31), Max: 36.8 (23 Nov 2024 00:44)  T(F): 98 (23 Nov 2024 13:31), Max: 98.2 (23 Nov 2024 00:44)  HR: 87 (23 Nov 2024 13:31) (83 - 100)  BP: 135/81 (23 Nov 2024 13:31) (135/81 - 166/88)  BP(mean): --  RR: 18 (23 Nov 2024 13:31) (18 - 18)  SpO2: 97% (23 Nov 2024 13:31) (96% - 98%)    Parameters below as of 23 Nov 2024 13:31  Patient On (Oxygen Delivery Method): room air        CONSTITUTIONAL: NAD  RESPIRATORY: Normal respiratory effort; lungs are clear to auscultation bilaterally  CARDIOVASCULAR: Regular rate and rhythm, normal S1 and S2, no murmur/rub/gallop; No lower extremity edema.  ABDOMEN: Nontender to palpation, normoactive bowel sounds, no rebound/guarding  PSYCH: A+O to person, place, and time; affect appropriate    LABS:                        8.7    3.95  )-----------( 132      ( 23 Nov 2024 07:04 )             27.1     11-23    143  |  108  |  97[H]  ----------------------------<  99  3.9   |  15[L]  |  5.46[H]    Ca    7.2[L]      23 Nov 2024 07:03  Phos  6.0     11-23  Mg     2.0     11-23    TPro  7.2  /  Alb  4.5  /  TBili  0.3  /  DBili  x   /  AST  15  /  ALT  11  /  AlkPhos  120  11-22    PT/INR - ( 23 Nov 2024 07:04 )   PT: 12.3 sec;   INR: 1.07 ratio               Urinalysis Basic - ( 23 Nov 2024 07:03 )  Color: x / Appearance: x / SG: x / pH: x  Gluc: 99 mg/dL / Ketone: x  / Bili: x / Urobili: x   Blood: x / Protein: x / Nitrite: x   Leuk Esterase: x / RBC: x / WBC x   Sq Epi: x / Non Sq Epi: x / Bacteria: x          RADIOLOGY & ADDITIONAL TESTS:  Results Reviewed:   Imaging Personally Reviewed:  Electrocardiogram Personally Reviewed:    COORDINATION OF CARE:  Care Discussed with Consultants/Other Providers [Y/N]:  Prior or Outpatient Records Reviewed [Y/N]:

## 2024-11-24 NOTE — DISCHARGE NOTE PROVIDER - HOSPITAL COURSE
HPI:  74y F pmh HTN, afib on apixaban, CKD iso b/l staghorn calculus s/p removal (2009) (follows with Dr. Elizondo),  HFpEF with mod to severe MR and moderate pulmonary hypertension, coming from nephrology office due to worsening renal fx. Outpatient labs done (11/21) showed worsening Cr to 5.62 (from 4.6 in Aug 2024); BUN 90; SCO2 9; AG 28; K 5.1. Patient has been advised about needing dialysis in future and need for AVF during her recent outpatient visit, but patient is not ready.Patient states she feels completely fine.    ROS: Denies HA, CP, SOB, palpitation, N/V/D, fever, cough, chills, dizziness, abm pain, change in bowel or urinary habits   A 10-system ROS was performed and is negative except as noted above and/or in the HPI.    ED: labs c/w decreased renal function, metabolic acidosis. Started on bicarb gtt. Eval'd by Nephro    (22 Nov 2024 22:50)    Hospital Course:      Patient is medically cleared for discharge. Medication reconciliation reviewed, revised, and resolved with Dr. Yarbrough who had medically cleared patient for discharge with follow-up as advised. Patient is currently stable for discharge to home at this time.    Important Medication Changes and Reason:    Active or Pending Issues Requiring Follow-up:    Advanced Directives:   [ ] Full code  [ ] DNR  [ ] Hospice    Discharge Diagnoses:         HPI:  74y F pmh HTN, afib on apixaban, CKD iso b/l staghorn calculus s/p removal (2009) (follows with Dr. Elizondo),  HFpEF with mod to severe MR and moderate pulmonary hypertension, coming from nephrology office due to worsening renal fx. Outpatient labs done (11/21) showed worsening Cr to 5.62 (from 4.6 in Aug 2024); BUN 90; SCO2 9; AG 28; K 5.1. Patient has been advised about needing dialysis in future and need for AVF during her recent outpatient visit, but patient is not ready.Patient states she feels completely fine.    ROS: Denies HA, CP, SOB, palpitation, N/V/D, fever, cough, chills, dizziness, abm pain, change in bowel or urinary habits   A 10-system ROS was performed and is negative except as noted above and/or in the HPI.    ED: labs c/w decreased renal function, metabolic acidosis. Started on bicarb gtt. Eval'd by Nephro    (22 Nov 2024 22:50)    Hospital Course:  Patient was admitted for worsening kidney function (Cr 5.62) and metabolic acidosis. Started bicarb gtt and advised on need for dialysis and AVF creation in the future, currently declining. Started renvela for elevated phosphate, ferrous sulfate and epogen for iron deficiency anemia/anemia of chronic disease. Seen by physical therapy, recommended for no skilled PT needs.    Patient is medically cleared for discharge. Medication reconciliation reviewed, revised, and resolved with Dr. Yarbrough who had medically cleared patient for discharge with follow-up as advised. Patient is currently stable for discharge to home at this time.    Important Medication Changes and Reason:  - Sodium bicarbonate 650mg PO TID  - Sevelamer carbonate 800mg PO TID with meals  - Calcium Carbonate and Vitamin D    Active or Pending Issues Requiring Follow-up:  - Nephrology Dr. Elizondo    Advanced Directives:   [x] Full code  [ ] DNR  [ ] Hospice    Discharge Diagnoses:  CKD,   Metabolic acidosis  PAF  HFpEF  HTn  Anemia         HPI:  74y F pmh HTN, afib on apixaban, CKD iso b/l staghorn calculus s/p removal (2009) (follows with Dr. Elizondo),  HFpEF with mod to severe MR and moderate pulmonary hypertension, coming from nephrology office due to worsening renal fx. Outpatient labs done (11/21) showed worsening Cr to 5.62 (from 4.6 in Aug 2024); BUN 90; SCO2 9; AG 28; K 5.1. Patient has been advised about needing dialysis in future and need for AVF during her recent outpatient visit, but patient is not ready.Patient states she feels completely fine.    ROS: Denies HA, CP, SOB, palpitation, N/V/D, fever, cough, chills, dizziness, abm pain, change in bowel or urinary habits   A 10-system ROS was performed and is negative except as noted above and/or in the HPI.    ED: labs c/w decreased renal function, metabolic acidosis. Started on bicarb gtt. Eval'd by Nephro    (22 Nov 2024 22:50)    Hospital Course:  Patient was admitted for worsening kidney function (Cr 5.62) and metabolic acidosis. Started bicarb gtt and advised on need for dialysis and AVF creation in the future, currently declining. Started renvela for elevated phosphate, ferrous sulfate and epogen for iron deficiency anemia/anemia of chronic disease. Seen by physical therapy, recommended for no skilled PT needs. Metabolic acidosis improved with IV bicarb gtt, transitioned to oral bicarb. Nephrology evaluated patient, clear patient for discharge with outpatient followup.     Patient is medically optimized for discharge with outpatient nephrology followup.     Important Medication Changes and Reason:  - Sodium bicarbonate 650mg PO TID  - Sevelamer carbonate 800mg PO TID with meals  - Calcium Carbonate and Vitamin D    Active or Pending Issues Requiring Follow-up:  - Nephrology Dr. Elizondo    Advanced Directives:   [x] Full code  [ ] DNR  [ ] Hospice    Discharge Diagnoses:  CKD,   Metabolic acidosis  PAF  HFpEF  HTn  Anemia

## 2024-11-24 NOTE — DISCHARGE NOTE PROVIDER - NSDCMRMEDTOKEN_GEN_ALL_CORE_FT
amiodarone 200 mg oral tablet: Take 2 tabs (400 mg) orally twice daily through 5/3, then 1 tab (200 mg) twice daily for 7 days, then 1 tab once daily thereafter  amLODIPine 5 mg oral tablet: 1 tab(s) orally once a day  apixaban 5 mg oral tablet: 1 tab(s) orally 2 times a day  metoprolol succinate 50 mg oral tablet, extended release: 1 tab(s) orally once a day  sevelamer carbonate 800 mg oral tablet: 1 tab(s) orally 3 times a day (with meals)  sodium bicarbonate: 1 tab(s) orally once a day  sodium bicarbonate 650 mg oral tablet: 1 tab(s) orally 3 times a day   amLODIPine 5 mg oral tablet: 1 tab(s) orally once a day  apixaban 5 mg oral tablet: 1 tab(s) orally 2 times a day  ferrous sulfate 325 mg (65 mg elemental iron) oral tablet: 1 tab(s) orally once a day  metoprolol succinate 50 mg oral tablet, extended release: 1 tab(s) orally once a day  sevelamer carbonate 800 mg oral tablet: 1 tab(s) orally 3 times a day (with meals)  sodium bicarbonate 650 mg oral tablet: 1 tab(s) orally 3 times a day  vitamin D-calcium (as carbonate) 5 mcg-500 mg oral tablet: 1 tab(s) orally once a day

## 2024-11-24 NOTE — DISCHARGE NOTE NURSING/CASE MANAGEMENT/SOCIAL WORK - PATIENT PORTAL LINK FT
You can access the FollowMyHealth Patient Portal offered by [facility] by registering at the following website: [email or web address]. By joining [employer]’s FollowMyHealth portal, you will also be able to view your health information using other applications (apps) compatible with our system.

## 2024-11-26 ENCOUNTER — NON-APPOINTMENT (OUTPATIENT)
Age: 74
End: 2024-11-26

## 2024-12-27 ENCOUNTER — APPOINTMENT (OUTPATIENT)
Dept: NEPHROLOGY | Facility: CLINIC | Age: 74
End: 2024-12-27

## 2025-01-03 ENCOUNTER — NON-APPOINTMENT (OUTPATIENT)
Age: 75
End: 2025-01-03

## 2025-01-03 ENCOUNTER — APPOINTMENT (OUTPATIENT)
Dept: NEPHROLOGY | Facility: CLINIC | Age: 75
End: 2025-01-03
Payer: MEDICARE

## 2025-01-03 VITALS
DIASTOLIC BLOOD PRESSURE: 78 MMHG | HEART RATE: 113 BPM | SYSTOLIC BLOOD PRESSURE: 150 MMHG | BODY MASS INDEX: 23.92 KG/M2 | TEMPERATURE: 97.2 F | HEIGHT: 62 IN | WEIGHT: 130 LBS | OXYGEN SATURATION: 86 %

## 2025-01-03 PROCEDURE — 99214 OFFICE O/P EST MOD 30 MIN: CPT

## 2025-01-03 PROCEDURE — G2211 COMPLEX E/M VISIT ADD ON: CPT

## 2025-01-03 RX ORDER — SEVELAMER CARBONATE 800 MG/1
800 TABLET, FILM COATED ORAL
Qty: 90 | Refills: 3 | Status: ACTIVE | COMMUNITY
Start: 2025-01-03 | End: 1900-01-01

## 2025-01-03 RX ORDER — CALCIUM CARBONATE/VITAMIN D3 600MG-5MCG
600-5 TABLET ORAL DAILY
Qty: 30 | Refills: 0 | Status: ACTIVE | COMMUNITY
Start: 2025-01-03 | End: 1900-01-01

## 2025-01-06 LAB
ANION GAP SERPL CALC-SCNC: 22 MMOL/L
BUN SERPL-MCNC: 94 MG/DL
CALCIUM SERPL-MCNC: 8.5 MG/DL
CHLORIDE SERPL-SCNC: 109 MMOL/L
CO2 SERPL-SCNC: 13 MMOL/L
CREAT SERPL-MCNC: 6.77 MG/DL
EGFR: 6 ML/MIN/1.73M2
GLUCOSE SERPL-MCNC: 123 MG/DL
HCT VFR BLD CALC: 32.8 %
HGB BLD-MCNC: 10 G/DL
MCHC RBC-ENTMCNC: 29.9 PG
MCHC RBC-ENTMCNC: 30.5 G/DL
MCV RBC AUTO: 98.2 FL
PLATELET # BLD AUTO: 179 K/UL
POTASSIUM SERPL-SCNC: 4.4 MMOL/L
RBC # BLD: 3.34 M/UL
RBC # FLD: 16 %
SODIUM SERPL-SCNC: 144 MMOL/L
WBC # FLD AUTO: 7.01 K/UL

## 2025-01-07 ENCOUNTER — INPATIENT (INPATIENT)
Facility: HOSPITAL | Age: 75
LOS: 1 days | Discharge: LEFT BEFORE DISCHARGE | DRG: 291 | End: 2025-01-09
Attending: STUDENT IN AN ORGANIZED HEALTH CARE EDUCATION/TRAINING PROGRAM | Admitting: STUDENT IN AN ORGANIZED HEALTH CARE EDUCATION/TRAINING PROGRAM
Payer: MEDICARE

## 2025-01-07 VITALS
WEIGHT: 130.07 LBS | RESPIRATION RATE: 18 BRPM | DIASTOLIC BLOOD PRESSURE: 106 MMHG | TEMPERATURE: 97 F | HEART RATE: 142 BPM | OXYGEN SATURATION: 95 % | HEIGHT: 62 IN | SYSTOLIC BLOOD PRESSURE: 175 MMHG

## 2025-01-07 DIAGNOSIS — N17.9 ACUTE KIDNEY FAILURE, UNSPECIFIED: ICD-10-CM

## 2025-01-07 DIAGNOSIS — E87.20 ACIDOSIS, UNSPECIFIED: ICD-10-CM

## 2025-01-07 DIAGNOSIS — R91.1 SOLITARY PULMONARY NODULE: ICD-10-CM

## 2025-01-07 DIAGNOSIS — I34.0 NONRHEUMATIC MITRAL (VALVE) INSUFFICIENCY: ICD-10-CM

## 2025-01-07 DIAGNOSIS — I50.9 HEART FAILURE, UNSPECIFIED: ICD-10-CM

## 2025-01-07 DIAGNOSIS — I48.91 UNSPECIFIED ATRIAL FIBRILLATION: ICD-10-CM

## 2025-01-07 DIAGNOSIS — Z29.9 ENCOUNTER FOR PROPHYLACTIC MEASURES, UNSPECIFIED: ICD-10-CM

## 2025-01-07 LAB
ALBUMIN SERPL ELPH-MCNC: 3.5 G/DL — SIGNIFICANT CHANGE UP (ref 3.3–5)
ALBUMIN SERPL ELPH-MCNC: 3.8 G/DL — SIGNIFICANT CHANGE UP (ref 3.3–5)
ALP SERPL-CCNC: 104 U/L — SIGNIFICANT CHANGE UP (ref 40–120)
ALP SERPL-CCNC: 111 U/L — SIGNIFICANT CHANGE UP (ref 40–120)
ALT FLD-CCNC: 5 U/L — LOW (ref 10–45)
ALT FLD-CCNC: 7 U/L — LOW (ref 10–45)
ANION GAP SERPL CALC-SCNC: 21 MMOL/L — HIGH (ref 5–17)
ANION GAP SERPL CALC-SCNC: 21 MMOL/L — HIGH (ref 5–17)
ANION GAP SERPL CALC-SCNC: 22 MMOL/L — HIGH (ref 5–17)
APPEARANCE UR: CLEAR — SIGNIFICANT CHANGE UP
APTT BLD: 33.8 SEC — SIGNIFICANT CHANGE UP (ref 24.5–35.6)
AST SERPL-CCNC: 10 U/L — SIGNIFICANT CHANGE UP (ref 10–40)
AST SERPL-CCNC: 10 U/L — SIGNIFICANT CHANGE UP (ref 10–40)
BACTERIA # UR AUTO: ABNORMAL /HPF
BASOPHILS # BLD AUTO: 0.03 K/UL — SIGNIFICANT CHANGE UP (ref 0–0.2)
BASOPHILS NFR BLD AUTO: 0.3 % — SIGNIFICANT CHANGE UP (ref 0–2)
BILIRUB SERPL-MCNC: 0.3 MG/DL — SIGNIFICANT CHANGE UP (ref 0.2–1.2)
BILIRUB SERPL-MCNC: 0.3 MG/DL — SIGNIFICANT CHANGE UP (ref 0.2–1.2)
BILIRUB UR-MCNC: NEGATIVE — SIGNIFICANT CHANGE UP
BUN SERPL-MCNC: 84 MG/DL — HIGH (ref 7–23)
BUN SERPL-MCNC: 84 MG/DL — HIGH (ref 7–23)
BUN SERPL-MCNC: 89 MG/DL — HIGH (ref 7–23)
CALCIUM SERPL-MCNC: 8.1 MG/DL — LOW (ref 8.4–10.5)
CALCIUM SERPL-MCNC: 8.3 MG/DL — LOW (ref 8.4–10.5)
CALCIUM SERPL-MCNC: 8.7 MG/DL — SIGNIFICANT CHANGE UP (ref 8.4–10.5)
CAST: 2 /LPF — SIGNIFICANT CHANGE UP (ref 0–4)
CHLORIDE SERPL-SCNC: 107 MMOL/L — SIGNIFICANT CHANGE UP (ref 96–108)
CHLORIDE SERPL-SCNC: 108 MMOL/L — SIGNIFICANT CHANGE UP (ref 96–108)
CHLORIDE SERPL-SCNC: 109 MMOL/L — HIGH (ref 96–108)
CK MB CFR SERPL CALC: 1.7 NG/ML — SIGNIFICANT CHANGE UP (ref 0–3.8)
CK SERPL-CCNC: 67 U/L — SIGNIFICANT CHANGE UP (ref 25–170)
CO2 SERPL-SCNC: 10 MMOL/L — CRITICAL LOW (ref 22–31)
CO2 SERPL-SCNC: 11 MMOL/L — LOW (ref 22–31)
CO2 SERPL-SCNC: 13 MMOL/L — LOW (ref 22–31)
COLOR SPEC: YELLOW — SIGNIFICANT CHANGE UP
CREAT SERPL-MCNC: 5.91 MG/DL — HIGH (ref 0.5–1.3)
CREAT SERPL-MCNC: 5.98 MG/DL — HIGH (ref 0.5–1.3)
CREAT SERPL-MCNC: 6.19 MG/DL — HIGH (ref 0.5–1.3)
DIFF PNL FLD: ABNORMAL
EGFR: 7 ML/MIN/1.73M2 — LOW
EOSINOPHIL # BLD AUTO: 0.02 K/UL — SIGNIFICANT CHANGE UP (ref 0–0.5)
EOSINOPHIL NFR BLD AUTO: 0.2 % — SIGNIFICANT CHANGE UP (ref 0–6)
FLUAV AG NPH QL: SIGNIFICANT CHANGE UP
FLUBV AG NPH QL: SIGNIFICANT CHANGE UP
GAS PNL BLDV: SIGNIFICANT CHANGE UP
GAS PNL BLDV: SIGNIFICANT CHANGE UP
GLUCOSE SERPL-MCNC: 116 MG/DL — HIGH (ref 70–99)
GLUCOSE SERPL-MCNC: 129 MG/DL — HIGH (ref 70–99)
GLUCOSE SERPL-MCNC: 152 MG/DL — HIGH (ref 70–99)
GLUCOSE UR QL: NEGATIVE MG/DL — SIGNIFICANT CHANGE UP
HCT VFR BLD CALC: 32.9 % — LOW (ref 34.5–45)
HGB BLD-MCNC: 10.3 G/DL — LOW (ref 11.5–15.5)
IMM GRANULOCYTES NFR BLD AUTO: 0.6 % — SIGNIFICANT CHANGE UP (ref 0–0.9)
INR BLD: 1.88 RATIO — HIGH (ref 0.85–1.16)
KETONES UR-MCNC: NEGATIVE MG/DL — SIGNIFICANT CHANGE UP
LEUKOCYTE ESTERASE UR-ACNC: NEGATIVE — SIGNIFICANT CHANGE UP
LYMPHOCYTES # BLD AUTO: 0.73 K/UL — LOW (ref 1–3.3)
LYMPHOCYTES # BLD AUTO: 8.4 % — LOW (ref 13–44)
MAGNESIUM SERPL-MCNC: 2.2 MG/DL — SIGNIFICANT CHANGE UP (ref 1.6–2.6)
MAGNESIUM SERPL-MCNC: 2.2 MG/DL — SIGNIFICANT CHANGE UP (ref 1.6–2.6)
MCHC RBC-ENTMCNC: 29.8 PG — SIGNIFICANT CHANGE UP (ref 27–34)
MCHC RBC-ENTMCNC: 31.3 G/DL — LOW (ref 32–36)
MCV RBC AUTO: 95.1 FL — SIGNIFICANT CHANGE UP (ref 80–100)
MONOCYTES # BLD AUTO: 0.61 K/UL — SIGNIFICANT CHANGE UP (ref 0–0.9)
MONOCYTES NFR BLD AUTO: 7 % — SIGNIFICANT CHANGE UP (ref 2–14)
NEUTROPHILS # BLD AUTO: 7.23 K/UL — SIGNIFICANT CHANGE UP (ref 1.8–7.4)
NEUTROPHILS NFR BLD AUTO: 83.5 % — HIGH (ref 43–77)
NITRITE UR-MCNC: NEGATIVE — SIGNIFICANT CHANGE UP
NRBC # BLD: 0 /100 WBCS — SIGNIFICANT CHANGE UP (ref 0–0)
PH UR: 6.5 — SIGNIFICANT CHANGE UP (ref 5–8)
PHOSPHATE SERPL-MCNC: 5.5 MG/DL — HIGH (ref 2.5–4.5)
PLATELET # BLD AUTO: 183 K/UL — SIGNIFICANT CHANGE UP (ref 150–400)
POTASSIUM SERPL-MCNC: 4.6 MMOL/L — SIGNIFICANT CHANGE UP (ref 3.5–5.3)
POTASSIUM SERPL-MCNC: 5.1 MMOL/L — SIGNIFICANT CHANGE UP (ref 3.5–5.3)
POTASSIUM SERPL-MCNC: 5.7 MMOL/L — HIGH (ref 3.5–5.3)
POTASSIUM SERPL-SCNC: 4.6 MMOL/L — SIGNIFICANT CHANGE UP (ref 3.5–5.3)
POTASSIUM SERPL-SCNC: 5.1 MMOL/L — SIGNIFICANT CHANGE UP (ref 3.5–5.3)
POTASSIUM SERPL-SCNC: 5.7 MMOL/L — HIGH (ref 3.5–5.3)
PROT SERPL-MCNC: 6.4 G/DL — SIGNIFICANT CHANGE UP (ref 6–8.3)
PROT SERPL-MCNC: 6.8 G/DL — SIGNIFICANT CHANGE UP (ref 6–8.3)
PROT UR-MCNC: 300 MG/DL
PROTHROM AB SERPL-ACNC: 21.5 SEC — HIGH (ref 9.9–13.4)
RBC # BLD: 3.46 M/UL — LOW (ref 3.8–5.2)
RBC # FLD: 16.8 % — HIGH (ref 10.3–14.5)
RBC CASTS # UR COMP ASSIST: 6 /HPF — HIGH (ref 0–4)
RSV RNA NPH QL NAA+NON-PROBE: SIGNIFICANT CHANGE UP
SARS-COV-2 RNA SPEC QL NAA+PROBE: SIGNIFICANT CHANGE UP
SODIUM SERPL-SCNC: 139 MMOL/L — SIGNIFICANT CHANGE UP (ref 135–145)
SODIUM SERPL-SCNC: 139 MMOL/L — SIGNIFICANT CHANGE UP (ref 135–145)
SODIUM SERPL-SCNC: 144 MMOL/L — SIGNIFICANT CHANGE UP (ref 135–145)
SP GR SPEC: 1.01 — SIGNIFICANT CHANGE UP (ref 1–1.03)
SQUAMOUS # UR AUTO: 7 /HPF — HIGH (ref 0–5)
TROPONIN T, HIGH SENSITIVITY RESULT: 36 NG/L — SIGNIFICANT CHANGE UP (ref 0–51)
TROPONIN T, HIGH SENSITIVITY RESULT: 38 NG/L — SIGNIFICANT CHANGE UP (ref 0–51)
UROBILINOGEN FLD QL: 0.2 MG/DL — SIGNIFICANT CHANGE UP (ref 0.2–1)
WBC # BLD: 8.67 K/UL — SIGNIFICANT CHANGE UP (ref 3.8–10.5)
WBC # FLD AUTO: 8.67 K/UL — SIGNIFICANT CHANGE UP (ref 3.8–10.5)
WBC UR QL: 8 /HPF — HIGH (ref 0–5)

## 2025-01-07 PROCEDURE — 99291 CRITICAL CARE FIRST HOUR: CPT | Mod: GC

## 2025-01-07 PROCEDURE — 93308 TTE F-UP OR LMTD: CPT | Mod: 26

## 2025-01-07 PROCEDURE — 99223 1ST HOSP IP/OBS HIGH 75: CPT | Mod: GC

## 2025-01-07 PROCEDURE — 99223 1ST HOSP IP/OBS HIGH 75: CPT | Mod: GC,AI

## 2025-01-07 PROCEDURE — 71250 CT THORAX DX C-: CPT | Mod: 26

## 2025-01-07 PROCEDURE — 71045 X-RAY EXAM CHEST 1 VIEW: CPT | Mod: 26

## 2025-01-07 RX ORDER — FUROSEMIDE 20 MG
40 TABLET ORAL ONCE
Refills: 0 | Status: COMPLETED | OUTPATIENT
Start: 2025-01-07 | End: 2025-01-07

## 2025-01-07 RX ORDER — SEVELAMER CARBONATE 800 MG/1
800 TABLET, FILM COATED ORAL
Refills: 0 | Status: DISCONTINUED | OUTPATIENT
Start: 2025-01-07 | End: 2025-01-09

## 2025-01-07 RX ORDER — CEFTRIAXONE SODIUM 1 G/1
1000 INJECTION, POWDER, FOR SOLUTION INTRAMUSCULAR; INTRAVENOUS EVERY 24 HOURS
Refills: 0 | Status: DISCONTINUED | OUTPATIENT
Start: 2025-01-07 | End: 2025-01-09

## 2025-01-07 RX ORDER — CALCIUM CARBONATE/VITAMIN D3 500MG-5MCG
1 TABLET ORAL DAILY
Refills: 0 | Status: DISCONTINUED | OUTPATIENT
Start: 2025-01-07 | End: 2025-01-09

## 2025-01-07 RX ORDER — FERROUS SULFATE 325(65) MG
325 TABLET ORAL DAILY
Refills: 0 | Status: DISCONTINUED | OUTPATIENT
Start: 2025-01-07 | End: 2025-01-09

## 2025-01-07 RX ORDER — METOPROLOL TARTRATE 50 MG
25 TABLET ORAL ONCE
Refills: 0 | Status: COMPLETED | OUTPATIENT
Start: 2025-01-07 | End: 2025-01-07

## 2025-01-07 RX ORDER — APIXABAN 5 MG/1
2.5 TABLET, FILM COATED ORAL EVERY 12 HOURS
Refills: 0 | Status: DISCONTINUED | OUTPATIENT
Start: 2025-01-07 | End: 2025-01-09

## 2025-01-07 RX ORDER — BUMETANIDE 2 MG/1
3 TABLET ORAL ONCE
Refills: 0 | Status: COMPLETED | OUTPATIENT
Start: 2025-01-07 | End: 2025-01-07

## 2025-01-07 RX ORDER — AZITHROMYCIN MONOHYDRATE 200 MG/5ML
500 POWDER, FOR SUSPENSION ORAL DAILY
Refills: 0 | Status: DISCONTINUED | OUTPATIENT
Start: 2025-01-07 | End: 2025-01-09

## 2025-01-07 RX ORDER — SODIUM BICARBONATE 84 MG/ML
650 INJECTION, SOLUTION INTRAVENOUS THREE TIMES A DAY
Refills: 0 | Status: DISCONTINUED | OUTPATIENT
Start: 2025-01-07 | End: 2025-01-07

## 2025-01-07 RX ORDER — APIXABAN 5 MG/1
5 TABLET, FILM COATED ORAL EVERY 12 HOURS
Refills: 0 | Status: DISCONTINUED | OUTPATIENT
Start: 2025-01-07 | End: 2025-01-07

## 2025-01-07 RX ORDER — METOPROLOL TARTRATE 50 MG
5 TABLET ORAL ONCE
Refills: 0 | Status: COMPLETED | OUTPATIENT
Start: 2025-01-07 | End: 2025-01-07

## 2025-01-07 RX ORDER — APIXABAN 5 MG/1
5 TABLET, FILM COATED ORAL ONCE
Refills: 0 | Status: COMPLETED | OUTPATIENT
Start: 2025-01-07 | End: 2025-01-07

## 2025-01-07 RX ORDER — METOPROLOL TARTRATE 50 MG
100 TABLET ORAL DAILY
Refills: 0 | Status: DISCONTINUED | OUTPATIENT
Start: 2025-01-08 | End: 2025-01-09

## 2025-01-07 RX ORDER — METOPROLOL TARTRATE 50 MG
50 TABLET ORAL DAILY
Refills: 0 | Status: DISCONTINUED | OUTPATIENT
Start: 2025-01-07 | End: 2025-01-07

## 2025-01-07 RX ORDER — SODIUM BICARBONATE 84 MG/ML
1300 INJECTION, SOLUTION INTRAVENOUS THREE TIMES A DAY
Refills: 0 | Status: DISCONTINUED | OUTPATIENT
Start: 2025-01-07 | End: 2025-01-09

## 2025-01-07 RX ADMIN — Medication 40 MILLIGRAM(S): at 08:40

## 2025-01-07 RX ADMIN — SEVELAMER CARBONATE 800 MILLIGRAM(S): 800 TABLET, FILM COATED ORAL at 18:06

## 2025-01-07 RX ADMIN — SODIUM BICARBONATE 1300 MILLIGRAM(S): 84 INJECTION, SOLUTION INTRAVENOUS at 23:40

## 2025-01-07 RX ADMIN — Medication 5 MILLIGRAM(S): at 08:52

## 2025-01-07 RX ADMIN — CEFTRIAXONE SODIUM 100 MILLIGRAM(S): 1 INJECTION, POWDER, FOR SOLUTION INTRAMUSCULAR; INTRAVENOUS at 18:22

## 2025-01-07 RX ADMIN — Medication 50 MILLIGRAM(S): at 08:30

## 2025-01-07 RX ADMIN — Medication 25 MILLIGRAM(S): at 18:04

## 2025-01-07 RX ADMIN — APIXABAN 5 MILLIGRAM(S): 5 TABLET, FILM COATED ORAL at 08:30

## 2025-01-07 RX ADMIN — APIXABAN 2.5 MILLIGRAM(S): 5 TABLET, FILM COATED ORAL at 19:53

## 2025-01-07 RX ADMIN — BUMETANIDE 124 MILLIGRAM(S): 2 TABLET ORAL at 12:21

## 2025-01-07 NOTE — CONSULT NOTE ADULT - SUBJECTIVE AND OBJECTIVE BOX
[facility] DIVISION OF KIDNEY DISEASES AND HYPERTENSION -- [phone]  -- INITIAL CONSULT NOTE  --------------------------------------------------------------------------------  HPI: 74F with PMH of HTN,         PAST HISTORY  --------------------------------------------------------------------------------  PAST MEDICAL & SURGICAL HISTORY:  Renal Calculus  Ureteral Calculus  Acute Renal Failure  9/2009  Wrist Fracture  CYNTHIA  Collar Bone Fracture  Rib Fractures  Kidney Stone removal  3/15/2011  C Section  Percutaneous Stone Extraction  Right  S/P Left Percutaneuos Stone extraction  01/26/2010, 04/20/2010    FAMILY HISTORY:  No family history of kidney disease    PAST SOCIAL HISTORY:  No history of smoking    ALLERGIES & MEDICATIONS  --------------------------------------------------------------------------------  Allergies  No Known Allergies    Intolerances    Standing Inpatient MedicationsamLODIPine   Tablet 5 milliGRAM(s) Oral daily  apixaban 5 milliGRAM(s) Oral every 12 hours  calcium carbonate 1250 mG  + Vitamin D (OsCal 500 + D) 1 Tablet(s) Oral daily  ferrous    sulfate 325 milliGRAM(s) Oral daily  metoprolol succinate ER 50 milliGRAM(s) Oral daily  sevelamer carbonate 800 milliGRAM(s) Oral three times a day with meals  sodium bicarbonate 650 milliGRAM(s) Oral three times a day    PRN Inpatient Medications    REVIEW OF SYSTEMS  --------------------------------------------------------------------------------  Gen: No fevers/chills  Skin: No rashes  Head/Eyes/Ears: No HA  Respiratory: +SOB  CV: +palpitations  GI: No abdominal pain, diarrhea  : No dysuria, hematuria  MSK: +Edema  Heme: No easy bruising or bleeding  Psych: No significant depression    All other systems were reviewed and are negative, except as noted.    VITALS/PHYSICAL EXAM  --------------------------------------------------------------------------------  T(C): 36.3 (01-07-25 @ 10:45), Max: 37.6 (01-07-25 @ 08:10)  HR: 115 (01-07-25 @ 10:45) (115 - 142)  BP: 130/88 (01-07-25 @ 10:45) (130/88 - 175/106)  RR: 22 (01-07-25 @ 10:45) (18 - 30)  SpO2: 96% (01-07-25 @ 10:45) (94% - 96%)  Wt(kg): --  Height (cm): 157.5 (01-07-25 @ 07:19)  Weight (kg): 59 (01-07-25 @ 07:19)  BMI (kg/m2): 23.8 (01-07-25 @ 07:19)  BSA (m2): 1.59 (01-07-25 @ 07:19)    Physical Exam:  Gen: NAD  HEENT: MMM  Pulm: Bibasilar rales, on NC  CV: S1S2  Abd: Soft, +BS   Ext: B/L LE edema  Neuro: Awake  Skin: Warm and dry  Vascular access: None for HD    LABS/STUDIES  --------------------------------------------------------------------------------              10.3   8.67  >-----------<  183      [01-07-25 @ 08:26]              32.9     139  |  108  |  84  ----------------------------<  129      [01-07-25 @ 10:43]  5.7   |  10  |  5.91        Ca     8.3     [01-07-25 @ 10:43]      Mg     2.2     [01-07-25 @ 08:26]    TPro  6.4  /  Alb  3.5  /  TBili  0.3  /  DBili  x   /  AST  10  /  ALT  5   /  AlkPhos  104  [01-07-25 @ 10:43]    PT/INR: PT 21.5 , INR 1.88       [01-07-25 @ 08:26]  PTT: 33.8       [01-07-25 @ 08:26]    Creatinine Trend:  SCr 5.91 [01-07 @ 10:43]  SCr 5.98 [01-07 @ 08:26]    Iron 61, TIBC 301, %sat 20      [11-22-24 @ 21:13]  Ferritin 101      [11-22-24 @ 21:13]  PTH -- (Ca 8.1)      [11-22-24 @ 21:13]   595  TSH 0.65      [04-09-24 @ 00:27] [facility] DIVISION OF KIDNEY DISEASES AND HYPERTENSION -- [phone]  -- INITIAL CONSULT NOTE  --------------------------------------------------------------------------------  HPI: 74F with PMH of HTN, CKD stage 5, Afib on eliquis, anemia, nephrolithiasis, and recently diagnosed HF who presents to the ED complaining of palpitations, cough, and flank pain. Over the past few days, she reports upper respiratory infectious symptoms. Nephrology consulted for management of advanced CKD.     Patient follows with Dr. Elizondo outpatient. Advanced CKD is in the setting of chronic history of nephrolithiasis/staghorn calculus and now with superimposed HF. On review of labs on [place], last Scr was elevated at 6.77 on 1/3/25. Admission Scr remained elevated but improved to 5.98 today. Patient does not have dialysis access but discussions have taken place in the outpatient setting to prepare. She was scheduled to see Dr. White for AVF creation.    Patient seen and evaluated in the ED. Reports improvement in breathing and palpitations. Still has mild LE. Denies HA, fevers/chills, abdominal pain, or dysuria. Remains in Afib with RVR (110-120s).    PAST HISTORY  --------------------------------------------------------------------------------  PAST MEDICAL & SURGICAL HISTORY:  Renal Calculus  Ureteral Calculus  Acute Renal Failure  9/2009  Wrist Fracture  CYNTHIA  Collar Bone Fracture  Rib Fractures  Kidney Stone removal  3/15/2011  C Section  Percutaneous Stone Extraction  Right  S/P Left Percutaneuos Stone extraction  01/26/2010, 04/20/2010    FAMILY HISTORY:  No family history of kidney disease    PAST SOCIAL HISTORY:  No history of smoking    ALLERGIES & MEDICATIONS  --------------------------------------------------------------------------------  Allergies  No Known Allergies    Intolerances    Standing Inpatient MedicationsamLODIPine   Tablet 5 milliGRAM(s) Oral daily  apixaban 5 milliGRAM(s) Oral every 12 hours  calcium carbonate 1250 mG  + Vitamin D (OsCal 500 + D) 1 Tablet(s) Oral daily  ferrous    sulfate 325 milliGRAM(s) Oral daily  metoprolol succinate ER 50 milliGRAM(s) Oral daily  sevelamer carbonate 800 milliGRAM(s) Oral three times a day with meals  sodium bicarbonate 650 milliGRAM(s) Oral three times a day    PRN Inpatient Medications    REVIEW OF SYSTEMS  --------------------------------------------------------------------------------  Gen: No fevers/chills  Skin: No rashes  Head/Eyes/Ears: No HA  Respiratory: +SOB  CV: +palpitations  GI: No abdominal pain, diarrhea  : No dysuria, hematuria  MSK: +Edema  Heme: No easy bruising or bleeding  Psych: No significant depression    All other systems were reviewed and are negative, except as noted.    VITALS/PHYSICAL EXAM  --------------------------------------------------------------------------------  T(C): 36.3 (01-07-25 @ 10:45), Max: 37.6 (01-07-25 @ 08:10)  HR: 115 (01-07-25 @ 10:45) (115 - 142)  BP: 130/88 (01-07-25 @ 10:45) (130/88 - 175/106)  RR: 22 (01-07-25 @ 10:45) (18 - 30)  SpO2: 96% (01-07-25 @ 10:45) (94% - 96%)  Wt(kg): --  Height (cm): 157.5 (01-07-25 @ 07:19)  Weight (kg): 59 (01-07-25 @ 07:19)  BMI (kg/m2): 23.8 (01-07-25 @ 07:19)  BSA (m2): 1.59 (01-07-25 @ 07:19)    Physical Exam:  Gen: NAD  HEENT: MMM  Pulm: Bibasilar rales, on NC  CV: S1S2  Abd: Soft, +BS   Ext: B/L LE edema  Neuro: Awake  Skin: Warm and dry  Vascular access: None for HD    LABS/STUDIES  --------------------------------------------------------------------------------              10.3   8.67  >-----------<  183      [01-07-25 @ 08:26]              32.9     139  |  108  |  84  ----------------------------<  129      [01-07-25 @ 10:43]  5.7   |  10  |  5.91        Ca     8.3     [01-07-25 @ 10:43]      Mg     2.2     [01-07-25 @ 08:26]    TPro  6.4  /  Alb  3.5  /  TBili  0.3  /  DBili  x   /  AST  10  /  ALT  5   /  AlkPhos  104  [01-07-25 @ 10:43]    PT/INR: PT 21.5 , INR 1.88       [01-07-25 @ 08:26]  PTT: 33.8       [01-07-25 @ 08:26]    Creatinine Trend:  SCr 5.91 [01-07 @ 10:43]  SCr 5.98 [01-07 @ 08:26]    Iron 61, TIBC 301, %sat 20      [11-22-24 @ 21:13]  Ferritin 101      [11-22-24 @ 21:13]  PTH -- (Ca 8.1)      [11-22-24 @ 21:13]   595  TSH 0.65      [04-09-24 @ 00:27] [facility] DIVISION OF KIDNEY DISEASES AND HYPERTENSION -- [phone]  -- INITIAL CONSULT NOTE  --------------------------------------------------------------------------------  HPI: 74F with PMH of HTN, CKD stage 5, Afib on eliquis, anemia, nephrolithiasis, and recently diagnosed HF who presents to the ED complaining of palpitations, cough, and flank pain. Over the past few days, she reports upper respiratory infectious symptoms. Nephrology consulted for management of advanced CKD and metabolic acidosis.     Patient follows with Dr. Elizondo outpatient. Advanced CKD is in the setting of chronic history of nephrolithiasis/staghorn calculus and now with superimposed HF. On review of labs on [place], last Scr was elevated at 6.77 on 1/3/25. Admission Scr remained elevated but improved to 5.98 today. Patient does not have dialysis access but discussions have taken place in the outpatient setting to prepare. She was scheduled to see Dr. White for AVF creation.    Patient seen and evaluated in the ED. Reports improvement in breathing and palpitations. Still has mild LE. Denies HA, fevers/chills, abdominal pain, or dysuria. Remains in Afib with RVR (110-120s).    PAST HISTORY  --------------------------------------------------------------------------------  PAST MEDICAL & SURGICAL HISTORY:  Renal Calculus  Ureteral Calculus  Acute Renal Failure  9/2009  Wrist Fracture  CYNTHIA  Collar Bone Fracture  Rib Fractures  Kidney Stone removal  3/15/2011  C Section  Percutaneous Stone Extraction  Right  S/P Left Percutaneuos Stone extraction  01/26/2010, 04/20/2010    FAMILY HISTORY:  No family history of kidney disease    PAST SOCIAL HISTORY:  No history of smoking    ALLERGIES & MEDICATIONS  --------------------------------------------------------------------------------  Allergies  No Known Allergies    Intolerances    Standing Inpatient MedicationsamLODIPine   Tablet 5 milliGRAM(s) Oral daily  apixaban 5 milliGRAM(s) Oral every 12 hours  calcium carbonate 1250 mG  + Vitamin D (OsCal 500 + D) 1 Tablet(s) Oral daily  ferrous    sulfate 325 milliGRAM(s) Oral daily  metoprolol succinate ER 50 milliGRAM(s) Oral daily  sevelamer carbonate 800 milliGRAM(s) Oral three times a day with meals  sodium bicarbonate 650 milliGRAM(s) Oral three times a day    PRN Inpatient Medications    REVIEW OF SYSTEMS  --------------------------------------------------------------------------------  Gen: No fevers/chills  Skin: No rashes  Head/Eyes/Ears: No HA  Respiratory: +SOB  CV: +palpitations  GI: No abdominal pain, diarrhea  : No dysuria, hematuria  MSK: +Edema  Heme: No easy bruising or bleeding  Psych: No significant depression    All other systems were reviewed and are negative, except as noted.    VITALS/PHYSICAL EXAM  --------------------------------------------------------------------------------  T(C): 36.3 (01-07-25 @ 10:45), Max: 37.6 (01-07-25 @ 08:10)  HR: 115 (01-07-25 @ 10:45) (115 - 142)  BP: 130/88 (01-07-25 @ 10:45) (130/88 - 175/106)  RR: 22 (01-07-25 @ 10:45) (18 - 30)  SpO2: 96% (01-07-25 @ 10:45) (94% - 96%)  Wt(kg): --  Height (cm): 157.5 (01-07-25 @ 07:19)  Weight (kg): 59 (01-07-25 @ 07:19)  BMI (kg/m2): 23.8 (01-07-25 @ 07:19)  BSA (m2): 1.59 (01-07-25 @ 07:19)    Physical Exam:  Gen: NAD  HEENT: MMM  Pulm: Bibasilar rales, on NC  CV: S1S2  Abd: Soft, +BS   Ext: B/L LE edema  Neuro: Awake  Skin: Warm and dry  Vascular access: None for HD    LABS/STUDIES  --------------------------------------------------------------------------------              10.3   8.67  >-----------<  183      [01-07-25 @ 08:26]              32.9     139  |  108  |  84  ----------------------------<  129      [01-07-25 @ 10:43]  5.7   |  10  |  5.91        Ca     8.3     [01-07-25 @ 10:43]      Mg     2.2     [01-07-25 @ 08:26]    TPro  6.4  /  Alb  3.5  /  TBili  0.3  /  DBili  x   /  AST  10  /  ALT  5   /  AlkPhos  104  [01-07-25 @ 10:43]    PT/INR: PT 21.5 , INR 1.88       [01-07-25 @ 08:26]  PTT: 33.8       [01-07-25 @ 08:26]    Creatinine Trend:  SCr 5.91 [01-07 @ 10:43]  SCr 5.98 [01-07 @ 08:26]    Iron 61, TIBC 301, %sat 20      [11-22-24 @ 21:13]  Ferritin 101      [11-22-24 @ 21:13]  PTH -- (Ca 8.1)      [11-22-24 @ 21:13]   595  TSH 0.65      [04-09-24 @ 00:27]

## 2025-01-07 NOTE — ED PROVIDER NOTE - PROGRESS NOTE DETAILS
Attending Diandra Bagley: Spoke with patient's cardiologist who is aware.  Blood work shows persistent NICO as well as worsening bicarb.  Patient is on sodium bicarb pills at home per report.  Cardiology will continue to follow.  Will also discuss with nephrology and monitor patient's extremities are warm and well-perfused Gladys Hollingsworth PGY3: Pt reassessed and resting comfortably. Pt still tachypneic. Will repeat vbg at this time. Will continue to closely monitor for possible need for bipap. Attending Diandra Bagley: Patient seen by cardiology recommending 3 mg of IV Bumex.  Paged patient's nephrologist to make aware.  Heart rate has improved.  Patient is persistently in atrial fibrillation we will continue to rate control.  No fevers no productive cough making pneumonia less likely.  Patient will need admission

## 2025-01-07 NOTE — H&P ADULT - PROBLEM SELECTOR PLAN 2
- Prior hx of b/l staghorn calculi s/p removal in 2009; Cr elevated since Apr 2024 (baseline 5's). On arrival 5.91  - Ongoing discussion with outpatient nephrologist regarding HD  - Nephro consulted, appreciate recommendations. No urgent indications for HD.  - Renally dose medications   [] US renal

## 2025-01-07 NOTE — CONSULT NOTE ADULT - SUBJECTIVE AND OBJECTIVE BOX
CHIEF COMPLAINT:    HISTORY OF PRESENT ILLNESS:      Allergies    No Known Allergies    Intolerances    	    MEDICATIONS:  metoprolol succinate ER 50 milliGRAM(s) Oral daily                  PAST MEDICAL & SURGICAL HISTORY:  Renal Calculus      Ureteral Calculus      Acute Renal Failure  9/2009      Wrist Fracture  CYNTHIA      Collar Bone Fracture      Rib Fractures      Kidney Stone removal  3/15/2011      C Section      Percutaneous Stone Extraction  Right      S/P Left Percutaneuos Stone extraction  01/26/2010, 04/20/2010          FAMILY HISTORY:      SOCIAL HISTORY:    [ ] Non-smoker  [ ] Smoker  [ ] Alcohol      REVIEW OF SYSTEMS:  See HPI. Otherwise, 12 point ROS done and otherwise negative.    PHYSICAL EXAM:  T(C): 36.3 (01-07-25 @ 10:45), Max: 37.6 (01-07-25 @ 08:10)  HR: 115 (01-07-25 @ 10:45) (115 - 142)  BP: 130/88 (01-07-25 @ 10:45) (130/88 - 175/106)  RR: 22 (01-07-25 @ 10:45) (18 - 30)  SpO2: 96% (01-07-25 @ 10:45) (94% - 96%)  Wt(kg): --  I&O's Summary      Appearance: Normal	  HEENT: PERRL, EOMI	  Cardiovascular: Normal S1 S2, No JVD, No murmurs, No edema  Respiratory: Lungs clear to auscultation	  Psychiatry: A & O x 3, Mood & affect appropriate  Gastrointestinal:  Soft, Non-tender, + BS	  Skin: No rashes, No ecchymoses, No cyanosis	  Neurologic: Grossly intact  Extremities: No clubbing, cyanosis or edema  Vascular: Peripheral pulses palpable 2+ bilaterally        LABS:	 	    CBC Full  -  ( 07 Jan 2025 08:26 )  WBC Count : 8.67 K/uL  Hemoglobin : 10.3 g/dL  Hematocrit : 32.9 %  Platelet Count - Automated : 183 K/uL  Mean Cell Volume : 95.1 fl  Mean Cell Hemoglobin : 29.8 pg  Mean Cell Hemoglobin Concentration : 31.3 g/dL  Auto Neutrophil # : 7.23 K/uL  Auto Lymphocyte # : 0.73 K/uL  Auto Monocyte # : 0.61 K/uL  Auto Eosinophil # : 0.02 K/uL  Auto Basophil # : 0.03 K/uL  Auto Neutrophil % : 83.5 %  Auto Lymphocyte % : 8.4 %  Auto Monocyte % : 7.0 %  Auto Eosinophil % : 0.2 %  Auto Basophil % : 0.3 %    01-07    139  |  108  |  84[H]  ----------------------------<  129[H]  5.7[H]   |  10[LL]  |  5.91[H]  01-07    139  |  107  |  84[H]  ----------------------------<  152[H]  5.1   |  11[L]  |  5.98[H]    Ca    8.3[L]      07 Jan 2025 10:43  Ca    8.7      07 Jan 2025 08:26  Mg     2.2     01-07    TPro  6.4  /  Alb  3.5  /  TBili  0.3  /  DBili  x   /  AST  10  /  ALT  5[L]  /  AlkPhos  104  01-07  TPro  6.8  /  Alb  3.8  /  TBili  0.3  /  DBili  x   /  AST  10  /  ALT  7[L]  /  AlkPhos  111  01-07      proBNP:   Lipid Profile:   HgA1c:   TSH:       CARDIAC MARKERS:                TELEMETRY: 	    ECG:  	  RADIOLOGY:  OTHER: 	    PREVIOUS DIAGNOSTIC TESTING:    [ ] Echocardiogram:  [ ]  Catheterization:  [ ] Stress Test:  	  	  ASSESSMENT/PLAN: 	     CHIEF COMPLAINT:    HISTORY OF PRESENT ILLNESS:  74 year old with PMH of HTN, 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 Overnight patient woke up to use the bathroom and as she got up noticed that her heart was racing, no chest pain or shortness of breath. Patient reports intermittent palpitations over the past one week. Over the past week she has had symptoms of a URI including cough but no fevers/chills, myalgias. Her  was also similarly sick. In the ED initially presented in afib w/ RVR to 140s, otherwise hemodynamically stable. Labs significant for metabolic acidosis (VBG 7.25/33/41/14). S/p lasix, bumex, lopressor and eliquis. Patient is a poor historian Patient's  states he prepares her medications and reports she has not missed any eliquis in the past 4 weeks.      In 2024, patient was admitted with new onset bilateral lower edema and renal dysfunction in the setting of acute decompensated heart failure and new-onset atrial fibrillation with rapid ventricular rate. Her initial transthoracic echocardiogram (2024) revealed a significant reduction in LV systolic function (EF: 37%) and severe MR, however these improved on her repeat echocardiogram on 2024 (LVEF 55 to 60%, trace MR). Once optimized from a cardiac and renal standpoint, she underwent a successful electrical cardioversion and started on amiodarone. She was doing well and amiodarone was discontinued in May 2024. She was placed on a 2-week event monitor in 2024 to monitor for AF burden off amiodarone and showed AF burden of 3%.     Outpatient TTE from 24 showed LVEF 55%, no RWMA, mod (grade 2) LVDD, severely dilated LA (RAUL: 85), mod-mod MR, mild-mod AR, mod plum HTN.       Allergies    No Known Allergies    Intolerances    	    HOME MEDICATIONS:  · 	sodium bicarbonate 650 mg oral tablet: 1 tab(s) orally 3 times a day  · 	vitamin D-calcium (as carbonate) 5 mcg-500 mg oral tablet: 1 tab(s) orally once a day  · 	ferrous sulfate 325 mg (65 mg elemental iron) oral tablet: 1 tab(s) orally once a day  · 	sevelamer carbonate 800 mg oral tablet: 1 tab(s) orally 3 times a day (with meals)  · 	apixaban 5 mg oral tablet: 1 tab(s) orally 2 times a day  · 	metoprolol succinate 50 mg oral tablet, extended release: 1 tab(s) orally once a day  · 	amLODIPine 5 mg oral tablet: 1 tab(s) orally once a day          PAST MEDICAL & SURGICAL HISTORY:  Renal Calculus      Ureteral Calculus      Acute Renal Failure  2009      Wrist Fracture  CYNTHIA      Collar Bone Fracture      Rib Fractures      Kidney Stone removal  3/15/2011      C Section      Percutaneous Stone Extraction  Right      S/P Left Percutaneuos Stone extraction  2010, 2010          FAMILY HISTORY:      SOCIAL HISTORY: , former smoker (quit many years ago), social ETOH (1 glass of wine on the weekends), independent with ADL.     REVIEW OF SYSTEMS:  See HPI. Otherwise, 12 point ROS done and otherwise negative.    PHYSICAL EXAM:  T(C): 36.3 (25 @ 10:45), Max: 37.6 (25 @ 08:10)  HR: 115 (25 @ 10:45) (115 - 142)  BP: 130/88 (25 @ 10:45) (130/88 - 175/106)  RR: 22 (25 @ 10:45) (18 - 30)  SpO2: 96% (25 @ 10:45) (94% - 96%)  Wt(kg): --  I&O's Summary      Appearance: Normal	  HEENT: PERRL, EOMI	  Cardiovascular: Normal S1 S2, Irregularly irregular, No JVD, No murmurs  Respiratory: Lungs clear to auscultation	  Psychiatry: A & O x 3, Mood & affect appropriate  Gastrointestinal: Soft, Non-tender, + BS	  Skin: No rashes, No ecchymoses, No cyanosis	  Neurologic: Grossly intact  Extremities: No clubbing, cyanosis or edema  Vascular: Peripheral pulses palpable 2+ bilaterally        LABS:	 	    CBC Full  -  ( 2025 08:26 )  WBC Count : 8.67 K/uL  Hemoglobin : 10.3 g/dL  Hematocrit : 32.9 %  Platelet Count - Automated : 183 K/uL  Mean Cell Volume : 95.1 fl  Mean Cell Hemoglobin : 29.8 pg  Mean Cell Hemoglobin Concentration : 31.3 g/dL  Auto Neutrophil # : 7.23 K/uL  Auto Lymphocyte # : 0.73 K/uL  Auto Monocyte # : 0.61 K/uL  Auto Eosinophil # : 0.02 K/uL  Auto Basophil # : 0.03 K/uL  Auto Neutrophil % : 83.5 %  Auto Lymphocyte % : 8.4 %  Auto Monocyte % : 7.0 %  Auto Eosinophil % : 0.2 %  Auto Basophil % : 0.3 %        139  |  108  |  84[H]  ----------------------------<  129[H]  5.7[H]   |  10[LL]  |  5.91[H]      139  |  107  |  84[H]  ----------------------------<  152[H]  5.1   |  11[L]  |  5.98[H]    Ca    8.3[L]      2025 10:43  Ca    8.7      2025 08:26  Mg     2.2         TPro  6.4  /  Alb  3.5  /  TBili  0.3  /  DBili  x   /  AST  10  /  ALT  5[L]  /  AlkPhos  104    TPro  6.8  /  Alb  3.8  /  TBili  0.3  /  DBili  x   /  AST  10  /  ALT  7[L]  /  AlkPhos  111        proBNP: 36,866    Thyroid Stimulating Hormone, Serum (24 @ 00:27)    Thyroid Stimulating Hormone, Serum: 0.65 uIU/mL      TELEMETRY: Afib with RVR currently 110-120's (was 140's on admission)  	    EC2025: Afib with RVR at 139bpm  2024: NSR at 66bpm

## 2025-01-07 NOTE — H&P ADULT - PROBLEM SELECTOR PLAN 6
- RVP negative but CT chest with findings c/f pneumonia  - Patient has been coughing for the past week, indicates cough is improved  - Afebrile (low grade temp 99.6), no leukocytosis, no lactate but given afib RVR, reasonable to treat for CAP  [] f/u urine strep and legionella  [] sputum cx if possible

## 2025-01-07 NOTE — ED PROVIDER NOTE - CARE PLAN
Principal Discharge DX:	Atrial fibrillation with RVR  Secondary Diagnosis:	Acute systolic congestive heart failure   1

## 2025-01-07 NOTE — ED PROVIDER NOTE - NSTIMEPROVIDERCAREINITIATE_GEN_ER
"Subjective   Chief Complaint   Patient presents with   • Postpartum Care     2w3d PPV; states her incision burns sometimes denies drainage/bleeding     Yesenia Reynoso is a 23 y.o. year old  presenting to be seen for her post-operative visit.  Currently she reports no problems with eating, bowel movements, voiding, or wound drainage and pain is well controlled. EPDS: 0    OTHER THINGS SHE WANTS TO DISCUSS TODAY:  Nothing else    The following portions of the patient's history were reviewed and updated as appropriate:current medications and allergies       Objective   /70   Ht 162.6 cm (64\")   Wt 75.8 kg (167 lb)   Breastfeeding? No   BMI 28.67 kg/m²     General:  well developed; well nourished  no acute distress   Abdomen: soft, non-tender; no masses  no umbilical or inguinal hernias are present  no hepato-splenomegaly  incision is healing   Pelvis: Not performed.          Assessment   1. S/P      Plan   1. Okay to remover steri-strips.  2. Avoid tampons, douching and intercourse  3. Lifting restriction of no more than 20 pounds  4. The importance of keeping all planned follow-up and taking all medications as prescribed was emphasized.  5. Follow up for postpartum visit in 4  weeks.    No orders of the defined types were placed in this encounter.         This note was electronically signed.    Mayela Watkins DO  2019    Note: Speech recognition transcription software may have been used to create portions of this document.  An attempt at proofreading has been made but errors in transcription could still be present.  " 07-Jan-2025 07:31

## 2025-01-07 NOTE — H&P ADULT - PROBLEM SELECTOR PLAN 4
-CT chest with pulmonary edema   -LVEF in 4/2024 normal with moderate MR  -Appears hypervolemic on exam  -C/w bumex 3mg qd  -Monitor I&O's, daily weights  [] TTE

## 2025-01-07 NOTE — ED PROVIDER NOTE - CLINICAL SUMMARY MEDICAL DECISION MAKING FREE TEXT BOX
74-year-old female with multiple medical issues including A-fib on Eliquis, as well as metoprolol, history of CKD, staghorn calculi presenting with concern for palpitations since yesterday. Pt endorsing URI symptoms for last few days as well. Initial EKG shows afib RVR in 130s. Pt well appearing but mildly tachypneic. Scattered wheezing and crackles b/l. No peripheral edema on exam. Considerations include HF 2/2 afib RVR, URI, PNA. Low concern for PE as pt compliant with Eliquis and repsiratory symptoms more consistent with fluid overload in setting on afib RVR. .Will consider other causes for afib RVR including electrolyte abnormalities, infectious etiology, valvular pathology. Plan for labs, EKG, CXR, POCUS. Dispo likely admission. 74-year-old female with multiple medical issues including A-fib on Eliquis, as well as metoprolol, history of CKD, staghorn calculi presenting with concern for palpitations since yesterday. Pt endorsing URI symptoms for last few days as well. Initial EKG shows afib RVR in 130s. Pt well appearing but mildly tachypneic. Scattered wheezing and crackles b/l. No peripheral edema on exam. Considerations include HF 2/2 afib RVR, URI, PNA. Low concern for PE as pt compliant with Eliquis and respiratory symptoms more consistent with fluid overload in setting on afib RVR. .Will consider other causes for afib RVR including electrolyte abnormalities, infectious etiology, valvular pathology. Plan for labs, EKG, CXR, POCUS. Dispo likely admission. 74-year-old female with multiple medical issues including A-fib on Eliquis, as well as metoprolol, history of CKD, staghorn calculi presenting with concern for palpitations since yesterday. Pt endorsing URI symptoms for last few days as well. Initial EKG shows afib RVR in 130s. Pt well appearing but mildly tachypneic. Scattered wheezing and crackles b/l. No peripheral edema on exam. Considerations include HF 2/2 afib RVR, URI, PNA. Low concern for PE as pt compliant with Eliquis and respiratory symptoms more consistent with fluid overload in setting on afib RVR. .Will consider other causes for afib RVR including electrolyte abnormalities, infectious etiology, valvular pathology. Plan for labs, EKG, CXR, POCUS. Dispo likely admission.  Attending Diandra Bagley: 75 yo female with multiple medical issues presenting with concern for palpitaitons. upon arrival pt tachcyardic. ekg with evidence of rapid atrial fibrillation. pt is on antiocoagulation, less likley PE as cause of rapid afib. pt did not take any of her medications today. pocus performed upon arrival with evidence of LA enlargement, mitral and tricupid regurgiations, scattered B lines and plethoric IVC. concern for fluid overload. pt not currently on diuretic as has been having difficulty with creatinine. will start diuresis. no fever rectally. pt did have recent cough. cxr with effusions, ct chest ordered to fruther evaluate for any evidence of pna. d/w pt's cardiologist as concern for fluid overload and nephrology called. pt will need admission, diuresis.

## 2025-01-07 NOTE — CONSULT NOTE ADULT - PROBLEM SELECTOR RECOMMENDATION 2
SCO2 low at 11 and remains low at 10 on repeat. Takes PO bicarb 650mg TID. Increase to 1300mg TID. Monitor SCO2.     If you have any questions, please feel free to contact me.  Shahab Jiménez MD  Nephrology Fellow  [email or web address] / [employer] Teams (Preferred)  (Please check the on-call schedule to reach the appropriate Nephrology Fellow)

## 2025-01-07 NOTE — ED ADULT NURSE NOTE - OBJECTIVE STATEMENT
Pt is a 74y F PMH Afib on Eliquis, CKD c/o palpitations and flank pain. Pt presents in rapid Afib. Pt denies sob, cp. Pt presents tachycardiac, tachypneic, placed on 3L NC. Pt placed in gown, placed on cardiac monitor, IV access obtained. Pt resting in stretcher, bed in lowest position,  and bedside, aware of plan of care. Comfort and safety measures maintained.

## 2025-01-07 NOTE — H&P ADULT - ATTENDING COMMENTS
# Afib with RVR   # s/p ablation, on Eliquis  # acute on chronic HFrEF   # pulmonary nodule  # NICO on CKD 5  # metabolic acidosis  # severe MR    - pt with chronic AFib s/p cardioversion in 4/2024, presents with palpitation found to be in AFib with RVR at 150s as well as acute on chronic HFrEF  - appreciate EP recs: discuss timing of ablation as outpatient when systemic AC not interrupted  - resume amiodarone (previously dc’ed as AF burden only 3%) and uptitrate metoprolol  - appreciate nephro recs: plan for AVF as outpatient on 1/16 and initiation of HD. No indication at this time for RRT on this admission  - start IV bumex 3mg QD and monitor volume status  - obtain TTE  - start bicarb tabs and monitor pH  - CT chest with 2cm pulmonary nodule. Will discuss with patient and referral to pulm for biopsy as outpatient    Daisy Moran MD  Division of Hospital Medicine  Contact via [employer] Teams  Office: [phone]    I have personally and independently provided 76 minutes in patient encounter, reviewing tests and imaging, independently obtaining a history, performing a physical examination, discussing the plan with the patient, ordering medications/tests, documenting clinical information, and coordinating care. This excludes any time spent on teaching.

## 2025-01-07 NOTE — CONSULT NOTE ADULT - ASSESSMENT
Aga is a 74yoF PMH HFmrEF now normalized LVEF, severe MR, pAF, HTN, CKD with bl Cr 5 who presents for ADHF and AF with RVR    #ADHF: Pro BNP 36k, CXR with bilateral opacification, CT chest is pending   - Needs high dose IV Bumex, at least 3mg IV  - Needs renal consult as noted below  - Recent TTE with mod to severe MR. AF and CKD are contributing to this presentation.   - Needs afterload reduction as well. once AF is more controlled on BB and there is still BP room, would initiate Hydralazine 25mg TID for afterload reduction to reduce MR severity     #AF with RVR:  - Previous /DCCV April of 2024 for very similar presentation   - Resume Metoprolol BID  - Continue home Eliquis  - If rates remain uncontrolled, will need EP consult later today for DCCV. She has been on uninterrupted AC.     #CKD:  - needs renal consult  - Need high dose Lasix or Bumex for diuresis. May require temporary HD for aggressive volume removal  Aga is a 74yoF PMH HFmrEF now normalized LVEF, severe MR, pAF, HTN, CKD with bl Cr 5 who presents for ADHF and AF with RVR    #ADHF: Pro BNP 36k, CXR with bilateral opacification, CT chest is pending   - S/p IV lasix 40mg x 1  - Will need higher dose of IV lasix or IV Bumex, would rec at least 3mg IV Bumex.   - Needs renal consult as noted below  - Recent TTE with mod to severe MR. AF and CKD are contributing to this presentation.   - Needs afterload reduction as well. once AF is more controlled on BB and there is still BP room, would initiate Hydralazine 25mg TID for afterload reduction to reduce MR severity     #AF with RVR:  - Previous /DCCV April of 2024 for very similar presentation   - S/p IV metoprolol and PO dosing in the ER with marked improvement in her sxs  - Resume Metoprolol BID, increase as needed for rate control. Remains in RVR but down to 110s-120s  - Please obtain 12 lead EKG  - Continue home Eliquis (given dose this AM in the ER)  - If rates remain uncontrolled, will need EP consult today for DCCV.     #CKD:  - needs renal consult. Previous Cr from 1/3 up to 6.7, now downtrended to 5.98  - Need high dose Lasix or Bumex for diuresis. May require temporary HD for aggressive volume removal if does not improve with IV diuretic    Will follow closely.

## 2025-01-07 NOTE — ED PROVIDER NOTE - ATTENDING CONTRIBUTION TO CARE
Attending MD Diandra Bagley:  I personally have seen and examined this patient.  Resident note reviewed and agree on plan of care and except where noted.  See HPI, PE, and MDM for details.

## 2025-01-07 NOTE — H&P ADULT - NSHPPHYSICALEXAM_GEN_ALL_CORE
EKG: Afib RVR 120s    GENERAL: NAD, lying in bed comfortably  EYES: EOMI, PERRLA, conjunctiva and sclera clear  NECK: Supple, trachea midline, no JVD  HEART: irregularly irregular, tachycardic  LUNGS: Unlabored respirations.  Clear to auscultation bilaterally, no crackles, wheezing, or rhonchi  ABDOMEN: Soft, nontender, nondistended, +BS  EXTREMITIES: 2+ peripheral pulses bilaterally. No clubbing, cyanosis, or edema  NERVOUS SYSTEM:  A&Ox3, moving all extremities, no focal deficits   SKIN: No rashes or lesions

## 2025-01-07 NOTE — ED ADULT TRIAGE NOTE - CHIEF COMPLAINT QUOTE
R flank pain x couple of days.   Denies urinary symptoms. R flank pain x couple of days.   Denies urinary symptoms.  Hx afib, ckd

## 2025-01-07 NOTE — H&P ADULT - HISTORY OF PRESENT ILLNESS
Aga is a 74yoF PMH HFmrEF now normalized LVEF, severe MR, pAF s/p DCCV in 4/2024, HTN, CKD 2/2 b/l staghorn calculi s/p removal (2009) who presents for palpitations. Overnight patient woke up to use the bathroom and as she got up noticed that her heart was racing, no chest pain or shortness of breath. Over the past week she has had symptoms of a URI including cough but no fevers/chills, myalgias. Her  was also similarly sick.  In the ED initially presented in afib w/ RVR to 140s, otherwise hemodynamically stable. Labs significant for metabolic acidosis (VBG 7.25/33/41/14)   S/p lasix, bumex, lopressor,    Aga is a 74yoF PMH HFmrEF now normalized LVEF, severe MR, pAF s/p DCCV in 4/2024, HTN, CKD 2/2 b/l staghorn calculi s/p removal (2009) who presents for palpitations. Overnight patient woke up to use the bathroom and as she got up noticed that her heart was racing, no chest pain or shortness of breath. Over the past week she has had symptoms of a URI including cough but no fevers/chills, myalgias. Her  was also similarly sick.  In the ED initially presented in afib w/ RVR to 140s, otherwise hemodynamically stable. Labs significant for metabolic acidosis (VBG 7.25/33/41/14)   S/p lasix, bumex, lopressor,      Aga is a 74yoF PMH HFmrEF now normalized LVEF, severe MR, pAF s/p DCCV in 4/2024, HTN, CKD 2/2 b/l staghorn calculi s/p removal (2009) who presents for palpitations. Overnight patient woke up to use the bathroom and as she got up noticed that her heart was racing, no chest pain or shortness of breath. Over the past week she has had symptoms of a URI including cough but no fevers/chills, myalgias. Her  was also similarly sick.  In the ED initially presented in afib w/ RVR to 140s, otherwise hemodynamically stable. Labs significant for metabolic acidosis (VBG 7.25/33/41/14)   S/p lasix, bumex, lopressor.

## 2025-01-07 NOTE — ED PROVIDER NOTE - PHYSICAL EXAMINATION
Attending Diandra Bagley: Gen: NAD, heent: atrauamtic, eomi, perrla, mmm, op pink, uvula midline, neck; nttpchest: nttp, no crepitus, cv: tachycardic irregular +murmur, lungs: decreased bs bl, abd: soft, nontender, nondistended, no peritoneal signs, , no guarding, ext: wwp, neg homans, skin: no rash, neuro: awake and alert, following commands, speech clear, sensation and strength intact, no focal deficits Attending Diandra Bagley: Gen: NAD, heent: atrauamtic, eomi, perrla, mmm, op pink, uvula midline, neck; nttpchest: nttp, no crepitus, cv: tachycardic irregular +murmur, lungs: decreased bs bl, abd: soft, nontender, nondistended, no peritoneal signs, , no guarding, ext: wwp, neg homans, skin: no rash, neuro: awake and alert, following commands, speech clear, sensation and strength intact, no focal deficits    Constitutional: VS reviewed. Alert and orientedx3, well appearing, no apparent distress  HEENT: Atraumatic, EOMI, PERRL  CV: Irregular, tachycardic   Lungs: Mildly tachypneic. Speaking full sentences. Scattered wheezing and crackles b/l.   Abdomen: Soft, nondistended, nontender  MSK: No deformities  Skin: Warm and dry. As visualized no rashes, lesions, bruising or erythema  Neuro: Moving all extremities  Lymph: No pitting edema in extremities.

## 2025-01-07 NOTE — H&P ADULT - NSHPLABSRESULTS_GEN_ALL_CORE
10.3   8.67  )-----------( 183      ( 07 Jan 2025 08:26 )             32.9     01-07    139  |  108  |  84[H]  ----------------------------<  129[H]  5.7[H]   |  10[LL]  |  5.91[H]    Ca    8.3[L]      07 Jan 2025 10:43  Mg     2.2     01-07    TPro  6.4  /  Alb  3.5  /  TBili  0.3  /  DBili  x   /  AST  10  /  ALT  5[L]  /  AlkPhos  104  01-07      < from: CT Chest No Cont (01.07.25 @ 11:09) >      IMPRESSION:  Left lower lobe 2 cm irregular pulmonary nodule is suspicious for primary   lung neoplasm.    Patchy left upper lobe groundglass and consolidative opacities are likely   infectious. Follow-up CT chest issuggested in 3 months to ensure   complete resolution.    Indeterminant 0.8 cm left upper lobe pulmonary nodule.    Small right pleural effusion with partial atelectasis of the right middle   and lower lobes.    --- End of Report ---    < end of copied text >

## 2025-01-07 NOTE — ED PROVIDER NOTE - OBJECTIVE STATEMENT
Attending Diandra Bagley: 74-year-old female with multiple medical issues including A-fib on Eliquis, as well as metoprolol, history of CKD, staghorn calculi who follows with Dr. Nava and Dr. Moreno he presenting with concern for palpitations.  Patient did not take her medication today.  Patient has been following with nephrology and was told that she might need to have dialysis.  Patient states has been having URI symptoms of cough and congestion for the last couple of days.  No fevers.  Patient denies any worsening shortness of breath with lying flat.  Does not think she is gain any weight.  Patient is not on a diuretic.

## 2025-01-07 NOTE — H&P ADULT - ASSESSMENT
74yoF PMH HFmrEF now normalized LVEF, severe MR, pAF s/p DCCV in 4/2024, HTN, CKD 2/2 b/l staghorn calculi s/p removal (2009) admitted for afib with RVR and NICO on CKD.

## 2025-01-07 NOTE — CONSULT NOTE ADULT - PROBLEM SELECTOR RECOMMENDATION 9
Patient with NICO on CKD in the setting of decompensated HF. CKD likely due to chronic nephrolithiasis. Patient with NICO on CKD in the setting of decompensated HF. CKD likely due to chronic nephrolithiasis. On review of labs on [place] [facility], last Scr was elevated at 6.77 on 1/3/25. Admission Scr remained elevated but improved to 5.98 today. Reports good UOP. UA not yet obtained. No renal imaging available for review. Cardiology note reviewed. Recommend to obtain UA, urine lytes, and kidney US. Agree with IV Bumex 3mg BID. Check daily weights. Discussed the possibility of HD if renal function worsens, becomes uremic, or is inadequately able to be diuresed. Patient in agreement and consent for HD was obtained. No acute indications for HD at this time but low threshold to initiate. Monitor labs and urine output. Avoid nephrotoxins. Dose medications as per eGFR. Patient with NICO on CKD in the setting of decompensated HF and Afib with RVR. CKD likely due to chronic nephrolithiasis. On review of labs on [place], last Scr was elevated at 6.77 on 1/3/25. Admission Scr remained elevated but improved to 5.98 today. Reports good UOP. UA not yet obtained. No renal imaging available for review. ECHO with preserved EF, moderate mitral regurg. Cardiology note reviewed: planned for DCCV once optimized from HF standpoint.     Recommend to obtain UA, urine lytes, and kidney US. Agree with IV Bumex 3mg BID. Check daily weights. Discussed the possibility of HD if renal function worsens, becomes uremic, or is inadequately able to be diuresed. Patient in agreement and consent for HD was obtained. No acute indications for HD at this time but low threshold to initiate. Monitor labs and urine output. Avoid nephrotoxins. Dose medications as per eGFR.

## 2025-01-07 NOTE — ED ADULT NURSE NOTE - NSFALLUNIVINTERV_ED_ALL_ED
Bed/Stretcher in lowest position, wheels locked, appropriate side rails in place/Call bell, personal items and telephone in reach/Instruct patient to call for assistance before getting out of bed/chair/stretcher/Non-slip footwear applied when patient is off stretcher/[place] to call system/Physically safe environment - no spills, clutter or unnecessary equipment/Purposeful proactive rounding/Room/bathroom lighting operational, light cord in reach

## 2025-01-07 NOTE — CONSULT NOTE ADULT - SUBJECTIVE AND OBJECTIVE BOX
[facility] - Heidy Bates, Alverto Green, Kevyn King  Office Number: [phone]    Initial Consult Note    CHIEF COMPLAINT: Patient is a 74y old  Female who presents with a chief complaint of     HPI: Aga is a 74yoF PMH HFmrEF now normalized LVEF, severe MR, pAF, HTN, CKD with bl Cr 5 who presents for ADHF and AF with RVR    She was recently seen by Dr King in September of 2024 for a hospital follow up. A repeat TTE was done at that time. She remained on Amlodipine, Metoprolol and Eliquis  Of note, Amio was stopped on most recent admission unclear why    Most recent Cr on 1/3/25 was 6.7 on OP labs      PAST MEDICAL & SURGICAL HISTORY:  Renal Calculus      Ureteral Calculus      Acute Renal Failure  9/2009      Wrist Fracture  CYNTHIA      Collar Bone Fracture      Rib Fractures      Kidney Stone removal  3/15/2011      C Section      Percutaneous Stone Extraction  Right      S/P Left Percutaneuos Stone extraction  01/26/2010, 04/20/2010          SOCIAL HISTORY:  No tobacco, ethanol, or drug abuse.    FAMILY HISTORY:    No family history of acute MI or sudden cardiac death.    MEDICATIONS  (STANDING):  metoprolol succinate ER 50 milliGRAM(s) Oral daily    MEDICATIONS  (PRN):      Allergies    No Known Allergies    Intolerances        REVIEW OF SYSTEMS:    CONSTITUTIONAL: No weakness, fevers or chills  EYES/ENT: No visual changes;  No vertigo or throat pain   NECK: No pain or stiffness  RESPIRATORY: No cough, wheezing, hemoptysis; No shortness of breath  CARDIOVASCULAR: No chest pain or palpitations  GASTROINTESTINAL: No abdominal pain. No nausea, vomiting, or hematemesis; No diarrhea or constipation. No melena or hematochezia.  GENITOURINARY: No dysuria, frequency or hematuria  NEUROLOGICAL: No numbness or weakness  SKIN: No itching or rash  All other review of systems is negative unless indicated above    VITAL SIGNS:   Vital Signs Last 24 Hrs  T(C): 37.6 (07 Jan 2025 08:10), Max: 37.6 (07 Jan 2025 08:10)  T(F): 99.6 (07 Jan 2025 08:10), Max: 99.6 (07 Jan 2025 08:10)  HR: 140 (07 Jan 2025 08:10) (135 - 142)  BP: 138/119 (07 Jan 2025 08:10) (138/119 - 175/106)  BP(mean): 126 (07 Jan 2025 08:10) (121 - 126)  RR: 24 (07 Jan 2025 08:10) (18 - 30)  SpO2: 96% (07 Jan 2025 08:10) (94% - 96%)    Parameters below as of 07 Jan 2025 08:10  Patient On (Oxygen Delivery Method): nasal cannula  O2 Flow (L/min): 3      I&O's Summary      On Exam:    Constitutional: NAD, alert and oriented x 3  Lungs:  Non-labored, breath sounds are clear bilaterally, No wheezing, rales or rhonchi  Cardiovascular: RRR.  S1 and S2 positive.  No murmurs, rubs, gallops or clicks  Gastrointestinal: Bowel Sounds present, soft, nontender.   Lymph: No peripheral edema. No cervical lymphadenopathy.  Neurological: Alert, no focal deficits  Skin: No rashes or ulcers   Psych:  Mood & affect appropriate.    LABS: All Labs Reviewed:                        10.3   8.67  )-----------( 183      ( 07 Jan 2025 08:26 )             32.9     07 Jan 2025 08:26    139    |  107    |  84     ----------------------------<  152    5.1     |  11     |  5.98     Ca    8.7        07 Jan 2025 08:26  Mg     2.2       07 Jan 2025 08:26    TPro  6.8    /  Alb  3.8    /  TBili  0.3    /  DBili  x      /  AST  10     /  ALT  7      /  AlkPhos  111    07 Jan 2025 08:26    PT/INR - ( 07 Jan 2025 08:26 )   PT: 21.5 sec;   INR: 1.88 ratio         PTT - ( 07 Jan 2025 08:26 )  PTT:33.8 sec  CARDIAC MARKERS ( 07 Jan 2025 08:26 )  x     / x     / x     / x     / 1.7 ng/mL      Blood Culture:         RADIOLOGY:    EKG:    TTE 11/7/24:  CONCLUSIONS:    1. Left ventricular cavity is mildly dilated. Left ventricular systolic function is normal with an ejection fraction of 55 % by Garcia's method of disks. There are no regional wall motion abnormalities seen.  2. There is increased LV mass and eccentric hypertrophy.  3. There is moderate (grade 2) left ventricular diastolic dysfunction, with elevated left ventricular filling pressure.  4. Normal right ventricular cavity size and normal right ventricular systolic function.  5. Left atrium is severely dilated.  6. The right atrium is mildly dilated.  7. Symmetric mitral valve leaflet tethering.  8. Moderate to severe mitral regurgitation.  9. Trileaflet aortic valve with normal systolic excursion. There is focal calcification of the aortic valve leaflets.  10. Mild to moderate aortic regurgitation.  11. Estimated pulmonary artery systolic pressure is 55 mmHg, consistent with moderate pulmonary hypertension.  12. Patent foramen ovale with left to right shunting by color flow Doppler.  13. Compared to the transthoracic echocardiogram performed on 6/20/2024, the mitral regurgitation on this study appears to be moderate to severe but possibly underestimated. A possible PFO is noted by color doppler.         [facility] - Heidy Bates, Avlerto Green, Kevyn King  Office Number: [phone]    Initial Consult Note    CHIEF COMPLAINT: Patient is a 74y old  Female who presents with a chief complaint of     HPI: Aga is a 74yoF PMH HFmrEF now normalized LVEF, severe MR, pAF, HTN, CKD with bl Cr 5 who presents for ADHF and AF with RVR    She was recently seen by Dr King in September of 2024 for a hospital follow up. A repeat TTE was done at that time. She remained on Amlodipine, Metoprolol and Eliquis  Of note, Amio was stopped on most recent admission unclear why    Most recent Cr on 1/3/25 was 6.7 on OP labs      PAST MEDICAL & SURGICAL HISTORY:  Renal Calculus      Ureteral Calculus      Acute Renal Failure  9/2009      Wrist Fracture  CYNTHIA      Collar Bone Fracture      Rib Fractures      Kidney Stone removal  3/15/2011      C Section      Percutaneous Stone Extraction  Right      S/P Left Percutaneuos Stone extraction  01/26/2010, 04/20/2010          SOCIAL HISTORY:  No tobacco, ethanol, or drug abuse.    FAMILY HISTORY:    No family history of acute MI or sudden cardiac death.    MEDICATIONS  (STANDING):  metoprolol succinate ER 50 milliGRAM(s) Oral daily    MEDICATIONS  (PRN):      Allergies    No Known Allergies    Intolerances        REVIEW OF SYSTEMS:    CONSTITUTIONAL: No weakness, fevers or chills  EYES/ENT: No visual changes;  No vertigo or throat pain   NECK: No pain or stiffness  RESPIRATORY: No cough, wheezing, hemoptysis; No shortness of breath  CARDIOVASCULAR: No chest pain or palpitations  GASTROINTESTINAL: No abdominal pain. No nausea, vomiting, or hematemesis; No diarrhea or constipation. No melena or hematochezia.  GENITOURINARY: No dysuria, frequency or hematuria  NEUROLOGICAL: No numbness or weakness  SKIN: No itching or rash  All other review of systems is negative unless indicated above    VITAL SIGNS:   Vital Signs Last 24 Hrs  T(C): 37.6 (07 Jan 2025 08:10), Max: 37.6 (07 Jan 2025 08:10)  T(F): 99.6 (07 Jan 2025 08:10), Max: 99.6 (07 Jan 2025 08:10)  HR: 140 (07 Jan 2025 08:10) (135 - 142)  BP: 138/119 (07 Jan 2025 08:10) (138/119 - 175/106)  BP(mean): 126 (07 Jan 2025 08:10) (121 - 126)  RR: 24 (07 Jan 2025 08:10) (18 - 30)  SpO2: 96% (07 Jan 2025 08:10) (94% - 96%)    Parameters below as of 07 Jan 2025 08:10  Patient On (Oxygen Delivery Method): nasal cannula  O2 Flow (L/min): 3      I&O's Summary      On Exam:    Constitutional: NAD, alert and oriented x 3  Lungs:  Non-labored, breath sounds are clear bilaterally, No wheezing, rales or rhonchi  Cardiovascular: RRR.  S1 and S2 positive.  No murmurs, rubs, gallops or clicks  Gastrointestinal: Bowel Sounds present, soft, nontender.   Lymph: No peripheral edema. No cervical lymphadenopathy.  Neurological: Alert, no focal deficits  Skin: No rashes or ulcers   Psych:  Mood & affect appropriate.    LABS: All Labs Reviewed:                        10.3   8.67  )-----------( 183      ( 07 Jan 2025 08:26 )             32.9     07 Jan 2025 08:26    139    |  107    |  84     ----------------------------<  152    5.1     |  11     |  5.98     Ca    8.7        07 Jan 2025 08:26  Mg     2.2       07 Jan 2025 08:26    TPro  6.8    /  Alb  3.8    /  TBili  0.3    /  DBili  x      /  AST  10     /  ALT  7      /  AlkPhos  111    07 Jan 2025 08:26    PT/INR - ( 07 Jan 2025 08:26 )   PT: 21.5 sec;   INR: 1.88 ratio         PTT - ( 07 Jan 2025 08:26 )  PTT:33.8 sec  CARDIAC MARKERS ( 07 Jan 2025 08:26 )  x     / x     / x     / x     / 1.7 ng/mL      Blood Culture:         RADIOLOGY:    EKG: none found in chart    TTE 11/7/24:  CONCLUSIONS:    1. Left ventricular cavity is mildly dilated. Left ventricular systolic function is normal with an ejection fraction of 55 % by Garcia's method of disks. There are no regional wall motion abnormalities seen.  2. There is increased LV mass and eccentric hypertrophy.  3. There is moderate (grade 2) left ventricular diastolic dysfunction, with elevated left ventricular filling pressure.  4. Normal right ventricular cavity size and normal right ventricular systolic function.  5. Left atrium is severely dilated.  6. The right atrium is mildly dilated.  7. Symmetric mitral valve leaflet tethering.  8. Moderate to severe mitral regurgitation.  9. Trileaflet aortic valve with normal systolic excursion. There is focal calcification of the aortic valve leaflets.  10. Mild to moderate aortic regurgitation.  11. Estimated pulmonary artery systolic pressure is 55 mmHg, consistent with moderate pulmonary hypertension.  12. Patent foramen ovale with left to right shunting by color flow Doppler.  13. Compared to the transthoracic echocardiogram performed on 6/20/2024, the mitral regurgitation on this study appears to be moderate to severe but possibly underestimated. A possible PFO is noted by color doppler.

## 2025-01-07 NOTE — H&P ADULT - PROBLEM SELECTOR PLAN 1
- Hx of afib RVR, s/p DCCV in April 2024 with Dr. Thomas  - On eliquis BID  - On presentation in afib w/ RVR to 120s, but otherwise hemodynamically stable   - C/w metoprolol 50 mg qd   - EP consulted, recommendations appreciated. Prior to repeat DCCV and/ ablation will have to confirm atrial appendage is clear with  and additionally patient will also need to be on AC for at least 3 months after procedure.

## 2025-01-08 ENCOUNTER — APPOINTMENT (OUTPATIENT)
Dept: CARDIOLOGY | Facility: CLINIC | Age: 75
End: 2025-01-08

## 2025-01-08 LAB
ANION GAP SERPL CALC-SCNC: 24 MMOL/L — HIGH (ref 5–17)
BUN SERPL-MCNC: 92 MG/DL — HIGH (ref 7–23)
CALCIUM SERPL-MCNC: 8.5 MG/DL — SIGNIFICANT CHANGE UP (ref 8.4–10.5)
CHLORIDE SERPL-SCNC: 107 MMOL/L — SIGNIFICANT CHANGE UP (ref 96–108)
CO2 SERPL-SCNC: 13 MMOL/L — LOW (ref 22–31)
CREAT SERPL-MCNC: 6.27 MG/DL — HIGH (ref 0.5–1.3)
EGFR: 6 ML/MIN/1.73M2 — LOW
GLUCOSE SERPL-MCNC: 102 MG/DL — HIGH (ref 70–99)
HCT VFR BLD CALC: 33.3 % — LOW (ref 34.5–45)
HGB BLD-MCNC: 10.2 G/DL — LOW (ref 11.5–15.5)
LEGIONELLA AG UR QL: NEGATIVE — SIGNIFICANT CHANGE UP
MAGNESIUM SERPL-MCNC: 2.2 MG/DL — SIGNIFICANT CHANGE UP (ref 1.6–2.6)
MCHC RBC-ENTMCNC: 30.1 PG — SIGNIFICANT CHANGE UP (ref 27–34)
MCHC RBC-ENTMCNC: 30.6 G/DL — LOW (ref 32–36)
MCV RBC AUTO: 98.2 FL — SIGNIFICANT CHANGE UP (ref 80–100)
NRBC # BLD: 0 /100 WBCS — SIGNIFICANT CHANGE UP (ref 0–0)
PHOSPHATE SERPL-MCNC: 5.3 MG/DL — HIGH (ref 2.5–4.5)
PLATELET # BLD AUTO: 191 K/UL — SIGNIFICANT CHANGE UP (ref 150–400)
POTASSIUM SERPL-MCNC: 4.5 MMOL/L — SIGNIFICANT CHANGE UP (ref 3.5–5.3)
POTASSIUM SERPL-SCNC: 4.5 MMOL/L — SIGNIFICANT CHANGE UP (ref 3.5–5.3)
RBC # BLD: 3.39 M/UL — LOW (ref 3.8–5.2)
RBC # FLD: 16.5 % — HIGH (ref 10.3–14.5)
S PNEUM AG UR QL: NEGATIVE — SIGNIFICANT CHANGE UP
SODIUM SERPL-SCNC: 144 MMOL/L — SIGNIFICANT CHANGE UP (ref 135–145)
WBC # BLD: 7.93 K/UL — SIGNIFICANT CHANGE UP (ref 3.8–10.5)
WBC # FLD AUTO: 7.93 K/UL — SIGNIFICANT CHANGE UP (ref 3.8–10.5)

## 2025-01-08 PROCEDURE — 93005 ELECTROCARDIOGRAM TRACING: CPT

## 2025-01-08 PROCEDURE — 83735 ASSAY OF MAGNESIUM: CPT

## 2025-01-08 PROCEDURE — 85014 HEMATOCRIT: CPT

## 2025-01-08 PROCEDURE — 76770 US EXAM ABDO BACK WALL COMP: CPT

## 2025-01-08 PROCEDURE — 80053 COMPREHEN METABOLIC PANEL: CPT

## 2025-01-08 PROCEDURE — 76770 US EXAM ABDO BACK WALL COMP: CPT | Mod: 26

## 2025-01-08 PROCEDURE — 83880 ASSAY OF NATRIURETIC PEPTIDE: CPT

## 2025-01-08 PROCEDURE — 82330 ASSAY OF CALCIUM: CPT

## 2025-01-08 PROCEDURE — 36415 COLL VENOUS BLD VENIPUNCTURE: CPT

## 2025-01-08 PROCEDURE — 99233 SBSQ HOSP IP/OBS HIGH 50: CPT

## 2025-01-08 PROCEDURE — 87899 AGENT NOS ASSAY W/OPTIC: CPT

## 2025-01-08 PROCEDURE — 93308 TTE F-UP OR LMTD: CPT

## 2025-01-08 PROCEDURE — 84132 ASSAY OF SERUM POTASSIUM: CPT

## 2025-01-08 PROCEDURE — 87086 URINE CULTURE/COLONY COUNT: CPT

## 2025-01-08 PROCEDURE — 85018 HEMOGLOBIN: CPT

## 2025-01-08 PROCEDURE — 87637 SARSCOV2&INF A&B&RSV AMP PRB: CPT

## 2025-01-08 PROCEDURE — 96375 TX/PRO/DX INJ NEW DRUG ADDON: CPT

## 2025-01-08 PROCEDURE — 87449 NOS EACH ORGANISM AG IA: CPT

## 2025-01-08 PROCEDURE — 99233 SBSQ HOSP IP/OBS HIGH 50: CPT | Mod: GC

## 2025-01-08 PROCEDURE — 87040 BLOOD CULTURE FOR BACTERIA: CPT

## 2025-01-08 PROCEDURE — 85730 THROMBOPLASTIN TIME PARTIAL: CPT

## 2025-01-08 PROCEDURE — 80048 BASIC METABOLIC PNL TOTAL CA: CPT

## 2025-01-08 PROCEDURE — 84484 ASSAY OF TROPONIN QUANT: CPT

## 2025-01-08 PROCEDURE — 85025 COMPLETE CBC W/AUTO DIFF WBC: CPT

## 2025-01-08 PROCEDURE — 82435 ASSAY OF BLOOD CHLORIDE: CPT

## 2025-01-08 PROCEDURE — 81001 URINALYSIS AUTO W/SCOPE: CPT

## 2025-01-08 PROCEDURE — 82803 BLOOD GASES ANY COMBINATION: CPT

## 2025-01-08 PROCEDURE — 82553 CREATINE MB FRACTION: CPT

## 2025-01-08 PROCEDURE — 99232 SBSQ HOSP IP/OBS MODERATE 35: CPT | Mod: GC

## 2025-01-08 PROCEDURE — 82550 ASSAY OF CK (CPK): CPT

## 2025-01-08 PROCEDURE — 84295 ASSAY OF SERUM SODIUM: CPT

## 2025-01-08 PROCEDURE — 84145 PROCALCITONIN (PCT): CPT

## 2025-01-08 PROCEDURE — 99285 EMERGENCY DEPT VISIT HI MDM: CPT

## 2025-01-08 PROCEDURE — 96374 THER/PROPH/DIAG INJ IV PUSH: CPT

## 2025-01-08 PROCEDURE — 82947 ASSAY GLUCOSE BLOOD QUANT: CPT

## 2025-01-08 PROCEDURE — 83605 ASSAY OF LACTIC ACID: CPT

## 2025-01-08 PROCEDURE — 71250 CT THORAX DX C-: CPT | Mod: MC

## 2025-01-08 PROCEDURE — 84100 ASSAY OF PHOSPHORUS: CPT

## 2025-01-08 PROCEDURE — 71045 X-RAY EXAM CHEST 1 VIEW: CPT

## 2025-01-08 PROCEDURE — 85027 COMPLETE CBC AUTOMATED: CPT

## 2025-01-08 PROCEDURE — 0241U: CPT

## 2025-01-08 PROCEDURE — 85610 PROTHROMBIN TIME: CPT

## 2025-01-08 RX ORDER — AMIODARONE HYDROCHLORIDE 200 MG/1
TABLET ORAL
Refills: 0 | Status: DISCONTINUED | OUTPATIENT
Start: 2025-01-08 | End: 2025-01-09

## 2025-01-08 RX ORDER — AMIODARONE HYDROCHLORIDE 200 MG/1
200 TABLET ORAL DAILY
Refills: 0 | Status: CANCELLED | OUTPATIENT
Start: 2025-01-16 | End: 2025-01-09

## 2025-01-08 RX ORDER — INFLUENZA A VIRUS A/WISCONSIN/588/2019 (H1N1) RECOMBINANT HEMAGGLUTININ ANTIGEN, INFLUENZA A VIRUS A/DARWIN/6/2021 (H3N2) RECOMBINANT HEMAGGLUTININ ANTIGEN, INFLUENZA B VIRUS B/AUSTRIA/1359417/2021 RECOMBINANT HEMAGGLUTININ ANTIGEN, AND INFLUENZA B VIRUS B/PHUKET/3073/2013 RECOMBINANT HEMAGGLUTININ ANTIGEN 45; 45; 45; 45 UG/.5ML; UG/.5ML; UG/.5ML; UG/.5ML
0.5 INJECTION INTRAMUSCULAR ONCE
Refills: 0 | Status: DISCONTINUED | OUTPATIENT
Start: 2025-01-08 | End: 2025-01-09

## 2025-01-08 RX ORDER — BUMETANIDE 2 MG/1
3 TABLET ORAL DAILY
Refills: 0 | Status: DISCONTINUED | OUTPATIENT
Start: 2025-01-08 | End: 2025-01-09

## 2025-01-08 RX ORDER — AMIODARONE HYDROCHLORIDE 200 MG/1
400 TABLET ORAL EVERY 8 HOURS
Refills: 0 | Status: DISCONTINUED | OUTPATIENT
Start: 2025-01-08 | End: 2025-01-09

## 2025-01-08 RX ADMIN — SEVELAMER CARBONATE 800 MILLIGRAM(S): 800 TABLET, FILM COATED ORAL at 12:34

## 2025-01-08 RX ADMIN — CEFTRIAXONE SODIUM 100 MILLIGRAM(S): 1 INJECTION, POWDER, FOR SOLUTION INTRAMUSCULAR; INTRAVENOUS at 18:13

## 2025-01-08 RX ADMIN — AMIODARONE HYDROCHLORIDE 400 MILLIGRAM(S): 200 TABLET ORAL at 09:27

## 2025-01-08 RX ADMIN — APIXABAN 2.5 MILLIGRAM(S): 5 TABLET, FILM COATED ORAL at 05:37

## 2025-01-08 RX ADMIN — SODIUM BICARBONATE 1300 MILLIGRAM(S): 84 INJECTION, SOLUTION INTRAVENOUS at 22:13

## 2025-01-08 RX ADMIN — Medication 100 MILLIGRAM(S): at 05:34

## 2025-01-08 RX ADMIN — Medication 5 MILLIGRAM(S): at 05:34

## 2025-01-08 RX ADMIN — Medication 1 TABLET(S): at 09:27

## 2025-01-08 RX ADMIN — BUMETANIDE 124 MILLIGRAM(S): 2 TABLET ORAL at 17:56

## 2025-01-08 RX ADMIN — Medication 325 MILLIGRAM(S): at 09:27

## 2025-01-08 RX ADMIN — SEVELAMER CARBONATE 800 MILLIGRAM(S): 800 TABLET, FILM COATED ORAL at 18:13

## 2025-01-08 RX ADMIN — APIXABAN 2.5 MILLIGRAM(S): 5 TABLET, FILM COATED ORAL at 18:13

## 2025-01-08 RX ADMIN — AMIODARONE HYDROCHLORIDE 400 MILLIGRAM(S): 200 TABLET ORAL at 14:32

## 2025-01-08 RX ADMIN — SODIUM BICARBONATE 1300 MILLIGRAM(S): 84 INJECTION, SOLUTION INTRAVENOUS at 14:32

## 2025-01-08 RX ADMIN — AMIODARONE HYDROCHLORIDE 400 MILLIGRAM(S): 200 TABLET ORAL at 22:14

## 2025-01-08 RX ADMIN — AZITHROMYCIN MONOHYDRATE 500 MILLIGRAM(S): 200 POWDER, FOR SUSPENSION ORAL at 09:27

## 2025-01-08 RX ADMIN — SODIUM BICARBONATE 1300 MILLIGRAM(S): 84 INJECTION, SOLUTION INTRAVENOUS at 05:34

## 2025-01-08 NOTE — PROGRESS NOTE ADULT - PROBLEM SELECTOR PLAN 6
- RVP negative but CT chest with findings c/f pneumonia  - Patient has been coughing for the past week, indicates cough is improved  - Afebrile (low grade temp 99.6), no leukocytosis, no lactate but given afib RVR, reasonable to treat for CAP  [] f/u urine strep and legionella  [] sputum cx if possible - RVP negative but CT chest with findings c/f pneumonia  - Patient has been coughing for the past week, indicates cough is improved  - Afebrile (low grade temp 99.6), no leukocytosis, no lactate but given afib RVR, reasonable to treat for CAP  - Urine strep negative  [] f/u urine legionella  [] sputum cx if possible - RVP negative but CT chest with findings c/f pneumonia  - Patient has been coughing for the past week, indicates cough is improved  - Afebrile (low grade temp 99.6), no leukocytosis, no lactate but given afib RVR, reasonable to treat for CAP  - Urine strep and legionella negative  -Ceftriaxone x 5 days, can give augmentin on dc  [] sputum cx if possible

## 2025-01-08 NOTE — POST DISCHARGE NOTE - NOTIFICATION:
Notified Dr. Daisy Moran of patient's CT findings.  Place an order in [place] to "Consults- Cancer Care Direct Navigation" for assistance in outpatient care.

## 2025-01-08 NOTE — PROGRESS NOTE ADULT - ASSESSMENT
Aga is a 74yoF PMH HFmrEF now normalized LVEF, severe MR, pAF, HTN, CKD with bl Cr 5 who presents for ADHF and AF with RVR    #ADHF: Pro BNP 36k, CXR with bilateral opacification, CT chest is pending   - S/p IV lasix 40mg x 1 in the ER  - Will need higher dose of IV lasix or IV Bumex, would recommended one more dose of IV diuretic, will need at least 3mg IV Bumex    - Renal consulted as noted below  - Recent TTE with mod to severe MR. AF and CKD are contributing to this presentation.   - Needs afterload reduction as well. Initiate Hydralazine 25mg TID.     #AF with RVR:  - Previous /DCCV April of 2024 for very similar presentation   - S/p IV metoprolol and PO dosing in the ER with marked improvement in her sxs  - Metoprolol 100mg QD resumed. Would increase dosing for improved rate control  - Continue home Eliquis (dose reduced for renal function and weight)  - EP following for possible DCCV. Will need to be on uninterrupted AC x 30 days    #CKD:  - Renal consulted, recommending 3mg BID Bumex    Will follow closely.

## 2025-01-08 NOTE — PROGRESS NOTE ADULT - PROBLEM SELECTOR PLAN 7
-Left lower lobe 2 cm irregular pulmonary nodule is suspicious for primary lung neoplasm. -Left lower lobe 2 cm irregular pulmonary nodule is suspicious for primary lung neoplasm.  -Cancer care direct consulted

## 2025-01-08 NOTE — PROGRESS NOTE ADULT - PROBLEM SELECTOR PLAN 1
- Hx of afib RVR, s/p DCCV in April 2024 with Dr. Thomas  - On eliquis BID  - On presentation in afib w/ RVR to 120s, but otherwise hemodynamically stable   - C/w metoprolol 50 mg qd   - EP consulted, recommendations appreciated. Prior to repeat DCCV and/ ablation will have to confirm atrial appendage is clear with  and additionally patient will also need to be on AC for at least 3 months after procedure. - Hx of afib RVR, s/p DCCV in April 2024 with Dr. Thomas  - On eliquis BID  - On presentation in afib w/ RVR to 120s, but otherwise hemodynamically stable   - C/w metoprolol 50 mg qd   - EP consulted, recommendations appreciated. Plan for DCCV 1/9. NPO at midnight. C/w eliquis

## 2025-01-08 NOTE — PROGRESS NOTE ADULT - SUBJECTIVE AND OBJECTIVE BOX
24H hour events:     MEDICATIONS:  aMIOdarone    Tablet 400 milliGRAM(s) Oral every 8 hours  aMIOdarone    Tablet   Oral   amLODIPine   Tablet 5 milliGRAM(s) Oral daily  apixaban 2.5 milliGRAM(s) Oral every 12 hours  metoprolol succinate  milliGRAM(s) Oral daily    azithromycin   Tablet 500 milliGRAM(s) Oral daily  cefTRIAXone   IVPB 1000 milliGRAM(s) IV Intermittent every 24 hours            calcium carbonate 1250 mG  + Vitamin D (OsCal 500 + D) 1 Tablet(s) Oral daily  ferrous    sulfate 325 milliGRAM(s) Oral daily  sodium bicarbonate 1300 milliGRAM(s) Oral three times a day      REVIEW OF SYSTEMS:  Complete 12 point ROS negative.    PHYSICAL EXAM:  T(C): 36.7 (01-08-25 @ 06:18), Max: 36.7 (01-07-25 @ 20:36)  HR: 115 (01-08-25 @ 06:18) (106 - 120)  BP: 144/95 (01-08-25 @ 06:18) (123/105 - 151/97)  RR: 18 (01-08-25 @ 06:18) (18 - 22)  SpO2: 97% (01-08-25 @ 06:18) (95% - 97%)  Wt(kg): --  I&O's Summary      Appearance: Normal	  HEENT: PERRL, EOMI	  Cardiovascular: Normal S1 S2, No JVD, No murmurs  Respiratory: Lungs clear to auscultation	  Psychiatry: A & O x 3, Mood & affect appropriate  Gastrointestinal:  Soft, Non-tender, + BS	  Skin: No rashes, No ecchymoses, No cyanosis	  Neurologic: Grossly intact  Extremities: No clubbing, cyanosis or edema  Vascular: Peripheral pulses palpable 2+ bilaterally        LABS:	 	    CBC Full  -  ( 08 Jan 2025 06:43 )  WBC Count : 7.93 K/uL  Hemoglobin : 10.2 g/dL  Hematocrit : 33.3 %  Platelet Count - Automated : 191 K/uL  Mean Cell Volume : 98.2 fl  Mean Cell Hemoglobin : 30.1 pg  Mean Cell Hemoglobin Concentration : 30.6 g/dL  Auto Neutrophil # : x  Auto Lymphocyte # : x  Auto Monocyte # : x  Auto Eosinophil # : x  Auto Basophil # : x  Auto Neutrophil % : x  Auto Lymphocyte % : x  Auto Monocyte % : x  Auto Eosinophil % : x  Auto Basophil % : x    01-08    144  |  107  |  92[H]  ----------------------------<  102[H]  4.5   |  13[L]  |  6.27[H]  01-07    144  |  109[H]  |  89[H]  ----------------------------<  116[H]  4.6   |  13[L]  |  6.19[H]    Ca    8.5      08 Jan 2025 06:43  Ca    8.1[L]      07 Jan 2025 20:08  Phos  5.3     01-08  Phos  5.5     01-07  Mg     2.2     01-08  Mg     2.2     01-07    TPro  6.4  /  Alb  3.5  /  TBili  0.3  /  DBili  x   /  AST  10  /  ALT  5[L]  /  AlkPhos  104  01-07  TPro  6.8  /  Alb  3.8  /  TBili  0.3  /  DBili  x   /  AST  10  /  ALT  7[L]  /  AlkPhos  111  01-07      proBNP:   Lipid Profile:   HgA1c:   TSH:       CARDIAC MARKERS:          TELEMETRY: 	    ECG:  	  RADIOLOGY:  OTHER: 	    PREVIOUS DIAGNOSTIC TESTING:    [ ] Echocardiogram:    [ ]  Catheterization:  [ ] Stress Test:  	  	  ASSESSMENT/PLAN: 	     24H hour events: Pt without complaint, no acute events overnight, Tele: Afib with VHR 's    MEDICATIONS:  aMIOdarone    Tablet 400 milliGRAM(s) Oral every 8 hours  amLODIPine   Tablet 5 milliGRAM(s) Oral daily  apixaban 2.5 milliGRAM(s) Oral every 12 hours  metoprolol succinate  milliGRAM(s) Oral daily  azithromycin   Tablet 500 milliGRAM(s) Oral daily  cefTRIAXone   IVPB 1000 milliGRAM(s) IV Intermittent every 24 hours  calcium carbonate 1250 mG  + Vitamin D (OsCal 500 + D) 1 Tablet(s) Oral daily  ferrous    sulfate 325 milliGRAM(s) Oral daily  sodium bicarbonate 1300 milliGRAM(s) Oral three times a day      REVIEW OF SYSTEMS:  Complete 12 point ROS negative.    PHYSICAL EXAM:  T(C): 36.7 (01-08-25 @ 06:18), Max: 36.7 (01-07-25 @ 20:36)  HR: 115 (01-08-25 @ 06:18) (106 - 120)  BP: 144/95 (01-08-25 @ 06:18) (123/105 - 151/97)  RR: 18 (01-08-25 @ 06:18) (18 - 22)  SpO2: 97% (01-08-25 @ 06:18) (95% - 97%)  Wt(kg): --  I&O's Summary      Appearance: Normal	  HEENT: PERRL, EOMI	  Cardiovascular: Normal S1 S2, Irregularly irregular, No JVD, + systolic murmurs  Respiratory: Lungs clear to auscultation	  Psychiatry: A & O x 3 (forgetful), Mood & affect appropriate  Gastrointestinal: Soft, Non-tender, + BS	  Skin: No rashes, No ecchymoses, No cyanosis	  Neurologic: Grossly intact  Extremities: No clubbing, cyanosis or edema  Vascular: Peripheral pulses palpable 2+ bilaterally        LABS:	 	    CBC Full  -  ( 08 Jan 2025 06:43 )  WBC Count : 7.93 K/uL  Hemoglobin : 10.2 g/dL  Hematocrit : 33.3 %  Platelet Count - Automated : 191 K/uL  Mean Cell Volume : 98.2 fl  Mean Cell Hemoglobin : 30.1 pg  Mean Cell Hemoglobin Concentration : 30.6 g/dL      01-08    144  |  107  |  92[H]  ----------------------------<  102[H]  4.5   |  13[L]  |  6.27[H]  01-07    144  |  109[H]  |  89[H]  ----------------------------<  116[H]  4.6   |  13[L]  |  6.19[H]    Ca    8.5      08 Jan 2025 06:43  Ca    8.1[L]      07 Jan 2025 20:08  Phos  5.3     01-08  Phos  5.5     01-07  Mg     2.2     01-08  Mg     2.2     01-07    TPro  6.4  /  Alb  3.5  /  TBili  0.3  /  DBili  x   /  AST  10  /  ALT  5[L]  /  AlkPhos  104  01-07  TPro  6.8  /  Alb  3.8  /  TBili  0.3  /  DBili  x   /  AST  10  /  ALT  7[L]  /  AlkPhos  111  01-07    Thyroid Stimulating Hormone, Serum (04.09.24 @ 00:27)    Thyroid Stimulating Hormone, Serum: 0.65 uIU/mL        TELEMETRY: Afib with VHR 's

## 2025-01-08 NOTE — PROGRESS NOTE ADULT - PROBLEM SELECTOR PLAN 2
- Prior hx of b/l staghorn calculi s/p removal in 2009; Cr elevated since Apr 2024 (baseline 5's). On arrival 5.91  - Ongoing discussion with outpatient nephrologist regarding HD  - Nephro consulted, appreciate recommendations. No urgent indications for HD.  - Renally dose medications   [] US renal
SCO2 low at 13. Currently on bicarb 1300mg TID. Monitor SCO2.     If you have any questions, please feel free to contact me.  Shahab Jiménez MD  Nephrology Fellow  [email or web address] / [employer] Teams (Preferred)  (Please check the on-call schedule to reach the appropriate Nephrology Fellow).

## 2025-01-08 NOTE — PROGRESS NOTE ADULT - SUBJECTIVE AND OBJECTIVE BOX
[facility] Division of Kidney Diseases & Hypertension  FOLLOW UP NOTE  [phone]--------------------------------------------------------------------------------    Chief Complaint: NICO on CKD    24 hour events/subjective: Patient seen and examined in the ED earlier today. Reports improvement in breathing and LE swelling. Feels well. Denies HA, fevers/chills, CP, abdominal pain, or dysuria.     PAST HISTORY  --------------------------------------------------------------------------------  No significant changes to PMH, PSH, FHx, SHx, unless otherwise noted    ALLERGIES & MEDICATIONS  --------------------------------------------------------------------------------  Allergies  No Known Allergies    Intolerances    Standing Inpatient Medications  aMIOdarone    Tablet 400 milliGRAM(s) Oral every 8 hours  aMIOdarone    Tablet   Oral   amLODIPine   Tablet 5 milliGRAM(s) Oral daily  apixaban 2.5 milliGRAM(s) Oral every 12 hours  azithromycin   Tablet 500 milliGRAM(s) Oral daily  calcium carbonate 1250 mG  + Vitamin D (OsCal 500 + D) 1 Tablet(s) Oral daily  cefTRIAXone   IVPB 1000 milliGRAM(s) IV Intermittent every 24 hours  ferrous    sulfate 325 milliGRAM(s) Oral daily  metoprolol succinate  milliGRAM(s) Oral daily  sevelamer carbonate 800 milliGRAM(s) Oral three times a day with meals  sodium bicarbonate 1300 milliGRAM(s) Oral three times a day    PRN Inpatient Medications    REVIEW OF SYSTEMS  --------------------------------------------------------------------------------  Gen: No fevers/chills  Skin: No rashes  Head/Eyes/Ears: No HA  Respiratory: +SOB improved  CV: No chest pain  GI: No abdominal pain, diarrhea  : No dysuria, hematuria  MSK: +Edema  Heme: No easy bruising or bleeding  Psych: No significant depression    All other systems were reviewed and are negative, except as noted.    VITALS/PHYSICAL EXAM  --------------------------------------------------------------------------------  T(C): 36.6 (01-08-25 @ 11:27), Max: 36.7 (01-07-25 @ 20:36)  HR: 118 (01-08-25 @ 11:27) (106 - 118)  BP: 128/92 (01-08-25 @ 11:27) (128/92 - 151/97)  RR: 18 (01-08-25 @ 11:27) (18 - 20)  SpO2: 96% (01-08-25 @ 11:27) (95% - 97%)  Wt(kg): --  Height (cm): 157.5 (01-07-25 @ 07:19)  Weight (kg): 59 (01-07-25 @ 07:19)  BMI (kg/m2): 23.8 (01-07-25 @ 07:19)  BSA (m2): 1.59 (01-07-25 @ 07:19)    Physical Exam:  Gen: NAD  HEENT: MMM  Pulm: Bibasilar rales, on NC  CV: S1S2  Abd: Soft, +BS   Ext: B/L LE edema  Neuro: Awake  Skin: Warm and dry  Vascular access: None for HD    LABS/STUDIES  --------------------------------------------------------------------------------              10.2   7.93  >-----------<  191      [01-08-25 @ 06:43]              33.3     144  |  107  |  92  ----------------------------<  102      [01-08-25 @ 06:43]  4.5   |  13  |  6.27        Ca     8.5     [01-08-25 @ 06:43]      Mg     2.2     [01-08-25 @ 06:43]      Phos  5.3     [01-08-25 @ 06:43]    TPro  6.4  /  Alb  3.5  /  TBili  0.3  /  DBili  x   /  AST  10  /  ALT  5   /  AlkPhos  104  [01-07-25 @ 10:43]    PT/INR: PT 21.5 , INR 1.88       [01-07-25 @ 08:26]  PTT: 33.8       [01-07-25 @ 08:26]    Creatinine Trend:  SCr 6.27 [01-08 @ 06:43]  SCr 6.19 [01-07 @ 20:08]  SCr 5.91 [01-07 @ 10:43]  SCr 5.98 [01-07 @ 08:26]

## 2025-01-08 NOTE — PROGRESS NOTE ADULT - SUBJECTIVE AND OBJECTIVE BOX
[facility] - Heidy Bates, Alverto Green, Kevyn King  Office Number:  [phone]    Patient resting comfortably in bed in NAD.  Laying flat with no respiratory distress.  No complaints of chest pain, dyspnea, palpitations, PND, or orthopnea.  Remains in AF    ROS: negative unless otherwise mentioned.      MEDICATIONS  (STANDING):  aMIOdarone    Tablet 400 milliGRAM(s) Oral every 8 hours  aMIOdarone    Tablet   Oral   amLODIPine   Tablet 5 milliGRAM(s) Oral daily  apixaban 2.5 milliGRAM(s) Oral every 12 hours  azithromycin   Tablet 500 milliGRAM(s) Oral daily  calcium carbonate 1250 mG  + Vitamin D (OsCal 500 + D) 1 Tablet(s) Oral daily  cefTRIAXone   IVPB 1000 milliGRAM(s) IV Intermittent every 24 hours  ferrous    sulfate 325 milliGRAM(s) Oral daily  metoprolol succinate  milliGRAM(s) Oral daily  sevelamer carbonate 800 milliGRAM(s) Oral three times a day with meals  sodium bicarbonate 1300 milliGRAM(s) Oral three times a day    MEDICATIONS  (PRN):      Allergies    No Known Allergies    Intolerances        Vital Signs Last 24 Hrs  T(C): 36.7 (08 Jan 2025 06:18), Max: 36.7 (07 Jan 2025 20:36)  T(F): 98 (08 Jan 2025 06:18), Max: 98.1 (07 Jan 2025 20:36)  HR: 115 (08 Jan 2025 06:18) (106 - 120)  BP: 144/95 (08 Jan 2025 06:18) (123/105 - 151/97)  BP(mean): 112 (07 Jan 2025 14:45) (101 - 112)  RR: 18 (08 Jan 2025 06:18) (18 - 22)  SpO2: 97% (08 Jan 2025 06:18) (95% - 97%)    Parameters below as of 08 Jan 2025 06:18  Patient On (Oxygen Delivery Method): nasal cannula  O2 Flow (L/min): 2      I&O's Summary      ON EXAM:    General: NAD, awake and alert, oriented x 3  HEENT: Mucous membranes are moist, anicteric  Lungs: Non-labored, breath sounds are clear bilaterally, No wheezing, rales or rhonchi  Cardiovascular: irregular, S1 and S2, no murmurs, rubs, or gallops  Gastrointestinal: Bowel Sounds present, soft, nontender.   Lymph: No peripheral edema. No lymphadenopathy.  Skin: No rashes or ulcers  Psych:  Mood & affect appropriate    LABS: All Labs Reviewed:                        10.2   7.93  )-----------( 191      ( 08 Jan 2025 06:43 )             33.3                         10.3   8.67  )-----------( 183      ( 07 Jan 2025 08:26 )             32.9     08 Jan 2025 06:43    144    |  107    |  92     ----------------------------<  102    4.5     |  13     |  6.27   07 Jan 2025 20:08    144    |  109    |  89     ----------------------------<  116    4.6     |  13     |  6.19   07 Jan 2025 10:43    139    |  108    |  84     ----------------------------<  129    5.7     |  10     |  5.91     Ca    8.5        08 Jan 2025 06:43  Ca    8.1        07 Jan 2025 20:08  Ca    8.3        07 Jan 2025 10:43  Phos  5.3       08 Jan 2025 06:43  Phos  5.5       07 Jan 2025 20:08  Mg     2.2       08 Jan 2025 06:43  Mg     2.2       07 Jan 2025 20:08  Mg     2.2       07 Jan 2025 08:26    TPro  6.4    /  Alb  3.5    /  TBili  0.3    /  DBili  x      /  AST  10     /  ALT  5      /  AlkPhos  104    07 Jan 2025 10:43  TPro  6.8    /  Alb  3.8    /  TBili  0.3    /  DBili  x      /  AST  10     /  ALT  7      /  AlkPhos  111    07 Jan 2025 08:26    PT/INR - ( 07 Jan 2025 08:26 )   PT: 21.5 sec;   INR: 1.88 ratio         PTT - ( 07 Jan 2025 08:26 )  PTT:33.8 sec  CARDIAC MARKERS ( 07 Jan 2025 08:26 )  x     / x     / x     / x     / 1.7 ng/mL      Blood Culture:

## 2025-01-08 NOTE — PROGRESS NOTE ADULT - REASON FOR ADMISSION
Call your healthcare provider right away if you get any of the following signs or symptoms of bleeding problems:   * Pain, color, or temperature changes to any part of your body   * Headaches, dizziness or weakness   * Unusual bruising (unexplained or growing in size)   * Nosebleeds   * Bleeding gums   * Bleeding from cuts that take a long time to stop   * Menstrual bleeding or vaginal bleeding that is heavier than normal   * Pink or brown urine   * Red or black stools   * Coughing up blood   * Vomiting blood or material that looks like coffee grounds    Update Anticoagulation Clinic (Coumadin Clinic) with any of the following:   * Medication changes (new, stopped or dose changes, this includes over-the-counter medications and supplements)   * Planning on having any surgery, medical or dental procedures   * Diet changes   * Falls  (you should go to Emergency Department immediately for evaluation, then notify Anticoagulation Clinic)    Please, do not wait until your next appointment to update us on changes.        
palpitation

## 2025-01-08 NOTE — PATIENT PROFILE ADULT - FALL HARM RISK - HARM RISK INTERVENTIONS

## 2025-01-08 NOTE — PROGRESS NOTE ADULT - ATTENDING COMMENTS
Advanced CKD with volume overload- pt of Dr Elizondo  Continue IV bumex   No acute indication for renal replacement today  Will have Dr White see pt for AVF eval   Plans per cards  If no improvement with diuretics then will need dialysis.    Kari Villalobos MD  Off: [phone]  contact me on teams    (After 5 pm or on weekends please page the on-call fellow/attending, can check [email or web address] for schedule. Login is dominick pierre, schedule under [facility] medicine, psych, derm)
# Afib with RVR   # s/p ablation, on Eliquis  # acute on chronic HFrEF   # pulmonary nodule  # NICO on CKD 5  # metabolic acidosis  # severe MR    - pt with chronic AFib s/p cardioversion in 4/2024, presents with palpitation found to be in AFib with RVR at 150s as well as acute on chronic HFrEF  - appreciate EP recs: discuss timing of ablation as outpatient when systemic AC not interrupted  - resume amiodarone (previously dc’ed as AF burden only 3%) and uptitrate metoprolol  - plan for DCCV tomorrow  - appreciate nephro recs: plan for AVF as outpatient on 1/16 and initiation of HD. No indication for RRT at this time on this admission  - continue IV bumex 3mg QD and monitor volume status  - continue reduced dose of eliquis 2.5mg BID (weight, renal function)  - obtain TTE  - continue bicarb tabs and monitor pH  - CT chest with 2cm pulmonary nodule. Will discuss with patient and referral to pulm for biopsy as outpatient    Daisy Moran MD  Division of Hospital Medicine  Contact via Microsoft Teams  Office: [phone]    I have personally and independently provided 76 minutes in patient encounter, reviewing tests and imaging, independently obtaining a history, performing a physical examination, discussing the plan with the patient, ordering medications/tests, documenting clinical information, and coordinating care. This excludes any time spent on teaching.

## 2025-01-08 NOTE — PROGRESS NOTE ADULT - ASSESSMENT
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     Recurrent AF RVR in the setting of ADHF and NICO on CKD    -She was admitted in April 2024 with the same presentation  -Outpatient TTE from 11/7/24 showed LVEF 55%, no RWMA, mod (grade 2) LVDD, severely dilated LA (RAUL: 85), mod-mod MR, mild-mod AR, mod plum HTN.   -She was doing well post DCCV with EF recovered and therefore amiodarone was discontinued in May 2024  -Outpatient event monitor from August 2024 showed 3% AF burden  -Renal consult appreciated, no acute indications for HD at this time but low threshold to initiate.   -Would hold off on placing AV fistula this admission. Can bridge to Heparin to minimize interruption on AC if tunneled catheter needed for temp dialysis while inpatient.   -CT chest showed LLL with 2cm irregular nodule, suspicious for neoplasm, outpatient f/u as per patient/ wish  -Please obtain structural consult for eval on MR.   -She will do better in sinus rhythm.   -No interruption on eliquis as per pt's , reload with amiodarone 400mg TID to 10 gram load and then 200mg QD.   -NPO after MN except meds for DCCV in am. May need to adjust amiodarone dose post DCCV pending HR in sinus rhythm. Would need to be on un-interrupted AC for minimum of 30 days post DCCV.   -Plan discussed with Dr. Thomas, primary team and pt's .     MAGDALENA Mata NP-C  Can reach me via teams.  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     Recurrent AF RVR in the setting of ADHF and NICO on CKD    -She was admitted in April 2024 with the same presentation  -Outpatient TTE from 11/7/24 showed LVEF 55%, no RWMA, mod (grade 2) LVDD, severely dilated LA (RAUL: 85), mod-mod MR, mild-mod AR, mod plum HTN.   -She was doing well post DCCV with EF recovered and therefore amiodarone was discontinued in May 2024  -Outpatient event monitor from August 2024 showed 3% AF burden  -Renal consult appreciated, no acute indications for HD at this time but low threshold to initiate.   -Would hold off on placing AV fistula this admission. Can bridge to Heparin to minimize interruption on AC if tunneled catheter needed for temp dialysis while inpatient.   -CT chest showed LLL with 2cm irregular nodule, suspicious for neoplasm, outpatient f/u as per patient/ wish  -Please obtain structural consult for eval on MR.   -She will do better in sinus rhythm.   -No interruption on eliquis as per pt's , reload with amiodarone 400mg TID to 10 gram load and then 200mg QD. I discussed with patient  plans for short term amiodarone including risks/side effects. Discussed need for outpatient monitoring of PFT/TFT/LFT/opthalmology monitoring while on amiodarone.   -Check TSH in am  -NPO after MN except meds for DCCV in am. May need to adjust amiodarone dose post DCCV pending HR in sinus rhythm. Would need to be on un-interrupted AC for minimum of 30 days post DCCV.   -Plan is for short term amiodarone to bridge to AF ablation as outpatient  -Plan discussed with Dr. Thomas, primary team and pt's .     MAGDALENA Mata, NP-C  Can reach me via teams.  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     Recurrent AF RVR in the setting of ADHF and NICO on CKD    -She was admitted in April 2024 with the same presentation  -Outpatient TTE from 11/7/24 showed LVEF 55%, no RWMA, mod (grade 2) LVDD, severely dilated LA (RAUL: 85), mod-severe MR, mild-mod AR, mod plum HTN.   -She was doing well post DCCV with EF recovered and therefore amiodarone was discontinued in May 2024  -Outpatient event monitor from August 2024 showed 3% AF burden  -Renal consult appreciated, no acute indications for HD at this time but low threshold to initiate.   -Would hold off on placing AV fistula this admission. Can bridge to Heparin to minimize interruption on AC if tunneled catheter needed for temp dialysis while inpatient.   -CT chest showed LLL with 2cm irregular nodule, suspicious for neoplasm, outpatient f/u as per patient/ wish  -Please obtain structural consult for eval on MR.   -She will do better in sinus rhythm.   -No interruption on eliquis as per pt's , reload with amiodarone 400mg TID to 10 gram load and then 200mg QD. I discussed with patient  plans for short term amiodarone including risks/side effects. Discussed need for outpatient monitoring of PFT/TFT/LFT/opthalmology monitoring while on amiodarone.   -Check TSH in am  -NPO after MN except meds for DCCV in am. May need to adjust amiodarone dose post DCCV pending HR in sinus rhythm. Would need to be on un-interrupted AC for minimum of 30 days post DCCV.   -Plan is for short term amiodarone to bridge to AF ablation as outpatient  -Plan discussed with Dr. Thomas, primary team and pt's .     MAGDALENA Mata, NP-C  Can reach me via teams.

## 2025-01-08 NOTE — PROGRESS NOTE ADULT - PROBLEM SELECTOR PLAN 3
Patient requests all Lab and Radiology Results on their Discharge Instructions
-Bicarb 14  -Likely i/s/o CKD  -C/w sodium bicarbonate 650 TID

## 2025-01-08 NOTE — PROGRESS NOTE ADULT - PROBLEM SELECTOR PLAN 1
Patient with NICO on CKD in the setting of decompensated HF and Afib with RVR. CKD likely due to chronic nephrolithiasis. On review of labs on [facility], last Scr was elevated at 6.77 on 1/3/25. Admission Scr remained elevated to 5.98. ECHO with preserved EF, moderate mitral regurg. Pro-BNP elevated at 36,866.    Scr remains elevated at 6.27 today. Recommend to obtain urine lytes and kidney US. Agree with IV Bumex 3mg BID. Check daily weights. Discussed the possibility of HD if renal function worsens, becomes uremic, or is inadequately able to be diuresed. Patient in agreement and consent for HD was obtained. No acute indications for HD at this time but low threshold to initiate.     Cardiology note reviewed: planned for DCCV once optimized from HF standpoint.  Dr. White to evaluate patient for AVF creation. EP recommending to hold off AVF creation on this admission.

## 2025-01-08 NOTE — PROGRESS NOTE ADULT - SUBJECTIVE AND OBJECTIVE BOX
*******************************  Muna Chase MD (PGY-2)  Internal Medicine  Contact via [employer] TEAMS  *******************************    INTERVAL HPI/OVERNIGHT EVENTS:    REVIEW OF SYSTEMS:       OBJECTIVE  T(C): 36.7 (01-08-25 @ 06:18), Max: 37.6 (01-07-25 @ 08:10)  HR: 115 (01-08-25 @ 06:18) (106 - 140)  BP: 144/95 (01-08-25 @ 06:18) (123/105 - 151/97)  RR: 18 (01-08-25 @ 06:18) (18 - 24)  SpO2: 97% (01-08-25 @ 06:18) (95% - 97%)      PHYSICAL EXAM  General:  HEENT:  Cardiovascular:  Pulmonary:  GI:  :  Neuro:  Psych:          IMAGING:     *******************************  Muna Chase MD (PGY-2)  Internal Medicine  Contact via [employer] TEAMS  *******************************    INTERVAL HPI/OVERNIGHT EVENTS:  No acute overnight events.     REVIEW OF SYSTEMS:     CONSTITUTIONAL:  No weakness, fevers or chills  EYES/ENT:  No visual changes;  No vertigo or throat pain   NECK:  No pain or stiffness  RESPIRATORY:  No cough, wheezing, hemoptysis; No shortness of breath  CARDIOVASCULAR:  No chest pain or palpitations  GASTROINTESTINAL:  No abdominal or epigastric pain. No nausea, vomiting, or hematemesis; No diarrhea or constipation. No melena or hematochezia.  GENITOURINARY:  No dysuria, frequency or hematuria  NEUROLOGICAL:  No numbness or weakness  SKIN:  No itching, rashes      OBJECTIVE  T(C): 36.7 (01-08-25 @ 06:18), Max: 37.6 (01-07-25 @ 08:10)  HR: 115 (01-08-25 @ 06:18) (106 - 140)  BP: 144/95 (01-08-25 @ 06:18) (123/105 - 151/97)  RR: 18 (01-08-25 @ 06:18) (18 - 24)  SpO2: 97% (01-08-25 @ 06:18) (95% - 97%)      PHYSICAL EXAM  GENERAL: NAD, lying in bed comfortably  NECK: Supple, trachea midline, no JVD  HEART: Regular rate and rhythm, no murmurs, rubs, or gallops  LUNGS: Unlabored respirations.  Clear to auscultation bilaterally, no crackles, wheezing, or rhonchi  ABDOMEN: Soft, nontender, nondistended, +BS  EXTREMITIES: 2+ peripheral pulses bilaterally. 2+ pitting edema b/l  NERVOUS SYSTEM:  A&Ox3, moving all extremities, no focal deficits   SKIN: No rashes or lesions        IMAGING:     *******************************  Muna Chase MD (PGY-2)  Internal Medicine  Contact via [employer] TEAMS  *******************************    INTERVAL HPI/OVERNIGHT EVENTS:  No acute overnight events.   Tele: afib RVR    REVIEW OF SYSTEMS:     CONSTITUTIONAL:  No weakness, fevers or chills  EYES/ENT:  No visual changes;  No vertigo or throat pain   NECK:  No pain or stiffness  RESPIRATORY:  No cough, wheezing, hemoptysis; No shortness of breath  CARDIOVASCULAR:  No chest pain or palpitations  GASTROINTESTINAL:  No abdominal or epigastric pain. No nausea, vomiting, or hematemesis; No diarrhea or constipation. No melena or hematochezia.  GENITOURINARY:  No dysuria, frequency or hematuria  NEUROLOGICAL:  No numbness or weakness  SKIN:  No itching, rashes      OBJECTIVE  T(C): 36.7 (01-08-25 @ 06:18), Max: 37.6 (01-07-25 @ 08:10)  HR: 115 (01-08-25 @ 06:18) (106 - 140)  BP: 144/95 (01-08-25 @ 06:18) (123/105 - 151/97)  RR: 18 (01-08-25 @ 06:18) (18 - 24)  SpO2: 97% (01-08-25 @ 06:18) (95% - 97%)      PHYSICAL EXAM  GENERAL: NAD, lying in bed comfortably  NECK: Supple, trachea midline, no JVD  HEART: Regular rate and rhythm, no murmurs, rubs, or gallops  LUNGS: Unlabored respirations.  Clear to auscultation bilaterally, no crackles, wheezing, or rhonchi  ABDOMEN: Soft, nontender, nondistended, +BS  EXTREMITIES: 2+ peripheral pulses bilaterally. 2+ pitting edema b/l  NERVOUS SYSTEM:  A&Ox3, moving all extremities, no focal deficits   SKIN: No rashes or lesions        IMAGING:

## 2025-01-09 VITALS
HEART RATE: 107 BPM | TEMPERATURE: 98 F | OXYGEN SATURATION: 97 % | SYSTOLIC BLOOD PRESSURE: 117 MMHG | DIASTOLIC BLOOD PRESSURE: 72 MMHG | RESPIRATION RATE: 18 BRPM

## 2025-01-09 LAB
CULTURE RESULTS: SIGNIFICANT CHANGE UP
SPECIMEN SOURCE: SIGNIFICANT CHANGE UP

## 2025-01-09 NOTE — CHART NOTE - NSCHARTNOTEFT_GEN_A_CORE
Was called by RN due to patient wanting to sign out AMA. Asked patient why she wanted to leave, reports she wants to visit her own cardiologist to confirm if she truly requires a DCCV.    Despite extensive discussion regarding the benefits of staying vs the risks associated with leaving against medical advice, RaviAga has decided to leave against medical advice (AMA). She has a normal mental status (AOX3) and full decisional capacity. I explained the risks, benefits, and alternatives to treatment. The patient understands her condition and the risks of leaving AMA, including but not limited to:     -stroke   -permanent disability   -death     Pt had the opportunity to ask questions about her medical condition. The patient's support person/spouse is also aware of the condition and the patient's desire to leave AMA. Medical staff believes the patient fully understands what has been explained and answered. The patient has been informed that she may return for care at any time. Pt signed form to sign out AMA and accepts responsibility for any and all results of this decision.

## 2025-01-09 NOTE — CHART NOTE - NSCHARTNOTEFT_GEN_A_CORE
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  CT- Chest (1/6/24): Left lower lobe 2 cm irregular pulmonary nodule is suspicious for primary lung neoplasm. Patchy left upper lobe groundglass and consolidative opacities are likely infectious.   Patient was scheduled to be cardioverted today (1/9/24) but left AMA. She has a history of subcentimeter lung nodules dating back to 2009, but now has a suspicious 2CM Left Lower Lobe Nodule in the background of PNA. Should see a Thoracic Surgeon and have a short interval CT scan in 3-4 months.     [facility] Thor[facility] will follow up with patient's  Luis for coordination of care.       Amita YOUNG  [facility] Inpatient Navigator

## 2025-01-09 NOTE — PROVIDER CONTACT NOTE (OTHER) - ACTION/TREATMENT ORDERED:
Provider aware, signed AMA forms. AMA form given to patient. Covering ANM Mariposa aware. IV lock removed.

## 2025-01-09 NOTE — PROVIDER CONTACT NOTE (OTHER) - BACKGROUND
Patient admitted for afib  PMH HFmrEF, severe MR, pAF s/p DCCV in 4/24 admitted for afib RVR and NICO

## 2025-01-12 LAB
CULTURE RESULTS: SIGNIFICANT CHANGE UP
CULTURE RESULTS: SIGNIFICANT CHANGE UP
SPECIMEN SOURCE: SIGNIFICANT CHANGE UP
SPECIMEN SOURCE: SIGNIFICANT CHANGE UP

## 2025-01-13 RX ORDER — FUROSEMIDE 40 MG/1
40 TABLET ORAL
Qty: 30 | Refills: 3 | Status: ACTIVE | COMMUNITY
Start: 2025-01-13 | End: 1900-01-01

## 2025-01-16 ENCOUNTER — APPOINTMENT (OUTPATIENT)
Dept: VASCULAR SURGERY | Facility: CLINIC | Age: 75
End: 2025-01-16

## 2025-01-16 ENCOUNTER — APPOINTMENT (OUTPATIENT)
Dept: VASCULAR SURGERY | Facility: CLINIC | Age: 75
End: 2025-01-16
Payer: MEDICARE

## 2025-01-16 VITALS
HEART RATE: 95 BPM | SYSTOLIC BLOOD PRESSURE: 156 MMHG | HEIGHT: 62 IN | WEIGHT: 135 LBS | DIASTOLIC BLOOD PRESSURE: 108 MMHG | BODY MASS INDEX: 24.84 KG/M2

## 2025-01-16 PROCEDURE — 93986 DUP-SCAN HEMO COMPL UNI STD: CPT

## 2025-01-16 PROCEDURE — 99204 OFFICE O/P NEW MOD 45 MIN: CPT

## 2025-01-23 ENCOUNTER — NON-APPOINTMENT (OUTPATIENT)
Age: 75
End: 2025-01-23

## 2025-01-24 ENCOUNTER — NON-APPOINTMENT (OUTPATIENT)
Age: 75
End: 2025-01-24

## 2025-01-28 ENCOUNTER — TRANSCRIPTION ENCOUNTER (OUTPATIENT)
Age: 75
End: 2025-01-28

## 2025-01-28 NOTE — DISCHARGE NOTE PROVIDER - NSDCCPCAREPLAN_GEN_ALL_CORE_FT
PRINCIPAL DISCHARGE DIAGNOSIS  Diagnosis: Atrial fibrillation with RVR  Assessment and Plan of Treatment:       SECONDARY DISCHARGE DIAGNOSES  Diagnosis: Acute systolic congestive heart failure  Assessment and Plan of Treatment:

## 2025-01-28 NOTE — DISCHARGE NOTE PROVIDER - HOSPITAL COURSE
74yoF PMH HFmrEF now normalized LVEF, severe MR, pAF s/p DCCV in 4/2024, HTN, CKD 2/2 b/l staghorn calculi s/p removal (2009) who presents for palpitations. Overnight patient woke up to use the bathroom and as she got up noticed that her heart was racing, no chest pain or shortness of breath. Over the past week she has had symptoms of a URI including cough but no fevers/chills, myalgias. Her  was also similarly sick.  In the ED initially presented in afib w/ RVR to 140s, otherwise hemodynamically stable. Labs significant for metabolic acidosis (VBG 7.25/33/41/14)   S/p lasix, bumex, lopressor.    Was called by RN due to patient wanting to sign out AMA. Asked patient why she wanted to leave, reports she wants to visit her own cardiologist to confirm if she truly requires a DCCV.    Despite extensive discussion regarding the benefits of staying vs the risks associated with leaving against medical advice, Aga Rodrigues has decided to leave against medical advice (AMA). She has a normal mental status (AOX3) and full decisional capacity. I explained the risks, benefits, and alternatives to treatment. The patient understands her condition and the risks of leaving AMA, including but not limited to:     -stroke   -permanent disability   -death     Pt had the opportunity to ask questions about her medical condition. The patient's support person/spouse is also aware of the condition and the patient's desire to leave AMA. Medical staff believes the patient fully understands what has been explained and answered. The patient has been informed that she may return for care at any time. Pt signed form to sign out AMA and accepts responsibility for any and all results of this decision.

## 2025-01-28 NOTE — DISCHARGE NOTE PROVIDER - NSDCFUSCHEDAPPT_GEN_ALL_CORE_FT
Bruce Granados  [facility]  CARDIOLOGY [address] [facility]   Scheduled Appointment: 01/30/2025    Bassem Elizondo  [facility]  NEPHRO [address]  Scheduled Appointment: 02/27/2025

## 2025-01-28 NOTE — DISCHARGE NOTE PROVIDER - NSDCMRMEDTOKEN_GEN_ALL_CORE_FT
[place] [address]   Neurology; follow up  CLINIC VISIT  12/3/2019    Vickie Burton Patient Status:  No patient class for patient encounter    3/5/1944 MRN [id number]   Location [address] [place] [address], [address] [place] [address], [address] [place] [address] PCP Devon Severino brother and father. SOCIAL HISTORY:   reports that he quit smoking about 12 years ago. He smoked 1.00 pack per day. He has never used smokeless tobacco. He reports that he does not drink alcohol or use drugs.     ALLERGIES:  No Known Allergies    MEDICAT normal.  Skin:  No unusual lesions    Neurologic Examination:  Mental status: he is awake, alert, oriented to time, name and place, Mentally appropriate  for age;  Language and speech: language is intact in expression and comprehension, no dysarthria, voic about 10 months (around 10/3/2020). We discussed in depth regarding diagnosis, prognosis, treatment. Side effect of medications were discussed in details. The patient and  Wife  were given ample opportunity to ask questions.  All questions and concerns w amLODIPine 5 mg oral tablet: 1 tab(s) orally once a day  apixaban 5 mg oral tablet: 1 tab(s) orally 2 times a day  ferrous sulfate 325 mg (65 mg elemental iron) oral tablet: 1 tab(s) orally once a day  metoprolol succinate 50 mg oral tablet, extended release: 1 tab(s) orally once a day  sevelamer carbonate 800 mg oral tablet: 1 tab(s) orally 3 times a day (with meals)  sodium bicarbonate 650 mg oral tablet: 1 tab(s) orally 3 times a day  vitamin D-calcium (as carbonate) 5 mcg-500 mg oral tablet: 1 tab(s) orally once a day

## 2025-01-28 NOTE — DISCHARGE NOTE PROVIDER - CARE PROVIDER_API CALL
Bruce Granados  Cardiology  [address] [facility], [facility] [address]  [place] [address]  Phone: [phone]  Fax: [phone]  Established Patient  Follow Up Time:

## 2025-01-30 ENCOUNTER — NON-APPOINTMENT (OUTPATIENT)
Age: 75
End: 2025-01-30

## 2025-01-30 ENCOUNTER — APPOINTMENT (OUTPATIENT)
Dept: CARDIOLOGY | Facility: CLINIC | Age: 75
End: 2025-01-30
Payer: MEDICARE

## 2025-01-30 VITALS
SYSTOLIC BLOOD PRESSURE: 120 MMHG | HEART RATE: 116 BPM | DIASTOLIC BLOOD PRESSURE: 80 MMHG | WEIGHT: 135 LBS | BODY MASS INDEX: 24.69 KG/M2

## 2025-01-30 DIAGNOSIS — N18.9 CHRONIC KIDNEY DISEASE, UNSPECIFIED: ICD-10-CM

## 2025-01-30 DIAGNOSIS — R60.0 LOCALIZED EDEMA: ICD-10-CM

## 2025-01-30 DIAGNOSIS — Z79.899 OTHER LONG TERM (CURRENT) DRUG THERAPY: ICD-10-CM

## 2025-01-30 PROCEDURE — G2211 COMPLEX E/M VISIT ADD ON: CPT

## 2025-01-30 PROCEDURE — 99215 OFFICE O/P EST HI 40 MIN: CPT

## 2025-01-30 PROCEDURE — 93000 ELECTROCARDIOGRAM COMPLETE: CPT

## 2025-01-30 RX ORDER — TORSEMIDE 100 MG/1
100 TABLET ORAL
Qty: 180 | Refills: 3 | Status: ACTIVE | COMMUNITY
Start: 2025-01-30 | End: 1900-01-01

## 2025-01-31 LAB
ALBUMIN SERPL ELPH-MCNC: 4.1 G/DL
ALP BLD-CCNC: 96 U/L
ALT SERPL-CCNC: 7 U/L
ANION GAP SERPL CALC-SCNC: 22 MMOL/L
AST SERPL-CCNC: 11 U/L
BASOPHILS # BLD AUTO: 0.07 K/UL
BASOPHILS NFR BLD AUTO: 1.1 %
BILIRUB SERPL-MCNC: 0.3 MG/DL
BUN SERPL-MCNC: 79 MG/DL
CALCIUM SERPL-MCNC: 7.6 MG/DL
CHLORIDE SERPL-SCNC: 100 MMOL/L
CO2 SERPL-SCNC: 19 MMOL/L
CREAT SERPL-MCNC: 6.18 MG/DL
DEPRECATED KAPPA LC FREE/LAMBDA SER: 1.73 RATIO
EGFR: 7 ML/MIN/1.73M2
EOSINOPHIL # BLD AUTO: 0.06 K/UL
EOSINOPHIL NFR BLD AUTO: 1 %
GLUCOSE SERPL-MCNC: 101 MG/DL
HCT VFR BLD CALC: 30 %
HGB BLD-MCNC: 9.4 G/DL
IMM GRANULOCYTES NFR BLD AUTO: 0.3 %
KAPPA LC CSF-MCNC: 3.69 MG/DL
KAPPA LC SERPL-MCNC: 6.37 MG/DL
LYMPHOCYTES # BLD AUTO: 1.24 K/UL
LYMPHOCYTES NFR BLD AUTO: 19.8 %
MAN DIFF?: NORMAL
MCHC RBC-ENTMCNC: 30.6 PG
MCHC RBC-ENTMCNC: 31.3 G/DL
MCV RBC AUTO: 97.7 FL
MONOCYTES # BLD AUTO: 0.47 K/UL
MONOCYTES NFR BLD AUTO: 7.5 %
NEUTROPHILS # BLD AUTO: 4.4 K/UL
NEUTROPHILS NFR BLD AUTO: 70.3 %
PLATELET # BLD AUTO: 195 K/UL
POTASSIUM SERPL-SCNC: 4.3 MMOL/L
PROT SERPL-MCNC: 6.1 G/DL
RBC # BLD: 3.07 M/UL
RBC # FLD: 14.7 %
SODIUM SERPL-SCNC: 141 MMOL/L
TSH SERPL-ACNC: 1.75 UIU/ML
WBC # FLD AUTO: 6.26 K/UL

## 2025-02-03 ENCOUNTER — RX RENEWAL (OUTPATIENT)
Age: 75
End: 2025-02-03

## 2025-02-03 ENCOUNTER — NON-APPOINTMENT (OUTPATIENT)
Age: 75
End: 2025-02-03

## 2025-02-05 LAB — M PROTEIN SPEC IFE-MCNC: NORMAL

## 2025-02-12 ENCOUNTER — INPATIENT (INPATIENT)
Facility: HOSPITAL | Age: 75
LOS: 21 days | Discharge: HOME HEALTH | DRG: 310 | End: 2025-03-06
Attending: INTERNAL MEDICINE | Admitting: INTERNAL MEDICINE
Payer: MEDICARE

## 2025-02-12 VITALS
TEMPERATURE: 98 F | HEIGHT: 62 IN | DIASTOLIC BLOOD PRESSURE: 85 MMHG | HEART RATE: 104 BPM | RESPIRATION RATE: 17 BRPM | WEIGHT: 132.06 LBS | SYSTOLIC BLOOD PRESSURE: 133 MMHG | OXYGEN SATURATION: 97 %

## 2025-02-12 DIAGNOSIS — I50.33 ACUTE ON CHRONIC DIASTOLIC (CONGESTIVE) HEART FAILURE: ICD-10-CM

## 2025-02-12 DIAGNOSIS — Z29.9 ENCOUNTER FOR PROPHYLACTIC MEASURES, UNSPECIFIED: ICD-10-CM

## 2025-02-12 DIAGNOSIS — Z92.89 PERSONAL HISTORY OF OTHER MEDICAL TREATMENT: Chronic | ICD-10-CM

## 2025-02-12 DIAGNOSIS — I48.91 UNSPECIFIED ATRIAL FIBRILLATION: ICD-10-CM

## 2025-02-12 DIAGNOSIS — I48.0 PAROXYSMAL ATRIAL FIBRILLATION: ICD-10-CM

## 2025-02-12 DIAGNOSIS — N17.9 ACUTE KIDNEY FAILURE, UNSPECIFIED: ICD-10-CM

## 2025-02-12 DIAGNOSIS — I10 ESSENTIAL (PRIMARY) HYPERTENSION: ICD-10-CM

## 2025-02-12 DIAGNOSIS — I50.32 CHRONIC DIASTOLIC (CONGESTIVE) HEART FAILURE: ICD-10-CM

## 2025-02-12 DIAGNOSIS — E87.20 ACIDOSIS, UNSPECIFIED: ICD-10-CM

## 2025-02-12 LAB
ANION GAP SERPL CALC-SCNC: 27 MMOL/L — HIGH (ref 5–17)
BUN SERPL-MCNC: 99 MG/DL — HIGH (ref 7–23)
CALCIUM SERPL-MCNC: 7.4 MG/DL — LOW (ref 8.4–10.5)
CHLORIDE SERPL-SCNC: 96 MMOL/L — SIGNIFICANT CHANGE UP (ref 96–108)
CO2 SERPL-SCNC: 18 MMOL/L — LOW (ref 22–31)
CREAT SERPL-MCNC: 8.42 MG/DL — HIGH (ref 0.5–1.3)
EGFR: 5 ML/MIN/1.73M2 — LOW
EGFR: 5 ML/MIN/1.73M2 — LOW
GLUCOSE SERPL-MCNC: 115 MG/DL — HIGH (ref 70–99)
HCT VFR BLD CALC: 32.1 % — LOW (ref 34.5–45)
HGB BLD-MCNC: 10 G/DL — LOW (ref 11.5–15.5)
MCHC RBC-ENTMCNC: 30.1 PG — SIGNIFICANT CHANGE UP (ref 27–34)
MCHC RBC-ENTMCNC: 31.2 G/DL — LOW (ref 32–36)
MCV RBC AUTO: 96.7 FL — SIGNIFICANT CHANGE UP (ref 80–100)
NRBC BLD AUTO-RTO: 0 /100 WBCS — SIGNIFICANT CHANGE UP (ref 0–0)
PLATELET # BLD AUTO: 173 K/UL — SIGNIFICANT CHANGE UP (ref 150–400)
POTASSIUM SERPL-MCNC: 3.7 MMOL/L — SIGNIFICANT CHANGE UP (ref 3.5–5.3)
POTASSIUM SERPL-SCNC: 3.7 MMOL/L — SIGNIFICANT CHANGE UP (ref 3.5–5.3)
RBC # BLD: 3.32 M/UL — LOW (ref 3.8–5.2)
RBC # FLD: 17.3 % — HIGH (ref 10.3–14.5)
SODIUM SERPL-SCNC: 141 MMOL/L — SIGNIFICANT CHANGE UP (ref 135–145)
WBC # BLD: 5.93 K/UL — SIGNIFICANT CHANGE UP (ref 3.8–10.5)
WBC # FLD AUTO: 5.93 K/UL — SIGNIFICANT CHANGE UP (ref 3.8–10.5)

## 2025-02-12 PROCEDURE — 99232 SBSQ HOSP IP/OBS MODERATE 35: CPT | Mod: GC

## 2025-02-12 PROCEDURE — 93010 ELECTROCARDIOGRAM REPORT: CPT

## 2025-02-12 RX ORDER — BUMETANIDE 1 MG/1
3 TABLET ORAL EVERY 12 HOURS
Refills: 0 | Status: DISCONTINUED | OUTPATIENT
Start: 2025-02-12 | End: 2025-02-12

## 2025-02-12 RX ORDER — SEVELAMER HYDROCHLORIDE 800 MG/1
800 TABLET ORAL
Refills: 0 | Status: DISCONTINUED | OUTPATIENT
Start: 2025-02-12 | End: 2025-03-06

## 2025-02-12 RX ORDER — BUMETANIDE 1 MG/1
3 TABLET ORAL DAILY
Refills: 0 | Status: DISCONTINUED | OUTPATIENT
Start: 2025-02-12 | End: 2025-02-12

## 2025-02-12 RX ORDER — HALOPERIDOL 10 MG/1
5 TABLET ORAL ONCE
Refills: 0 | Status: COMPLETED | OUTPATIENT
Start: 2025-02-12 | End: 2025-02-12

## 2025-02-12 RX ORDER — METOPROLOL SUCCINATE 50 MG/1
50 TABLET, EXTENDED RELEASE ORAL
Refills: 0 | Status: DISCONTINUED | OUTPATIENT
Start: 2025-02-12 | End: 2025-02-17

## 2025-02-12 RX ORDER — ACETAMINOPHEN 500 MG/5ML
650 LIQUID (ML) ORAL EVERY 6 HOURS
Refills: 0 | Status: DISCONTINUED | OUTPATIENT
Start: 2025-02-12 | End: 2025-03-06

## 2025-02-12 RX ORDER — CALCIUM CARBONATE 750 MG/1
1 TABLET ORAL
Refills: 0 | DISCHARGE

## 2025-02-12 RX ORDER — SODIUM BICARBONATE 1 MEQ/ML
650 SYRINGE (ML) INTRAVENOUS
Refills: 0 | Status: DISCONTINUED | OUTPATIENT
Start: 2025-02-12 | End: 2025-03-04

## 2025-02-12 RX ORDER — APIXABAN 2.5 MG/1
5 TABLET, FILM COATED ORAL
Refills: 0 | Status: DISCONTINUED | OUTPATIENT
Start: 2025-02-12 | End: 2025-02-17

## 2025-02-12 RX ORDER — HALOPERIDOL 10 MG/1
5 TABLET ORAL ONCE
Refills: 0 | Status: DISCONTINUED | OUTPATIENT
Start: 2025-02-12 | End: 2025-02-12

## 2025-02-12 RX ORDER — MELATONIN 5 MG
3 TABLET ORAL AT BEDTIME
Refills: 0 | Status: DISCONTINUED | OUTPATIENT
Start: 2025-02-12 | End: 2025-03-06

## 2025-02-12 RX ADMIN — Medication 3 MILLIGRAM(S): at 23:48

## 2025-02-12 RX ADMIN — Medication 650 MILLIGRAM(S): at 23:47

## 2025-02-12 RX ADMIN — HALOPERIDOL 5 MILLIGRAM(S): 10 TABLET ORAL at 23:48

## 2025-02-12 RX ADMIN — BUMETANIDE 124 MILLIGRAM(S): 1 TABLET ORAL at 19:03

## 2025-02-12 RX ADMIN — APIXABAN 5 MILLIGRAM(S): 2.5 TABLET, FILM COATED ORAL at 23:47

## 2025-02-12 RX ADMIN — Medication 650 MILLIGRAM(S): at 19:03

## 2025-02-12 RX ADMIN — METOPROLOL SUCCINATE 50 MILLIGRAM(S): 50 TABLET, EXTENDED RELEASE ORAL at 19:03

## 2025-02-12 NOTE — CONSULT NOTE ADULT - SUBJECTIVE AND OBJECTIVE BOX
[facility] DIVISION OF KIDNEY DISEASES AND HYPERTENSION -- [phone]  -- INITIAL CONSULT NOTE  --------------------------------------------------------------------------------  HPI: 74-year-old female with PMHx of HTN, CKD stage 5 (pt. of Dr. Elizondo), Afib on eliquis, anemia, nephrolithiasis, and recently diagnosed HF who presents for schedule cardioversion for Afib found to have NICO. Nephrology consulted for NICO on CKD V.     Pt. with hx of advanced CKD is in the setting of chronic history of nephrolithiasis/staghorn calculus. On review of labs on [place], last Scr was elevated at 6.18 on 1/30/25. Admission SCr is elevated at 8.42 without electrolyte abnormalities. Patient does not have dialysis access but discussions have taken place in the outpatient setting to prepare. She was scheduled to see Dr. White for AVF creation.         PAST HISTORY  --------------------------------------------------------------------------------  PAST MEDICAL & SURGICAL HISTORY:  Renal Calculus  Ureteral Calculus  Acute Renal Failure  9/2009  Wrist Fracture  CYNTHIA  Collar Bone Fracture  Rib Fractures  Kidney Stone removal  3/15/2011  Atrial fibrillation with RVR  C Section  Percutaneous Stone Extraction  Right  S/P Left Percutaneuos Stone extraction  01/26/2010, 04/20/2010  History of cardioversion      FAMILY HISTORY:    PAST SOCIAL HISTORY:    ALLERGIES & MEDICATIONS  --------------------------------------------------------------------------------  Allergies    No Known Allergies    Intolerances      Standing Inpatient Medications  apixaban 5 milliGRAM(s) Oral two times a day  buMETAnide IVPB 3 milliGRAM(s) IV Intermittent daily  metoprolol succinate ER 50 milliGRAM(s) Oral two times a day  sevelamer carbonate 800 milliGRAM(s) Oral three times a day with meals  sodium bicarbonate 650 milliGRAM(s) Oral four times a day    PRN Inpatient Medications  acetaminophen     Tablet .. 650 milliGRAM(s) Oral every 6 hours PRN  melatonin 3 milliGRAM(s) Oral at bedtime PRN      REVIEW OF SYSTEMS  --------------------------------------------------------------------------------  Gen: No fevers/chills  Skin: No rashes  Head/Eyes/Ears: No HA  Respiratory: No SOB,  CV: No CP  GI: No abdominal pain, diarrhea, N/V  : No dysuria, hematuria  MSK: No edema  Heme: No easy bruising or bleeding  Psych: No significant depression    All other systems were reviewed and are negative, except as noted.    VITALS/PHYSICAL EXAM  --------------------------------------------------------------------------------  T(C): 36.3 (02-12-25 @ 16:57), Max: 36.4 (02-12-25 @ 12:42)  HR: 100 (02-12-25 @ 19:01) (97 - 104)  BP: 132/87 (02-12-25 @ 19:01) (127/87 - 133/85)  RR: 18 (02-12-25 @ 16:57) (17 - 18)  SpO2: 98% (02-12-25 @ 16:57) (97% - 98%)  Wt(kg): --  Height (cm): 157.5 (02-12-25 @ 15:03)  Weight (kg): 59.9 (02-12-25 @ 15:03)  BMI (kg/m2): 24.1 (02-12-25 @ 15:03)  BSA (m2): 1.6 (02-12-25 @ 15:03)    Physical Exam:  Gen: NAD  Pulm: CTA B/L  CV: RRR, S1S2;  Abd: +BS, soft  : No suprapubic tenderness  Extremities: no bilateral LE edema noted.   Neuro: Awake, alert.  Skin: Warm  Vascular access:    LABS/STUDIES  --------------------------------------------------------------------------------              10.0   5.93  >-----------<  173      [02-12-25 @ 13:13]              32.1     141  |  96  |  99  ----------------------------<  115      [02-12-25 @ 13:13]  3.7   |  18  |  8.42        Ca     7.4     [02-12-25 @ 13:13]        Creatinine Trend:  SCr 8.42 [02-12 @ 13:13]    Urinalysis - [02-12-25 @ 13:13]      Color  / Appearance  / SG  / pH       Gluc 115 / Ketone   / Bili  / Urobili        Blood  / Protein  / Leuk Est  / Nitrite       RBC  / WBC  / Hyaline  / Gran  / Sq Epi  / Non Sq Epi  / Bacteria       Iron 61, TIBC 301, %sat 20      [11-22-24 @ 21:13]  Ferritin 101      [11-22-24 @ 21:13]  PTH -- (Ca 8.1)      [11-22-24 @ 21:13]   595  TSH 0.65      [04-09-24 @ 00:27]    HBsAg Nonreact      [04-10-24 @ 07:11]  HCV 0.05, Nonreact      [04-09-24 @ 07:25]  HIV Nonreact      [04-09-24 @ 20:21]    Free Light Chains: kappa 5.19, lambda 2.81, ratio = 1.85      [04-10 @ 07:11]  Immunofixation Serum:   Weak  IgG Kappa Band Identified      Reference Range: None Detected      [04-10-24 @ 07:11]     [facility] DIVISION OF KIDNEY DISEASES AND HYPERTENSION -- [phone]  -- INITIAL CONSULT NOTE  --------------------------------------------------------------------------------  HPI: 74-year-old female with PMHx of HTN, CKD stage 5 (pt. of Dr. Elizondo), Afib on eliquis, anemia, nephrolithiasis, and recently diagnosed HF who presents for schedule cardioversion for Afib found to have NICO. Nephrology consulted for NICO on CKD V.     Pt. with hx of advanced CKD is in the setting of chronic history of nephrolithiasis/staghorn calculus. On review of labs on [place], last Scr was elevated at 6.18 on 1/30/25. Admission SCr is elevated at 8.42 without electrolyte abnormalities. Of note patient diuretics increased to torsemide 100mg BID (1/31/25) for CHF and peripheral edema. Patient does not have dialysis access yet, had seen Dr. White who recommended Afib ablation prior to creation of AVF.     Pt. seen and examined at bedside earlier today with  present. Pt. reports that she has had long standing LE swelling and was on increasing doses of lasix upto 80mg BID as per Dr. Oseguera, however saw cardiology on 1/31/25 and transitioned to torsemide 100mg BID which patient has been taking without improvement in swelling. She denies chest pain, SOB, dysuria, hematuria, difficulty urinating, NSAID use.  She denies uremic complaints N/V, metallic taste in mouth, poor appetite. She came for scheduled ablation but was admitted due to NICO.     PAST HISTORY  --------------------------------------------------------------------------------  PAST MEDICAL & SURGICAL HISTORY:  Renal Calculus  Ureteral Calculus  Acute Renal Failure  9/2009  Wrist Fracture  CYNTHIA  Collar Bone Fracture  Rib Fractures  Kidney Stone removal  3/15/2011  Atrial fibrillation with RVR  C Section  Percutaneous Stone Extraction  Right  S/P Left Percutaneuos Stone extraction  01/26/2010, 04/20/2010  History of cardioversion      FAMILY HISTORY:    PAST SOCIAL HISTORY:    ALLERGIES & MEDICATIONS  --------------------------------------------------------------------------------  Allergies    No Known Allergies    Intolerances      Standing Inpatient Medications  apixaban 5 milliGRAM(s) Oral two times a day  buMETAnide IVPB 3 milliGRAM(s) IV Intermittent daily  metoprolol succinate ER 50 milliGRAM(s) Oral two times a day  sevelamer carbonate 800 milliGRAM(s) Oral three times a day with meals  sodium bicarbonate 650 milliGRAM(s) Oral four times a day    PRN Inpatient Medications  acetaminophen     Tablet .. 650 milliGRAM(s) Oral every 6 hours PRN  melatonin 3 milliGRAM(s) Oral at bedtime PRN      REVIEW OF SYSTEMS  --------------------------------------------------------------------------------  Gen: No fevers/chills  Skin: No rashes  Head/Eyes/Ears: No HA  Respiratory: No SOB,  CV: No CP  GI: No abdominal pain, diarrhea, N/V  : No dysuria, hematuria  MSK: + edema  Heme: No easy bruising or bleeding  Psych: No significant depression    All other systems were reviewed and are negative, except as noted.    VITALS/PHYSICAL EXAM  --------------------------------------------------------------------------------  T(C): 36.3 (02-12-25 @ 16:57), Max: 36.4 (02-12-25 @ 12:42)  HR: 100 (02-12-25 @ 19:01) (97 - 104)  BP: 132/87 (02-12-25 @ 19:01) (127/87 - 133/85)  RR: 18 (02-12-25 @ 16:57) (17 - 18)  SpO2: 98% (02-12-25 @ 16:57) (97% - 98%)  Wt(kg): --  Height (cm): 157.5 (02-12-25 @ 15:03)  Weight (kg): 59.9 (02-12-25 @ 15:03)  BMI (kg/m2): 24.1 (02-12-25 @ 15:03)  BSA (m2): 1.6 (02-12-25 @ 15:03)    Physical Exam:  Gen: NAD, sitting up in chair  Pulm: CTA B/L  CV: irregularly irregular, S1S2;  Abd: +BS, soft  : No suprapubic tenderness  Extremities: + bilateral LE edema noted upto knee with stockings in place.   Neuro: Awake, alert.  Skin: Warm    LABS/STUDIES  --------------------------------------------------------------------------------              10.0   5.93  >-----------<  173      [02-12-25 @ 13:13]              32.1     141  |  96  |  99  ----------------------------<  115      [02-12-25 @ 13:13]  3.7   |  18  |  8.42        Ca     7.4     [02-12-25 @ 13:13]    Creatinine Trend:  SCr 8.42 [02-12 @ 13:13]    Urinalysis - [02-12-25 @ 13:13]      Color  / Appearance  / SG  / pH       Gluc 115 / Ketone   / Bili  / Urobili        Blood  / Protein  / Leuk Est  / Nitrite       RBC  / WBC  / Hyaline  / Gran  / Sq Epi  / Non Sq Epi  / Bacteria       Iron 61, TIBC 301, %sat 20      [11-22-24 @ 21:13]  Ferritin 101      [11-22-24 @ 21:13]  PTH -- (Ca 8.1)      [11-22-24 @ 21:13]   595  TSH 0.65      [04-09-24 @ 00:27]    HBsAg Nonreact      [04-10-24 @ 07:11]  HCV 0.05, Nonreact      [04-09-24 @ 07:25]  HIV Nonreact      [04-09-24 @ 20:21]    Free Light Chains: kappa 5.19, lambda 2.81, ratio = 1.85      [04-10 @ 07:11]  Immunofixation Serum:   Weak  IgG Kappa Band Identified      Reference Range: None Detected      [04-10-24 @ 07:11]

## 2025-02-12 NOTE — PROGRESS NOTE ADULT - SUBJECTIVE AND OBJECTIVE BOX
ADIRENNE PARSONS  74y  Female    Complaints:  Subjective:     Admitted. Denies any SOB, chest pain, palpitations, dysuria, hematuria, decreased urinary frequency.     FAMILY HISTORY: No cardiac hx     74yVital Signs Last 24 Hrs  T(C): 36.3 (12 Feb 2025 16:57), Max: 36.4 (12 Feb 2025 12:42)  T(F): 97.3 (12 Feb 2025 16:57), Max: 97.5 (12 Feb 2025 12:42)  HR: 97 (12 Feb 2025 16:57) (97 - 104)  BP: 127/87 (12 Feb 2025 16:57) (127/87 - 133/85)  BP(mean): --  RR: 18 (12 Feb 2025 16:57) (17 - 18)  SpO2: 98% (12 Feb 2025 16:57) (97% - 98%)    Parameters below as of 12 Feb 2025 16:57  Patient On (Oxygen Delivery Method): room air    PHYSICAL EXAM:  GENERAL: NAD  HEAD:  Atraumatic  EYES: EOMI  ENMT: Moist mucous membranes, Good dentition  NERVOUS SYSTEM:  Alert & Oriented X3, Good concentration  CHEST/LUNG: Clear to percussion bilaterally; No rales, rhonchi, wheezing  HEART: Irregularly irregular   ABDOMEN: Soft, Nontender, Nondistended  EXTREMITIES:  +2 pitting edema bilaterally     Consultant(s) Notes Reviewed:  [x ] YES  [ ] NO  Care Discussed with Consultants/Other Providers [ x] YES  [ ] NO    LABS:                        10.0   5.93  )-----------( 173      ( 12 Feb 2025 13:13 )             32.1       02-12    141  |  96  |  99[H]  ----------------------------<  115[H]  3.7   |  18[L]  |  8.42[H]    Ca    7.4[L]      12 Feb 2025 13:13                Urinalysis Basic - ( 12 Feb 2025 13:13 )    Color: x / Appearance: x / SG: x / pH: x  Gluc: 115 mg/dL / Ketone: x  / Bili: x / Urobili: x   Blood: x / Protein: x / Nitrite: x   Leuk Esterase: x / RBC: x / WBC x   Sq Epi: x / Non Sq Epi: x / Bacteria: x    Female  RADIOLOGY & ADDITIONAL TESTS:  no imaging on admission     MedsMEDICATIONS  (STANDING):  apixaban 5 milliGRAM(s) Oral two times a day  buMETAnide IVPB 3 milliGRAM(s) IV Intermittent every 12 hours  metoprolol succinate ER 50 milliGRAM(s) Oral two times a day  sevelamer carbonate 800 milliGRAM(s) Oral three times a day with meals  sodium bicarbonate 650 milliGRAM(s) Oral four times a day    MEDICATIONS  (PRN):  acetaminophen     Tablet .. 650 milliGRAM(s) Oral every 6 hours PRN Temp greater or equal to 38C (100.4F), Mild Pain (1 - 3)  melatonin 3 milliGRAM(s) Oral at bedtime PRN Insomnia      HEALTH ISSUES - PROBLEM Dx:           ADRIENNE PARSONS  74y  Female    Complaints:  Subjective:     Admitted. Denies any SOB, chest pain, palpitations, dysuria, hematuria, decreased urinary frequency.     FAMILY HISTORY: No cardiac hx     74yVital Signs Last 24 Hrs  T(C): 36.3 (12 Feb 2025 16:57), Max: 36.4 (12 Feb 2025 12:42)  T(F): 97.3 (12 Feb 2025 16:57), Max: 97.5 (12 Feb 2025 12:42)  HR: 97 (12 Feb 2025 16:57) (97 - 104)  BP: 127/87 (12 Feb 2025 16:57) (127/87 - 133/85)  BP(mean): --  RR: 18 (12 Feb 2025 16:57) (17 - 18)  SpO2: 98% (12 Feb 2025 16:57) (97% - 98%)    Parameters below as of 12 Feb 2025 16:57  Patient On (Oxygen Delivery Method): room air    PHYSICAL EXAM:  GENERAL: NAD  HEAD:  Atraumatic  EYES: EOMI  ENMT: Moist mucous membranes, Good dentition  NERVOUS SYSTEM:  Alert & Oriented X3, Good concentration  CHEST/LUNG: Clear to percussion bilaterally; No rales, rhonchi, wheezing  HEART: Irregularly irregular   ABDOMEN: Soft, Nontender, Nondistended  EXTREMITIES:  +2 pitting edema bilaterally     Consultant(s) Notes Reviewed:  [x ] YES  [ ] NO  Care Discussed with Consultants/Other Providers [ x] YES  [ ] NO    LABS:                        10.0   5.93  )-----------( 173      ( 12 Feb 2025 13:13 )             32.1       02-12    141  |  96  |  99[H]  ----------------------------<  115[H]  3.7   |  18[L]  |  8.42[H]    Ca    7.4[L]      12 Feb 2025 13:13                Urinalysis Basic - ( 12 Feb 2025 13:13 )    Color: x / Appearance: x / SG: x / pH: x  Gluc: 115 mg/dL / Ketone: x  / Bili: x / Urobili: x   Blood: x / Protein: x / Nitrite: x   Leuk Esterase: x / RBC: x / WBC x   Sq Epi: x / Non Sq Epi: x / Bacteria: x    Female  RADIOLOGY & ADDITIONAL TESTS:  TRANSTHORACIC ECHOCARDIOGRAM REPORT  ________________________________________________________________________________  _______      Pt. Name:       ADRIENNE PARSONS Study Date:    11/7/2024  MRN:            [id number]        YOB: 1950  Accession #:    [id number]    Age:           74 years  Account#:       [id number]           Gender:        F  Heart Rate:     91 bpm             Height:        62.00 in (157.48 cm)  Rhythm:                            Weight:        132.00 lb (59.88 kg)  Blood Pressure: 177/93 mmHg        BSA/BMI:       1.60 m² / 24.14 kg/m²  ________________________________________________________________________________________  Referring Physician:    [id number] Bassem King  Interpreting Physician: Vesna Del Toro  Primary Sonographer:    Omkar Wesley    Indication(s):     Mitral regurgitation - I34.0  Procedure:         Transthoracic echocardiogram with 2-D, M-mode and complete  spectral and color flow Doppler.  Ordering Location: [facility]  Admission Status:  Outpatient  Study Information: Image quality for this study is fair.    _______________________________________________________________________________________    CONCLUSIONS:    1. Left ventricular cavity is mildly dilated. Left ventricular systolic function is normal with an ejection fraction of 55 % by Garcia's method of disks. There are no regional wall motion abnormalities seen.  2. There is increased LV mass and eccentric hypertrophy.  3. There is moderate (grade 2) left ventricular diastolic dysfunction, with elevated left ventricular filling pressure.  4. Normal right ventricular cavity size and normal right ventricular systolic function.  5. Left atrium is severely dilated.  6. The right atrium is mildly dilated.  7. Symmetric mitral valve leaflet tethering.  8. Moderate to severe mitral regurgitation.  9. Trileaflet aortic valve with normal systolic excursion. There is focal calcification of the aortic valve leaflets.  10. Mild to moderate aortic regurgitation.  11. Estimated pulmonary artery systolic pressure is 55 mmHg, consistent with moderate pulmonary hypertension.  12. Patent foramen ovale with left to right shunting by color flow Doppler.  13. Compared to the transthoracic echocardiogram performed on 6/20/2024, the mitral regurgitation on this study appears to be moderate to severe but possibly underestimated. A possible PFO is noted by color doppler.      MedsMEDICATIONS  (STANDING):  apixaban 5 milliGRAM(s) Oral two times a day  buMETAnide IVPB 3 milliGRAM(s) IV Intermittent every 12 hours  metoprolol succinate ER 50 milliGRAM(s) Oral two times a day  sevelamer carbonate 800 milliGRAM(s) Oral three times a day with meals  sodium bicarbonate 650 milliGRAM(s) Oral four times a day    MEDICATIONS  (PRN):  acetaminophen     Tablet .. 650 milliGRAM(s) Oral every 6 hours PRN Temp greater or equal to 38C (100.4F), Mild Pain (1 - 3)  melatonin 3 milliGRAM(s) Oral at bedtime PRN Insomnia      HEALTH ISSUES - PROBLEM Dx:

## 2025-02-12 NOTE — PROGRESS NOTE ADULT - PROBLEM SELECTOR PLAN 2
Admitted for cardioversion but unable to do because NICO on CKD. Admitted for cardioversion but unable to do because NICO on CKD.  -C/w home eliquis 5 mg BID   -C/w home metoprolol 50 mg BID   -EP following

## 2025-02-12 NOTE — H&P CARDIOLOGY - NSICDXPASTMEDICALHX_GEN_ALL_CORE_FT
PAST MEDICAL HISTORY:  Acute Renal Failure 9/2009    Atrial fibrillation with RVR     Collar Bone Fracture     Kidney Stone removal 3/15/2011    Renal Calculus     Rib Fractures     Ureteral Calculus     Wrist Fracture CYNTHIA

## 2025-02-12 NOTE — PROGRESS NOTE ADULT - ATTENDING COMMENTS
74F w/ severe MR, AFib on Eliquis, HFpEF, CKD and recent admission 1/7-9 for AF w/ RVR and decompensated HF who presented for planned DCCV, found to have NICO on CKD and admitted to medicine for further work up. Concern for CKD progression (baseline SCr 5-6) vs overdiuresis, lasix 80 BID increased to torsemide 100 mg daily at end of January. Nephrology consulted, f/u recs. Obtain urine studies, PVR. Recent renal US w/o hydro. Overloaded on exam, will c/w IV bumex 3. Cardiology and structural cardiology consult in AM. Last TTE 11/2024 with mod-severe MR. Repeat TTE pending. F/u EP recs re Afib. 74F w/ severe MR, AFib on Eliquis, HFpEF, CKD and recent admission 1/7-9 for AF w/ RVR and decompensated HF who presented for planned DCCV, found to have NICO on CKD and admitted to medicine for further work up. Concern for CKD progression (baseline SCr 5-6) vs overdiuresis, lasix 80 BID increased to torsemide 100 mg daily at end of January. Nephrology consulted, f/u recs. Obtain urine studies, PVR. Recent renal US w/o hydro. 2+ BLE edema, will c/w IV bumex 3. Cardiology and structural cardiology consult in AM. Last TTE 11/2024 with mod-severe MR. Repeat TTE pending. F/u EP recs re Afib.

## 2025-02-12 NOTE — H&P CARDIOLOGY - HISTORY OF PRESENT ILLNESS
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    Of Note:  Confirmed with  who is responsible for pt's care administers her medications consistently.  Her last dose of Eliquis was this am.     TTE 4/2024  CONCLUSIONS:      1. Left ventricular wall thickness is normal. Left ventricular systolic function is normal with an ejection fraction of 76 % by 3D. There are no regional wall motion abnormalities seen.   2. No evidence of left atrial or left atrial appendage thrombus. Ultrasound enhancing agent was utilized to visualize the left atrial appendage.   3. The left atrium is severely dilated.   4. Moderate mitral regurgitation at a blood pressure of 132/71 mmHg. The mitral regurgitant jet is centrally directed. Mechanism of mitral regurgitation: Apryl Type I (normal leaflet motion with dilated annulus). PISA : later pisa radius 0.4, medial 0.37, Alisaing velocity 38.5cm/sec MR VTI 136cm MR Vmax 548 cm/s. The ERO 0.3sqcm and Reg. Vol 38cc. 3D vena contracta area was 0.35sqcm.   5. No pericardial effusion seen.   6. Normal right ventricular cavity size, with normal wall thickness, and normal systolic function.     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    Of Note:  Confirmed with  who is responsible for pt's care administers her medications consistently.  Her last dose of Eliquis was this am. Given at 10am.      TTE 4/2024  CONCLUSIONS:      1. Left ventricular wall thickness is normal. Left ventricular systolic function is normal with an ejection fraction of 76 % by 3D. There are no regional wall motion abnormalities seen.   2. No evidence of left atrial or left atrial appendage thrombus. Ultrasound enhancing agent was utilized to visualize the left atrial appendage.   3. The left atrium is severely dilated.   4. Moderate mitral regurgitation at a blood pressure of 132/71 mmHg. The mitral regurgitant jet is centrally directed. Mechanism of mitral regurgitation: Apryl Type I (normal leaflet motion with dilated annulus). PISA : later pisa radius 0.4, medial 0.37, Alisaing velocity 38.5cm/sec MR VTI 136cm MR Vmax 548 cm/s. The ERO 0.3sqcm and Reg. Vol 38cc. 3D vena contracta area was 0.35sqcm.   5. No pericardial effusion seen.   6. Normal right ventricular cavity size, with normal wall thickness, and normal systolic function.

## 2025-02-12 NOTE — PROGRESS NOTE ADULT - PROBLEM SELECTOR PLAN 3
TTE in 11/2024 showed progression to moderate to severe MR.  Left ventricular systolic function is normal with an ejection fraction of 55 % by Garcia's method of disks. Appears hypervolemic on exam  -Bumex 3 mg daily per previous hospitalization   -Hold home torsemide   -Repeat TTE ordered  -Structural cardiology consult in AM per EP reccs  -General cardiology consult in the AM

## 2025-02-12 NOTE — H&P CARDIOLOGY - NSICDXPASTSURGICALHX_GEN_ALL_CORE_FT
PAST SURGICAL HISTORY:  C Section     History of cardioversion     Percutaneous Stone Extraction Right    S/P Left Percutaneuos Stone extraction 01/26/2010, 04/20/2010

## 2025-02-12 NOTE — CONSULT NOTE ADULT - ATTENDING COMMENTS
Patient seen and examined on 2/13/2025 at 10am.     # NICO on CKD vs CKD progression.   Baseline serum creatinine 6.5 in Jan 2025. However, noted that serum creatinine was up to 8.4 before getting Afib ablation. No diarrhea/vomiting.    # Hypervolemia - worsening lower extremity swelling over the past 2 weeks. Home torsemide 100mg daily did not seem to work well for her.  - We will do IV bumex 4mg BID.     # Metabolic acidosis - secondary to CKD. Continue sodium bicarb tabs.     # Medication monitoring encounter: medication dose reviewed. Please dose medications to CrCl<15    The rest of the recommendations as per fellow's note.    Tiffanie Ureña MD  Attending Nephrologist  [phone] or via [employer] Patient seen and examined on 2/13/2025 at 10am.     # NICO on CKD vs CKD progression.   Baseline serum creatinine 6.5 in Jan 2025. However, noted that serum creatinine was up to 8.4 before getting Afib ablation. No diarrhea/vomiting.  Patient is azotemic, but not uremic. No metabolic derangement. No immediate indication for dialysis. We will monitor closely.     # Hypervolemia - worsening lower extremity swelling over the past 2 weeks. Home torsemide 100mg daily did not seem to work well for her.  - We will do IV bumex 4mg BID.     # Metabolic acidosis - secondary to CKD. Continue sodium bicarb tabs.     # Medication monitoring encounter: medication dose reviewed. Please dose medications to CrCl<15    The rest of the recommendations as per fellow's note.    Tiffanie Ureña MD  Attending Nephrologist  [phone] or via [employer]

## 2025-02-12 NOTE — PROGRESS NOTE ADULT - PROBLEM SELECTOR PLAN 1
Hx/o CKD iso b/l staghorn calculus s/p removal (2009) (follows with Dr. Elizondo). Found to have worsening renal function on admission so procedure cancelled. Baseline 4.6 in Aug 2024 and 5.5 in 1/25. Cr on admission 2/12 was 8.42.   - Bladder scan   - Strict I/Os   - UA and urine lytes ordered   - Trend BMPs, monitor renal function, renally dose meds, avoid nephrotoxins   - Nephro consulted, pending reccs

## 2025-02-12 NOTE — CONSULT NOTE ADULT - PROBLEM SELECTOR RECOMMENDATION 9
NICO on CKD V in setting of over-diuresis (increased from lasix 80mg BID to torsemide 100mg BID 1/31/24), r/o obstruction v other. Pt. with hx of advanced CKD is in the setting of chronic history of nephrolithiasis/staghorn calculus. On review of labs on [place], last Scr was elevated at 6.18 on 1/30/25. Admission SCr is elevated at 8.42 without electrolyte abnormalities. Prior renal US 1/8/25 without hydronephrosis but calcifications, stones and cysts bilaterally.   - Recommend bladder scan/PVR and obtain repeat Kidney US  - Obtain U/A, UPCR, and urine lytes  - Recommend holding further diuresis overnight.   - Continue sodium bicarbonate 650mg QID.   - Check serum phosphorous, continue sevelember 800mg TID with meals pending labs.   - Pt. without uremic symptoms, no urgent indication for HD at this time. Will re-evaluate daily.   - Monitor labs and I/Os. Avoid nephrotoxins including, ACE/ARB, NSAIDs, contrast, etc. Dose medications as per eGFR.    If you have any questions, please feel free to contact me:  Stacey Meyer MD PGY-4  Nephrology Fellow  [employer] Teams (Preferred)/ Pager [phone] NICO on CKD V in setting of over-diuresis (increased from lasix 80mg BID to torsemide 100mg BID 1/31/24), r/o obstruction v other. Pt. with hx of advanced CKD is in the setting of chronic history of nephrolithiasis/staghorn calculus. On review of labs on [place]IE, last Scr was elevated at 6.18 on 1/30/25. Admission SCr is elevated at 8.42 without electrolyte abnormalities. Prior renal US 1/8/25 without hydronephrosis but calcifications, stones and cysts bilaterally.   - Recommend bladder scan/PVR and obtain repeat Kidney US  - Obtain U/A, UPCR, and urine lytes  - Recommend holding further diuresis overnight.   - Continue sodium bicarbonate 650mg QID.   - Check serum phosphorous, continue sevelember 800mg TID with meals pending labs.   - Pt. without uremic symptoms, no urgent indication for HD at this time. Will re-evaluate daily.   - Monitor labs and I/Os. Avoid nephrotoxins including, ACE/ARB, NSAIDs, contrast, etc. Dose medications as per eGFR.    Discussed with attending, Dr. Salazar.  If you have any questions, please feel free to contact me:  Stacey Meyer MD PGY-4  Nephrology Fellow  [employer] Teams (Preferred)/ Pager [phone]

## 2025-02-12 NOTE — PROGRESS NOTE ADULT - ASSESSMENT
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

## 2025-02-13 ENCOUNTER — APPOINTMENT (OUTPATIENT)
Dept: CARDIOLOGY | Facility: CLINIC | Age: 75
End: 2025-02-13

## 2025-02-13 ENCOUNTER — RESULT REVIEW (OUTPATIENT)
Age: 75
End: 2025-02-13

## 2025-02-13 DIAGNOSIS — I34.0 NONRHEUMATIC MITRAL (VALVE) INSUFFICIENCY: ICD-10-CM

## 2025-02-13 LAB
ALBUMIN SERPL ELPH-MCNC: 3.8 G/DL — SIGNIFICANT CHANGE UP (ref 3.3–5)
ALP SERPL-CCNC: 74 U/L — SIGNIFICANT CHANGE UP (ref 40–120)
ALT FLD-CCNC: 7 U/L — LOW (ref 10–45)
ANION GAP SERPL CALC-SCNC: 28 MMOL/L — HIGH (ref 5–17)
APPEARANCE UR: CLEAR — SIGNIFICANT CHANGE UP
APTT BLD: 30.9 SEC — SIGNIFICANT CHANGE UP (ref 24.5–35.6)
AST SERPL-CCNC: 7 U/L — LOW (ref 10–40)
BASOPHILS # BLD AUTO: 0.05 K/UL — SIGNIFICANT CHANGE UP (ref 0–0.2)
BASOPHILS NFR BLD AUTO: 0.9 % — SIGNIFICANT CHANGE UP (ref 0–2)
BILIRUB SERPL-MCNC: 0.2 MG/DL — SIGNIFICANT CHANGE UP (ref 0.2–1.2)
BILIRUB UR-MCNC: NEGATIVE — SIGNIFICANT CHANGE UP
BLD GP AB SCN SERPL QL: NEGATIVE — SIGNIFICANT CHANGE UP
BUN SERPL-MCNC: 101 MG/DL — HIGH (ref 7–23)
CALCIUM SERPL-MCNC: 7.6 MG/DL — LOW (ref 8.4–10.5)
CHLORIDE SERPL-SCNC: 96 MMOL/L — SIGNIFICANT CHANGE UP (ref 96–108)
CO2 SERPL-SCNC: 16 MMOL/L — LOW (ref 22–31)
COLOR SPEC: YELLOW — SIGNIFICANT CHANGE UP
CREAT ?TM UR-MCNC: 46 MG/DL — SIGNIFICANT CHANGE UP
CREAT SERPL-MCNC: 8.47 MG/DL — HIGH (ref 0.5–1.3)
DIFF PNL FLD: NEGATIVE — SIGNIFICANT CHANGE UP
EGFR: 4 ML/MIN/1.73M2 — LOW
EGFR: 4 ML/MIN/1.73M2 — LOW
EOSINOPHIL # BLD AUTO: 0.07 K/UL — SIGNIFICANT CHANGE UP (ref 0–0.5)
EOSINOPHIL NFR BLD AUTO: 1.2 % — SIGNIFICANT CHANGE UP (ref 0–6)
GAS PNL BLDV: SIGNIFICANT CHANGE UP
GLUCOSE SERPL-MCNC: 126 MG/DL — HIGH (ref 70–99)
GLUCOSE UR QL: NEGATIVE MG/DL — SIGNIFICANT CHANGE UP
HCT VFR BLD CALC: 32.4 % — LOW (ref 34.5–45)
HGB BLD-MCNC: 10 G/DL — LOW (ref 11.5–15.5)
IMM GRANULOCYTES NFR BLD AUTO: 0.5 % — SIGNIFICANT CHANGE UP (ref 0–0.9)
INR BLD: 1.5 RATIO — HIGH (ref 0.85–1.16)
KETONES UR-MCNC: NEGATIVE MG/DL — SIGNIFICANT CHANGE UP
LEUKOCYTE ESTERASE UR-ACNC: NEGATIVE — SIGNIFICANT CHANGE UP
LYMPHOCYTES # BLD AUTO: 1.53 K/UL — SIGNIFICANT CHANGE UP (ref 1–3.3)
LYMPHOCYTES # BLD AUTO: 27.1 % — SIGNIFICANT CHANGE UP (ref 13–44)
MAGNESIUM SERPL-MCNC: 2.3 MG/DL — SIGNIFICANT CHANGE UP (ref 1.6–2.6)
MCHC RBC-ENTMCNC: 30 PG — SIGNIFICANT CHANGE UP (ref 27–34)
MCHC RBC-ENTMCNC: 30.9 G/DL — LOW (ref 32–36)
MCV RBC AUTO: 97.3 FL — SIGNIFICANT CHANGE UP (ref 80–100)
MONOCYTES # BLD AUTO: 0.5 K/UL — SIGNIFICANT CHANGE UP (ref 0–0.9)
MONOCYTES NFR BLD AUTO: 8.8 % — SIGNIFICANT CHANGE UP (ref 2–14)
NEUTROPHILS # BLD AUTO: 3.47 K/UL — SIGNIFICANT CHANGE UP (ref 1.8–7.4)
NEUTROPHILS NFR BLD AUTO: 61.5 % — SIGNIFICANT CHANGE UP (ref 43–77)
NITRITE UR-MCNC: NEGATIVE — SIGNIFICANT CHANGE UP
NRBC BLD AUTO-RTO: 0 /100 WBCS — SIGNIFICANT CHANGE UP (ref 0–0)
OSMOLALITY UR: 319 MOS/KG — SIGNIFICANT CHANGE UP (ref 300–900)
PH UR: 7 — SIGNIFICANT CHANGE UP (ref 5–8)
PHOSPHATE SERPL-MCNC: 7 MG/DL — HIGH (ref 2.5–4.5)
PLATELET # BLD AUTO: 161 K/UL — SIGNIFICANT CHANGE UP (ref 150–400)
POTASSIUM SERPL-MCNC: 3.7 MMOL/L — SIGNIFICANT CHANGE UP (ref 3.5–5.3)
POTASSIUM SERPL-SCNC: 3.7 MMOL/L — SIGNIFICANT CHANGE UP (ref 3.5–5.3)
POTASSIUM UR-SCNC: 18 MMOL/L — SIGNIFICANT CHANGE UP
PROT ?TM UR-MCNC: 227 MG/DL — HIGH (ref 0–12)
PROT SERPL-MCNC: 6.2 G/DL — SIGNIFICANT CHANGE UP (ref 6–8.3)
PROT UR-MCNC: NEGATIVE MG/DL — SIGNIFICANT CHANGE UP
PROT/CREAT UR-RTO: 4.9 RATIO — HIGH (ref 0–0.2)
PROTHROM AB SERPL-ACNC: 17.2 SEC — HIGH (ref 9.9–13.4)
RBC # BLD: 3.33 M/UL — LOW (ref 3.8–5.2)
RBC # FLD: 17.3 % — HIGH (ref 10.3–14.5)
RH IG SCN BLD-IMP: POSITIVE — SIGNIFICANT CHANGE UP
SODIUM SERPL-SCNC: 140 MMOL/L — SIGNIFICANT CHANGE UP (ref 135–145)
SODIUM UR-SCNC: 81 MMOL/L — SIGNIFICANT CHANGE UP
SP GR SPEC: 1.01 — SIGNIFICANT CHANGE UP (ref 1–1.03)
UROBILINOGEN FLD QL: 0.2 MG/DL — SIGNIFICANT CHANGE UP (ref 0.2–1)
WBC # BLD: 5.65 K/UL — SIGNIFICANT CHANGE UP (ref 3.8–10.5)
WBC # FLD AUTO: 5.65 K/UL — SIGNIFICANT CHANGE UP (ref 3.8–10.5)

## 2025-02-13 PROCEDURE — 99222 1ST HOSP IP/OBS MODERATE 55: CPT | Mod: GC

## 2025-02-13 PROCEDURE — 76770 US EXAM ABDO BACK WALL COMP: CPT | Mod: 26

## 2025-02-13 PROCEDURE — 99223 1ST HOSP IP/OBS HIGH 75: CPT

## 2025-02-13 PROCEDURE — 93306 TTE W/DOPPLER COMPLETE: CPT | Mod: 26

## 2025-02-13 PROCEDURE — 99233 SBSQ HOSP IP/OBS HIGH 50: CPT | Mod: GC

## 2025-02-13 RX ORDER — AMIODARONE HYDROCHLORIDE 50 MG/ML
200 INJECTION, SOLUTION INTRAVENOUS DAILY
Refills: 0 | Status: DISCONTINUED | OUTPATIENT
Start: 2025-02-13 | End: 2025-02-14

## 2025-02-13 RX ORDER — BUMETANIDE 1 MG/1
3 TABLET ORAL EVERY 12 HOURS
Refills: 0 | Status: DISCONTINUED | OUTPATIENT
Start: 2025-02-13 | End: 2025-02-13

## 2025-02-13 RX ORDER — BUMETANIDE 1 MG/1
4 TABLET ORAL EVERY 12 HOURS
Refills: 0 | Status: DISCONTINUED | OUTPATIENT
Start: 2025-02-13 | End: 2025-02-20

## 2025-02-13 RX ADMIN — Medication 3 MILLIGRAM(S): at 22:05

## 2025-02-13 RX ADMIN — APIXABAN 5 MILLIGRAM(S): 2.5 TABLET, FILM COATED ORAL at 22:05

## 2025-02-13 RX ADMIN — Medication 650 MILLIGRAM(S): at 23:23

## 2025-02-13 RX ADMIN — APIXABAN 5 MILLIGRAM(S): 2.5 TABLET, FILM COATED ORAL at 11:32

## 2025-02-13 RX ADMIN — Medication 650 MILLIGRAM(S): at 06:42

## 2025-02-13 RX ADMIN — Medication 650 MILLIGRAM(S): at 11:32

## 2025-02-13 RX ADMIN — Medication 650 MILLIGRAM(S): at 18:20

## 2025-02-13 RX ADMIN — AMIODARONE HYDROCHLORIDE 200 MILLIGRAM(S): 50 INJECTION, SOLUTION INTRAVENOUS at 18:24

## 2025-02-13 RX ADMIN — BUMETANIDE 132 MILLIGRAM(S): 1 TABLET ORAL at 15:23

## 2025-02-13 RX ADMIN — SEVELAMER HYDROCHLORIDE 800 MILLIGRAM(S): 800 TABLET ORAL at 18:21

## 2025-02-13 RX ADMIN — METOPROLOL SUCCINATE 50 MILLIGRAM(S): 50 TABLET, EXTENDED RELEASE ORAL at 06:42

## 2025-02-13 RX ADMIN — METOPROLOL SUCCINATE 50 MILLIGRAM(S): 50 TABLET, EXTENDED RELEASE ORAL at 18:21
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
## 2025-02-13 NOTE — PROGRESS NOTE ADULT - ASSESSMENT
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  who is responsible for pt's care administers her medications consistently.  Her last dose of Eliquis was this am. Given at 10am.        1. Afib  2. CKD  3. HTN    - Patient for elective cardioversion today  - BUN/Cr noted to be elevated at 99/8.42 today from 92/6.27  - Lasix was recently changed to Torsemide 100mg qd  - Cardioversion cancelled, admit patient to hospitalist on telemetry (Called in to office)  - Cardiology Consult, ?overdiurese/NICO on CKD  - Rec Structural evaluation to assess MR  - Nephrology Consult called (Dr Kari Villalobos) to see patient, ? 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  who is responsible for pt's care administers her medications consistently.  Her last dose of Eliquis was this am. Given at 10am.        1. Afib  2. CKD  3. HTN    - Patient for elective cardioversion 2/12 but with worsening kidney function on 2/12, CV cancelled  - BUN/Cr noted to be elevated at 99/8.42  from 92/6.27  - Lasix was recently changed to Torsemide 100mg qd  - Appreciate Cardiology Consult,holding Torsemide  - Pending Structural evaluation to assess MR  - Appreciate Nephrology Consult, NICO on CKD V, iso overdiuresis, no urgent HD  - Bladder scan, rpt Kidney US  - Monitor BMP levels  - Continue telemetry monitoring  - Continue AC    #[phone]

## 2025-02-13 NOTE — CONSULT NOTE ADULT - SUBJECTIVE AND OBJECTIVE BOX
MR:- [id number] :  NAME:ADRIENNE SHIN:-    DATE OF SERVICE:02-13-25 @ 08:26    Patient was seen,examined and evaluated  by Aki Carias MD [id number] @ 08:26 .  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    Of Note:  Confirmed with  who is responsible for pt's care administers her medications consistently.  Her last dose of Eliquis was this am. Given at 10am.      TTE 4/2024  CONCLUSIONS:   1. Left ventricular wall thickness is normal. Left ventricular systolic function is normal with an ejection fraction of 76 % by 3D. There are no regional wall motion abnormalities seen.   2. No evidence of left atrial or left atrial appendage thrombus. Ultrasound enhancing agent was utilized to visualize the left atrial appendage.   3. The left atrium is severely dilated.   4. Moderate mitral regurgitation at a blood pressure of 132/71 mmHg. The mitral regurgitant jet is centrally directed. Mechanism of mitral regurgitation: Apryl Type I (normal leaflet motion with dilated annulus). PISA : later pisa radius 0.4, medial 0.37, Alisaing velocity 38.5cm/sec MR VTI 136cm MR Vmax 548 cm/s. The ERO 0.3sqcm and Reg. Vol 38cc. 3D vena contracta area was 0.35sqcm.   5. No pericardial effusion seen.   6. Normal right ventricular cavity size, with normal wall thickness, and normal systolic function.     (12 Feb 2025 12:50)    Patient awake alert DCCV held 2/2 increased Creatinine    CARDIAC HISTORY:  [ ] CAD [ [PCI [ ] CABG [ ] Prior Cath  [x ] Atrial Fibrillation  Devices[ ] PPM [ ] ICD [ ]ILR  Heart Failure [ ] HFrEF [x ] HFpEF    PAST MEDICAL & SURGICAL HISTORY:  Renal Calculus      Ureteral Calculus      Acute Renal Failure  9/2009      Wrist Fracture  CYNTHIA      Collar Bone Fracture      Rib Fractures      Kidney Stone removal  3/15/2011      Atrial fibrillation with RVR      C Section      Percutaneous Stone Extraction  Right      S/P Left Percutaneuos Stone extraction  01/26/2010, 04/20/2010      History of cardioversion          MEDICATIONS  (STANDING):  apixaban 5 milliGRAM(s) Oral two times a day  metoprolol succinate ER 50 milliGRAM(s) Oral two times a day  sevelamer carbonate 800 milliGRAM(s) Oral three times a day with meals  sodium bicarbonate 650 milliGRAM(s) Oral four times a day    MEDICATIONS  (PRN):  acetaminophen     Tablet .. 650 milliGRAM(s) Oral every 6 hours PRN Temp greater or equal to 38C (100.4F), Mild Pain (1 - 3)  melatonin 3 milliGRAM(s) Oral at bedtime PRN Insomnia      FAMILY HISTORY:    No family history of premature coronary artery disease or sudden cardiac death    SOCIAL HISTORY:  Smoking-[ ] Active  [ ] Former [x ] Non Smoker  Alcohol-[x ] Denies [ ] Social [ ] Daily  Ilicit Drug use-[x ] Denies [ ] Active user    REVIEW OF SYSTEMS:  Constitutional: [ ] fever, [ ]weight loss, [x ]fatigue   Activity [ ] Bedbound,[x ] Ambulates [ x] Unassisted[ ] Cane/Walker [ ] Assistence.  Effort tolerance:[ ] Excellent [ ] Good [ ] Fair [x ] Poor [ ]  Eyes: [ ] visual changes  Respiratory: [x ]shortness of breath;  [ ] cough, [ ]wheezing, [ ]chills, [ ]hemoptysis  Cardiovascular: [ ] chest pain, [ ]palpitations, [ ]dizziness,  [x ]leg swelling[ ]orthopnea [ ]PND  Gastrointestinal: [ ] abdominal pain, [ ]nausea, [ ]vomiting,  [ ]diarrhea,[ ]constipation  Genitourinary: [ ] dysuria, [ ] hematuria  Neurologic: [ ] headaches [ ] tremors[ ] weakness  Skin: [ ] itching, [ ]burning, [ ] rashes  Endocrine: [ ] heat or cold intolerance  Musculoskeletal: [ ] joint pain or swelling; [ ] muscle, back, or extremity pain  Psychiatric: [ ] depression, [ ]anxiety, [ ]mood swings, or [ ]difficulty sleeping  Hematologic: [ ] easy bruising, [ ] bleeding gums       [ x] All others negative	  [ ] Unable to obtain    Vital Signs Last 24 Hrs  T(C): 36.3 (13 Feb 2025 06:40), Max: 36.4 (12 Feb 2025 12:42)  T(F): 97.4 (13 Feb 2025 06:40), Max: 97.6 (12 Feb 2025 21:27)  HR: 99 (13 Feb 2025 06:40) (97 - 110)  BP: 113/76 (13 Feb 2025 06:40) (113/76 - 149/77)  RR: 18 (13 Feb 2025 06:40) (17 - 18)  SpO2: 98% (13 Feb 2025 06:40) (97% - 98%)    Parameters below as of 13 Feb 2025 06:40  Patient On (Oxygen Delivery Method): room air      I&O's Summary      PHYSICAL EXAM:  General: No acute distress BMI-21  HEENT: EOMI, PERRL[ ] Icteric  Neck: Supple, [ ] JVD  Lungs: Equal air entry bilaterally; [ ] Rales [ ] Rhonchi [ ] Wheezing  Heart: Irregular rate and rhythm;[x ] Murmurs-   3/6 [x ] Systolic [ ] Diastolic [x ] Radiation,No rubs, or gallops  Abdomen: Nontender, bowel sounds present  Extremities: No clubbing, cyanosis, [x] edema[ ] Calf tenderness  Nervous system:  Alert & Oriented X3, no focal deficits  Psychiatric: Normal affect  Skin: No rashes or lesions      LABS:  02-13    140  |  96  |  101[H]  ----------------------------<  126[H]  3.7   |  16[L]  |  8.47[H]    Ca    7.6[L]      13 Feb 2025 07:28  Phos  7.0     02-13  Mg     2.3     02-13    TPro  6.2  /  Alb  3.8  /  TBili  0.2  /  DBili  x   /  AST  7[L]  /  ALT  7[L]  /  AlkPhos  74  02-13    Creatinine Trend: 8.47<--, 8.42<--                        10.0   5.65  )-----------( 161      ( 13 Feb 2025 07:27 )             32.4     PT/INR - ( 13 Feb 2025 07:27 )   PT: 17.2 sec;   INR: 1.50 ratio         PTT - ( 13 Feb 2025 07:27 )  PTT:30.9 sec       ECG    ATRIAL FIBRILLATION WITH RAPID VENTRICULAR RESPONSE  NONSPECIFIC ST AND T WAVE ABNORMALITY  ABNORMAL ECG       US Kidney and Bladder (01.08.25 @ 20:46) >  FINDINGS:    Right kidney: 9.5 cm in length. No obvious hydronephrosis. Echogenic,  reflecting parenchymal disease. Again noted, calcifications/stones and  cysts.    Left kidney: 8.4 cm in length. No obvious hydronephrosis. Echogenic,  reflecting parenchymal disease. Again noted, calcifications/stones and  cysts.    Bladder: Partially distended.    Additional: Bilateral pleural effusion.    IMPRESSION:    No obvious hydronephrosis. Additional findings as described.    :  W or WO Ultrasound Enhancing Agent (04.26.24 @ 09:45) >  CONCLUSIONS:      1. Left ventricular wall thickness is normal. Left ventricular systolic function is normal with an ejection fraction of 76 % by 3D. There are no regional wall motion abnormalities seen.   2. No evidence of left atrial or left atrial appendage thrombus. Ultrasound enhancing agent was utilized to visualize the left atrial appendage.   3. The left atrium is severely dilated.   4. Moderate mitral regurgitation at a blood pressure of 132/71 mmHg. The mitral regurgitant jet is centrally directed. Mechanism of mitral regurgitation: Apryl Type I (normal leaflet motion with dilated annulus). PISA : later pisa radius 0.4, medial 0.37, Alisaing velocity 38.5cm/sec MR VTI 136cm MR Vmax 548 cm/s. The ERO 0.3sqcm and Reg. Vol 38cc. 3D vena contracta area was 0.35sqcm.   5. No pericardial effusion seen.   6. Normal right ventricular cavity size, with normal wall thickness, and normal systolic function.

## 2025-02-13 NOTE — PROGRESS NOTE ADULT - ATTENDING COMMENTS
74F w/ severe MR, AFib on Eliquis, HFpEF, CKD and recent admission 1/7-9 for AF w/ RVR and decompensated HF who presented for planned DCCV, found to have NICO on CKD and admitted to medicine for further work up. Concern for CKD progression (baseline SCr 5-6) vs NICO on CKD. Nephrology consulted, f/u recs. Worsening LE edema despite torsemide 100 mg bid, started on bumex 4 IV BID. Cardiology and structural cardiology consulted. Last TTE 11/2024 with mod-severe MR. Repeat TTE pending. F/u EP recs re Afib.

## 2025-02-13 NOTE — CONSULT NOTE ADULT - SUBJECTIVE AND OBJECTIVE BOX
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    Of Note:  Confirmed with  who is responsible for pt's care administers her medications consistently.  Her last dose of Eliquis was this am. Given at 10am.     Today she states that she feels fatigued and with Pedal Edema. She has known renal disease, and her creatinine has been elevated.      02-13 @ 07:01  -  02-13 @ 17:24  --------------------------------------------------------  IN: 480 mL / OUT: 0 mL / NET: 480 mL        PAST MEDICAL & SURGICAL HISTORY:  Renal Calculus      Ureteral Calculus      Acute Renal Failure  9/2009      Wrist Fracture  CYNTHIA      Collar Bone Fracture      Rib Fractures      Kidney Stone removal  3/15/2011      Atrial fibrillation with RVR      C Section      Percutaneous Stone Extraction  Right      S/P Left Percutaneuos Stone extraction  01/26/2010, 04/20/2010      History of cardioversion          FAMILY HISTORY:            No Known Allergies    aMIOdarone    Tablet 200 milliGRAM(s) Oral daily  apixaban 5 milliGRAM(s) Oral two times a day  buMETAnide IVPB 4 milliGRAM(s) IV Intermittent every 12 hours  metoprolol succinate ER 50 milliGRAM(s) Oral two times a day  sevelamer carbonate 800 milliGRAM(s) Oral three times a day with meals  sodium bicarbonate 650 milliGRAM(s) Oral four times a day      T(C): 36.4 (02-13-25 @ 11:26), Max: 36.4 (02-12-25 @ 21:27)  HR: 82 (02-13-25 @ 15:20) (82 - 110)  BP: 115/70 (02-13-25 @ 15:20) (113/76 - 149/77)  RR: 18 (02-13-25 @ 11:26) (18 - 18)  SpO2: 95% (02-13-25 @ 11:26) (95% - 98%)  Wt(kg): --      Review of Symptoms:  General: Alert, Follows commands  Respiratory:  + SOB, + BOO, + Cough  Cardiac: Denies CP, Denies Palpitations  Gastrointestinal: Denies Pain, Denies N/V  Extremities: Denies Pedal Edema, No Joint pain  Vascular: Negative  Neurological: Negative  Endocrine: negative      Physical Exam:  Gen: A/Ox3, NAD  HEENT: No JVD, Neck Supple, Trachea midline, No masses  Pulmonary: CTAB,  No accessory muscle use for respiration  Cardiac: S1S2, Irregular, II/VI MARYJO   No Gallops/Rubs  ECG:  Gastrointestinal: Soft, NT/ND, + Bowel Sounds  Extremities:  Edema,  No joint pain or swelling +PMSx4  Vascular: 1+ pulses B/L, No Bruits  Neurological: Non Focal, = motor and sensory      Laboratory:                        10.0   5.65  )-----------( 161      ( 13 Feb 2025 07:27 )             32.4    02-13    140  |  96  |  101[H]  ----------------------------<  126[H]  3.7   |  16[L]  |  8.47[H]    Ca    7.6[L]      13 Feb 2025 07:28  Phos  7.0     02-13  Mg     2.3     02-13    TPro  6.2  /  Alb  3.8  /  TBili  0.2  /  DBili  x   /  AST  7[L]  /  ALT  7[L]  /  AlkPhos  74  02-13   PT/INR - ( 13 Feb 2025 07:27 )   PT: 17.2 sec;   INR: 1.50 ratio         PTT - ( 13 Feb 2025 07:27 )  PTT:30.9 sec    Pro-BNP:        Transthoracic Echo:      < from: TTE W or WO Ultrasound Enhancing Agent (02.13.25 @ 11:46) >  CONCLUSIONS:      1. Left ventricular cavity is mildly dilated. Left ventricular systolic function is moderately decreased with an ejection fraction of 41 % by Garcia'smethod of disks. Global left ventricular hypokinesis.   2. Multiple segmental abnormalities exist. See findings.   3. Left atrium is severely dilated.   4. Severe mitral regurgitation. The mitral regurgitant jet is posteriorly directed.   5. No pericardial effusion seen.   6. Mild to moderate tricuspid regurgitation.   7. Compared to the transthoracic echocardiogram performed on 11/27/2024, LVEF has decreased, MR is increased,.   8. Estimated pulmonary artery systolic pressure is 46 mmHg, consistent with mild to moderate pulmonary hypertension.   9. Symmetric mitral valve leaflet tethering.  10. The inferior vena cava is dilated measuring 2.40 cm in diameter, (dilated >2.1cm) with abnormal inspiratory collapse (abnormal <50%) consistent with elevated right atrial pressure (~15, range 10-20mmHg).  11. There is normal LV mass and normal geometry.    ________________________________________________________________________________________  FINDINGS:     Left Ventricle:  The left ventricular cavity is mildly dilated. Left ventricular systolic function is moderately decreased with a calculated ejection fraction of 41 % by the Garcia's biplane method of disks. There is global left ventricular hypokinesis. There is normal LV mass and normal geometry. There is no evidence of a left ventricular thrombus. Analysis of left ventricular diastolic function and filling pressure is made challenging by the presence of atrial fibrillation.  LV Wall Scoring: There is diffuse hypokinesis.          Right Ventricle:  The right ventricular cavity is normal in size, with normal wall thickness and right ventricular systolic function is borderline reduced. Tricuspid annular plane systolic excursion (TAPSE) is 1.8 cm (normal >=1.7 cm). Tricuspid annulartissue Doppler S' is 8.4 cm/s (normal >10 cm/s).     Left Atrium:  The left atrium is severely dilated with an indexed volume of 112.42 ml/m².     Right Atrium:  The right atrium is normal in size with an indexed volume of 26.64 ml/m² and an indexed area of 9.31 cm²/m².     Aortic Valve:  The aortic valve appears trileaflet with normal systolic excursion. There is no aortic valve stenosis. There is mild aortic regurgitation.     Mitral Valve:  Structurally normal mitral valve with normal leaflet excursion. There is symmetric leaflet tethering. There is severe mitral regurgitation. The mitral regurgitant jet is posteriorly directed.     Tricuspid Valve:  Structurally normal tricuspid valve with normal leaflet excursion. There is mild to moderate tricuspid regurgitation. Estimated pulmonary artery systolic pressure is 46 mmHg, consistent with mild to moderate pulmonary hypertension.     Pulmonic Valve:  Structurally normal pulmonic valve with normal leaflet excursion. There is trace pulmonic regurgitation.     Pericardium:  No pericardial effusion seen.     Systemic Veins:  The inferior vena cava is dilated measuring 2.40 cm in diameter, (dilated >2.1cm) with abnormal inspiratory collapse (abnormal <50%) consistent with elevated right atrial pressure (~15, range 10-20mmHg).  ____________________________________________________________________  QUANTITATIVE DATA:  Left Ventricle Measurements: (Indexed to BSA)     IVSd (2D):   0.7 cm  LVPWd (2D):  0.7 cm  LVIDd (2D):  5.7 cm  LVIDs (2D):  3.5 cm  LV Mass:     141 g  83.6 g/m²  BiPlane LV EF%: 41 %    Aorta Measurements: (Normal range) (Indexed to BSA)     Ao Root 2.7 cm  Ao Asc: 2.8 cm       Left Atrium Measurements: (Indexed to BSA)  LA Diam 2D: 5.99 cm         Right Ventricle Measurements: Right Atrial Measurements:     TAPSE: 1.8 cm                 RA Vol:       45.00 ml                                RA Vol Index: 26.64 ml/m²       LVOT / RVOT/ Qp/Qs Data: (Indexed to BSA)  LVOT Diameter,s: 1.77 cm  LVOT Area:       2.47 cm²  LVOT Vmax:       0.70 m/s  LVOT VTI:        12.96 cm  LVOT peak grad:  2 mmHg  LVOT mean grad:  1.2 mmHg  LVOT SV:         32.1 ml  18.98 ml/m²    Aortic Valve Measurements:  AV Vmax:          1.2 m/s  AV Peak Gradient: 5.4 mmHg  AR Vmax4.20 m/s  AR PHT            53071 msec  AR Mobile          0.75 m/s²  AR Decel Time     72018 msec       MR Vmax:          5.18 m/s  MR VTI:           164.20 cm  MR Mean Gradient: 81.1 mmHg  MR Peak Gradient: 107.4 mmHg       Tricuspid Valve Measurements:     TV S'             8.4 cm/s  TR Vmax:          2.8 m/s  TR Peak Gradient: 31.4 mmHg  RA Pressure:      15 mmHg  PASP:             46 mmHg    < end of copied text >

## 2025-02-13 NOTE — CONSULT NOTE ADULT - TIME BILLING
- Review of records, telemetry, vital signs and daily labs.   - General and cardiovascular physical examination.  - Generation of cardiovascular treatment plan and completion of note .  - Coordination of care.      Patient was seen and examined by me on 02/13/2025 ,interim events noted,labs and radiology studies reviewed.  Aki Carias MD,FACC.  [address] [place], [place] [id number].  [phone]  Availabe to call or text on [employer] TEAMS.

## 2025-02-13 NOTE — PROGRESS NOTE ADULT - ASSESSMENT
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

## 2025-02-13 NOTE — PROGRESS NOTE ADULT - PROBLEM SELECTOR PLAN 3
TTE in 11/2024 showed progression to moderate to severe MR.  Left ventricular systolic function is normal with an ejection fraction of 55 % by Garcia's method of disks. Appears hypervolemic on exam  -Bumex 3 mg BID   -Hold home torsemide   -Repeat TTE ordered  -Structural cardiology consult in AM per EP reccs  -General cardiology consult in the AM

## 2025-02-13 NOTE — PROGRESS NOTE ADULT - SUBJECTIVE AND OBJECTIVE BOX
24H hour events:   seen resting in bed, reports feeling ok but does not remember date;     MEDICATIONS:  apixaban 5 milliGRAM(s) Oral two times a day  metoprolol succinate ER 50 milliGRAM(s) Oral two times a day  acetaminophen     Tablet .. 650 milliGRAM(s) Oral every 6 hours PRN  melatonin 3 milliGRAM(s) Oral at bedtime PRN  sodium bicarbonate 650 milliGRAM(s) Oral four times a day      REVIEW OF SYSTEMS:  Complete 12point ROS negative.    PHYSICAL EXAM:  T(C): 36.3 (02-13-25 @ 06:40), Max: 36.4 (02-12-25 @ 12:42)  HR: 99 (02-13-25 @ 06:40) (97 - 110)  BP: 113/76 (02-13-25 @ 06:40) (113/76 - 149/77)  RR: 18 (02-13-25 @ 06:40) (17 - 18)  SpO2: 98% (02-13-25 @ 06:40) (97% - 98%)  Wt(kg): --  I&O's Summary      Appearance: well develop, in NAD	  Head: normocephalic  Cardiovascular: irregular S1 S2, no r/g  Respiratory: Lungs clear to auscultation	  Psychiatry: awake, alert, unable to state date; responds to verbal communication with "I dont remember", calm  Gastrointestinal:  Soft, Non-tender, + BS	  Skin: No rashes, No ecchymoses  Neurologic: Non-focal  Extremities: Normal range of motion, No clubbing, cyanosis or edema  Vascular: Peripheral pulses palpable 2+ bilaterally        LABS:	 	    CBC Full  -  ( 13 Feb 2025 07:27 )  WBC Count : 5.65 K/uL  Hemoglobin : 10.0 g/dL  Hematocrit : 32.4 %  Platelet Count - Automated : 161 K/uL  Mean Cell Volume : 97.3 fl  Mean Cell Hemoglobin : 30.0 pg  Mean Cell Hemoglobin Concentration : 30.9 g/dL  Auto Neutrophil # : x  Auto Lymphocyte # : x  Auto Monocyte # : x  Auto Eosinophil # : x  Auto Basophil # : x  Auto Neutrophil % : x  Auto Lymphocyte % : x  Auto Monocyte % : x  Auto Eosinophil % : x  Auto Basophil % : x    02-13    140  |  96  |  101[H]  ----------------------------<  126[H]  3.7   |  16[L]  |  8.47[H]  02-12    141  |  96  |  99[H]  ----------------------------<  115[H]  3.7   |  18[L]  |  8.42[H]    Ca    7.6[L]      13 Feb 2025 07:28  Ca    7.4[L]      12 Feb 2025 13:13  Phos  7.0     02-13  Mg     2.3     02-13    TPro  6.2  /  Alb  3.8  /  TBili  0.2  /  DBili  x   /  AST  7[L]  /  ALT  7[L]  /  AlkPhos  74  02-13      proBNP:   Lipid Profile:   HgA1c:   TSH:     CARDIAC MARKERS:      TELEMETRY: Afib , occ PVC 90-110s  	    Echo 1/7/25:    INTERPRETATION:  A focused transthoracic cardiac ultrasound examination   was performed.  Limited due to rate  No pericardial effusion was present.  Reduced ejection fraction  Left atrium was dilated  Severe mitral regurgitation present  Tricuspid regurgitation present  IVC plethoric  Bilateral pleural effusions present    IMPRESSION:  No Pericardial Effusion.  Bilateral pleural effusion  IVC plethoric

## 2025-02-13 NOTE — PROGRESS NOTE ADULT - PROBLEM SELECTOR PLAN 2
Admitted for cardioversion but unable to do because NICO on CKD.  -C/w home eliquis 5 mg BID   -C/w home metoprolol 50 mg BID   -EP following

## 2025-02-13 NOTE — CONSULT NOTE ADULT - PROBLEM SELECTOR RECOMMENDATION 9
Patient in AFib with rising Creatinine, and today's TTE showed Severe MR. We will review the TTE with Dr. Dina Yarbrough to determine anatomy of her MR. She will need to have her Renal disease further evaluated and have her AFib rate controlled. Further recommendations will be made

## 2025-02-14 DIAGNOSIS — I50.22 CHRONIC SYSTOLIC (CONGESTIVE) HEART FAILURE: ICD-10-CM

## 2025-02-14 LAB
ANION GAP SERPL CALC-SCNC: 24 MMOL/L — HIGH (ref 5–17)
BUN SERPL-MCNC: 100 MG/DL — HIGH (ref 7–23)
CALCIUM SERPL-MCNC: 7.5 MG/DL — LOW (ref 8.4–10.5)
CHLORIDE SERPL-SCNC: 93 MMOL/L — LOW (ref 96–108)
CO2 SERPL-SCNC: 22 MMOL/L — SIGNIFICANT CHANGE UP (ref 22–31)
CREAT SERPL-MCNC: 8.71 MG/DL — HIGH (ref 0.5–1.3)
EGFR: 4 ML/MIN/1.73M2 — LOW
EGFR: 4 ML/MIN/1.73M2 — LOW
GLUCOSE SERPL-MCNC: 101 MG/DL — HIGH (ref 70–99)
HCT VFR BLD CALC: 32.4 % — LOW (ref 34.5–45)
HGB BLD-MCNC: 10 G/DL — LOW (ref 11.5–15.5)
MAGNESIUM SERPL-MCNC: 2.4 MG/DL — SIGNIFICANT CHANGE UP (ref 1.6–2.6)
MCHC RBC-ENTMCNC: 30.6 PG — SIGNIFICANT CHANGE UP (ref 27–34)
MCHC RBC-ENTMCNC: 30.9 G/DL — LOW (ref 32–36)
MCV RBC AUTO: 99.1 FL — SIGNIFICANT CHANGE UP (ref 80–100)
NRBC BLD AUTO-RTO: 0 /100 WBCS — SIGNIFICANT CHANGE UP (ref 0–0)
PHOSPHATE SERPL-MCNC: 7.1 MG/DL — HIGH (ref 2.5–4.5)
PLATELET # BLD AUTO: 144 K/UL — LOW (ref 150–400)
POTASSIUM SERPL-MCNC: 3.7 MMOL/L — SIGNIFICANT CHANGE UP (ref 3.5–5.3)
POTASSIUM SERPL-SCNC: 3.7 MMOL/L — SIGNIFICANT CHANGE UP (ref 3.5–5.3)
RBC # BLD: 3.27 M/UL — LOW (ref 3.8–5.2)
RBC # FLD: 17.5 % — HIGH (ref 10.3–14.5)
SODIUM SERPL-SCNC: 139 MMOL/L — SIGNIFICANT CHANGE UP (ref 135–145)
UUN UR-MCNC: 334 MG/DL — SIGNIFICANT CHANGE UP
WBC # BLD: 4.99 K/UL — SIGNIFICANT CHANGE UP (ref 3.8–10.5)
WBC # FLD AUTO: 4.99 K/UL — SIGNIFICANT CHANGE UP (ref 3.8–10.5)

## 2025-02-14 PROCEDURE — 99285 EMERGENCY DEPT VISIT HI MDM: CPT

## 2025-02-14 PROCEDURE — 99232 SBSQ HOSP IP/OBS MODERATE 35: CPT | Mod: GC

## 2025-02-14 RX ADMIN — Medication 650 MILLIGRAM(S): at 12:17

## 2025-02-14 RX ADMIN — METOPROLOL SUCCINATE 50 MILLIGRAM(S): 50 TABLET, EXTENDED RELEASE ORAL at 17:14

## 2025-02-14 RX ADMIN — BUMETANIDE 132 MILLIGRAM(S): 1 TABLET ORAL at 06:11

## 2025-02-14 RX ADMIN — Medication 650 MILLIGRAM(S): at 17:13

## 2025-02-14 RX ADMIN — METOPROLOL SUCCINATE 50 MILLIGRAM(S): 50 TABLET, EXTENDED RELEASE ORAL at 05:52

## 2025-02-14 RX ADMIN — SEVELAMER HYDROCHLORIDE 800 MILLIGRAM(S): 800 TABLET ORAL at 17:13

## 2025-02-14 RX ADMIN — Medication 650 MILLIGRAM(S): at 05:51

## 2025-02-14 RX ADMIN — Medication 3 MILLIGRAM(S): at 21:44

## 2025-02-14 RX ADMIN — AMIODARONE HYDROCHLORIDE 200 MILLIGRAM(S): 50 INJECTION, SOLUTION INTRAVENOUS at 05:51

## 2025-02-14 RX ADMIN — BUMETANIDE 132 MILLIGRAM(S): 1 TABLET ORAL at 17:12

## 2025-02-14 RX ADMIN — SEVELAMER HYDROCHLORIDE 800 MILLIGRAM(S): 800 TABLET ORAL at 14:29

## 2025-02-14 RX ADMIN — SEVELAMER HYDROCHLORIDE 800 MILLIGRAM(S): 800 TABLET ORAL at 09:25

## 2025-02-14 RX ADMIN — APIXABAN 5 MILLIGRAM(S): 2.5 TABLET, FILM COATED ORAL at 12:16

## 2025-02-14 RX ADMIN — APIXABAN 5 MILLIGRAM(S): 2.5 TABLET, FILM COATED ORAL at 21:43

## 2025-02-14 NOTE — PROGRESS NOTE ADULT - SUBJECTIVE AND OBJECTIVE BOX
[facility] - Heidy Bates, Alverto Green, Kevyn King  Office Number:  [phone]    Patient resting comfortably in bed in NAD.  Laying flat with no respiratory distress.  No complaints of chest pain, dyspnea, palpitations, PND, or orthopnea.    ROS: negative unless otherwise mentioned.    Telemetry: AF    MEDICATIONS  (STANDING):  aMIOdarone    Tablet 200 milliGRAM(s) Oral daily  apixaban 5 milliGRAM(s) Oral two times a day  buMETAnide IVPB 4 milliGRAM(s) IV Intermittent every 12 hours  metoprolol succinate ER 50 milliGRAM(s) Oral two times a day  sevelamer carbonate 800 milliGRAM(s) Oral three times a day with meals  sodium bicarbonate 650 milliGRAM(s) Oral four times a day    MEDICATIONS  (PRN):  acetaminophen     Tablet .. 650 milliGRAM(s) Oral every 6 hours PRN Temp greater or equal to 38C (100.4F), Mild Pain (1 - 3)  melatonin 3 milliGRAM(s) Oral at bedtime PRN Insomnia      Allergies    No Known Allergies    Intolerances        Vital Signs Last 24 Hrs  T(C): 36.4 (14 Feb 2025 04:55), Max: 36.4 (13 Feb 2025 11:26)  T(F): 97.6 (14 Feb 2025 04:55), Max: 97.6 (13 Feb 2025 11:26)  HR: 93 (14 Feb 2025 04:55) (82 - 109)  BP: 132/95 (14 Feb 2025 04:55) (115/70 - 132/95)  BP(mean): --  RR: 18 (14 Feb 2025 04:55) (18 - 18)  SpO2: 99% (14 Feb 2025 04:55) (95% - 99%)    Parameters below as of 13 Feb 2025 22:01  Patient On (Oxygen Delivery Method): room air        I&O's Summary    13 Feb 2025 07:01  -  14 Feb 2025 07:00  --------------------------------------------------------  IN: 770 mL / OUT: 0 mL / NET: 770 mL        ON EXAM:    General: NAD, awake and alert, oriented x 3  HEENT: Mucous membranes are moist, anicteric  Lungs: Non-labored, breath sounds are clear bilaterally, No wheezing, rales or rhonchi  Cardiovascular: irregular, S1 and S2, +SM  Gastrointestinal: Bowel Sounds present, soft, nontender.   Lymph: 2-3+ peripheral edema. No lymphadenopathy.  Skin: No rashes or ulcers  Psych:  Mood & affect appropriate    LABS: All Labs Reviewed:                        10.0   4.99  )-----------( 144      ( 14 Feb 2025 07:26 )             32.4                         10.0   5.65  )-----------( 161      ( 13 Feb 2025 07:27 )             32.4                         10.0   5.93  )-----------( 173      ( 12 Feb 2025 13:13 )             32.1     14 Feb 2025 07:21    139    |  93     |  100    ----------------------------<  101    3.7     |  22     |  8.71   13 Feb 2025 07:28    140    |  96     |  101    ----------------------------<  126    3.7     |  16     |  8.47   12 Feb 2025 13:13    141    |  96     |  99     ----------------------------<  115    3.7     |  18     |  8.42     Ca    7.5        14 Feb 2025 07:21  Ca    7.6        13 Feb 2025 07:28  Ca    7.4        12 Feb 2025 13:13  Phos  7.1       14 Feb 2025 07:21  Phos  7.0       13 Feb 2025 07:28  Mg     2.4       14 Feb 2025 07:21  Mg     2.3       13 Feb 2025 07:28    TPro  6.2    /  Alb  3.8    /  TBili  0.2    /  DBili  x      /  AST  7      /  ALT  7      /  AlkPhos  74     13 Feb 2025 07:28    PT/INR - ( 13 Feb 2025 07:27 )   PT: 17.2 sec;   INR: 1.50 ratio         PTT - ( 13 Feb 2025 07:27 )  PTT:30.9 sec      Blood Culture:     TTE 4/2024  CONCLUSIONS:   1. Left ventricular wall thickness is normal. Left ventricular systolic function is normal with an ejection fraction of 76 % by 3D. There are no regional wall motion abnormalities seen.   2. No evidence of left atrial or left atrial appendage thrombus. Ultrasound enhancing agent was utilized to visualize the left atrial appendage.   3. The left atrium is severely dilated.   4. Moderate mitral regurgitation at a blood pressure of 132/71 mmHg. The mitral regurgitant jet is centrally directed. Mechanism of mitral regurgitation: Apryl Type I (normal leaflet motion with dilated annulus). PISA : later pisa radius 0.4, medial 0.37, Alisaing velocity 38.5cm/sec MR VTI 136cm MR Vmax 548 cm/s. The ERO 0.3sqcm and Reg. Vol 38cc. 3D vena contracta area was 0.35sqcm.   5. No pericardial effusion seen.   6. Normal right ventricular cavity size, with normal wall thickness, and normal systolic function.      TTE 2/13/25:  CONCLUSIONS:   1. Left ventricular cavity is mildly dilated. Left ventricular systolic function is moderately decreased with an ejection fraction of 41 % by Garcia'smethod of disks. Global left ventricular hypokinesis.   2. Multiple segmental abnormalities exist. See findings.   3. Left atrium is severely dilated.   4. Severe mitral regurgitation. The mitral regurgitant jet is posteriorly directed.   5. No pericardial effusion seen.   6. Mild to moderate tricuspid regurgitation.   7. Compared to the transthoracic echocardiogram performed on 11/27/2024, LVEF has decreased, MR is increased,.   8. Estimated pulmonary artery systolic pressure is 46 mmHg, consistent with mild to moderate pulmonary hypertension.   9. Symmetric mitral valve leaflet tethering.  10. The inferior vena cava is dilated measuring 2.40 cm in diameter, (dilated >2.1cm) with abnormal inspiratory collapse (abnormal <50%) consistent with elevated right atrial pressure (~15, range 10-20mmHg).  11. There is normal LV mass and normal geometry.

## 2025-02-14 NOTE — CONSULT NOTE ADULT - SUBJECTIVE AND OBJECTIVE BOX
Interventional Radiology    Evaluate for Procedure: Permacath placement for new dialysis    HPI: 74-year-old female with PMHx of HTN, CKD stage 5 (pt. of Dr. Elizondo), Afib on eliquis, anemia, nephrolithiasis, and recently diagnosed HF who presents for scheduled Afib ablation for Afib found to have NICO. Nephrology has obtained consent for HD. IR consulted for tunneled HD catheter placement.    Nephrology consulted for NICO on CKD V.    Allergies: No Known Allergies    Medications (Abx/Cardiac/Anticoagulation/Blood Products)    aMIOdarone    Tablet: 200 milliGRAM(s) Oral (02-14 @ 05:51)  apixaban: 5 milliGRAM(s) Oral (02-14 @ 12:16)  buMETAnide IVPB: 132 mL/Hr IV Intermittent (02-14 @ 17:12)  buMETAnide IVPB: 124 mL/Hr IV Intermittent (02-12 @ 19:03)  metoprolol succinate ER: 50 milliGRAM(s) Oral (02-14 @ 17:14)    Data:    T(C): 36.4  HR: 86  BP: 138/89  RR: 18  SpO2: 97%    -WBC 4.99 / HgB 10.0 / Hct 32.4 / Plt 144  -Na 139 / Cl 93 /  / Glucose 101  -K 3.7 / CO2 22 / Cr 8.71  -ALT -- / Alk Phos -- / T.Bili --  -INR 1.50 / PTT 30.9    Radiology: Reviewed    Assessment/Plan:   74-year-old female with PMHx of HTN, CKD stage 5 (pt. of Dr. Elizondo), Afib on eliquis, anemia, nephrolithiasis, and recently diagnosed HF who presents for scheduled Afib ablation for Afib found to have NICO. Nephrology has obtained consent for HD. IR consulted for tunneled HD catheter placement.    - case reviewed and approved for Wednesday 2/19 for tunneled HD catheter placement  - please place IR procedure order under NP Terrono  - STAT labs in AM (cbc,coags, bmp, T&S)  - hold eliquis for 24 hours prior  - If patient requires HD over the weekend consult vascular for bedside   - If patient requires HD sooner can perform non tunneled catheter Tuesday  - NPO on Tuesday at 11pm  - d/w primary team  - If patient clinically decompensates please contact IR for more urgent intervention      --  Bhargav Swanson NP  Interventional Radiology  Available on [employer] TEAMS / [employer]    For EMERGENT inquiries/questions:  IR Pager ([facility]): [phone]    For non-emergent consults/questions:   Please place a sunrise order "Consult- Interventional Radiology" with an appropriate callback number    For questions about scheduling during appropriate work hours, call IR :  [facility]: [phone]    For outpatient IR booking:  [facility]: [phone]   Interventional Radiology    Evaluate for Procedure: Permacath placement for new dialysis    HPI: 74-year-old female with PMHx of HTN, CKD stage 5 (pt. of Dr. Elizondo), Afib on eliquis, anemia, nephrolithiasis, and recently diagnosed HF who presents for scheduled Afib ablation for Afib found to have NICO. Nephrology has obtained consent for HD. IR consulted for tunneled HD catheter placement.    Nephrology consulted for NICO on CKD V.    Allergies: No Known Allergies    Medications (Abx/Cardiac/Anticoagulation/Blood Products)    aMIOdarone    Tablet: 200 milliGRAM(s) Oral (02-14 @ 05:51)  apixaban: 5 milliGRAM(s) Oral (02-14 @ 12:16)  buMETAnide IVPB: 132 mL/Hr IV Intermittent (02-14 @ 17:12)  buMETAnide IVPB: 124 mL/Hr IV Intermittent (02-12 @ 19:03)  metoprolol succinate ER: 50 milliGRAM(s) Oral (02-14 @ 17:14)    Data:    T(C): 36.4  HR: 86  BP: 138/89  RR: 18  SpO2: 97%    -WBC 4.99 / HgB 10.0 / Hct 32.4 / Plt 144  -Na 139 / Cl 93 /  / Glucose 101  -K 3.7 / CO2 22 / Cr 8.71  -ALT -- / Alk Phos -- / T.Bili --  -INR 1.50 / PTT 30.9    Radiology: Reviewed    Assessment/Plan:   74-year-old female with PMHx of HTN, CKD stage 5 (pt. of Dr. Elizondo), Afib on eliquis, anemia, nephrolithiasis, and recently diagnosed HF who presents for scheduled Afib ablation for Afib found to have NICO. Nephrology has obtained consent for HD. IR consulted for tunneled HD catheter placement.    - case reviewed and approved for Wednesday 2/19 for tunneled HD catheter placement  - please place IR procedure order under NP Terrono  - STAT labs in AM (cbc,coags, bmp, T&S)  - hold eliquis for 24 hours prior and expect to resume AC approximately 12 hours post procedure  - If patient requires HD over the weekend consult vascular for bedside line placement  - NPO on Tuesday at 11pm  - d/w primary team  - If patient clinically decompensates please contact IR for more urgent intervention      --  Bhargav Swanson NP  Interventional Radiology  Available on [employer] TEAMS / [employer]    For EMERGENT inquiries/questions:  IR Pager ([facility]): [phone]    For non-emergent consults/questions:   Please place a sunrise order "Consult- Interventional Radiology" with an appropriate callback number    For questions about scheduling during appropriate work hours, call IR :  [facility]: [phone]    For outpatient IR booking:  [facility]: [phone]

## 2025-02-14 NOTE — PROGRESS NOTE ADULT - ASSESSMENT
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       1. Persistent Afib  2. NICO on CKD  3. Severe MR    -She was admitted in April 2024 with the same presentation, underwent DCCV and loaded on amio, did well. Readmitted in January 2025 with the same presentation. Plan was DCCV when was she was admitted in January but left AMA.   -Pt is a poor historian, her  preps her medications and states no missed doses on eliquis  -Structural heart evaluation to assess MR, will f/u recs  -Outpatient Renal note states plan is for creation left radiocephalic fistula, given readmit with NICO on CKD please follow up with renal to see if there is any plan for AVF placement within the next 30 days as pt can not interrupted AC for minimum of 30 days post cardioversion   -Continue telemetry monitoring  -Continue AC  -Timing for cardioversion pending above  -Plan discussed with primary team    MARY HanksC  Can reach me via Teams         73 y/o female who is a poor historian with PMH HTN, 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       1. Persistent Afib  2. NICO on CKD  3. Severe MR    -She was admitted in April 2024 with the same presentation, underwent DCCV and loaded on amio, did well. Readmitted in January 2025 with the same presentation. Plan was DCCV when was she was admitted in January but left AMA.   -Pt is a poor historian, her  preps her medications and states no missed doses on eliquis  -Structural heart evaluation to assess MR, will f/u recs  -Outpatient Renal note states plan is for creation left radiocephalic fistula, given readmit with NICO on CKD please follow up with renal to see if there is any plan for AVF placement within the next 30 days as pt can not interrupted AC for minimum of 30 days post cardioversion   -Continue telemetry monitoring, currently AF with controlled VHR in the 's.  -Continue AC  -Timing for cardioversion pending above.    Addendum: EP will sign off for now. Please reconsult when patient is optimized from renal standpoint. If plan is for tunneled catheter for dialysis, please minimize interruption on anticoagulation to avoid the need for  to clear her MARY prior to cardioversion. Will discontinue amiodarone for now. Plan discussed with Dr. Thomas and primary team.    MAGDALENA Mata NP-C  Can reach me via Teams

## 2025-02-14 NOTE — PROGRESS NOTE ADULT - SUBJECTIVE AND OBJECTIVE BOX
24H hour events:     MEDICATIONS:  aMIOdarone    Tablet 200 milliGRAM(s) Oral daily  apixaban 5 milliGRAM(s) Oral two times a day  buMETAnide IVPB 4 milliGRAM(s) IV Intermittent every 12 hours  metoprolol succinate ER 50 milliGRAM(s) Oral two times a day        acetaminophen     Tablet .. 650 milliGRAM(s) Oral every 6 hours PRN  melatonin 3 milliGRAM(s) Oral at bedtime PRN        sodium bicarbonate 650 milliGRAM(s) Oral four times a day      REVIEW OF SYSTEMS:  Complete 12 point ROS negative.    PHYSICAL EXAM:  T(C): 36.4 (02-14-25 @ 04:55), Max: 36.4 (02-13-25 @ 11:26)  HR: 93 (02-14-25 @ 04:55) (82 - 109)  BP: 132/95 (02-14-25 @ 04:55) (115/70 - 132/95)  RR: 18 (02-14-25 @ 04:55) (18 - 18)  SpO2: 99% (02-14-25 @ 04:55) (95% - 99%)  Wt(kg): --  I&O's Summary    13 Feb 2025 07:01  -  14 Feb 2025 07:00  --------------------------------------------------------  IN: 770 mL / OUT: 0 mL / NET: 770 mL        Appearance: Normal	  HEENT: PERRL, EOMI	  Cardiovascular: Normal S1 S2, No JVD, No murmurs  Respiratory: Lungs clear to auscultation	  Psychiatry: A & O x 3, Mood & affect appropriate  Gastrointestinal:  Soft, Non-tender, + BS	  Skin: No rashes, No ecchymoses, No cyanosis	  Neurologic: Grossly intact  Extremities: No clubbing, cyanosis or edema  Vascular: Peripheral pulses palpable 2+ bilaterally        LABS:	 	    CBC Full  -  ( 14 Feb 2025 07:26 )  WBC Count : 4.99 K/uL  Hemoglobin : 10.0 g/dL  Hematocrit : 32.4 %  Platelet Count - Automated : 144 K/uL  Mean Cell Volume : 99.1 fl  Mean Cell Hemoglobin : 30.6 pg  Mean Cell Hemoglobin Concentration : 30.9 g/dL  Auto Neutrophil # : x  Auto Lymphocyte # : x  Auto Monocyte # : x  Auto Eosinophil # : x  Auto Basophil # : x  Auto Neutrophil % : x  Auto Lymphocyte % : x  Auto Monocyte % : x  Auto Eosinophil % : x  Auto Basophil % : x    02-14    139  |  93[L]  |  100[H]  ----------------------------<  101[H]  3.7   |  22  |  8.71[H]  02-13    140  |  96  |  101[H]  ----------------------------<  126[H]  3.7   |  16[L]  |  8.47[H]    Ca    7.5[L]      14 Feb 2025 07:21  Ca    7.6[L]      13 Feb 2025 07:28  Phos  7.1     02-14  Phos  7.0     02-13  Mg     2.4     02-14  Mg     2.3     02-13    TPro  6.2  /  Alb  3.8  /  TBili  0.2  /  DBili  x   /  AST  7[L]  /  ALT  7[L]  /  AlkPhos  74  02-13      proBNP:   Lipid Profile:   HgA1c:   TSH:       CARDIAC MARKERS:          TELEMETRY: 	    ECG:  	  RADIOLOGY:  OTHER: 	    PREVIOUS DIAGNOSTIC TESTING:    [ ] Echocardiogram:    [ ]  Catheterization:  [ ] Stress Test:  	  	  ASSESSMENT/PLAN: 	     24H hour events: Pt without complaint, no acute events overnight, Tele: Afib with VHR 's      MEDICATIONS:  aMIOdarone    Tablet 200 milliGRAM(s) Oral daily  apixaban 5 milliGRAM(s) Oral two times a day  buMETAnide IVPB 4 milliGRAM(s) IV Intermittent every 12 hours  metoprolol succinate ER 50 milliGRAM(s) Oral two times a day  acetaminophen     Tablet .. 650 milliGRAM(s) Oral every 6 hours PRN  melatonin 3 milliGRAM(s) Oral at bedtime PRN  sodium bicarbonate 650 milliGRAM(s) Oral four times a day      REVIEW OF SYSTEMS:  Complete 12 point ROS negative.    PHYSICAL EXAM:  T(C): 36.4 (02-14-25 @ 04:55), Max: 36.4 (02-13-25 @ 11:26)  HR: 93 (02-14-25 @ 04:55) (82 - 109)  BP: 132/95 (02-14-25 @ 04:55) (115/70 - 132/95)  RR: 18 (02-14-25 @ 04:55) (18 - 18)  SpO2: 99% (02-14-25 @ 04:55) (95% - 99%)  Wt(kg): --  I&O's Summary    13 Feb 2025 07:01  -  14 Feb 2025 07:00  --------------------------------------------------------  IN: 770 mL / OUT: 0 mL / NET: 770 mL        Appearance: Normal	  HEENT: PERRL, EOMI	  Cardiovascular: Normal S1 S2, Irregularly irregular, No JVD, No murmurs  Respiratory: Lungs clear to auscultation	  Psychiatry: A & O x 3 (forgetful), Mood & affect appropriate  Gastrointestinal: Soft, Non-tender, + BS	  Skin: No rashes, No ecchymoses, No cyanosis	  Neurologic: Grossly intact  Extremities: No clubbing, cyanosis or edema  Vascular: Peripheral pulses palpable 2+ bilaterally        LABS:	 	    CBC Full  -  ( 14 Feb 2025 07:26 )  WBC Count : 4.99 K/uL  Hemoglobin : 10.0 g/dL  Hematocrit : 32.4 %  Platelet Count - Automated : 144 K/uL  Mean Cell Volume : 99.1 fl  Mean Cell Hemoglobin : 30.6 pg  Mean Cell Hemoglobin Concentration : 30.9 g/dL  Auto Neutrophil # : x  Auto Lymphocyte # : x  Auto Monocyte # : x  Auto Eosinophil # : x  Auto Basophil # : x  Auto Neutrophil % : x  Auto Lymphocyte % : x  Auto Monocyte % : x  Auto Eosinophil % : x  Auto Basophil % : x    02-14    139  |  93[L]  |  100[H]  ----------------------------<  101[H]  3.7   |  22  |  8.71[H]  02-13    140  |  96  |  101[H]  ----------------------------<  126[H]  3.7   |  16[L]  |  8.47[H]    Ca    7.5[L]      14 Feb 2025 07:21  Ca    7.6[L]      13 Feb 2025 07:28  Phos  7.1     02-14  Phos  7.0     02-13  Mg     2.4     02-14  Mg     2.3     02-13    TPro  6.2  /  Alb  3.8  /  TBili  0.2  /  DBili  x   /  AST  7[L]  /  ALT  7[L]  /  AlkPhos  74  02-13    Thyroid Stimulating Hormone, Serum (04.09.24 @ 00:27)    Thyroid Stimulating Hormone, Serum: 0.65 uIU/mL        TELEMETRY: Afib with VHR 's  	      < from: TTE W or WO Ultrasound Enhancing Agent (02.13.25 @ 11:46) >  CONCLUSIONS:      1. Left ventricular cavity is mildly dilated. Left ventricular systolic function is moderately decreased with an ejection fraction of 41 % by Garcia'smethod of disks. Global left ventricular hypokinesis.   2. Multiple segmental abnormalities exist. See findings.   3. Left atrium is severely dilated.   4. Severe mitral regurgitation. The mitral regurgitant jet is posteriorly directed.   5. No pericardial effusion seen.   6. Mild to moderate tricuspid regurgitation.   7. Compared to the transthoracic echocardiogram performed on 11/27/2024, LVEF has decreased, MR is increased,.   8. Estimated pulmonary artery systolic pressure is 46 mmHg, consistent with mild to moderate pulmonary hypertension.   9. Symmetric mitral valve leaflet tethering.  10. The inferior vena cava is dilated measuring 2.40 cm in diameter, (dilated >2.1cm) with abnormal inspiratory collapse (abnormal <50%) consistent with elevated right atrial pressure (~15, range 10-20mmHg).  11. There is normal LV mass and normal geometry.    ________________________________________________________________________________________  FINDINGS:     Left Ventricle:  The left ventricular cavity is mildly dilated. Left ventricular systolic function is moderately decreased with a calculated ejection fraction of 41 % by the Garcia's biplane method of disks. There is global left ventricular hypokinesis. There is normal LV mass and normal geometry. There is no evidence of a left ventricular thrombus. Analysis of left ventricular diastolic function and filling pressure is made challenging by the presence of atrial fibrillation.  LV Wall Scoring: There is diffuse hypokinesis.          Right Ventricle:  The right ventricular cavity is normal in size, with normal wall thickness and right ventricular systolic function is borderline reduced. Tricuspid annular plane systolic excursion (TAPSE) is 1.8 cm (normal >=1.7 cm). Tricuspid annulartissue Doppler S' is 8.4 cm/s (normal >10 cm/s).     Left Atrium:  The left atrium is severely dilated with an indexed volume of 112.42 ml/m².     Right Atrium:  The right atrium is normal in size with an indexed volume of 26.64 ml/m² and an indexed area of 9.31 cm²/m².     Aortic Valve:  The aortic valve appears trileaflet with normal systolic excursion. There is no aortic valve stenosis. There is mild aortic regurgitation.     Mitral Valve:  Structurally normal mitral valve with normal leaflet excursion. There is symmetric leaflet tethering. There is severe mitral regurgitation. The mitral regurgitant jet is posteriorly directed.     Tricuspid Valve:  Structurally normal tricuspid valve with normal leaflet excursion. There is mild to moderate tricuspid regurgitation. Estimated pulmonary artery systolic pressure is 46 mmHg, consistent with mild to moderate pulmonary hypertension.     Pulmonic Valve:  Structurally normal pulmonic valve with normal leaflet excursion. There is trace pulmonic regurgitation.     Pericardium:  No pericardial effusion seen.     Systemic Veins:  The inferior vena cava is dilated measuring 2.40 cm in diameter, (dilated >2.1cm) with abnormal inspiratory collapse (abnormal <50%) consistent with elevated right atrial pressure (~15, range 10-20mmHg).  ____________________________________________________________________  QUANTITATIVE DATA:  Left Ventricle Measurements: (Indexed to BSA)     IVSd (2D):   0.7 cm  LVPWd (2D):  0.7 cm  LVIDd (2D):  5.7 cm  LVIDs (2D):  3.5 cm  LV Mass:     141 g  83.6 g/m²  BiPlane LV EF%: 41 %    Aorta Measurements: (Normal range) (Indexed to BSA)     Ao Root 2.7 cm  Ao Asc: 2.8 cm       Left Atrium Measurements: (Indexed to BSA)  LA Diam 2D: 5.99 cm         Right Ventricle Measurements: Right Atrial Measurements:     TAPSE: 1.8 cm                 RA Vol:       45.00 ml                                RA Vol Index: 26.64 ml/m²       LVOT / RVOT/ Qp/Qs Data: (Indexed to BSA)  LVOT Diameter,s: 1.77 cm  LVOT Area:       2.47 cm²  LVOT Vmax:       0.70 m/s  LVOT VTI:        12.96 cm  LVOT peak grad:  2 mmHg  LVOT mean grad:  1.2 mmHg  LVOT SV:         32.1 ml  18.98 ml/m²    Aortic Valve Measurements:  AV Vmax:          1.2 m/s  AV Peak Gradient: 5.4 mmHg  AR Vmax4.20 m/s  AR PHT            36285 msec  AR Harney          0.75 m/s²  AR Decel Time     63847 msec       MR Vmax:          5.18 m/s  MR VTI:           164.20 cm  MR Mean Gradient: 81.1 mmHg  MR Peak Gradient: 107.4 mmHg       Tricuspid Valve Measurements:     TV S'             8.4 cm/s  TR Vmax:          2.8 m/s  TR Peak Gradient: 31.4 mmHg  RA Pressure:      15 mmHg  PASP:             46 mmHg      < end of copied text >

## 2025-02-14 NOTE — PROGRESS NOTE ADULT - PROBLEM SELECTOR PROBLEM 3
Chronic heart failure with preserved ejection fraction Chronic HFrEF (heart failure with reduced ejection fraction)

## 2025-02-14 NOTE — PROGRESS NOTE ADULT - ATTENDING COMMENTS
# NICO on CKD vs CKD progression.   Baseline serum creatinine 6.5 in Jan 2025. However, noted that serum creatinine was up to 8.4 before getting Afib ablation. Serum creatinine is now 8.7. I have discussed with her outpatient nephrologist (Dr Bassem Elizondo), her eGFR is now ~ 4, we will start dialysis during inpatient.    - We had an extensive discussion with patient and her  about the need to start dialysis. Patient is not ready to start dialysis yet, she wants to think further before making a decision. I will speak to her again this evening.     # Hypervolemia - worsening lower extremity swelling over the past 2 weeks. Home torsemide 100mg daily did not seem to work well for her.  -COntinue IV bumex 4mg BID.     # Metabolic acidosis - secondary to CKD. Continue sodium bicarb tabs.     # Medication monitoring encounter: medication dose reviewed. Please dose medications to CrCl<15    The rest of the recommendations as per fellow's note.    Tiffanie Ureña MD  Attending Nephrologist  [phone] or via [employer].

## 2025-02-14 NOTE — PROGRESS NOTE ADULT - SUBJECTIVE AND OBJECTIVE BOX
ISSAADRIENNE  74y  Female    Complaints:  Subjective:     No acute o/n events. Patient this morning is resting comfortably. Denies any chest pain, SOB, abdominal pain, nausea, vomiting. Patient states she is making good urine.     FAMILY HISTORY:    74yVital Signs Last 24 Hrs  T(C): 36.4 (2025 04:55), Max: 36.4 (2025 11:26)  T(F): 97.6 (2025 04:55), Max: 97.6 (2025 11:26)  HR: 93 (2025 04:55) (82 - 109)  BP: 132/95 (2025 04:55) (115/70 - 132/95)  BP(mean): --  RR: 18 (2025 04:55) (18 - 18)  SpO2: 99% (2025 04:55) (95% - 99%)    Parameters below as of 2025 22:01  Patient On (Oxygen Delivery Method): room air    Net negative 770 mL in the last 24 hrs     PHYSICAL EXAM:  GENERAL: NAD  HEAD:  Atraumatic  EYES: EOMI  ENMT: Moist mucous membranes, Good dentition  NERVOUS SYSTEM:  Alert & Oriented X3, Good concentration  CHEST/LUNG: Clear to percussion bilaterally; No rales, rhonchi, wheezing  HEART: Irregularly irregular   ABDOMEN: Soft, Nontender, Nondistended  EXTREMITIES:  +2 pitting edema bilaterally     Consultant(s) Notes Reviewed:  [x ] YES  [ ] NO  Care Discussed with Consultants/Other Providers [ x] YES  [ ] NO    LABS:                        10.0   5.65  )-----------( 161      ( 2025 07:27 )             32.4       02-13    140  |  96  |  101[H]  ----------------------------<  126[H]  3.7   |  16[L]  |  8.47[H]    Ca    7.6[L]      2025 07:28  Phos  7.0     02-13  Mg     2.3     02-13    TPro  6.2  /  Alb  3.8  /  TBili  0.2  /  DBili  x   /  AST  7[L]  /  ALT  7[L]  /  AlkPhos  74                Urinalysis Basic - ( 2025 11:05 )    Color: Yellow / Appearance: Clear / S.007 / pH: x  Gluc: x / Ketone: Negative mg/dL  / Bili: Negative / Urobili: 0.2 mg/dL   Blood: x / Protein: Negative mg/dL / Nitrite: Negative   Leuk Esterase: Negative / RBC: x / WBC x   Sq Epi: x / Non Sq Epi: x / Bacteria: x    PT/INR - ( 2025 07:27 )   PT: 17.2 sec;   INR: 1.50 ratio      PTT - ( 2025 07:27 )  PTT:30.9 sec    CAPILLARY BLOOD GLUCOSE    Female  RADIOLOGY & ADDITIONAL TESTS:  < from: TTE W or WO Ultrasound Enhancing Agent (25 @ 11:46) >  CONCLUSIONS:      1. Left ventricular cavity is mildly dilated. Left ventricular systolic function is moderately decreased with an ejection fraction of 41 % by Garcia'smethod of disks. Global left ventricular hypokinesis.   2. Multiple segmental abnormalities exist. See findings.   3. Left atrium is severely dilated.   4. Severe mitral regurgitation. The mitral regurgitant jet is posteriorly directed.   5. No pericardial effusion seen.   6. Mild to moderate tricuspid regurgitation.   7. Compared to the transthoracic echocardiogram performed on 2024, LVEF has decreased, MR is increased,.   8. Estimated pulmonary artery systolic pressure is 46 mmHg, consistent with mild to moderate pulmonary hypertension.   9. Symmetric mitral valve leaflet tethering.  10. The inferior vena cava is dilated measuring 2.40 cm in diameter, (dilated >2.1cm) with abnormal inspiratory collapse (abnormal <50%) consistent with elevated right atrial pressure (~15, range 10-20mmHg).  11. There is normal LV mass and normal geometry.    < end of copied text >  < from: US Kidney and Bladder (25 @ 17:59) >  IMPRESSION:  Bilateral renal parenchymal disease. No definite hydronephrosis.   Bilateral renal calculi.    --- End of Report ---    < end of copied text >    MedsMEDICATIONS  (STANDING):  aMIOdarone    Tablet 200 milliGRAM(s) Oral daily  apixaban 5 milliGRAM(s) Oral two times a day  buMETAnide IVPB 4 milliGRAM(s) IV Intermittent every 12 hours  metoprolol succinate ER 50 milliGRAM(s) Oral two times a day  sevelamer carbonate 800 milliGRAM(s) Oral three times a day with meals  sodium bicarbonate 650 milliGRAM(s) Oral four times a day    MEDICATIONS  (PRN):  acetaminophen     Tablet .. 650 milliGRAM(s) Oral every 6 hours PRN Temp greater or equal to 38C (100.4F), Mild Pain (1 - 3)  melatonin 3 milliGRAM(s) Oral at bedtime PRN Insomnia      HEALTH ISSUES - PROBLEM Dx:  Acute kidney injury superimposed on CKD    Paroxysmal atrial fibrillation    Chronic heart failure with preserved ejection fraction    Metabolic acidosis    Hypertension    Need for prophylactic measure    Severe mitral regurgitation             GASPERMARIAMADRIENNE HOPKINS  74y  Female    Complaints:  Subjective:     No acute o/n events. Patient this morning is resting comfortably. Denies any chest pain, SOB, abdominal pain, nausea, vomiting. Patient states she is making good urine.     FAMILY HISTORY:    74yVital Signs Last 24 Hrs  T(C): 36.4 (2025 04:55), Max: 36.4 (2025 11:26)  T(F): 97.6 (2025 04:55), Max: 97.6 (2025 11:26)  HR: 93 (2025 04:55) (82 - 109)  BP: 132/95 (2025 04:55) (115/70 - 132/95)  BP(mean): --  RR: 18 (2025 04:55) (18 - 18)  SpO2: 99% (2025 04:55) (95% - 99%)    Parameters below as of 2025 22:01  Patient On (Oxygen Delivery Method): room air    PHYSICAL EXAM:  GENERAL: NAD  HEAD:  Atraumatic  EYES: EOMI  ENMT: Moist mucous membranes, Good dentition  NERVOUS SYSTEM:  Alert & Oriented X3, Good concentration  CHEST/LUNG: Clear to percussion bilaterally; No rales, rhonchi, wheezing  HEART: Irregularly irregular   ABDOMEN: Soft, Nontender, Nondistended  EXTREMITIES:  +2 pitting edema bilaterally     Consultant(s) Notes Reviewed:  [x ] YES  [ ] NO  Care Discussed with Consultants/Other Providers [ x] YES  [ ] NO    LABS:                        10.0   5.65  )-----------( 161      ( 2025 07:27 )             32.4       02-13    140  |  96  |  101[H]  ----------------------------<  126[H]  3.7   |  16[L]  |  8.47[H]    Ca    7.6[L]      2025 07:28  Phos  7.0     02-13  Mg     2.3     02-13    TPro  6.2  /  Alb  3.8  /  TBili  0.2  /  DBili  x   /  AST  7[L]  /  ALT  7[L]  /  AlkPhos  74  02-13              Urinalysis Basic - ( 2025 11:05 )    Color: Yellow / Appearance: Clear / S.007 / pH: x  Gluc: x / Ketone: Negative mg/dL  / Bili: Negative / Urobili: 0.2 mg/dL   Blood: x / Protein: Negative mg/dL / Nitrite: Negative   Leuk Esterase: Negative / RBC: x / WBC x   Sq Epi: x / Non Sq Epi: x / Bacteria: x    PT/INR - ( 2025 07:27 )   PT: 17.2 sec;   INR: 1.50 ratio      PTT - ( 2025 07:27 )  PTT:30.9 sec    CAPILLARY BLOOD GLUCOSE    Female  RADIOLOGY & ADDITIONAL TESTS:  < from: TTE W or WO Ultrasound Enhancing Agent (25 @ 11:46) >  CONCLUSIONS:      1. Left ventricular cavity is mildly dilated. Left ventricular systolic function is moderately decreased with an ejection fraction of 41 % by Garcia'smethod of disks. Global left ventricular hypokinesis.   2. Multiple segmental abnormalities exist. See findings.   3. Left atrium is severely dilated.   4. Severe mitral regurgitation. The mitral regurgitant jet is posteriorly directed.   5. No pericardial effusion seen.   6. Mild to moderate tricuspid regurgitation.   7. Compared to the transthoracic echocardiogram performed on 2024, LVEF has decreased, MR is increased,.   8. Estimated pulmonary artery systolic pressure is 46 mmHg, consistent with mild to moderate pulmonary hypertension.   9. Symmetric mitral valve leaflet tethering.  10. The inferior vena cava is dilated measuring 2.40 cm in diameter, (dilated >2.1cm) with abnormal inspiratory collapse (abnormal <50%) consistent with elevated right atrial pressure (~15, range 10-20mmHg).  11. There is normal LV mass and normal geometry.    < end of copied text >  < from: US Kidney and Bladder (25 @ 17:59) >  IMPRESSION:  Bilateral renal parenchymal disease. No definite hydronephrosis.   Bilateral renal calculi.    --- End of Report ---    < end of copied text >    MedsMEDICATIONS  (STANDING):  aMIOdarone    Tablet 200 milliGRAM(s) Oral daily  apixaban 5 milliGRAM(s) Oral two times a day  buMETAnide IVPB 4 milliGRAM(s) IV Intermittent every 12 hours  metoprolol succinate ER 50 milliGRAM(s) Oral two times a day  sevelamer carbonate 800 milliGRAM(s) Oral three times a day with meals  sodium bicarbonate 650 milliGRAM(s) Oral four times a day    MEDICATIONS  (PRN):  acetaminophen     Tablet .. 650 milliGRAM(s) Oral every 6 hours PRN Temp greater or equal to 38C (100.4F), Mild Pain (1 - 3)  melatonin 3 milliGRAM(s) Oral at bedtime PRN Insomnia      HEALTH ISSUES - PROBLEM Dx:  Acute kidney injury superimposed on CKD    Paroxysmal atrial fibrillation    Chronic heart failure with preserved ejection fraction    Metabolic acidosis    Hypertension    Need for prophylactic measure    Severe mitral regurgitation

## 2025-02-14 NOTE — PROGRESS NOTE ADULT - PROBLEM SELECTOR PLAN 3
TTE in 11/2024 showed progression to moderate to severe MR.  Left ventricular systolic function is normal with an ejection fraction of 55 % by Garcia's method of disks. Appears hypervolemic on exam  -Bumex 3 mg BID   -Hold home torsemide   -Repeat TTE ordered  -Structural cardiology consulted, recommend aggressive diuresis. Once euvolemic and on GDMT, right heart cath to assess volume status and rEF and repeat TTE. If moderate to severe mitral regurg,  recc.   -General cardiology following TTE in 11/2024 showed progression to moderate to severe MR.  Left ventricular systolic function is normal with an ejection fraction of 55 % by Garcia's method of disks. Appears hypervolemic on exam.   -Bumex as above   -Hold home torsemide   -Repeat TTE ordered  -Structural cardiology consulted, recommend aggressive diuresis. Once euvolemic and on GDMT, right heart cath to assess volume status and rEF and repeat TTE. If moderate to severe mitral regurg,  recc.   -General cardiology following

## 2025-02-14 NOTE — PROGRESS NOTE ADULT - PROBLEM SELECTOR PLAN 1
Hx/o CKD iso b/l staghorn calculus s/p removal (2009) (follows with Dr. Elizondo). Found to have worsening renal function on admission so procedure cancelled. Baseline 4.6 in Aug 2024 and 5.5 in 1/25. Cr on admission 2/12 was 8.42.   - Bladder scan   - Strict I/Os   - Trend BMPs, monitor renal function, renally dose meds, avoid nephrotoxins   - Nephro consulted, recommend bumex 4 mg IV BID Hx/o CKD iso b/l staghorn calculus s/p removal (2009) (follows with Dr. Elizondo). Found to have worsening renal function on admission so procedure cancelled. Baseline 4.6 in Aug 2024 and 5.5 in 1/25. Cr on admission 2/12 was 8.42.   - Strict I/Os   - Trend BMPs, monitor renal function, renally dose meds, avoid nephrotoxins   - Nephro consulted, recommend bumex 4 mg IV BID- will touch base today given still hypervolemic on exam Hx/o CKD iso b/l staghorn calculus s/p removal (2009) (follows with Dr. Elizondo). Found to have worsening renal function on admission so procedure cancelled. Baseline 4.6 in Aug 2024 and 5.5 in 1/25. Cr on admission 2/12 was 8.42.   - Strict I/Os   - Trend BMPs, monitor renal function, renally dose meds, avoid nephrotoxins   - Nephro consulted, recommend bumex 4 mg IV BID and patient to make decision on dialysis

## 2025-02-14 NOTE — PROGRESS NOTE ADULT - ASSESSMENT
74-year-old female with PMHx of HTN, CKD stage 5 (pt. of Dr. Elizondo), Afib on eliquis, anemia, nephrolithiasis, and recently diagnosed HF who presents for scheduled Afib ablation for Afib found to have NICO.    Nephrology consulted for NICO on CKD V.      Acute kidney injury superimposed on CKD:  NICO on CKD V in setting of over-diuresis (increased from lasix 80mg BID to torsemide 100mg BID 1/31/24), r/o obstruction v other. Pt. with hx of advanced CKD is in the setting of chronic history of nephrolithiasis/staghorn calculus. On review of labs on [facility], last Scr was elevated at 6.18 on 1/30/25. Admission SCr is elevated at 8.42 without electrolyte abnormalities. Prior renal US 1/8/25 without hydronephrosis but calcifications, stones and cysts bilaterally.   USG - Kidney and bladder showed Bilateral renal calculi, no Hydronephrosis, B/L renal parenchymal disease.   UPCR 4.9    PLAN:  C/w Bumex 4mg IV BID - Monitor strict I/O, daily standing wts.   Monitor labs and I/Os. Avoid nephrotoxins including, ACE/ARB, NSAIDs, contrast, etc.   Pt was explained in detail about the need of HD. She may need tunnelled HD cath for now and then AVF. Pending final decision from the patient.   Dose medications as per eGFR.      Anemia:  Hb 10.0  likely anemia of chronic disease.   Obtain iron panel, B12 and folate.     MBD:  Obtain PTH, Vitamin D level (25-OH cholecalciferol.)  C/w -  sodium bicarbonate 650mg QID.   c/w sevelember 800mg TID with meals   Renal diet.     wait for the final reccs from the attending.

## 2025-02-14 NOTE — PROGRESS NOTE ADULT - SUBJECTIVE AND OBJECTIVE BOX
[facility] Division of Kidney Diseases & Hypertension  FOLLOW UP NOTE  [phone]--------------------------------------------------------------------------------  Chief Complaint:Atypical atrial flutter    24 hour events/subjective:  Pt was seen and examined earlier today. No acute overnight events. No fresh complaints. Pt reports feeling well.   Pt denies any worsening of SOB/ Constipation/ Diarrhea/ Nausea/ Vomiting/ abdominal pain/ chest pain/ tingling/ numbness.     PAST HISTORY  --------------------------------------------------------------------------------  No significant changes to PMH, PSH, FHx, SHx, unless otherwise noted    ALLERGIES & MEDICATIONS  --------------------------------------------------------------------------------  Allergies    No Known Allergies    Intolerances      Standing Inpatient Medications  aMIOdarone    Tablet 200 milliGRAM(s) Oral daily  apixaban 5 milliGRAM(s) Oral two times a day  buMETAnide IVPB 4 milliGRAM(s) IV Intermittent every 12 hours  metoprolol succinate ER 50 milliGRAM(s) Oral two times a day  sevelamer carbonate 800 milliGRAM(s) Oral three times a day with meals  sodium bicarbonate 650 milliGRAM(s) Oral four times a day    PRN Inpatient Medications  acetaminophen     Tablet .. 650 milliGRAM(s) Oral every 6 hours PRN  melatonin 3 milliGRAM(s) Oral at bedtime PRN      REVIEW OF SYSTEMS  --------------------------------------------------------------------------------  as noted above.     VITALS/PHYSICAL EXAM  --------------------------------------------------------------------------------  T(C): 36.4 (02-14-25 @ 11:14), Max: 36.4 (02-14-25 @ 04:55)  HR: 103 (02-14-25 @ 11:14) (82 - 109)  BP: 135/95 (02-14-25 @ 11:14) (115/70 - 135/95)  RR: 18 (02-14-25 @ 11:14) (18 - 18)  SpO2: 97% (02-14-25 @ 11:14) (97% - 99%)  Wt(kg): --  Height (cm): 157.5 (02-12-25 @ 15:03)  Weight (kg): 59.9 (02-12-25 @ 15:03)  BMI (kg/m2): 24.1 (02-12-25 @ 15:03)  BSA (m2): 1.6 (02-12-25 @ 15:03)      02-13-25 @ 07:01  -  02-14-25 @ 07:00  --------------------------------------------------------  IN: 770 mL / OUT: 0 mL / NET: 770 mL      Physical Exam:  Gen: NAD, sitting up in chair  Pulm: Lower zone decreased breath sounds B/L, Saturating well.   CV: irregularly irregular, S1S2;  Abd: +BS, soft  : No suprapubic tenderness  Extremities: + bilateral LE edema noted upto knee with stockings in place.   Neuro: Awake, alert and oriented. No apparent focal neurological deficits.   Skin: Warm and dry.     LABS/STUDIES  --------------------------------------------------------------------------------              10.0   4.99  >-----------<  144      [02-14-25 @ 07:26]              32.4     139  |  93  |  100  ----------------------------<  101      [02-14-25 @ 07:21]  3.7   |  22  |  8.71        Ca     7.5     [02-14-25 @ 07:21]      Mg     2.4     [02-14-25 @ 07:21]      Phos  7.1     [02-14-25 @ 07:21]    TPro  6.2  /  Alb  3.8  /  TBili  0.2  /  DBili  x   /  AST  7   /  ALT  7   /  AlkPhos  74  [02-13-25 @ 07:28]    PT/INR: PT 17.2 , INR 1.50       [02-13-25 @ 07:27]  PTT: 30.9       [02-13-25 @ 07:27]      Creatinine Trend:  SCr 8.71 [02-14 @ 07:21]  SCr 8.47 [02-13 @ 07:28]  SCr 8.42 [02-12 @ 13:13]    Urine Creatinine 46      [02-13-25 @ 15:43]  Urine Protein 227      [02-13-25 @ 15:43]  Urine Sodium 81      [02-13-25 @ 15:43]  Urine Urea Nitrogen 334      [02-13-25 @ 15:43]  Urine Potassium 18      [02-13-25 @ 15:43]  Urine Osmolality 319      [02-13-25 @ 15:43]

## 2025-02-14 NOTE — PROGRESS NOTE ADULT - ASSESSMENT
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       #Acute kidney injury superimposed on CKD.    Found to have worsening renal function on admission so procedure cancelled. Baseline 4.6 in Aug 2024 and 5.5 in 1/25. Cr on admission 2/12 was 8.42.   - Cr continues to rise to 8.71 this AM  - On Bumex 4mg IV BID for diurese but Is and Os document 0ml of urine  - RAP elevated to 15 on TTE from 2/13 and she appears significantly volume up on exam    #  Paroxysmal atrial fibrillation.   ·  Plan: Admitted for cardioversion but unable to do because given significant volume overload  -C/w home eliquis 5 mg BID   -C/w home metoprolol 50 mg BID, would increase dose to 100mg in the AM, 50mg in the PM    # Chronic heart failure with preserved ejection fraction.   ·TTE in 11/2024 showed progression to moderate to severe MR.  Left ventricular systolic function is normal with an ejection fraction of 55 % by Garcia's method of disks.  -TTE now with LVEF of 41%, global hypokinesis, severe MR, RAP of 15.   -Structural Heart evaluation noted. Will aggressively diurese and repeat TTE when euvolemic  Will be very difficult to diurese to euvolemia without HD but will continue with high dose Bumex for now  Eventual GDMT  No recent ischemic eval noted. Will need to reassess LVEF once in NSR

## 2025-02-14 NOTE — PROGRESS NOTE ADULT - PROBLEM SELECTOR PLAN 2
Admitted for cardioversion but unable to do because NICO on CKD.  -C/w home eliquis 5 mg BID   -C/w home metoprolol 50 mg BID   -EP following Admitted for cardioversion but unable to do because NICO on CKD.  -C/w home eliquis 5 mg BID   -C/w home metoprolol 50 mg BID   -EP following, recommend d/c amiodarone given plan for tunnel cath and worsening kidney function

## 2025-02-14 NOTE — PROGRESS NOTE ADULT - ATTENDING COMMENTS
74F w/ severe MR, AFib on Eliquis, HFpEF, CKD and recent admission 1/7-9 for AF w/ RVR and decompensated HF who presented for planned DCCV, found to have NICO on CKD and admitted to medicine for further work up. Concern for CKD progression (baseline SCr 5-6) vs NICO on CKD. Worsening LE edema despite torsemide 100 mg bid, on bumex 4 IV BID. Nephrology consulted, f/u recs. TTE with EF 41%, WMA, severe MR, progressive from 11/2024 echo. Cardiology and structural cardiology following. Concern for decompensated HF, plan for repeat TTE after aggressive diuresis. 74F w/ severe MR, AFib on Eliquis, HFpEF, CKD and recent admission 1/7-9 for AF w/ RVR and decompensated HF who presented for planned DCCV, found to have NICO on CKD and admitted to medicine for further work up. Concern for CKD progression (baseline SCr 5-6) vs NICO on CKD. Worsening LE edema despite torsemide 100 mg bid, on bumex 4 IV BID. Nephrology consulted, recommending dialysis initiation this admission. Pt still considering whether or not to pursue dialysis. TTE with EF 41%, WMA, severe MR, progressive from 11/2024 echo. Cardiology and structural cardiology following. Concern for decompensated HF, plan for repeat TTE after aggressive diuresis.

## 2025-02-15 LAB
ANION GAP SERPL CALC-SCNC: 23 MMOL/L — HIGH (ref 5–17)
BUN SERPL-MCNC: 100 MG/DL — HIGH (ref 7–23)
CALCIUM SERPL-MCNC: 7.4 MG/DL — LOW (ref 8.4–10.5)
CHLORIDE SERPL-SCNC: 99 MMOL/L — SIGNIFICANT CHANGE UP (ref 96–108)
CO2 SERPL-SCNC: 21 MMOL/L — LOW (ref 22–31)
CREAT SERPL-MCNC: 8.35 MG/DL — HIGH (ref 0.5–1.3)
EGFR: 5 ML/MIN/1.73M2 — LOW
EGFR: 5 ML/MIN/1.73M2 — LOW
GLUCOSE SERPL-MCNC: 101 MG/DL — HIGH (ref 70–99)
HCT VFR BLD CALC: 30.3 % — LOW (ref 34.5–45)
HGB BLD-MCNC: 9.5 G/DL — LOW (ref 11.5–15.5)
MAGNESIUM SERPL-MCNC: 2.3 MG/DL — SIGNIFICANT CHANGE UP (ref 1.6–2.6)
MCHC RBC-ENTMCNC: 31 PG — SIGNIFICANT CHANGE UP (ref 27–34)
MCHC RBC-ENTMCNC: 31.4 G/DL — LOW (ref 32–36)
MCV RBC AUTO: 99 FL — SIGNIFICANT CHANGE UP (ref 80–100)
NRBC BLD AUTO-RTO: 0 /100 WBCS — SIGNIFICANT CHANGE UP (ref 0–0)
PHOSPHATE SERPL-MCNC: 6.7 MG/DL — HIGH (ref 2.5–4.5)
PLATELET # BLD AUTO: 135 K/UL — LOW (ref 150–400)
POTASSIUM SERPL-MCNC: 3.5 MMOL/L — SIGNIFICANT CHANGE UP (ref 3.5–5.3)
POTASSIUM SERPL-SCNC: 3.5 MMOL/L — SIGNIFICANT CHANGE UP (ref 3.5–5.3)
RBC # BLD: 3.06 M/UL — LOW (ref 3.8–5.2)
RBC # FLD: 17.5 % — HIGH (ref 10.3–14.5)
SODIUM SERPL-SCNC: 143 MMOL/L — SIGNIFICANT CHANGE UP (ref 135–145)
WBC # BLD: 4.88 K/UL — SIGNIFICANT CHANGE UP (ref 3.8–10.5)
WBC # FLD AUTO: 4.88 K/UL — SIGNIFICANT CHANGE UP (ref 3.8–10.5)

## 2025-02-15 PROCEDURE — 99233 SBSQ HOSP IP/OBS HIGH 50: CPT

## 2025-02-15 PROCEDURE — 99232 SBSQ HOSP IP/OBS MODERATE 35: CPT | Mod: GC

## 2025-02-15 RX ORDER — TRAMADOL HYDROCHLORIDE 50 MG/1
25 TABLET, FILM COATED ORAL ONCE
Refills: 0 | Status: DISCONTINUED | OUTPATIENT
Start: 2025-02-15 | End: 2025-02-15

## 2025-02-15 RX ORDER — INFLUENZA A VIRUS A/IDAHO/07/2018 (H1N1) ANTIGEN (MDCK CELL DERIVED, PROPIOLACTONE INACTIVATED, INFLUENZA A VIRUS A/INDIANA/08/2018 (H3N2) ANTIGEN (MDCK CELL DERIVED, PROPIOLACTONE INACTIVATED), INFLUENZA B VIRUS B/SINGAPORE/INFTT-16-0610/2016 ANTIGEN (MDCK CELL DERIVED, PROPIOLACTONE INACTIVATED), INFLUENZA B VIRUS B/IOWA/06/2017 ANTIGEN (MDCK CELL DERIVED, PROPIOLACTONE INACTIVATED) 15; 15; 15; 15 UG/.5ML; UG/.5ML; UG/.5ML; UG/.5ML
0.5 INJECTION, SUSPENSION INTRAMUSCULAR ONCE
Refills: 0 | Status: DISCONTINUED | OUTPATIENT
Start: 2025-02-15 | End: 2025-03-06

## 2025-02-15 RX ORDER — ACETAMINOPHEN 500 MG/5ML
1000 LIQUID (ML) ORAL ONCE
Refills: 0 | Status: DISCONTINUED | OUTPATIENT
Start: 2025-02-15 | End: 2025-02-15

## 2025-02-15 RX ORDER — ACETAMINOPHEN 500 MG/5ML
1000 LIQUID (ML) ORAL ONCE
Refills: 0 | Status: COMPLETED | OUTPATIENT
Start: 2025-02-15 | End: 2025-02-15

## 2025-02-15 RX ADMIN — APIXABAN 5 MILLIGRAM(S): 2.5 TABLET, FILM COATED ORAL at 11:31

## 2025-02-15 RX ADMIN — METOPROLOL SUCCINATE 50 MILLIGRAM(S): 50 TABLET, EXTENDED RELEASE ORAL at 05:27

## 2025-02-15 RX ADMIN — Medication 650 MILLIGRAM(S): at 17:33

## 2025-02-15 RX ADMIN — TRAMADOL HYDROCHLORIDE 25 MILLIGRAM(S): 50 TABLET, FILM COATED ORAL at 22:01

## 2025-02-15 RX ADMIN — Medication 650 MILLIGRAM(S): at 05:27

## 2025-02-15 RX ADMIN — SEVELAMER HYDROCHLORIDE 800 MILLIGRAM(S): 800 TABLET ORAL at 08:36

## 2025-02-15 RX ADMIN — BUMETANIDE 132 MILLIGRAM(S): 1 TABLET ORAL at 17:04

## 2025-02-15 RX ADMIN — Medication 3 MILLIGRAM(S): at 22:40

## 2025-02-15 RX ADMIN — Medication 650 MILLIGRAM(S): at 11:31

## 2025-02-15 RX ADMIN — Medication 650 MILLIGRAM(S): at 12:54

## 2025-02-15 RX ADMIN — TRAMADOL HYDROCHLORIDE 25 MILLIGRAM(S): 50 TABLET, FILM COATED ORAL at 23:00

## 2025-02-15 RX ADMIN — Medication 650 MILLIGRAM(S): at 17:08

## 2025-02-15 RX ADMIN — Medication 400 MILLIGRAM(S): at 18:33

## 2025-02-15 RX ADMIN — Medication 650 MILLIGRAM(S): at 01:45

## 2025-02-15 RX ADMIN — BUMETANIDE 132 MILLIGRAM(S): 1 TABLET ORAL at 05:28

## 2025-02-15 RX ADMIN — Medication 650 MILLIGRAM(S): at 16:42

## 2025-02-15 RX ADMIN — SEVELAMER HYDROCHLORIDE 800 MILLIGRAM(S): 800 TABLET ORAL at 16:43

## 2025-02-15 RX ADMIN — APIXABAN 5 MILLIGRAM(S): 2.5 TABLET, FILM COATED ORAL at 22:01

## 2025-02-15 RX ADMIN — SEVELAMER HYDROCHLORIDE 800 MILLIGRAM(S): 800 TABLET ORAL at 13:46

## 2025-02-15 RX ADMIN — METOPROLOL SUCCINATE 50 MILLIGRAM(S): 50 TABLET, EXTENDED RELEASE ORAL at 17:07

## 2025-02-15 NOTE — PROGRESS NOTE ADULT - PROBLEM SELECTOR PLAN 3
TTE in 11/2024 showed progression to moderate to severe MR.  Left ventricular systolic function is normal with an ejection fraction of 55 % by Garcia's method of disks. Appears hypervolemic on exam.   -Bumex as above   -Hold home torsemide   -Repeat TTE ordered  -Structural cardiology consulted, recommend aggressive diuresis. Once euvolemic and on GDMT, right heart cath to assess volume status and rEF and repeat TTE. If moderate to severe mitral regurg,  recc.   -General cardiology following

## 2025-02-15 NOTE — PATIENT PROFILE ADULT - FALL HARM RISK - FACTORS
Pt's HCPOA paperwork has been received and placed in the chart. Pt's POA is Meredith Meier, son, and alternative POA is Ge Whalen, daughter. Weakness

## 2025-02-15 NOTE — PROGRESS NOTE ADULT - SUBJECTIVE AND OBJECTIVE BOX
***************************************************************  Jane Gold, PGY 2  Internal Medicine   ***************************************************************    ADRIENNE PARSONS  74y  MRN: [id number]    Patient is a 74y old  Female who presents with a chief complaint of AF (15 Feb 2025 06:37)      Subjective: no events ON. Denies fever, CP, SOB, abn pain, N/V, dysuria. Tolerating diet.      MEDICATIONS  (STANDING):  apixaban 5 milliGRAM(s) Oral two times a day  buMETAnide IVPB 4 milliGRAM(s) IV Intermittent every 12 hours  influenza  Vaccine (HIGH DOSE) 0.5 milliLiter(s) IntraMuscular once  metoprolol succinate ER 50 milliGRAM(s) Oral two times a day  sevelamer carbonate 800 milliGRAM(s) Oral three times a day with meals  sodium bicarbonate 650 milliGRAM(s) Oral four times a day    MEDICATIONS  (PRN):  acetaminophen     Tablet .. 650 milliGRAM(s) Oral every 6 hours PRN Temp greater or equal to 38C (100.4F), Mild Pain (1 - 3)  melatonin 3 milliGRAM(s) Oral at bedtime PRN Insomnia      Objective:    Vitals: Vital Signs Last 24 Hrs  T(C): 36.3 (02-15-25 @ 05:22), Max: 36.4 (02-14-25 @ 11:14)  T(F): 97.3 (02-15-25 @ 05:22), Max: 97.6 (02-14-25 @ 11:14)  HR: 91 (02-15-25 @ 05:22) (86 - 103)  BP: 130/98 (02-15-25 @ 05:22) (130/91 - 138/89)  BP(mean): --  RR: 18 (02-15-25 @ 05:22) (18 - 18)  SpO2: 97% (02-15-25 @ 05:22) (95% - 97%)            I&O's Summary    14 Feb 2025 07:01  -  15 Feb 2025 07:00  --------------------------------------------------------  IN: 600 mL / OUT: 0 mL / NET: 600 mL        PHYSICAL EXAM:  GENERAL: NAD  HEAD:  Atraumatic, Normocephalic  EYES: EOMI, conjunctiva and sclera clear  CHEST/LUNG: Clear to auscultation bilaterally; No rales, rhonchi, wheezing, or rubs  HEART: Regular rate and rhythm; No murmurs, rubs, or gallops  ABDOMEN: Soft, Nontender, Nondistended;   SKIN: No rashes or lesions  NERVOUS SYSTEM:  Alert & Oriented X3, no focal deficits    LABS:  02-15    143  |  99  |  100[H]  ----------------------------<  101[H]  3.5   |  21[L]  |  8.35[H]  02-14    139  |  93[L]  |  100[H]  ----------------------------<  101[H]  3.7   |  22  |  8.71[H]  02-13    140  |  96  |  101[H]  ----------------------------<  126[H]  3.7   |  16[L]  |  8.47[H]    Ca    7.4[L]      15 Feb 2025 06:04  Ca    7.5[L]      14 Feb 2025 07:21  Ca    7.6[L]      13 Feb 2025 07:28  Phos  6.7     02-15  Mg     2.3     02-15    TPro  6.2  /  Alb  3.8  /  TBili  0.2  /  DBili  x   /  AST  7[L]  /  ALT  7[L]  /  AlkPhos  74  02-13      PT/INR - ( 13 Feb 2025 07:27 )   PT: 17.2 sec;   INR: 1.50 ratio         PTT - ( 13 Feb 2025 07:27 )  PTT:30.9 sec              Urinalysis Basic - ( 15 Feb 2025 06:04 )    Color: x / Appearance: x / SG: x / pH: x  Gluc: 101 mg/dL / Ketone: x  / Bili: x / Urobili: x   Blood: x / Protein: x / Nitrite: x   Leuk Esterase: x / RBC: x / WBC x   Sq Epi: x / Non Sq Epi: x / Bacteria: x                              9.5    4.88  )-----------( 135      ( 15 Feb 2025 06:04 )             30.3                         10.0   4.99  )-----------( 144      ( 14 Feb 2025 07:26 )             32.4                         10.0   5.65  )-----------( 161      ( 13 Feb 2025 07:27 )             32.4     CAPILLARY BLOOD GLUCOSE          RADIOLOGY & ADDITIONAL TESTS:    Imaging Personally Reviewed:  [x ] YES  [ ] NO    Consultants involved in case:   Consultant(s) Notes Reviewed:  [ x] YES  [ ] NO:   Care Discussed with Consultants/Other Providers [x ] YES  [ ] NO         ***************************************************************  Jane Gold, PGY 2  Internal Medicine   ***************************************************************    ADRIENNE PARSONS  74y  MRN: [id number]    Patient is a 74y old  Female who presents with a chief complaint of AF (15 Feb 2025 06:37)      Subjective: no events ON. Seen at bedside, comfortable appearing. Denies fever, CP, SOB, abn pain, N/V, dysuria however limited historian.     MEDICATIONS  (STANDING):  apixaban 5 milliGRAM(s) Oral two times a day  buMETAnide IVPB 4 milliGRAM(s) IV Intermittent every 12 hours  influenza  Vaccine (HIGH DOSE) 0.5 milliLiter(s) IntraMuscular once  metoprolol succinate ER 50 milliGRAM(s) Oral two times a day  sevelamer carbonate 800 milliGRAM(s) Oral three times a day with meals  sodium bicarbonate 650 milliGRAM(s) Oral four times a day    MEDICATIONS  (PRN):  acetaminophen     Tablet .. 650 milliGRAM(s) Oral every 6 hours PRN Temp greater or equal to 38C (100.4F), Mild Pain (1 - 3)  melatonin 3 milliGRAM(s) Oral at bedtime PRN Insomnia      Objective:    Vitals: Vital Signs Last 24 Hrs  T(C): 36.3 (02-15-25 @ 05:22), Max: 36.4 (02-14-25 @ 11:14)  T(F): 97.3 (02-15-25 @ 05:22), Max: 97.6 (02-14-25 @ 11:14)  HR: 91 (02-15-25 @ 05:22) (86 - 103)  BP: 130/98 (02-15-25 @ 05:22) (130/91 - 138/89)  BP(mean): --  RR: 18 (02-15-25 @ 05:22) (18 - 18)  SpO2: 97% (02-15-25 @ 05:22) (95% - 97%)            I&O's Summary    14 Feb 2025 07:01  -  15 Feb 2025 07:00  --------------------------------------------------------  IN: 600 mL / OUT: 0 mL / NET: 600 mL        PHYSICAL EXAM:  GENERAL: NAD  HEAD:  Atraumatic, Normocephalic  EYES: EOMI, conjunctiva and sclera clear  CHEST/LUNG: Clear to auscultation bilaterally; No rales, rhonchi, wheezing, or rubs  HEART: Irregularly irregular, No murmurs, rubs, or gallops  ABDOMEN: Soft, Nontender, Nondistended;   SKIN: No rashes or lesions  NERVOUS SYSTEM:  Alert & Oriented X3, no focal deficits    LABS:  02-15    143  |  99  |  100[H]  ----------------------------<  101[H]  3.5   |  21[L]  |  8.35[H]  02-14    139  |  93[L]  |  100[H]  ----------------------------<  101[H]  3.7   |  22  |  8.71[H]  02-13    140  |  96  |  101[H]  ----------------------------<  126[H]  3.7   |  16[L]  |  8.47[H]    Ca    7.4[L]      15 Feb 2025 06:04  Ca    7.5[L]      14 Feb 2025 07:21  Ca    7.6[L]      13 Feb 2025 07:28  Phos  6.7     02-15  Mg     2.3     02-15    TPro  6.2  /  Alb  3.8  /  TBili  0.2  /  DBili  x   /  AST  7[L]  /  ALT  7[L]  /  AlkPhos  74  02-13      PT/INR - ( 13 Feb 2025 07:27 )   PT: 17.2 sec;   INR: 1.50 ratio         PTT - ( 13 Feb 2025 07:27 )  PTT:30.9 sec              Urinalysis Basic - ( 15 Feb 2025 06:04 )    Color: x / Appearance: x / SG: x / pH: x  Gluc: 101 mg/dL / Ketone: x  / Bili: x / Urobili: x   Blood: x / Protein: x / Nitrite: x   Leuk Esterase: x / RBC: x / WBC x   Sq Epi: x / Non Sq Epi: x / Bacteria: x                              9.5    4.88  )-----------( 135      ( 15 Feb 2025 06:04 )             30.3                         10.0   4.99  )-----------( 144      ( 14 Feb 2025 07:26 )             32.4                         10.0   5.65  )-----------( 161      ( 13 Feb 2025 07:27 )             32.4     CAPILLARY BLOOD GLUCOSE          RADIOLOGY & ADDITIONAL TESTS:

## 2025-02-15 NOTE — PROGRESS NOTE ADULT - ATTENDING COMMENTS
Patient seen and examined with fellow, agree with A/P as above.  Permcath insertion for initiation of HD scheduled for next week.  Patient counseled that if she demonstrates reasonable degree of renal recovery before that time we can readdress, but given lonstanding h/o progressive CKD, patient is now likely ESRD.

## 2025-02-15 NOTE — PROGRESS NOTE ADULT - SUBJECTIVE AND OBJECTIVE BOX
[facility] DIVISION OF KIDNEY DISEASES AND HYPERTENSION   FOLLOW UP NOTE    --------------------------------------------------------------------------------  Chief Complaint:    24 hour events/subjective: Pt. was seen and examined today.  Feels ok. Denies any shortness of breath, chest pain, nausea, vomiting, abdominal pain, diarrhea or urinary changes.        PAST HISTORY  --------------------------------------------------------------------------------  No significant changes to PMH, PSH, FHx, SHx, unless otherwise noted    ALLERGIES & MEDICATIONS  --------------------------------------------------------------------------------  Allergies    No Known Allergies    Intolerances      Standing Inpatient Medications  apixaban 5 milliGRAM(s) Oral two times a day  buMETAnide IVPB 4 milliGRAM(s) IV Intermittent every 12 hours  influenza  Vaccine (HIGH DOSE) 0.5 milliLiter(s) IntraMuscular once  metoprolol succinate ER 50 milliGRAM(s) Oral two times a day  sevelamer carbonate 800 milliGRAM(s) Oral three times a day with meals  sodium bicarbonate 650 milliGRAM(s) Oral four times a day    PRN Inpatient Medications  acetaminophen     Tablet .. 650 milliGRAM(s) Oral every 6 hours PRN  melatonin 3 milliGRAM(s) Oral at bedtime PRN      REVIEW OF SYSTEMS  --------------------------------------------------------------------------------    All other systems were reviewed and are negative, except as noted.    VITALS/PHYSICAL EXAM  --------------------------------------------------------------------------------  T(C): 36.7 (02-15-25 @ 11:14), Max: 36.7 (02-15-25 @ 11:14)  HR: 98 (02-15-25 @ 11:14) (86 - 98)  BP: 121/77 (02-15-25 @ 11:14) (121/77 - 138/89)  RR: 18 (02-15-25 @ 11:14) (18 - 18)  SpO2: 98% (02-15-25 @ 11:14) (95% - 98%)  Wt(kg): --        02-14-25 @ 07:01  -  02-15-25 @ 07:00  --------------------------------------------------------  IN: 600 mL / OUT: 0 mL / NET: 600 mL    02-15-25 @ 07:01  -  02-15-25 @ 13:28  --------------------------------------------------------  IN: 480 mL / OUT: 0 mL / NET: 480 mL        Physical Exam:  	Gen: NAD  	HEENT: Anicteric  	Pulm: CTA B/L  	CV: S1S2+  	Abd: Soft, +BS   	Ext: No LE edema B/L  	Neuro: Awake, no asterixis   	Skin: Warm and dry      LABS/STUDIES  --------------------------------------------------------------------------------              9.5    4.88  >-----------<  135      [02-15-25 @ 06:04]              30.3     143  |  99  |  100  ----------------------------<  101      [02-15-25 @ 06:04]  3.5   |  21  |  8.35        Ca     7.4     [02-15-25 @ 06:04]      Mg     2.3     [02-15-25 @ 06:04]      Phos  6.7     [02-15-25 @ 06:04]            Creatinine Trend:  SCr 8.35 [02-15 @ 06:04]  SCr 8.71 [02-14 @ 07:21]  SCr 8.47 [02-13 @ 07:28]  SCr 8.42 [02-12 @ 13:13]    Urinalysis - [02-15-25 @ 06:04]      Color  / Appearance  / SG  / pH       Gluc 101 / Ketone   / Bili  / Urobili        Blood  / Protein  / Leuk Est  / Nitrite       RBC  / WBC  / Hyaline  / Gran  / Sq Epi  / Non Sq Epi  / Bacteria     Urine Creatinine 46      [02-13-25 @ 15:43]  Urine Protein 227      [02-13-25 @ 15:43]  Urine Sodium 81      [02-13-25 @ 15:43]  Urine Urea Nitrogen 334      [02-13-25 @ 15:43]  Urine Potassium 18      [02-13-25 @ 15:43]  Urine Osmolality 319      [02-13-25 @ 15:43]        Tacrolimus  Cyclosporine  Sirolimus  Mycophenolate  BK PCR  CMV PCR  Parvo PCR  EBV PCR

## 2025-02-15 NOTE — PROGRESS NOTE ADULT - ASSESSMENT
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       #Acute kidney injury superimposed on CKD.    Found to have worsening renal function on admission so procedure cancelled. Baseline 4.6 in Aug 2024 and 5.5 in 1/25. Cr on admission 2/12 was 8.42.   - Cr continues to rise to 8.71   - On Bumex 4mg IV BID for diurese but Is and Os document 0ml of urine  - RAP elevated to 15 on TTE from 2/13 and she appears significantly volume up on exam  -Plan for Permacath on Wednesday    #  Paroxysmal atrial fibrillation.   ·  Plan: Admitted for cardioversion but unable to do because given significant volume overload  -C/w home eliquis 5 mg BID   -C/w home metoprolol 50 mg BID, would increase dose to 100mg in the AM, 50mg in the PM for rate control  - Off Amiodarone    # Chronic heart failure with preserved ejection fraction.   ·TTE in 11/2024 showed progression to moderate to severe MR.  Left ventricular systolic function is normal with an ejection fraction of 55 % by Garcia's method of disks.  -TTE now with LVEF of 41%, global hypokinesis, severe MR, RAP of 15.   -Structural Heart evaluation noted. Will aggressively diurese and repeat TTE when euvolemic  Will be very difficult to diurese to euvolemia without HD but will continue with high dose Bumex for now  Eventual GDMT  No recent ischemic eval noted. Will need to reassess LVEF once in NSR

## 2025-02-15 NOTE — PROGRESS NOTE ADULT - PROBLEM SELECTOR PLAN 2
Admitted for cardioversion but unable to do because NICO on CKD.  -C/w home eliquis 5 mg BID   -C/w home metoprolol 50 mg BID   -EP following, recommend d/c amiodarone given plan for tunnel cath and worsening kidney function

## 2025-02-15 NOTE — PROGRESS NOTE ADULT - PROBLEM SELECTOR PLAN 1
Hx/o CKD iso b/l staghorn calculus s/p removal (2009) (follows with Dr. Elizondo). Found to have worsening renal function on admission so procedure cancelled. Baseline 4.6 in Aug 2024 and 5.5 in 1/25. Cr on admission 2/12 was 8.42.   - Strict I/Os   - Trend BMPs, monitor renal function, renally dose meds, avoid nephrotoxins   - Nephro consulted, recommend bumex 4 mg IV BID and patient to make decision on dialysis Hx/o CKD iso b/l staghorn calculus s/p removal (2009) (follows with Dr. Elizondo). Found to have worsening renal function on admission so procedure cancelled. Baseline 4.6 in Aug 2024 and 5.5 in 1/25. Cr on admission 2/12 was 8.42.   - Strict I/Os, daily weights  - Trend BMPs, monitor renal function, renally dose meds, avoid nephrotoxins   - Nephro consulted, recommend bumex 4 mg IV BID and patient to make decision on dialysis

## 2025-02-15 NOTE — PATIENT PROFILE ADULT - FALL HARM RISK - HARM RISK INTERVENTIONS

## 2025-02-15 NOTE — PROGRESS NOTE ADULT - ATTENDING COMMENTS
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    Plan:   #NICO on CKD: likely cardiorenal, on Bumex 4mg IVPB BID, eventual HD likely will need tunnel catheter done by IR on Wednesday; patient is poor historian, however  agreeable   #HFrEF: EF 41% (decreased from 70%) likely ischemic, cardiac cath eventually as well once NICO improves/euvolemia; strict I/O recommend   #pAF s/p DCCV, was scheduled for another DCCV scheduled by outpatient cardiologist, but currently on hold due to NICO   #Met Acidosis: like CHF and uremia, Bicarb     *Clarify Goals of care by primary team     Code: full code   Activity: as tolerated  VTE PPx: Eliquis

## 2025-02-15 NOTE — PROGRESS NOTE ADULT - ASSESSMENT
74-year-old female with PMHx of HTN, CKD stage 5 (pt. of Dr. Elizondo), Afib on eliquis, anemia, nephrolithiasis, and recently diagnosed HF who presents for scheduled Afib ablation for Afib found to have NICO.    Nephrology consulted for NICO on CKD V.      Acute kidney injury superimposed on CKD-5 iso possibly over-diuresis (increased from lasix 80mg BID to torsemide 100mg BID 1/31/24)  -Pt. with hx of advanced CKD is in the setting of chronic history of nephrolithiasis/staghorn calculus. On review of labs on [place], last Scr was elevated at 6.18 on 1/30/25. Admission SCr is elevated at 8.42 without electrolyte abnormalities. Prior renal US 1/8/25 without hydronephrosis but calcifications, stones and cysts bilaterally.   USG - Kidney and bladder showed Bilateral renal calculi, no Hydronephrosis, B/L renal parenchymal disease.   UPCR 4.9  RECS:  -No urgency to initiate HD. C/w Bumex 4mg IV BID - Monitor strict I/O, daily standing wts.   -Avoid ACE/ARB, NSAIDs, contrast  -Pt was explained in detail about the need of HD. She will need tunnelled HD cath for now and then AVF.Get IR consult for HD cath placement for next week       Anemia iso CKD  -Hb 9.5  -Obtain ferritin and iron panel         MBD:  -  -Obtain Vitamin D level (25-OH cholecalciferol.)  C/w -  sodium bicarbonate 650mg QID.   c/w sevelember 800mg TID with meals   -Monitor phos daily         Scout Erazo  Nephrology Fellow  Feel free to contact me on TEAMS  After 5 pm and on weekends please contact the on-call Fellow.   74-year-old female with PMHx of HTN, CKD stage 5 (pt. of Dr. Elizondo), Afib on eliquis, anemia, nephrolithiasis, and recently diagnosed HF who presents for scheduled Afib ablation for Afib found to have NICO.    Nephrology consulted for NICO on CKD V.      Acute kidney injury superimposed on CKD-5 iso possibly over-diuresis (increased from lasix 80mg BID to torsemide 100mg BID 1/31/24)  -Pt. with hx of advanced CKD is in the setting of chronic history of nephrolithiasis/staghorn calculus. On review of labs on [place], last Scr was elevated at 6.18 on 1/30/25. Admission SCr is elevated at 8.42 without electrolyte abnormalities. Prior renal US 1/8/25 without hydronephrosis but calcifications, stones and cysts bilaterally.   USG - Kidney and bladder showed Bilateral renal calculi, no Hydronephrosis, B/L renal parenchymal disease.   UPCR 4.9  RECS:  -No urgency to initiate HD. C/w Bumex 4mg IV BID - Monitor strict I/O, daily standing wts.   -Avoid ACE/ARB, NSAIDs, contrast  -Pt was explained in detail about the need of HD. She will need tunnelled HD cath for now and then AVF. IR consulted for HD cath placement for next week       Anemia iso CKD  -Hb 9.5  -Obtain ferritin and iron panel         MBD:  -  -Obtain Vitamin D level (25-OH cholecalciferol.)  C/w -  sodium bicarbonate 650mg QID.   c/w sevelember 800mg TID with meals   -Monitor phos daily         Scout Erazo  Nephrology Fellow  Feel free to contact me on TEAMS  After 5 pm and on weekends please contact the on-call Fellow.

## 2025-02-15 NOTE — PROGRESS NOTE ADULT - SUBJECTIVE AND OBJECTIVE BOX
MR#[id number]  PATIENT NAME:ELIZABETH PARSONS    DATE OF SERVICE: 02-15-25 @ 06:38  Patient was seen and examined by Aki Carias MD on    02-15-25 @ 06:38 .  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    Of Note:  Confirmed with  who is responsible for pt's care administers her medications consistently.  Her last dose of Eliquis was this am. Given at 10am.      TTE 4/2024  CONCLUSIONS:      1. Left ventricular wall thickness is normal. Left ventricular systolic function is normal with an ejection fraction of 76 % by 3D. There are no regional wall motion abnormalities seen.   2. No evidence of left atrial or left atrial appendage thrombus. Ultrasound enhancing agent was utilized to visualize the left atrial appendage.   3. The left atrium is severely dilated.   4. Moderate mitral regurgitation at a blood pressure of 132/71 mmHg. The mitral regurgitant jet is centrally directed. Mechanism of mitral regurgitation: Apryl Type I (normal leaflet motion with dilated annulus). PISA : later pisa radius 0.4, medial 0.37, Alisaing velocity 38.5cm/sec MR VTI 136cm MR Vmax 548 cm/s. The ERO 0.3sqcm and Reg. Vol 38cc. 3D vena contracta area was 0.35sqcm.   5. No pericardial effusion seen.   6. Normal right ventricular cavity size, with normal wall thickness, and normal systolic function.     (12 Feb 2025 12:50)      INTERIM EVENTS:Patient seen at bedside ,interim events noted.  Patient resting comfortably in bed in NAD.  Laying flat with no respiratory distress.  No complaints of chest pain, dyspnea, palpitations, PND, or orthopnea.  02/15-Remains in AF scheduled for Permacath on Wednesday      PMH -reviewed admission note, no change since admission  HEART FAILURE: Acute[ ]Chronic[x ] Systolic[ ] Diastolic[x ] Combined Systolic and Diastolic[ ]  CAD[ ] CABG[ ] PCI[ ]  DEVICES[ ] PPM[ ] ICD[ ] ILR[ ]  ATRIAL FIBRILLATION[ x] Paroxysmal[ ] Permanent[ ] CHADS2-[ 4 ]  NICO[ ] CKD1[ ] CKD2[ ] CKD3[ ] CKD4[ ] ESRD[x ]  COPD[ ] HTN[x ]   DM[ ] Type1[ ] Type 2[ ]   CVA[ ] Paresis[ ]    AMBULATION: Assisted[ ] Cane/walker[ ] Independent[ x]    MEDICATIONS  (STANDING):  apixaban 5 milliGRAM(s) Oral two times a day  buMETAnide IVPB 4 milliGRAM(s) IV Intermittent every 12 hours  influenza  Vaccine (HIGH DOSE) 0.5 milliLiter(s) IntraMuscular once  metoprolol succinate ER 50 milliGRAM(s) Oral two times a day  sevelamer carbonate 800 milliGRAM(s) Oral three times a day with meals  sodium bicarbonate 650 milliGRAM(s) Oral four times a day    MEDICATIONS  (PRN):  acetaminophen     Tablet .. 650 milliGRAM(s) Oral every 6 hours PRN Temp greater or equal to 38C (100.4F), Mild Pain (1 - 3)  melatonin 3 milliGRAM(s) Oral at bedtime PRN Insomnia            REVIEW OF SYSTEMS:  Constitutional: [ ] fever, [ ]weight loss,  [x ]fatigue [ ]weight gain  Eyes: [ ] visual changes  Respiratory: [ ]shortness of breath;  [ ] cough, [ ]wheezing, [ ]chills, [ ]hemoptysis  Cardiovascular: [ ] chest pain, [ ]palpitations, [ ]dizziness,  [ ]leg swelling[ ]orthopnea[ ]PND  Gastrointestinal: [ ] abdominal pain, [ ]nausea, [ ]vomiting,  [ ]diarrhea [ ]Constipation [ ]Melena  Genitourinary: [ ] dysuria, [ ] hematuria [ ]Zelaya  Neurologic: [ ] headaches [ ] tremors[ ]weakness [ ]Paralysis Right[ ] Left[ ]  Skin: [ ] itching, [ ]burning, [ ] rashes  Endocrine: [ ] heat or cold intolerance  Musculoskeletal: [ ] joint pain or swelling; [ ] muscle, back, or extremity pain  Psychiatric: [ ] depression, [ ]anxiety, [ ]mood swings, or [ ]difficulty sleeping  Hematologic: [ ] easy bruising, [ ] bleeding gums    [ ] All remaining systems negative except as per above.   [ ]Unable to obtain.  [x] No change in ROS since admission      Vital Signs Last 24 Hrs  T(C): 36.2 (14 Feb 2025 20:12), Max: 36.4 (14 Feb 2025 11:14)  T(F): 97.2 (14 Feb 2025 20:12), Max: 97.6 (14 Feb 2025 11:14)  HR: 91 (14 Feb 2025 20:12) (86 - 103)  BP: 130/91 (14 Feb 2025 20:12) (130/91 - 138/89)  RR: 18 (14 Feb 2025 20:12) (18 - 18)  SpO2: 95% (14 Feb 2025 20:12) (95% - 97%)    Parameters below as of 14 Feb 2025 20:12  Patient On (Oxygen Delivery Method): room air      I&O's Summary    13 Feb 2025 07:01  -  14 Feb 2025 07:00  --------------------------------------------------------  IN: 770 mL / OUT: 0 mL / NET: 770 mL    14 Feb 2025 07:01  -  15 Feb 2025 06:38  --------------------------------------------------------  IN: 600 mL / OUT: 0 mL / NET: 600 mL        PHYSICAL EXAM:  General: No acute distress BMI-21  HEENT: EOMI, PERRL  Neck: Supple, [ ] JVD  Lungs: Equal air entry bilaterally; [ ] rales [ ] wheezing [ ] rhonchi  Heart: Irregular rate and rhythm; [x ] murmur   2/6 [ x] systolic [ ] diastolic [ ] radiation[ ] rubs [ ]  gallops  Abdomen: Nontender, bowel sounds present  Extremities: No clubbing, cyanosis, [ ] edema [ ]Pulses  equal and intact  Nervous system:  Alert & Oriented X3, no focal deficits  Psychiatric: Normal affect  Skin: No rashes or lesions    LABS:  02-14    139  |  93[L]  |  100[H]  ----------------------------<  101[H]  3.7   |  22  |  8.71[H]    Ca    7.5[L]      14 Feb 2025 07:21  Phos  7.1     02-14  Mg     2.4     02-14    TPro  6.2  /  Alb  3.8  /  TBili  0.2  /  DBili  x   /  AST  7[L]  /  ALT  7[L]  /  AlkPhos  74  02-13    Creatinine Trend: 8.71<--, 8.47<--, 8.42<--                        9.5    4.88  )-----------( 135      ( 15 Feb 2025 06:04 )             30.3     PT/INR - ( 13 Feb 2025 07:27 )   PT: 17.2 sec;   INR: 1.50 ratio         PTT - ( 13 Feb 2025 07:27 )  PTT:30.9 sec    TTE 4/2024  CONCLUSIONS:   1. Left ventricular wall thickness is normal. Left ventricular systolic function is normal with an ejection fraction of 76 % by 3D. There are no regional wall motion abnormalities seen.   2. No evidence of left atrial or left atrial appendage thrombus. Ultrasound enhancing agent was utilized to visualize the left atrial appendage.   3. The left atrium is severely dilated.   4. Moderate mitral regurgitation at a blood pressure of 132/71 mmHg. The mitral regurgitant jet is centrally directed. Mechanism of mitral regurgitation: Apryl Type I (normal leaflet motion with dilated annulus). PISA : later pisa radius 0.4, medial 0.37, Alisaing velocity 38.5cm/sec MR VTI 136cm MR Vmax 548 cm/s. The ERO 0.3sqcm and Reg. Vol 38cc. 3D vena contracta area was 0.35sqcm.   5. No pericardial effusion seen.   6. Normal right ventricular cavity size, with normal wall thickness, and normal systolic function.      TTE 2/13/25:  CONCLUSIONS:   1. Left ventricular cavity is mildly dilated. Left ventricular systolic function is moderately decreased with an ejection fraction of 41 % by Garcia'smethod of disks. Global left ventricular hypokinesis.   2. Multiple segmental abnormalities exist. See findings.   3. Left atrium is severely dilated.   4. Severe mitral regurgitation. The mitral regurgitant jet is posteriorly directed.   5. No pericardial effusion seen.   6. Mild to moderate tricuspid regurgitation.   7. Compared to the transthoracic echocardiogram performed on 11/27/2024, LVEF has decreased, MR is increased,.   8. Estimated pulmonary artery systolic pressure is 46 mmHg, consistent with mild to moderate pulmonary hypertension.   9. Symmetric mitral valve leaflet tethering.  10. The inferior vena cava is dilated measuring 2.40 cm in diameter, (dilated >2.1cm) with abnormal inspiratory collapse (abnormal <50%) consistent with elevated right atrial pressure (~15, range 10-20mmHg).  11. There is normal LV mass and normal geometry.

## 2025-02-16 LAB
ANION GAP SERPL CALC-SCNC: 22 MMOL/L — HIGH (ref 5–17)
BUN SERPL-MCNC: 102 MG/DL — HIGH (ref 7–23)
CALCIUM SERPL-MCNC: 6.9 MG/DL — LOW (ref 8.4–10.5)
CHLORIDE SERPL-SCNC: 97 MMOL/L — SIGNIFICANT CHANGE UP (ref 96–108)
CO2 SERPL-SCNC: 23 MMOL/L — SIGNIFICANT CHANGE UP (ref 22–31)
CREAT SERPL-MCNC: 8.22 MG/DL — HIGH (ref 0.5–1.3)
EGFR: 5 ML/MIN/1.73M2 — LOW
EGFR: 5 ML/MIN/1.73M2 — LOW
GLUCOSE SERPL-MCNC: 97 MG/DL — SIGNIFICANT CHANGE UP (ref 70–99)
HCT VFR BLD CALC: 29.2 % — LOW (ref 34.5–45)
HGB BLD-MCNC: 8.9 G/DL — LOW (ref 11.5–15.5)
MAGNESIUM SERPL-MCNC: 2.5 MG/DL — SIGNIFICANT CHANGE UP (ref 1.6–2.6)
MCHC RBC-ENTMCNC: 30.2 PG — SIGNIFICANT CHANGE UP (ref 27–34)
MCHC RBC-ENTMCNC: 30.5 G/DL — LOW (ref 32–36)
MCV RBC AUTO: 99 FL — SIGNIFICANT CHANGE UP (ref 80–100)
NRBC BLD AUTO-RTO: 0 /100 WBCS — SIGNIFICANT CHANGE UP (ref 0–0)
PHOSPHATE SERPL-MCNC: 6.6 MG/DL — HIGH (ref 2.5–4.5)
PLATELET # BLD AUTO: 130 K/UL — LOW (ref 150–400)
POTASSIUM SERPL-MCNC: 4 MMOL/L — SIGNIFICANT CHANGE UP (ref 3.5–5.3)
POTASSIUM SERPL-SCNC: 4 MMOL/L — SIGNIFICANT CHANGE UP (ref 3.5–5.3)
RBC # BLD: 2.95 M/UL — LOW (ref 3.8–5.2)
RBC # FLD: 17.7 % — HIGH (ref 10.3–14.5)
SODIUM SERPL-SCNC: 142 MMOL/L — SIGNIFICANT CHANGE UP (ref 135–145)
WBC # BLD: 4.6 K/UL — SIGNIFICANT CHANGE UP (ref 3.8–10.5)
WBC # FLD AUTO: 4.6 K/UL — SIGNIFICANT CHANGE UP (ref 3.8–10.5)

## 2025-02-16 PROCEDURE — 99233 SBSQ HOSP IP/OBS HIGH 50: CPT

## 2025-02-16 PROCEDURE — 99232 SBSQ HOSP IP/OBS MODERATE 35: CPT | Mod: GC

## 2025-02-16 RX ADMIN — BUMETANIDE 132 MILLIGRAM(S): 1 TABLET ORAL at 17:32

## 2025-02-16 RX ADMIN — Medication 650 MILLIGRAM(S): at 11:24

## 2025-02-16 RX ADMIN — METOPROLOL SUCCINATE 50 MILLIGRAM(S): 50 TABLET, EXTENDED RELEASE ORAL at 05:04

## 2025-02-16 RX ADMIN — SEVELAMER HYDROCHLORIDE 800 MILLIGRAM(S): 800 TABLET ORAL at 08:20

## 2025-02-16 RX ADMIN — APIXABAN 5 MILLIGRAM(S): 2.5 TABLET, FILM COATED ORAL at 21:02

## 2025-02-16 RX ADMIN — SEVELAMER HYDROCHLORIDE 800 MILLIGRAM(S): 800 TABLET ORAL at 11:33

## 2025-02-16 RX ADMIN — BUMETANIDE 132 MILLIGRAM(S): 1 TABLET ORAL at 05:04

## 2025-02-16 RX ADMIN — Medication 650 MILLIGRAM(S): at 05:04

## 2025-02-16 RX ADMIN — APIXABAN 5 MILLIGRAM(S): 2.5 TABLET, FILM COATED ORAL at 11:24

## 2025-02-16 RX ADMIN — METOPROLOL SUCCINATE 50 MILLIGRAM(S): 50 TABLET, EXTENDED RELEASE ORAL at 17:33

## 2025-02-16 RX ADMIN — SEVELAMER HYDROCHLORIDE 800 MILLIGRAM(S): 800 TABLET ORAL at 17:35

## 2025-02-16 RX ADMIN — Medication 650 MILLIGRAM(S): at 00:10

## 2025-02-16 RX ADMIN — Medication 650 MILLIGRAM(S): at 17:33

## 2025-02-16 NOTE — PROGRESS NOTE ADULT - ATTENDING COMMENTS
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    Plan:   #NICO on CKD: likely cardiorenal, on Bumex 4mg IVPB BID, eventual HD likely will need tunnel catheter done by IR on Wednesday; patient is poor historian, however  agreeable   #HFrEF: EF 41% (decreased from 70%) likely ischemic, cardiac cath eventually as well once NICO improves/euvolemia; strict I/O recommend   #pAF s/p DCCV, was scheduled for another DCCV scheduled by outpatient cardiologist, but currently on hold due to NICO   #Met Acidosis: like CHF and uremia, Bicarb     Rest of management as per resident.     Code: full code   Activity: as tolerated  VTE PPx: Eliquis .

## 2025-02-16 NOTE — PROGRESS NOTE ADULT - SUBJECTIVE AND OBJECTIVE BOX
GASPERMARIAMADRIENNE HOPKINS  74y  Female    Complaints:  Subjective:     No acute o/n events. Patient this morning is resting comfortably. Denies any chest pain, SOB, abdominal pain, difficulty urinating, dysuria, hematuria.     FAMILY HISTORY:    74yVital Signs Last 24 Hrs  T(C): 36.3 (16 Feb 2025 04:51), Max: 36.8 (15 Feb 2025 20:08)  T(F): 97.4 (16 Feb 2025 04:51), Max: 98.2 (15 Feb 2025 20:08)  HR: 107 (16 Feb 2025 04:51) (85 - 107)  BP: 124/84 (16 Feb 2025 04:51) (121/77 - 129/87)  BP(mean): --  RR: 18 (16 Feb 2025 04:51) (18 - 18)  SpO2: 97% (16 Feb 2025 04:51) (96% - 98%)    Parameters below as of 15 Feb 2025 20:08  Patient On (Oxygen Delivery Method): room air    PHYSICAL EXAM:  GENERAL: NAD  HEAD:  Atraumatic  EYES: EOMI  ENMT: Moist mucous membranes, Good dentition  NERVOUS SYSTEM:  Alert & Oriented X3, Good concentration  CHEST/LUNG: Clear to percussion bilaterally; No rales, rhonchi, wheezing  HEART: Irregularly irregular   ABDOMEN: Soft, Nontender, Nondistended  EXTREMITIES:  +2 pitting edema bilaterally     Consultant(s) Notes Reviewed:  [x ] YES  [ ] NO  Care Discussed with Consultants/Other Providers [ x] YES  [ ] NO    LABS:                        9.5    4.88  )-----------( 135      ( 15 Feb 2025 06:04 )             30.3       02-15    143  |  99  |  100[H]  ----------------------------<  101[H]  3.5   |  21[L]  |  8.35[H]    Ca    7.4[L]      15 Feb 2025 06:04  Phos  6.7     02-15  Mg     2.3     02-15                Urinalysis Basic - ( 15 Feb 2025 06:04 )    Color: x / Appearance: x / SG: x / pH: x  Gluc: 101 mg/dL / Ketone: x  / Bili: x / Urobili: x   Blood: x / Protein: x / Nitrite: x   Leuk Esterase: x / RBC: x / WBC x   Sq Epi: x / Non Sq Epi: x / Bacteria: x      CAPILLARY BLOOD GLUCOSE    Female  RADIOLOGY & ADDITIONAL TESTS:  no interval imaging     MedsMEDICATIONS  (STANDING):  apixaban 5 milliGRAM(s) Oral two times a day  buMETAnide IVPB 4 milliGRAM(s) IV Intermittent every 12 hours  influenza  Vaccine (HIGH DOSE) 0.5 milliLiter(s) IntraMuscular once  metoprolol succinate ER 50 milliGRAM(s) Oral two times a day  sevelamer carbonate 800 milliGRAM(s) Oral three times a day with meals  sodium bicarbonate 650 milliGRAM(s) Oral four times a day    MEDICATIONS  (PRN):  acetaminophen     Tablet .. 650 milliGRAM(s) Oral every 6 hours PRN Temp greater or equal to 38C (100.4F), Mild Pain (1 - 3)  melatonin 3 milliGRAM(s) Oral at bedtime PRN Insomnia      HEALTH ISSUES - PROBLEM Dx:  Acute kidney injury superimposed on CKD    Paroxysmal atrial fibrillation    Chronic HFrEF (heart failure with reduced ejection fraction)    Metabolic acidosis    Hypertension    Need for prophylactic measure    Severe mitral regurgitation

## 2025-02-16 NOTE — PROGRESS NOTE ADULT - ATTENDING COMMENTS
Patient seen and examined with fellow, Agree with A/P as above.  Plan for HD initiation s/p catheter placement by IR later this week.

## 2025-02-16 NOTE — PROGRESS NOTE ADULT - PROBLEM SELECTOR PLAN 1
Hx/o CKD iso b/l staghorn calculus s/p removal (2009) (follows with Dr. Elizondo). Found to have worsening renal function on admission so procedure cancelled. Baseline 4.6 in Aug 2024 and 5.5 in 1/25. Cr on admission 2/12 was 8.42.   - Strict I/Os, daily weights  - Trend BMPs, monitor renal function, renally dose meds, avoid nephrotoxins   - Nephro consulted, recommend bumex 4 mg IV BID and patient agreed to dialysis - IR consulted

## 2025-02-16 NOTE — PROGRESS NOTE ADULT - ASSESSMENT
74-year-old female with PMHx of HTN, CKD stage 5 (pt. of Dr. Elizondo), Afib on eliquis, anemia, nephrolithiasis, and recently diagnosed HF who presents for scheduled Afib ablation for Afib found to have NICO.    Nephrology consulted for NICO on CKD V.      Acute kidney injury superimposed on CKD-5 iso possibly over-diuresis (increased from lasix 80mg BID to torsemide 100mg BID 1/31/24)  -Pt. with hx of advanced CKD is in the setting of chronic history of nephrolithiasis/staghorn calculus. On review of labs on [facility], last Scr was elevated at 6.18 on 1/30/25. Admission SCr is elevated at 8.42 without electrolyte abnormalities. Prior renal US 1/8/25 without hydronephrosis but calcifications, stones and cysts bilaterally.   USG - Kidney and bladder showed Bilateral renal calculi, no Hydronephrosis, B/L renal parenchymal disease.   UPCR 4.9  RECS:  -No urgency to initiate HD. C/w Bumex 4mg IV BID - Monitor strict I/O, daily standing wts.   -Avoid ACE/ARB, NSAIDs, contrast  -Pt was explained in detail about the need of HD. She will need tunnelled HD cath for now and then AVF. IR consulted for HD cath placement for next week (2/19)      Anemia iso CKD  -Hb 8.9  -Obtain ferritin and iron panel         Scout Erazo  Nephrology Fellow  Feel free to contact me on TEAMS  After 5 pm and on weekends please contact the on-call Fellow.

## 2025-02-16 NOTE — PROGRESS NOTE ADULT - PROBLEM SELECTOR PLAN 3
TTE in 11/2024 showed progression to moderate to severe MR.  Left ventricular systolic function is normal with an ejection fraction of 55 % by Garcia's method of disks. Appears hypervolemic on exam. Repeat TTE during admission reveals left ventricular systolic function is moderately decreased with an ejection fraction of 41 % by Garcia'smethod of disks.   -Bumex as above   -Hold home torsemide   -Structural cardiology consulted, recommend aggressive diuresis. Once euvolemic and on GDMT, right heart cath to assess volume status and rEF and repeat TTE. If moderate to severe mitral regurg,  recc.   -General cardiology following

## 2025-02-16 NOTE — PROGRESS NOTE ADULT - SUBJECTIVE AND OBJECTIVE BOX
MR#[id number]  PATIENT NAME:ELIZABETH PARSONS    DATE OF SERVICE: 02-16-25 @ 06:15  Patient was seen and examined by Aki Carias MD on    02-16-25 @ 06:15 .  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    Of Note:  Confirmed with  who is responsible for pt's care administers her medications consistently.  Her last dose of Eliquis was this am. Given at 10am.      TTE 4/2024  CONCLUSIONS:      1. Left ventricular wall thickness is normal. Left ventricular systolic function is normal with an ejection fraction of 76 % by 3D. There are no regional wall motion abnormalities seen.   2. No evidence of left atrial or left atrial appendage thrombus. Ultrasound enhancing agent was utilized to visualize the left atrial appendage.   3. The left atrium is severely dilated.   4. Moderate mitral regurgitation at a blood pressure of 132/71 mmHg. The mitral regurgitant jet is centrally directed. Mechanism of mitral regurgitation: Apryl Type I (normal leaflet motion with dilated annulus). PISA : later pisa radius 0.4, medial 0.37, Alisaing velocity 38.5cm/sec MR VTI 136cm MR Vmax 548 cm/s. The ERO 0.3sqcm and Reg. Vol 38cc. 3D vena contracta area was 0.35sqcm.   5. No pericardial effusion seen.   6. Normal right ventricular cavity size, with normal wall thickness, and normal systolic function.     (12 Feb 2025 12:50)      INTERIM EVENTS:Patient seen at bedside ,interim events noted.  Awake lying in bed not orthopneac remains in AF~~100's    PMH -reviewed admission note, no change since admission        MEDICATIONS  (STANDING):  apixaban 5 milliGRAM(s) Oral two times a day  buMETAnide IVPB 4 milliGRAM(s) IV Intermittent every 12 hours  influenza  Vaccine (HIGH DOSE) 0.5 milliLiter(s) IntraMuscular once  metoprolol succinate ER 50 milliGRAM(s) Oral two times a day  sevelamer carbonate 800 milliGRAM(s) Oral three times a day with meals  sodium bicarbonate 650 milliGRAM(s) Oral four times a day    MEDICATIONS  (PRN):  acetaminophen     Tablet .. 650 milliGRAM(s) Oral every 6 hours PRN Temp greater or equal to 38C (100.4F), Mild Pain (1 - 3)  melatonin 3 milliGRAM(s) Oral at bedtime PRN Insomnia            REVIEW OF SYSTEMS:  Constitutional: [ ] fever, [ ]weight loss,  [x ]fatigue [ ]weight gain  Eyes: [ ] visual changes  Respiratory: [ ]shortness of breath;  [ ] cough, [ ]wheezing, [ ]chills, [ ]hemoptysis  Cardiovascular: [ ] chest pain, [ ]palpitations, [ ]dizziness,  [ ]leg swelling[ ]orthopnea[ ]PND  Gastrointestinal: [ ] abdominal pain, [ ]nausea, [ ]vomiting,  [ ]diarrhea [ ]Constipation [ ]Melena  Genitourinary: [ ] dysuria, [ ] hematuria [ ]Zelaya  Neurologic: [ ] headaches [ ] tremors[ ]weakness [ ]Paralysis Right[ ] Left[ ]  Skin: [ ] itching, [ ]burning, [ ] rashes  Endocrine: [ ] heat or cold intolerance  Musculoskeletal: [ ] joint pain or swelling; [ ] muscle, back, or extremity pain  Psychiatric: [ ] depression, [ ]anxiety, [x ]mood swings, or [ ]difficulty sleeping  Hematologic: [ ] easy bruising, [ ] bleeding gums    [ ] All remaining systems negative except as per above.   [ ]Unable to obtain.  [x] No change in ROS since admission      Vital Signs Last 24 Hrs  T(C): 36.3 (16 Feb 2025 04:51), Max: 36.8 (15 Feb 2025 20:08)  T(F): 97.4 (16 Feb 2025 04:51), Max: 98.2 (15 Feb 2025 20:08)  HR: 107 (16 Feb 2025 04:51) (85 - 107)  BP: 124/84 (16 Feb 2025 04:51) (121/77 - 129/87)  RR: 18 (16 Feb 2025 04:51) (18 - 18)  SpO2: 97% (16 Feb 2025 04:51) (96% - 98%)    Parameters below as of 15 Feb 2025 20:08  Patient On (Oxygen Delivery Method): room air      I&O's Summary    14 Feb 2025 07:01  -  15 Feb 2025 07:00  --------------------------------------------------------  IN: 600 mL / OUT: 0 mL / NET: 600 mL    15 Feb 2025 07:01  -  16 Feb 2025 06:15  --------------------------------------------------------  IN: 820 mL / OUT: 100 mL / NET: 720 mL        PHYSICAL EXAM:  General: No acute distress BMI-21  HEENT: EOMI, PERRL  Neck: Supple, [ ] JVD  Lungs: Equal air entry bilaterally; [ ] rales [ ] wheezing [ ] rhonchi  Heart: Irregular rate and rhythm; [x ] murmur   2/6 [ x] systolic [ ] diastolic [ ] radiation[ ] rubs [ ]  gallops  Abdomen: Nontender, bowel sounds present  Extremities: No clubbing, cyanosis, [ ] edema [ ]Pulses  equal and intact  Nervous system:  Alert & Oriented X3, no focal deficits  Psychiatric: Normal affect  Skin: No rashes or lesions    LABS:  02-15    143  |  99  |  100[H]  ----------------------------<  101[H]  3.5   |  21[L]  |  8.35[H]    Ca    7.4[L]      15 Feb 2025 06:04  Phos  6.7     02-15  Mg     2.3     02-15      Creatinine Trend: 8.35<--, 8.71<--, 8.47<--, 8.42<--                        9.5    4.88  )-----------( 135      ( 15 Feb 2025 06:04 )             30.3         TTE W or WO Ultrasound Enhancing Agent (02.13.25 @ 11:46) >  CONCLUSIONS:      1. Left ventricular cavity is mildly dilated. Left ventricular systolic function is moderately decreased with an ejection fraction of 41 % by Garcia'smethod of disks. Global left ventricular hypokinesis.   2. Multiple segmental abnormalities exist. There is global left ventricular hypokinesis. There is normal LV mass and normal geometry. There is no evidence of a left ventricular thrombus.   3. Left atrium is severely dilated.   4. Severe mitral regurgitation. The mitral regurgitant jet is posteriorly directed.   5. No pericardial effusion seen.   6. Mild to moderate tricuspid regurgitation.   7. Compared to the transthoracic echocardiogram performed on 11/27/2024, LVEF has decreased, MR is increased,.   8. Estimated pulmonary artery systolic pressure is 46 mmHg, consistent with mild to moderate pulmonary hypertension.   9. Symmetric mitral valve leaflet tethering.  10. The inferior vena cava is dilated measuring 2.40 cm in diameter, (dilated >2.1cm) with abnormal inspiratory collapse (abnormal <50%) consistent with elevated right atrial pressure (~15, range 10-20mmHg).  11. There is normal LV mass and normal geometry.      12 Lead ECG (02.12.25 @ 12:54) >   ATRIAL FIBRILLATION WITH RAPID VENTRICULAR RESPONSE WITH PREMATURE VENTRICULAR OR ABERRANTLY CONDUCTED COMPLEXES  POSSIBLE ANTERIOR INFARCT , AGE UNDETERMINED  ABNORMAL ECG

## 2025-02-16 NOTE — PROGRESS NOTE ADULT - SUBJECTIVE AND OBJECTIVE BOX
[facility] DIVISION OF KIDNEY DISEASES AND HYPERTENSION   FOLLOW UP NOTE    --------------------------------------------------------------------------------  Chief Complaint:    24 hour events/subjective: Pt. was seen and examined today.   Feels ok. Denies any shortness of breath, chest pain, nausea, vomiting, abdominal pain, diarrhea or urinary changes.      PAST HISTORY  --------------------------------------------------------------------------------  No significant changes to PMH, PSH, FHx, SHx, unless otherwise noted    ALLERGIES & MEDICATIONS  --------------------------------------------------------------------------------  Allergies    No Known Allergies    Intolerances      Standing Inpatient Medications  apixaban 5 milliGRAM(s) Oral two times a day  buMETAnide IVPB 4 milliGRAM(s) IV Intermittent every 12 hours  influenza  Vaccine (HIGH DOSE) 0.5 milliLiter(s) IntraMuscular once  metoprolol succinate ER 50 milliGRAM(s) Oral two times a day  sevelamer carbonate 800 milliGRAM(s) Oral three times a day with meals  sodium bicarbonate 650 milliGRAM(s) Oral four times a day    PRN Inpatient Medications  acetaminophen     Tablet .. 650 milliGRAM(s) Oral every 6 hours PRN  melatonin 3 milliGRAM(s) Oral at bedtime PRN      REVIEW OF SYSTEMS  --------------------------------------------------------------------------------    All other systems were reviewed and are negative, except as noted.    VITALS/PHYSICAL EXAM  --------------------------------------------------------------------------------  T(C): 36.5 (02-16-25 @ 11:15), Max: 36.8 (02-15-25 @ 20:08)  HR: 95 (02-16-25 @ 11:15) (85 - 107)  BP: 126/83 (02-16-25 @ 11:15) (124/84 - 129/87)  RR: 18 (02-16-25 @ 11:15) (18 - 18)  SpO2: 97% (02-16-25 @ 11:15) (96% - 97%)  Wt(kg): --        02-15-25 @ 07:01  -  02-16-25 @ 07:00  --------------------------------------------------------  IN: 940 mL / OUT: 100 mL / NET: 840 mL    02-16-25 @ 07:01  -  02-16-25 @ 15:11  --------------------------------------------------------  IN: 360 mL / OUT: 0 mL / NET: 360 mL        Physical Exam:  	Gen: NAD  	HEENT: Anicteric  	Pulm: CTA B/L  	CV: S1S2+  	Abd: Soft, +BS   	Ext: No LE edema B/L  	Neuro: Awake, has b/l hand mild tremor but no significant asterixis  	Skin: Warm and dry        LABS/STUDIES  --------------------------------------------------------------------------------              8.9    4.60  >-----------<  130      [02-16-25 @ 10:17]              29.2     142  |  97  |  102  ----------------------------<  97      [02-16-25 @ 10:17]  4.0   |  23  |  8.22        Ca     6.9     [02-16-25 @ 10:17]      Mg     2.5     [02-16-25 @ 10:17]      Phos  6.6     [02-16-25 @ 10:17]            Creatinine Trend:  SCr 8.22 [02-16 @ 10:17]  SCr 8.35 [02-15 @ 06:04]  SCr 8.71 [02-14 @ 07:21]  SCr 8.47 [02-13 @ 07:28]  SCr 8.42 [02-12 @ 13:13]    Urinalysis - [02-16-25 @ 10:17]      Color  / Appearance  / SG  / pH       Gluc 97 / Ketone   / Bili  / Urobili        Blood  / Protein  / Leuk Est  / Nitrite       RBC  / WBC  / Hyaline  / Gran  / Sq Epi  / Non Sq Epi  / Bacteria     Urine Creatinine 46      [02-13-25 @ 15:43]  Urine Protein 227      [02-13-25 @ 15:43]  Urine Sodium 81      [02-13-25 @ 15:43]  Urine Urea Nitrogen 334      [02-13-25 @ 15:43]  Urine Potassium 18      [02-13-25 @ 15:43]  Urine Osmolality 319      [02-13-25 @ 15:43]        Tacrolimus  Cyclosporine  Sirolimus  Mycophenolate  BK PCR  CMV PCR  Parvo PCR  EBV PCR

## 2025-02-16 NOTE — PROGRESS NOTE ADULT - ASSESSMENT
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       #Acute kidney injury superimposed on CKD.    Found to have worsening renal function on admission so procedure cancelled. Baseline 4.6 in Aug 2024 and 5.5 in 1/25. Cr on admission 2/12 was 8.42.   - Cr continues to rise to 8.71   - On Bumex 4mg IV BID for diurese but Is and Os document 0ml of urine  - RAP elevated to 15 on TTE from 2/13 and she appears significantly volume up on exam  -Plan for Permacath on Wednesday  -On Bumex 4mg IV BID poor UOP noted    #  Paroxysmal atrial fibrillation.   ·  Plan: Admitted for cardioversion but unable to do because given significant volume overload  -C/w home eliquis 5 mg BID   -C/w home metoprolol 50 mg BID, would increase dose to 100mg in the AM, 50mg in the PM for rate control  - Off Amiodarone    # Chronic heart failure with preserved ejection fraction.   ·TTE in 11/2024 showed progression to moderate to severe MR.  Left ventricular systolic function is normal with an ejection fraction of 55 % by Garcia's method of disks.  -TTE now with LVEF of 41%, global hypokinesis, severe MR, RAP of 15.   -Structural Heart evaluation noted. Will aggressively diurese and repeat TTE when euvolemic  Will be very difficult to diurese to euvolemia without HD but will continue with high dose Bumex for now  Eventual GDMT  No recent ischemic eval noted. Will need to reassess LVEF once in NSR

## 2025-02-17 LAB
ANION GAP SERPL CALC-SCNC: 23 MMOL/L — HIGH (ref 5–17)
BUN SERPL-MCNC: 99 MG/DL — HIGH (ref 7–23)
CALCIUM SERPL-MCNC: 7.1 MG/DL — LOW (ref 8.4–10.5)
CHLORIDE SERPL-SCNC: 96 MMOL/L — SIGNIFICANT CHANGE UP (ref 96–108)
CO2 SERPL-SCNC: 23 MMOL/L — SIGNIFICANT CHANGE UP (ref 22–31)
CREAT SERPL-MCNC: 8.08 MG/DL — HIGH (ref 0.5–1.3)
EGFR: 5 ML/MIN/1.73M2 — LOW
EGFR: 5 ML/MIN/1.73M2 — LOW
GLUCOSE SERPL-MCNC: 116 MG/DL — HIGH (ref 70–99)
HCT VFR BLD CALC: 30.2 % — LOW (ref 34.5–45)
HGB BLD-MCNC: 9.3 G/DL — LOW (ref 11.5–15.5)
MAGNESIUM SERPL-MCNC: 2.5 MG/DL — SIGNIFICANT CHANGE UP (ref 1.6–2.6)
MCHC RBC-ENTMCNC: 30.7 PG — SIGNIFICANT CHANGE UP (ref 27–34)
MCHC RBC-ENTMCNC: 30.8 G/DL — LOW (ref 32–36)
MCV RBC AUTO: 99.7 FL — SIGNIFICANT CHANGE UP (ref 80–100)
NRBC BLD AUTO-RTO: 0 /100 WBCS — SIGNIFICANT CHANGE UP (ref 0–0)
PHOSPHATE SERPL-MCNC: 6.2 MG/DL — HIGH (ref 2.5–4.5)
PLATELET # BLD AUTO: 135 K/UL — LOW (ref 150–400)
POTASSIUM SERPL-MCNC: 4 MMOL/L — SIGNIFICANT CHANGE UP (ref 3.5–5.3)
POTASSIUM SERPL-SCNC: 4 MMOL/L — SIGNIFICANT CHANGE UP (ref 3.5–5.3)
RBC # BLD: 3.03 M/UL — LOW (ref 3.8–5.2)
RBC # FLD: 17.7 % — HIGH (ref 10.3–14.5)
SODIUM SERPL-SCNC: 142 MMOL/L — SIGNIFICANT CHANGE UP (ref 135–145)
WBC # BLD: 5.6 K/UL — SIGNIFICANT CHANGE UP (ref 3.8–10.5)
WBC # FLD AUTO: 5.6 K/UL — SIGNIFICANT CHANGE UP (ref 3.8–10.5)

## 2025-02-17 PROCEDURE — 99232 SBSQ HOSP IP/OBS MODERATE 35: CPT | Mod: GC

## 2025-02-17 PROCEDURE — 99232 SBSQ HOSP IP/OBS MODERATE 35: CPT

## 2025-02-17 RX ORDER — OXYCODONE HYDROCHLORIDE 30 MG/1
2.5 TABLET ORAL ONCE
Refills: 0 | Status: DISCONTINUED | OUTPATIENT
Start: 2025-02-17 | End: 2025-02-17

## 2025-02-17 RX ORDER — METOPROLOL SUCCINATE 50 MG/1
50 TABLET, EXTENDED RELEASE ORAL
Refills: 0 | Status: DISCONTINUED | OUTPATIENT
Start: 2025-02-17 | End: 2025-02-20

## 2025-02-17 RX ORDER — METOPROLOL SUCCINATE 50 MG/1
100 TABLET, EXTENDED RELEASE ORAL
Refills: 0 | Status: DISCONTINUED | OUTPATIENT
Start: 2025-02-17 | End: 2025-02-20

## 2025-02-17 RX ORDER — APIXABAN 2.5 MG/1
2.5 TABLET, FILM COATED ORAL
Refills: 0 | Status: DISCONTINUED | OUTPATIENT
Start: 2025-02-17 | End: 2025-02-18

## 2025-02-17 RX ADMIN — APIXABAN 2.5 MILLIGRAM(S): 2.5 TABLET, FILM COATED ORAL at 17:16

## 2025-02-17 RX ADMIN — METOPROLOL SUCCINATE 100 MILLIGRAM(S): 50 TABLET, EXTENDED RELEASE ORAL at 21:02

## 2025-02-17 RX ADMIN — METOPROLOL SUCCINATE 50 MILLIGRAM(S): 50 TABLET, EXTENDED RELEASE ORAL at 05:04

## 2025-02-17 RX ADMIN — Medication 650 MILLIGRAM(S): at 23:13

## 2025-02-17 RX ADMIN — OXYCODONE HYDROCHLORIDE 2.5 MILLIGRAM(S): 30 TABLET ORAL at 12:56

## 2025-02-17 RX ADMIN — Medication 650 MILLIGRAM(S): at 17:15

## 2025-02-17 RX ADMIN — Medication 650 MILLIGRAM(S): at 00:01

## 2025-02-17 RX ADMIN — METOPROLOL SUCCINATE 50 MILLIGRAM(S): 50 TABLET, EXTENDED RELEASE ORAL at 17:15

## 2025-02-17 RX ADMIN — BUMETANIDE 132 MILLIGRAM(S): 1 TABLET ORAL at 05:05

## 2025-02-17 RX ADMIN — SEVELAMER HYDROCHLORIDE 800 MILLIGRAM(S): 800 TABLET ORAL at 17:16

## 2025-02-17 RX ADMIN — BUMETANIDE 132 MILLIGRAM(S): 1 TABLET ORAL at 17:15

## 2025-02-17 RX ADMIN — Medication 650 MILLIGRAM(S): at 11:15

## 2025-02-17 RX ADMIN — SEVELAMER HYDROCHLORIDE 800 MILLIGRAM(S): 800 TABLET ORAL at 08:27

## 2025-02-17 RX ADMIN — OXYCODONE HYDROCHLORIDE 2.5 MILLIGRAM(S): 30 TABLET ORAL at 16:20

## 2025-02-17 RX ADMIN — Medication 650 MILLIGRAM(S): at 05:04

## 2025-02-17 RX ADMIN — OXYCODONE HYDROCHLORIDE 2.5 MILLIGRAM(S): 30 TABLET ORAL at 13:56

## 2025-02-17 RX ADMIN — Medication 3 MILLIGRAM(S): at 21:00

## 2025-02-17 RX ADMIN — SEVELAMER HYDROCHLORIDE 800 MILLIGRAM(S): 800 TABLET ORAL at 11:16

## 2025-02-17 NOTE — PROGRESS NOTE ADULT - PROBLEM SELECTOR PLAN 1
Hx/o CKD iso b/l staghorn calculus s/p removal (2009) (follows with Dr. Elizondo). Found to have worsening renal function on admission so procedure cancelled. Baseline 4.6 in Aug 2024 and 5.5 in 1/25. Cr on admission 2/12 was 8.42.   - Strict I/Os, daily weights  - Trend BMPs, monitor renal function, renally dose meds, avoid nephrotoxins   - Nephro consulted, recommend bumex 4 mg IV BID and patient agreed to dialysis - IR consulted and plan for cath on 2/19

## 2025-02-17 NOTE — PROGRESS NOTE ADULT - ASSESSMENT
74-year-old female with PMHx of HTN, CKD stage 5 (pt. of Dr. Elizondo), Afib on Eliquis, anemia, nephrolithiasis, and recently diagnosed HF who presents for scheduled Afib ablation for Afib found to have NICO.    Nephrology consulted for NICO on CKD V.    Acute kidney injury superimposed on CKD-5 iso possibly over-diuresis (increased from lasix 80mg BID to torsemide 100mg BID 1/31/24)  -Pt. with hx of advanced CKD is in the setting of chronic history of nephrolithiasis/staghorn calculus. On review of labs on [place] [place] [facility], last Scr was elevated at 6.18 on 1/30/25. Admission SCr is elevated at 8.42 without electrolyte abnormalities. Prior renal US 1/8/25 without hydronephrosis but calcifications, stones and cysts bilaterally.   USG - Kidney and bladder showed Bilateral renal calculi, no Hydronephrosis, B/L renal parenchymal disease. UPCR 4.9  RECS:  -No urgency to initiate HD. C/w Bumex 4mg IV BID - Monitor strict I/O, daily standing wts.   -Avoid ACE/ARB, NSAIDs, contrast  -Pt was explained in detail about the need of HD. She will need tunnelled HD cath for now and then AVF. IR consulted for HD cath placement on 2/19    Anemia iso CKD  -Hb 9.3  -Obtain ferritin and iron panel     If you have any questions, please feel free to contact me  Albertina Frederick  Nephrology Fellow  [employer]/Page [phone]  (After 5pm or on weekends please page the on-call fellow)

## 2025-02-17 NOTE — DIETITIAN INITIAL EVALUATION ADULT - SIGNS/SYMPTOMS
pt stated she never received nutrition education regarding CKD/HD likely initiation of HD during this admission

## 2025-02-17 NOTE — PROGRESS NOTE ADULT - PROBLEM SELECTOR PLAN 6
DVT ppx: eliquis   Diet: Dash diet   Dispo: pending clinical course DVT ppx: dose reduced eliquis   Diet: Dash diet   Dispo: pending clinical course

## 2025-02-17 NOTE — DIETITIAN INITIAL EVALUATION ADULT - REASON INDICATOR FOR ASSESSMENT
Nutrition consult received for MST score 2 or greater		    Source: patient, EMR, family at bedside, ACP

## 2025-02-17 NOTE — DIETITIAN INITIAL EVALUATION ADULT - OTHER INFO
-On Bumex for diuresis  -On Renvela + Sodium Bicarbonate  -Possible plan for initiation of HD this admission

## 2025-02-17 NOTE — PROGRESS NOTE ADULT - SUBJECTIVE AND OBJECTIVE BOX
[facility] Division of Kidney Diseases & Hypertension  FOLLOW UP NOTE  [phone]--------------------------------------------------------------------------------  Chief Complaint: NICO on CKD    24 hour events/subjective: Pt. was seen and examined today. Feels ok. Denies any shortness of breath, chest pain, nausea, vomiting, abdominal pain, diarrhea or urinary changes.    PAST HISTORY  --------------------------------------------------------------------------------  No significant changes to PMH, PSH, FHx, SHx, unless otherwise noted    ALLERGIES & MEDICATIONS  --------------------------------------------------------------------------------  Allergies    No Known Allergies    Intolerances    Standing Inpatient Medications  apixaban 2.5 milliGRAM(s) Oral two times a day  buMETAnide IVPB 4 milliGRAM(s) IV Intermittent every 12 hours  influenza  Vaccine (HIGH DOSE) 0.5 milliLiter(s) IntraMuscular once  metoprolol succinate ER 50 milliGRAM(s) Oral <User Schedule>  metoprolol succinate  milliGRAM(s) Oral <User Schedule>  sevelamer carbonate 800 milliGRAM(s) Oral three times a day with meals  sodium bicarbonate 650 milliGRAM(s) Oral four times a day    PRN Inpatient Medications  acetaminophen     Tablet .. 650 milliGRAM(s) Oral every 6 hours PRN  melatonin 3 milliGRAM(s) Oral at bedtime PRN    REVIEW OF SYSTEMS  --------------------------------------------------------------------------------  see above    VITALS/PHYSICAL EXAM  --------------------------------------------------------------------------------  T(C): 36.3 (02-17-25 @ 11:59), Max: 36.7 (02-17-25 @ 04:11)  HR: 102 (02-17-25 @ 11:59) (91 - 102)  BP: 141/104 (02-17-25 @ 11:59) (125/87 - 141/104)  RR: 16 (02-17-25 @ 11:59) (16 - 18)  SpO2: 95% (02-17-25 @ 11:59) (95% - 97%)  Wt(kg): --    02-16-25 @ 07:01  -  02-17-25 @ 07:00  --------------------------------------------------------  IN: 640 mL / OUT: 0 mL / NET: 640 mL    02-17-25 @ 07:01  -  02-17-25 @ 13:42  --------------------------------------------------------  IN: 240 mL / OUT: 0 mL / NET: 240 mL    Physical Exam:  	Gen: NAD  	HEENT: Anicteric  	Pulm: CTA B/L  	CV: S1S2+  	Abd: Soft, +BS   	Ext: No LE edema B/L  	Neuro: Awake, has b/l hand mild tremor but no significant asterixis  	Skin: Warm and dry    LABS/STUDIES  --------------------------------------------------------------------------------              9.3    5.60  >-----------<  135      [02-17-25 @ 07:03]              30.2     142  |  96  |  99  ----------------------------<  116      [02-17-25 @ 07:01]  4.0   |  23  |  8.08        Ca     7.1     [02-17-25 @ 07:01]      Mg     2.5     [02-17-25 @ 07:01]      Phos  6.2     [02-17-25 @ 07:01]    Creatinine Trend:  SCr 8.08 [02-17 @ 07:01]  SCr 8.22 [02-16 @ 10:17]  SCr 8.35 [02-15 @ 06:04]  SCr 8.71 [02-14 @ 07:21]  SCr 8.47 [02-13 @ 07:28]    Urine Creatinine 46      [02-13-25 @ 15:43]  Urine Protein 227      [02-13-25 @ 15:43]  Urine Sodium 81      [02-13-25 @ 15:43]  Urine Urea Nitrogen 334      [02-13-25 @ 15:43]  Urine Potassium 18      [02-13-25 @ 15:43]  Urine Osmolality 319      [02-13-25 @ 15:43]

## 2025-02-17 NOTE — DIETITIAN INITIAL EVALUATION ADULT - ORAL INTAKE PTA/DIET HISTORY
Pt reports good appetite & PO intakes PTA. Reports NKFA. Denied micronutrient supplementation PTA. Per chart, patient was taking renvela, calcium carbonate, & ferrous sulfate PTA

## 2025-02-17 NOTE — PROGRESS NOTE ADULT - SUBJECTIVE AND OBJECTIVE BOX
GASPERMARIAMADRIENNE HOPKINS  74y  Female    Complaints:  Subjective:     No acute o/n events. Patient this morning is resting comfortably. Denies any chest pain, SOB, abdominal pain, dysuria.     FAMILY HISTORY:    74yVital Signs Last 22 Hrs  T(C): 36.7 (17 Feb 2025 04:11), Max: 36.7 (17 Feb 2025 04:11)  T(F): 98.1 (17 Feb 2025 04:11), Max: 98.1 (17 Feb 2025 04:11)  HR: 96 (17 Feb 2025 04:11) (91 - 100)  BP: 133/81 (17 Feb 2025 04:11) (125/87 - 133/92)  BP(mean): --  RR: 18 (17 Feb 2025 04:11) (18 - 18)  SpO2: 96% (17 Feb 2025 04:11) (96% - 97%)    Parameters below as of 17 Feb 2025 04:11  Patient On (Oxygen Delivery Method): room air    PHYSICAL EXAM:  GENERAL: NAD  HEAD:  Atraumatic  EYES: EOMI  ENMT: Moist mucous membranes, Good dentition  NERVOUS SYSTEM:  Alert & Oriented X3, Good concentration  CHEST/LUNG: Clear to percussion bilaterally; No rales, rhonchi, wheezing  HEART: Irregularly irregular   ABDOMEN: Soft, Nontender, Nondistended  EXTREMITIES:  +2 pitting edema bilaterally     Consultant(s) Notes Reviewed:  [x ] YES  [ ] NO  Care Discussed with Consultants/Other Providers [ x] YES  [ ] NO    LABS:                        8.9    4.60  )-----------( 130      ( 16 Feb 2025 10:17 )             29.2       02-16    142  |  97  |  102[H]  ----------------------------<  97  4.0   |  23  |  8.22[H]    Ca    6.9[L]      16 Feb 2025 10:17  Phos  6.6     02-16  Mg     2.5     02-16                Urinalysis Basic - ( 16 Feb 2025 10:17 )    Color: x / Appearance: x / SG: x / pH: x  Gluc: 97 mg/dL / Ketone: x  / Bili: x / Urobili: x   Blood: x / Protein: x / Nitrite: x   Leuk Esterase: x / RBC: x / WBC x   Sq Epi: x / Non Sq Epi: x / Bacteria: x    Female  RADIOLOGY & ADDITIONAL TESTS:  no interval imaging     MedsMEDICATIONS  (STANDING):  apixaban 5 milliGRAM(s) Oral two times a day  buMETAnide IVPB 4 milliGRAM(s) IV Intermittent every 12 hours  influenza  Vaccine (HIGH DOSE) 0.5 milliLiter(s) IntraMuscular once  metoprolol succinate ER 50 milliGRAM(s) Oral <User Schedule>  metoprolol succinate  milliGRAM(s) Oral <User Schedule>  sevelamer carbonate 800 milliGRAM(s) Oral three times a day with meals  sodium bicarbonate 650 milliGRAM(s) Oral four times a day    MEDICATIONS  (PRN):  acetaminophen     Tablet .. 650 milliGRAM(s) Oral every 6 hours PRN Temp greater or equal to 38C (100.4F), Mild Pain (1 - 3)  melatonin 3 milliGRAM(s) Oral at bedtime PRN Insomnia      HEALTH ISSUES - PROBLEM Dx:  Acute kidney injury superimposed on CKD    Paroxysmal atrial fibrillation    Chronic HFrEF (heart failure with reduced ejection fraction)    Metabolic acidosis    Hypertension    Need for prophylactic measure    Severe mitral regurgitation             GASPERMARIAMADRIENNE HOPKISN  74y  Female    Complaints:  Subjective:     No acute o/n events. Patient this morning is resting comfortably. Denies any chest pain, SOB, abdominal pain. No issues with urination.     FAMILY HISTORY:    74yVital Signs Last 24 Hrs  T(C): 36.7 (17 Feb 2025 04:11), Max: 36.7 (17 Feb 2025 04:11)  T(F): 98.1 (17 Feb 2025 04:11), Max: 98.1 (17 Feb 2025 04:11)  HR: 96 (17 Feb 2025 04:11) (91 - 100)  BP: 133/81 (17 Feb 2025 04:11) (125/87 - 133/92)  BP(mean): --  RR: 18 (17 Feb 2025 04:11) (18 - 18)  SpO2: 96% (17 Feb 2025 04:11) (96% - 97%)    Parameters below as of 17 Feb 2025 04:11  Patient On (Oxygen Delivery Method): room air    PHYSICAL EXAM:  GENERAL: NAD  HEAD:  Atraumatic  EYES: EOMI  ENMT: Moist mucous membranes, Good dentition  NERVOUS SYSTEM:  Alert & Oriented X3, Good concentration  CHEST/LUNG: Clear to percussion bilaterally; No rales, rhonchi, wheezing  HEART: Irregularly irregular   ABDOMEN: Soft, Nontender, Nondistended  EXTREMITIES:  +2 pitting edema bilaterally     Consultant(s) Notes Reviewed:  [x ] YES  [ ] NO  Care Discussed with Consultants/Other Providers [ x] YES  [ ] NO    LABS:                        8.9    4.60  )-----------( 130      ( 16 Feb 2025 10:17 )             29.2       02-16    142  |  97  |  102[H]  ----------------------------<  97  4.0   |  23  |  8.22[H]    Ca    6.9[L]      16 Feb 2025 10:17  Phos  6.6     02-16  Mg     2.5     02-16                Urinalysis Basic - ( 16 Feb 2025 10:17 )    Color: x / Appearance: x / SG: x / pH: x  Gluc: 97 mg/dL / Ketone: x  / Bili: x / Urobili: x   Blood: x / Protein: x / Nitrite: x   Leuk Esterase: x / RBC: x / WBC x   Sq Epi: x / Non Sq Epi: x / Bacteria: x    Female  RADIOLOGY & ADDITIONAL TESTS:  no interval imaging     MedsMEDICATIONS  (STANDING):  apixaban 5 milliGRAM(s) Oral two times a day  buMETAnide IVPB 4 milliGRAM(s) IV Intermittent every 12 hours  influenza  Vaccine (HIGH DOSE) 0.5 milliLiter(s) IntraMuscular once  metoprolol succinate ER 50 milliGRAM(s) Oral <User Schedule>  metoprolol succinate  milliGRAM(s) Oral <User Schedule>  sevelamer carbonate 800 milliGRAM(s) Oral three times a day with meals  sodium bicarbonate 650 milliGRAM(s) Oral four times a day    MEDICATIONS  (PRN):  acetaminophen     Tablet .. 650 milliGRAM(s) Oral every 6 hours PRN Temp greater or equal to 38C (100.4F), Mild Pain (1 - 3)  melatonin 3 milliGRAM(s) Oral at bedtime PRN Insomnia      HEALTH ISSUES - PROBLEM Dx:  Acute kidney injury superimposed on CKD    Paroxysmal atrial fibrillation    Chronic HFrEF (heart failure with reduced ejection fraction)    Metabolic acidosis    Hypertension    Need for prophylactic measure    Severe mitral regurgitation

## 2025-02-17 NOTE — DIETITIAN INITIAL EVALUATION ADULT - NSFNSPHYEXAMSKINFT_GEN_A_CORE
[2] : 1) Little interest or pleasure doing things for more than half of the days (2) [1] : 2) Feeling down, depressed, or hopeless for several days (1) [I have developed a follow-up plan documented below in the note.] : I have developed a follow-up plan documented below in the note. [PHQ-9 Positive] : PHQ-9 Positive [Have you ever fainted, passed out or had an unexplained seizure suddenly and without warning, especially during exercise or in response] : Have you ever fainted, passed out or had an unexplained seizure suddenly and without warning, especially during exercise or in response to sudden loud noises such as doorbells, alarm clocks and ringing telephones? No [Have you ever had exercise-related chest pain or shortness of breath?] : Have you ever had exercise-related chest pain or shortness of breath? No [Are you related to anyone with hypertrophic cardiomyopathy or hypertrophic obstructive cardiomyopathy, Marfan syndrome, arrhythmogenic] : Are you related to anyone with hypertrophic cardiomyopathy or hypertrophic obstructive cardiomyopathy, Marfan syndrome, arrhythmogenic right ventricular cardiomyopathy, long QT syndrome, short QT syndrome, Brugada syndrome or catecholaminergic polymorphic ventricular tachycardia, or anyone younger than 50 years with a pacemaker or implantable defibrillator? No [No Increased risk of SCA or SCD] : No Increased risk of SCA or SCD    no pressure injuries per nursing flow sheet

## 2025-02-17 NOTE — DIETITIAN INITIAL EVALUATION ADULT - ENERGY INTAKE
Pt reports good appetite & PO intakes during this admission. Seen eating a bagel with jam. Per chart, noted variable PO intakes since admission (largely 26-50% PO intakes). Denied need for nutritional supplementation. Denied N/V or GI distress. No reports of chewing/swallowing difficulties

## 2025-02-17 NOTE — PROGRESS NOTE ADULT - ATTENDING COMMENTS
Patient seen and examined with Fellow, agree with A/P as above.  Patient scheduled for permcath placement this week with subsequent initiation of HD

## 2025-02-17 NOTE — PROGRESS NOTE ADULT - SUBJECTIVE AND OBJECTIVE BOX
MR#[id number]  PATIENT NAME:ELIZABETH PARSONS    DATE OF SERVICE: 02-17-25 @ 07:25  Patient was seen and examined by Aki Carias MD on    02-17-25 @ 07:25 .  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    Of Note:  Confirmed with  who is responsible for pt's care administers her medications consistently.  Her last dose of Eliquis was this am. Given at 10am.      TTE 4/2024  CONCLUSIONS:      1. Left ventricular wall thickness is normal. Left ventricular systolic function is normal with an ejection fraction of 76 % by 3D. There are no regional wall motion abnormalities seen.   2. No evidence of left atrial or left atrial appendage thrombus. Ultrasound enhancing agent was utilized to visualize the left atrial appendage.   3. The left atrium is severely dilated.   4. Moderate mitral regurgitation at a blood pressure of 132/71 mmHg. The mitral regurgitant jet is centrally directed. Mechanism of mitral regurgitation: Apryl Type I (normal leaflet motion with dilated annulus). PISA : later pisa radius 0.4, medial 0.37, Alisaing velocity 38.5cm/sec MR VTI 136cm MR Vmax 548 cm/s. The ERO 0.3sqcm and Reg. Vol 38cc. 3D vena contracta area was 0.35sqcm.   5. No pericardial effusion seen.   6. Normal right ventricular cavity size, with normal wall thickness, and normal systolic function.     (12 Feb 2025 12:50)      INTERIM EVENTS:Patient seen at bedside ,interim events noted.  Awake alert no overnight events remains in AF    PMH -reviewed admission note, no change since admission  HEART FAILURE: Acute[ ]Chronic[ ] Systolic[ ] Diastolic[ ] Combined Systolic and Diastolic[ ]  CAD[ ] CABG[ ] PCI[ ]  DEVICES[ ] PPM[ ] ICD[ ] ILR[ ]  ATRIAL FIBRILLATION[ ] Paroxysmal[ ] Permanent[ ] CHADS2-[  ]  NICO[ ] CKD1[ ] CKD2[ ] CKD3[ ] CKD4[ ] ESRD[ ]  COPD[ ] HTN[ ]   DM[ ] Type1[ ] Type 2[ ]   CVA[ ] Paresis[ ]    AMBULATION: Assisted[ ] Cane/walker[ ] Independent[ ]    MEDICATIONS  (STANDING):  apixaban 5 milliGRAM(s) Oral two times a day  buMETAnide IVPB 4 milliGRAM(s) IV Intermittent every 12 hours  influenza  Vaccine (HIGH DOSE) 0.5 milliLiter(s) IntraMuscular once  metoprolol succinate ER 50 milliGRAM(s) Oral <User Schedule>  metoprolol succinate  milliGRAM(s) Oral <User Schedule>  sevelamer carbonate 800 milliGRAM(s) Oral three times a day with meals  sodium bicarbonate 650 milliGRAM(s) Oral four times a day    MEDICATIONS  (PRN):  acetaminophen     Tablet .. 650 milliGRAM(s) Oral every 6 hours PRN Temp greater or equal to 38C (100.4F), Mild Pain (1 - 3)  melatonin 3 milliGRAM(s) Oral at bedtime PRN Insomnia            REVIEW OF SYSTEMS:  Constitutional: [ ] fever, [ ]weight loss,  [ ]fatigue [ ]weight gain  Eyes: [ ] visual changes  Respiratory: [ ]shortness of breath;  [ ] cough, [ ]wheezing, [ ]chills, [ ]hemoptysis  Cardiovascular: [ ] chest pain, [ ]palpitations, [ ]dizziness,  [ ]leg swelling[ ]orthopnea[ ]PND  Gastrointestinal: [ ] abdominal pain, [ ]nausea, [ ]vomiting,  [ ]diarrhea [ ]Constipation [ ]Melena  Genitourinary: [ ] dysuria, [ ] hematuria [ ]Zelaya  Neurologic: [ ] headaches [ ] tremors[ ]weakness [ ]Paralysis Right[ ] Left[ ]  Skin: [ ] itching, [ ]burning, [ ] rashes  Endocrine: [ ] heat or cold intolerance  Musculoskeletal: [ ] joint pain or swelling; [ ] muscle, back, or extremity pain  Psychiatric: [ ] depression, [ ]anxiety, [ ]mood swings, or [ ]difficulty sleeping  Hematologic: [ ] easy bruising, [ ] bleeding gums    [ ] All remaining systems negative except as per above.   [ ]Unable to obtain.  [x] No change in ROS since admission      Vital Signs Last 24 Hrs  T(C): 36.7 (17 Feb 2025 04:11), Max: 36.7 (17 Feb 2025 04:11)  T(F): 98.1 (17 Feb 2025 04:11), Max: 98.1 (17 Feb 2025 04:11)  HR: 96 (17 Feb 2025 04:11) (91 - 100)  BP: 133/81 (17 Feb 2025 04:11) (125/87 - 133/92)  BP(mean): --  RR: 18 (17 Feb 2025 04:11) (18 - 18)  SpO2: 96% (17 Feb 2025 04:11) (96% - 97%)    Parameters below as of 17 Feb 2025 04:11  Patient On (Oxygen Delivery Method): room air      I&O's Summary    16 Feb 2025 07:01  -  17 Feb 2025 07:00  --------------------------------------------------------  IN: 640 mL / OUT: 0 mL / NET: 640 mL        PHYSICAL EXAM:  General: No acute distress BMI-21  HEENT: EOMI, PERRL  Neck: Supple, [ ] JVD  Lungs: Equal air entry bilaterally; [ ] rales [ ] wheezing [ ] rhonchi  Heart: Regular rate and rhythm; [x ] murmur   2/6 [ x] systolic [ ] diastolic [ ] radiation[ ] rubs [ ]  gallops  Abdomen: Nontender, bowel sounds present  Extremities: No clubbing, cyanosis, [ ] edema [ ]Pulses  equal and intact  Nervous system:  Alert & Oriented X3, no focal deficits  Psychiatric: Normal affect  Skin: No rashes or lesions    LABS:  02-16    142  |  97  |  102[H]  ----------------------------<  97  4.0   |  23  |  8.22[H]    Ca    6.9[L]      16 Feb 2025 10:17  Phos  6.6     02-16  Mg     2.5     02-16      Creatinine Trend: 8.22<--, 8.35<--, 8.71<--, 8.47<--, 8.42<--                        8.9    4.60  )-----------( 130      ( 16 Feb 2025 10:17 )             29.2         TTE W or WO Ultrasound Enhancing Agent (02.13.25 @ 11:46) >  CONCLUSIONS:      1. Left ventricular cavity is mildly dilated. Left ventricular systolic function is moderately decreased with an ejection fraction of 41 % by Garcia'smethod of disks. Global left ventricular hypokinesis.   2. Multiple segmental abnormalities exist. There is global left ventricular hypokinesis. There is normal LV mass and normal geometry. There is no evidence of a left ventricular thrombus.   3. Left atrium is severely dilated.   4. Severe mitral regurgitation. The mitral regurgitant jet is posteriorly directed.   5. No pericardial effusion seen.   6. Mild to moderate tricuspid regurgitation.   7. Compared to the transthoracic echocardiogram performed on 11/27/2024, LVEF has decreased, MR is increased,.   8. Estimated pulmonary artery systolic pressure is 46 mmHg, consistent with mild to moderate pulmonary hypertension.   9. Symmetric mitral valve leaflet tethering.  10. The inferior vena cava is dilated measuring 2.40 cm in diameter, (dilated >2.1cm) with abnormal inspiratory collapse (abnormal <50%) consistent with elevated right atrial pressure (~15, range 10-20mmHg).  11. There is normal LV mass and normal geometry.      12 Lead ECG (02.12.25 @ 12:54) >   ATRIAL FIBRILLATION WITH RAPID VENTRICULAR RESPONSE WITH PREMATURE VENTRICULAR OR ABERRANTLY CONDUCTED COMPLEXES  POSSIBLE ANTERIOR INFARCT , AGE UNDETERMINED  ABNORMAL ECG

## 2025-02-17 NOTE — PROGRESS NOTE ADULT - PROBLEM SELECTOR PLAN 2
Admitted for cardioversion but unable to do because NICO on CKD.  -C/w home eliquis 5 mg BID   -Increase toprol to 100 mg in morning and 50 mg in evening    -EP following, recommend d/c amiodarone given plan for tunnel cath and worsening kidney function Admitted for cardioversion but unable to do because NICO on CKD.  -Decrease eliquis to 2.5 mg BID for weight and NICO   -Increase toprol to 100 mg in morning and 50 mg in evening    -EP following, recommend d/c amiodarone given plan for tunnel cath and worsening kidney function

## 2025-02-17 NOTE — DIETITIAN INITIAL EVALUATION ADULT - REASON FOR ADMISSION
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 Possible plan to start HD; IR consulted

## 2025-02-17 NOTE — PROGRESS NOTE ADULT - ATTENDING COMMENTS
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    Plan:   #NICO on CKD: likely cardiorenal, on Bumex 4mg IVPB BID, eventual HD likely will need tunnel catheter done by IR on Wednesday; patient is poor historian, however  agreeable   #HFrEF: EF 41% (decreased from 70%) likely ischemic, cardiac cath eventually as well once NICO improves/euvolemia; strict I/O recommend   #pAF s/p DCCV, was scheduled for another DCCV scheduled by outpatient cardiologist, but currently on hold due to NICO. reduce eliquis to 2.5 bid given weight and renal function, continue toprol as per cardiology   #Met Acidosis: like CHF and uremia, Bicarb     Rest of management as per resident.     Code: full code   Activity: as tolerated  VTE PPx: Eliquis 2.5

## 2025-02-17 NOTE — DIETITIAN INITIAL EVALUATION ADULT - NS FNS REASON FOR WEIGHT CHANG
Pt reports  pounds & reports weight stability PTA. Noted weights during this admission as follows: (2/13) 132.9 pounds, (2/14) 133.3 pounds, (2/15) 133.3 pounds, (2/17) 134 pounds. Weight slightly uptrending likely 2/2 fluid shifts; trend as able

## 2025-02-17 NOTE — DIETITIAN INITIAL EVALUATION ADULT - OTHER CALCULATIONS
Protein recommendations based upon assumption of HD initiation. May require alteration if patient does not begin HD.  pounds. Pt is 121% IBW. Defer total fluids to medical team

## 2025-02-17 NOTE — DIETITIAN INITIAL EVALUATION ADULT - NSFNSADHERENCEPTAFT_GEN_A_CORE
-Pt with hx of HF, CKD, HTN  -States she was not following any therapeutic diets PTA  -States she was not informed of any nutrition recommendations regarding CKD  -Provided diet education regarding recommendations for phosphorous & sodium restrictions. Pt appeared largely disinterested & stated she will take "the papers". Provided with food list for phosphorous contents of foods.

## 2025-02-17 NOTE — DIETITIAN INITIAL EVALUATION ADULT - PERTINENT LABORATORY DATA
02-17    142  |  96  |  99[H]  ----------------------------<  116[H]  4.0   |  23  |  8.08[H]    Ca    7.1[L]      17 Feb 2025 07:01  Phos  6.2     02-17  Mg     2.5     02-17

## 2025-02-17 NOTE — DIETITIAN INITIAL EVALUATION ADULT - ADD RECOMMEND
1) Continue to monitor intake, weight, labs, GI tolerance, skin integrity  2) Provide food preferences within dietary restrictions when feasible   3) Encourage good PO intake

## 2025-02-17 NOTE — DIETITIAN INITIAL EVALUATION ADULT - ETIOLOGY
increased metabolic demands in the setting of catabolic process limited previous exposure to nutrition related recommendations

## 2025-02-17 NOTE — DIETITIAN INITIAL EVALUATION ADULT - PERSON TAUGHT/METHOD
Provided brief diet education regarding ESRD on HD given pt may initiate HD during this admission. Education included importance of monitoring intake of foods high in potassium/phosphorous/sodium. Provided with food list of phosphorous contents of foods from [employer]/verbal instruction/written material/patient instructed

## 2025-02-17 NOTE — DIETITIAN INITIAL EVALUATION ADULT - PERTINENT MEDS FT
MEDICATIONS  (STANDING):  apixaban 2.5 milliGRAM(s) Oral two times a day  buMETAnide IVPB 4 milliGRAM(s) IV Intermittent every 12 hours  influenza  Vaccine (HIGH DOSE) 0.5 milliLiter(s) IntraMuscular once  metoprolol succinate ER 50 milliGRAM(s) Oral <User Schedule>  metoprolol succinate  milliGRAM(s) Oral <User Schedule>  sevelamer carbonate 800 milliGRAM(s) Oral three times a day with meals  sodium bicarbonate 650 milliGRAM(s) Oral four times a day    MEDICATIONS  (PRN):  acetaminophen     Tablet .. 650 milliGRAM(s) Oral every 6 hours PRN Temp greater or equal to 38C (100.4F), Mild Pain (1 - 3)  melatonin 3 milliGRAM(s) Oral at bedtime PRN Insomnia

## 2025-02-18 LAB
ANION GAP SERPL CALC-SCNC: 20 MMOL/L — HIGH (ref 5–17)
BUN SERPL-MCNC: 92 MG/DL — HIGH (ref 7–23)
CALCIUM SERPL-MCNC: 7.8 MG/DL — LOW (ref 8.4–10.5)
CHLORIDE SERPL-SCNC: 99 MMOL/L — SIGNIFICANT CHANGE UP (ref 96–108)
CO2 SERPL-SCNC: 22 MMOL/L — SIGNIFICANT CHANGE UP (ref 22–31)
CREAT SERPL-MCNC: 7.55 MG/DL — HIGH (ref 0.5–1.3)
EGFR: 5 ML/MIN/1.73M2 — LOW
EGFR: 5 ML/MIN/1.73M2 — LOW
GLUCOSE SERPL-MCNC: 111 MG/DL — HIGH (ref 70–99)
HCT VFR BLD CALC: 30.2 % — LOW (ref 34.5–45)
HGB BLD-MCNC: 9.2 G/DL — LOW (ref 11.5–15.5)
MAGNESIUM SERPL-MCNC: 2.5 MG/DL — SIGNIFICANT CHANGE UP (ref 1.6–2.6)
MCHC RBC-ENTMCNC: 30.2 PG — SIGNIFICANT CHANGE UP (ref 27–34)
MCHC RBC-ENTMCNC: 30.5 G/DL — LOW (ref 32–36)
MCV RBC AUTO: 99 FL — SIGNIFICANT CHANGE UP (ref 80–100)
NRBC BLD AUTO-RTO: 0 /100 WBCS — SIGNIFICANT CHANGE UP (ref 0–0)
PHOSPHATE SERPL-MCNC: 6.6 MG/DL — HIGH (ref 2.5–4.5)
PLATELET # BLD AUTO: 135 K/UL — LOW (ref 150–400)
POTASSIUM SERPL-MCNC: 4.5 MMOL/L — SIGNIFICANT CHANGE UP (ref 3.5–5.3)
POTASSIUM SERPL-SCNC: 4.5 MMOL/L — SIGNIFICANT CHANGE UP (ref 3.5–5.3)
RBC # BLD: 3.05 M/UL — LOW (ref 3.8–5.2)
RBC # FLD: 17.5 % — HIGH (ref 10.3–14.5)
SODIUM SERPL-SCNC: 141 MMOL/L — SIGNIFICANT CHANGE UP (ref 135–145)
WBC # BLD: 6.6 K/UL — SIGNIFICANT CHANGE UP (ref 3.8–10.5)
WBC # FLD AUTO: 6.6 K/UL — SIGNIFICANT CHANGE UP (ref 3.8–10.5)

## 2025-02-18 PROCEDURE — 99233 SBSQ HOSP IP/OBS HIGH 50: CPT | Mod: 57

## 2025-02-18 PROCEDURE — 99233 SBSQ HOSP IP/OBS HIGH 50: CPT | Mod: GC

## 2025-02-18 PROCEDURE — 99232 SBSQ HOSP IP/OBS MODERATE 35: CPT | Mod: GC

## 2025-02-18 PROCEDURE — 99233 SBSQ HOSP IP/OBS HIGH 50: CPT

## 2025-02-18 RX ORDER — DESMOPRESSIN ACETATE 4 UG/ML
20 INJECTION INTRAVENOUS ONCE
Refills: 0 | Status: DISCONTINUED | OUTPATIENT
Start: 2025-02-18 | End: 2025-02-22

## 2025-02-18 RX ORDER — DESMOPRESSIN ACETATE 4 UG/ML
0.2 INJECTION INTRAVENOUS ONCE
Refills: 0 | Status: DISCONTINUED | OUTPATIENT
Start: 2025-02-18 | End: 2025-02-18

## 2025-02-18 RX ADMIN — Medication 650 MILLIGRAM(S): at 12:18

## 2025-02-18 RX ADMIN — SEVELAMER HYDROCHLORIDE 800 MILLIGRAM(S): 800 TABLET ORAL at 12:19

## 2025-02-18 RX ADMIN — BUMETANIDE 132 MILLIGRAM(S): 1 TABLET ORAL at 05:22

## 2025-02-18 RX ADMIN — METOPROLOL SUCCINATE 50 MILLIGRAM(S): 50 TABLET, EXTENDED RELEASE ORAL at 16:22

## 2025-02-18 RX ADMIN — Medication 3 MILLIGRAM(S): at 21:11

## 2025-02-18 RX ADMIN — Medication 650 MILLIGRAM(S): at 16:22

## 2025-02-18 RX ADMIN — Medication 650 MILLIGRAM(S): at 05:23

## 2025-02-18 RX ADMIN — SEVELAMER HYDROCHLORIDE 800 MILLIGRAM(S): 800 TABLET ORAL at 08:08

## 2025-02-18 RX ADMIN — SEVELAMER HYDROCHLORIDE 800 MILLIGRAM(S): 800 TABLET ORAL at 16:39

## 2025-02-18 RX ADMIN — METOPROLOL SUCCINATE 100 MILLIGRAM(S): 50 TABLET, EXTENDED RELEASE ORAL at 05:23

## 2025-02-18 RX ADMIN — APIXABAN 2.5 MILLIGRAM(S): 2.5 TABLET, FILM COATED ORAL at 05:30

## 2025-02-18 RX ADMIN — BUMETANIDE 132 MILLIGRAM(S): 1 TABLET ORAL at 16:21

## 2025-02-18 NOTE — PROGRESS NOTE ADULT - SUBJECTIVE AND OBJECTIVE BOX
*****Structural Heart Team*****    Subjective:     Patient resting comfortably in bed, offering no complaints.      PAST MEDICAL & SURGICAL HISTORY:  Renal Calculus    Ureteral Calculus    Acute Renal Failure  9/2009    Wrist Fracture  CYNTHIA    Collar Bone Fracture    Rib Fractures    Kidney Stone removal  3/15/2011    Atrial fibrillation with RVR    C Section    Percutaneous Stone Extraction  Right    S/P Left Percutaneuos Stone extraction  01/26/2010, 04/20/2010    History of cardioversion          T(C): 36.8 (02-18-25 @ 11:48), Max: 36.8 (02-18-25 @ 11:48)  HR: 99 (02-18-25 @ 16:20) (89 - 108)  BP: 127/85 (02-18-25 @ 16:20) (106/68 - 133/91)  RR: 16 (02-18-25 @ 11:48) (16 - 18)  SpO2: 95% (02-18-25 @ 11:48) (95% - 98%)  Wt(kg): --  02-17 @ 07:01  -  02-18 @ 07:00  --------------------------------------------------------  IN: 720 mL / OUT: 0 mL / NET: 720 mL    02-18 @ 07:01  -  02-18 @ 17:38  --------------------------------------------------------  IN: 240 mL / OUT: 200 mL / NET: 40 mL      MEDICATIONS  (STANDING):  buMETAnide IVPB 4 milliGRAM(s) IV Intermittent every 12 hours  desmopressin IVPB 20 MICROGram(s) IV Intermittent once  influenza  Vaccine (HIGH DOSE) 0.5 milliLiter(s) IntraMuscular once  metoprolol succinate ER 50 milliGRAM(s) Oral <User Schedule>  metoprolol succinate  milliGRAM(s) Oral <User Schedule>  sevelamer carbonate 800 milliGRAM(s) Oral three times a day with meals  sodium bicarbonate 650 milliGRAM(s) Oral four times a day    MEDICATIONS  (PRN):  acetaminophen     Tablet .. 650 milliGRAM(s) Oral every 6 hours PRN Temp greater or equal to 38C (100.4F), Mild Pain (1 - 3)  melatonin 3 milliGRAM(s) Oral at bedtime PRN Insomnia      Review of Symptoms:  Constitutional: Awake, Alert, Follows commands  Respiratory: + SOB, + BOO  Cardiac: Denies CP, Denies Palpitations  Gastrointestinal: Denies Pain, Denies N/V, tolerating po intake  Vascular: Negative  Extremities: + Edema, No joint pain or swelling  Neurological: Negative  Endocrine: No heat or cold intolerance, No excessive thirst  Heme/Onc: Negative    Exam:  General: A/Ox3, ZHOU  HEENT: Supple, No JVD, Trachea midline, no masses  Pulmonary: CTAB, = Chest Excursion, no accessory muscle use  Cor: S1S2, Irregular, II/VI MARYJO  ECG: AFib  Gastrointestinal: Soft, NT/ND, + Bowel Sounds  Neuro: = motor and sensory B/L, No focal deficits  Vascular: 1+ pedal edema  Extremities: No joint pain or swelling  Skin: Warm/Dry/Normal color, Normal turgor, no rashes                          9.2    6.60  )-----------( 135      ( 18 Feb 2025 06:10 )             30.2   02-18    141  |  99  |  92[H]  ----------------------------<  111[H]  4.5   |  22  |  7.55[H]    Ca    7.8[L]      18 Feb 2025 06:11  Phos  6.6     02-18  Mg     2.5     02-18        Imaging Reviewed:    Echocardiogram:    Cardiac Catheterization:        Assesment/Plan:  73 y/o female with Severe MR, HFrEF, Acute on Chronic Kidney disease    1.) Severe MR: Patient should continue to be medically optimized. Once she is optimized, we will repeat her TTE to better evaluate her valve. Currently being followed by Renal who is starting HD, EP for her AFib.  2.) Acute on Chronic Renal Injury: Patient getting HD catheter today, will start HD tomorrow as per Renal Team  3.) AFib: Currently rate controlled. EP input appreciated  4.) Ambulate as tolerated      NEIDA Ruth  11996 None

## 2025-02-18 NOTE — PROGRESS NOTE ADULT - PROBLEM SELECTOR PLAN 2
Admitted for cardioversion but unable to do because NICO on CKD.  -Decrease eliquis to 2.5 mg BID for weight and NICO   -Increase toprol to 100 mg in morning and 50 mg in evening    -EP following, recommend d/c amiodarone given plan for tunnel cath and worsening kidney function Admitted for cardioversion but unable to do because NICO on CKD.  -D/c eliquis 2.5 mg BID 24 hrs prior to procedure   -C/w toprol to 100 mg in morning and 50 mg in evening    -EP recommend d/c amiodarone given plan for tunnel cath and worsening kidney function

## 2025-02-18 NOTE — PROGRESS NOTE ADULT - ASSESSMENT
74-year-old female with PMHx of HTN, CKD stage 5 (pt. of Dr. Elizondo), Afib on Eliquis, anemia, nephrolithiasis, and recently diagnosed HF who presents for scheduled Afib ablation for Afib found to have NICO.    Nephrology consulted for NICO on CKD V.    Acute kidney injury superimposed on CKD-5 iso possibly over-diuresis (increased from lasix 80mg BID to torsemide 100mg BID 1/31/24)  -Pt. with hx of advanced CKD is in the setting of chronic history of nephrolithiasis/staghorn calculus. On review of labs on [facility], last Scr was elevated at 6.18 on 1/30/25. Admission SCr is elevated at 8.42 without electrolyte abnormalities. Prior renal US 1/8/25 without hydronephrosis but calcifications, stones and cysts bilaterally.   USG - Kidney and bladder showed Bilateral renal calculi, no Hydronephrosis, B/L renal parenchymal disease. UPCR 4.9  RECS:  -Pt is uremic and needs HD tomorrow, plan for tunnelled HD cath and start HD tomorrow  -Pt was explained in detail about the need of HD. She will need tunnelled HD cath for now and then AVF. IR consulted for HD cath placement on 2/19  - would give ddavp 20mcg pre HD cath insertion tomorrow    Anemia iso CKD  -Obtain ferritin and iron panel       Shane Mauricio MD  Office   Contact me directly via [employer] Teams     (After 5 pm or on weekends please page the on-call fellow/attending, can check [email or web address] for schedule. Login is dominick pierre, schedule under [facility] medicine, psych, derm)

## 2025-02-18 NOTE — PROGRESS NOTE ADULT - PROBLEM SELECTOR PLAN 6
DVT ppx: dose reduced eliquis   Diet: Dash diet   Dispo: pending clinical course DVT ppx: scds   Diet: Dash diet   Dispo: pending clinical course

## 2025-02-18 NOTE — PROGRESS NOTE ADULT - SUBJECTIVE AND OBJECTIVE BOX
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 Possible plan to start HD; IR consulted     (17 Feb 2025 13:59)      Follow Up: af manjula    Interim history: The patient reports no new symptoms.  Denies chest discomfort and shortness of breath.  No abdominal pain.  No new neurologic symptoms.      MEDICATIONS  (STANDING):  buMETAnide IVPB 4 milliGRAM(s) IV Intermittent every 12 hours  influenza  Vaccine (HIGH DOSE) 0.5 milliLiter(s) IntraMuscular once  metoprolol succinate ER 50 milliGRAM(s) Oral <User Schedule>  metoprolol succinate  milliGRAM(s) Oral <User Schedule>  sevelamer carbonate 800 milliGRAM(s) Oral three times a day with meals  sodium bicarbonate 650 milliGRAM(s) Oral four times a day    MEDICATIONS  (PRN):  acetaminophen     Tablet .. 650 milliGRAM(s) Oral every 6 hours PRN Temp greater or equal to 38C (100.4F), Mild Pain (1 - 3)  melatonin 3 milliGRAM(s) Oral at bedtime PRN Insomnia      REVIEW OF SYSTEMS:  eye, ent, GI, , allergic, dermatologic, musculoskeletal and neurologic are negative except as described above    Vital Signs Last 24 Hrs  T(C): 36.3 (18 Feb 2025 05:39), Max: 36.4 (17 Feb 2025 21:02)  T(F): 97.4 (18 Feb 2025 05:39), Max: 97.6 (17 Feb 2025 21:02)  HR: 108 (18 Feb 2025 05:39) (97 - 108)  BP: 133/91 (18 Feb 2025 05:39) (118/82 - 141/104)  BP(mean): --  RR: 18 (18 Feb 2025 05:39) (16 - 18)  SpO2: 96% (18 Feb 2025 05:39) (95% - 98%)    Parameters below as of 18 Feb 2025 05:39  Patient On (Oxygen Delivery Method): room air        I&O's Summary    17 Feb 2025 07:01  -  18 Feb 2025 07:00  --------------------------------------------------------  IN: 720 mL / OUT: 0 mL / NET: 720 mL        Telemetry past 24h: af controlled    PHYSICAL EXAM:    Constitutional: well-nourished, well-developed, NAD   HEENT:  MMM, sclerae anicteric, conjunctivae clear, no oral cyanosis.  Pulmonary: Non-labored, breath sounds are clear bilaterally, No wheezing, rales or rhonchi  Cardiovascular: irregular, S1 and S2.  No murmur.  No rubs, gallops or clicks  Gastrointestinal: Bowel Sounds present, soft, nontender.   Lymph: 1-2+ peripheral edema.   Neurological: Alert, no focal deficits  Skin: No rashes.  Psych:  Mood & affect appropriate    LABS: All Labs Reviewed:                        9.2    6.60  )-----------( 135      ( 18 Feb 2025 06:10 )             30.2                         9.3    5.60  )-----------( 135      ( 17 Feb 2025 07:03 )             30.2                         8.9    4.60  )-----------( 130      ( 16 Feb 2025 10:17 )             29.2     18 Feb 2025 06:11    141    |  99     |  92     ----------------------------<  111    4.5     |  22     |  7.55   17 Feb 2025 07:01    142    |  96     |  99     ----------------------------<  116    4.0     |  23     |  8.08   16 Feb 2025 10:17    142    |  97     |  102    ----------------------------<  97     4.0     |  23     |  8.22     Ca    7.8        18 Feb 2025 06:11  Ca    7.1        17 Feb 2025 07:01  Ca    6.9        16 Feb 2025 10:17  Phos  6.6       18 Feb 2025 06:11  Phos  6.2       17 Feb 2025 07:01  Phos  6.6       16 Feb 2025 10:17  Mg     2.5       18 Feb 2025 06:11  Mg     2.5       17 Feb 2025 07:01  Mg     2.5       16 Feb 2025 10:17            Blood Culture:        RADIOLOGY:    EKG:    Echo:

## 2025-02-18 NOTE — PROGRESS NOTE ADULT - ASSESSMENT
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       #Acute kidney injury superimposed on CKD.    Found to have worsening renal function on admission so procedure cancelled. Baseline 4.6 in Aug 2024 and 5.5 in 1/25. Cr on admission 2/12 was 8.42.   - Cr continued to rise to 8.71 now down to 7's  - On Bumex 4mg IV BID for diurese but Is and Os document 0ml of urine  - RAP elevated to 15 on TTE from 2/13 and she appears significantly volume up on exam  -Plan for Permacath on Wednesday  -On Bumex 4mg IV BID poor UOP noted    #  Paroxysmal atrial fibrillation.   ·  Plan: Admitted for cardioversion but unable to do because given significant volume overload  -C/w home eliquis 5 mg BID   -C/w home metoprolol 50 mg BID, would increase dose to 100mg in the AM, 50mg in the PM for rate control  - Off Amiodarone    # Chronic heart failure with preserved ejection fraction.   ·TTE in 11/2024 showed progression to moderate to severe MR.  Left ventricular systolic function was normal with an ejection fraction of 55 % by Garcia's method of disks.  -TTE now with LVEF of 41%, global hypokinesis, severe MR, RAP of 15.   -Structural Heart evaluation noted. Will aggressively diurese and repeat TTE when euvolemic  Will be very difficult to diurese to euvolemia without HD but will continue with high dose Bumex for now  Eventual GDMT  No recent ischemic eval noted. Will need to reassess LVEF once in NSR

## 2025-02-18 NOTE — PROGRESS NOTE ADULT - ATTENDING COMMENTS
74F w/ severe MR, AFib on Eliquis, HFpEF, CKD and recent admission 1/7-9 for AF w/ RVR and decompensated HF who presented for planned DCCV, found to have NICO on CKD and admitted to medicine for further work up. Concern for CKD progression (baseline SCr 5-6) vs NICO on CKD. Worsening LE edema despite torsemide 100 mg bid, now on bumex 4 IV BID. Nephrology consulted, plan for HD initiation. IR permacath scheduled 2/19. TTE with EF 41%, WMA, severe MR, progressive from 11/2024 echo. Cardiology and structural cardiology following. Will need eventual TTE/angiograms after adequate fluid removal. F/u cards re timing inpatient vs outpatient. EP following re Afib - DCCV pending renal optimization and if no plans for AVF placement w/n 30d.

## 2025-02-18 NOTE — PROGRESS NOTE ADULT - ASSESSMENT
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

## 2025-02-18 NOTE — PROGRESS NOTE ADULT - PROBLEM SELECTOR PLAN 1
Hx/o CKD iso b/l staghorn calculus s/p removal (2009) (follows with Dr. Elizondo). Found to have worsening renal function on admission so procedure cancelled. Baseline 4.6 in Aug 2024 and 5.5 in 1/25. Cr on admission 2/12 was 8.42.   - Strict I/Os, daily weights  - Trend BMPs, monitor renal function, renally dose meds, avoid nephrotoxins   - Nephro consulted, recommend bumex 4 mg IV BID and patient agreed to dialysis - IR consulted and plan for cath on 2/19 Hx/o CKD iso b/l staghorn calculus s/p removal (2009) (follows with Dr. Elizondo). Found to have worsening renal function on admission so procedure cancelled. Baseline 4.6 in Aug 2024 and 5.5 in 1/25. Cr on admission 2/12 was 8.42.   - Strict I/Os, daily weights  - Trend BMPs, monitor renal function, renally dose meds, avoid nephrotoxins   - Nephro consulted, recommend bumex 4 mg IV BID and patient agreed to dialysis - IR consulted and plan for cath on 2/19  - NPO at midnight and 4 am labs

## 2025-02-18 NOTE — PROGRESS NOTE ADULT - SUBJECTIVE AND OBJECTIVE BOX
GASPERMARIAMADRIENNE HOPKINS  74y  Female    Complaints:  Subjective:     No acute o/n events. Patient this morning is resting comfortably. Denies any chest pain, trouble breathing, urinary symptoms, abdominal pain.    FAMILY HISTORY:    74yVital Signs Last 24 Hrs  T(C): 36.3 (18 Feb 2025 05:39), Max: 36.4 (17 Feb 2025 21:02)  T(F): 97.4 (18 Feb 2025 05:39), Max: 97.6 (17 Feb 2025 21:02)  HR: 108 (18 Feb 2025 05:39) (97 - 108)  BP: 133/91 (18 Feb 2025 05:39) (118/82 - 141/104)  BP(mean): --  RR: 18 (18 Feb 2025 05:39) (16 - 18)  SpO2: 96% (18 Feb 2025 05:39) (95% - 98%)    Parameters below as of 18 Feb 2025 05:39  Patient On (Oxygen Delivery Method): room air    PHYSICAL EXAM:  GENERAL: NAD  HEAD:  Atraumatic  EYES: EOMI  ENMT: Moist mucous membranes, Good dentition  NERVOUS SYSTEM:  Alert & Oriented X3, Good concentration  CHEST/LUNG: Clear to percussion bilaterally; No rales, rhonchi, wheezing  HEART: Irregularly irregular   ABDOMEN: Soft, Nontender, Nondistended  EXTREMITIES:  +2 pitting edema bilaterally     Consultant(s) Notes Reviewed:  [x ] YES  [ ] NO  Care Discussed with Consultants/Other Providers [ x] YES  [ ] NO    LABS:                        9.2    6.60  )-----------( 135      ( 18 Feb 2025 06:10 )             30.2       02-18    141  |  99  |  92[H]  ----------------------------<  111[H]  4.5   |  22  |  7.55[H]    Ca    7.8[L]      18 Feb 2025 06:11  Phos  6.6     02-18  Mg     2.5     02-18                Urinalysis Basic - ( 18 Feb 2025 06:11 )    Color: x / Appearance: x / SG: x / pH: x  Gluc: 111 mg/dL / Ketone: x  / Bili: x / Urobili: x   Blood: x / Protein: x / Nitrite: x   Leuk Esterase: x / RBC: x / WBC x   Sq Epi: x / Non Sq Epi: x / Bacteria: x    CAPILLARY BLOOD GLUCOSE    Female  RADIOLOGY & ADDITIONAL TESTS:  no interval imaging     MedsMEDICATIONS  (STANDING):  apixaban 2.5 milliGRAM(s) Oral two times a day  buMETAnide IVPB 4 milliGRAM(s) IV Intermittent every 12 hours  influenza  Vaccine (HIGH DOSE) 0.5 milliLiter(s) IntraMuscular once  metoprolol succinate ER 50 milliGRAM(s) Oral <User Schedule>  metoprolol succinate  milliGRAM(s) Oral <User Schedule>  sevelamer carbonate 800 milliGRAM(s) Oral three times a day with meals  sodium bicarbonate 650 milliGRAM(s) Oral four times a day    MEDICATIONS  (PRN):  acetaminophen     Tablet .. 650 milliGRAM(s) Oral every 6 hours PRN Temp greater or equal to 38C (100.4F), Mild Pain (1 - 3)  melatonin 3 milliGRAM(s) Oral at bedtime PRN Insomnia      HEALTH ISSUES - PROBLEM Dx:  Acute kidney injury superimposed on CKD    Paroxysmal atrial fibrillation    Chronic HFrEF (heart failure with reduced ejection fraction)    Metabolic acidosis    Hypertension    Need for prophylactic measure    Severe mitral regurgitation

## 2025-02-18 NOTE — PROGRESS NOTE ADULT - SUBJECTIVE AND OBJECTIVE BOX
[facility] DIVISION OF KIDNEY DISEASES AND HYPERTENSION -- FOLLOW UP NOTE  --------------------------------------------------------------------------------  Chief Complaint:    24 hour events/subjective:        PAST HISTORY  --------------------------------------------------------------------------------  No significant changes to PMH, PSH, FHx, SHx, unless otherwise noted    ALLERGIES & MEDICATIONS  --------------------------------------------------------------------------------  Allergies    No Known Allergies    Intolerances      Standing Inpatient Medications  buMETAnide IVPB 4 milliGRAM(s) IV Intermittent every 12 hours  influenza  Vaccine (HIGH DOSE) 0.5 milliLiter(s) IntraMuscular once  metoprolol succinate ER 50 milliGRAM(s) Oral <User Schedule>  metoprolol succinate  milliGRAM(s) Oral <User Schedule>  sevelamer carbonate 800 milliGRAM(s) Oral three times a day with meals  sodium bicarbonate 650 milliGRAM(s) Oral four times a day    PRN Inpatient Medications  acetaminophen     Tablet .. 650 milliGRAM(s) Oral every 6 hours PRN  melatonin 3 milliGRAM(s) Oral at bedtime PRN      REVIEW OF SYSTEMS  --------------------------------------------------------------------------------  Gen: No weight changes, fatigue, fevers/chills, weakness  Skin: No rashes  Head/Eyes/Ears/Mouth: No headache; Normal hearing; Normal vision w/o blurriness; No sinus pain/discomfort, sore throat  Respiratory: No dyspnea, cough, wheezing, hemoptysis  CV: No chest pain, PND, orthopnea  GI: No abdominal pain, diarrhea, constipation, nausea, vomiting, melena, hematochezia  : No increased frequency, dysuria, hematuria, nocturia  MSK: No joint pain/swelling; no back pain; no edema  Neuro: No dizziness/lightheadedness, weakness, seizures, numbness, tingling  Heme: No easy bruising or bleeding  Endo: No heat/cold intolerance  Psych: No significant nervousness, anxiety, stress, depression    All other systems were reviewed and are negative, except as noted.    VITALS/PHYSICAL EXAM  --------------------------------------------------------------------------------  T(C): 36.3 (02-18-25 @ 05:39), Max: 36.4 (02-17-25 @ 21:02)  HR: 108 (02-18-25 @ 05:39) (97 - 108)  BP: 133/91 (02-18-25 @ 05:39) (118/82 - 141/104)  RR: 18 (02-18-25 @ 05:39) (16 - 18)  SpO2: 96% (02-18-25 @ 05:39) (95% - 98%)  Wt(kg): --        02-17-25 @ 07:01  -  02-18-25 @ 07:00  --------------------------------------------------------  IN: 720 mL / OUT: 0 mL / NET: 720 mL      Physical Exam:  	Gen: NAD, well-appearing  	HEENT: PERRL, supple neck, clear oropharynx  	Pulm: CTA B/L  	CV: RRR, S1S2; no rub  	Back: No spinal or CVA tenderness; no sacral edema  	Abd: +BS, soft, nontender/nondistended  	: No suprapubic tenderness  	UE: Warm, FROM, no clubbing, intact strength; no edema; no asterixis  	LE: Warm, FROM, no clubbing, intact strength; no edema  	Neuro: No focal deficits, intact gait  	Psych: Normal affect and mood  	Skin: Warm, without rashes  	Vascular access:    LABS/STUDIES  --------------------------------------------------------------------------------              9.2    6.60  >-----------<  135      [02-18-25 @ 06:10]              30.2     141  |  99  |  92  ----------------------------<  111      [02-18-25 @ 06:11]  4.5   |  22  |  7.55        Ca     7.8     [02-18-25 @ 06:11]      Mg     2.5     [02-18-25 @ 06:11]      Phos  6.6     [02-18-25 @ 06:11]            Creatinine Trend:  SCr 7.55 [02-18 @ 06:11]  SCr 8.08 [02-17 @ 07:01]  SCr 8.22 [02-16 @ 10:17]  SCr 8.35 [02-15 @ 06:04]  SCr 8.71 [02-14 @ 07:21]    Urinalysis - [02-18-25 @ 06:11]      Color  / Appearance  / SG  / pH       Gluc 111 / Ketone   / Bili  / Urobili        Blood  / Protein  / Leuk Est  / Nitrite       RBC  / WBC  / Hyaline  / Gran  / Sq Epi  / Non Sq Epi  / Bacteria     Urine Creatinine 46      [02-13-25 @ 15:43]  Urine Protein 227      [02-13-25 @ 15:43]  Urine Sodium 81      [02-13-25 @ 15:43]  Urine Urea Nitrogen 334      [02-13-25 @ 15:43]  Urine Potassium 18      [02-13-25 @ 15:43]  Urine Osmolality 319      [02-13-25 @ 15:43]    Iron 61, TIBC 301, %sat 20      [11-22-24 @ 21:13]  Ferritin 101      [11-22-24 @ 21:13]  PTH -- (Ca 8.1)      [11-22-24 @ 21:13]   595  TSH 0.65      [04-09-24 @ 00:27]

## 2025-02-19 LAB
ALBUMIN SERPL ELPH-MCNC: 4.1 G/DL — SIGNIFICANT CHANGE UP (ref 3.3–5)
ALP SERPL-CCNC: 90 U/L — SIGNIFICANT CHANGE UP (ref 40–120)
ALT FLD-CCNC: 9 U/L — LOW (ref 10–45)
ANION GAP SERPL CALC-SCNC: 25 MMOL/L — HIGH (ref 5–17)
APTT BLD: 24.3 SEC — LOW (ref 24.5–35.6)
AST SERPL-CCNC: 11 U/L — SIGNIFICANT CHANGE UP (ref 10–40)
BASOPHILS # BLD AUTO: 0.05 K/UL — SIGNIFICANT CHANGE UP (ref 0–0.2)
BASOPHILS NFR BLD AUTO: 0.6 % — SIGNIFICANT CHANGE UP (ref 0–2)
BILIRUB SERPL-MCNC: 0.6 MG/DL — SIGNIFICANT CHANGE UP (ref 0.2–1.2)
BUN SERPL-MCNC: 97 MG/DL — HIGH (ref 7–23)
CALCIUM SERPL-MCNC: 8 MG/DL — LOW (ref 8.4–10.5)
CHLORIDE SERPL-SCNC: 95 MMOL/L — LOW (ref 96–108)
CO2 SERPL-SCNC: 20 MMOL/L — LOW (ref 22–31)
CREAT SERPL-MCNC: 7.54 MG/DL — HIGH (ref 0.5–1.3)
EGFR: 5 ML/MIN/1.73M2 — LOW
EGFR: 5 ML/MIN/1.73M2 — LOW
EOSINOPHIL # BLD AUTO: 0.05 K/UL — SIGNIFICANT CHANGE UP (ref 0–0.5)
EOSINOPHIL NFR BLD AUTO: 0.6 % — SIGNIFICANT CHANGE UP (ref 0–6)
GLUCOSE SERPL-MCNC: 125 MG/DL — HIGH (ref 70–99)
HBV SURFACE AG SER-ACNC: SIGNIFICANT CHANGE UP
HCT VFR BLD CALC: 34 % — LOW (ref 34.5–45)
HGB BLD-MCNC: 10.4 G/DL — LOW (ref 11.5–15.5)
IMM GRANULOCYTES NFR BLD AUTO: 0.4 % — SIGNIFICANT CHANGE UP (ref 0–0.9)
INR BLD: 1.09 RATIO — SIGNIFICANT CHANGE UP (ref 0.85–1.16)
IRON SATN MFR SERPL: 44 UG/DL — SIGNIFICANT CHANGE UP (ref 30–160)
LYMPHOCYTES # BLD AUTO: 0.89 K/UL — LOW (ref 1–3.3)
LYMPHOCYTES # BLD AUTO: 10.5 % — LOW (ref 13–44)
MAGNESIUM SERPL-MCNC: 2.6 MG/DL — SIGNIFICANT CHANGE UP (ref 1.6–2.6)
MCHC RBC-ENTMCNC: 30.5 PG — SIGNIFICANT CHANGE UP (ref 27–34)
MCHC RBC-ENTMCNC: 30.6 G/DL — LOW (ref 32–36)
MCV RBC AUTO: 99.7 FL — SIGNIFICANT CHANGE UP (ref 80–100)
MONOCYTES # BLD AUTO: 0.6 K/UL — SIGNIFICANT CHANGE UP (ref 0–0.9)
MONOCYTES NFR BLD AUTO: 7.1 % — SIGNIFICANT CHANGE UP (ref 2–14)
NEUTROPHILS # BLD AUTO: 6.84 K/UL — SIGNIFICANT CHANGE UP (ref 1.8–7.4)
NEUTROPHILS NFR BLD AUTO: 80.8 % — HIGH (ref 43–77)
NRBC BLD AUTO-RTO: 0 /100 WBCS — SIGNIFICANT CHANGE UP (ref 0–0)
PHOSPHATE SERPL-MCNC: 6.7 MG/DL — HIGH (ref 2.5–4.5)
PLATELET # BLD AUTO: 146 K/UL — LOW (ref 150–400)
POTASSIUM SERPL-MCNC: 4.7 MMOL/L — SIGNIFICANT CHANGE UP (ref 3.5–5.3)
POTASSIUM SERPL-SCNC: 4.7 MMOL/L — SIGNIFICANT CHANGE UP (ref 3.5–5.3)
PROT SERPL-MCNC: 6.9 G/DL — SIGNIFICANT CHANGE UP (ref 6–8.3)
PROTHROM AB SERPL-ACNC: 12.4 SEC — SIGNIFICANT CHANGE UP (ref 9.9–13.4)
RBC # BLD: 3.41 M/UL — LOW (ref 3.8–5.2)
RBC # FLD: 17.3 % — HIGH (ref 10.3–14.5)
SODIUM SERPL-SCNC: 140 MMOL/L — SIGNIFICANT CHANGE UP (ref 135–145)
WBC # BLD: 8.46 K/UL — SIGNIFICANT CHANGE UP (ref 3.8–10.5)
WBC # FLD AUTO: 8.46 K/UL — SIGNIFICANT CHANGE UP (ref 3.8–10.5)

## 2025-02-19 PROCEDURE — 99232 SBSQ HOSP IP/OBS MODERATE 35: CPT | Mod: GC

## 2025-02-19 PROCEDURE — 71045 X-RAY EXAM CHEST 1 VIEW: CPT | Mod: 26

## 2025-02-19 PROCEDURE — 77001 FLUOROGUIDE FOR VEIN DEVICE: CPT | Mod: 26

## 2025-02-19 PROCEDURE — 76937 US GUIDE VASCULAR ACCESS: CPT | Mod: 26

## 2025-02-19 PROCEDURE — 36558 INSERT TUNNELED CV CATH: CPT | Mod: RT

## 2025-02-19 PROCEDURE — 99233 SBSQ HOSP IP/OBS HIGH 50: CPT | Mod: GC

## 2025-02-19 RX ORDER — APIXABAN 2.5 MG/1
2.5 TABLET, FILM COATED ORAL
Refills: 0 | Status: DISCONTINUED | OUTPATIENT
Start: 2025-02-19 | End: 2025-02-20

## 2025-02-19 RX ADMIN — METOPROLOL SUCCINATE 50 MILLIGRAM(S): 50 TABLET, EXTENDED RELEASE ORAL at 18:37

## 2025-02-19 RX ADMIN — APIXABAN 2.5 MILLIGRAM(S): 2.5 TABLET, FILM COATED ORAL at 22:52

## 2025-02-19 RX ADMIN — Medication 650 MILLIGRAM(S): at 04:16

## 2025-02-19 RX ADMIN — Medication 650 MILLIGRAM(S): at 22:52

## 2025-02-19 RX ADMIN — SEVELAMER HYDROCHLORIDE 800 MILLIGRAM(S): 800 TABLET ORAL at 18:37

## 2025-02-19 RX ADMIN — Medication 650 MILLIGRAM(S): at 05:15

## 2025-02-19 RX ADMIN — BUMETANIDE 132 MILLIGRAM(S): 1 TABLET ORAL at 05:02

## 2025-02-19 RX ADMIN — Medication 650 MILLIGRAM(S): at 05:02

## 2025-02-19 RX ADMIN — Medication 650 MILLIGRAM(S): at 13:57

## 2025-02-19 RX ADMIN — Medication 650 MILLIGRAM(S): at 00:41

## 2025-02-19 RX ADMIN — METOPROLOL SUCCINATE 100 MILLIGRAM(S): 50 TABLET, EXTENDED RELEASE ORAL at 05:02

## 2025-02-19 RX ADMIN — Medication 650 MILLIGRAM(S): at 18:37

## 2025-02-19 RX ADMIN — BUMETANIDE 132 MILLIGRAM(S): 1 TABLET ORAL at 19:30

## 2025-02-19 RX ADMIN — SEVELAMER HYDROCHLORIDE 800 MILLIGRAM(S): 800 TABLET ORAL at 13:57

## 2025-02-19 NOTE — PROGRESS NOTE ADULT - ASSESSMENT
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

## 2025-02-19 NOTE — PRE-ANESTHESIA EVALUATION ADULT - NSANTHINPATMEDSFT_GEN_ALL_CORE
On bumex 4mg bid last 05:02, Toprol 50mg last yday 16:22, Toprol 100mg last today 05:02, last Eliquis > 48h prior

## 2025-02-19 NOTE — PRE-ANESTHESIA EVALUATION ADULT - NSANTHPMHFT_GEN_ALL_CORE
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  Plan for tunneled HD cath today and to start HD today per nephro

## 2025-02-19 NOTE — PROGRESS NOTE ADULT - ATTENDING COMMENTS
74F w/ severe MR, AFib on Eliquis, HFpEF, CKD and recent admission 1/7-9 for AF w/ RVR and decompensated HF who presented for planned DCCV, found to have NICO on CKD and admitted to medicine for further work up. Concern for CKD progression (baseline SCr 5-6) vs NICO on CKD. Worsening LE edema despite torsemide 100 mg bid, now on bumex 4 IV BID. Nephrology consulted, plan for HD initiation. S/p permacath 2/19. TTE with EF 41%, WMA, severe MR, progressive from 11/2024 echo. Cardiology and structural cardiology following. Will need eventual TTE/angiograms/DCCV after adequate fluid removal. F/u cards and EP re timing inpatient vs outpatient.    DCP home pending outpatient HD set up, cards/EP/structural recs

## 2025-02-19 NOTE — PRE PROCEDURE NOTE - PRE PROCEDURE EVALUATION
Interventional Radiology Pre-Procedure Note    Patient is a 74y old Female with renal failure here for tunneled dialysis catheter placement.     PAST MEDICAL & SURGICAL HISTORY:  Renal Calculus      Ureteral Calculus      Acute Renal Failure  9/2009      Wrist Fracture  CYNTHIA      Collar Bone Fracture      Rib Fractures      Kidney Stone removal  3/15/2011      Atrial fibrillation with RVR      C Section      Percutaneous Stone Extraction  Right      S/P Left Percutaneuos Stone extraction  01/26/2010, 04/20/2010      History of cardioversion           Allergies: No Known Allergies      LABS:                        10.4   8.46  )-----------( 146      ( 19 Feb 2025 04:22 )             34.0     02-19    140  |  95[L]  |  97[H]  ----------------------------<  125[H]  4.7   |  20[L]  |  7.54[H]    Ca    8.0[L]      19 Feb 2025 04:22  Phos  6.7     02-19  Mg     2.6     02-19    TPro  6.9  /  Alb  4.1  /  TBili  0.6  /  DBili  x   /  AST  11  /  ALT  9[L]  /  AlkPhos  90  02-19    PT/INR - ( 19 Feb 2025 04:22 )   PT: 12.4 sec;   INR: 1.09 ratio         PTT - ( 19 Feb 2025 04:22 )  PTT:24.3 sec    T(C): 36.5 (02-19-25 @ 07:40), Max: 36.8 (02-18-25 @ 11:48)  HR: 105 (02-19-25 @ 07:40) (89 - 107)  BP: 130/87 (02-19-25 @ 07:40) (106/68 - 130/87)  RR: 18 (02-19-25 @ 07:40) (16 - 18)  SpO2: 97% (02-19-25 @ 07:40) (95% - 98%)    Procedure/ risks/ benefits were explained, informed consent obtained from patient, verbalizes understanding.

## 2025-02-19 NOTE — PROGRESS NOTE ADULT - PROBLEM SELECTOR PLAN 1
Hx/o CKD iso b/l staghorn calculus s/p removal (2009) (follows with Dr. Elizondo). Found to have worsening renal function on admission so procedure cancelled. Baseline 4.6 in Aug 2024 and 5.5 in 1/25. Cr on admission 2/12 was 8.42.   - Strict I/Os, daily weights  - Trend BMPs, monitor renal function, renally dose meds, avoid nephrotoxins   - Nephro consulted, recommend bumex 4 mg IV BID and patient agreed to dialysis - IR consulted and plan for permacath on 2/19 Hx/o CKD iso b/l staghorn calculus s/p removal (2009) (follows with Dr. Elizondo). Found to have worsening renal function on admission so procedure cancelled. Baseline 4.6 in Aug 2024 and 5.5 in 1/25. Cr on admission 2/12 was 8.42.   - Strict I/Os, daily weights  - Trend BMPs, monitor renal function, renally dose meds, avoid nephrotoxins   - Nephro consulted, recommend bumex 4 mg IV BID and patient agreed to dialysis - IR consulted and plan for permacath on 2/19 and first dialysis today

## 2025-02-19 NOTE — PROGRESS NOTE ADULT - SUBJECTIVE AND OBJECTIVE BOX
LAWRENCE PARSONSE  74y  Female    Complaints:  Subjective:     No acute o/n events. Patient this morning is resting comfortably. Denies any chest pain, SOB, abdominal pain.     FAMILY HISTORY:    74yVital Signs Last 24 Hrs  T(C): 36.4 (19 Feb 2025 04:22), Max: 36.8 (18 Feb 2025 11:48)  T(F): 97.6 (19 Feb 2025 04:22), Max: 98.2 (18 Feb 2025 11:48)  HR: 107 (19 Feb 2025 04:22) (89 - 107)  BP: 128/88 (19 Feb 2025 04:22) (106/68 - 130/87)  BP(mean): --  RR: 18 (19 Feb 2025 04:22) (16 - 18)  SpO2: 98% (19 Feb 2025 04:22) (95% - 98%)    Parameters below as of 19 Feb 2025 04:22  Patient On (Oxygen Delivery Method): room air    PHYSICAL EXAM:  GENERAL: NAD  HEAD:  Atraumatic  EYES: EOMI  ENMT: Moist mucous membranes, Good dentition  NERVOUS SYSTEM:  Alert & Oriented X3, Good concentration  CHEST/LUNG: Clear to percussion bilaterally; No rales, rhonchi, wheezing  HEART: Irregularly irregular   ABDOMEN: Soft, Nontender, Nondistended  EXTREMITIES:  +2 pitting edema bilaterally     Consultant(s) Notes Reviewed:  [x ] YES  [ ] NO  Care Discussed with Consultants/Other Providers [ x] YES  [ ] NO    LABS:                        10.4   8.46  )-----------( 146      ( 19 Feb 2025 04:22 )             34.0       02-19    140  |  95[L]  |  97[H]  ----------------------------<  125[H]  4.7   |  20[L]  |  7.54[H]    Ca    8.0[L]      19 Feb 2025 04:22  Phos  6.7     02-19  Mg     2.6     02-19    TPro  6.9  /  Alb  4.1  /  TBili  0.6  /  DBili  x   /  AST  11  /  ALT  9[L]  /  AlkPhos  90  02-19              Urinalysis Basic - ( 19 Feb 2025 04:22 )    Color: x / Appearance: x / SG: x / pH: x  Gluc: 125 mg/dL / Ketone: x  / Bili: x / Urobili: x   Blood: x / Protein: x / Nitrite: x   Leuk Esterase: x / RBC: x / WBC x   Sq Epi: x / Non Sq Epi: x / Bacteria: x        PT/INR - ( 19 Feb 2025 04:22 )   PT: 12.4 sec;   INR: 1.09 ratio         PTT - ( 19 Feb 2025 04:22 )  PTT:24.3 sec    CAPILLARY BLOOD GLUCOSE    Female  RADIOLOGY & ADDITIONAL TESTS:  no interval imaging     MedsMEDICATIONS  (STANDING):  buMETAnide IVPB 4 milliGRAM(s) IV Intermittent every 12 hours  desmopressin IVPB 20 MICROGram(s) IV Intermittent once  influenza  Vaccine (HIGH DOSE) 0.5 milliLiter(s) IntraMuscular once  metoprolol succinate ER 50 milliGRAM(s) Oral <User Schedule>  metoprolol succinate  milliGRAM(s) Oral <User Schedule>  sevelamer carbonate 800 milliGRAM(s) Oral three times a day with meals  sodium bicarbonate 650 milliGRAM(s) Oral four times a day    MEDICATIONS  (PRN):  acetaminophen     Tablet .. 650 milliGRAM(s) Oral every 6 hours PRN Temp greater or equal to 38C (100.4F), Mild Pain (1 - 3)  melatonin 3 milliGRAM(s) Oral at bedtime PRN Insomnia      HEALTH ISSUES - PROBLEM Dx:  Acute kidney injury superimposed on CKD    Paroxysmal atrial fibrillation    Chronic HFrEF (heart failure with reduced ejection fraction)    Metabolic acidosis    Hypertension    Need for prophylactic measure    Severe mitral regurgitation

## 2025-02-19 NOTE — PROGRESS NOTE ADULT - PROBLEM SELECTOR PLAN 2
Admitted for cardioversion but unable to do because NICO on CKD.  -D/c eliquis 2.5 mg BID 24 hrs prior to procedure   -C/w toprol to 100 mg in morning and 50 mg in evening    -EP recommend d/c amiodarone given plan for tunnel cath and worsening kidney function Admitted for cardioversion but unable to do because NICO on CKD.  -D/c eliquis 2.5 mg BID 24 hrs prior to procedure   -C/w toprol to 100 mg in morning and 50 mg in evening    -EP recommend d/c amiodarone given plan for tunnel cath and worsening kidney function- will touch base if plan for cardioversion after dialysis

## 2025-02-19 NOTE — PROGRESS NOTE ADULT - ASSESSMENT
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       #Acute kidney injury superimposed on CKD.    Found to have worsening renal function on admission so procedure cancelled. Baseline 4.6 in Aug 2024 and 5.5 in 1/25. Cr on admission 2/12 was 8.42.   - Cr continued to rise to 8.71 now down to 7's  - On Bumex 4mg IV BID for diurese but Is and Os document 0ml of urine  - RAP elevated to 15 on TTE from 2/13 and she appears significantly volume up on exam  -Plan for Permacath on Wednesday  -On Bumex 4mg IV BID poor UOP noted  -Scheduled for HD catheter today-No cardiac contraindications for procedure      #  Paroxysmal atrial fibrillation.   ·  Plan: Admitted for cardioversion but unable to do because given significant volume overload  -C/w home eliquis 5 mg BID   -C/w home metoprolol 50 mg BID, would increase dose to 100mg in the AM, 50mg in the PM for rate control  - Off Amiodarone    # Chronic heart failure with preserved ejection fraction. -Severe MR  ·TTE in 11/2024 showed progression to moderate to severe MR.  Left ventricular systolic function was normal with an ejection fraction of 55 % by Garcia's method of disks.  -TTE now with LVEF of 41%, global hypokinesis, severe MR, RAP of 15.   -Structural Heart evaluation noted. Will aggressively diurese and repeat TTE when euvolemic  Will be very difficult to diurese to euvolemia without HD but will continue with high dose Bumex for now  Eventual GDMT  No recent ischemic eval noted. Will need to reassess LVEF once in NSR

## 2025-02-19 NOTE — PROGRESS NOTE ADULT - SUBJECTIVE AND OBJECTIVE BOX
[facility] DIVISION OF KIDNEY DISEASES AND HYPERTENSION -- FOLLOW UP NOTE  --------------------------------------------------------------------------------  Chief Complaint:    24 hour events/subjective:        PAST HISTORY  --------------------------------------------------------------------------------  No significant changes to PMH, PSH, FHx, SHx, unless otherwise noted    ALLERGIES & MEDICATIONS  --------------------------------------------------------------------------------  Allergies    No Known Allergies    Intolerances      Standing Inpatient Medications  buMETAnide IVPB 4 milliGRAM(s) IV Intermittent every 12 hours  desmopressin IVPB 20 MICROGram(s) IV Intermittent once  influenza  Vaccine (HIGH DOSE) 0.5 milliLiter(s) IntraMuscular once  metoprolol succinate ER 50 milliGRAM(s) Oral <User Schedule>  metoprolol succinate  milliGRAM(s) Oral <User Schedule>  sevelamer carbonate 800 milliGRAM(s) Oral three times a day with meals  sodium bicarbonate 650 milliGRAM(s) Oral four times a day    PRN Inpatient Medications  acetaminophen     Tablet .. 650 milliGRAM(s) Oral every 6 hours PRN  melatonin 3 milliGRAM(s) Oral at bedtime PRN      REVIEW OF SYSTEMS  --------------------------------------------------------------------------------  Gen: No weight changes, fatigue, fevers/chills, weakness  Skin: No rashes  Head/Eyes/Ears/Mouth: No headache; Normal hearing; Normal vision w/o blurriness; No sinus pain/discomfort, sore throat  Respiratory: No dyspnea, cough, wheezing, hemoptysis  CV: No chest pain, PND, orthopnea  GI: No abdominal pain, diarrhea, constipation, nausea, vomiting, melena, hematochezia  : No increased frequency, dysuria, hematuria, nocturia  MSK: No joint pain/swelling; no back pain; no edema  Neuro: No dizziness/lightheadedness, weakness, seizures, numbness, tingling  Heme: No easy bruising or bleeding  Endo: No heat/cold intolerance  Psych: No significant nervousness, anxiety, stress, depression    All other systems were reviewed and are negative, except as noted.    VITALS/PHYSICAL EXAM  --------------------------------------------------------------------------------  T(C): 36.2 (02-19-25 @ 09:20), Max: 36.8 (02-18-25 @ 11:48)  HR: 100 (02-19-25 @ 10:35) (89 - 111)  BP: 134/76 (02-19-25 @ 10:35) (106/68 - 135/91)  RR: 14 (02-19-25 @ 10:35) (12 - 22)  SpO2: 95% (02-19-25 @ 10:35) (93% - 100%)  Wt(kg): --  Height (cm): 157.5 (02-19-25 @ 07:40)  Weight (kg): 59.9 (02-19-25 @ 07:40)  BMI (kg/m2): 24.1 (02-19-25 @ 07:40)  BSA (m2): 1.6 (02-19-25 @ 07:40)      02-18-25 @ 07:01  -  02-19-25 @ 07:00  --------------------------------------------------------  IN: 680 mL / OUT: 200 mL / NET: 480 mL      Physical Exam:  	Gen: NAD, well-appearing  	HEENT: PERRL, supple neck, clear oropharynx  	Pulm: CTA B/L  	CV: RRR, S1S2; no rub  	Back: No spinal or CVA tenderness; no sacral edema  	Abd: +BS, soft, nontender/nondistended  	: No suprapubic tenderness  	UE: Warm, FROM, no clubbing, intact strength; no edema; no asterixis  	LE: Warm, FROM, no clubbing, intact strength; no edema  	Neuro: No focal deficits, intact gait  	Psych: Normal affect and mood  	Skin: Warm, without rashes  	Vascular access:    LABS/STUDIES  --------------------------------------------------------------------------------              10.4   8.46  >-----------<  146      [02-19-25 @ 04:22]              34.0     140  |  95  |  97  ----------------------------<  125      [02-19-25 @ 04:22]  4.7   |  20  |  7.54        Ca     8.0     [02-19-25 @ 04:22]      Mg     2.6     [02-19-25 @ 04:22]      Phos  6.7     [02-19-25 @ 04:22]    TPro  6.9  /  Alb  4.1  /  TBili  0.6  /  DBili  x   /  AST  11  /  ALT  9   /  AlkPhos  90  [02-19-25 @ 04:22]    PT/INR: PT 12.4 , INR 1.09       [02-19-25 @ 04:22]  PTT: 24.3       [02-19-25 @ 04:22]      Creatinine Trend:  SCr 7.54 [02-19 @ 04:22]  SCr 7.55 [02-18 @ 06:11]  SCr 8.08 [02-17 @ 07:01]  SCr 8.22 [02-16 @ 10:17]  SCr 8.35 [02-15 @ 06:04]    Urinalysis - [02-19-25 @ 04:22]      Color  / Appearance  / SG  / pH       Gluc 125 / Ketone   / Bili  / Urobili        Blood  / Protein  / Leuk Est  / Nitrite       RBC  / WBC  / Hyaline  / Gran  / Sq Epi  / Non Sq Epi  / Bacteria     Urine Creatinine 46      [02-13-25 @ 15:43]  Urine Protein 227      [02-13-25 @ 15:43]  Urine Sodium 81      [02-13-25 @ 15:43]  Urine Urea Nitrogen 334      [02-13-25 @ 15:43]  Urine Potassium 18      [02-13-25 @ 15:43]  Urine Osmolality 319      [02-13-25 @ 15:43]    Iron 44, TIBC --, %sat --      [02-19-25 @ 04:22]  Ferritin 101      [11-22-24 @ 21:13]  PTH -- (Ca 8.1)      [11-22-24 @ 21:13]   595  TSH 0.65      [04-09-24 @ 00:27]

## 2025-02-19 NOTE — PROVIDER CONTACT NOTE (OTHER) - BACKGROUND
Pt admitted for Atypical A flutter
(Admit Diagnosis) Atypical atrial flutter
Admitted for atypical atrial flutter.
Admitted for atypical atrial flutter.

## 2025-02-19 NOTE — PROGRESS NOTE ADULT - SUBJECTIVE AND OBJECTIVE BOX
MR#[id number]  PATIENT NAME:ELIZABETH PARSONS    DATE OF SERVICE: 02-19-25 @ 06:05  Patient was seen and examined by Aki Carias MD on    02-19-25 @ 06:05 .  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    Of Note:  Confirmed with  who is responsible for pt's care administers her medications consistently.  Her last dose of Eliquis was this am. Given at 10am.      TTE 4/2024  CONCLUSIONS:      1. Left ventricular wall thickness is normal. Left ventricular systolic function is normal with an ejection fraction of 76 % by 3D. There are no regional wall motion abnormalities seen.   2. No evidence of left atrial or left atrial appendage thrombus. Ultrasound enhancing agent was utilized to visualize the left atrial appendage.   3. The left atrium is severely dilated.   4. Moderate mitral regurgitation at a blood pressure of 132/71 mmHg. The mitral regurgitant jet is centrally directed. Mechanism of mitral regurgitation: Apryl Type I (normal leaflet motion with dilated annulus). PISA : later pisa radius 0.4, medial 0.37, Alisaing velocity 38.5cm/sec MR VTI 136cm MR Vmax 548 cm/s. The ERO 0.3sqcm and Reg. Vol 38cc. 3D vena contracta area was 0.35sqcm.   5. No pericardial effusion seen.   6. Normal right ventricular cavity size, with normal wall thickness, and normal systolic function.     (12 Feb 2025 12:50)      INTERIM EVENTS:Patient seen at bedside ,interim events noted.  The patient reports no new symptoms.  Denies chest discomfort and shortness of breath.  No abdominal pain.  No new neurologic symptoms.  2/19-Awake remains in AF-fpr HD catheter placement today    PMH -reviewed admission note, no change since admission  HEART FAILURE: Acute[ ]Chronic[x ] Systolic[ ] Diastolic[ x] Combined Systolic and Diastolic[ ]  CAD[ ] CABG[ ] PCI[ ]  DEVICES[ ] PPM[ ] ICD[ ] ILR[ ]  ATRIAL FIBRILLATION[x ] Paroxysmal[ ] Permanent[ ] CHADS2-[4  ]  NICO[ ] CKD1[ ] CKD2[ ] CKD3[ ] CKD4[ ] ESRD[x ]  COPD[ ] HTN[x ]   DM[ ] Type1[ ] Type 2[ ]   CVA[ ] Paresis[ ]    AMBULATION: Assisted[ ] Cane/walker[ ] Independent[x ]    MEDICATIONS  (STANDING):  buMETAnide IVPB 4 milliGRAM(s) IV Intermittent every 12 hours  desmopressin IVPB 20 MICROGram(s) IV Intermittent once  influenza  Vaccine (HIGH DOSE) 0.5 milliLiter(s) IntraMuscular once  metoprolol succinate ER 50 milliGRAM(s) Oral <User Schedule>  metoprolol succinate  milliGRAM(s) Oral <User Schedule>  sevelamer carbonate 800 milliGRAM(s) Oral three times a day with meals  sodium bicarbonate 650 milliGRAM(s) Oral four times a day    MEDICATIONS  (PRN):  acetaminophen     Tablet .. 650 milliGRAM(s) Oral every 6 hours PRN Temp greater or equal to 38C (100.4F), Mild Pain (1 - 3)  melatonin 3 milliGRAM(s) Oral at bedtime PRN Insomnia            REVIEW OF SYSTEMS:  Constitutional: [ ] fever, [ ]weight loss,  [x ]fatigue [ ]weight gain  Eyes: [ ] visual changes  Respiratory: [ ]shortness of breath;  [ ] cough, [ ]wheezing, [ ]chills, [ ]hemoptysis  Cardiovascular: [ ] chest pain, [ ]palpitations, [ ]dizziness,  [ ]leg swelling[ ]orthopnea[ ]PND  Gastrointestinal: [ ] abdominal pain, [ ]nausea, [ ]vomiting,  [ ]diarrhea [ ]Constipation [ ]Melena  Genitourinary: [ ] dysuria, [ ] hematuria [ ]Zelaya  Neurologic: [ ] headaches [ ] tremors[ ]weakness [ ]Paralysis Right[ ] Left[ ]  Skin: [ ] itching, [ ]burning, [ ] rashes  Endocrine: [ ] heat or cold intolerance  Musculoskeletal: [ ] joint pain or swelling; [ ] muscle, back, or extremity pain  Psychiatric: [ ] depression, [ ]anxiety, [ ]mood swings, or [ ]difficulty sleeping  Hematologic: [ ] easy bruising, [ ] bleeding gums    [ ] All remaining systems negative except as per above.   [ ]Unable to obtain.  [x] No change in ROS since admission      Vital Signs Last 24 Hrs  T(C): 36.4 (19 Feb 2025 04:22), Max: 36.8 (18 Feb 2025 11:48)  T(F): 97.6 (19 Feb 2025 04:22), Max: 98.2 (18 Feb 2025 11:48)  HR: 107 (19 Feb 2025 04:22) (89 - 107)  BP: 128/88 (19 Feb 2025 04:22) (106/68 - 130/87)  RR: 18 (19 Feb 2025 04:22) (16 - 18)  SpO2: 98% (19 Feb 2025 04:22) (95% - 98%)    Parameters below as of 19 Feb 2025 04:22  Patient On (Oxygen Delivery Method): room air      I&O's Summary    17 Feb 2025 07:01  -  18 Feb 2025 07:00  --------------------------------------------------------  IN: 720 mL / OUT: 0 mL / NET: 720 mL    18 Feb 2025 07:01  -  19 Feb 2025 06:05  --------------------------------------------------------  IN: 680 mL / OUT: 200 mL / NET: 480 mL        PHYSICAL EXAM:  General: No acute distress BMI-24  HEENT: EOMI, PERRL  Neck: Supple, [ ] JVD  Lungs: Equal air entry bilaterally; [ ] rales [ ] wheezing [ ] rhonchi  Heart: Irregular rate and rhythm; [x ] murmur   2/6 [ x] systolic [ ] diastolic [ ] radiation[ ] rubs [ ]  gallops  Abdomen: Nontender, bowel sounds present  Extremities: No clubbing, cyanosis, [ ] edema [ ]Pulses  equal and intact  Nervous system:  Alert & Oriented X3, no focal deficits  Psychiatric: Normal affect  Skin: No rashes or lesions    LABS:  02-19    140  |  95[L]  |  97[H]  ----------------------------<  125[H]  4.7   |  20[L]  |  7.54[H]    Ca    8.0[L]      19 Feb 2025 04:22  Phos  6.7     02-19  Mg     2.6     02-19    TPro  6.9  /  Alb  4.1  /  TBili  0.6  /  DBili  x   /  AST  11  /  ALT  9[L]  /  AlkPhos  90  02-19    Creatinine Trend: 7.54<--, 7.55<--, 8.08<--, 8.22<--, 8.35<--, 8.71<--                        10.4   8.46  )-----------( 146      ( 19 Feb 2025 04:22 )             34.0     PT/INR - ( 19 Feb 2025 04:22 )   PT: 12.4 sec;   INR: 1.09 ratio         PTT - ( 19 Feb 2025 04:22 )  PTT:24.3 sec    TTE W or WO Ultrasound Enhancing Agent (02.13.25 @ 11:46) >  CONCLUSIONS:      1. Left ventricular cavity is mildly dilated. Left ventricular systolic function is moderately decreased with an ejection fraction of 41 % by Garcia'smethod of disks. Global left ventricular hypokinesis.   2. Multiple segmental abnormalities exist. See findings.   3. Left atrium is severely dilated.   4. Severe mitral regurgitation. The mitral regurgitant jet is posteriorly directed.   5. No pericardial effusion seen.   6. Mild to moderate tricuspid regurgitation.   7. Compared to the transthoracic echocardiogram performed on 11/27/2024, LVEF has decreased, MR is increased,.   8. Estimated pulmonary artery systolic pressure is 46 mmHg, consistent with mild to moderate pulmonary hypertension.   9. Symmetric mitral valve leaflet tethering.  10. The inferior vena cava is dilated measuring 2.40 cm in diameter, (dilated >2.1cm) with abnormal inspiratory collapse (abnormal <50%) consistent with elevated right atrial pressure (~15, range 10-20mmHg).  11. There is normal LV mass and normal geometry.

## 2025-02-19 NOTE — PROGRESS NOTE ADULT - ASSESSMENT
74-year-old female with PMHx of HTN, CKD stage 5 (pt. of Dr. Elizondo), Afib on Eliquis, anemia, nephrolithiasis, and recently diagnosed HF who presents for scheduled Afib ablation for Afib found to have NICO.    Nephrology consulted for NICO on CKD V.    Acute kidney injury superimposed on CKD-5 iso possibly over-diuresis (increased from lasix 80mg BID to torsemide 100mg BID 1/31/24)  -Pt. with hx of advanced CKD is in the setting of chronic history of nephrolithiasis/staghorn calculus. On review of labs on [place]/ [place] [facility], last Scr was elevated at 6.18 on 1/30/25. Admission SCr is elevated at 8.42 without electrolyte abnormalities. Prior renal US 1/8/25 without hydronephrosis but calcifications, stones and cysts bilaterally.   USG - Kidney and bladder showed Bilateral renal calculi, no Hydronephrosis, B/L renal parenchymal disease. UPCR 4.9  RECS:  -Pt is uremic and needs HD, s/p tunnelled HD cath placement and First HD today      Anemia iso CKD  -Obtain ferritin and iron panel       Shane Mauricio MD  Office   Contact me directly via [employer] Teams     (After 5 pm or on weekends please page the on-call fellow/attending, can check [email or web address] for schedule. Login is dominick pierre, schedule under [facility] medicine, psych, derm)

## 2025-02-20 LAB
ANION GAP SERPL CALC-SCNC: 19 MMOL/L — HIGH (ref 5–17)
BUN SERPL-MCNC: 64 MG/DL — HIGH (ref 7–23)
CALCIUM SERPL-MCNC: 8.3 MG/DL — LOW (ref 8.4–10.5)
CHLORIDE SERPL-SCNC: 95 MMOL/L — LOW (ref 96–108)
CO2 SERPL-SCNC: 24 MMOL/L — SIGNIFICANT CHANGE UP (ref 22–31)
CREAT SERPL-MCNC: 5.47 MG/DL — HIGH (ref 0.5–1.3)
EGFR: 8 ML/MIN/1.73M2 — LOW
EGFR: 8 ML/MIN/1.73M2 — LOW
GLUCOSE SERPL-MCNC: 113 MG/DL — HIGH (ref 70–99)
HAV IGM SER-ACNC: SIGNIFICANT CHANGE UP
HBV CORE AB SER-ACNC: SIGNIFICANT CHANGE UP
HBV CORE IGM SER-ACNC: SIGNIFICANT CHANGE UP
HBV CORE IGM SER-ACNC: SIGNIFICANT CHANGE UP
HBV SURFACE AB SER-ACNC: ABNORMAL
HBV SURFACE AG SER-ACNC: SIGNIFICANT CHANGE UP
HCT VFR BLD CALC: 32.3 % — LOW (ref 34.5–45)
HCV AB S/CO SERPL IA: 0.09 S/CO — SIGNIFICANT CHANGE UP (ref 0–0.79)
HCV AB SERPL-IMP: SIGNIFICANT CHANGE UP
HGB BLD-MCNC: 9.7 G/DL — LOW (ref 11.5–15.5)
MAGNESIUM SERPL-MCNC: 2.4 MG/DL — SIGNIFICANT CHANGE UP (ref 1.6–2.6)
MCHC RBC-ENTMCNC: 29.8 PG — SIGNIFICANT CHANGE UP (ref 27–34)
MCHC RBC-ENTMCNC: 30 G/DL — LOW (ref 32–36)
MCV RBC AUTO: 99.4 FL — SIGNIFICANT CHANGE UP (ref 80–100)
NRBC BLD AUTO-RTO: 0 /100 WBCS — SIGNIFICANT CHANGE UP (ref 0–0)
PHOSPHATE SERPL-MCNC: 4.8 MG/DL — HIGH (ref 2.5–4.5)
PLATELET # BLD AUTO: 121 K/UL — LOW (ref 150–400)
POTASSIUM SERPL-MCNC: 4 MMOL/L — SIGNIFICANT CHANGE UP (ref 3.5–5.3)
POTASSIUM SERPL-SCNC: 4 MMOL/L — SIGNIFICANT CHANGE UP (ref 3.5–5.3)
RBC # BLD: 3.25 M/UL — LOW (ref 3.8–5.2)
RBC # FLD: 17.3 % — HIGH (ref 10.3–14.5)
SODIUM SERPL-SCNC: 138 MMOL/L — SIGNIFICANT CHANGE UP (ref 135–145)
WBC # BLD: 8.05 K/UL — SIGNIFICANT CHANGE UP (ref 3.8–10.5)
WBC # FLD AUTO: 8.05 K/UL — SIGNIFICANT CHANGE UP (ref 3.8–10.5)

## 2025-02-20 PROCEDURE — 99233 SBSQ HOSP IP/OBS HIGH 50: CPT | Mod: GC

## 2025-02-20 PROCEDURE — 99232 SBSQ HOSP IP/OBS MODERATE 35: CPT | Mod: GC

## 2025-02-20 PROCEDURE — 99233 SBSQ HOSP IP/OBS HIGH 50: CPT

## 2025-02-20 RX ORDER — APIXABAN 2.5 MG/1
5 TABLET, FILM COATED ORAL EVERY 12 HOURS
Refills: 0 | Status: COMPLETED | OUTPATIENT
Start: 2025-02-20 | End: 2025-02-22

## 2025-02-20 RX ORDER — METOPROLOL SUCCINATE 50 MG/1
100 TABLET, EXTENDED RELEASE ORAL
Refills: 0 | Status: DISCONTINUED | OUTPATIENT
Start: 2025-02-20 | End: 2025-02-23

## 2025-02-20 RX ORDER — IRON SUCROSE 20 MG/ML
200 INJECTION, SOLUTION INTRAVENOUS EVERY 24 HOURS
Refills: 0 | Status: COMPLETED | OUTPATIENT
Start: 2025-02-20 | End: 2025-02-24

## 2025-02-20 RX ORDER — APIXABAN 2.5 MG/1
5 TABLET, FILM COATED ORAL EVERY 12 HOURS
Refills: 0 | Status: DISCONTINUED | OUTPATIENT
Start: 2025-02-20 | End: 2025-02-20

## 2025-02-20 RX ADMIN — METOPROLOL SUCCINATE 100 MILLIGRAM(S): 50 TABLET, EXTENDED RELEASE ORAL at 05:19

## 2025-02-20 RX ADMIN — SEVELAMER HYDROCHLORIDE 800 MILLIGRAM(S): 800 TABLET ORAL at 17:17

## 2025-02-20 RX ADMIN — APIXABAN 2.5 MILLIGRAM(S): 2.5 TABLET, FILM COATED ORAL at 05:22

## 2025-02-20 RX ADMIN — Medication 3 MILLIGRAM(S): at 21:11

## 2025-02-20 RX ADMIN — Medication 650 MILLIGRAM(S): at 12:19

## 2025-02-20 RX ADMIN — SEVELAMER HYDROCHLORIDE 800 MILLIGRAM(S): 800 TABLET ORAL at 08:25

## 2025-02-20 RX ADMIN — Medication 650 MILLIGRAM(S): at 23:52

## 2025-02-20 RX ADMIN — METOPROLOL SUCCINATE 100 MILLIGRAM(S): 50 TABLET, EXTENDED RELEASE ORAL at 17:16

## 2025-02-20 RX ADMIN — BUMETANIDE 132 MILLIGRAM(S): 1 TABLET ORAL at 05:16

## 2025-02-20 RX ADMIN — SEVELAMER HYDROCHLORIDE 800 MILLIGRAM(S): 800 TABLET ORAL at 12:20

## 2025-02-20 RX ADMIN — IRON SUCROSE 200 MILLIGRAM(S): 20 INJECTION, SOLUTION INTRAVENOUS at 17:13

## 2025-02-20 RX ADMIN — Medication 650 MILLIGRAM(S): at 17:15

## 2025-02-20 RX ADMIN — APIXABAN 5 MILLIGRAM(S): 2.5 TABLET, FILM COATED ORAL at 17:16

## 2025-02-20 RX ADMIN — Medication 650 MILLIGRAM(S): at 05:19

## 2025-02-20 NOTE — PROGRESS NOTE ADULT - ASSESSMENT
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       #Acute kidney injury superimposed on CKD.    Found to have worsening renal function on admission so procedure cancelled. Baseline 4.6 in Aug 2024 and 5.5 in 1/25. Cr on admission 2/12 was 8.42.   - Cr continued to rise to 8.71 now down to 7's  - On Bumex 4mg IV BID for diurese but Is and Os document 0ml of urine  - RAP elevated to 15 on TTE from 2/13 and she appears significantly volume up on exam  -S/ permacath 2/19 and first HD session same day  -On Bumex 4mg IV BID poor UOP noted    #  Paroxysmal atrial fibrillation.   ·  Plan: Admitted for cardioversion but unable to do because given significant volume overload  - Is usually on 5mg BId of eliquis now dose reduced to 2.5mg BID based on weight and renal function. Weight documented at 59.9kg in chart but this morning's weight is 60.5kg, would place back on 5mg BID  - Continue Metoprolol 100mg in the AM, 50mg in the PM for rate control, may need to increase dosing   - Off Amiodarone    # Chronic heart failure with preserved ejection fraction. -Severe MR  ·TTE in 11/2024 showed progression to moderate to severe MR.  Left ventricular systolic function was normal with an ejection fraction of 55 % by Garcia's method of disks.  -TTE now with LVEF of 41%, global hypokinesis, severe MR, RAP of 15.   -Structural Heart evaluation noted. Will aggressively diurese and repeat TTE when euvolemic  - Volume removal with HD  Eventual GDMT  No recent ischemic eval noted. 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       #Acute kidney injury superimposed on CKD.    Found to have worsening renal function on admission so procedure cancelled. Baseline 4.6 in Aug 2024 and 5.5 in 1/25. Cr on admission 2/12 was 8.42.   - Cr continued to rise to 8.71 now down to 7's  - On Bumex 4mg IV BID for diurese but Is and Os document 0ml of urine  - RAP elevated to 15 on TTE from 2/13 and she appears significantly volume up on exam  -S/ permacath 2/19 and first HD session same day  -On Bumex 4mg IV BID poor UOP noted, HD initiated now  for volume removal. Plan for HD again tomorrow     #  Paroxysmal atrial fibrillation.   ·  Plan: Admitted for cardioversion but unable to do because given significant volume overload  - Is usually on 5mg BID of eliquis now dose reduced to 2.5mg BID based on weight and renal function. Weight documented at 59.9kg in chart but this morning's weight is 60.5kg, would place back on 5mg BID  - Increase Metoprolol to 100mg BID, remains in RVR  - Off Amiodarone    # Chronic heart failure with preserved ejection fraction. -Severe MR  ·TTE in 11/2024 showed progression to moderate to severe MR.  Left ventricular systolic function was normal with an ejection fraction of 55 % by Garcia's method of disks.  -TTE now with LVEF of 41%, global hypokinesis, severe MR, RAP of 15.   -Structural Heart evaluation noted. Will aggressively diurese and repeat TTE when euvolemic  - Volume removal with HD  Eventual GDMT  Will plan for RHC/LHC Monday 2/24. Discussed with patient, Structural team and primary team.   - To hold 3 doses of Eliquis prior to Monday (Sunday AM, sunday night and Monday AM)

## 2025-02-20 NOTE — PROGRESS NOTE ADULT - PROBLEM SELECTOR PLAN 1
Hx/o CKD iso b/l staghorn calculus s/p removal (2009) (follows with Dr. Elizondo). Found to have worsening renal function on admission so procedure cancelled. Baseline 4.6 in Aug 2024 and 5.5 in 1/25. Cr on admission 2/12 was 8.42.   - Strict I/Os, daily weights  - Trend BMPs, monitor renal function, renally dose meds, avoid nephrotoxins   - Nephro consulted, recommend bumex 4 mg IV BID and patient agreed to dialysis - IR consulted and plan for permacath on 2/19 and first dialysis 2/19 Hx/o CKD iso b/l staghorn calculus s/p removal (2009) (follows with Dr. Elizondo). Found to have worsening renal function on admission so procedure cancelled. Baseline 4.6 in Aug 2024 and 5.5 in 1/25. Cr on admission 2/12 was 8.42.   - Strict I/Os, daily weights  - Trend BMPs, monitor renal function, renally dose meds, avoid nephrotoxins   - Nephro consulted, dialysis 2/19, 2/20 and 2/21  -C/w bumex 4 mg IV BID on non dialysis days

## 2025-02-20 NOTE — PROGRESS NOTE ADULT - ASSESSMENT
74-year-old female with PMHx of HTN, CKD stage 5 (pt. of Dr. Elizondo), Afib on Eliquis, anemia, nephrolithiasis, and recently diagnosed HF who presents for scheduled Afib ablation for Afib found to have NICO.    Nephrology consulted for NICO on CKD V.    Acute kidney injury superimposed on CKD-5 iso possibly over-diuresis (increased from lasix 80mg BID to torsemide 100mg BID 1/31/24)  -Pt. with hx of advanced CKD is in the setting of chronic history of nephrolithiasis/staghorn calculus. On review of labs on [place]/ [place] [facility]IE, last Scr was elevated at 6.18 on 1/30/25. Admission SCr is elevated at 8.42 without electrolyte abnormalities. Prior renal US 1/8/25 without hydronephrosis but calcifications, stones and cysts bilaterally.   USG - Kidney and bladder showed Bilateral renal calculi, no Hydronephrosis, B/L renal parenchymal disease. UPCR 4.9  RECS:  -Pt is uremic and needs HD, s/p tunnelled HD cath placement and First HD yesterday, did well  -next HD today and then tomorrow  -break on the weekend      Anemia iso CKD  Start venofer 200mg IV with HD X 5 doses  Once complete, can start PERLITA    Dc planning to [facility] HD unit- preferred unit by patient under Dr Elizondo once medically ready  AVF placement as outpatient once Afib better and cards clearance      d/w with pt and case mgt    Shane Mauricio MD  Office   Contact me directly via [employer] Teams     (After 5 pm or on weekends please page the on-call fellow/attending, can check [email or web address] for schedule. Login is dominick pierre, schedule under [facility] medicine, psych, derm)

## 2025-02-20 NOTE — PROGRESS NOTE ADULT - SUBJECTIVE AND OBJECTIVE BOX
[facility] DIVISION OF KIDNEY DISEASES AND HYPERTENSION -- FOLLOW UP NOTE  --------------------------------------------------------------------------------  Chief Complaint:    24 hour events/subjective:        PAST HISTORY  --------------------------------------------------------------------------------  No significant changes to PMH, PSH, FHx, SHx, unless otherwise noted    ALLERGIES & MEDICATIONS  --------------------------------------------------------------------------------  Allergies    No Known Allergies    Intolerances      Standing Inpatient Medications  apixaban 2.5 milliGRAM(s) Oral two times a day  buMETAnide IVPB 4 milliGRAM(s) IV Intermittent every 12 hours  desmopressin IVPB 20 MICROGram(s) IV Intermittent once  influenza  Vaccine (HIGH DOSE) 0.5 milliLiter(s) IntraMuscular once  metoprolol succinate ER 50 milliGRAM(s) Oral <User Schedule>  metoprolol succinate  milliGRAM(s) Oral <User Schedule>  sevelamer carbonate 800 milliGRAM(s) Oral three times a day with meals  sodium bicarbonate 650 milliGRAM(s) Oral four times a day    PRN Inpatient Medications  acetaminophen     Tablet .. 650 milliGRAM(s) Oral every 6 hours PRN  melatonin 3 milliGRAM(s) Oral at bedtime PRN      REVIEW OF SYSTEMS  --------------------------------------------------------------------------------  Gen: No weight changes, fatigue, fevers/chills, weakness  Skin: No rashes  Head/Eyes/Ears/Mouth: No headache; Normal hearing; Normal vision w/o blurriness; No sinus pain/discomfort, sore throat  Respiratory: No dyspnea, cough, wheezing, hemoptysis  CV: No chest pain, PND, orthopnea  GI: No abdominal pain, diarrhea, constipation, nausea, vomiting, melena, hematochezia  : No increased frequency, dysuria, hematuria, nocturia  MSK: No joint pain/swelling; no back pain; no edema  Neuro: No dizziness/lightheadedness, weakness, seizures, numbness, tingling  Heme: No easy bruising or bleeding  Endo: No heat/cold intolerance  Psych: No significant nervousness, anxiety, stress, depression    All other systems were reviewed and are negative, except as noted.    VITALS/PHYSICAL EXAM  --------------------------------------------------------------------------------  T(C): 37.6 (02-20-25 @ 05:06), Max: 37.6 (02-20-25 @ 05:06)  HR: 117 (02-20-25 @ 05:06) (97 - 119)  BP: 126/88 (02-20-25 @ 05:06) (108/77 - 134/76)  RR: 18 (02-20-25 @ 05:06) (14 - 18)  SpO2: 93% (02-20-25 @ 05:06) (93% - 100%)  Wt(kg): --  Height (cm): 157.5 (02-19-25 @ 07:40)  Weight (kg): 59.9 (02-19-25 @ 07:40)  BMI (kg/m2): 24.1 (02-19-25 @ 07:40)  BSA (m2): 1.6 (02-19-25 @ 07:40)      02-19-25 @ 07:01  -  02-20-25 @ 07:00  --------------------------------------------------------  IN: 0 mL / OUT: 250 mL / NET: -250 mL      Physical Exam:  	Gen: NAD, well-appearing  	HEENT: PERRL, supple neck, clear oropharynx  	Pulm: CTA B/L  	CV: RRR, S1S2; no rub  	Back: No spinal or CVA tenderness; no sacral edema  	Abd: +BS, soft, nontender/nondistended  	: No suprapubic tenderness  	UE: Warm, FROM, no clubbing, intact strength; no edema; no asterixis  	LE: Warm, FROM, no clubbing, intact strength; no edema  	Neuro: No focal deficits, intact gait  	Psych: Normal affect and mood  	Skin: Warm, without rashes  	Vascular access:    LABS/STUDIES  --------------------------------------------------------------------------------              9.7    8.05  >-----------<  121      [02-20-25 @ 05:47]              32.3     138  |  95  |  64  ----------------------------<  113      [02-20-25 @ 05:47]  4.0   |  24  |  5.47        Ca     8.3     [02-20-25 @ 05:47]      Mg     2.4     [02-20-25 @ 05:47]      Phos  4.8     [02-20-25 @ 05:47]    TPro  6.9  /  Alb  4.1  /  TBili  0.6  /  DBili  x   /  AST  11  /  ALT  9   /  AlkPhos  90  [02-19-25 @ 04:22]    PT/INR: PT 12.4 , INR 1.09       [02-19-25 @ 04:22]  PTT: 24.3       [02-19-25 @ 04:22]      Creatinine Trend:  SCr 5.47 [02-20 @ 05:47]  SCr 7.54 [02-19 @ 04:22]  SCr 7.55 [02-18 @ 06:11]  SCr 8.08 [02-17 @ 07:01]  SCr 8.22 [02-16 @ 10:17]    Urinalysis - [02-20-25 @ 05:47]      Color  / Appearance  / SG  / pH       Gluc 113 / Ketone   / Bili  / Urobili        Blood  / Protein  / Leuk Est  / Nitrite       RBC  / WBC  / Hyaline  / Gran  / Sq Epi  / Non Sq Epi  / Bacteria     Urine Creatinine 46      [02-13-25 @ 15:43]  Urine Protein 227      [02-13-25 @ 15:43]  Urine Sodium 81      [02-13-25 @ 15:43]  Urine Urea Nitrogen 334      [02-13-25 @ 15:43]  Urine Potassium 18      [02-13-25 @ 15:43]  Urine Osmolality 319      [02-13-25 @ 15:43]    Iron 44, TIBC --, %sat --      [02-19-25 @ 04:22]  Ferritin 101      [11-22-24 @ 21:13]  PTH -- (Ca 8.1)      [11-22-24 @ 21:13]   595  TSH 0.65      [04-09-24 @ 00:27]    HBsAg Nonreact      [02-19-25 @ 16:11]

## 2025-02-20 NOTE — PROGRESS NOTE ADULT - SUBJECTIVE AND OBJECTIVE BOX
[facility] - Heidy Bates, Peter, Alverto, Kevyn King  Office Number:  [phone]    Patient resting comfortably in bed in NAD.  Laying flat with no respiratory distress.  No complaints of chest pain, dyspnea, palpitations, PND, or orthopnea.  S/p tunnelled catheter on 2/19 and first HD same day    ROS: negative unless otherwise mentioned.    Telemetry:      MEDICATIONS  (STANDING):  apixaban 2.5 milliGRAM(s) Oral two times a day  buMETAnide IVPB 4 milliGRAM(s) IV Intermittent every 12 hours  desmopressin IVPB 20 MICROGram(s) IV Intermittent once  influenza  Vaccine (HIGH DOSE) 0.5 milliLiter(s) IntraMuscular once  metoprolol succinate  milliGRAM(s) Oral <User Schedule>  metoprolol succinate ER 50 milliGRAM(s) Oral <User Schedule>  sevelamer carbonate 800 milliGRAM(s) Oral three times a day with meals  sodium bicarbonate 650 milliGRAM(s) Oral four times a day    MEDICATIONS  (PRN):  acetaminophen     Tablet .. 650 milliGRAM(s) Oral every 6 hours PRN Temp greater or equal to 38C (100.4F), Mild Pain (1 - 3)  melatonin 3 milliGRAM(s) Oral at bedtime PRN Insomnia      Allergies    No Known Allergies    Intolerances        Vital Signs Last 24 Hrs  T(C): 37.6 (20 Feb 2025 05:06), Max: 37.6 (20 Feb 2025 05:06)  T(F): 99.6 (20 Feb 2025 05:06), Max: 99.6 (20 Feb 2025 05:06)  HR: 117 (20 Feb 2025 05:06) (97 - 119)  BP: 126/88 (20 Feb 2025 05:06) (108/77 - 135/91)  BP(mean): 102 (19 Feb 2025 10:35) (94 - 108)  RR: 18 (20 Feb 2025 05:06) (12 - 22)  SpO2: 93% (20 Feb 2025 05:06) (93% - 100%)    Parameters below as of 20 Feb 2025 05:06  Patient On (Oxygen Delivery Method): room air        I&O's Summary    19 Feb 2025 07:01  -  20 Feb 2025 07:00  --------------------------------------------------------  IN: 0 mL / OUT: 250 mL / NET: -250 mL        ON EXAM:    General: NAD, awake and alert, oriented x 3  HEENT: Mucous membranes are moist, anicteric  Lungs: Non-labored, breath sounds are clear bilaterally, No wheezing, rales or rhonchi  Cardiovascular: Regular, S1 and S2, no murmurs, rubs, or gallops  Gastrointestinal: Bowel Sounds present, soft, nontender.   Lymph: No peripheral edema. No lymphadenopathy.  Skin: No rashes or ulcers  Psych:  Mood & affect appropriate    LABS: All Labs Reviewed:                        9.7    8.05  )-----------( 121      ( 20 Feb 2025 05:47 )             32.3                         10.4   8.46  )-----------( 146      ( 19 Feb 2025 04:22 )             34.0                         9.2    6.60  )-----------( 135      ( 18 Feb 2025 06:10 )             30.2     20 Feb 2025 05:47    138    |  95     |  64     ----------------------------<  113    4.0     |  24     |  5.47   19 Feb 2025 04:22    140    |  95     |  97     ----------------------------<  125    4.7     |  20     |  7.54   18 Feb 2025 06:11    141    |  99     |  92     ----------------------------<  111    4.5     |  22     |  7.55     Ca    8.3        20 Feb 2025 05:47  Ca    8.0        19 Feb 2025 04:22  Ca    7.8        18 Feb 2025 06:11  Phos  4.8       20 Feb 2025 05:47  Phos  6.7       19 Feb 2025 04:22  Phos  6.6       18 Feb 2025 06:11  Mg     2.4       20 Feb 2025 05:47  Mg     2.6       19 Feb 2025 04:22  Mg     2.5       18 Feb 2025 06:11    TPro  6.9    /  Alb  4.1    /  TBili  0.6    /  DBili  x      /  AST  11     /  ALT  9      /  AlkPhos  90     19 Feb 2025 04:22    PT/INR - ( 19 Feb 2025 04:22 )   PT: 12.4 sec;   INR: 1.09 ratio         PTT - ( 19 Feb 2025 04:22 )  PTT:24.3 sec      Blood Culture:        [facility] - Heidy Bates, Peter, Alverto, Kevyn King  Office Number:  [phone]    Patient resting comfortably in bed in NAD.  Laying flat with no respiratory distress.  No complaints of chest pain, dyspnea, palpitations, PND, or orthopnea.  S/p tunnelled catheter on 2/19 and first HD same day    ROS: negative unless otherwise mentioned.    Telemetry: AF with RVR 120s-130s    MEDICATIONS  (STANDING):  apixaban 2.5 milliGRAM(s) Oral two times a day  buMETAnide IVPB 4 milliGRAM(s) IV Intermittent every 12 hours  desmopressin IVPB 20 MICROGram(s) IV Intermittent once  influenza  Vaccine (HIGH DOSE) 0.5 milliLiter(s) IntraMuscular once  metoprolol succinate  milliGRAM(s) Oral <User Schedule>  metoprolol succinate ER 50 milliGRAM(s) Oral <User Schedule>  sevelamer carbonate 800 milliGRAM(s) Oral three times a day with meals  sodium bicarbonate 650 milliGRAM(s) Oral four times a day    MEDICATIONS  (PRN):  acetaminophen     Tablet .. 650 milliGRAM(s) Oral every 6 hours PRN Temp greater or equal to 38C (100.4F), Mild Pain (1 - 3)  melatonin 3 milliGRAM(s) Oral at bedtime PRN Insomnia      Allergies    No Known Allergies    Intolerances        Vital Signs Last 24 Hrs  T(C): 37.6 (20 Feb 2025 05:06), Max: 37.6 (20 Feb 2025 05:06)  T(F): 99.6 (20 Feb 2025 05:06), Max: 99.6 (20 Feb 2025 05:06)  HR: 117 (20 Feb 2025 05:06) (97 - 119)  BP: 126/88 (20 Feb 2025 05:06) (108/77 - 135/91)  BP(mean): 102 (19 Feb 2025 10:35) (94 - 108)  RR: 18 (20 Feb 2025 05:06) (12 - 22)  SpO2: 93% (20 Feb 2025 05:06) (93% - 100%)    Parameters below as of 20 Feb 2025 05:06  Patient On (Oxygen Delivery Method): room air        I&O's Summary    19 Feb 2025 07:01  -  20 Feb 2025 07:00  --------------------------------------------------------  IN: 0 mL / OUT: 250 mL / NET: -250 mL        ON EXAM:    General: NAD, awake and alert, oriented x 3  HEENT: Mucous membranes are moist, anicteric  Lungs: Non-labored, breath sounds are clear bilaterally, No wheezing, rales or rhonchi  Cardiovascular: irregular, S1 and S2, +SM  Gastrointestinal: Bowel Sounds present, soft, nontender.   Lymph: 2+ peripheral edema. No lymphadenopathy.  Skin: No rashes or ulcers  Psych:  Mood & affect appropriate    LABS: All Labs Reviewed:                        9.7    8.05  )-----------( 121      ( 20 Feb 2025 05:47 )             32.3                         10.4   8.46  )-----------( 146      ( 19 Feb 2025 04:22 )             34.0                         9.2    6.60  )-----------( 135      ( 18 Feb 2025 06:10 )             30.2     20 Feb 2025 05:47    138    |  95     |  64     ----------------------------<  113    4.0     |  24     |  5.47   19 Feb 2025 04:22    140    |  95     |  97     ----------------------------<  125    4.7     |  20     |  7.54   18 Feb 2025 06:11    141    |  99     |  92     ----------------------------<  111    4.5     |  22     |  7.55     Ca    8.3        20 Feb 2025 05:47  Ca    8.0        19 Feb 2025 04:22  Ca    7.8        18 Feb 2025 06:11  Phos  4.8       20 Feb 2025 05:47  Phos  6.7       19 Feb 2025 04:22  Phos  6.6       18 Feb 2025 06:11  Mg     2.4       20 Feb 2025 05:47  Mg     2.6       19 Feb 2025 04:22  Mg     2.5       18 Feb 2025 06:11    TPro  6.9    /  Alb  4.1    /  TBili  0.6    /  DBili  x      /  AST  11     /  ALT  9      /  AlkPhos  90     19 Feb 2025 04:22    PT/INR - ( 19 Feb 2025 04:22 )   PT: 12.4 sec;   INR: 1.09 ratio         PTT - ( 19 Feb 2025 04:22 )  PTT:24.3 sec      Blood Culture:

## 2025-02-20 NOTE — PROGRESS NOTE ADULT - PROBLEM SELECTOR PLAN 2
Admitted for cardioversion but unable to do because NICO on CKD.  -D/c eliquis 2.5 mg BID 24 hrs prior to procedure - ask IR when to resume   -C/w toprol to 100 mg in morning and 50 mg in evening    -EP recommend d/c amiodarone given plan for tunnel cath and worsening kidney function- will touch base if plan for cardioversion after dialysis Admitted for cardioversion but unable to do because NICO on CKD.  -Eliquis mg BID - d/c 2/22 evening for heart cath per cards  -Per cards, toprol to 100 mg BID  -EP recommend d/c amiodarone given plan for tunnel cath and worsening kidney function

## 2025-02-20 NOTE — PROGRESS NOTE ADULT - ATTENDING COMMENTS
74F w/ severe MR, AFib on Eliquis, HFpEF, CKD and recent admission 1/7-9 for AF w/ RVR and decompensated HF who presented for planned DCCV, found to have NICO on CKD and admitted to medicine for further work up. Concern for CKD progression (baseline SCr 5-6) vs NICO on CKD. Worsening LE edema despite torsemide 100 mg bid, now on bumex 4 IV BID. Nephrology consulted, s/p permacath 2/19 and started on HD. TTE with EF 41%, WMA, severe MR, progressive from 11/2024 echo. Cardiology and structural cardiology following. Will need eventual TTE/angiograms/DCCV after adequate fluid removal. Cards following, plan for RHC/LHC 2/24. F/u EP re timing inpatient vs outpatient DCCV.    DCP home pending outpatient HD set up, cards/EP/structural recs.

## 2025-02-20 NOTE — PROGRESS NOTE ADULT - PROBLEM SELECTOR PLAN 3
TTE in 11/2024 showed progression to moderate to severe MR.  Left ventricular systolic function is normal with an ejection fraction of 55 % by Garcia's method of disks. Appears hypervolemic on exam. Repeat TTE during admission reveals left ventricular systolic function is moderately decreased with an ejection fraction of 41 % by Garcia'smethod of disks.   -Bumex as above   -Hold home torsemide   -Structural cardiology consulted, recommend aggressive diuresis. Once euvolemic and on GDMT, right heart cath to assess volume status and rEF and repeat TTE. If moderate to severe mitral regurg,  recc.   -General cardiology following TTE in 11/2024 showed progression to moderate to severe MR.  Left ventricular systolic function is normal with an ejection fraction of 55 % by Garcia's method of disks. Appears hypervolemic on exam. Repeat TTE during admission reveals left ventricular systolic function is moderately decreased with an ejection fraction of 41 % by Garcia'smethod of disks.   -Bumex as above   -Hold home torsemide   -Per cardiology, plan or RHC and LHC on 2/24   -General cardiology following

## 2025-02-20 NOTE — PROGRESS NOTE ADULT - ASSESSMENT
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

## 2025-02-20 NOTE — PROGRESS NOTE ADULT - SUBJECTIVE AND OBJECTIVE BOX
ADRIENNE PARSONS  74y  Female    Complaints:  Subjective:     Overnight, patient noted to have oozing from the permacath site. IR said nothing to do. This morning patient denies any pain. SOB, chest pain.     FAMILY HISTORY:    74yVital Signs Last 24 Hrs  T(C): 37.6 (20 Feb 2025 05:06), Max: 37.6 (20 Feb 2025 05:06)  T(F): 99.6 (20 Feb 2025 05:06), Max: 99.6 (20 Feb 2025 05:06)  HR: 117 (20 Feb 2025 05:06) (97 - 119)  BP: 126/88 (20 Feb 2025 05:06) (108/77 - 135/91)  BP(mean): 102 (19 Feb 2025 10:35) (94 - 108)  RR: 18 (20 Feb 2025 05:06) (12 - 22)  SpO2: 93% (20 Feb 2025 05:06) (93% - 100%)    Parameters below as of 20 Feb 2025 05:06  Patient On (Oxygen Delivery Method): room air    PHYSICAL EXAM:  GENERAL: NAD  HEAD:  Atraumatic  EYES: EOMI  ENMT: Moist mucous membranes, Good dentition  NERVOUS SYSTEM:  Alert & Oriented X3, Good concentration  CHEST/LUNG: Clear to percussion bilaterally; No rales, rhonchi, wheezing  HEART: Irregularly irregular   ABDOMEN: Soft, Nontender, Nondistended  EXTREMITIES:  +2 pitting edema bilaterally     Consultant(s) Notes Reviewed:  [x ] YES  [ ] NO  Care Discussed with Consultants/Other Providers [ x] YES  [ ] NO    LABS:                        9.7    8.05  )-----------( 121      ( 20 Feb 2025 05:47 )             32.3       02-20    138  |  95[L]  |  64[H]  ----------------------------<  113[H]  4.0   |  24  |  5.47[H]    Ca    8.3[L]      20 Feb 2025 05:47  Phos  4.8     02-20  Mg     2.4     02-20    TPro  6.9  /  Alb  4.1  /  TBili  0.6  /  DBili  x   /  AST  11  /  ALT  9[L]  /  AlkPhos  90  02-19              Urinalysis Basic - ( 20 Feb 2025 05:47 )    Color: x / Appearance: x / SG: x / pH: x  Gluc: 113 mg/dL / Ketone: x  / Bili: x / Urobili: x   Blood: x / Protein: x / Nitrite: x   Leuk Esterase: x / RBC: x / WBC x   Sq Epi: x / Non Sq Epi: x / Bacteria: x        PT/INR - ( 19 Feb 2025 04:22 )   PT: 12.4 sec;   INR: 1.09 ratio         PTT - ( 19 Feb 2025 04:22 )  PTT:24.3 sec    CAPILLARY BLOOD GLUCOSE      Female  RADIOLOGY & ADDITIONAL TESTS:  no interval imaging     MedsMEDICATIONS  (STANDING):  apixaban 2.5 milliGRAM(s) Oral two times a day  buMETAnide IVPB 4 milliGRAM(s) IV Intermittent every 12 hours  desmopressin IVPB 20 MICROGram(s) IV Intermittent once  influenza  Vaccine (HIGH DOSE) 0.5 milliLiter(s) IntraMuscular once  metoprolol succinate  milliGRAM(s) Oral <User Schedule>  metoprolol succinate ER 50 milliGRAM(s) Oral <User Schedule>  sevelamer carbonate 800 milliGRAM(s) Oral three times a day with meals  sodium bicarbonate 650 milliGRAM(s) Oral four times a day    MEDICATIONS  (PRN):  acetaminophen     Tablet .. 650 milliGRAM(s) Oral every 6 hours PRN Temp greater or equal to 38C (100.4F), Mild Pain (1 - 3)  melatonin 3 milliGRAM(s) Oral at bedtime PRN Insomnia      HEALTH ISSUES - PROBLEM Dx:  Acute kidney injury superimposed on CKD    Paroxysmal atrial fibrillation    Chronic HFrEF (heart failure with reduced ejection fraction)    Metabolic acidosis    Hypertension    Need for prophylactic measure    Severe mitral regurgitation

## 2025-02-21 LAB
ANION GAP SERPL CALC-SCNC: 15 MMOL/L — SIGNIFICANT CHANGE UP (ref 5–17)
BUN SERPL-MCNC: 42 MG/DL — HIGH (ref 7–23)
CALCIUM SERPL-MCNC: 8.9 MG/DL — SIGNIFICANT CHANGE UP (ref 8.4–10.5)
CHLORIDE SERPL-SCNC: 97 MMOL/L — SIGNIFICANT CHANGE UP (ref 96–108)
CO2 SERPL-SCNC: 27 MMOL/L — SIGNIFICANT CHANGE UP (ref 22–31)
CREAT SERPL-MCNC: 4.35 MG/DL — HIGH (ref 0.5–1.3)
EGFR: 10 ML/MIN/1.73M2 — LOW
EGFR: 10 ML/MIN/1.73M2 — LOW
GLUCOSE SERPL-MCNC: 106 MG/DL — HIGH (ref 70–99)
HCT VFR BLD CALC: 31 % — LOW (ref 34.5–45)
HGB BLD-MCNC: 9.3 G/DL — LOW (ref 11.5–15.5)
MAGNESIUM SERPL-MCNC: 2.4 MG/DL — SIGNIFICANT CHANGE UP (ref 1.6–2.6)
MCHC RBC-ENTMCNC: 30 G/DL — LOW (ref 32–36)
MCHC RBC-ENTMCNC: 30.7 PG — SIGNIFICANT CHANGE UP (ref 27–34)
MCV RBC AUTO: 102.3 FL — HIGH (ref 80–100)
MRSA PCR RESULT.: SIGNIFICANT CHANGE UP
NRBC BLD AUTO-RTO: 0 /100 WBCS — SIGNIFICANT CHANGE UP (ref 0–0)
PHOSPHATE SERPL-MCNC: 5.1 MG/DL — HIGH (ref 2.5–4.5)
PLATELET # BLD AUTO: 114 K/UL — LOW (ref 150–400)
POTASSIUM SERPL-MCNC: 4.3 MMOL/L — SIGNIFICANT CHANGE UP (ref 3.5–5.3)
POTASSIUM SERPL-SCNC: 4.3 MMOL/L — SIGNIFICANT CHANGE UP (ref 3.5–5.3)
RBC # BLD: 3.03 M/UL — LOW (ref 3.8–5.2)
RBC # FLD: 17.1 % — HIGH (ref 10.3–14.5)
S AUREUS DNA NOSE QL NAA+PROBE: SIGNIFICANT CHANGE UP
SODIUM SERPL-SCNC: 139 MMOL/L — SIGNIFICANT CHANGE UP (ref 135–145)
WBC # BLD: 6.07 K/UL — SIGNIFICANT CHANGE UP (ref 3.8–10.5)
WBC # FLD AUTO: 6.07 K/UL — SIGNIFICANT CHANGE UP (ref 3.8–10.5)

## 2025-02-21 PROCEDURE — 99233 SBSQ HOSP IP/OBS HIGH 50: CPT | Mod: GC

## 2025-02-21 PROCEDURE — 99232 SBSQ HOSP IP/OBS MODERATE 35: CPT

## 2025-02-21 PROCEDURE — 99232 SBSQ HOSP IP/OBS MODERATE 35: CPT | Mod: GC

## 2025-02-21 RX ORDER — SODIUM BICARBONATE 1 MEQ/ML
1 SYRINGE (ML) INTRAVENOUS
Qty: 0 | Refills: 0 | DISCHARGE
Start: 2025-02-21

## 2025-02-21 RX ORDER — ALPRAZOLAM 0.5 MG
0.25 TABLET, EXTENDED RELEASE 24 HR ORAL ONCE
Refills: 0 | Status: DISCONTINUED | OUTPATIENT
Start: 2025-02-21 | End: 2025-02-21

## 2025-02-21 RX ORDER — TORSEMIDE 10 MG
60 TABLET ORAL DAILY
Refills: 0 | Status: COMPLETED | OUTPATIENT
Start: 2025-02-22 | End: 2025-02-23

## 2025-02-21 RX ORDER — SEVELAMER HYDROCHLORIDE 800 MG/1
1 TABLET ORAL
Qty: 0 | Refills: 0 | DISCHARGE
Start: 2025-02-21

## 2025-02-21 RX ADMIN — Medication 650 MILLIGRAM(S): at 05:30

## 2025-02-21 RX ADMIN — APIXABAN 5 MILLIGRAM(S): 2.5 TABLET, FILM COATED ORAL at 05:29

## 2025-02-21 RX ADMIN — Medication 650 MILLIGRAM(S): at 17:58

## 2025-02-21 RX ADMIN — Medication 1 APPLICATION(S): at 09:18

## 2025-02-21 RX ADMIN — SEVELAMER HYDROCHLORIDE 800 MILLIGRAM(S): 800 TABLET ORAL at 08:34

## 2025-02-21 RX ADMIN — Medication 650 MILLIGRAM(S): at 12:15

## 2025-02-21 RX ADMIN — Medication 0.25 MILLIGRAM(S): at 21:31

## 2025-02-21 RX ADMIN — IRON SUCROSE 200 MILLIGRAM(S): 20 INJECTION, SOLUTION INTRAVENOUS at 18:53

## 2025-02-21 RX ADMIN — SEVELAMER HYDROCHLORIDE 800 MILLIGRAM(S): 800 TABLET ORAL at 12:15

## 2025-02-21 RX ADMIN — Medication 3 MILLIGRAM(S): at 21:32

## 2025-02-21 RX ADMIN — APIXABAN 5 MILLIGRAM(S): 2.5 TABLET, FILM COATED ORAL at 17:57

## 2025-02-21 RX ADMIN — SEVELAMER HYDROCHLORIDE 800 MILLIGRAM(S): 800 TABLET ORAL at 17:57

## 2025-02-21 RX ADMIN — METOPROLOL SUCCINATE 100 MILLIGRAM(S): 50 TABLET, EXTENDED RELEASE ORAL at 05:30

## 2025-02-21 RX ADMIN — METOPROLOL SUCCINATE 100 MILLIGRAM(S): 50 TABLET, EXTENDED RELEASE ORAL at 17:57

## 2025-02-21 NOTE — DISCHARGE NOTE PROVIDER - NSDCFUSCHEDAPPT_GEN_ALL_CORE_FT
Bassem Elizondo  [facility]  [address]  Scheduled Appointment: 02/27/2025     [facility]  [address]OPH [address]  Scheduled Appointment: 04/02/2025

## 2025-02-21 NOTE — PROGRESS NOTE ADULT - SUBJECTIVE AND OBJECTIVE BOX
GASPERMARIAMADRIENNE HOPKINS  74y  Female    Complaints:  Subjective:     No acute o/n events. Patient this morning is resting comfortably. Denies chest pain, trouble breathing, abdominal pain, urinary issues.     FAMILY HISTORY:    74yVital Signs Last 24 Hrs  T(C): 36.3 (21 Feb 2025 05:30), Max: 36.7 (20 Feb 2025 12:18)  T(F): 97.4 (21 Feb 2025 05:30), Max: 98.1 (20 Feb 2025 12:18)  HR: 86 (21 Feb 2025 05:30) (86 - 113)  BP: 120/82 (21 Feb 2025 05:30) (114/78 - 130/101)  BP(mean): --  RR: 18 (21 Feb 2025 05:30) (17 - 18)  SpO2: 98% (21 Feb 2025 05:30) (94% - 99%)    Parameters below as of 20 Feb 2025 21:10  Patient On (Oxygen Delivery Method): room air    PHYSICAL EXAM:  GENERAL: NAD  HEAD:  Atraumatic  EYES: EOMI  ENMT: Moist mucous membranes, Good dentition  NERVOUS SYSTEM:  Alert & Oriented X3, Good concentration  CHEST/LUNG: Clear to percussion bilaterally; No rales, rhonchi, wheezing  HEART: Irregularly irregular   ABDOMEN: Soft, Nontender, Nondistended  EXTREMITIES:  +1 pitting edema bilaterally    Consultant(s) Notes Reviewed:  [x ] YES  [ ] NO  Care Discussed with Consultants/Other Providers [ x] YES  [ ] NO    LABS:                        9.7    8.05  )-----------( 121      ( 20 Feb 2025 05:47 )             32.3       02-20    138  |  95[L]  |  64[H]  ----------------------------<  113[H]  4.0   |  24  |  5.47[H]    Ca    8.3[L]      20 Feb 2025 05:47  Phos  4.8     02-20  Mg     2.4     02-20                Urinalysis Basic - ( 20 Feb 2025 05:47 )    Color: x / Appearance: x / SG: x / pH: x  Gluc: 113 mg/dL / Ketone: x  / Bili: x / Urobili: x   Blood: x / Protein: x / Nitrite: x   Leuk Esterase: x / RBC: x / WBC x   Sq Epi: x / Non Sq Epi: x / Bacteria: x    Female  RADIOLOGY & ADDITIONAL TESTS:  no interval imaging     MedsMEDICATIONS  (STANDING):  apixaban 5 milliGRAM(s) Oral every 12 hours  chlorhexidine 2% Cloths 1 Application(s) Topical <User Schedule>  desmopressin IVPB 20 MICROGram(s) IV Intermittent once  influenza  Vaccine (HIGH DOSE) 0.5 milliLiter(s) IntraMuscular once  iron sucrose Injectable 200 milliGRAM(s) IV Push every 24 hours  metoprolol succinate  milliGRAM(s) Oral <User Schedule>  sevelamer carbonate 800 milliGRAM(s) Oral three times a day with meals  sodium bicarbonate 650 milliGRAM(s) Oral four times a day    MEDICATIONS  (PRN):  acetaminophen     Tablet .. 650 milliGRAM(s) Oral every 6 hours PRN Temp greater or equal to 38C (100.4F), Mild Pain (1 - 3)  melatonin 3 milliGRAM(s) Oral at bedtime PRN Insomnia      HEALTH ISSUES - PROBLEM Dx:  Acute kidney injury superimposed on CKD    Paroxysmal atrial fibrillation    Chronic HFrEF (heart failure with reduced ejection fraction)    Metabolic acidosis    Hypertension    Need for prophylactic measure    Severe mitral regurgitation

## 2025-02-21 NOTE — DISCHARGE NOTE PROVIDER - HOSPITAL COURSE
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    Of Note:  Confirmed with  who is responsible for pt's care administers her medications consistently.  Her last dose of Eliquis was this am. Given at 10am.      TTE 4/2024  CONCLUSIONS:      1. Left ventricular wall thickness is normal. Left ventricular systolic function is normal with an ejection fraction of 76 % by 3D. There are no regional wall motion abnormalities seen.   2. No evidence of left atrial or left atrial appendage thrombus. Ultrasound enhancing agent was utilized to visualize the left atrial appendage.   3. The left atrium is severely dilated.   4. Moderate mitral regurgitation at a blood pressure of 132/71 mmHg. The mitral regurgitant jet is centrally directed. Mechanism of mitral regurgitation: Apryl Type I (normal leaflet motion with dilated annulus). PISA : later pisa radius 0.4, medial 0.37, Alisaing velocity 38.5cm/sec MR VTI 136cm MR Vmax 548 cm/s. The ERO 0.3sqcm and Reg. Vol 38cc. 3D vena contracta area was 0.35sqcm.   5. No pericardial effusion seen.   6. Normal right ventricular cavity size, with normal wall thickness, and normal systolic function.     (12 Feb 2025 12:50)    Hospital Course: Patient was admitted for cardioversion but was found to have NICO on CKD. Patient was seen by the nephrology team and had a permacath placed and was started on dialysis for uremia and volume overload unresponsive to diuretics. Patient was seen by the cardiology team and was recommended to have a right heart cath and left heart cath which revealed ...? Patient was seen by EP doctors who preformed a TTE and cardioversion which revealed ...?     The patient is afebrile, hemodynamically stable and medically optimized for discharge to home with follow up with PCP, nephrology, cardiology. On day of discharge, patient is clinically stable with no new exam findings or acute symptoms compared to prior. The patient was seen by the attending physician on the date of discharge and deemed stable and acceptable for discharge. The patient's chronic medical conditions were treated accordingly per the patient's home medication regimen. The patient's medication reconciliation (with changes made to chronic medications), follow up appointments, discharge orders, instructions, and significant lab and diagnostic studies are as noted.    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    Of Note:  Confirmed with  who is responsible for pt's care administers her medications consistently.  Her last dose of Eliquis was this am. Given at 10am.      TTE 4/2024  CONCLUSIONS:      1. Left ventricular wall thickness is normal. Left ventricular systolic function is normal with an ejection fraction of 76 % by 3D. There are no regional wall motion abnormalities seen.   2. No evidence of left atrial or left atrial appendage thrombus. Ultrasound enhancing agent was utilized to visualize the left atrial appendage.   3. The left atrium is severely dilated.   4. Moderate mitral regurgitation at a blood pressure of 132/71 mmHg. The mitral regurgitant jet is centrally directed. Mechanism of mitral regurgitation: Apryl Type I (normal leaflet motion with dilated annulus). PISA : later pisa radius 0.4, medial 0.37, Alisaing velocity 38.5cm/sec MR VTI 136cm MR Vmax 548 cm/s. The ERO 0.3sqcm and Reg. Vol 38cc. 3D vena contracta area was 0.35sqcm.   5. No pericardial effusion seen.   6. Normal right ventricular cavity size, with normal wall thickness, and normal systolic function.     (12 Feb 2025 12:50)    Hospital Course: Patient was admitted for cardioversion but was found to have NICO on CKD. Patient was seen by the nephrology team and had a permacath placed and was started on dialysis for uremia and volume overload unresponsive to diuretics. Patient was seen by the cardiology team and was recommended to increase toprol to 100 mg BID and to have a right heart cath and left heart cath which revealed elevated filling pressures and mitral regurgitation. Other than mRCA partial obstruction, cath was non-obstructive and did not require further interventions. Given the persistent volume overload, nephrology recommended Bumex 2mg IV daily and alternating UF/HF daily. Volume status normalized and patient received cardioversion with EP on 2/27, followed by starting Amiodarone. Patient will need to be on uninterrupted eliquis for 30 days prior to placement of AVF for permanent HD. Course was complicated by AMS with increased confusion and visual/auditory hallucinations. PRN Zyprexa was ordered. UA was also positive and patient was started on empiric ceftriaxone.      The patient is afebrile, hemodynamically stable and medically optimized for discharge to home with follow up with PCP, nephrology, cardiology. On day of discharge, patient is clinically stable with no new exam findings or acute symptoms compared to prior. The patient was seen by the attending physician on the date of discharge and deemed stable and acceptable for discharge. The patient's chronic medical conditions were treated accordingly per the patient's home medication regimen. The patient's medication reconciliation (with changes made to chronic medications), follow up appointments, discharge orders, instructions, and significant lab and diagnostic studies are as noted.    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    Of Note:  Confirmed with  who is responsible for pt's care administers her medications consistently.  Her last dose of Eliquis was this am. Given at 10am.      TTE 4/2024  CONCLUSIONS:      1. Left ventricular wall thickness is normal. Left ventricular systolic function is normal with an ejection fraction of 76 % by 3D. There are no regional wall motion abnormalities seen.   2. No evidence of left atrial or left atrial appendage thrombus. Ultrasound enhancing agent was utilized to visualize the left atrial appendage.   3. The left atrium is severely dilated.   4. Moderate mitral regurgitation at a blood pressure of 132/71 mmHg. The mitral regurgitant jet is centrally directed. Mechanism of mitral regurgitation: Apryl Type I (normal leaflet motion with dilated annulus). PISA : later pisa radius 0.4, medial 0.37, Alisaing velocity 38.5cm/sec MR VTI 136cm MR Vmax 548 cm/s. The ERO 0.3sqcm and Reg. Vol 38cc. 3D vena contracta area was 0.35sqcm.   5. No pericardial effusion seen.   6. Normal right ventricular cavity size, with normal wall thickness, and normal systolic function.     (12 Feb 2025 12:50)    Hospital Course: Patient was admitted for cardioversion but was found to have NICO on CKD. Patient was seen by the nephrology team and had a permacath placed and was started on dialysis for uremia and volume overload unresponsive to diuretics. Patient was seen by the cardiology team and was recommended to increase toprol to 100 mg BID and to have a right heart cath and left heart cath which revealed elevated filling pressures and mitral regurgitation. Other than mRCA partial obstruction, cath was non-obstructive and did not require further interventions. Given the persistent volume overload, nephrology recommended Bumex 2mg IV daily and alternating UF/HF daily. Volume status normalized and patient received cardioversion with EP on 2/27 but was unsuccessful, remains in afib ans started on amiodarone and increased metoprolol. Repeat RHC showed elevated pressures. Course was complicated by AMS with increased confusion and visual/auditory hallucinations. PRN Zyprexa was ordered. UA was also positive and patient was started on empiric ceftriaxone, transitioned to vancomycin for MSSE.       The patient is afebrile, hemodynamically stable and medically optimized for discharge to home with follow up with PCP, nephrology, cardiology. On day of discharge, patient is clinically stable with no new exam findings or acute symptoms compared to prior. The patient was seen by the attending physician on the date of discharge and deemed stable and acceptable for discharge. The patient's chronic medical conditions were treated accordingly per the patient's home medication regimen. The patient's medication reconciliation (with changes made to chronic medications), follow up appointments, discharge orders, instructions, and significant lab and diagnostic studies are as noted.    Important Medication Changes and Reason:  -Metoprolol 200 mg qd  -Eliquis 2.5 mg BID for renally dosed AC for Afib  -Bumex 2mg on non dialysis days  -Amiodarone 200mg qd  -Atorvastatin 80mg qd    Active or Pending Issues Requiring Follow-up:  1: PCP  2: Nephrology  3: Cardiology  4: Vascular Surgery    Advanced Directives:   [x] Full code  [ ] DNR  [ ] Hospice    Discharge Diagnoses:  ESRD on dialysis   Atrial fibrillation

## 2025-02-21 NOTE — DISCHARGE NOTE PROVIDER - NSDCFUADDINST_GEN_ALL_CORE_FT
Hemodialysis Monday, Wednesday, Friday at [facility]  AVF placement will be performed as outpatient, 30 days after cardioversion Hemodialysis Monday, Wednesday, Friday at [facility]  AVF placement will be performed as outpatient with Dr. White (vascular surgery), 30 days after cardioversion

## 2025-02-21 NOTE — DISCHARGE NOTE PROVIDER - CARE PROVIDER_API CALL
Bassem Elizondo  Nephrology  [address] [place] [address], [address]  [place] [address]  Phone: [phone]  Fax: [phone]  Established Patient  Follow Up Time: 1 week   Bassem Elizondo  Nephrology  [address] [place] [address], [address]  [place] [address]  Phone: [phone]  Fax: [phone]  Established Patient  Follow Up Time: 1 week    Mook White  Vascular Surgery  [address] [place] [address], [address]  [place] [address]  Phone: [phone]  Fax: [phone]  Follow Up Time: 1 month

## 2025-02-21 NOTE — DISCHARGE NOTE PROVIDER - PROVIDER TOKENS
PROVIDER:[TOKEN:[[id number]],FOLLOWUP:[1 week],ESTABLISHEDPATIENT:[T]] PROVIDER:[TOKEN:[[id number]],FOLLOWUP:[1 week],ESTABLISHEDPATIENT:[T]],PROVIDER:[TOKEN:[[id number]],FOLLOWUP:[1 month]]

## 2025-02-21 NOTE — PROGRESS NOTE ADULT - PROBLEM SELECTOR PLAN 1
Hx/o CKD iso b/l staghorn calculus s/p removal (2009) (follows with Dr. Elizondo). Found to have worsening renal function on admission so procedure cancelled. Baseline 4.6 in Aug 2024 and 5.5 in 1/25. Cr on admission 2/12 was 8.42.   - Strict I/Os, daily weights  - Trend BMPs, monitor renal function, renally dose meds, avoid nephrotoxins   - Nephro consulted, dialysis 2/19, 2/20 and 2/21  -C/w bumex 4 mg IV BID on non dialysis days Hx/o CKD iso b/l staghorn calculus s/p removal (2009) (follows with Dr. Elizondo). Found to have worsening renal function on admission so procedure cancelled. Baseline 4.6 in Aug 2024 and 5.5 in 1/25. Cr on admission 2/12 was 8.42.   - Strict I/Os, daily weights  - Trend BMPs, monitor renal function, renally dose meds, avoid nephrotoxins   - Nephro consulted, dialysis 2/19, 2/20 and 2/21  - Per nephrology, torsemide 60 mg PO daily on non dialysis days - confirm with patient when dialysis days will be

## 2025-02-21 NOTE — PROGRESS NOTE ADULT - PROBLEM SELECTOR PLAN 3
TTE in 11/2024 showed progression to moderate to severe MR.  Left ventricular systolic function is normal with an ejection fraction of 55 % by Garcia's method of disks. Appears hypervolemic on exam. Repeat TTE during admission reveals left ventricular systolic function is moderately decreased with an ejection fraction of 41 % by Garcia's method of disks.   -Bumex as above   -Hold home torsemide   -Per cardiology, plan or RHC and LHC on 2/24   -General cardiology following

## 2025-02-21 NOTE — DISCHARGE NOTE PROVIDER - NSDCCPCAREPLAN_GEN_ALL_CORE_FT
PRINCIPAL DISCHARGE DIAGNOSIS  Diagnosis: Acute kidney injury superimposed on CKD  Assessment and Plan of Treatment: You have been diagnosed with End-Stage Renal Disease (ESRD), and we have initiated dialysis treatment to manage your condition. Dialysis is a procedure that helps perform the functions of your kidneys, such as removing waste, fluids, and balancing electrolytes.  It is important that you follow up with your nephrologist and dialysis team for further evaluation and management. We will also be scheduling regular dialysis sessions based on your specific needs. You should take your prescribed medications as directed including the torsemide on non-dialysis days. If you experience any of the following symptoms, please seek immediate medical attention by returning to the Emergency Department (ED): Shortness of breath or difficulty breathing, Chest pain or discomfort, Excessive swelling in the body, especially the legs or abdomen, Dizziness or lightheadedness, Any concerns or changes in your condition that seem urgent.      SECONDARY DISCHARGE DIAGNOSES  Diagnosis: Paroxysmal atrial fibrillation  Assessment and Plan of Treatment: You were found to have an irregular rhythm of your heart so you were seen by the cardiology team and electrophysiology team who recommended TTE and cardioversion which revealed ...? Please follow up with your PCP and cardiologist for further management.     PRINCIPAL DISCHARGE DIAGNOSIS  Diagnosis: Acute kidney injury superimposed on CKD  Assessment and Plan of Treatment: You have been diagnosed with End-Stage Renal Disease (ESRD), and we have initiated dialysis treatment with a permacath to manage your condition. Dialysis is a procedure that helps perform the functions of your kidneys, such as removing waste, fluids, and balancing electrolytes.  It is important that you follow up with your nephrologist and dialysis team for further evaluation and management. We will also be scheduling regular dialysis sessions based on your specific needs. You should take your prescribed medications as directed including the torsemide on non-dialysis days. You will get an AV fistula placed 30 days after your cardioversion for Afib. If you experience any of the following symptoms, please seek immediate medical attention by returning to the Emergency Department (ED): Shortness of breath or difficulty breathing, Chest pain or discomfort, Excessive swelling in the body, especially the legs or abdomen, Dizziness or lightheadedness, Any concerns or changes in your condition that seem urgent.      SECONDARY DISCHARGE DIAGNOSES  Diagnosis: Paroxysmal atrial fibrillation  Assessment and Plan of Treatment: You were found to have an irregular rhythm of your heart called Afib so you were seen by the cardiology team and electrophysiology team who performed TTE and cardioversion. Please take Amiodarone as prescribed. Please continue to take eliquis 2.5 mg twice daily and follow up with your PCP and cardiologist for further management.    Diagnosis: Acute on chronic systolic congestive heart failure  Assessment and Plan of Treatment: You have heart failure which causes volume overload and leg swelling. The cardiology team followed you and performed a left and right heart cath which showed volume overload and mitral valve regurgitaiton. One heart vessel was partially occluded but the rest were not so you did not require any other heart procedure. You should take your heart medications as prescribed, including the torsemide on non-dialysis days and metoprolol twice a day, which has a new increased dose of 150mg twice daily. You should follow up with your cardiologist and come back to the hospial if you experience worsening chest pain, shortness of breath, or other new concerning cardiac symptoms.    Diagnosis: Urinary tract infection  Assessment and Plan of Treatment: You had some confusion and hallucinations, which could be a result of being in the hospital for a prolonged period of time. You received a medication called zyprexa as needed for anxiety and confusion. When you first came in, a urinalysis was performed and did not show urinary infection. However, because you have a history of urinary tract infection (UTI) in the past, we obtained a repeat urinalysis which showed you developed a urinary infection. You were treated with a course of antibiotics. If you experience worsening confusion or hallucinations after you leave the hospital, it would be a good idea to seek psychiatric help. If you experience worsening fevers, chills, or urinary symptoms, please consult your doctors.     PRINCIPAL DISCHARGE DIAGNOSIS  Diagnosis: Acute kidney injury superimposed on CKD  Assessment and Plan of Treatment: You have been diagnosed with End-Stage Renal Disease (ESRD), and we have initiated dialysis treatment with a permacath to manage your condition. Dialysis is a procedure that helps perform the functions of your kidneys, such as removing waste, fluids, and balancing electrolytes.  It is important that you follow up with your nephrologist and dialysis team for further evaluation and management. We will also be scheduling regular dialysis sessions based on your specific needs. You should take your prescribed medications as directed including Bumex (2mg) instead of torsemide on non-dialysis days. You will get an AV fistula as an outpatient. If you experience any of the following symptoms, please seek immediate medical attention by returning to the Emergency Department (ED): Shortness of breath or difficulty breathing, Chest pain or discomfort, Excessive swelling in the body, especially the legs or abdomen, Dizziness or lightheadedness, Any concerns or changes in your condition that seem urgent.      SECONDARY DISCHARGE DIAGNOSES  Diagnosis: Paroxysmal atrial fibrillation  Assessment and Plan of Treatment: You were found to have an irregular rhythm of your heart called Afib so you were seen by the cardiology team and electrophysiology team who performed TTE and cardioversion that was unsuccessful. Please take amiodarone (200mg daily) and metoprolol (200mg daily) as prescribed. Please continue to take eliquis 2.5 mg twice daily and follow up with your PCP and cardiologist for further management.    Diagnosis: Acute on chronic systolic congestive heart failure  Assessment and Plan of Treatment: You have heart failure which causes volume overload and leg swelling. The cardiology team followed you and performed a left and right heart cath which showed volume overload and mitral valve regurgitaiton. One heart vessel was partially occluded but the rest were not so you did not require any other heart procedure. You should take bumex (2mg) as prescribed on non-dialysis days. You should follow up with your cardiologist and come back to the hospial if you experience worsening chest pain, shortness of breath, or other new concerning cardiac symptoms.    Diagnosis: Urinary tract infection  Assessment and Plan of Treatment: You had some confusion and hallucinations, which could be a result of being in the hospital for a prolonged period of time. You received a medication called zyprexa as needed for anxiety and confusion. When you first came in, a urinalysis was performed and did not show urinary infection. However, because you have a history of urinary tract infection (UTI) in the past, we obtained a repeat urinalysis which showed you developed a urinary infection. You were treated with a course of antibiotics. If you experience worsening confusion or hallucinations after you leave the hospital, it would be a good idea to seek psychiatric help. If you experience worsening fevers, chills, or urinary symptoms, please consult your doctors.

## 2025-02-21 NOTE — DISCHARGE NOTE PROVIDER - NSFOLLOWUPCLINICS_GEN_ALL_ED_FT
Cardiology at [facility]  Cardiology  [address] [place] [address]  Phone: [phone]  Fax:     [facility] - Primary Care  Primary [facility]  [address] [place] [address]  Phone: [phone]  Fax:     [facility]  Nephrology  [address], [address] [place] [address]  Phone: [phone]  Fax:      Cardiology at [facility]  Cardiology  [address] [place] [address]  Phone: [phone]  Fax:     [facility] - Primary Care  Primary Care  [address] [place] [address]  Phone: [phone]  Fax:

## 2025-02-21 NOTE — PROGRESS NOTE ADULT - SUBJECTIVE AND OBJECTIVE BOX
[facility] Division of Kidney Diseases & Hypertension  FOLLOW UP NOTE  [phone]--------------------------------------------------------------------------------  Chief Complaint:Atypical atrial flutter, Now ESRD on HD, new start.     24 hour events/subjective:  Pt was seen and examined earlier today. No acute overnight events. No fresh complaints. Pt reports feeling well.   Pt denies SOB/ Constipation/ Diarrhea/ Nausea/ Vomiting/ abdominal pain/ chest pain/ tingling/ numbness.       PAST HISTORY  --------------------------------------------------------------------------------  No significant changes to PMH, PSH, FHx, SHx, unless otherwise noted    ALLERGIES & MEDICATIONS  --------------------------------------------------------------------------------  Allergies    No Known Allergies    Intolerances      Standing Inpatient Medications  apixaban 5 milliGRAM(s) Oral every 12 hours  chlorhexidine 2% Cloths 1 Application(s) Topical <User Schedule>  desmopressin IVPB 20 MICROGram(s) IV Intermittent once  influenza  Vaccine (HIGH DOSE) 0.5 milliLiter(s) IntraMuscular once  iron sucrose Injectable 200 milliGRAM(s) IV Push every 24 hours  metoprolol succinate  milliGRAM(s) Oral <User Schedule>  sevelamer carbonate 800 milliGRAM(s) Oral three times a day with meals  sodium bicarbonate 650 milliGRAM(s) Oral four times a day    PRN Inpatient Medications  acetaminophen     Tablet .. 650 milliGRAM(s) Oral every 6 hours PRN  melatonin 3 milliGRAM(s) Oral at bedtime PRN      REVIEW OF SYSTEMS  --------------------------------------------------------------------------------  as noted above.     VITALS/PHYSICAL EXAM  --------------------------------------------------------------------------------  T(C): 36.3 (02-21-25 @ 05:30), Max: 36.7 (02-20-25 @ 12:18)  HR: 86 (02-21-25 @ 05:30) (86 - 113)  BP: 120/82 (02-21-25 @ 05:30) (114/78 - 130/101)  RR: 18 (02-21-25 @ 05:30) (17 - 18)  SpO2: 98% (02-21-25 @ 05:30) (94% - 99%)  Wt(kg): --        02-20-25 @ 07:01  -  02-21-25 @ 07:00  --------------------------------------------------------  IN: 540 mL / OUT: 500 mL / NET: 40 mL      Physical Exam:  Gen: NAD  	HEENT: Anicteric  	Pulm: CTA B/L  	CV: S1S2+  	Abd: Soft, +BS   	Ext: No LE edema B/L  	Neuro: Awake, alert and oriented.   	Skin: Warm and dry              Access: RIJ tunnelled HD cath.         LABS/STUDIES  --------------------------------------------------------------------------------              9.3    6.07  >-----------<  114      [02-21-25 @ 08:48]              31.0     139  |  97  |  42  ----------------------------<  106      [02-21-25 @ 08:47]  4.3   |  27  |  4.35        Ca     8.9     [02-21-25 @ 08:47]      Mg     2.4     [02-21-25 @ 08:47]      Phos  5.1     [02-21-25 @ 08:47]    Creatinine Trend:  SCr 4.35 [02-21 @ 08:47]  SCr 5.47 [02-20 @ 05:47]  SCr 7.54 [02-19 @ 04:22]  SCr 7.55 [02-18 @ 06:11]  SCr 8.08 [02-17 @ 07:01]    Iron 44, TIBC --, %sat --      [02-19-25 @ 04:22]    HBsAb Nonreact      [02-20-25 @ 05:47]  HBsAg Nonreact      [02-20-25 @ 05:47]  HBcAb Nonreact      [02-20-25 @ 05:47]  HCV 0.09, Nonreact      [02-20-25 @ 05:47]

## 2025-02-21 NOTE — PROGRESS NOTE ADULT - ATTENDING COMMENTS
I have seen this patient with the fellow and agree with their assessment and plan. I was physically present for significant portions of the evaluation and management (E/M) service provided.  I agree with the above history, physical, and plan which I have reviewed and edited where appropriate.    New to ESRD  S/p tunnelled Hd cath and plan for HD today ( 3rd session)  Avf on hold till afib ablation per vascular team  LHC and RHC planned for next week  Next HD will be Monday 2/24 unless gets volume overloaded on weekend  rest as per above  please arrange dc planning to [facility] HD unit    For weekend coverage, please call Dr. Migel Thao ( fellow) or Dr Amanda Frost( Attending)    Shane Mauricio MD  Office   Contact me directly via [employer] Teams     (After 5 pm or on weekends please page the on-call fellow/attending, can check [email or web address] for schedule. Login is dominick pierre, schedule under [facility] medicine, psych, derm)

## 2025-02-21 NOTE — PROGRESS NOTE ADULT - ASSESSMENT
74-year-old female with PMHx of HTN, CKD stage 5 (pt. of Dr. Elizondo), Afib on Eliquis, anemia, nephrolithiasis, and recently diagnosed HF who presents for scheduled Afib ablation for Afib found to have NICO.    Nephrology consulted for NICO on CKD V.    Acute kidney injury superimposed on CKD-5 iso possibly over-diuresis (increased from lasix 80mg BID to torsemide 100mg BID 1/31/24)  -Pt. with hx of advanced CKD is in the setting of chronic history of nephrolithiasis/staghorn calculus. On review of labs on [place], last Scr was elevated at 6.18 on 1/30/25. Admission SCr is elevated at 8.42 without electrolyte abnormalities. Prior renal US 1/8/25 without hydronephrosis but calcifications, stones and cysts bilaterally.   USG - Kidney and bladder showed Bilateral renal calculi, no Hydronephrosis, B/L renal parenchymal disease. UPCR 4.9  RECS:  -Pt is uremic and needs HD, s/p tunnelled HD cath placement and First HD 2/19, 2nd session 2/20  HD today.   -break on the weekend      Anemia iso CKD  On venofer 200mg IV with HD X 5 doses  Once complete, can start PERLITA    Dc planning to [facility] HD unit- preferred unit by patient under Dr Elizondo once medically ready  AVF placement as outpatient     Please ask EP team to reevaluate for Afib management.   Plan to get RHC and LHC on monday.       Discussed with primary team.  74-year-old female with PMHx of HTN, CKD stage 5 (pt. of Dr. Elizondo), Afib on Eliquis, anemia, nephrolithiasis, and recently diagnosed HF who presents for scheduled Afib ablation for Afib found to have NICO.    Nephrology consulted for NICO on CKD V.    Acute kidney injury superimposed on CKD-5 iso possibly over-diuresis (increased from lasix 80mg BID to torsemide 100mg BID 1/31/24)  -Pt. with hx of advanced CKD is in the setting of chronic history of nephrolithiasis/staghorn calculus. On review of labs on [place]/ [facility], last Scr was elevated at 6.18 on 1/30/25. Admission SCr is elevated at 8.42 without electrolyte abnormalities. Prior renal US 1/8/25 without hydronephrosis but calcifications, stones and cysts bilaterally.   USG - Kidney and bladder showed Bilateral renal calculi, no Hydronephrosis, B/L renal parenchymal disease. UPCR 4.9    RECS:  -Pt is uremic and needs HD, s/p tunnelled HD cath placement and First HD 2/19, 2nd session 2/20  HD today.   -break on the weekend      Anemia iso CKD  On venofer 200mg IV with HD X 5 doses  Once complete, can start PERLITA    Dc planning to [facility] HD unit- preferred unit by patient under Dr Elizondo once medically ready  AVF placement as outpatient     Please ask EP team to reevaluate for Afib management.   Plan to get RHC and LHC on monday.

## 2025-02-21 NOTE — DISCHARGE NOTE PROVIDER - NSDCMRMEDTOKEN_GEN_ALL_CORE_FT
apixaban 5 mg oral tablet: 1 tab(s) orally 2 times a day  calcium (as carbonate) 600 mg oral tablet: 1 tab(s) orally 4 times a day  ferrous sulfate 325 mg (65 mg elemental iron) oral tablet: 1 tab(s) orally once a day  metoprolol succinate 50 mg oral tablet, extended release: 1 tab(s) orally 2 times a day  sevelamer carbonate 800 mg oral tablet: 1 tab(s) orally 3 times a day (with meals)  sodium bicarbonate 650 mg oral tablet: 1 tab(s) orally 4 times a day  torsemide 100 mg oral tablet: 1 tab(s) orally 2 times a day   amiodarone 200 mg oral tablet: 1 tab(s) orally once a day  apixaban 2.5 mg oral tablet: 1 tab(s) orally every 12 hours  atorvastatin 80 mg oral tablet: 1 tab(s) orally once a day (at bedtime)  bumetanide 2 mg oral tablet: 1 tab(s) orally Tuesday, Thursday, Saturday, Sunday Only on non-hemodialysis days  calcium (as carbonate) 600 mg oral tablet: 1 tab(s) orally 4 times a day  ferrous sulfate 325 mg (65 mg elemental iron) oral tablet: 1 tab(s) orally once a day  metoprolol succinate 200 mg oral tablet, extended release: 1 tab(s) orally once a day  sevelamer carbonate 800 mg oral tablet: 1 tab(s) orally 3 times a day (with meals)  sodium bicarbonate 650 mg oral tablet: 1 tab(s) orally 4 times a day

## 2025-02-21 NOTE — DISCHARGE NOTE PROVIDER - NSFOLLOWUPCLINICSTOKEN_GEN_ALL_ED_FT
[id number]: || ||00\01||False;[id number]: || ||00\01||False;[id number]: || ||00\01||False; [id number]: || ||00\01||False;[id number]: || ||00\01||False;

## 2025-02-21 NOTE — DISCHARGE NOTE PROVIDER - CARE PROVIDERS DIRECT ADDRESSES
[email or web address]

## 2025-02-21 NOTE — DISCHARGE NOTE PROVIDER - NSDCCPTREATMENT_GEN_ALL_CORE_FT
PRINCIPAL PROCEDURE  Procedure: Ultrasound  Findings and Treatment:   < end of copied text >  FINDINGS:  Right kidney: 8.9 cm.. Echogenic, compatible with parenchymal disease.   Numerous cysts. No definite hydronephrosis. Multiple calculi measuring up   to 1.3 cm.  Left kidney: 9.5 cm. Echogenic, compatible with parenchymal disease.   Numerous cysts including cysts with calcifications. No definite   hydronephrosis. Calculi measuring up to 0.7 cm.  Urinary bladder: Limited evaluation.  IMPRESSION:  Bilateral renal parenchymal disease. No definite hydronephrosis.   Bilateral renal calculi.  --- End of Report ---< from: US Kidney and Bladder (02.13.25 @ 17:59) >        SECONDARY PROCEDURE  Procedure: CXR, single AP view  Findings and Treatment:   < end of copied text >  FINDINGS:  Right IJ dialysis catheter terminating within the SVC.  The heart is magnified by technique.  Bilateral perihilar opacities.  There is no pneumothorax or pleural effusion.  IMPRESSION:  Right IJ dialysis catheter terminating in the SVC  Pulmonary vascular congestion  --- End of Report ---< from: Xray Chest 1 View- PORTABLE-Urgent (Xray Chest 1 View- PORTABLE-Urgent .) (02.19.25 @ 15:47) >       PRINCIPAL PROCEDURE  Procedure: Ultrasound  Findings and Treatment:   < end of copied text >  FINDINGS:  Right kidney: 8.9 cm.. Echogenic, compatible with parenchymal disease.   Numerous cysts. No definite hydronephrosis. Multiple calculi measuring up   to 1.3 cm.  Left kidney: 9.5 cm. Echogenic, compatible with parenchymal disease.   Numerous cysts including cysts with calcifications. No definite   hydronephrosis. Calculi measuring up to 0.7 cm.  Urinary bladder: Limited evaluation.  IMPRESSION:  Bilateral renal parenchymal disease. No definite hydronephrosis.   Bilateral renal calculi.  --- End of Report ---< from: US Kidney and Bladder (02.13.25 @ 17:59) >        SECONDARY PROCEDURE  Procedure: CXR, single AP view  Findings and Treatment:   < end of copied text >  FINDINGS:  Right IJ dialysis catheter terminating within the SVC.  The heart is magnified by technique.  Bilateral perihilar opacities.  There is no pneumothorax or pleural effusion.  IMPRESSION:  Right IJ dialysis catheter terminating in the SVC  Pulmonary vascular congestion  --- End of Report ---< from: Xray Chest 1 View- PORTABLE-Urgent (Xray Chest 1 View- PORTABLE-Urgent .) (02.19.25 @ 15:47) >      Procedure: Complete transthoracic echocardiography (TTE)  Findings and Treatment:   < end of copied text >  CONCLUSIONS:      1. Left ventricular cavity is mildly dilated. Left ventricular systolic function is moderately decreased with an ejection fraction of 41 % by Garcia'smethod of disks. Global left ventricular hypokinesis.   2. Multiple segmental abnormalities exist. See findings.   3. Left atrium is severely dilated.   4. Severe mitral regurgitation. The mitral regurgitant jet is posteriorly directed.   5. No pericardial effusion seen.   6. Mild to moderate tricuspid regurgitation.   7. Compared to the transthoracic echocardiogram performed on 11/27/2024, LVEF has decreased, MR is increased,.   8. Estimated pulmonary artery systolic pressure is 46 mmHg, consistent with mild to moderate pulmonary hypertension.   9. Symmetric mitral valve leaflet tethering.  10. The inferior vena cava is dilated measuring 2.40 cm in diameter, (dilated >2.1cm) with abnormal inspiratory collapse (abnormal <50%) consistent with elevated right atrial pressure (~15, range 10-20mmHg).  11. There is normal LV mass and normal geometry.< from: TTE W or WO Ultrasound Enhancing Agent (02.13.25 @ 11:46) >      Procedure: Catheterization, heart, complete  Findings and Treatment:   < end of copied text >  Cath Lab Report    Diagnostic Cardiologist:       Ta Campa MD   Referring Physician:           Shan Moody MD   Procedures Performed   Procedures:              1.    Arterial Access - Right Femoral   2.    Venous Access - Right Femoral   3.    Ultrasound Guided Access   4.    Diagnostic Coronary Angiography   5.    Left Heart Cath   6.    RHC   Indications:               Cardiomyopathy   Mitral Regurgitation   Tricuspid Regurgitation   Congestive heart failure with cardiomyopathy   Lab Visit Indication:      cardiomyopathy, Mitral Regurgitation,  Tricuspid Regurgitation, CardmyUS  Diagnostic Conclusions:   Three vessel coronary artery disease   Normal left main coronary artery   Right dominant system   Elevated right atrial pressure (mRA 17mmHg with a V wave of 21mmHg)   Mild post-capillary pulmonary hypertension (sPAP 40mmHg, dPAP 22mmHg,  mPAP 30mmHg)  PCWP = 30mmHg with a V wave of 36mmHg   LVEDP = 20mmHg   AO = 112/73/90   PAsat = 43.25% // AOsat = 94.9% (room air)   Felix CO // CI = 3.44l/min // 2.14l/min/m2   Recommendations:   Keep right leg straight for 4 hours following removal of sheaths   Continue aggressive medical management   Findings discussed with cardiology/Dr. Moody   Procedure Narrative:   The risks and alternatives ofthe procedures and conscious sedation  were explained to the patient and informed consent was  obtained. The patient was brought to the cath lab and placed on the  exam table.  Access   Right femoral artery:   Patient: ADRIENNE PARSONS          MRN: [id number]  Study Date: 02/24/2025   05:06 PM      Page 1 of 5  The puncture site was infiltrated with 1% Lidocaine. Vascular access  was obtained using modified seldinger technique.  Right femoral vein:   The puncture site was infiltrated with1% Lidocaine. Vascular access  was obtained using modified seldinger technique.  Coronary Angiography   Left Coronary System:   A catheter was positioned into the vessel ostia under fluoroscopic  guidance. Angiograms were obtained in multiple views

## 2025-02-21 NOTE — PROGRESS NOTE ADULT - PROBLEM SELECTOR PLAN 2
Admitted for cardioversion but unable to do because NICO on CKD.  -Eliquis mg BID - d/c 2/22 evening for heart cath per cards  -Per cards, toprol to 100 mg BID  -EP recommend d/c amiodarone given plan for tunnel cath and worsening kidney function

## 2025-02-21 NOTE — PROGRESS NOTE ADULT - SUBJECTIVE AND OBJECTIVE BOX
[facility] - Heidy Bates, Alverto Green, Kevyn King  Office Number:  [phone]    Patient resting comfortably in bed in NAD.  Laying flat with no respiratory distress.  No complaints of chest pain, dyspnea, palpitations, PND, or orthopnea.    ROS: negative unless otherwise mentioned.    Telemetry:  AF    MEDICATIONS  (STANDING):  apixaban 5 milliGRAM(s) Oral every 12 hours  chlorhexidine 2% Cloths 1 Application(s) Topical <User Schedule>  desmopressin IVPB 20 MICROGram(s) IV Intermittent once  influenza  Vaccine (HIGH DOSE) 0.5 milliLiter(s) IntraMuscular once  iron sucrose Injectable 200 milliGRAM(s) IV Push every 24 hours  metoprolol succinate  milliGRAM(s) Oral <User Schedule>  sevelamer carbonate 800 milliGRAM(s) Oral three times a day with meals  sodium bicarbonate 650 milliGRAM(s) Oral four times a day    MEDICATIONS  (PRN):  acetaminophen     Tablet .. 650 milliGRAM(s) Oral every 6 hours PRN Temp greater or equal to 38C (100.4F), Mild Pain (1 - 3)  melatonin 3 milliGRAM(s) Oral at bedtime PRN Insomnia      Allergies    No Known Allergies    Intolerances        Vital Signs Last 24 Hrs  T(C): 36.3 (21 Feb 2025 05:30), Max: 36.7 (20 Feb 2025 12:18)  T(F): 97.4 (21 Feb 2025 05:30), Max: 98.1 (20 Feb 2025 12:18)  HR: 86 (21 Feb 2025 05:30) (86 - 113)  BP: 120/82 (21 Feb 2025 05:30) (114/78 - 130/101)  BP(mean): --  RR: 18 (21 Feb 2025 05:30) (17 - 18)  SpO2: 98% (21 Feb 2025 05:30) (94% - 99%)    Parameters below as of 20 Feb 2025 21:10  Patient On (Oxygen Delivery Method): room air        I&O's Summary    20 Feb 2025 07:01  -  21 Feb 2025 07:00  --------------------------------------------------------  IN: 540 mL / OUT: 500 mL / NET: 40 mL        ON EXAM:    General: NAD, awake and alert, oriented x 3  HEENT: Mucous membranes are moist, anicteric  Lungs: Non-labored, breath sounds are clear bilaterally, No wheezing, rales or rhonchi  Cardiovascular: irregular, S1 and S2, +SM  Gastrointestinal: Bowel Sounds present, soft, nontender.   Lymph: 2+ peripheral edema. No lymphadenopathy.  Skin: No rashes or ulcers  Psych:  Mood & affect appropriate    LABS: All Labs Reviewed:                        9.7    8.05  )-----------( 121      ( 20 Feb 2025 05:47 )             32.3                         10.4   8.46  )-----------( 146      ( 19 Feb 2025 04:22 )             34.0     20 Feb 2025 05:47    138    |  95     |  64     ----------------------------<  113    4.0     |  24     |  5.47   19 Feb 2025 04:22    140    |  95     |  97     ----------------------------<  125    4.7     |  20     |  7.54     Ca    8.3        20 Feb 2025 05:47  Ca    8.0        19 Feb 2025 04:22  Phos  4.8       20 Feb 2025 05:47  Phos  6.7       19 Feb 2025 04:22  Mg     2.4       20 Feb 2025 05:47  Mg     2.6       19 Feb 2025 04:22    TPro  6.9    /  Alb  4.1    /  TBili  0.6    /  DBili  x      /  AST  11     /  ALT  9      /  AlkPhos  90     19 Feb 2025 04:22          Blood Culture:        [facility] - Heidy Bates, Alverto Green, Kevyn King  Office Number:  [phone]    Patient resting comfortably in bed in NAD.  Laying flat with no respiratory distress.  No complaints of chest pain, dyspnea, palpitations, PND, or orthopnea.    ROS: negative unless otherwise mentioned.    Telemetry:      MEDICATIONS  (STANDING):  apixaban 5 milliGRAM(s) Oral every 12 hours  chlorhexidine 2% Cloths 1 Application(s) Topical <User Schedule>  desmopressin IVPB 20 MICROGram(s) IV Intermittent once  influenza  Vaccine (HIGH DOSE) 0.5 milliLiter(s) IntraMuscular once  iron sucrose Injectable 200 milliGRAM(s) IV Push every 24 hours  metoprolol succinate  milliGRAM(s) Oral <User Schedule>  sevelamer carbonate 800 milliGRAM(s) Oral three times a day with meals  sodium bicarbonate 650 milliGRAM(s) Oral four times a day    MEDICATIONS  (PRN):  acetaminophen     Tablet .. 650 milliGRAM(s) Oral every 6 hours PRN Temp greater or equal to 38C (100.4F), Mild Pain (1 - 3)  melatonin 3 milliGRAM(s) Oral at bedtime PRN Insomnia      Allergies    No Known Allergies    Intolerances        Vital Signs Last 24 Hrs  T(C): 36.3 (21 Feb 2025 05:30), Max: 36.7 (20 Feb 2025 12:18)  T(F): 97.4 (21 Feb 2025 05:30), Max: 98.1 (20 Feb 2025 12:18)  HR: 86 (21 Feb 2025 05:30) (86 - 113)  BP: 120/82 (21 Feb 2025 05:30) (114/78 - 130/101)  BP(mean): --  RR: 18 (21 Feb 2025 05:30) (17 - 18)  SpO2: 98% (21 Feb 2025 05:30) (94% - 99%)    Parameters below as of 20 Feb 2025 21:10  Patient On (Oxygen Delivery Method): room air        I&O's Summary    20 Feb 2025 07:01  -  21 Feb 2025 07:00  --------------------------------------------------------  IN: 540 mL / OUT: 500 mL / NET: 40 mL        ON EXAM:    General: NAD, awake and alert, oriented x 3  HEENT: Mucous membranes are moist, anicteric  Lungs: Non-labored, breath sounds are clear bilaterally, No wheezing, rales or rhonchi  Cardiovascular: irregular, S1 and S2, +SM  Gastrointestinal: Bowel Sounds present, soft, nontender.   Lymph: 2+ peripheral edema. No lymphadenopathy.  Skin: No rashes or ulcers  Psych:  Mood & affect appropriate    LABS: All Labs Reviewed:                        9.7    8.05  )-----------( 121      ( 20 Feb 2025 05:47 )             32.3                         10.4   8.46  )-----------( 146      ( 19 Feb 2025 04:22 )             34.0     20 Feb 2025 05:47    138    |  95     |  64     ----------------------------<  113    4.0     |  24     |  5.47   19 Feb 2025 04:22    140    |  95     |  97     ----------------------------<  125    4.7     |  20     |  7.54     Ca    8.3        20 Feb 2025 05:47  Ca    8.0        19 Feb 2025 04:22  Phos  4.8       20 Feb 2025 05:47  Phos  6.7       19 Feb 2025 04:22  Mg     2.4       20 Feb 2025 05:47  Mg     2.6       19 Feb 2025 04:22    TPro  6.9    /  Alb  4.1    /  TBili  0.6    /  DBili  x      /  AST  11     /  ALT  9      /  AlkPhos  90     19 Feb 2025 04:22          Blood Culture:

## 2025-02-21 NOTE — PROGRESS NOTE ADULT - ASSESSMENT
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       #Acute kidney injury superimposed on CKD.    Found to have worsening renal function on admission so procedure cancelled. Baseline 4.6 in Aug 2024 and 5.5 in 1/25. Cr on admission 2/12 was 8.42.   - Cr continued to rise to 8.71 now down to 7's  - On Bumex 4mg IV BID for diurese but Is and Os document 0ml of urine  - RAP elevated to 15 on TTE from 2/13 and she appears significantly volume up on exam  -S/ permacath 2/19 and first HD session same day  -On Bumex 4mg IV BID poor UOP noted, HD initiated now  for volume removal. Plan for HD again tomorrow     #  Paroxysmal atrial fibrillation.   ·  Plan: Admitted for cardioversion but unable to do because given significant volume overload  - Weight 60kg, Eliquis increased to 5mg BID. Will monitor weights closely to assess if Eliquis dose reduction needed in the future.   - Continue Metoprolol Succinate 100mg  BID  - Off Amiodarone    # Chronic heart failure with preserved ejection fraction. -Severe MR  ·TTE in 11/2024 showed progression to moderate to severe MR.  Left ventricular systolic function was normal with an ejection fraction of 55 % by Garcia's method of disks.  -TTE now with LVEF of 41%, global hypokinesis, severe MR, RAP of 15.   -Structural Heart evaluation noted. Will aggressively diurese and repeat TTE when euvolemic  - Volume removal with HD, repeat session today.   Eventual GDMT with hydralazine and isordil   Will plan for RHC/LHC Monday 2/24. Discussed with patient, Structural team and primary team.   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       #Acute kidney injury superimposed on CKD.    Found to have worsening renal function on admission so procedure cancelled. Baseline 4.6 in Aug 2024 and 5.5 in 1/25. Cr on admission 2/12 was 8.42.   - Cr continued to rise to 8.71 now down to 7's  - On Bumex 4mg IV BID for diurese but Is and Os document 0ml of urine  - RAP elevated to 15 on TTE from 2/13 and she appears significantly volume up on exam  -S/ permacath 2/19 and first HD session same day  -On Bumex 4mg IV BID poor UOP noted, HD initiated now  for volume removal. Plan for HD again tomorrow     #  Paroxysmal atrial fibrillation.   ·  Plan: Admitted for cardioversion but unable to do because given significant volume overload  - Weight 60kg, Eliquis increased to 5mg BID. Will monitor weights closely to assess if Eliquis dose reduction needed in the future.   - Continue Metoprolol Succinate 100mg  BID, HRs have improved. If sustains at >110, may require additional agent   - Off Amiodarone    # Chronic heart failure with preserved ejection fraction. -Severe MR  ·TTE in 11/2024 showed progression to moderate to severe MR.  Left ventricular systolic function was normal with an ejection fraction of 55 % by Garcia's method of disks.  -TTE now with LVEF of 41%, global hypokinesis, severe MR, RAP of 15.   -Structural Heart evaluation noted. Will aggressively diurese and repeat TTE when euvolemic  - Volume removal with HD, repeat session today.   Eventual GDMT with hydralazine and isordil   Will plan for RHC/LHC Monday 2/24. Discussed with patient, Structural team and primary team.   - To hold 3 doses of Eliquis prior to Monday (Sunday AM, sunday night and Monday AM)

## 2025-02-21 NOTE — DISCHARGE NOTE PROVIDER - NSDCFUADDAPPT_GEN_ALL_CORE_FT
APPTS ARE READY TO BE MADE: [ ] YES    Best Family or Patient Contact (if needed):    Additional Information about above appointments (if needed):    1: PCP  2: Cardiology  3: Nephrology     Other comments or requests:   If you need a new PCP, Please make an appointment with General Internal Medicine, [address] [place] [address], [address], [place] [address]. Please call [phone] to make an appointment  APPTS ARE READY TO BE MADE: [X] YES    Best Family or Patient Contact (if needed):    Additional Information about above appointments (if needed):    1: PCP  2: Cardiology  3: Nephrology - follow up with Dr. Elizondo for ESRD requiring dialysis    Other comments or requests:   If you need a new PCP, Please make an appointment with General Internal Medicine, [address] [place] [address], [address], [place] [address]. Please call [phone] to make an appointment  APPTS ARE READY TO BE MADE: [X] YES    Best Family or Patient Contact (if needed):    Additional Information about above appointments (if needed):    1: PCP  2: Cardiology  3: Nephrology - follow up with Dr. Elizondo for ESRD requiring dialysis  4: Vascular surgery - Dr. White for AVF placement    Other comments or requests:   If you need a new PCP, Please make an appointment with General Internal Medicine, [address] [place] [address], [address], [place] [address]. Please call [phone] to make an appointment

## 2025-02-21 NOTE — PROGRESS NOTE ADULT - ATTENDING COMMENTS
74F w/ severe MR, AFib on Eliquis, HFpEF, CKD and recent admission 1/7-9 for AF w/ RVR and decompensated HF who presented for planned DCCV, found to have NICO on CKD and admitted to medicine for further work up. Concern for CKD progression (baseline SCr 5-6) vs NICO on CKD. Worsening LE edema despite torsemide 100 mg bid. Nephrology consulted, s/p permacath 2/19 and started on HD. Now on PO diuretics on non HD days. TTE with EF 41%, WMA, severe MR, progressive from 11/2024 echo. Cardiology, EP, and structural cardiology following. Will need eventual TTE/angiograms/DCCV after adequate fluid removal. Cards following, plan for RHC/LHC 2/24. F/u EP re timing inpatient vs outpatient DCCV.    DCP home pending outpatient HD set up, cards/EP/structural recs. 74F w/ severe MR, AFib on Eliquis, HFpEF, CKD and recent admission 1/7-9 for AF w/ RVR and decompensated HF who presented for planned DCCV, found to have NICO on CKD and admitted to medicine for further work up. Concern for CKD progression (baseline SCr 5-6) vs NICO on CKD. Worsening LE edema despite torsemide 100 mg bid. Nephrology consulted, s/p permacath 2/19 and started on HD. Now on PO diuretics on non HD days. TTE with EF 41%, WMA, severe MR, progressive from 11/2024 echo. Cardiology, EP, and structural cardiology following. Will need eventual TTE/angiograms/DCCV after adequate fluid removal. Cards following, plan for RHC/LHC 2/24. F/u EP re timing of DCCV, potentially 2/25 but not yet confirmed.    DCP home pending outpatient HD set up, cards/EP/structural recs.

## 2025-02-22 LAB
ANION GAP SERPL CALC-SCNC: 21 MMOL/L — HIGH (ref 5–17)
BUN SERPL-MCNC: 32 MG/DL — HIGH (ref 7–23)
CALCIUM SERPL-MCNC: 9.5 MG/DL — SIGNIFICANT CHANGE UP (ref 8.4–10.5)
CHLORIDE SERPL-SCNC: 100 MMOL/L — SIGNIFICANT CHANGE UP (ref 96–108)
CO2 SERPL-SCNC: 23 MMOL/L — SIGNIFICANT CHANGE UP (ref 22–31)
CREAT SERPL-MCNC: 3.33 MG/DL — HIGH (ref 0.5–1.3)
EGFR: 14 ML/MIN/1.73M2 — LOW
EGFR: 14 ML/MIN/1.73M2 — LOW
GLUCOSE SERPL-MCNC: 84 MG/DL — SIGNIFICANT CHANGE UP (ref 70–99)
HCT VFR BLD CALC: 33.2 % — LOW (ref 34.5–45)
HGB BLD-MCNC: 9.8 G/DL — LOW (ref 11.5–15.5)
MAGNESIUM SERPL-MCNC: 2.4 MG/DL — SIGNIFICANT CHANGE UP (ref 1.6–2.6)
MCHC RBC-ENTMCNC: 29.5 G/DL — LOW (ref 32–36)
MCHC RBC-ENTMCNC: 30.7 PG — SIGNIFICANT CHANGE UP (ref 27–34)
MCV RBC AUTO: 104.1 FL — HIGH (ref 80–100)
NRBC BLD AUTO-RTO: 0 /100 WBCS — SIGNIFICANT CHANGE UP (ref 0–0)
PHOSPHATE SERPL-MCNC: 5.1 MG/DL — HIGH (ref 2.5–4.5)
PLATELET # BLD AUTO: 165 K/UL — SIGNIFICANT CHANGE UP (ref 150–400)
POTASSIUM SERPL-MCNC: 5.1 MMOL/L — SIGNIFICANT CHANGE UP (ref 3.5–5.3)
POTASSIUM SERPL-SCNC: 5.1 MMOL/L — SIGNIFICANT CHANGE UP (ref 3.5–5.3)
RBC # BLD: 3.19 M/UL — LOW (ref 3.8–5.2)
RBC # FLD: 16.7 % — HIGH (ref 10.3–14.5)
SODIUM SERPL-SCNC: 144 MMOL/L — SIGNIFICANT CHANGE UP (ref 135–145)
WBC # BLD: 7.31 K/UL — SIGNIFICANT CHANGE UP (ref 3.8–10.5)
WBC # FLD AUTO: 7.31 K/UL — SIGNIFICANT CHANGE UP (ref 3.8–10.5)

## 2025-02-22 PROCEDURE — 99232 SBSQ HOSP IP/OBS MODERATE 35: CPT | Mod: GC

## 2025-02-22 PROCEDURE — 93010 ELECTROCARDIOGRAM REPORT: CPT

## 2025-02-22 RX ORDER — OLANZAPINE 10 MG/1
2.5 TABLET ORAL ONCE
Refills: 0 | Status: COMPLETED | OUTPATIENT
Start: 2025-02-22 | End: 2025-02-22

## 2025-02-22 RX ADMIN — SEVELAMER HYDROCHLORIDE 800 MILLIGRAM(S): 800 TABLET ORAL at 13:16

## 2025-02-22 RX ADMIN — Medication 650 MILLIGRAM(S): at 23:41

## 2025-02-22 RX ADMIN — OLANZAPINE 2.5 MILLIGRAM(S): 10 TABLET ORAL at 18:59

## 2025-02-22 RX ADMIN — APIXABAN 5 MILLIGRAM(S): 2.5 TABLET, FILM COATED ORAL at 05:21

## 2025-02-22 RX ADMIN — APIXABAN 5 MILLIGRAM(S): 2.5 TABLET, FILM COATED ORAL at 17:51

## 2025-02-22 RX ADMIN — IRON SUCROSE 200 MILLIGRAM(S): 20 INJECTION, SOLUTION INTRAVENOUS at 17:51

## 2025-02-22 RX ADMIN — Medication 60 MILLIGRAM(S): at 05:20

## 2025-02-22 RX ADMIN — Medication 650 MILLIGRAM(S): at 05:21

## 2025-02-22 RX ADMIN — METOPROLOL SUCCINATE 100 MILLIGRAM(S): 50 TABLET, EXTENDED RELEASE ORAL at 05:20

## 2025-02-22 RX ADMIN — Medication 1 APPLICATION(S): at 07:47

## 2025-02-22 RX ADMIN — Medication 650 MILLIGRAM(S): at 00:40

## 2025-02-22 RX ADMIN — Medication 650 MILLIGRAM(S): at 13:16

## 2025-02-22 RX ADMIN — METOPROLOL SUCCINATE 100 MILLIGRAM(S): 50 TABLET, EXTENDED RELEASE ORAL at 17:51

## 2025-02-22 RX ADMIN — Medication 650 MILLIGRAM(S): at 17:51

## 2025-02-22 NOTE — PROGRESS NOTE ADULT - PROBLEM SELECTOR PLAN 2
Admitted for cardioversion but unable to do because NICO on CKD.  -Tentative plan for cardioversion on Tuesday 2/25  -Eliquis mg BID - d/c 2/22 evening for heart cath per cards  -Per cards, toprol to 100 mg BID  -EP recommend d/c amiodarone given plan for tunnel cath and worsening kidney function Admitted for cardioversion but unable to do because NICO on CKD.  -Tentative plan for cardioversion on Tuesday 2/25  -Eliquis mg BID - d/c 2/22 evening for heart cath per cards and start heparin 2/23  -Per cards, toprol to 100 mg BID  -EP recommend d/c amiodarone given plan for tunnel cath and worsening kidney function

## 2025-02-22 NOTE — PROGRESS NOTE ADULT - SUBJECTIVE AND OBJECTIVE BOX
MR#[id number]  PATIENT NAME:ELIZABETH PARSONS    DATE OF SERVICE: 02-22-25 @ 06:26  Patient was seen and examined by Aki Carias MD on    02-22-25 @ 06:26 .  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    Of Note:  Confirmed with  who is responsible for pt's care administers her medications consistently.  Her last dose of Eliquis was this am. Given at 10am.      TTE 4/2024  CONCLUSIONS:      1. Left ventricular wall thickness is normal. Left ventricular systolic function is normal with an ejection fraction of 76 % by 3D. There are no regional wall motion abnormalities seen.   2. No evidence of left atrial or left atrial appendage thrombus. Ultrasound enhancing agent was utilized to visualize the left atrial appendage.   3. The left atrium is severely dilated.   4. Moderate mitral regurgitation at a blood pressure of 132/71 mmHg. The mitral regurgitant jet is centrally directed. Mechanism of mitral regurgitation: Apryl Type I (normal leaflet motion with dilated annulus). PISA : later pisa radius 0.4, medial 0.37, Alisaing velocity 38.5cm/sec MR VTI 136cm MR Vmax 548 cm/s. The ERO 0.3sqcm and Reg. Vol 38cc. 3D vena contracta area was 0.35sqcm.   5. No pericardial effusion seen.   6. Normal right ventricular cavity size, with normal wall thickness, and normal systolic function.     (12 Feb 2025 12:50)      INTERIM EVENTS:Patient seen at bedside ,interim events noted.  The patient reports no new symptoms.  Denies chest discomfort and shortness of breath.  No abdominal pain.  No new neurologic symptoms.  2/19-Awake remains in AF-fpr HD catheter placement today  2/22-Awake had 3 sessions HD Remains in AF    PMH -reviewed admission note, no change since admission    MEDICATIONS  (STANDING):  apixaban 5 milliGRAM(s) Oral every 12 hours  chlorhexidine 2% Cloths 1 Application(s) Topical <User Schedule>  desmopressin IVPB 20 MICROGram(s) IV Intermittent once  influenza  Vaccine (HIGH DOSE) 0.5 milliLiter(s) IntraMuscular once  iron sucrose Injectable 200 milliGRAM(s) IV Push every 24 hours  metoprolol succinate  milliGRAM(s) Oral <User Schedule>  sevelamer carbonate 800 milliGRAM(s) Oral three times a day with meals  sodium bicarbonate 650 milliGRAM(s) Oral four times a day  torsemide 60 milliGRAM(s) Oral daily    MEDICATIONS  (PRN):  acetaminophen     Tablet .. 650 milliGRAM(s) Oral every 6 hours PRN Temp greater or equal to 38C (100.4F), Mild Pain (1 - 3)  melatonin 3 milliGRAM(s) Oral at bedtime PRN Insomnia            REVIEW OF SYSTEMS:  Constitutional: [ ] fever, [ ]weight loss,  [x ]fatigue [ ]weight gain  Eyes: [ ] visual changes  Respiratory: [ ]shortness of breath;  [ ] cough, [ ]wheezing, [ ]chills, [ ]hemoptysis  Cardiovascular: [ ] chest pain, [ ]palpitations, [ ]dizziness,  [ ]leg swelling[ ]orthopnea[ ]PND  Gastrointestinal: [ ] abdominal pain, [ ]nausea, [ ]vomiting,  [ ]diarrhea [ ]Constipation [ ]Melena  Genitourinary: [ ] dysuria, [ ] hematuria [ ]Zelaya  Neurologic: [ ] headaches [ ] tremors[ ]weakness [ ]Paralysis Right[ ] Left[ ]  Skin: [ ] itching, [ ]burning, [ ] rashes  Endocrine: [ ] heat or cold intolerance  Musculoskeletal: [ ] joint pain or swelling; [x ] muscle, back, or extremity pain  Psychiatric: [ ] depression, [ ]anxiety, [ ]mood swings, or [ ]difficulty sleeping  Hematologic: [ ] easy bruising, [ ] bleeding gums    [ ] All remaining systems negative except as per above.   [ ]Unable to obtain.  [x] No change in ROS since admission      Vital Signs Last 24 Hrs  T(C): 36.3 (22 Feb 2025 05:09), Max: 36.8 (21 Feb 2025 21:16)  T(F): 97.4 (22 Feb 2025 05:09), Max: 98.3 (21 Feb 2025 21:16)  HR: 113 (22 Feb 2025 05:09) (103 - 113)  BP: 135/69 (22 Feb 2025 05:09) (121/90 - 135/69)  RR: 18 (22 Feb 2025 05:09) (16 - 18)  SpO2: 97% (22 Feb 2025 05:09) (97% - 99%)    Parameters below as of 22 Feb 2025 05:09  Patient On (Oxygen Delivery Method): room air      I&O's Summary    20 Feb 2025 07:01  -  21 Feb 2025 07:00  --------------------------------------------------------  IN: 540 mL / OUT: 500 mL / NET: 40 mL    21 Feb 2025 07:01  -  22 Feb 2025 06:26  --------------------------------------------------------  IN: 1020 mL / OUT: 0 mL / NET: 1020 mL        PHYSICAL EXAM:  General: No acute distress BMI-21  HEENT: EOMI, PERRL  Neck: Supple, [ ] JVD  Lungs: Equal air entry bilaterally; [ ] rales [ ] wheezing [ ] rhonchi  Heart: Irregular rate and rhythm; [x ] murmur   2/6 [ x] systolic [ ] diastolic [ ] radiation[ ] rubs [ ]  gallops  Abdomen: Nontender, bowel sounds present  Extremities: No clubbing, cyanosis, [ ] edema [ ]Pulses  equal and intact  Nervous system:  Alert & Oriented X3, no focal deficits  Psychiatric: Normal affect  Skin: No rashes or lesions    LABS:  02-21    139  |  97  |  42[H]  ----------------------------<  106[H]  4.3   |  27  |  4.35[H]    Ca    8.9      21 Feb 2025 08:47  Phos  5.1     02-21  Mg     2.4     02-21      Creatinine Trend: 4.35<--, 5.47<--, 7.54<--, 7.55<--, 8.08<--, 8.22<--                        9.8    7.31  )-----------( x        ( 22 Feb 2025 06:16 )             33.2         TTE W or WO Ultrasound Enhancing Agent (02.13.25 @ 11:46) >  CONCLUSIONS:      1. Left ventricular cavity is mildly dilated. Left ventricular systolic function is moderately decreased with an ejection fraction of 41 % by Garcia'smethod of disks. Global left ventricular hypokinesis.   2. Multiple segmental abnormalities exist. See findings.   3. Left atrium is severely dilated.   4. Severe mitral regurgitation. The mitral regurgitant jet is posteriorly directed.   5. No pericardial effusion seen.   6. Mild to moderate tricuspid regurgitation.   7. Compared to the transthoracic echocardiogram performed on 11/27/2024, LVEF has decreased, MR is increased,.   8. Estimated pulmonary artery systolic pressure is 46 mmHg, consistent with mild to moderate pulmonary hypertension.   9. Symmetric mitral valve leaflet tethering.  10. The inferior vena cava is dilated measuring 2.40 cm in diameter, (dilated >2.1cm) with abnormal inspiratory collapse (abnormal <50%) consistent with elevated right atrial pressure (~15, range 10-20mmHg).  11. There is normal LV mass and normal geometry.

## 2025-02-22 NOTE — PROGRESS NOTE ADULT - PROBLEM SELECTOR PLAN 3
TTE in 11/2024 showed progression to moderate to severe MR.  Left ventricular systolic function is normal with an ejection fraction of 55 % by Garcia's method of disks. Appears hypervolemic on exam. Repeat TTE during admission reveals left ventricular systolic function is moderately decreased with an ejection fraction of 41 % by Garcia's method of disks.   -Torsemide 60 mg on non dialysis days   -Per cardiology, plan or RHC and LHC on 2/24   -General cardiology following

## 2025-02-22 NOTE — PROGRESS NOTE ADULT - PROBLEM SELECTOR PLAN 1
Hx/o CKD iso b/l staghorn calculus s/p removal (2009) (follows with Dr. Elizondo). Found to have worsening renal function on admission so procedure cancelled. Baseline 4.6 in Aug 2024 and 5.5 in 1/25. Cr on admission 2/12 was 8.42.   - Strict I/Os, daily weights  - Trend BMPs, monitor renal function, renally dose meds, avoid nephrotoxins   - Nephro consulted, new dialysis started 2/19, 2/20 and 2/21  - Per nephrology, torsemide 60 mg PO daily on non dialysis days - confirm with patient when dialysis days and will need to be ordered user schedule

## 2025-02-22 NOTE — PROGRESS NOTE ADULT - ATTENDING COMMENTS
74F w/ severe MR, AFib on Eliquis, HFpEF, CKD and recent admission 1/7-9 for AF w/ RVR and decompensated HF who presented for planned DCCV, found to have NICO on CKD and admitted to medicine for further work up. Concern for CKD progression (baseline SCr 5-6) vs NICO on CKD. Worsening LE edema despite torsemide 100 mg bid. Nephrology consulted, s/p permacath 2/19 and started on HD. Now on PO diuretics on non HD days. TTE with EF 41%, WMA, severe MR, progressive from 11/2024 echo. Cardiology, EP, and structural cardiology following. Will need eventual TTE/angiograms/DCCV after adequate fluid removal. Cards following, plan for RHC/LHC 2/24. F/u EP re timing of DCCV, potentially 2/25 but not yet confirmed.    DCP home pending outpatient HD set up, cards/EP/structural recs.

## 2025-02-22 NOTE — PROGRESS NOTE ADULT - ASSESSMENT
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       #Acute kidney injury superimposed on CKD.    Found to have worsening renal function on admission so procedure cancelled. Baseline 4.6 in Aug 2024 and 5.5 in 1/25. Cr on admission 2/12 was 8.42.   - Cr continued to rise to 8.71 now down to 7's  - On Bumex 4mg IV BID for diurese but Is and Os document 0ml of urine  - RAP elevated to 15 on TTE from 2/13 and she appears significantly volume up on exam  -Plan for Permacath on Wednesday  -On Bumex 4mg IV BID poor UOP noted  -Scheduled for HD catheter today-No cardiac contraindications for procedure  02/22-Remains in AF has had 3 HD sessions          #  Paroxysmal atrial fibrillation.   ·  Plan: Admitted for cardioversion but unable to do because given significant volume overload  -C/w home eliquis 5 mg BID   -C/w home metoprolol 50 mg BID, would increase dose to 100mg in the AM, 50mg in the PM for rate control  - Off Amiodarone    # Chronic heart failure with preserved ejection fraction. -Severe MR  ·TTE in 11/2024 showed progression to moderate to severe MR.  Left ventricular systolic function was normal with an ejection fraction of 55 % by Garcia's method of disks.  -TTE now with LVEF of 41%, global hypokinesis, severe MR, RAP of 15.   -Structural Heart evaluation noted. Will aggressively diurese and repeat TTE when euvolemic  Will be very difficult to diurese to euvolemia without HD but will continue with high dose Bumex for now  Eventual GDMT  No recent ischemic eval noted. Will need to reassess LVEF once in NSR  Will plan for RHC/LHC Monday 2/24. Discussed with patient, Structural team and primary team.   - To hold 3 doses of Eliquis prior to Monday (Sunday AM, sunday night and Monday AM)

## 2025-02-23 LAB
ANION GAP SERPL CALC-SCNC: 18 MMOL/L — HIGH (ref 5–17)
BLD GP AB SCN SERPL QL: NEGATIVE — SIGNIFICANT CHANGE UP
BUN SERPL-MCNC: 50 MG/DL — HIGH (ref 7–23)
CALCIUM SERPL-MCNC: 8.6 MG/DL — SIGNIFICANT CHANGE UP (ref 8.4–10.5)
CHLORIDE SERPL-SCNC: 97 MMOL/L — SIGNIFICANT CHANGE UP (ref 96–108)
CO2 SERPL-SCNC: 25 MMOL/L — SIGNIFICANT CHANGE UP (ref 22–31)
CREAT SERPL-MCNC: 4.61 MG/DL — HIGH (ref 0.5–1.3)
EGFR: 9 ML/MIN/1.73M2 — LOW
EGFR: 9 ML/MIN/1.73M2 — LOW
GLUCOSE SERPL-MCNC: 128 MG/DL — HIGH (ref 70–99)
HCT VFR BLD CALC: 29.8 % — LOW (ref 34.5–45)
HGB BLD-MCNC: 9.1 G/DL — LOW (ref 11.5–15.5)
MAGNESIUM SERPL-MCNC: 2.4 MG/DL — SIGNIFICANT CHANGE UP (ref 1.6–2.6)
MCHC RBC-ENTMCNC: 30.5 G/DL — LOW (ref 32–36)
MCHC RBC-ENTMCNC: 30.8 PG — SIGNIFICANT CHANGE UP (ref 27–34)
MCV RBC AUTO: 101 FL — HIGH (ref 80–100)
NRBC BLD AUTO-RTO: 0 /100 WBCS — SIGNIFICANT CHANGE UP (ref 0–0)
PHOSPHATE SERPL-MCNC: 4.7 MG/DL — HIGH (ref 2.5–4.5)
PLATELET # BLD AUTO: 145 K/UL — LOW (ref 150–400)
POTASSIUM SERPL-MCNC: 4.2 MMOL/L — SIGNIFICANT CHANGE UP (ref 3.5–5.3)
POTASSIUM SERPL-SCNC: 4.2 MMOL/L — SIGNIFICANT CHANGE UP (ref 3.5–5.3)
RBC # BLD: 2.95 M/UL — LOW (ref 3.8–5.2)
RBC # FLD: 16.8 % — HIGH (ref 10.3–14.5)
RH IG SCN BLD-IMP: POSITIVE — SIGNIFICANT CHANGE UP
SODIUM SERPL-SCNC: 140 MMOL/L — SIGNIFICANT CHANGE UP (ref 135–145)
WBC # BLD: 7.02 K/UL — SIGNIFICANT CHANGE UP (ref 3.8–10.5)
WBC # FLD AUTO: 7.02 K/UL — SIGNIFICANT CHANGE UP (ref 3.8–10.5)

## 2025-02-23 PROCEDURE — 99232 SBSQ HOSP IP/OBS MODERATE 35: CPT | Mod: GC

## 2025-02-23 RX ORDER — METOPROLOL SUCCINATE 50 MG/1
150 TABLET, EXTENDED RELEASE ORAL
Refills: 0 | Status: DISCONTINUED | OUTPATIENT
Start: 2025-02-23 | End: 2025-03-02

## 2025-02-23 RX ORDER — OLANZAPINE 10 MG/1
2.5 TABLET ORAL ONCE
Refills: 0 | Status: COMPLETED | OUTPATIENT
Start: 2025-02-23 | End: 2025-02-23

## 2025-02-23 RX ADMIN — IRON SUCROSE 200 MILLIGRAM(S): 20 INJECTION, SOLUTION INTRAVENOUS at 18:23

## 2025-02-23 RX ADMIN — Medication 650 MILLIGRAM(S): at 22:21

## 2025-02-23 RX ADMIN — Medication 650 MILLIGRAM(S): at 05:33

## 2025-02-23 RX ADMIN — Medication 60 MILLIGRAM(S): at 05:34

## 2025-02-23 RX ADMIN — Medication 1 APPLICATION(S): at 05:40

## 2025-02-23 RX ADMIN — SEVELAMER HYDROCHLORIDE 800 MILLIGRAM(S): 800 TABLET ORAL at 08:37

## 2025-02-23 RX ADMIN — SEVELAMER HYDROCHLORIDE 800 MILLIGRAM(S): 800 TABLET ORAL at 11:32

## 2025-02-23 RX ADMIN — METOPROLOL SUCCINATE 100 MILLIGRAM(S): 50 TABLET, EXTENDED RELEASE ORAL at 05:33

## 2025-02-23 RX ADMIN — Medication 3 MILLIGRAM(S): at 22:21

## 2025-02-23 RX ADMIN — SEVELAMER HYDROCHLORIDE 800 MILLIGRAM(S): 800 TABLET ORAL at 17:44

## 2025-02-23 RX ADMIN — METOPROLOL SUCCINATE 150 MILLIGRAM(S): 50 TABLET, EXTENDED RELEASE ORAL at 17:44

## 2025-02-23 RX ADMIN — Medication 650 MILLIGRAM(S): at 23:00

## 2025-02-23 RX ADMIN — Medication 650 MILLIGRAM(S): at 11:32

## 2025-02-23 RX ADMIN — Medication 650 MILLIGRAM(S): at 17:44

## 2025-02-23 RX ADMIN — OLANZAPINE 2.5 MILLIGRAM(S): 10 TABLET ORAL at 16:05

## 2025-02-23 NOTE — PROGRESS NOTE ADULT - ASSESSMENT
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       #Acute kidney injury superimposed on CKD.    Found to have worsening renal function on admission so procedure cancelled. Baseline 4.6 in Aug 2024 and 5.5 in 1/25. Cr on admission 2/12 was 8.42.   - Cr continued to rise to 8.71 now down to 7's  - On Bumex 4mg IV BID for diurese but Is and Os document 0ml of urine  - RAP elevated to 15 on TTE from 2/13 and she appears significantly volume up on exam  -Plan for Permacath on Wednesday  -On Bumex 4mg IV BID poor UOP noted  -Scheduled for HD catheter today-No cardiac contraindications for procedure  02/22-Remains in AF has had 3 HD sessions  02/23 AF plan for R/LHC in AM        #  Paroxysmal atrial fibrillation.   ·  Plan: Admitted for cardioversion but unable to do because given significant volume overload  -C/w home eliquis 5 mg BID   -C/w home metoprolol succ 150 mg daily  - Off Amiodarone    # Chronic heart failure with preserved ejection fraction. -Severe MR  ·TTE in 11/2024 showed progression to moderate to severe MR.  Left ventricular systolic function was normal with an ejection fraction of 55 % by Garcia's method of disks.  -TTE now with LVEF of 41%, global hypokinesis, severe MR, RAP of 15.   -Structural Heart evaluation noted. Will aggressively diurese and repeat TTE when euvolemic  Will be very difficult to diurese to euvolemia without HD but will continue with high dose Bumex for now  Eventual GDMT  No recent ischemic eval noted. Will need to reassess LVEF once in NSR  Will plan for RHC/LHC Monday 2/24. Discussed with patient, Structural team and primary team.   - To hold 3 doses of Eliquis prior to Monday (Sunday AM, sunday night and Monday AM)

## 2025-02-23 NOTE — PROGRESS NOTE ADULT - PROBLEM SELECTOR PLAN 3
TTE in 11/2024 showed progression to moderate to severe MR.  Left ventricular systolic function is normal with an ejection fraction of 55 % by Garcia's method of disks. Appears hypervolemic on exam. Repeat TTE during admission reveals left ventricular systolic function is moderately decreased with an ejection fraction of 41 % by Garcia's method of disks.   -Torsemide 60 mg on non dialysis days   -Per cardiology, plan for RHC and LHC on 2/24   -General cardiology following

## 2025-02-23 NOTE — PROGRESS NOTE ADULT - PROBLEM SELECTOR PLAN 1
Patient is a 74y old  Female who presents with a chief complaint of fall, femoral head/neck subacute incomplete fracture (22 Jan 2019 10:59)    HPI:  73 yo female with pmh of PAF on xarelto, htn, hld, depression, hypothyroid p/w mechanical fall with (+)head trauma, no LOC. patient states that she tripped on the pavement and hit top of her head on a step. denies having any palpitations, diaphoresis, chest pain, blurry vision, dizziness States that she does have right hip and knee pain  worsened after the fall but she has been having the pain for weeks. yesterday she went to her PMD office (Dr. Savage) and had a cortisone injection to her right knee.    interval history:  1 month ago patient had a mechanical fall, she missed a stair when walking down a flight of steps at [employer] when watching good morning jerrell. she was able to complete watching the show but at night when at home she developed severe pain of right le and came in via cab for evaluation. trauma w/u was negative at that time. was admitted and d/marco. (19 Jan 2019 16:28)      PAST MEDICAL & SURGICAL HISTORY:  CKD (chronic kidney disease) stage 3, GFR 30-59 ml/min  Pituitary adenoma: ??  Osteoarthritis  Sleep apnea  Afib: on xarelto  Hypothyroid: s/p thyroid ca surgery  High cholesterol  HTN (hypertension)  S/P rotator cuff surgery  H/O thyroidectomy      Hospital Course: mechanical fall subacute fem neck incomplete fracture  - MRI suggestive of CPPD  - ortho: no surgical indication; pt can be protected weight bearing with walker for 4 weeks   - dispo planning   CO severe mago ankle pain - Hx gout?  TODAY'S SUBJECTIVE & REVIEW OF SYMPTOMS:     Constitutional WNL   Cardio WNL   Resp WNL   GI WNL  Heme WNL  Endo WNL  Skin WNL  MSK paiN R Knee mago ankles  Neuro WNL  Cognitive WNL  Psych WNL  + urine incontinence    MEDICATIONS  (STANDING):  amLODIPine   Tablet 5 milliGRAM(s) Oral at bedtime  atorvastatin 20 milliGRAM(s) Oral at bedtime  chlorhexidine 4% Liquid 1 Application(s) Topical <User Schedule>  citalopram 20 milliGRAM(s) Oral daily  levothyroxine 175 MICROGram(s) Oral <User Schedule>  lisinopril 20 milliGRAM(s) Oral two times a day  metoprolol tartrate 50 milliGRAM(s) Oral two times a day  oxybutynin 5 milliGRAM(s) Oral two times a day  rivaroxaban 20 milliGRAM(s) Oral at bedtime  triamterene 37.5 mG/hydrochlorothiazide 25 mG Tablet 1 Tablet(s) Oral daily    MEDICATIONS  (PRN):  acetaminophen   Tablet .. 650 milliGRAM(s) Oral every 6 hours PRN Mild Pain (1 - 3)  morphine  - Injectable 4 milliGRAM(s) IV Push every 4 hours PRN Severe Pain (7 - 10)  oxyCODONE    IR 10 milliGRAM(s) Oral every 4 hours PRN Moderate Pain (4 - 6)      FAMILY HISTORY:  Family history of abdominal aortic aneurysm (AAA) (Father)  Family history of lung cancer (Mother)      Allergies    No Known Allergies    Intolerances        SOCIAL HISTORY:    [    ] Etoh  [    ] Smoking  [    ] Substance abuse     Home Environment:  [  x  ] Home Alone  [    ] Lives with Family  [    ] Home Health Aid    Dwelling:  [    ] Apartment  [  x  ] Private House  [    ] Adult Home  [    ] Skilled Nursing Facility      [    ] Short Term  [    ] Long Term  [   x ] Stairs                           [    ] Elevator     FUNCTIONAL STATUS PTA: (Check all that apply)  Ambulation: [ x    ]Independent    [    ] Dependent     [    ] Non-Ambulatory  Assistive Device: [    ] SA Cane  [    ]  Q Cane  [    ] Walker  [    ]  Wheelchair  ADL : [  x  ] Independent  [    ]  Dependent   INDEPENDENT PRIOR TO FALL    Vital Signs Last 24 Hrs  T(C): 36.3 (22 Jan 2019 07:51), Max: 37.4 (21 Jan 2019 15:03)  T(F): 97.4 (22 Jan 2019 07:51), Max: 99.4 (21 Jan 2019 15:03)  HR: 61 (22 Jan 2019 07:51) (61 - 77)  BP: 120/63 (22 Jan 2019 07:51) (117/59 - 120/63)  BP(mean): --  RR: 18 (22 Jan 2019 07:51) (18 - 18)  SpO2: --      PHYSICAL EXAM: Alert & Oriented X3  GENERAL: NAD, well-groomed, well-developed  HEAD:  Atraumatic, Normocephalic  EYES: EOMI, PERRLA, conjunctiva and sclera clear  NECK: Supple, No JVD, Normal thyroid  CHEST/LUNG: Clear bilaterally  HEART: Regular rate and rhythm  ABDOMEN: Soft, Nontender, Nondistended; Bowel sounds present  EXTREMITIES: R knee OA Changes Warmth tenderness- severe tenderness warmth mago Ankles/ feet    NERVOUS SYSTEM:  Cranial Nerves 2-12 intact [ x   ] Abnormal  [    ]  ROM: WFL all extremities [    ]  Abnormal [ x  ]LIMITED ROM BOTH FEET/ANKLES SEVERE PAIN  Motor Strength: WFL all extremities  [    ]  Abnormal [x    ]3-/5 R Knee, Feet ankles Not Tested  Sensation: intact to light touch [  x  ] Abnormal [    ]  Reflexes: Symmetric [    ]  Abnormal [    ]    FUNCTIONAL STATUS:  Bed Mobility: [   ]  Independent [    ]  Supervision [  x  ]  Needs Assistance [  ]  N/A  Transfers: [    ]  Independent [    ]  Supervision [   x ]  Needs Assistance [    ]  N/A    Ambulation:  [    ]  Independent [    ]  Supervision [ x   ]  Needs Assistance [    ]  N/A   ADL:  [    ]   Independent [   x ] Requires Assistance [    ] N/A       LABS:                        9.5    14.05 )-----------( 302      ( 22 Jan 2019 06:06 )             31.4     01-22    139  |  96<L>  |  35<H>  ----------------------------<  132<H>  4.0   |  25  |  1.5    Ca    9.0      22 Jan 2019 06:06  Mg     2.1     01-21            RADIOLOGY & ADDITIONAL STUDIES: Hx/o CKD iso b/l staghorn calculus s/p removal (2009) (follows with Dr. Elizondo). Found to have worsening renal function on admission so procedure cancelled. Baseline 4.6 in Aug 2024 and 5.5 in 1/25. Cr on admission 2/12 was 8.42.   - Strict I/Os, daily weights  - Trend BMPs, monitor renal function, renally dose meds, avoid nephrotoxins   - Nephro consulted, new dialysis started 2/19, 2/20 and 2/21  - Per nephrology, torsemide 60 mg PO daily on non dialysis days - confirm with patient when dialysis days and will need to be ordered user schedule

## 2025-02-23 NOTE — PROGRESS NOTE ADULT - ASSESSMENT
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 Started HD due to volume overload refractory to diuresis. Planned for LHC/RHC on 2/24. Eventual goal for DCCV, but may be delayed due to brief holds in AC.

## 2025-02-23 NOTE — PROGRESS NOTE ADULT - PROBLEM SELECTOR PLAN 2
Admitted for cardioversion but unable to do because NICO on CKD.  -Tentative plan for cardioversion on Tuesday 2/25  -Eliquis mg BID - d/c 2/22 evening for heart cath per cards and start heparin 2/23  -Increased toprol to 150 bid for rate control  -EP recommend d/c amiodarone given plan for tunnel cath and worsening kidney function

## 2025-02-23 NOTE — PROGRESS NOTE ADULT - SUBJECTIVE AND OBJECTIVE BOX
Deacon Hawkins, PGY-2  Please contact on Teams    ISSAADRIENNE  74y  Female    Reason for Admission:     SUBJECTIVE/INTERVAL EVENTS: No acute events. Pt seen and examined at bedside.    Vital Signs Last 24 Hrs  T(C): 36.4 (23 Feb 2025 04:46), Max: 36.4 (22 Feb 2025 12:26)  T(F): 97.6 (23 Feb 2025 04:46), Max: 97.6 (22 Feb 2025 12:26)  HR: 95 (23 Feb 2025 04:46) (91 - 104)  BP: 102/67 (23 Feb 2025 04:46) (102/67 - 134/86)  BP(mean): --  RR: 18 (23 Feb 2025 04:46) (16 - 18)  SpO2: 96% (23 Feb 2025 04:46) (94% - 98%)    Parameters below as of 23 Feb 2025 04:46  Patient On (Oxygen Delivery Method): room air        Physical Exam:   General: NAD  HEENT: PERRL, normal sclera/conjunctiva  Neck: Supple  Lungs: CTA bilaterally  Heart: RRR, normal S1S2, no murmurs/rubs/gallops  Abdomen: Soft, ND/NT  Extremities: no edema  Skin: Warm, well-perfused  Neuro: A&O x3, no focal deficits    Consultant(s) Notes Reviewed:  [x] YES  [ ] NO  Care Discussed with Consultants/Other Providers [x] YES  [ ] NO    LABS:                        9.1    7.02  )-----------( 145      ( 23 Feb 2025 06:14 )             29.8                         9.8    7.31  )-----------( 165      ( 22 Feb 2025 06:16 )             33.2                         9.3    6.07  )-----------( 114      ( 21 Feb 2025 08:48 )             31.0         Urinalysis Basic - ( 22 Feb 2025 06:16 )    Color: x / Appearance: x / SG: x / pH: x  Gluc: 84 mg/dL / Ketone: x  / Bili: x / Urobili: x   Blood: x / Protein: x / Nitrite: x   Leuk Esterase: x / RBC: x / WBC x   Sq Epi: x / Non Sq Epi: x / Bacteria: x        RADIOLOGY & ADDITIONAL TESTS:    Imaging Personally Reviewed:  [x] YES  [ ] NO    MEDICATIONS  (STANDING):  chlorhexidine 2% Cloths 1 Application(s) Topical <User Schedule>  influenza  Vaccine (HIGH DOSE) 0.5 milliLiter(s) IntraMuscular once  iron sucrose Injectable 200 milliGRAM(s) IV Push every 24 hours  metoprolol succinate  milliGRAM(s) Oral <User Schedule>  sevelamer carbonate 800 milliGRAM(s) Oral three times a day with meals  sodium bicarbonate 650 milliGRAM(s) Oral four times a day    MEDICATIONS  (PRN):  acetaminophen     Tablet .. 650 milliGRAM(s) Oral every 6 hours PRN Temp greater or equal to 38C (100.4F), Mild Pain (1 - 3)  melatonin 3 milliGRAM(s) Oral at bedtime PRN Insomnia      HEALTH ISSUES - PROBLEM Dx:  Acute kidney injury superimposed on CKD    Paroxysmal atrial fibrillation    Need for prophylactic measure    Metabolic acidosis    Hypertension    Severe mitral regurgitation    Chronic HFrEF (heart failure with reduced ejection fraction)

## 2025-02-23 NOTE — PROGRESS NOTE ADULT - SUBJECTIVE AND OBJECTIVE BOX
MR#[id number]  PATIENT NAME:ELIZABETH PARSONS    DATE OF SERVICE: 02-23-25 @ 08:39  Patient was seen and examined by Aki Carias MD on    02-23-25 @ 08:39 .  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    Of Note:  Confirmed with  who is responsible for pt's care administers her medications consistently.  Her last dose of Eliquis was this am. Given at 10am.      TTE 4/2024  CONCLUSIONS:      1. Left ventricular wall thickness is normal. Left ventricular systolic function is normal with an ejection fraction of 76 % by 3D. There are no regional wall motion abnormalities seen.   2. No evidence of left atrial or left atrial appendage thrombus. Ultrasound enhancing agent was utilized to visualize the left atrial appendage.   3. The left atrium is severely dilated.   4. Moderate mitral regurgitation at a blood pressure of 132/71 mmHg. The mitral regurgitant jet is centrally directed. Mechanism of mitral regurgitation: Apryl Type I (normal leaflet motion with dilated annulus). PISA : later pisa radius 0.4, medial 0.37, Alisaing velocity 38.5cm/sec MR VTI 136cm MR Vmax 548 cm/s. The ERO 0.3sqcm and Reg. Vol 38cc. 3D vena contracta area was 0.35sqcm.   5. No pericardial effusion seen.   6. Normal right ventricular cavity size, with normal wall thickness, and normal systolic function.     (12 Feb 2025 12:50)      INTERIM EVENTS:Patient seen at bedside ,interim events noted.  The patient reports no new symptoms.  Denies chest discomfort and shortness of breath.  No abdominal pain.  No new neurologic symptoms.  2/19-Awake remains in AF-fpr HD catheter placement today  2/22-Awake had 3 sessions HD Remains in AF  2/23-Remains in AF plan for Left and Right cath tomorrow on PO torsemide    PMH -reviewed admission note, no change since admission    AMBULATION: Assisted[ ] Cane/walker[ ] Independent[x ]    MEDICATIONS  (STANDING):  chlorhexidine 2% Cloths 1 Application(s) Topical <User Schedule>  influenza  Vaccine (HIGH DOSE) 0.5 milliLiter(s) IntraMuscular once  iron sucrose Injectable 200 milliGRAM(s) IV Push every 24 hours  metoprolol succinate  milliGRAM(s) Oral <User Schedule>  sevelamer carbonate 800 milliGRAM(s) Oral three times a day with meals  sodium bicarbonate 650 milliGRAM(s) Oral four times a day    MEDICATIONS  (PRN):  acetaminophen     Tablet .. 650 milliGRAM(s) Oral every 6 hours PRN Temp greater or equal to 38C (100.4F), Mild Pain (1 - 3)  melatonin 3 milliGRAM(s) Oral at bedtime PRN Insomnia            REVIEW OF SYSTEMS:  Constitutional: [ ] fever, [ ]weight loss,  [ ]fatigue [ ]weight gain  Eyes: [ ] visual changes  Respiratory: [ ]shortness of breath;  [ ] cough, [ ]wheezing, [ ]chills, [ ]hemoptysis  Cardiovascular: [ ] chest pain, [ ]palpitations, [ ]dizziness,  [ ]leg swelling[ ]orthopnea[ ]PND  Gastrointestinal: [ ] abdominal pain, [ ]nausea, [ ]vomiting,  [ ]diarrhea [ ]Constipation [ ]Melena  Genitourinary: [ ] dysuria, [ ] hematuria [ ]Zelaya  Neurologic: [ ] headaches [ ] tremors[ ]weakness [ ]Paralysis Right[ ] Left[ ]  Skin: [ ] itching, [ ]burning, [ ] rashes  Endocrine: [ ] heat or cold intolerance  Musculoskeletal: [ ] joint pain or swelling; [ ] muscle, back, or extremity pain  Psychiatric: [ ] depression, [ ]anxiety, [ ]mood swings, or [ ]difficulty sleeping  Hematologic: [ ] easy bruising, [ ] bleeding gums    [ ] All remaining systems negative except as per above.   [ ]Unable to obtain.  [x] No change in ROS since admission      Vital Signs Last 24 Hrs  T(C): 36.4 (23 Feb 2025 04:46), Max: 36.4 (22 Feb 2025 12:26)  T(F): 97.6 (23 Feb 2025 04:46), Max: 97.6 (22 Feb 2025 12:26)  HR: 95 (23 Feb 2025 04:46) (91 - 104)  BP: 102/67 (23 Feb 2025 04:46) (102/67 - 134/86)  BP(mean): --  RR: 18 (23 Feb 2025 04:46) (16 - 18)  SpO2: 96% (23 Feb 2025 04:46) (94% - 98%)    Parameters below as of 23 Feb 2025 04:46  Patient On (Oxygen Delivery Method): room air      I&O's Summary    22 Feb 2025 07:01  -  23 Feb 2025 07:00  --------------------------------------------------------  IN: 780 mL / OUT: 0 mL / NET: 780 mL        PHYSICAL EXAM:  General: No acute distress BMI-  HEENT: EOMI, PERRL  Neck: Supple, [ ] JVD  Lungs: Equal air entry bilaterally; [ ] rales [ ] wheezing [ ] rhonchi  Heart: Regular rate and rhythm; [x ] murmur   2/6 [ x] systolic [ ] diastolic [ ] radiation[ ] rubs [ ]  gallops  Abdomen: Nontender, bowel sounds present  Extremities: No clubbing, cyanosis, [ ] edema [ ]Pulses  equal and intact  Nervous system:  Alert & Oriented X3, no focal deficits  Psychiatric: Normal affect  Skin: No rashes or lesions    LABS:  02-23    140  |  97  |  50[H]  ----------------------------<  128[H]  4.2   |  25  |  4.61[H]    Ca    8.6      23 Feb 2025 06:15  Phos  4.7     02-23  Mg     2.4     02-23      Creatinine Trend: 4.61<--, 3.33<--, 4.35<--, 5.47<--, 7.54<--, 7.55<--                        9.1    7.02  )-----------( 145      ( 23 Feb 2025 06:14 )             29.8             TTE W or WO Ultrasound Enhancing Agent (02.13.25 @ 11:46) >  CONCLUSIONS:      1. Left ventricular cavity is mildly dilated. Left ventricular systolic function is moderately decreased with an ejection fraction of 41 % by Garcia'smethod of disks. Global left ventricular hypokinesis.   2. Multiple segmental abnormalities exist. See findings.   3. Left atrium is severely dilated.   4. Severe mitral regurgitation. The mitral regurgitant jet is posteriorly directed.   5. No pericardial effusion seen.   6. Mild to moderate tricuspid regurgitation.   7. Compared to the transthoracic echocardiogram performed on 11/27/2024, LVEF has decreased, MR is increased,.   8. Estimated pulmonary artery systolic pressure is 46 mmHg, consistent with mild to moderate pulmonary hypertension.   9. Symmetric mitral valve leaflet tethering.  10. The inferior vena cava is dilated measuring 2.40 cm in diameter, (dilated >2.1cm) with abnormal inspiratory collapse (abnormal <50%) consistent with elevated right atrial pressure (~15, range 10-20mmHg).  11. There is normal LV mass and normal geometry.

## 2025-02-24 LAB
ALBUMIN SERPL ELPH-MCNC: 3.5 G/DL — SIGNIFICANT CHANGE UP (ref 3.3–5)
ALP SERPL-CCNC: 97 U/L — SIGNIFICANT CHANGE UP (ref 40–120)
ALT FLD-CCNC: 150 U/L — HIGH (ref 10–45)
ANION GAP SERPL CALC-SCNC: 18 MMOL/L — HIGH (ref 5–17)
APTT BLD: 31.7 SEC — SIGNIFICANT CHANGE UP (ref 24.5–35.6)
AST SERPL-CCNC: 93 U/L — HIGH (ref 10–40)
BASOPHILS # BLD AUTO: 0.04 K/UL — SIGNIFICANT CHANGE UP (ref 0–0.2)
BASOPHILS NFR BLD AUTO: 0.5 % — SIGNIFICANT CHANGE UP (ref 0–2)
BILIRUB SERPL-MCNC: 0.7 MG/DL — SIGNIFICANT CHANGE UP (ref 0.2–1.2)
BUN SERPL-MCNC: 61 MG/DL — HIGH (ref 7–23)
CALCIUM SERPL-MCNC: 8.5 MG/DL — SIGNIFICANT CHANGE UP (ref 8.4–10.5)
CHLORIDE SERPL-SCNC: 95 MMOL/L — LOW (ref 96–108)
CO2 SERPL-SCNC: 25 MMOL/L — SIGNIFICANT CHANGE UP (ref 22–31)
CREAT SERPL-MCNC: 5.52 MG/DL — HIGH (ref 0.5–1.3)
EGFR: 8 ML/MIN/1.73M2 — LOW
EGFR: 8 ML/MIN/1.73M2 — LOW
EOSINOPHIL # BLD AUTO: 0.14 K/UL — SIGNIFICANT CHANGE UP (ref 0–0.5)
EOSINOPHIL NFR BLD AUTO: 1.8 % — SIGNIFICANT CHANGE UP (ref 0–6)
GLUCOSE SERPL-MCNC: 102 MG/DL — HIGH (ref 70–99)
HCT VFR BLD CALC: 30.5 % — LOW (ref 34.5–45)
HGB BLD-MCNC: 9.2 G/DL — LOW (ref 11.5–15.5)
IMM GRANULOCYTES NFR BLD AUTO: 0.8 % — SIGNIFICANT CHANGE UP (ref 0–0.9)
INR BLD: 1.82 RATIO — HIGH (ref 0.85–1.16)
LYMPHOCYTES # BLD AUTO: 1.25 K/UL — SIGNIFICANT CHANGE UP (ref 1–3.3)
LYMPHOCYTES # BLD AUTO: 16.4 % — SIGNIFICANT CHANGE UP (ref 13–44)
MAGNESIUM SERPL-MCNC: 2.4 MG/DL — SIGNIFICANT CHANGE UP (ref 1.6–2.6)
MCHC RBC-ENTMCNC: 30.2 G/DL — LOW (ref 32–36)
MCHC RBC-ENTMCNC: 30.8 PG — SIGNIFICANT CHANGE UP (ref 27–34)
MCV RBC AUTO: 102 FL — HIGH (ref 80–100)
MONOCYTES # BLD AUTO: 0.88 K/UL — SIGNIFICANT CHANGE UP (ref 0–0.9)
MONOCYTES NFR BLD AUTO: 11.5 % — SIGNIFICANT CHANGE UP (ref 2–14)
NEUTROPHILS # BLD AUTO: 5.26 K/UL — SIGNIFICANT CHANGE UP (ref 1.8–7.4)
NEUTROPHILS NFR BLD AUTO: 69 % — SIGNIFICANT CHANGE UP (ref 43–77)
NRBC BLD AUTO-RTO: 2 /100 WBCS — HIGH (ref 0–0)
PHOSPHATE SERPL-MCNC: 4.3 MG/DL — SIGNIFICANT CHANGE UP (ref 2.5–4.5)
PLATELET # BLD AUTO: 152 K/UL — SIGNIFICANT CHANGE UP (ref 150–400)
POTASSIUM SERPL-MCNC: 4.3 MMOL/L — SIGNIFICANT CHANGE UP (ref 3.5–5.3)
POTASSIUM SERPL-SCNC: 4.3 MMOL/L — SIGNIFICANT CHANGE UP (ref 3.5–5.3)
PROT SERPL-MCNC: 6 G/DL — SIGNIFICANT CHANGE UP (ref 6–8.3)
PROTHROM AB SERPL-ACNC: 20.8 SEC — HIGH (ref 9.9–13.4)
RBC # BLD: 2.99 M/UL — LOW (ref 3.8–5.2)
RBC # FLD: 17 % — HIGH (ref 10.3–14.5)
SODIUM SERPL-SCNC: 138 MMOL/L — SIGNIFICANT CHANGE UP (ref 135–145)
WBC # BLD: 7.63 K/UL — SIGNIFICANT CHANGE UP (ref 3.8–10.5)
WBC # FLD AUTO: 7.63 K/UL — SIGNIFICANT CHANGE UP (ref 3.8–10.5)

## 2025-02-24 PROCEDURE — 93460 R&L HRT ART/VENTRICLE ANGIO: CPT | Mod: 26

## 2025-02-24 PROCEDURE — 99232 SBSQ HOSP IP/OBS MODERATE 35: CPT

## 2025-02-24 PROCEDURE — 90935 HEMODIALYSIS ONE EVALUATION: CPT | Mod: GC

## 2025-02-24 PROCEDURE — 99233 SBSQ HOSP IP/OBS HIGH 50: CPT

## 2025-02-24 RX ORDER — HEPARIN SODIUM 1000 [USP'U]/ML
INJECTION INTRAVENOUS; SUBCUTANEOUS
Qty: 25000 | Refills: 0 | Status: DISCONTINUED | OUTPATIENT
Start: 2025-02-25 | End: 2025-02-25

## 2025-02-24 RX ORDER — HEPARIN SODIUM 1000 [USP'U]/ML
4500 INJECTION INTRAVENOUS; SUBCUTANEOUS EVERY 6 HOURS
Refills: 0 | Status: DISCONTINUED | OUTPATIENT
Start: 2025-02-24 | End: 2025-02-25

## 2025-02-24 RX ORDER — OLANZAPINE 10 MG/1
2.5 TABLET ORAL ONCE
Refills: 0 | Status: COMPLETED | OUTPATIENT
Start: 2025-02-24 | End: 2025-02-24

## 2025-02-24 RX ORDER — HEPARIN SODIUM 1000 [USP'U]/ML
2000 INJECTION INTRAVENOUS; SUBCUTANEOUS EVERY 6 HOURS
Refills: 0 | Status: DISCONTINUED | OUTPATIENT
Start: 2025-02-24 | End: 2025-02-25

## 2025-02-24 RX ADMIN — Medication 650 MILLIGRAM(S): at 05:56

## 2025-02-24 RX ADMIN — SEVELAMER HYDROCHLORIDE 800 MILLIGRAM(S): 800 TABLET ORAL at 21:36

## 2025-02-24 RX ADMIN — METOPROLOL SUCCINATE 150 MILLIGRAM(S): 50 TABLET, EXTENDED RELEASE ORAL at 21:37

## 2025-02-24 RX ADMIN — Medication 3 MILLIGRAM(S): at 21:38

## 2025-02-24 RX ADMIN — Medication 650 MILLIGRAM(S): at 21:36

## 2025-02-24 RX ADMIN — METOPROLOL SUCCINATE 150 MILLIGRAM(S): 50 TABLET, EXTENDED RELEASE ORAL at 05:56

## 2025-02-24 RX ADMIN — Medication 1 APPLICATION(S): at 05:56

## 2025-02-24 RX ADMIN — SEVELAMER HYDROCHLORIDE 800 MILLIGRAM(S): 800 TABLET ORAL at 11:58

## 2025-02-24 RX ADMIN — Medication 650 MILLIGRAM(S): at 11:59

## 2025-02-24 NOTE — PROGRESS NOTE ADULT - PROBLEM SELECTOR PLAN 2
Admitted for cardioversion but unable to do because NICO on CKD.  -Tentative plan for cardioversion on Tuesday 2/25  -Eliquis mg BID - d/c 2/22 evening for heart cath per cards and start heparin 2/23  -Increased toprol to 150 bid for rate control  -EP recommend d/c amiodarone given plan for tunnel cath and worsening kidney function Admitted for cardioversion but unable to do because NICO on CKD.  -Tentative plan for cardioversion on Tuesday 2/25  -Eliquis mg BID - d/c 2/22 evening for heart cath per cards and start heparin after L/RHC  -Increased toprol to 150 bid for rate control  -EP recommend d/c amiodarone given plan for tunnel cath and worsening kidney function

## 2025-02-24 NOTE — H&P CARDIOLOGY - PULMONARY EMBOLUS
Pt notified of results. Reminded of f/u appt. Pt voiced understanding.     Echo-      Limited echo to R/O pericardial effusion.    Left Ventricle: Low normal left ventricular systolic function with a visually estimated EF of 45 - 50%. Left ventricle size is normal.    Aortic Valve: Mild regurgitation. AV PHT is 362.0 ms. Vena contracta measures 0.4 cm.    Mitral Valve: Mild to moderate regurgitation.    Tricuspid Valve: Mild regurgitation. The estimated RVSP is 35 mmHg.    Extracardiac: Left pleural effusion.    Image quality is good.    Exercise stress-    Interpretation Summary  Show Result Comparison     ECG: Resting ECG demonstrates sinus bradycardia.    Stress Test: A Migel protocol stress test was performed. The patient was stressed for 4 min and 0 sec.    Reduced excess performance without angina or ischemic EKG changes    Patient cleared for phase 2 rehab   no

## 2025-02-24 NOTE — PRE-OP CHECKLIST - BP NONINVASIVE SYSTOLIC (MM HG)
This is a Term female   born 2026 10:16 AM at Gestational Age: 39w2d now at age: 3 day old    Started on double phototherapy yesterday around 1630 for TSB of 15.9mg/dl at 53HOL with RoR >30%. Repeat TSB at 22:30 was 15.7mg/dl and infant remained on double ptx overnight. Labs included CBCd without left shift, negative marcelo, elevated reticulocyte count c/w hemolysis. Continues to eat well, voiding and stooling. \    Physical Exam:  Visit Vitals  Pulse 120   Temp 98.6 °F (37 °C) (Axillary)   Resp 36   Ht 20.5\" (52.1 cm) Comment: Filed from Delivery Summary   Wt 4101 g   HC 36 cm (14.17\") Comment: Filed from Delivery Summary   BMI 15.12 kg/m²     Birthweight: 9 lb 4.9 oz (4220 g)  With Weight change: -70 g .-3% since birth  Discharge weight: Weight: 4101 g  Length: 20.5\"  Pink, well perfused, under overhead light and lying on blanket  Normal Facies, no anomalies/bruising noted, no cleft noted  Lungs clear to ascultation, No murmur, clavicles intact  Abdomen - soft, no masses, non distended  Normal female genitalia, hips stable  Skin- diffusely dry, jaundice to upper chest, face  Normal tone, movement, cordell, root, suck    Intake/Output         03/15 0700  / 0659  0700  / 0659    P.O. (mL/kg) 320 (78.05) 30 (7.32)    Total Intake(mL/kg) 320 (78.05) 30 (7.32)    Net +320 +30          Unmeasured Urine Occurrence 11 x 1 x    Unmeasured Stool Occurrence 10 x 1 x          Last Feeding:    Last Void: 1 (24 0830)  Stool: 1 (24 0830)    Labs  Recent Results (from the past 48 hour(s))   Bilirubin, Total    Collection Time: 24  5:56 PM   Result Value Ref Range    Bilirubin, Total 10.5 (HH) 2.0 - 6.0 mg/dL   Bilirubin Panel    Collection Time: 03/15/24  6:20 AM   Result Value Ref Range    Bilirubin, Total 12.7 (HH) 2.0 - 6.0 mg/dL    Bilirubin, Direct 0.2 0.0 - 0.6 mg/dL   Bilirubin Panel    Collection Time: 03/15/24  3:59 PM   Result Value Ref Range    Bilirubin, Total 15.9 (HH) 2.0 - 
7.0 mg/dL    Bilirubin, Direct 0.2 0.0 - 0.6 mg/dL   Reticulocyte Count Automated    Collection Time: 03/15/24  6:35 PM   Result Value Ref Range    Retic  (H) 78 - 330 K/mcL    Immature Retic Frac 37.5 %    Retic Count 6.5 (H) 2.0 - 6.0 %    Reticulocyte Hemoglobin Content 27.7 (L) 28.6 - 36.3 pg   CBC with Automated Differential (performable only)    Collection Time: 03/15/24  6:35 PM   Result Value Ref Range    WBC 10.5 9.4 - 30.0 K/mcL    RBC 6.45 4.00 - 6.60 mil/mcL    HGB 20.1 14.5 - 22.5 g/dL    HCT 55.8 45.0 - 67.0 %    MCV 86.5 (L) 95.0 - 121.0 fl    MCH 31.2 31.0 - 37.0 pg    MCHC 36.0 29.0 - 37.0 g/dL    RDW-CV 20.7 (H) 11.0 - 15.0 %    RDW-SD 59.0 (H) 39.0 - 54.0 fL    PLT      NRBC 1 (H) <=0 /100 WBC    WILBERT IGG    Collection Time: 03/15/24  6:35 PM   Result Value Ref Range    WILBERT, ANTI IGG Negative    Manual Differential    Collection Time: 03/15/24  6:35 PM   Result Value Ref Range    Neutrophil, Percent 56 %    Lymphocytes, Percent 23 %    Mono, Percent 17 %    Eosinophils, Percent 3 %    Basophils, Percent 1 %    Absolute Neutrophil 5.9 5.0 - 21.0 K/mcL    Absolute Lymphocytes 2.4 2.0 - 11.5 K/mcL    Absolute Monocytes 1.8 0.4 - 1.8 K/mcL    Absolute Eosinophils 0.3 0.0 - 0.7 K/mcL    Absolute Basophils 0.1 0.0 - 0.6 K/mcL    WBC Morphology Normal Normal    Platelet Morphology Normal Normal    Polychromasia Many    Gold Top    Collection Time: 03/15/24  8:10 PM   Result Value Ref Range    Extra Tube Hold for Add Ons    Bilirubin, Total    Collection Time: 03/15/24 10:30 PM   Result Value Ref Range    Bilirubin, Total 15.7 (HH) 2.0 - 7.0 mg/dL   Lavender Top    Collection Time: 03/15/24 11:02 PM   Result Value Ref Range    Extra Tube Hold for Add Ons    Bilirubin Panel    Collection Time: 24  6:44 AM   Result Value Ref Range    Bilirubin, Total 15.7 (HH) 2.0 - 7.0 mg/dL    Bilirubin, Direct 0.2 0.0 - 0.6 mg/dL   Bilirubin, Total    Collection Time: 24 12:03 PM   Result Value 
Ref Range    Bilirubin, Total 15.0 (HH) 2.0 - 6.0 mg/dL     Meds:   Current Facility-Administered Medications   Medication    dextrose (GLUTOSE) 0.4 g/mL (40%) gel 2.25 mL     Immunizations:   Most Recent Immunizations   Administered Date(s) Administered    Hep B, adolescent or pediatric 2024      Impression: DOL 3 term female  born via C/S at 39+2/7 wga 2/2 breech positioning. Hospitalization now c/b hyperbilirubinemia, on double phototherapy. Repeat TSB this AM was stable, no change from last night. Labs completed yesterday reassuring against infection, ABO incompatibility, and c/w hemolysis, likely given infant of poorly-controlled diabetic as contributor. She remains well appearing, bottle feeding well, voiding and stooling adequtely. Down -3% from BW.     Plan:   -Hyperbilirubinemia: will repeat at 1200. If  down-trending, will wean to blanket and recheck at 1800  -LGA/IDM: Blood glucoses reassuring overall  -Breech positioning: Hip US at 6 weeks; discussed with mom  -Discharge pending bilirubin, clinic status. Discussed at length with mom and RN present and mom understanding.     Discharge Planning:   [place] Screen: 24 (24 1027)   Hearing Screen:     Last TCB: 10.3 (Draw) (24 1006) at 24 Hrs (24 1006) hrs of age.   Congenital Heart Disease Screen: Normal (24 1008)   Circumcision:     Immunizations:   Most Recent Immunizations   Administered Date(s) Administered    Hep B, adolescent or pediatric 2024      Pediatrician:   Marva Wick MD    Follow Up:    No follow-up provider specified.   
130
129

## 2025-02-24 NOTE — PROGRESS NOTE ADULT - PROBLEM SELECTOR PLAN 6
DVT ppx: eliquis   Diet: Dash diet   Dispo: pending clinical course DVT ppx: eliquis, restart when ok with cardiology  Diet: Dash diet   Dispo: pending clinical course

## 2025-02-24 NOTE — PROGRESS NOTE ADULT - SUBJECTIVE AND OBJECTIVE BOX
[facility]    Heidy Bates, Peter, Alverto, Fernando, Kevyn      [phone]    CHIEF COMPLAINT: Patient is a 74y old  Female who presents with a chief complaint of MANJULA on CKD (24 Feb 2025 07:32)      Follow Up: cmy, af, hf, manjula    Interim history: The patient reports no new symptoms.  Denies chest discomfort and shortness of breath.  No abdominal pain.  No new neurologic symptoms. Somewhat confused this am      MEDICATIONS  (STANDING):  chlorhexidine 2% Cloths 1 Application(s) Topical <User Schedule>  influenza  Vaccine (HIGH DOSE) 0.5 milliLiter(s) IntraMuscular once  iron sucrose Injectable 200 milliGRAM(s) IV Push every 24 hours  metoprolol succinate  milliGRAM(s) Oral <User Schedule>  sevelamer carbonate 800 milliGRAM(s) Oral three times a day with meals  sodium bicarbonate 650 milliGRAM(s) Oral four times a day    MEDICATIONS  (PRN):  acetaminophen     Tablet .. 650 milliGRAM(s) Oral every 6 hours PRN Temp greater or equal to 38C (100.4F), Mild Pain (1 - 3)  melatonin 3 milliGRAM(s) Oral at bedtime PRN Insomnia      REVIEW OF SYSTEMS:  eye, ent, GI, , allergic, dermatologic, musculoskeletal and neurologic are negative except as described above    Vital Signs Last 24 Hrs  T(C): 36.3 (24 Feb 2025 08:18), Max: 36.8 (23 Feb 2025 12:49)  T(F): 97.4 (24 Feb 2025 08:18), Max: 98.2 (23 Feb 2025 12:49)  HR: 72 (24 Feb 2025 08:18) (72 - 112)  BP: 110/74 (24 Feb 2025 08:18) (106/75 - 117/83)  BP(mean): --  RR: 18 (24 Feb 2025 08:18) (16 - 18)  SpO2: 99% (24 Feb 2025 08:18) (93% - 99%)    Parameters below as of 24 Feb 2025 08:18  Patient On (Oxygen Delivery Method): room air        I&O's Summary    23 Feb 2025 07:01  -  24 Feb 2025 07:00  --------------------------------------------------------  IN: 780 mL / OUT: 0 mL / NET: 780 mL        Telemetry past 24h: af controlled    PHYSICAL EXAM:    Constitutional: well-nourished, well-developed, NAD   HEENT:  MMM, sclerae anicteric, conjunctivae clear, no oral cyanosis.  Pulmonary: Non-labored, breath sounds are clear bilaterally, No wheezing, rales or rhonchi  Cardiovascular: irreg, S1 and S2.  2/6 sys murmur.  No rubs, gallops or clicks  Gastrointestinal: Bowel Sounds present, soft, nontender.   Lymph: mild peripheral edema.   Neurological: Alert, no focal deficits  Skin: No rashes.  Psych: interactive with some lethargy    LABS: All Labs Reviewed:                        9.2    7.63  )-----------( 152      ( 24 Feb 2025 05:28 )             30.5                         9.1    7.02  )-----------( 145      ( 23 Feb 2025 06:14 )             29.8                         9.8    7.31  )-----------( 165      ( 22 Feb 2025 06:16 )             33.2     24 Feb 2025 05:28    138    |  95     |  61     ----------------------------<  102    4.3     |  25     |  5.52   23 Feb 2025 06:15    140    |  97     |  50     ----------------------------<  128    4.2     |  25     |  4.61   22 Feb 2025 06:16    144    |  100    |  32     ----------------------------<  84     5.1     |  23     |  3.33     Ca    8.5        24 Feb 2025 05:28  Ca    8.6        23 Feb 2025 06:15  Ca    9.5        22 Feb 2025 06:16  Phos  4.3       24 Feb 2025 05:28  Phos  4.7       23 Feb 2025 06:15  Phos  5.1       22 Feb 2025 06:16  Mg     2.4       24 Feb 2025 05:28  Mg     2.4       23 Feb 2025 06:15  Mg     2.4       22 Feb 2025 06:16    TPro  6.0    /  Alb  3.5    /  TBili  0.7    /  DBili  x      /  AST  93     /  ALT  150    /  AlkPhos  97     24 Feb 2025 05:28    PT/INR - ( 24 Feb 2025 05:28 )   PT: 20.8 sec;   INR: 1.82 ratio         PTT - ( 24 Feb 2025 05:28 )  PTT:31.7 sec      Blood Culture:        RADIOLOGY:    EKG:    Echo:

## 2025-02-24 NOTE — PROGRESS NOTE ADULT - SUBJECTIVE AND OBJECTIVE BOX
HPI/Interval Hx    Sitting OOB-Chair, had HD today. Structural Team following for evaluation of mitral regurgitation, awaiting cardiac catheterization today.      MEDICATIONS  (STANDING):  chlorhexidine 2% Cloths 1 Application(s) Topical <User Schedule>  influenza  Vaccine (HIGH DOSE) 0.5 milliLiter(s) IntraMuscular once  iron sucrose Injectable 200 milliGRAM(s) IV Push every 24 hours  metoprolol succinate  milliGRAM(s) Oral <User Schedule>  sevelamer carbonate 800 milliGRAM(s) Oral three times a day with meals  sodium bicarbonate 650 milliGRAM(s) Oral four times a day    MEDICATIONS  (PRN):  acetaminophen     Tablet .. 650 milliGRAM(s) Oral every 6 hours PRN Temp greater or equal to 38C (100.4F), Mild Pain (1 - 3)  melatonin 3 milliGRAM(s) Oral at bedtime PRN Insomnia      PAST MEDICAL & SURGICAL HISTORY:  Renal Calculus      Ureteral Calculus      Acute Renal Failure  9/2009      Wrist Fracture  CYNTHIA      Collar Bone Fracture      Rib Fractures      Kidney Stone removal  3/15/2011      Atrial fibrillation with RVR      C Section      Percutaneous Stone Extraction  Right      S/P Left Percutaneuos Stone extraction  01/26/2010, 04/20/2010      History of cardioversion          Review of Systems  CONSTITUTIONAL: No weakness, fevers or chills, (+) fatigue  EYES/ENT: No visual changes;  No vertigo or throat pain   NECK: No pain or stiffness  RESPIRATORY: No cough, wheezing, hemoptysis; No shortness of breath  CARDIOVASCULAR: No chest pain or palpitations  GASTROINTESTINAL: No abdominal or epigastric pain. No nausea, vomiting, or hematemesis; No diarrhea or constipation. No melena or hematochezia.  GENITOURINARY: No dysuria, frequency or hematuria  NEUROLOGICAL: No numbness or weakness  SKIN: No itching, burning, rashes, or lesions   All other review of systems is negative unless indicated above.    Physical Exam  General: A/ox 3, No acute Distress  Neck: Supple, NO JVD  Cardiac: S1 S2, II/VI axillary murmur  Pulmonary: CTAB, Breathing unlabored, No Rhonchi/Rales/Wheezing  Abdomen: Soft, Non -tender, +BS x 4 quads  Extremities: No Rashes, No edema  Neuro: A/o x 3, No focal deficits    Vital Signs Last 24 Hrs  T(C): 36.2 (24 Feb 2025 11:18), Max: 36.5 (23 Feb 2025 20:08)  T(F): 97.2 (24 Feb 2025 11:18), Max: 97.7 (23 Feb 2025 20:08)  HR: 70 (24 Feb 2025 11:18) (70 - 112)  BP: 130/91 (24 Feb 2025 11:18) (106/75 - 130/91)  BP(mean): --  RR: 18 (24 Feb 2025 11:18) (16 - 18)  SpO2: 99% (24 Feb 2025 11:18) (93% - 99%)    Parameters below as of 24 Feb 2025 11:18  Patient On (Oxygen Delivery Method): room air                            9.2    7.63  )-----------( 152      ( 24 Feb 2025 05:28 )             30.5     02-24    138  |  95[L]  |  61[H]  ----------------------------<  102[H]  4.3   |  25  |  5.52[H]    Ca    8.5      24 Feb 2025 05:28  Phos  4.3     02-24  Mg     2.4     02-24    TPro  6.0  /  Alb  3.5  /  TBili  0.7  /  DBili  x   /  AST  93[H]  /  ALT  150[H]  /  AlkPhos  97  02-24      Telemetry: AFib 70's    EKG: < from: 12 Lead ECG (02.12.25 @ 12:54) >  Ventricular Rate 104 BPM    QRS Duration 90 ms    Q-T Interval 384 ms    QTC Calculation(Bazett) 504 ms    R Axis 5 degrees    T Axis 95 degrees    Diagnosis Line ATRIAL FIBRILLATION WITH RAPID VENTRICULAR RESPONSE WITH PREMATURE VENTRICULAR OR ABERRANTLY CONDUCTED COMPLEXES  POSSIBLE ANTERIOR INFARCT , AGE UNDETERMINED  ABNORMAL ECG  WHEN COMPARED WITH ECG OF 07-JAN-2025 07:28,  NO SIGNIFICANT CHANGE WAS FOUND  Confirmed by HARLEY PASCUAL MD ([id number]) on 2/14/2025 1:06:28 PM    < end of copied text >      Other  < from: TTE W or WO Ultrasound Enhancing Agent (02.13.25 @ 11:46) >  TRANSTHORACIC ECHOCARDIOGRAM REPORT  ________________________________________________________________________________                                      _______       Pt. Name:       ADRIENNE PARSONS  Study Date:    2/13/2025  MRN:            [id number]            YOB: 1950  Accession #:    [id number]           Age:           74 years  Account#:       [id number]        Gender:        F  Heart Rate:     92 bpm              Height:        67.00 in (170.18 cm)  Rhythm:         atrial fibrillation Weight:        131.00 lb (59.42 kg)  Blood Pressure: 143/75 mmHg         BSA/BMI:       1.69 m² / 20.52 kg/m²  ________________________________________________________________________________________  Referring Physician:    [id number] Jairon Fuentes  Interpreting Physician: Leonid Marin M.D.  Primary Sonographer:    Magnus Babb RDCS    CPT:               ECHO TTE WO CON COMP W DOPP - 66654.m  Indication(s):     Unspecified atrial fibrillation - I48.91  Procedure:        Transthoracic echocardiogram with 2-D, M-mode and complete                     spectral and color flow Doppler.  Ordering Location: [facility]  Admission Status:  Inpatient  Study Information: Image quality for this study is adequate.    _______________________________________________________________________________________     CONCLUSIONS:      1. Left ventricular cavity is mildly dilated. Left ventricular systolic function is moderately decreased with an ejection fraction of 41 % by Garcia'smethod of disks. Global left ventricular hypokinesis.   2. Multiple segmental abnormalities exist. See findings.   3. Left atrium is severely dilated.   4. Severe mitral regurgitation. The mitral regurgitant jet is posteriorly directed.   5. No pericardial effusion seen.   6. Mild to moderate tricuspid regurgitation.   7. Compared to the transthoracic echocardiogram performed on 11/27/2024, LVEF has decreased, MR is increased,.   8. Estimated pulmonary artery systolic pressure is 46 mmHg, consistent with mild to moderate pulmonary hypertension.   9. Symmetric mitral valve leaflet tethering.  10. The inferior vena cava is dilated measuring 2.40 cm in diameter, (dilated >2.1cm) with abnormal inspiratory collapse (abnormal <50%) consistent with elevated right atrial pressure (~15, range 10-20mmHg).  11. There is normal LV mass and normal geometry.      < end of copied text >

## 2025-02-24 NOTE — PROGRESS NOTE ADULT - ASSESSMENT
74-year-old female with PMHx of HTN, CKD stage 5 (pt. of Dr. Elizondo), Afib on Eliquis, anemia, nephrolithiasis, and recently diagnosed HF who presents for scheduled Afib ablation for Afib found to have NICO.    Nephrology consulted for NICO on CKD V.    Acute kidney injury superimposed on CKD-5 iso possibly over-diuresis (increased from lasix 80mg BID to torsemide 100mg BID 1/31/24)  -Pt. with hx of advanced CKD is in the setting of chronic history of nephrolithiasis/staghorn calculus. On review of labs on [place], last Scr was elevated at 6.18 on 1/30/25. Admission SCr is elevated at 8.42 without electrolyte abnormalities. Prior renal US 1/8/25 without hydronephrosis but calcifications, stones and cysts bilaterally.   USG - Kidney and bladder showed Bilateral renal calculi, no Hydronephrosis, B/L renal parenchymal disease. UPCR 4.9    RECS:  Now ESRD, on HD started last week  Seen on HD today, tolerating well  Has a tunnelled HD cath    Anemia iso CKD  On venofer 200mg IV with HD X 5 doses  Once complete, can start PERLITA    Cards:  ·TTE in 11/2024 showed progression to moderate to severe MR. TTE now with LVEF of 41%, global hypokinesis, severe MR, RAP of 15.   -Structural Heart evaluation noted. Once well HD and good UF, repeat TTE and re-assess  -Plan for LHC and RHC today post HD  -EP eval for afib ablation please as AVF cannot be done without that  -Can start ARB/SGLT2i/MRA if needed for cardiac disease as she is ESRD now    DE planning to [facility] HD unit- preferred unit by patient under Dr Elizondo once medically ready  AVF placement as outpatient due to afib ablation first

## 2025-02-24 NOTE — PROGRESS NOTE ADULT - ATTENDING COMMENTS
74F w/ severe MR, AFib on Eliquis, HFpEF, CKD and recent admission 1/7-9 for AF w/ RVR and decompensated HF who presented for planned DCCV, found to have NICO on CKD and admitted to medicine for further work up. Concern for CKD progression (baseline SCr 5-6) vs NICO on CKD. Worsening LE edema despite torsemide 100 mg bid. Nephrology consulted, s/p permacath 2/19 and started on HD. Now on PO diuretics on non HD days. TTE with EF 41%, WMA, severe MR, progressive from 11/2024 echo. Cardiology, EP, and structural cardiology following. Will need eventual TTE/angiograms/DCCV after adequate fluid removal.     - Cards following, plan for RHC/LHC today  - F/u EP re timing of DCCV, potentially 2/25 but not yet confirmed  - plan for repeat TTE post volume optimization/DCCV to re-eval MR, structural consulted    DCP home pending outpatient HD set up, cards/EP/structural recs.

## 2025-02-24 NOTE — PROGRESS NOTE ADULT - ASSESSMENT
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 Started HD due to volume overload refractory to diuresis. Planned for LHC/RHC on 2/24. Eventual goal for DCCV, but may be delayed due to brief holds in [place].

## 2025-02-24 NOTE — PROGRESS NOTE ADULT - ASSESSMENT
75 y/o female with Severe MR, HFrEF, Acute on Chronic Kidney disease    1.) Severe MR: Awaiting LHC/RHC today, pt. had HD this morning. Plan for repeat TTE when euvolemic to reassess MR after optimization. Eliquis on hold pending angiogram.   2.) Acute on Chronic Renal Injury: HD started, planning for AVF placement as outpt   3.) AFib: Currently rate controlled. EP following with plan for eventual cardioversion, renal requesting to be done prior to AVF.   4.) Ambulate as tolerated      SOFÍA Calix NP  TEAMS  [id number]

## 2025-02-24 NOTE — PROGRESS NOTE ADULT - PROBLEM SELECTOR PLAN 1
Hx/o CKD iso b/l staghorn calculus s/p removal (2009) (follows with Dr. Elizondo). Found to have worsening renal function on admission so procedure cancelled. Baseline 4.6 in Aug 2024 and 5.5 in 1/25. Cr on admission 2/12 was 8.42.   - Strict I/Os, daily weights  - Trend BMPs, monitor renal function, renally dose meds, avoid nephrotoxins   - Nephro consulted, new dialysis started 2/19, 2/20 and 2/21  - Per nephrology, torsemide 60 mg PO daily on non dialysis days - confirm with patient when dialysis days and will need to be ordered user schedule Hx/o CKD iso b/l staghorn calculus s/p removal (2009) (follows with Dr. Elizondo). Found to have worsening renal function on admission so procedure cancelled. Baseline 4.6 in Aug 2024 and 5.5 in 1/25. Cr on admission 2/12 was 8.42.   - Strict I/Os, daily weights  - Trend BMPs, monitor renal function, renally dose meds, avoid nephrotoxins   - Nephro consulted, new dialysis started 2/19, 2/20 and 2/21, 2/24  - Per nephrology, torsemide 60 mg PO daily on non dialysis days - confirm with patient when dialysis days and will need to be ordered user schedule

## 2025-02-24 NOTE — PROGRESS NOTE ADULT - PROBLEM SELECTOR PLAN 3
TTE in 11/2024 showed progression to moderate to severe MR.  Left ventricular systolic function is normal with an ejection fraction of 55 % by Garcia's method of disks. Appears hypervolemic on exam. Repeat TTE during admission reveals left ventricular systolic function is moderately decreased with an ejection fraction of 41 % by Garcia's method of disks.   -Torsemide 60 mg on non dialysis days   -Per cardiology, plan for RHC and LHC on 2/24   -General cardiology following TTE in 11/2024 showed progression to moderate to severe MR.  Left ventricular systolic function is normal with an ejection fraction of 55 % by Garcia's method of disks. Appears hypervolemic on exam. Repeat TTE during admission reveals left ventricular systolic function is moderately decreased with an ejection fraction of 41 % by Garcia's method of disks.   -Torsemide 60 mg on non dialysis days   -Per cardiology, plan for RHC and LHC on 2/24   -General cardiology following  -Will need to reassess LVEF once in NSR after cardioversion

## 2025-02-24 NOTE — PROGRESS NOTE ADULT - SUBJECTIVE AND OBJECTIVE BOX
Patient is a 74y old  Female who presents with a chief complaint of ESRD AF (23 Feb 2025 08:39)      INTERVAL HPI/OVERNIGHT EVENTS: No acute events overnight. Received Zyprexa. Remains with Afib on tele.     Patient examined at bedside this morning. Aware of plans for R/LHC today.    Vital Signs Last 24 Hrs  T(C): 36.3 (24 Feb 2025 04:40), Max: 36.8 (23 Feb 2025 12:49)  T(F): 97.3 (24 Feb 2025 04:40), Max: 98.2 (23 Feb 2025 12:49)  HR: 96 (24 Feb 2025 07:19) (72 - 112)  BP: 111/84 (24 Feb 2025 04:40) (106/75 - 117/83)  BP(mean): --  RR: 18 (24 Feb 2025 04:40) (16 - 18)  SpO2: 95% (24 Feb 2025 04:40) (93% - 95%)    Parameters below as of 24 Feb 2025 04:40  Patient On (Oxygen Delivery Method): room air    I&O's Detail    23 Feb 2025 07:01  -  24 Feb 2025 07:00  --------------------------------------------------------  IN:    Oral Fluid: 780 mL  Total IN: 780 mL    OUT:  Total OUT: 0 mL    Total NET: 780 mL        No Known Allergies      PHYSICAL EXAM:  General: NAD  HEENT: PERRL, normal sclera/conjunctiva  Neck: Supple  Lungs: CTA bilaterally  Heart: RRR, normal S1S2, no murmurs/rubs/gallops  Abdomen: Soft, ND/NT  Extremities: no edema  Skin: Warm, well-perfused  Neuro: A&O x3, no focal deficits      LABS:                          9.2    7.63  )-----------( 152      ( 24 Feb 2025 05:28 )             30.5     02-24    138  |  95[L]  |  61[H]  ----------------------------<  102[H]  4.3   |  25  |  5.52[H]    Ca    8.5      24 Feb 2025 05:28  Phos  4.3     02-24  Mg     2.4     02-24    TPro  6.0  /  Alb  3.5  /  TBili  0.7  /  DBili  x   /  AST  93[H]  /  ALT  150[H]  /  AlkPhos  97  02-24      RADIOLOGY & ADDITIONAL TESTS:        acetaminophen     Tablet .. 650 milliGRAM(s) Oral every 6 hours PRN  chlorhexidine 2% Cloths 1 Application(s) Topical <User Schedule>  influenza  Vaccine (HIGH DOSE) 0.5 milliLiter(s) IntraMuscular once  iron sucrose Injectable 200 milliGRAM(s) IV Push every 24 hours  melatonin 3 milliGRAM(s) Oral at bedtime PRN  metoprolol succinate  milliGRAM(s) Oral <User Schedule>  sevelamer carbonate 800 milliGRAM(s) Oral three times a day with meals  sodium bicarbonate 650 milliGRAM(s) Oral four times a day      HEALTH ISSUES - PROBLEM Dx:  Acute kidney injury superimposed on CKD    Paroxysmal atrial fibrillation    Need for prophylactic measure    Metabolic acidosis    Hypertension    Severe mitral regurgitation    Chronic HFrEF (heart failure with reduced ejection fraction)             Patient is a 74y old  Female who presents with a chief complaint of ESRD AF (23 Feb 2025 08:39)      INTERVAL HPI/OVERNIGHT EVENTS: No acute events overnight. Received Zyprexa. Remains with Afib on tele.     Patient examined at bedside during HD this morning. Aware of plans for R/LHC today. Denies any symptoms, including heart palpations or lightheadedness. Reports normal appetite and BMs.    Vital Signs Last 24 Hrs  T(C): 36.3 (24 Feb 2025 04:40), Max: 36.8 (23 Feb 2025 12:49)  T(F): 97.3 (24 Feb 2025 04:40), Max: 98.2 (23 Feb 2025 12:49)  HR: 96 (24 Feb 2025 07:19) (72 - 112)  BP: 111/84 (24 Feb 2025 04:40) (106/75 - 117/83)  BP(mean): --  RR: 18 (24 Feb 2025 04:40) (16 - 18)  SpO2: 95% (24 Feb 2025 04:40) (93% - 95%)    Parameters below as of 24 Feb 2025 04:40  Patient On (Oxygen Delivery Method): room air    I&O's Detail    23 Feb 2025 07:01  -  24 Feb 2025 07:00  --------------------------------------------------------  IN:    Oral Fluid: 780 mL  Total IN: 780 mL    OUT:  Total OUT: 0 mL    Total NET: 780 mL        No Known Allergies      PHYSICAL EXAM:  General: NAD  HEENT: PERRL, normal sclera/conjunctiva  Neck: Supple  Lungs: CTA bilaterally  Heart: Irregular rhythm appreciated, normal S1S2, no murmurs/rubs/gallops  Abdomen: Soft, ND/NT  Extremities: no edema  Skin: Warm, well-perfused  Neuro: A&O x3, no focal deficits      LABS:                          9.2    7.63  )-----------( 152      ( 24 Feb 2025 05:28 )             30.5     02-24    138  |  95[L]  |  61[H]  ----------------------------<  102[H]  4.3   |  25  |  5.52[H]    Ca    8.5      24 Feb 2025 05:28  Phos  4.3     02-24  Mg     2.4     02-24    TPro  6.0  /  Alb  3.5  /  TBili  0.7  /  DBili  x   /  AST  93[H]  /  ALT  150[H]  /  AlkPhos  97  02-24      RADIOLOGY & ADDITIONAL TESTS:        acetaminophen     Tablet .. 650 milliGRAM(s) Oral every 6 hours PRN  chlorhexidine 2% Cloths 1 Application(s) Topical <User Schedule>  influenza  Vaccine (HIGH DOSE) 0.5 milliLiter(s) IntraMuscular once  iron sucrose Injectable 200 milliGRAM(s) IV Push every 24 hours  melatonin 3 milliGRAM(s) Oral at bedtime PRN  metoprolol succinate  milliGRAM(s) Oral <User Schedule>  sevelamer carbonate 800 milliGRAM(s) Oral three times a day with meals  sodium bicarbonate 650 milliGRAM(s) Oral four times a day      HEALTH ISSUES - PROBLEM Dx:  Acute kidney injury superimposed on CKD    Paroxysmal atrial fibrillation    Need for prophylactic measure    Metabolic acidosis    Hypertension    Severe mitral regurgitation    Chronic HFrEF (heart failure with reduced ejection fraction)

## 2025-02-24 NOTE — PROGRESS NOTE ADULT - ASSESSMENT
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       #Acute kidney injury superimposed on CKD.    Found to have worsening renal function on admission so procedure cancelled. Baseline 4.6 in Aug 2024 and 5.5 in 1/25. Cr on admission 2/12 was 8.42.   - Cr continued to rise to 8.71 down to 7's, now on hd  - On Bumex 4mg IV BID for diurese but Is and Os document 0ml of urine  - RAP elevated to 15 on TTE from 2/13 and she appears significantly volume up on exam  -Plan for Permacath on Wednesday  -was on Bumex 4mg IV BID poor UOP now off  -s/p hd access  02/22-Remains in AF has had 3 HD sessions  02/23 AF plan for R/LHC in AM  2/24 remains rate controlled af, pt and  agreeable to cath      #  Paroxysmal atrial fibrillation.   ·  Plan: Admitted for cardioversion but unable to do because given significant volume overload  -C/w home eliquis 5 mg BID   -C/w home metoprolol succ 150 mg daily  - Off Amiodarone    # Chronic heart failure with preserved ejection fraction. -Severe MR  ·TTE in 11/2024 showed progression to moderate to severe MR.  Left ventricular systolic function was normal with an ejection fraction of 55 % by Garcia's method of disks.  -TTE now with LVEF of 41%, global hypokinesis, severe MR, RAP of 15.   -Structural Heart evaluation noted. Will aggressively diurese and repeat TTE when euvolemic   Eventual GDMT  No recent ischemic eval noted. Will need to reassess LVEF once in NSR  Will plan for RHC/LHC today 2/24. Discussed with patient, her  by phone.   - have held eliquis

## 2025-02-24 NOTE — PROGRESS NOTE ADULT - SUBJECTIVE AND OBJECTIVE BOX
[facility] DIVISION OF KIDNEY DISEASES AND HYPERTENSION -- FOLLOW UP NOTE  --------------------------------------------------------------------------------  Chief Complaint:    24 hour events/subjective:        PAST HISTORY  --------------------------------------------------------------------------------  No significant changes to PMH, PSH, FHx, SHx, unless otherwise noted    ALLERGIES & MEDICATIONS  --------------------------------------------------------------------------------  Allergies    No Known Allergies    Intolerances      Standing Inpatient Medications  chlorhexidine 2% Cloths 1 Application(s) Topical <User Schedule>  influenza  Vaccine (HIGH DOSE) 0.5 milliLiter(s) IntraMuscular once  iron sucrose Injectable 200 milliGRAM(s) IV Push every 24 hours  metoprolol succinate  milliGRAM(s) Oral <User Schedule>  sevelamer carbonate 800 milliGRAM(s) Oral three times a day with meals  sodium bicarbonate 650 milliGRAM(s) Oral four times a day    PRN Inpatient Medications  acetaminophen     Tablet .. 650 milliGRAM(s) Oral every 6 hours PRN  melatonin 3 milliGRAM(s) Oral at bedtime PRN      REVIEW OF SYSTEMS  --------------------------------------------------------------------------------  Gen: No weight changes, fatigue, fevers/chills, weakness  Skin: No rashes  Head/Eyes/Ears/Mouth: No headache; Normal hearing; Normal vision w/o blurriness; No sinus pain/discomfort, sore throat  Respiratory: No dyspnea, cough, wheezing, hemoptysis  CV: No chest pain, PND, orthopnea  GI: No abdominal pain, diarrhea, constipation, nausea, vomiting, melena, hematochezia  : No increased frequency, dysuria, hematuria, nocturia  MSK: No joint pain/swelling; no back pain; no edema  Neuro: No dizziness/lightheadedness, weakness, seizures, numbness, tingling  Heme: No easy bruising or bleeding  Endo: No heat/cold intolerance  Psych: No significant nervousness, anxiety, stress, depression    All other systems were reviewed and are negative, except as noted.    VITALS/PHYSICAL EXAM  --------------------------------------------------------------------------------  T(C): 36.3 (02-24-25 @ 08:18), Max: 36.8 (02-23-25 @ 12:49)  HR: 72 (02-24-25 @ 08:18) (72 - 112)  BP: 110/74 (02-24-25 @ 08:18) (106/75 - 117/83)  RR: 18 (02-24-25 @ 08:18) (16 - 18)  SpO2: 99% (02-24-25 @ 08:18) (93% - 99%)  Wt(kg): --        02-23-25 @ 07:01  -  02-24-25 @ 07:00  --------------------------------------------------------  IN: 780 mL / OUT: 0 mL / NET: 780 mL    02-24-25 @ 07:01  -  02-24-25 @ 11:05  --------------------------------------------------------  IN: 0 mL / OUT: 1500 mL / NET: -1500 mL      Physical Exam:  	Gen: NAD, well-appearing  	HEENT: PERRL, supple neck, clear oropharynx  	Pulm: CTA B/L  	CV: RRR, S1S2; no rub  	Back: No spinal or CVA tenderness; no sacral edema  	Abd: +BS, soft, nontender/nondistended  	: No suprapubic tenderness  	UE: Warm, FROM, no clubbing, intact strength; no edema; no asterixis  	LE: Warm, FROM, no clubbing, intact strength; no edema  	Neuro: No focal deficits, intact gait  	Psych: Normal affect and mood  	Skin: Warm, without rashes  	Vascular access:    LABS/STUDIES  --------------------------------------------------------------------------------              9.2    7.63  >-----------<  152      [02-24-25 @ 05:28]              30.5     138  |  95  |  61  ----------------------------<  102      [02-24-25 @ 05:28]  4.3   |  25  |  5.52        Ca     8.5     [02-24-25 @ 05:28]      Mg     2.4     [02-24-25 @ 05:28]      Phos  4.3     [02-24-25 @ 05:28]    TPro  6.0  /  Alb  3.5  /  TBili  0.7  /  DBili  x   /  AST  93  /  ALT  150  /  AlkPhos  97  [02-24-25 @ 05:28]    PT/INR: PT 20.8 , INR 1.82       [02-24-25 @ 05:28]  PTT: 31.7       [02-24-25 @ 05:28]      Creatinine Trend:  SCr 5.52 [02-24 @ 05:28]  SCr 4.61 [02-23 @ 06:15]  SCr 3.33 [02-22 @ 06:16]  SCr 4.35 [02-21 @ 08:47]  SCr 5.47 [02-20 @ 05:47]    Urinalysis - [02-24-25 @ 05:28]      Color  / Appearance  / SG  / pH       Gluc 102 / Ketone   / Bili  / Urobili        Blood  / Protein  / Leuk Est  / Nitrite       RBC  / WBC  / Hyaline  / Gran  / Sq Epi  / Non Sq Epi  / Bacteria       Iron 44, TIBC --, %sat --      [02-19-25 @ 04:22]  Ferritin 101      [11-22-24 @ 21:13]  PTH -- (Ca 8.1)      [11-22-24 @ 21:13]   595  TSH 0.65      [04-09-24 @ 00:27]    HBsAb Nonreact      [02-20-25 @ 05:47]  HBsAg Nonreact      [02-20-25 @ 05:47]  HBcAb Nonreact      [02-20-25 @ 05:47]  HCV 0.09, Nonreact      [02-20-25 @ 05:47]

## 2025-02-25 DIAGNOSIS — F41.9 ANXIETY DISORDER, UNSPECIFIED: ICD-10-CM

## 2025-02-25 DIAGNOSIS — R74.01 ELEVATION OF LEVELS OF LIVER TRANSAMINASE LEVELS: ICD-10-CM

## 2025-02-25 LAB
ALBUMIN SERPL ELPH-MCNC: 3.4 G/DL — SIGNIFICANT CHANGE UP (ref 3.3–5)
ALBUMIN SERPL ELPH-MCNC: 4 G/DL — SIGNIFICANT CHANGE UP (ref 3.3–5)
ALP SERPL-CCNC: 119 U/L — SIGNIFICANT CHANGE UP (ref 40–120)
ALP SERPL-CCNC: 99 U/L — SIGNIFICANT CHANGE UP (ref 40–120)
ALT FLD-CCNC: 195 U/L — HIGH (ref 10–45)
ALT FLD-CCNC: 241 U/L — HIGH (ref 10–45)
ANION GAP SERPL CALC-SCNC: 20 MMOL/L — HIGH (ref 5–17)
ANION GAP SERPL CALC-SCNC: 21 MMOL/L — HIGH (ref 5–17)
APTT BLD: >200 SEC — CRITICAL HIGH (ref 24.5–35.6)
AST SERPL-CCNC: 106 U/L — HIGH (ref 10–40)
AST SERPL-CCNC: 137 U/L — HIGH (ref 10–40)
BILIRUB SERPL-MCNC: 0.9 MG/DL — SIGNIFICANT CHANGE UP (ref 0.2–1.2)
BILIRUB SERPL-MCNC: 1.2 MG/DL — SIGNIFICANT CHANGE UP (ref 0.2–1.2)
BUN SERPL-MCNC: 31 MG/DL — HIGH (ref 7–23)
BUN SERPL-MCNC: 36 MG/DL — HIGH (ref 7–23)
CALCIUM SERPL-MCNC: 7.6 MG/DL — LOW (ref 8.4–10.5)
CALCIUM SERPL-MCNC: 8.6 MG/DL — SIGNIFICANT CHANGE UP (ref 8.4–10.5)
CHLORIDE SERPL-SCNC: 81 MMOL/L — LOW (ref 96–108)
CHLORIDE SERPL-SCNC: 92 MMOL/L — LOW (ref 96–108)
CO2 SERPL-SCNC: 22 MMOL/L — SIGNIFICANT CHANGE UP (ref 22–31)
CO2 SERPL-SCNC: 25 MMOL/L — SIGNIFICANT CHANGE UP (ref 22–31)
CREAT SERPL-MCNC: 3.23 MG/DL — HIGH (ref 0.5–1.3)
CREAT SERPL-MCNC: 3.76 MG/DL — HIGH (ref 0.5–1.3)
EGFR: 12 ML/MIN/1.73M2 — LOW
EGFR: 12 ML/MIN/1.73M2 — LOW
EGFR: 14 ML/MIN/1.73M2 — LOW
EGFR: 14 ML/MIN/1.73M2 — LOW
GLUCOSE BLDC GLUCOMTR-MCNC: 105 MG/DL — HIGH (ref 70–99)
GLUCOSE BLDC GLUCOMTR-MCNC: 97 MG/DL — SIGNIFICANT CHANGE UP (ref 70–99)
GLUCOSE SERPL-MCNC: 558 MG/DL — CRITICAL HIGH (ref 70–99)
GLUCOSE SERPL-MCNC: 97 MG/DL — SIGNIFICANT CHANGE UP (ref 70–99)
HCT VFR BLD CALC: 33.1 % — LOW (ref 34.5–45)
HGB BLD-MCNC: 9.6 G/DL — LOW (ref 11.5–15.5)
MAGNESIUM SERPL-MCNC: 2 MG/DL — SIGNIFICANT CHANGE UP (ref 1.6–2.6)
MCHC RBC-ENTMCNC: 29 G/DL — LOW (ref 32–36)
MCHC RBC-ENTMCNC: 29.8 PG — SIGNIFICANT CHANGE UP (ref 27–34)
MCV RBC AUTO: 102.8 FL — HIGH (ref 80–100)
NRBC BLD AUTO-RTO: 3 /100 WBCS — HIGH (ref 0–0)
PHOSPHATE SERPL-MCNC: 3.7 MG/DL — SIGNIFICANT CHANGE UP (ref 2.5–4.5)
PLATELET # BLD AUTO: 206 K/UL — SIGNIFICANT CHANGE UP (ref 150–400)
POTASSIUM SERPL-MCNC: 4 MMOL/L — SIGNIFICANT CHANGE UP (ref 3.5–5.3)
POTASSIUM SERPL-MCNC: 4.5 MMOL/L — SIGNIFICANT CHANGE UP (ref 3.5–5.3)
POTASSIUM SERPL-SCNC: 4 MMOL/L — SIGNIFICANT CHANGE UP (ref 3.5–5.3)
POTASSIUM SERPL-SCNC: 4.5 MMOL/L — SIGNIFICANT CHANGE UP (ref 3.5–5.3)
PROT SERPL-MCNC: 5.7 G/DL — LOW (ref 6–8.3)
PROT SERPL-MCNC: 6.5 G/DL — SIGNIFICANT CHANGE UP (ref 6–8.3)
RBC # BLD: 3.22 M/UL — LOW (ref 3.8–5.2)
RBC # FLD: 17 % — HIGH (ref 10.3–14.5)
SODIUM SERPL-SCNC: 124 MMOL/L — LOW (ref 135–145)
SODIUM SERPL-SCNC: 137 MMOL/L — SIGNIFICANT CHANGE UP (ref 135–145)
WBC # BLD: 8.3 K/UL — SIGNIFICANT CHANGE UP (ref 3.8–10.5)
WBC # FLD AUTO: 8.3 K/UL — SIGNIFICANT CHANGE UP (ref 3.8–10.5)

## 2025-02-25 PROCEDURE — 99233 SBSQ HOSP IP/OBS HIGH 50: CPT | Mod: 57

## 2025-02-25 PROCEDURE — 99233 SBSQ HOSP IP/OBS HIGH 50: CPT | Mod: GC

## 2025-02-25 PROCEDURE — 93010 ELECTROCARDIOGRAM REPORT: CPT

## 2025-02-25 PROCEDURE — 99233 SBSQ HOSP IP/OBS HIGH 50: CPT

## 2025-02-25 PROCEDURE — 93010 ELECTROCARDIOGRAM REPORT: CPT | Mod: 77

## 2025-02-25 RX ORDER — APIXABAN 2.5 MG/1
TABLET, FILM COATED ORAL
Refills: 0 | Status: DISCONTINUED | OUTPATIENT
Start: 2025-02-25 | End: 2025-02-25

## 2025-02-25 RX ORDER — APIXABAN 2.5 MG/1
2.5 TABLET, FILM COATED ORAL EVERY 12 HOURS
Refills: 0 | Status: DISCONTINUED | OUTPATIENT
Start: 2025-02-25 | End: 2025-03-03

## 2025-02-25 RX ORDER — APIXABAN 2.5 MG/1
2.5 TABLET, FILM COATED ORAL EVERY 12 HOURS
Refills: 0 | Status: DISCONTINUED | OUTPATIENT
Start: 2025-02-25 | End: 2025-02-25

## 2025-02-25 RX ORDER — APIXABAN 2.5 MG/1
2.5 TABLET, FILM COATED ORAL ONCE
Refills: 0 | Status: COMPLETED | OUTPATIENT
Start: 2025-02-25 | End: 2025-02-25

## 2025-02-25 RX ORDER — APIXABAN 2.5 MG/1
5 TABLET, FILM COATED ORAL EVERY 12 HOURS
Refills: 0 | Status: DISCONTINUED | OUTPATIENT
Start: 2025-02-25 | End: 2025-02-25

## 2025-02-25 RX ORDER — BUMETANIDE 1 MG/1
2 TABLET ORAL DAILY
Refills: 0 | Status: DISCONTINUED | OUTPATIENT
Start: 2025-02-25 | End: 2025-03-06

## 2025-02-25 RX ADMIN — Medication 1 APPLICATION(S): at 07:18

## 2025-02-25 RX ADMIN — Medication 650 MILLIGRAM(S): at 01:01

## 2025-02-25 RX ADMIN — METOPROLOL SUCCINATE 150 MILLIGRAM(S): 50 TABLET, EXTENDED RELEASE ORAL at 17:13

## 2025-02-25 RX ADMIN — Medication 650 MILLIGRAM(S): at 11:45

## 2025-02-25 RX ADMIN — METOPROLOL SUCCINATE 150 MILLIGRAM(S): 50 TABLET, EXTENDED RELEASE ORAL at 05:49

## 2025-02-25 RX ADMIN — SEVELAMER HYDROCHLORIDE 800 MILLIGRAM(S): 800 TABLET ORAL at 11:45

## 2025-02-25 RX ADMIN — Medication 650 MILLIGRAM(S): at 17:15

## 2025-02-25 RX ADMIN — SEVELAMER HYDROCHLORIDE 800 MILLIGRAM(S): 800 TABLET ORAL at 08:00

## 2025-02-25 RX ADMIN — BUMETANIDE 2 MILLIGRAM(S): 1 TABLET ORAL at 11:46

## 2025-02-25 RX ADMIN — APIXABAN 2.5 MILLIGRAM(S): 2.5 TABLET, FILM COATED ORAL at 22:15

## 2025-02-25 RX ADMIN — HEPARIN SODIUM 1100 UNIT(S)/HR: 1000 INJECTION INTRAVENOUS; SUBCUTANEOUS at 00:51

## 2025-02-25 RX ADMIN — Medication 650 MILLIGRAM(S): at 07:17

## 2025-02-25 RX ADMIN — Medication 650 MILLIGRAM(S): at 05:49

## 2025-02-25 RX ADMIN — SEVELAMER HYDROCHLORIDE 800 MILLIGRAM(S): 800 TABLET ORAL at 17:12

## 2025-02-25 RX ADMIN — APIXABAN 2.5 MILLIGRAM(S): 2.5 TABLET, FILM COATED ORAL at 10:57

## 2025-02-25 RX ADMIN — Medication 3 MILLIGRAM(S): at 22:14

## 2025-02-25 NOTE — PROGRESS NOTE ADULT - ASSESSMENT
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       #Acute kidney injury superimposed on CKD.    Found to have worsening renal function on admission so procedure cancelled. Baseline 4.6 in Aug 2024 and 5.5 in 1/25. Cr on admission 2/12 was 8.42.   - Cr continued to rise to 8.71 down to 7's, now on hd  - On Bumex 4mg IV BID for diurese but Is and Os document 0ml of urine  - RAP elevated to 15 on TTE from 2/13 and she appears significantly volume up on exam  -Plan for Permacath on Wednesday  -was on Bumex 4mg IV BID poor UOP now off  -s/p hd access  02/22-Remains in AF has had 3 HD sessions    #  Paroxysmal atrial fibrillation.   ·  Plan: Admitted for cardioversion but unable to do because given significant volume overload  -C/w home eliquis 5 mg BID   -Currently on Metoprolol succinate 150mg BID, max dose is usually 200mg in 24 hours  - Off Amiodarone    # Chronic heart failure with preserved ejection fraction. -Severe MR  ·TTE in 11/2024 showed progression to moderate to severe MR.  Left ventricular systolic function was normal with an ejection fraction of 55 % by Garcia's method of disks.  -TTE now with LVEF of 41%, global hypokinesis, severe MR, RAP of 15.   -Structural Heart evaluation noted. Will aggressively diurese and repeat TTE when euvolemic   Eventual GDMT  S/p LHC/RHC on 2/24 with elevated filling pressures: mRA 17, PCWP 30, CI 2.14. Needs aggressive volume removal with HD  LHC with 75% mRCA disease otherwise non obstructive   - eliquis resumed   - Needs high intensity statin for her CAD. will hold on ASA for now given anemia

## 2025-02-25 NOTE — PROGRESS NOTE ADULT - PROBLEM SELECTOR PLAN 2
Admitted for cardioversion but unable to do because NICO on CKD.    Plan:  -Tentative plan for cardioversion on Tuesday 2/25  -Eliquis mg BID - d/c 2/22 evening for heart cath per cards and start heparin after L/RHC - restarted eliquis 2/25  -Increased toprol to 150 bid for rate control  -EP recommend d/c amiodarone given plan for tunnel cath and worsening kidney function Admitted for cardioversion but unable to do because NICO on CKD.    Plan:  -Pending cardioversion; however, delayed because pt still volume overloaded (LHC/RHC on 2/24 with elevated filling pressures: mRA 17, PCWP 30, CI 2.14) - plan for  and cardioversion once volume better optimized  -Eliquis 2.5 mg BID - renally dosed  -Increased toprol to 150 bid for rate control  -EP recommend d/c amiodarone given plan for tunnel cath and worsening kidney function

## 2025-02-25 NOTE — PROGRESS NOTE ADULT - SUBJECTIVE AND OBJECTIVE BOX
24H hour events:   Seen sitting up in bed; no c/o noted; tele remains in Afib 70-110s    MEDICATIONS:  apixaban      metoprolol succinate  milliGRAM(s) Oral <User Schedule>  acetaminophen     Tablet .. 650 milliGRAM(s) Oral every 6 hours PRN  melatonin 3 milliGRAM(s) Oral at bedtime PRN  chlorhexidine 2% Cloths 1 Application(s) Topical <User Schedule>  influenza  Vaccine (HIGH DOSE) 0.5 milliLiter(s) IntraMuscular once  sodium bicarbonate 650 milliGRAM(s) Oral four times a day      REVIEW OF SYSTEMS:  Complete 12point ROS negative; patient forgetful    PHYSICAL EXAM:  T(C): 36.7 (02-25-25 @ 04:38), Max: 36.8 (02-24-25 @ 18:46)  HR: 99 (02-25-25 @ 04:38) (70 - 111)  BP: 116/83 (02-25-25 @ 04:38) (107/55 - 130/91)  RR: 18 (02-25-25 @ 04:38) (16 - 18)  SpO2: 93% (02-25-25 @ 04:38) (93% - 100%)  Wt(kg): --  I&O's Summary    24 Feb 2025 07:01  -  25 Feb 2025 07:00  --------------------------------------------------------  IN: 480 mL / OUT: 1500 mL / NET: -1020 mL        Appearance: well develop, in NAD	  Head: normocephalic  Neck: supple  Cardiovascular: irregular S1S2, no r/g   Respiratory: Lungs clear to auscultation	  Psychiatry: A & O x 3, Mood & affect appropriate  Gastrointestinal:  Soft, Non-tender, + BS	  : voiding  Skin: No rashes, No ecchymoses	  Neurologic: Non-focal  Extremities: Normal range of motion, No clubbing, cyanosis or edema  Vascular: Peripheral pulses palpable 2+ bilaterally        LABS:	 	    CBC Full  -  ( 25 Feb 2025 06:30 )  WBC Count : 8.30 K/uL  Hemoglobin : 9.6 g/dL  Hematocrit : 33.1 %  Platelet Count - Automated : 206 K/uL  Mean Cell Volume : 102.8 fl  Mean Cell Hemoglobin : 29.8 pg  Mean Cell Hemoglobin Concentration : 29.0 g/dL  Auto Neutrophil # : x  Auto Lymphocyte # : x  Auto Monocyte # : x  Auto Eosinophil # : x  Auto Basophil # : x  Auto Neutrophil % : x  Auto Lymphocyte % : x  Auto Monocyte % : x  Auto Eosinophil % : x  Auto Basophil % : x    02-25    137  |  92[L]  |  36[H]  ----------------------------<  97  4.5   |  25  |  3.76[H]  02-25    124[L]  |  81[L]  |  31[H]  ----------------------------<  558[HH]  4.0   |  22  |  3.23[H]    Ca    8.6      25 Feb 2025 09:04  Ca    7.6[L]      25 Feb 2025 06:30  Phos  3.7     02-25  Phos  4.3     02-24  Mg     2.0     02-25  Mg     2.4     02-24    TPro  6.5  /  Alb  4.0  /  TBili  1.2  /  DBili  x   /  AST  137[H]  /  ALT  241[H]  /  AlkPhos  119  02-25  TPro  5.7[L]  /  Alb  3.4  /  TBili  0.9  /  DBili  x   /  AST  106[H]  /  ALT  195[H]  /  AlkPhos  99  02-25      proBNP:   Lipid Profile:   HgA1c:   TSH:       CARDIAC MARKERS:      TELEMETRY: Afib 70-110s    Echo 2/13/25:    CONCLUSIONS:      1. Left ventricular cavity is mildly dilated. Left ventricular systolic function is moderately decreased with an ejection fraction of 41 % by Garcia'smethod of disks. Global left ventricular hypokinesis.   2. Multiple segmental abnormalities exist. See findings.   3. Left atrium is severely dilated.   4. Severe mitral regurgitation. The mitral regurgitant jet is posteriorly directed.   5. No pericardial effusion seen.   6. Mild to moderate tricuspid regurgitation.   7. Compared to the transthoracic echocardiogram performed on 11/27/2024, LVEF has decreased, MR is increased,.   8. Estimated pulmonary artery systolic pressure is 46 mmHg, consistent with mild to moderate pulmonary hypertension.   9. Symmetric mitral valve leaflet tethering.  10. The inferior vena cava is dilated measuring 2.40 cm in diameter, (dilated >2.1cm) with abnormal inspiratory collapse (abnormal <50%) consistent with elevated right atrial pressure (~15, range 10-20mmHg).  11. There is normal LV mass and normal geometry.

## 2025-02-25 NOTE — PROGRESS NOTE ADULT - SUBJECTIVE AND OBJECTIVE BOX
Patient is a 74y old  Female who presents with a chief complaint of ESRD AF (23 Feb 2025 08:39)      INTERVAL HPI/OVERNIGHT EVENTS: No acute events overnight. S/p diagnostic R/LHC via RFV/A, showing triple vessel disease. Started on heparin overnight and then switched to eliquis this morning.    Patient examined at bedside during HD this morning. Aware of plans for cardioversion today. Denies any symptoms, including heart palpations or lightheadedness. Reports normal appetite and BMs.      Vital Signs Last 24 Hrs  T(C): 36.7 (25 Feb 2025 04:38), Max: 36.8 (24 Feb 2025 18:46)  T(F): 98.1 (25 Feb 2025 04:38), Max: 98.2 (24 Feb 2025 18:46)  HR: 99 (25 Feb 2025 04:38) (70 - 111)  BP: 116/83 (25 Feb 2025 04:38) (107/55 - 130/91)  BP(mean): --  RR: 18 (25 Feb 2025 04:38) (16 - 18)  SpO2: 93% (25 Feb 2025 04:38) (93% - 100%)    Parameters below as of 25 Feb 2025 04:38  Patient On (Oxygen Delivery Method): room air    I&O's Detail    24 Feb 2025 07:01  -  25 Feb 2025 07:00  --------------------------------------------------------  IN:    Oral Fluid: 480 mL  Total IN: 480 mL    OUT:    Other (mL): 1500 mL  Total OUT: 1500 mL    Total NET: -1020 mL      CAPILLARY BLOOD GLUCOSE      POCT Blood Glucose.: 105 mg/dL (25 Feb 2025 07:14)  POCT Blood Glucose.: 97 mg/dL (25 Feb 2025 07:13)      No Known Allergies      PHYSICAL EXAM:  General: NAD  HEENT: PERRL, normal sclera/conjunctiva  Neck: Supple  Lungs: CTA bilaterally  Heart: Irregular rhythm appreciated, normal S1S2, no murmurs/rubs/gallops  Abdomen: Soft, ND/NT  Extremities: no edema. RFV/A site intact with no surrounding erythema or pain  Skin: Warm, well-perfused  Neuro: A&O x3, no focal deficits      LABS:                                        9.6    8.30  )-----------( 206      ( 25 Feb 2025 06:30 )             33.1   02-25    124[L]  |  81[L]  |  31[H]  ----------------------------<  558[HH]  4.0   |  22  |  3.23[H]    Ca    7.6[L]      25 Feb 2025 06:30  Phos  3.7     02-25  Mg     2.0     02-25    TPro  5.7[L]  /  Alb  3.4  /  TBili  0.9  /  DBili  x   /  AST  106[H]  /  ALT  195[H]  /  AlkPhos  99  02-25          RADIOLOGY & ADDITIONAL TESTS:    < from: Cardiac Catheterization (02.24.25 @ 17:06) >  Diagnostic Conclusions:     Three vessel coronary artery disease   Normal left main coronary artery   Right dominant system   Elevated right atrial pressure (mRA 17mmHg with a V wave of 21mmHg)   Mild post-capillary pulmonary hypertension (sPAP 40mmHg, dPAP 22mmHg,  mPAP 30mmHg)  PCWP = 30mmHg with a V wave of 36mmHg   LVEDP = 20mmHg   AO = 112/73/90   PAsat = 43.25% // AOsat = 94.9% (room air)   Felix CO // CI = 3.44l/min // 2.14l/min/m2     Recommendations:     Keep right leg straight for 4 hours following removal of sheaths   Continue aggressive medical management     < end of copied text >      MEDICATIONS  (STANDING):  apixaban 5 milliGRAM(s) Oral every 12 hours  chlorhexidine 2% Cloths 1 Application(s) Topical <User Schedule>  influenza  Vaccine (HIGH DOSE) 0.5 milliLiter(s) IntraMuscular once  metoprolol succinate  milliGRAM(s) Oral <User Schedule>  sevelamer carbonate 800 milliGRAM(s) Oral three times a day with meals  sodium bicarbonate 650 milliGRAM(s) Oral four times a day    MEDICATIONS  (PRN):  acetaminophen     Tablet .. 650 milliGRAM(s) Oral every 6 hours PRN Temp greater or equal to 38C (100.4F), Mild Pain (1 - 3)  melatonin 3 milliGRAM(s) Oral at bedtime PRN Insomnia      HEALTH ISSUES - PROBLEM Dx:  Acute kidney injury superimposed on CKD    Paroxysmal atrial fibrillation    Need for prophylactic measure    Metabolic acidosis    Hypertension    Severe mitral regurgitation    Chronic HFrEF (heart failure with reduced ejection fraction)             Patient is a 74y old  Female who presents with a chief complaint of ESRD AF (23 Feb 2025 08:39)      INTERVAL HPI/OVERNIGHT EVENTS: No acute events overnight. S/p diagnostic R/LHC via RFV/A, showing elevated filling pressures and 75% obstruction RCA, otherwise not obstructive. Started on heparin overnight and then switched to eliquis this morning. Planned for cardioversion today but cancelled for better volume control first.    Patient examined at bedside this morning. Denies any symptoms, including heart palpations or lightheadedness. Reports normal appetite and BMs. Per nurse, patient frequently anxious and roaming halls toward elevators.      Vital Signs Last 24 Hrs  T(C): 36.7 (25 Feb 2025 04:38), Max: 36.8 (24 Feb 2025 18:46)  T(F): 98.1 (25 Feb 2025 04:38), Max: 98.2 (24 Feb 2025 18:46)  HR: 99 (25 Feb 2025 04:38) (70 - 111)  BP: 116/83 (25 Feb 2025 04:38) (107/55 - 130/91)  BP(mean): --  RR: 18 (25 Feb 2025 04:38) (16 - 18)  SpO2: 93% (25 Feb 2025 04:38) (93% - 100%)    Parameters below as of 25 Feb 2025 04:38  Patient On (Oxygen Delivery Method): room air    I&O's Detail    24 Feb 2025 07:01  -  25 Feb 2025 07:00  --------------------------------------------------------  IN:    Oral Fluid: 480 mL  Total IN: 480 mL    OUT:    Other (mL): 1500 mL  Total OUT: 1500 mL    Total NET: -1020 mL      CAPILLARY BLOOD GLUCOSE      POCT Blood Glucose.: 105 mg/dL (25 Feb 2025 07:14)  POCT Blood Glucose.: 97 mg/dL (25 Feb 2025 07:13)      No Known Allergies      PHYSICAL EXAM:  General: NAD  HEENT: PERRL, normal sclera/conjunctiva  Neck: Supple  Lungs: CTA bilaterally  Heart: Irregular rhythm appreciated, normal S1S2, no murmurs/rubs/gallops  Abdomen: Soft, ND/NT  Extremities: LLE > RLE pitting edema. RFV/A site intact with no surrounding erythema or pain  Skin: Warm, well-perfused  Neuro: A&O x3, no focal deficits      LABS:                                        9.6    8.30  )-----------( 206      ( 25 Feb 2025 06:30 )             33.1   02-25    124[L]  |  81[L]  |  31[H]  ----------------------------<  558[HH]  4.0   |  22  |  3.23[H]    Ca    7.6[L]      25 Feb 2025 06:30  Phos  3.7     02-25  Mg     2.0     02-25    TPro  5.7[L]  /  Alb  3.4  /  TBili  0.9  /  DBili  x   /  AST  106[H]  /  ALT  195[H]  /  AlkPhos  99  02-25          RADIOLOGY & ADDITIONAL TESTS:    < from: Cardiac Catheterization (02.24.25 @ 17:06) >  Diagnostic Conclusions:     Three vessel coronary artery disease   Normal left main coronary artery   Right dominant system   Elevated right atrial pressure (mRA 17mmHg with a V wave of 21mmHg)   Mild post-capillary pulmonary hypertension (sPAP 40mmHg, dPAP 22mmHg,  mPAP 30mmHg)  PCWP = 30mmHg with a V wave of 36mmHg   LVEDP = 20mmHg   AO = 112/73/90   PAsat = 43.25% // AOsat = 94.9% (room air)   Felix CO // CI = 3.44l/min // 2.14l/min/m2     Recommendations:     Keep right leg straight for 4 hours following removal of sheaths   Continue aggressive medical management     < end of copied text >      MEDICATIONS  (STANDING):  apixaban 5 milliGRAM(s) Oral every 12 hours  chlorhexidine 2% Cloths 1 Application(s) Topical <User Schedule>  influenza  Vaccine (HIGH DOSE) 0.5 milliLiter(s) IntraMuscular once  metoprolol succinate  milliGRAM(s) Oral <User Schedule>  sevelamer carbonate 800 milliGRAM(s) Oral three times a day with meals  sodium bicarbonate 650 milliGRAM(s) Oral four times a day    MEDICATIONS  (PRN):  acetaminophen     Tablet .. 650 milliGRAM(s) Oral every 6 hours PRN Temp greater or equal to 38C (100.4F), Mild Pain (1 - 3)  melatonin 3 milliGRAM(s) Oral at bedtime PRN Insomnia      HEALTH ISSUES - PROBLEM Dx:  Acute kidney injury superimposed on CKD    Paroxysmal atrial fibrillation    Need for prophylactic measure    Metabolic acidosis    Hypertension    Severe mitral regurgitation    Chronic HFrEF (heart failure with reduced ejection fraction)

## 2025-02-25 NOTE — PROGRESS NOTE ADULT - SUBJECTIVE AND OBJECTIVE BOX
[facility] DIVISION OF KIDNEY DISEASES AND HYPERTENSION -- FOLLOW UP NOTE  --------------------------------------------------------------------------------  Chief Complaint:    24 hour events/subjective:        PAST HISTORY  --------------------------------------------------------------------------------  No significant changes to PMH, PSH, FHx, SHx, unless otherwise noted    ALLERGIES & MEDICATIONS  --------------------------------------------------------------------------------  Allergies    No Known Allergies    Intolerances      Standing Inpatient Medications  apixaban      apixaban 2.5 milliGRAM(s) Oral every 12 hours  buMETAnide Injectable 2 milliGRAM(s) IV Push daily  chlorhexidine 2% Cloths 1 Application(s) Topical <User Schedule>  influenza  Vaccine (HIGH DOSE) 0.5 milliLiter(s) IntraMuscular once  metoprolol succinate  milliGRAM(s) Oral <User Schedule>  sevelamer carbonate 800 milliGRAM(s) Oral three times a day with meals  sodium bicarbonate 650 milliGRAM(s) Oral four times a day    PRN Inpatient Medications  acetaminophen     Tablet .. 650 milliGRAM(s) Oral every 6 hours PRN  melatonin 3 milliGRAM(s) Oral at bedtime PRN      REVIEW OF SYSTEMS  --------------------------------------------------------------------------------  Gen: No weight changes, fatigue, fevers/chills, weakness  Skin: No rashes  Head/Eyes/Ears/Mouth: No headache; Normal hearing; Normal vision w/o blurriness; No sinus pain/discomfort, sore throat  Respiratory: No dyspnea, cough, wheezing, hemoptysis  CV: No chest pain, PND, orthopnea  GI: No abdominal pain, diarrhea, constipation, nausea, vomiting, melena, hematochezia  : No increased frequency, dysuria, hematuria, nocturia  MSK: No joint pain/swelling; no back pain; no edema  Neuro: No dizziness/lightheadedness, weakness, seizures, numbness, tingling  Heme: No easy bruising or bleeding  Endo: No heat/cold intolerance  Psych: No significant nervousness, anxiety, stress, depression    All other systems were reviewed and are negative, except as noted.    VITALS/PHYSICAL EXAM  --------------------------------------------------------------------------------  T(C): 36.7 (02-25-25 @ 04:38), Max: 36.8 (02-24-25 @ 18:46)  HR: 99 (02-25-25 @ 04:38) (79 - 111)  BP: 116/83 (02-25-25 @ 04:38) (107/55 - 129/72)  RR: 18 (02-25-25 @ 04:38) (16 - 18)  SpO2: 93% (02-25-25 @ 04:38) (93% - 100%)  Wt(kg): --  Height (cm): 157.5 (02-24-25 @ 16:46)  Weight (kg): 59.9 (02-24-25 @ 16:46)  BMI (kg/m2): 24.1 (02-24-25 @ 16:46)  BSA (m2): 1.6 (02-24-25 @ 16:46)      02-24-25 @ 07:01  -  02-25-25 @ 07:00  --------------------------------------------------------  IN: 480 mL / OUT: 1500 mL / NET: -1020 mL    02-25-25 @ 07:01  -  02-25-25 @ 11:20  --------------------------------------------------------  IN: 0 mL / OUT: 0 mL / NET: 0 mL      Physical Exam:  	Gen: NAD, well-appearing  	HEENT: PERRL, supple neck, clear oropharynx  	Pulm: CTA B/L  	CV: RRR, S1S2; no rub  	Back: No spinal or CVA tenderness; no sacral edema  	Abd: +BS, soft, nontender/nondistended  	: No suprapubic tenderness  	UE: Warm, FROM, no clubbing, intact strength; no edema; no asterixis  	LE: Warm, FROM, no clubbing, intact strength; no edema  	Neuro: No focal deficits, intact gait  	Psych: Normal affect and mood  	Skin: Warm, without rashes  	Vascular access:    LABS/STUDIES  --------------------------------------------------------------------------------              9.6    8.30  >-----------<  206      [02-25-25 @ 06:30]              33.1     137  |  92  |  36  ----------------------------<  97      [02-25-25 @ 09:04]  4.5   |  25  |  3.76        Ca     8.6     [02-25-25 @ 09:04]      Mg     2.0     [02-25-25 @ 06:30]      Phos  3.7     [02-25-25 @ 06:30]    TPro  6.5  /  Alb  4.0  /  TBili  1.2  /  DBili  x   /  AST  137  /  ALT  241  /  AlkPhos  119  [02-25-25 @ 09:04]    PT/INR: PT 20.8 , INR 1.82       [02-24-25 @ 05:28]  PTT: >200.0      [02-25-25 @ 06:30]      Creatinine Trend:  SCr 3.76 [02-25 @ 09:04]  SCr 3.23 [02-25 @ 06:30]  SCr 5.52 [02-24 @ 05:28]  SCr 4.61 [02-23 @ 06:15]  SCr 3.33 [02-22 @ 06:16]    Urinalysis - [02-25-25 @ 09:04]      Color  / Appearance  / SG  / pH       Gluc 97 / Ketone   / Bili  / Urobili        Blood  / Protein  / Leuk Est  / Nitrite       RBC  / WBC  / Hyaline  / Gran  / Sq Epi  / Non Sq Epi  / Bacteria       Iron 44, TIBC --, %sat --      [02-19-25 @ 04:22]  Ferritin 101      [11-22-24 @ 21:13]  PTH -- (Ca 8.1)      [11-22-24 @ 21:13]   595  TSH 0.65      [04-09-24 @ 00:27]    HBsAb Nonreact      [02-20-25 @ 05:47]  HBsAg Nonreact      [02-20-25 @ 05:47]  HBcAb Nonreact      [02-20-25 @ 05:47]  HCV 0.09, Nonreact      [02-20-25 @ 05:47]

## 2025-02-25 NOTE — PROGRESS NOTE ADULT - ASSESSMENT
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 Started HD due to volume overload refractory to diuresis. S/p LHC/RHC on 2/24. Plan for DCCV on 2/25 followed by amiodarone. 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 Started HD due to volume overload refractory to diuresis. S/p LHC/RHC on 2/24. Plan for DCCV once volume is better controlled, followed by amiodarone.

## 2025-02-25 NOTE — PROGRESS NOTE ADULT - ASSESSMENT
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     1. Persistent Afib  2. NICO on CKD  3. Severe MR    - She was admitted in April 2024 with the same presentation, underwent DCCV and loaded on amio, did well. Readmitted in January 2025 with the same presentation. Plan was DCCV when was she was admitted in January but left AMA.   - Pt is a poor historian, her  preps her medications and states no missed doses on Eliquis  - Structural heart evaluation to assess MR, will f/u recs  - Outpatient Renal note states plan is for creation left radiocephalic fistula, given readmit with NICO on CKD, plan for AVF as outpatient   - Continue telemetry monitoring, currently AF with controlled VHR in the 's.  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     1. Persistent Afib  2. NICO on CKD, now ESRD Per Renal  3. Severe MR    - She was admitted in April 2024 with the same presentation, underwent DCCV and loaded on amio, did well. Readmitted in January 2025 with the same presentation. Plan was DCCV when was she was admitted in January but left AMA.   - Pt is a poor historian, her  preps her medications and states no missed doses on Eliquis  - Structural heart evaluation appreacited,   - Outpatient Renal note states plan is for creation left radiocephalic fistula, given readmit with NICO on CKD, plan for AVF as outpatient   - Continue telemetry monitoring, currently AF with controlled VHR in the 's.  - s/p RHC/LHC 2/24, elevated filling pressures, 75%mRCA  - s/p tunneled catheter 2/19, s/p HD 2/19, 2/20, 2/21, 2/24  - UF 2/25, Next HD 2/26  - Restart Eliquis today 5mg bid  - Keep patient NPO for  CV today    #[phone]    Addendum:    - Plan for  CV today cancelled as patient still volume overloaded and had elevated filling pressures on cath 2/24  - Continue aggressive diuresis  - Rpt TTE when euvolemic and eventual GDMT    #[phone]

## 2025-02-25 NOTE — PROGRESS NOTE ADULT - PROBLEM SELECTOR PLAN 3
TTE in 11/2024 showed progression to moderate to severe MR.  Left ventricular systolic function is normal with an ejection fraction of 55 % by Garcia's method of disks. Appears hypervolemic on exam. Repeat TTE during admission reveals left ventricular systolic function is moderately decreased with an ejection fraction of 41 % by Garcia's method of disks.     Plan:  -Torsemide 60 mg on non dialysis days   -S/p RHC and LHC on 2/24 showing triple vessel disease, plan to continue medical management  -General and structural cardiology following  -Plan to reassess LVEF once in NSR after cardioversion TTE in 11/2024 showed progression to moderate to severe MR.  Left ventricular systolic function is normal with an ejection fraction of 55 % by Garcia's method of disks. Appears hypervolemic on exam. Repeat TTE during admission reveals left ventricular systolic function is moderately decreased with an ejection fraction of 41 % by Garcia's method of disks.     Plan:  -Torsemide 60 mg on non dialysis days  -S/p RHC and LHC on 2/24 showing elevated filling pressures: mRA 17, PCWP 30, CI 2.14, 75% mRCA, otherwise no obstruction; plan to continue medical management  -Patient still volume overloaded, started on Bumex 2g IV daily and plan for UF daily until euvolemic  -General and structural cardiology following  -Plan to reassess LVEF once in NSR after cardioversion

## 2025-02-25 NOTE — PROGRESS NOTE ADULT - ATTENDING COMMENTS
74F w/ severe MR, AFib on Eliquis, HFpEF, CKD and recent admission 1/7-9 for AF w/ RVR and decompensated HF who presented for planned DCCV, found to have NICO on CKD and admitted to medicine for further work up. Concern for CKD progression (baseline SCr 5-6) vs NICO on CKD. Worsening LE edema despite torsemide 100 mg bid. Nephrology consulted, s/p permacath 2/19 and started on HD. Now on PO diuretics on non HD days. TTE with EF 41%, WMA, severe MR, progressive from 11/2024 echo. Cardiology, EP, and structural cardiology following. Will need eventual TTE/angiograms/DCCV after adequate fluid removal.     - s/p L/RHC 2/24 with elevated R filling pressures consistent with fluid overload, and 3 vessel nonobstructive disease (75% RCA dz nonstented)  - given ongoing overload, decision made to defer DCCV until more volume optimized  - continue HD/UF for volume optimization, will get UF today  - F/u EP re timing of DCCV  - plan for repeat TTE post volume optimization/DCCV to re-eval MR, structural consulted  - hospital delirium- family notes pt more confused, wandering the halls and appears to be hallucinating- suspect toxic metabolic encephalopathy from renal failure/cardiac issues, likely component of sedatives given for catheterization and hospital delirium all superimposed on suspected baseline cognitive decline. Offered 1:1 however RN feels pt is readily redirectable and with family at bedside most of the day she is not a true elopement risk, will defer ordering, continue redirection/reorientation with family and zyprexa prn. Would try to avoid anxiolytics. Counseled family that pt will do better once back in a familiar home environment and delirium may take days to weeks to improve.    DCP home pending outpatient HD set up, cards/EP/structural recs.  plans d/w and agreed on by pt,  and pt's twin sister at bedside  all questions answered to satisfaction

## 2025-02-25 NOTE — PROGRESS NOTE ADULT - PROBLEM SELECTOR PLAN 1
Hx/o CKD iso b/l staghorn calculus s/p removal (2009) (follows with Dr. Elizondo). Found to have worsening renal function on admission so procedure cancelled. Baseline 4.6 in Aug 2024 and 5.5 in 1/25. Cr on admission 2/12 was 8.42.     Plan:  - Strict I/Os, daily weights  - Trend BMPs, monitor renal function, renally dose meds, avoid nephrotoxins   - Nephro consulted, new dialysis started 2/19, 2/20 and 2/21, 2/24  - Per nephrology, torsemide 60 mg PO daily on non dialysis days - confirm with patient when dialysis days and will need to be ordered user schedule Hx/o CKD iso b/l staghorn calculus s/p removal (2009) (follows with Dr. Elizondo). Found to have worsening renal function on admission so procedure cancelled. Baseline 4.6 in Aug 2024 and 5.5 in 1/25. Cr on admission 2/12 was 8.42.     Plan:  - Strict I/Os, daily weights  - Trend BMPs, monitor renal function, renally dose meds, avoid nephrotoxins   - Nephro consulted, new dialysis started - HF on 2/19, 2/20, 2/21, 2/24, 2/25

## 2025-02-25 NOTE — PROGRESS NOTE ADULT - PROBLEM SELECTOR PLAN 7
Patient gets anxious and sometimes tries to leave. Has AMA'd in the past but does not have capacity to leave.    Plan:  -Zyprexa prn  -Obtain repeat EKG, if QTc elevated switch to ativan 0.5 mg prn instead

## 2025-02-25 NOTE — PROGRESS NOTE ADULT - ASSESSMENT
74-year-old female with PMHx of HTN, CKD stage 5 (pt. of Dr. Elizondo), Afib on Eliquis, anemia, nephrolithiasis, and recently diagnosed HF who presents for scheduled Afib ablation for Afib found to have NICO.    Nephrology consulted for NICO on CKD V.    Acute kidney injury superimposed on CKD-5 iso possibly over-diuresis (increased from lasix 80mg BID to torsemide 100mg BID 1/31/24)  -Pt. with hx of advanced CKD is in the setting of chronic history of nephrolithiasis/staghorn calculus. On review of labs on [place], last Scr was elevated at 6.18 on 1/30/25. Admission SCr is elevated at 8.42 without electrolyte abnormalities. Prior renal US 1/8/25 without hydronephrosis but calcifications, stones and cysts bilaterally.   USG - Kidney and bladder showed Bilateral renal calculi, no Hydronephrosis, B/L renal parenchymal disease. UPCR 4.9    RECS:  Now ESRD, on HD started last week  Has a tunnelled HD cath  Next HD tomorrow but UF today    Anemia iso CKD  On venofer 200mg IV with HD X 5 doses  Once complete, can start PERLITA    Cards:  ·TTE in 11/2024 showed progression to moderate to severe MR. TTE now with LVEF of 41%, global hypokinesis, severe MR, RAP of 15.   -Structural Heart evaluation noted. Once well HD and good UF, repeat TTE and re-assess  -Plan for LHC and RHC today post HD  -EP eval for afib ablation please as AVF cannot be done without that  -daily UF to help re volume mgt, can also give bumex 2mg IV for good UO (S/p LHC/RHC on 2/24 with elevated filling pressures: mRA 17, PCWP 30, CI 2.14)  -Can start ARB/SGLT2i/MRA if needed for cardiac disease as she is ESRD now but still makes urine  -plan for ablation today by EP    Dc planning to [facility] HD unit- preferred unit by patient under Dr Elizondo once medically ready  AVF placement as outpatient or after ablation     Shane Mauricio MD  Office   Contact me directly via Microsoft Teams     (After 5 pm or on weekends please page the on-call fellow/attending, can check [email or web address] for schedule. Login is dominick pierre, schedule under [facility] medicine, psych, derm)

## 2025-02-25 NOTE — PROGRESS NOTE ADULT - PROBLEM SELECTOR PLAN 4
C/w home sodium bicarb 650 mg 4x a day Mild transaminitis with , .  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

## 2025-02-25 NOTE — PROGRESS NOTE ADULT - SUBJECTIVE AND OBJECTIVE BOX
[facility] - Heidy Bates, Alverto Green Savella, Cohen  Office Number:  [phone]    Patient resting comfortably in bed in NAD.   Denies any symptoms  S/p RHC/LHC 2/24 with elevated filling pressures    ROS: negative unless otherwise mentioned.    Telemetry:  AF    MEDICATIONS  (STANDING):  apixaban 5 milliGRAM(s) Oral every 12 hours  chlorhexidine 2% Cloths 1 Application(s) Topical <User Schedule>  influenza  Vaccine (HIGH DOSE) 0.5 milliLiter(s) IntraMuscular once  metoprolol succinate  milliGRAM(s) Oral <User Schedule>  sevelamer carbonate 800 milliGRAM(s) Oral three times a day with meals  sodium bicarbonate 650 milliGRAM(s) Oral four times a day    MEDICATIONS  (PRN):  acetaminophen     Tablet .. 650 milliGRAM(s) Oral every 6 hours PRN Temp greater or equal to 38C (100.4F), Mild Pain (1 - 3)  melatonin 3 milliGRAM(s) Oral at bedtime PRN Insomnia      Allergies    No Known Allergies    Intolerances        Vital Signs Last 24 Hrs  T(C): 36.7 (25 Feb 2025 04:38), Max: 36.8 (24 Feb 2025 18:46)  T(F): 98.1 (25 Feb 2025 04:38), Max: 98.2 (24 Feb 2025 18:46)  HR: 99 (25 Feb 2025 04:38) (70 - 111)  BP: 116/83 (25 Feb 2025 04:38) (107/55 - 130/91)  BP(mean): --  RR: 18 (25 Feb 2025 04:38) (16 - 18)  SpO2: 93% (25 Feb 2025 04:38) (93% - 100%)    Parameters below as of 25 Feb 2025 04:38  Patient On (Oxygen Delivery Method): room air        I&O's Summary    24 Feb 2025 07:01  -  25 Feb 2025 07:00  --------------------------------------------------------  IN: 480 mL / OUT: 1500 mL / NET: -1020 mL        ON EXAM:    Constitutional: well-nourished, well-developed, NAD   HEENT:  MMM, sclerae anicteric, conjunctivae clear, no oral cyanosis.  Pulmonary: Non-labored, breath sounds are clear bilaterally, No wheezing, rales or rhonchi  Cardiovascular: irreg, S1 and S2.  2/6 sys murmur.  No rubs, gallops or clicks  Gastrointestinal: Bowel Sounds present, soft, nontender.   Lymph: mild peripheral edema.   Neurological: Alert, no focal deficits  Skin: No rashes.    LABS: All Labs Reviewed:                        9.6    8.30  )-----------( 206      ( 25 Feb 2025 06:30 )             33.1                         9.2    7.63  )-----------( 152      ( 24 Feb 2025 05:28 )             30.5                         9.1    7.02  )-----------( 145      ( 23 Feb 2025 06:14 )             29.8     25 Feb 2025 06:30    124    |  81     |  31     ----------------------------<  558    4.0     |  22     |  3.23   24 Feb 2025 05:28    138    |  95     |  61     ----------------------------<  102    4.3     |  25     |  5.52   23 Feb 2025 06:15    140    |  97     |  50     ----------------------------<  128    4.2     |  25     |  4.61     Ca    7.6        25 Feb 2025 06:30  Ca    8.5        24 Feb 2025 05:28  Ca    8.6        23 Feb 2025 06:15  Phos  3.7       25 Feb 2025 06:30  Phos  4.3       24 Feb 2025 05:28  Phos  4.7       23 Feb 2025 06:15  Mg     2.0       25 Feb 2025 06:30  Mg     2.4       24 Feb 2025 05:28  Mg     2.4       23 Feb 2025 06:15    TPro  5.7    /  Alb  3.4    /  TBili  0.9    /  DBili  x      /  AST  106    /  ALT  195    /  AlkPhos  99     25 Feb 2025 06:30  TPro  6.0    /  Alb  3.5    /  TBili  0.7    /  DBili  x      /  AST  93     /  ALT  150    /  AlkPhos  97     24 Feb 2025 05:28    PT/INR - ( 24 Feb 2025 05:28 )   PT: 20.8 sec;   INR: 1.82 ratio         PTT - ( 25 Feb 2025 06:30 )  PTT:>200.0 sec      Blood Culture:

## 2025-02-25 NOTE — PROGRESS NOTE ADULT - SUBJECTIVE AND OBJECTIVE BOX
Subjective  No acute events reported overnight.  The patient is A&O x 2 (person/time).  The patient does not having any cardiopulmonary complaints at rest or upon minimal exertion.  She is currently sitting in a chair.  Her  is at her bedside.  Denies any lightheaded sensation, dizzy or palpitation.  No pain at groin site.  Currently on a heparin drip    Physical examination  No apparent distress, alert and oriented x 3, appropriate affect  JVD is not elevated, supple, no lymphadenopathy  Clear to auscultation bilaterally  Irregular irregular with 2 out of 6 holosystolic murmur at apex and left lower sternal border  Positive bowel sound, soft, nontender, nondistended, no mass and guarding or rebound ¢ no clubbing cyanosis   1+ lower extremity/pedal edema bilaterally.  Moving all extremities spontaneously  Right groin site ecchymosis with no hematoma/bruit  2+ DP/PT pulses    Assessment/plan  74-year-old female with past medical history significant for    Cardiomyopathy  Paroxysmal atrial fibrillation status post DCCV on 4/2004   Acute on chronic renal insufficiency  Acute on chronic decompensated/systolic heart failure  Mitral regurgitation, functional  Tricuspid regurgitation  , Function    The patient presented with atrial fibrillation with RVR and acute on chronic kidney disease in the setting of acute on chronic diastolic/systolic heart failure.    --Patient found to have severe stenosis of right coronary artery.  Discussion was had with cardiology team.  The patient is not and prior to hospitalization having anginal symptoms.  It is not clear if the patient's coronary artery disease is contributing to the patient's clinical state.  Due to the patient's frailty, comorbidities and and need for anticoagulation therapy at this time plan for medical management of her obstructive coronary artery disease.  --Patient status post cardiac catheterization on 2/24/2025.  The patient is volume overloaded.  Recommend patient continue to be aggressively dialyzed.  Once the patient is felt to be euvolemic it is recommended that she undergo  cardioversion as per electrophysiology team.  -- Uptitrate goal-directed medical therapy.  Once the patient is euvolemic and on maximal tolerated goal-directed medical therapy it is recommended that a TTE be performed to assess the degree of mitral and tricuspid valve regurgitation.  -- It was explained to the patient and her  that if she has significant mitral and tricuspid valve regurgitation following medical optimization/cardioversion she will be reassessed for possible percutaneous catheter-based interventions.  Indications and details of these percutaneous catheter-based interventions were gone over  -- Continue Eliquis.  Closely monitor for signs and symptoms of bleeding.  -- Continue telemetry monitoring.    All questions and concerns of the patient and her  who was at her bedside were reviewed    Attestation Statements:   35 minutes spent on total encounter. The necessity of the time spent during the encounter on this date of service was due to: education, assessment and coordination of care.

## 2025-02-26 LAB
ALBUMIN SERPL ELPH-MCNC: 3.9 G/DL — SIGNIFICANT CHANGE UP (ref 3.3–5)
ALP SERPL-CCNC: 122 U/L — HIGH (ref 40–120)
ALT FLD-CCNC: 204 U/L — HIGH (ref 10–45)
ANION GAP SERPL CALC-SCNC: 22 MMOL/L — HIGH (ref 5–17)
APPEARANCE UR: ABNORMAL
AST SERPL-CCNC: 90 U/L — HIGH (ref 10–40)
BACTERIA # UR AUTO: ABNORMAL /HPF
BILIRUB SERPL-MCNC: 0.8 MG/DL — SIGNIFICANT CHANGE UP (ref 0.2–1.2)
BILIRUB UR-MCNC: NEGATIVE — SIGNIFICANT CHANGE UP
BUN SERPL-MCNC: 49 MG/DL — HIGH (ref 7–23)
CALCIUM SERPL-MCNC: 8.9 MG/DL — SIGNIFICANT CHANGE UP (ref 8.4–10.5)
CAST: 3 /LPF — SIGNIFICANT CHANGE UP (ref 0–4)
CHLORIDE SERPL-SCNC: 93 MMOL/L — LOW (ref 96–108)
CO2 SERPL-SCNC: 24 MMOL/L — SIGNIFICANT CHANGE UP (ref 22–31)
COLOR SPEC: YELLOW — SIGNIFICANT CHANGE UP
CREAT SERPL-MCNC: 4.78 MG/DL — HIGH (ref 0.5–1.3)
DIFF PNL FLD: ABNORMAL
EGFR: 9 ML/MIN/1.73M2 — LOW
EGFR: 9 ML/MIN/1.73M2 — LOW
GLUCOSE SERPL-MCNC: 86 MG/DL — SIGNIFICANT CHANGE UP (ref 70–99)
GLUCOSE UR QL: NEGATIVE MG/DL — SIGNIFICANT CHANGE UP
HCT VFR BLD CALC: 33.3 % — LOW (ref 34.5–45)
HGB BLD-MCNC: 10.2 G/DL — LOW (ref 11.5–15.5)
KETONES UR-MCNC: NEGATIVE MG/DL — SIGNIFICANT CHANGE UP
LEUKOCYTE ESTERASE UR-ACNC: ABNORMAL
MAGNESIUM SERPL-MCNC: 2.4 MG/DL — SIGNIFICANT CHANGE UP (ref 1.6–2.6)
MCHC RBC-ENTMCNC: 30.6 G/DL — LOW (ref 32–36)
MCHC RBC-ENTMCNC: 31.1 PG — SIGNIFICANT CHANGE UP (ref 27–34)
MCV RBC AUTO: 101.5 FL — HIGH (ref 80–100)
NITRITE UR-MCNC: NEGATIVE — SIGNIFICANT CHANGE UP
NRBC BLD AUTO-RTO: 2 /100 WBCS — HIGH (ref 0–0)
PH UR: 7.5 — SIGNIFICANT CHANGE UP (ref 5–8)
PHOSPHATE SERPL-MCNC: 4.9 MG/DL — HIGH (ref 2.5–4.5)
PLATELET # BLD AUTO: 209 K/UL — SIGNIFICANT CHANGE UP (ref 150–400)
POTASSIUM SERPL-MCNC: 4.6 MMOL/L — SIGNIFICANT CHANGE UP (ref 3.5–5.3)
POTASSIUM SERPL-SCNC: 4.6 MMOL/L — SIGNIFICANT CHANGE UP (ref 3.5–5.3)
PROT SERPL-MCNC: 6.5 G/DL — SIGNIFICANT CHANGE UP (ref 6–8.3)
PROT UR-MCNC: 100 MG/DL
RBC # BLD: 3.28 M/UL — LOW (ref 3.8–5.2)
RBC # FLD: 17.8 % — HIGH (ref 10.3–14.5)
RBC CASTS # UR COMP ASSIST: 1 /HPF — SIGNIFICANT CHANGE UP (ref 0–4)
REVIEW: SIGNIFICANT CHANGE UP
SODIUM SERPL-SCNC: 139 MMOL/L — SIGNIFICANT CHANGE UP (ref 135–145)
SP GR SPEC: <1.005 — LOW (ref 1–1.03)
SQUAMOUS # UR AUTO: 2 /HPF — SIGNIFICANT CHANGE UP (ref 0–5)
UROBILINOGEN FLD QL: 0.2 MG/DL — SIGNIFICANT CHANGE UP (ref 0.2–1)
WBC # BLD: 7.61 K/UL — SIGNIFICANT CHANGE UP (ref 3.8–10.5)
WBC # FLD AUTO: 7.61 K/UL — SIGNIFICANT CHANGE UP (ref 3.8–10.5)
WBC UR QL: 341 /HPF — HIGH (ref 0–5)

## 2025-02-26 PROCEDURE — 99232 SBSQ HOSP IP/OBS MODERATE 35: CPT | Mod: GC

## 2025-02-26 PROCEDURE — 99233 SBSQ HOSP IP/OBS HIGH 50: CPT | Mod: GC

## 2025-02-26 PROCEDURE — 99233 SBSQ HOSP IP/OBS HIGH 50: CPT

## 2025-02-26 RX ORDER — B1/B2/B3/B5/B6/B12/VIT C/FOLIC 500-0.5 MG
1 TABLET ORAL DAILY
Refills: 0 | Status: DISCONTINUED | OUTPATIENT
Start: 2025-02-26 | End: 2025-03-06

## 2025-02-26 RX ORDER — OLANZAPINE 10 MG/1
2.5 TABLET ORAL EVERY 12 HOURS
Refills: 0 | Status: DISCONTINUED | OUTPATIENT
Start: 2025-02-26 | End: 2025-03-04

## 2025-02-26 RX ORDER — OLANZAPINE 10 MG/1
2.5 TABLET ORAL ONCE
Refills: 0 | Status: COMPLETED | OUTPATIENT
Start: 2025-02-26 | End: 2025-02-26

## 2025-02-26 RX ADMIN — METOPROLOL SUCCINATE 150 MILLIGRAM(S): 50 TABLET, EXTENDED RELEASE ORAL at 21:17

## 2025-02-26 RX ADMIN — APIXABAN 2.5 MILLIGRAM(S): 2.5 TABLET, FILM COATED ORAL at 10:20

## 2025-02-26 RX ADMIN — SEVELAMER HYDROCHLORIDE 800 MILLIGRAM(S): 800 TABLET ORAL at 11:49

## 2025-02-26 RX ADMIN — Medication 650 MILLIGRAM(S): at 11:28

## 2025-02-26 RX ADMIN — APIXABAN 2.5 MILLIGRAM(S): 2.5 TABLET, FILM COATED ORAL at 21:17

## 2025-02-26 RX ADMIN — Medication 650 MILLIGRAM(S): at 05:48

## 2025-02-26 RX ADMIN — SEVELAMER HYDROCHLORIDE 800 MILLIGRAM(S): 800 TABLET ORAL at 10:20

## 2025-02-26 RX ADMIN — Medication 650 MILLIGRAM(S): at 00:41

## 2025-02-26 RX ADMIN — Medication 650 MILLIGRAM(S): at 11:49

## 2025-02-26 RX ADMIN — Medication 1 APPLICATION(S): at 09:54

## 2025-02-26 RX ADMIN — BUMETANIDE 2 MILLIGRAM(S): 1 TABLET ORAL at 06:14

## 2025-02-26 RX ADMIN — Medication 650 MILLIGRAM(S): at 20:18

## 2025-02-26 RX ADMIN — Medication 650 MILLIGRAM(S): at 11:50

## 2025-02-26 RX ADMIN — OLANZAPINE 2.5 MILLIGRAM(S): 10 TABLET ORAL at 21:55

## 2025-02-26 RX ADMIN — OLANZAPINE 2.5 MILLIGRAM(S): 10 TABLET ORAL at 10:20

## 2025-02-26 RX ADMIN — METOPROLOL SUCCINATE 150 MILLIGRAM(S): 50 TABLET, EXTENDED RELEASE ORAL at 05:48

## 2025-02-26 NOTE — PROGRESS NOTE ADULT - ASSESSMENT
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     1. Persistent Afib  2. NICO on CKD, now ESRD Per Renal  3. Severe MR    - She was admitted in April 2024 with the same presentation, underwent DCCV and loaded on amio, did well. Readmitted in January 2025 with the same presentation. Plan was DCCV when was she was admitted in January but left AMA.   - Pt is a poor historian, her  preps her medications   - Structural heart evaluation appreciated, repeat TTE when euvolemic to reassess MR  - Renal note states plan is for creation left radiocephalic fistula, given readmit with NICO on CKD, plan for AVF as outpatient   - Continue telemetry monitoring, currently AF with controlled VHR in the 's.  - s/p RHC/LHC 2/24, elevated filling pressures, 75%mRCA  - s/p tunneled catheter 2/19, s/p HD 2/19, 2/20, 2/21, 2/24  - UF 2/25, Next HD 2/26  - continue Eliquis at 2.5mg bid (Age >60, Cr 3.76_  - Cath 2/24 with elevated filling pressures  - Will schedule  CV once euvolemic    - Continue aggressive diuresis, on IVP Bumex qd  - eventual GDMT    #May reach me via teams

## 2025-02-26 NOTE — PROGRESS NOTE ADULT - PROBLEM SELECTOR PLAN 3
TTE in 11/2024 showed progression to moderate to severe MR.  Left ventricular systolic function is normal with an ejection fraction of 55 % by Garcia's method of disks. Appears hypervolemic on exam. Repeat TTE during admission reveals left ventricular systolic function is moderately decreased with an ejection fraction of 41 % by Garcia's method of disks.     Plan:  -Torsemide 60 mg on non dialysis days  -S/p RHC and LHC on 2/24 showing elevated filling pressures: mRA 17, PCWP 30, CI 2.14, 75% mRCA, otherwise no obstruction; plan to continue medical management  -Patient still volume overloaded, started on Bumex 2g IV daily and plan for UF daily until euvolemic  -General and structural cardiology following  -Plan to reassess LVEF once in NSR after cardioversion TTE in 11/2024 showed progression to moderate to severe MR.  Left ventricular systolic function is normal with an ejection fraction of 55 % by Garcia's method of disks. Appears hypervolemic on exam. Repeat TTE during admission reveals left ventricular systolic function is moderately decreased with an ejection fraction of 41 % by Garcia's method of disks.     Plan:  -Torsemide 60 mg on non dialysis days  -S/p RHC and LHC on 2/24 showing elevated filling pressures: mRA 17, PCWP 30, CI 2.14, 75% mRCA, otherwise no obstruction; plan to continue medical management  -Patient still volume overloaded, started on Bumex 2g IV daily and plan for HD/UF daily until euvolemic  -General and structural cardiology following  -Plan to reassess LVEF once in NSR after cardioversion

## 2025-02-26 NOTE — PROGRESS NOTE ADULT - PROBLEM SELECTOR PLAN 7
Patient gets anxious and sometimes tries to leave. Has AMA'd in the past but does not have capacity to leave.    Plan:  -Zyprexa prn (repeat EKG with QTc 385) Patient gets anxious and sometimes tries to leave. Has AMA'd in the past but does not have capacity to leave.  -Now patient experiencing visual hallucinations (big bugs all over the floor), likely hospital-aquired delirium     Plan:  -Zyprexa 2.5mg prn (repeat EKG 2/25 with QTc 385)  -Delirium precautions  -Melatonin at bedtime  -If worsening, can consider psych consult

## 2025-02-26 NOTE — PROGRESS NOTE ADULT - PROBLEM SELECTOR PLAN 4
Mild transaminitis with , .  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

## 2025-02-26 NOTE — PROGRESS NOTE ADULT - ASSESSMENT
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 Started HD due to volume overload refractory to diuresis. S/p LHC/RHC on 2/24. Plan for DCCV once volume is better controlled, followed by amiodarone.

## 2025-02-26 NOTE — PROGRESS NOTE ADULT - PROBLEM SELECTOR PLAN 2
Admitted for cardioversion but unable to do because NICO on CKD.    Plan:  -Pending cardioversion; however, delayed because pt still volume overloaded (LHC/RHC on 2/24 with elevated filling pressures: mRA 17, PCWP 30, CI 2.14) - plan for  and cardioversion once volume status better optimized  -Eliquis 2.5 mg BID - renally dosed  -Increased toprol to 150 bid for rate control  -EP recommend d/c amiodarone given plan for tunnel cath and worsening kidney function

## 2025-02-26 NOTE — PROGRESS NOTE ADULT - SUBJECTIVE AND OBJECTIVE BOX
Patient is a 74y old  Female who presents with a chief complaint of ESRD AF (23 Feb 2025 08:39)      INTERVAL HPI/OVERNIGHT EVENTS: No acute events overnight. S/p HD with removal of 1.5L.    Patient examined at bedside this morning. Denies any symptoms, including heart palpations or lightheadedness. Reports normal appetite and BMs. Per nurse, patient frequently anxious and roaming halls toward elevators.    Vital Signs Last 24 Hrs  T(C): 36.3 (26 Feb 2025 04:43), Max: 36.6 (25 Feb 2025 21:28)  T(F): 97.4 (26 Feb 2025 04:43), Max: 97.8 (25 Feb 2025 21:28)  HR: 87 (26 Feb 2025 04:43) (70 - 102)  BP: 106/74 (26 Feb 2025 04:43) (106/74 - 134/78)  BP(mean): --  RR: 18 (26 Feb 2025 04:43) (17 - 18)  SpO2: 94% (26 Feb 2025 04:43) (94% - 100%)    Parameters below as of 26 Feb 2025 04:43  Patient On (Oxygen Delivery Method): room air    I&O's Detail    25 Feb 2025 07:01  -  26 Feb 2025 07:00  --------------------------------------------------------  IN:    Oral Fluid: 240 mL  Total IN: 240 mL    OUT:    Other (mL): 1500 mL  Total OUT: 1500 mL    Total NET: -1260 mL      CAPILLARY BLOOD GLUCOSE          No Known Allergies      PHYSICAL EXAM:  General: NAD  HEENT: PERRL, normal sclera/conjunctiva  Neck: Supple  Lungs: CTA bilaterally  Heart: Irregular rhythm appreciated, normal S1S2, no murmurs/rubs/gallops  Abdomen: Soft, ND/NT  Extremities: LLE > RLE pitting edema. RFV/A site intact with no surrounding erythema or pain  Skin: Warm, well-perfused  Neuro: A&O x3, no focal deficits      LABS:                                        9.6    8.30  )-----------( 206      ( 25 Feb 2025 06:30 )             33.1   02-25    124[L]  |  81[L]  |  31[H]  ----------------------------<  558[HH]  4.0   |  22  |  3.23[H]    Ca    7.6[L]      25 Feb 2025 06:30  Phos  3.7     02-25  Mg     2.0     02-25    TPro  5.7[L]  /  Alb  3.4  /  TBili  0.9  /  DBili  x   /  AST  106[H]  /  ALT  195[H]  /  AlkPhos  99  02-25          RADIOLOGY & ADDITIONAL TESTS:    < from: Cardiac Catheterization (02.24.25 @ 17:06) >  Diagnostic Conclusions:     Three vessel coronary artery disease   Normal left main coronary artery   Right dominant system   Elevated right atrial pressure (mRA 17mmHg with a V wave of 21mmHg)   Mild post-capillary pulmonary hypertension (sPAP 40mmHg, dPAP 22mmHg,  mPAP 30mmHg)  PCWP = 30mmHg with a V wave of 36mmHg   LVEDP = 20mmHg   AO = 112/73/90   PAsat = 43.25% // AOsat = 94.9% (room air)   Felix CO // CI = 3.44l/min // 2.14l/min/m2     Recommendations:     Keep right leg straight for 4 hours following removal of sheaths   Continue aggressive medical management     < end of copied text >      MEDICATIONS  (STANDING):  apixaban 5 milliGRAM(s) Oral every 12 hours  chlorhexidine 2% Cloths 1 Application(s) Topical <User Schedule>  influenza  Vaccine (HIGH DOSE) 0.5 milliLiter(s) IntraMuscular once  metoprolol succinate  milliGRAM(s) Oral <User Schedule>  sevelamer carbonate 800 milliGRAM(s) Oral three times a day with meals  sodium bicarbonate 650 milliGRAM(s) Oral four times a day    MEDICATIONS  (PRN):  acetaminophen     Tablet .. 650 milliGRAM(s) Oral every 6 hours PRN Temp greater or equal to 38C (100.4F), Mild Pain (1 - 3)  melatonin 3 milliGRAM(s) Oral at bedtime PRN Insomnia      HEALTH ISSUES - PROBLEM Dx:  Acute kidney injury superimposed on CKD    Paroxysmal atrial fibrillation    Need for prophylactic measure    Metabolic acidosis    Hypertension    Severe mitral regurgitation    Chronic HFrEF (heart failure with reduced ejection fraction)             Patient is a 74y old  Female who presents with a chief complaint of ESRD AF (23 Feb 2025 08:39)      INTERVAL HPI/OVERNIGHT EVENTS: No acute events overnight. S/p HD with removal of 1.5L.    Patient examined at bedside this morning. Actively hallucinating, seeing bugs on the floor. Denies any other symptoms, including heart palpations or lightheadedness. Reports normal appetite and BMs. Per nurse, patient frequently anxious and roaming halls toward elevators.     Vital Signs Last 24 Hrs  T(C): 36.3 (26 Feb 2025 04:43), Max: 36.6 (25 Feb 2025 21:28)  T(F): 97.4 (26 Feb 2025 04:43), Max: 97.8 (25 Feb 2025 21:28)  HR: 87 (26 Feb 2025 04:43) (70 - 102)  BP: 106/74 (26 Feb 2025 04:43) (106/74 - 134/78)  BP(mean): --  RR: 18 (26 Feb 2025 04:43) (17 - 18)  SpO2: 94% (26 Feb 2025 04:43) (94% - 100%)    Parameters below as of 26 Feb 2025 04:43  Patient On (Oxygen Delivery Method): room air    I&O's Detail    25 Feb 2025 07:01  -  26 Feb 2025 07:00  --------------------------------------------------------  IN:    Oral Fluid: 240 mL  Total IN: 240 mL    OUT:    Other (mL): 1500 mL  Total OUT: 1500 mL    Total NET: -1260 mL      CAPILLARY BLOOD GLUCOSE          No Known Allergies      PHYSICAL EXAM:  General: NAD  HEENT: PERRL, normal sclera/conjunctiva  Neck: Supple  Lungs: CTA bilaterally  Heart: Irregular rhythm appreciated, normal S1S2, no murmurs/rubs/gallops  Abdomen: Soft, ND/NT  Extremities: b/l LE pitting edema. RFV/A site intact with no surrounding erythema or pain  Skin: Warm, well-perfused  Neuro: A&O x3, no focal deficits, experiencing visual hallucinations      LABS:                                     10.2   7.61  )-----------( 209      ( 26 Feb 2025 07:22 )             33.3   02-26    139  |  93[L]  |  49[H]  ----------------------------<  86  4.6   |  24  |  4.78[H]    Ca    8.9      26 Feb 2025 07:22  Phos  4.9     02-26  Mg     2.4     02-26    TPro  6.5  /  Alb  3.9  /  TBili  0.8  /  DBili  x   /  AST  90[H]  /  ALT  204[H]  /  AlkPhos  122[H]  02-26          RADIOLOGY & ADDITIONAL TESTS:    < from: Cardiac Catheterization (02.24.25 @ 17:06) >  Diagnostic Conclusions:     Three vessel coronary artery disease   Normal left main coronary artery   Right dominant system   Elevated right atrial pressure (mRA 17mmHg with a V wave of 21mmHg)   Mild post-capillary pulmonary hypertension (sPAP 40mmHg, dPAP 22mmHg,  mPAP 30mmHg)  PCWP = 30mmHg with a V wave of 36mmHg   LVEDP = 20mmHg   AO = 112/73/90   PAsat = 43.25% // AOsat = 94.9% (room air)   Felix CO // CI = 3.44l/min // 2.14l/min/m2     Recommendations:     Keep right leg straight for 4 hours following removal of sheaths   Continue aggressive medical management     < end of copied text >      MEDICATIONS  (STANDING):  apixaban 5 milliGRAM(s) Oral every 12 hours  chlorhexidine 2% Cloths 1 Application(s) Topical <User Schedule>  influenza  Vaccine (HIGH DOSE) 0.5 milliLiter(s) IntraMuscular once  metoprolol succinate  milliGRAM(s) Oral <User Schedule>  sevelamer carbonate 800 milliGRAM(s) Oral three times a day with meals  sodium bicarbonate 650 milliGRAM(s) Oral four times a day    MEDICATIONS  (PRN):  acetaminophen     Tablet .. 650 milliGRAM(s) Oral every 6 hours PRN Temp greater or equal to 38C (100.4F), Mild Pain (1 - 3)  melatonin 3 milliGRAM(s) Oral at bedtime PRN Insomnia      HEALTH ISSUES - PROBLEM Dx:  Acute kidney injury superimposed on CKD    Paroxysmal atrial fibrillation    Need for prophylactic measure    Metabolic acidosis    Hypertension    Severe mitral regurgitation    Chronic HFrEF (heart failure with reduced ejection fraction)

## 2025-02-26 NOTE — PROGRESS NOTE ADULT - SUBJECTIVE AND OBJECTIVE BOX
[facility] Division of Kidney Diseases & Hypertension  FOLLOW UP NOTE  [phone]--------------------------------------------------------------------------------  Chief Complaint:Atypical atrial flutter    24 hour events/subjective: Pt was seen and examined earlier today. No acute overnight events. No new complaints. Pt reports feeling well. Pt's  mentioned that she has hallucinations.   Pt denies SOB/ Constipation/ Diarrhea/ Nausea/ Vomiting/ abdominal pain/ chest pain/ tingling/ numbness.         PAST HISTORY  --------------------------------------------------------------------------------  No significant changes to PMH, PSH, FHx, SHx, unless otherwise noted    ALLERGIES & MEDICATIONS  --------------------------------------------------------------------------------  Allergies    No Known Allergies    Intolerances      Standing Inpatient Medications  apixaban 2.5 milliGRAM(s) Oral every 12 hours  buMETAnide Injectable 2 milliGRAM(s) IV Push daily  chlorhexidine 2% Cloths 1 Application(s) Topical <User Schedule>  influenza  Vaccine (HIGH DOSE) 0.5 milliLiter(s) IntraMuscular once  metoprolol succinate  milliGRAM(s) Oral <User Schedule>  sevelamer carbonate 800 milliGRAM(s) Oral three times a day with meals  sodium bicarbonate 650 milliGRAM(s) Oral four times a day    PRN Inpatient Medications  acetaminophen     Tablet .. 650 milliGRAM(s) Oral every 6 hours PRN  melatonin 3 milliGRAM(s) Oral at bedtime PRN  OLANZapine 2.5 milliGRAM(s) Oral every 12 hours PRN      REVIEW OF SYSTEMS  --------------------------------------------------------------------------------  as noted above.     VITALS/PHYSICAL EXAM  --------------------------------------------------------------------------------  T(C): 36.4 (02-26-25 @ 12:22), Max: 36.6 (02-25-25 @ 21:28)  HR: 108 (02-26-25 @ 12:22) (70 - 108)  BP: 104/67 (02-26-25 @ 12:22) (104/67 - 134/78)  RR: 18 (02-26-25 @ 12:22) (17 - 18)  SpO2: 98% (02-26-25 @ 12:22) (94% - 100%)  Wt(kg): --  Height (cm): 157.5 (02-24-25 @ 16:46)  Weight (kg): 59.9 (02-24-25 @ 16:46)  BMI (kg/m2): 24.1 (02-24-25 @ 16:46)  BSA (m2): 1.6 (02-24-25 @ 16:46)      02-25-25 @ 07:01  -  02-26-25 @ 07:00  --------------------------------------------------------  IN: 240 mL / OUT: 1500 mL / NET: -1260 mL      Physical Exam:  Gen: NAD  HEENT: Anicteric  Pulm: CTA B/L  CV: S1S2+  Abd: Soft, +BS   Ext: No LE edema B/L  Neuro: Awake, alert and oriented.   Skin: Warm and dry   Access: RIJ tunnelled HD cath.   Psych - Pt has auditory hallucinations.     LABS/STUDIES  --------------------------------------------------------------------------------              10.2   7.61  >-----------<  209      [02-26-25 @ 07:22]              33.3     139  |  93  |  49  ----------------------------<  86      [02-26-25 @ 07:22]  4.6   |  24  |  4.78        Ca     8.9     [02-26-25 @ 07:22]      Mg     2.4     [02-26-25 @ 07:22]      Phos  4.9     [02-26-25 @ 07:22]    TPro  6.5  /  Alb  3.9  /  TBili  0.8  /  DBili  x   /  AST  90  /  ALT  204  /  AlkPhos  122  [02-26-25 @ 07:22]      PTT: >200.0      [02-25-25 @ 06:30]      Creatinine Trend:  SCr 4.78 [02-26 @ 07:22]  SCr 3.76 [02-25 @ 09:04]  SCr 3.23 [02-25 @ 06:30]  SCr 5.52 [02-24 @ 05:28]  SCr 4.61 [02-23 @ 06:15]    HBsAb Nonreact      [02-20-25 @ 05:47]  HBsAg Nonreact      [02-20-25 @ 05:47]  HBcAb Nonreact      [02-20-25 @ 05:47]  HCV 0.09, Nonreact      [02-20-25 @ 05:47]

## 2025-02-26 NOTE — PROGRESS NOTE ADULT - SUBJECTIVE AND OBJECTIVE BOX
24H hour events:   seen ambulating room, steady gait, denies any complaints; no acute changes overnight    MEDICATIONS:  apixaban 2.5 milliGRAM(s) Oral every 12 hours  buMETAnide Injectable 2 milliGRAM(s) IV Push daily  metoprolol succinate  milliGRAM(s) Oral <User Schedule>  acetaminophen     Tablet .. 650 milliGRAM(s) Oral every 6 hours PRN  melatonin 3 milliGRAM(s) Oral at bedtime PRN  chlorhexidine 2% Cloths 1 Application(s) Topical <User Schedule>  influenza  Vaccine (HIGH DOSE) 0.5 milliLiter(s) IntraMuscular once  sodium bicarbonate 650 milliGRAM(s) Oral four times a day      REVIEW OF SYSTEMS:  Complete 12point ROS negative.    PHYSICAL EXAM:  T(C): 36.3 (02-26-25 @ 04:43), Max: 36.6 (02-25-25 @ 21:28)  HR: 87 (02-26-25 @ 04:43) (70 - 102)  BP: 106/74 (02-26-25 @ 04:43) (106/74 - 134/78)  RR: 18 (02-26-25 @ 04:43) (17 - 18)  SpO2: 94% (02-26-25 @ 04:43) (94% - 100%)  Wt(kg): --  I&O's Summary    25 Feb 2025 07:01  -  26 Feb 2025 07:00  --------------------------------------------------------  IN: 240 mL / OUT: 1500 mL / NET: -1260 mL      Appearance: Normal, in NAD  Head: normocephalic  Neck: supple  Cardiovascular: irregular S1 S2, no r/g  Respiratory: Lungs clear to auscultation	  Psychiatry: A & O x 3, Mood & affect appropriate  Gastrointestinal:  Soft, Non-tender, + BS	  : on HD  Skin: No rashes, No ecchymoses	  Neurologic: Non-focal  Extremities: Normal range of motion, no c/c, edema LLE>RLE   Vascular: Peripheral pulses palpable 2+ bilaterally        LABS:	 	    CBC Full  -  ( 26 Feb 2025 07:22 )  WBC Count : 7.61 K/uL  Hemoglobin : 10.2 g/dL  Hematocrit : 33.3 %  Platelet Count - Automated : 209 K/uL  Mean Cell Volume : 101.5 fl  Mean Cell Hemoglobin : 31.1 pg  Mean Cell Hemoglobin Concentration : 30.6 g/dL  Auto Neutrophil # : x  Auto Lymphocyte # : x  Auto Monocyte # : x  Auto Eosinophil # : x  Auto Basophil # : x  Auto Neutrophil % : x  Auto Lymphocyte % : x  Auto Monocyte % : x  Auto Eosinophil % : x  Auto Basophil % : x    02-26    139  |  93[L]  |  49[H]  ----------------------------<  86  4.6   |  24  |  4.78[H]  02-25    137  |  92[L]  |  36[H]  ----------------------------<  97  4.5   |  25  |  3.76[H]    Ca    8.9      26 Feb 2025 07:22  Ca    8.6      25 Feb 2025 09:04  Phos  4.9     02-26  Phos  3.7     02-25  Mg     2.4     02-26  Mg     2.0     02-25    TPro  6.5  /  Alb  3.9  /  TBili  0.8  /  DBili  x   /  AST  90[H]  /  ALT  204[H]  /  AlkPhos  122[H]  02-26  TPro  6.5  /  Alb  4.0  /  TBili  1.2  /  DBili  x   /  AST  137[H]  /  ALT  241[H]  /  AlkPhos  119  02-25      proBNP:   Lipid Profile:   HgA1c:   TSH:       CARDIAC MARKERS:      TELEMETRY: Afib 70-100s        Echo 2/13/25:     1. Left ventricular cavity is mildly dilated. Left ventricular systolic function is moderately decreased with an ejection fraction of 41 % by Garcia'smethod of disks. Global left ventricular hypokinesis.   2. Multiple segmental abnormalities exist. See findings.   3. Left atrium is severely dilated.   4. Severe mitral regurgitation. The mitral regurgitant jet is posteriorly directed.   5. No pericardial effusion seen.   6. Mild to moderate tricuspid regurgitation.   7. Compared to the transthoracic echocardiogram performed on 11/27/2024, LVEF has decreased, MR is increased,.   8. Estimated pulmonary artery systolic pressure is 46 mmHg, consistent with mild to moderate pulmonary hypertension.   9. Symmetric mitral valve leaflet tethering.  10. The inferior vena cava is dilated measuring 2.40 cm in diameter, (dilated >2.1cm) with abnormal inspiratory collapse (abnormal <50%) consistent with elevated right atrial pressure (~15, range 10-20mmHg).  11. There is normal LV mass and normal geometry.    Cath 2/24/25:    Diagnostic Conclusions:     Three vessel coronary artery disease   Normal left main coronary artery   Right dominant system   Elevated right atrial pressure (mRA 17mmHg with a V wave of 21mmHg)   Mild post-capillary pulmonary hypertension (sPAP 40mmHg, dPAP 22mmHg,  mPAP 30mmHg)  PCWP = 30mmHg with a V wave of 36mmHg   LVEDP = 20mmHg   AO = 112/73/90   PAsat = 43.25% // AOsat = 94.9% (room air)   Felix CO // CI = 3.44l/min // 2.14l/min/m2     Diagnostic Findings:     CoronaryAngiography   The coronary circulation is right dominant.      LM   Left main artery: The segment is visually normal in size and  structure.    LAD   First diagonal: There is a 35 % stenosis. Proximal left anterior  descending: There is a 40 % stenosis. Mid left anterior  descending: There is a 25 % stenosis. Distal left anterior descending:  There is a 10 % stenosis.    CX   Mid circumflex: There is a 30 % stenosis.      RCA   Proximal right coronary artery: There is a 15 % stenosis. Mid right  coronary artery: There is a 75 % stenosis.

## 2025-02-26 NOTE — PROGRESS NOTE ADULT - ATTENDING COMMENTS
NICO on CKD- continue HD/UF for volume optimization- has permacath- AVF as outpatient  CAD- s/p LHC with 75% mRCA- start high intensity statin if transaminases continue to improve  AF- /DCCV once volume optimized- possibly 2/28  MR/TR- to be reassessed once volume optimized- structural heart following  Hospital delirium- continue Zyprexa prn

## 2025-02-26 NOTE — PROGRESS NOTE ADULT - ASSESSMENT
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       #Acute kidney injury superimposed on CKD.    Found to have worsening renal function on admission so procedure cancelled. Baseline 4.6 in Aug 2024 and 5.5 in 1/25. Cr on admission 2/12 was 8.42.   - Cr continued to rise to 8.71 down to 7's, now on hd  - On Bumex 4mg IV BID for diurese but Is and Os document 0ml of urine  - RAP elevated to 15 on TTE from 2/13 and she appears significantly volume up on exam  -Plan for Permacath on Wednesday  -was on Bumex 4mg IV BID with poor UOP, now on 2mg IV Bumex BID  -s/p hd access  02/22-Remains in AF has had 3 HD sessions    #  Paroxysmal atrial fibrillation.   ·  Plan: Admitted for cardioversion but unable to do because given significant volume overload  - She is usually on 5mg BID of Eliquis but her weight has decreased to 56.5kg after HD and her Cr is >1.5, so her dose has been reduced to 2.5mg BID  -Currently on Metoprolol succinate 150mg BID, max dose is usually 200mg in 24 hours  - Off Amiodarone  - Would try to plan for DCCV/ on Friday but will need additional 1-2 sessions of HD for aggressive volume removal so that she is euvolemic in time for her DCCV and . Discussed with primary team.     # Chronic heart failure with preserved ejection fraction. -Severe MR  ·TTE in 11/2024 showed progression to moderate to severe MR.  Left ventricular systolic function was normal with an ejection fraction of 55 % by Garcia's method of disks.  -TTE now with LVEF of 41%, global hypokinesis, severe MR, RAP of 15.   -Structural Heart evaluation noted. Will aggressively diurese and repeat TTE when euvolemic   Eventual GDMT  S/p LHC/RHC on 2/24 with elevated filling pressures: mRA 17, PCWP 30, CI 2.14. Needs aggressive volume removal with HD  LHC with 75% mRCA disease otherwise non obstructive   - eliquis resumed   - Needs high intensity statin for her CAD. will hold on ASA for now given anemia

## 2025-02-26 NOTE — PROGRESS NOTE ADULT - PROBLEM SELECTOR PLAN 1
Hx/o CKD iso b/l staghorn calculus s/p removal (2009) (follows with Dr. Elizondo). Found to have worsening renal function on admission so procedure cancelled. Baseline 4.6 in Aug 2024 and 5.5 in 1/25. Cr on admission 2/12 was 8.42.     Plan:  - Strict I/Os, daily weights  - Trend BMPs, monitor renal function, renally dose meds, avoid nephrotoxins   - Nephro consulted, new dialysis started - HF on 2/19, 2/20, 2/21, 2/24, 2/25, 2/26

## 2025-02-26 NOTE — PROGRESS NOTE ADULT - SUBJECTIVE AND OBJECTIVE BOX
[facility] - Heidy Bates, Peter, Alverto, Kevyn King  Office Number:  [phone]    Patient resting comfortably in bed in NAD.  Laying flat with no respiratory distress.  No complaints of chest pain, dyspnea, palpitations, PND, or orthopnea.  Hallucinating somewhat this morning  S/p HD on 2/25    ROS: negative unless otherwise mentioned.    Telemetry: -110    MEDICATIONS  (STANDING):  apixaban 2.5 milliGRAM(s) Oral every 12 hours  buMETAnide Injectable 2 milliGRAM(s) IV Push daily  chlorhexidine 2% Cloths 1 Application(s) Topical <User Schedule>  influenza  Vaccine (HIGH DOSE) 0.5 milliLiter(s) IntraMuscular once  metoprolol succinate  milliGRAM(s) Oral <User Schedule>  sevelamer carbonate 800 milliGRAM(s) Oral three times a day with meals  sodium bicarbonate 650 milliGRAM(s) Oral four times a day    MEDICATIONS  (PRN):  acetaminophen     Tablet .. 650 milliGRAM(s) Oral every 6 hours PRN Temp greater or equal to 38C (100.4F), Mild Pain (1 - 3)  melatonin 3 milliGRAM(s) Oral at bedtime PRN Insomnia      Allergies    No Known Allergies    Intolerances        Vital Signs Last 24 Hrs  T(C): 36.3 (26 Feb 2025 04:43), Max: 36.6 (25 Feb 2025 21:28)  T(F): 97.4 (26 Feb 2025 04:43), Max: 97.8 (25 Feb 2025 21:28)  HR: 87 (26 Feb 2025 04:43) (70 - 102)  BP: 106/74 (26 Feb 2025 04:43) (106/74 - 134/78)  BP(mean): --  RR: 18 (26 Feb 2025 04:43) (17 - 18)  SpO2: 94% (26 Feb 2025 04:43) (94% - 100%)    Parameters below as of 26 Feb 2025 04:43  Patient On (Oxygen Delivery Method): room air        I&O's Summary    25 Feb 2025 07:01  -  26 Feb 2025 07:00  --------------------------------------------------------  IN: 240 mL / OUT: 1500 mL / NET: -1260 mL        ON EXAM:    General: NAD, awake and alert, oriented x 3  HEENT: Mucous membranes are moist, anicteric  Lungs: Non-labored, breath sounds are clear bilaterally, No wheezing, rales or rhonchi  Cardiovascular: irregular S1 and S2, no murmurs, rubs, or gallops  Gastrointestinal: Bowel Sounds present, soft, nontender.   Lymph: trace-1+ peripheral edema. No lymphadenopathy.  Skin: No rashes or ulcers  Psych:  confused    LABS: All Labs Reviewed:                        10.2   7.61  )-----------( 209      ( 26 Feb 2025 07:22 )             33.3                         9.6    8.30  )-----------( 206      ( 25 Feb 2025 06:30 )             33.1                         9.2    7.63  )-----------( 152      ( 24 Feb 2025 05:28 )             30.5     26 Feb 2025 07:22    139    |  93     |  49     ----------------------------<  86     4.6     |  24     |  4.78   25 Feb 2025 09:04    137    |  92     |  36     ----------------------------<  97     4.5     |  25     |  3.76   25 Feb 2025 06:30    124    |  81     |  31     ----------------------------<  558    4.0     |  22     |  3.23     Ca    8.9        26 Feb 2025 07:22  Ca    8.6        25 Feb 2025 09:04  Ca    7.6        25 Feb 2025 06:30  Phos  4.9       26 Feb 2025 07:22  Phos  3.7       25 Feb 2025 06:30  Phos  4.3       24 Feb 2025 05:28  Mg     2.4       26 Feb 2025 07:22  Mg     2.0       25 Feb 2025 06:30  Mg     2.4       24 Feb 2025 05:28    TPro  6.5    /  Alb  3.9    /  TBili  0.8    /  DBili  x      /  AST  90     /  ALT  204    /  AlkPhos  122    26 Feb 2025 07:22  TPro  6.5    /  Alb  4.0    /  TBili  1.2    /  DBili  x      /  AST  137    /  ALT  241    /  AlkPhos  119    25 Feb 2025 09:04  TPro  5.7    /  Alb  3.4    /  TBili  0.9    /  DBili  x      /  AST  106    /  ALT  195    /  AlkPhos  99     25 Feb 2025 06:30    PTT - ( 25 Feb 2025 06:30 )  PTT:>200.0 sec      Blood Culture:

## 2025-02-26 NOTE — PROGRESS NOTE ADULT - ASSESSMENT
74-year-old female with PMHx of HTN, CKD stage 5 (pt. of Dr. Elizondo), Afib on Eliquis, anemia, nephrolithiasis, and recently diagnosed HF who presents for scheduled Afib ablation for Afib found to have NICO.    Nephrology consulted for NICO on CKD V.    Acute kidney injury superimposed on CKD-5 iso possibly over-diuresis (increased from lasix 80mg BID to torsemide 100mg BID 1/31/24)  -Pt. with hx of advanced CKD is in the setting of chronic history of nephrolithiasis/staghorn calculus. On review of labs on [facility], last Scr was elevated at 6.18 on 1/30/25. Admission SCr is elevated at 8.42 without electrolyte abnormalities. Prior renal US 1/8/25 without hydronephrosis but calcifications, stones and cysts bilaterally.   USG - Kidney and bladder showed Bilateral renal calculi, no Hydronephrosis, B/L renal parenchymal disease. UPCR 4.9    RECS:  Now ESRD, on HD started last week  Has a tunnelled HD cath  HD today.     Anemia iso CKD  Complete  venofer 200mg IV with HD X 5 doses  Give PERLITA 4000  U with HD.       Cards:  ·TTE in 11/2024 showed progression to moderate to severe MR. TTE now with LVEF of 41%, global hypokinesis, severe MR, RAP of 15.   -Structural Heart evaluation noted. Once well HD and good UF, repeat TTE and re-assess  -Plan for LHC and RHC today post HD  -EP eval for afib ablation please as AVF cannot be done without that  -daily UF to help re volume mgt, can also give bumex 2mg IV for good UO (S/p LHC/RHC on 2/24 with elevated filling pressures: mRA 17, PCWP 30, CI 2.14)  -Can start ARB/SGLT2i/MRA if needed for cardiac disease as she is ESRD now but still makes urine  -plan for  cardioversion as per EP team.     Dc planning to [facility] HD unit- preferred unit by patient under Dr Elizondo once medically ready  AVF placement as outpatient or after ablation       Wait for final reccs from the attending.          74-year-old female with PMHx of HTN, CKD stage 5 (pt. of Dr. Elizondo), Afib on Eliquis, anemia, nephrolithiasis, and recently diagnosed HF who presents for scheduled Afib ablation for Afib found to have NICO.    Nephrology consulted for NICO on CKD V.    Acute kidney injury superimposed on CKD-5 iso possibly over-diuresis (increased from lasix 80mg BID to torsemide 100mg BID 1/31/24)  -Pt. with hx of advanced CKD is in the setting of chronic history of nephrolithiasis/staghorn calculus. On review of labs on [facility] [place], last Scr was elevated at 6.18 on 1/30/25. Admission SCr is elevated at 8.42 without electrolyte abnormalities. Prior renal US 1/8/25 without hydronephrosis but calcifications, stones and cysts bilaterally.   USG - Kidney and bladder showed Bilateral renal calculi, no Hydronephrosis, B/L renal parenchymal disease. UPCR 4.9    RECS:  Now ESRD, on HD started last week  Has a tunnelled HD cath  HD today.     Anemia iso CKD  Complete  venofer 200mg IV with HD X 5 doses  Give PERLITA 4000  U with HD.       Cards:  ·TTE in 11/2024 showed progression to moderate to severe MR. TTE now with LVEF of 41%, global hypokinesis, severe MR, RAP of 15.   -Structural Heart evaluation noted. Once well HD and good UF, repeat TTE and re-assess  -Plan for LHC and RHC today post HD  -EP eval for afib ablation please as AVF cannot be done without that  -daily UF to help re volume mgt, can also give bumex 2mg IV for good UO (S/p LHC/RHC on 2/24 with elevated filling pressures: mRA 17, PCWP 30, CI 2.14)  -Can start ARB/SGLT2i/MRA if needed for cardiac disease as she is ESRD now but still makes urine  -plan for  cardioversion as per EP team.     Dc planning to [facility] HD unit- preferred unit by patient under Dr Elizondo once medically ready  AVF placement as outpatient or after ablation

## 2025-02-26 NOTE — PROGRESS NOTE ADULT - ATTENDING COMMENTS
I have seen this patient with the fellow and agree with their assessment and plan. I was physically present for significant portions of the evaluation and management (E/M) service provided.  I agree with the above history, physical, and plan which I have reviewed and edited where appropriate.    now ESRD  Volume overloaded  LVEDP is high and needs daily UF and HD ongoing  Bumex daily as well as making urine  Today , hallucinating that is new- primary team aware  Unclear cause  Cardioversion to be done once volume is improved with daily UF and diuresis  Appreciate Cards, CHF and EP help  AVF once completed cards workup    Shane Mauricio MD  Office   Contact me directly via [employer] Teams     (After 5 pm or on weekends please page the on-call fellow/attending, can check [email or web address] for schedule. Login is dominick pierre, schedule under [facility] medicine, psych, derm)

## 2025-02-27 ENCOUNTER — APPOINTMENT (OUTPATIENT)
Dept: NEPHROLOGY | Facility: CLINIC | Age: 75
End: 2025-02-27

## 2025-02-27 DIAGNOSIS — N39.0 URINARY TRACT INFECTION, SITE NOT SPECIFIED: ICD-10-CM

## 2025-02-27 LAB
ALBUMIN SERPL ELPH-MCNC: 3.5 G/DL — SIGNIFICANT CHANGE UP (ref 3.3–5)
ALP SERPL-CCNC: 110 U/L — SIGNIFICANT CHANGE UP (ref 40–120)
ALT FLD-CCNC: 143 U/L — HIGH (ref 10–45)
ANION GAP SERPL CALC-SCNC: 16 MMOL/L — SIGNIFICANT CHANGE UP (ref 5–17)
AST SERPL-CCNC: 45 U/L — HIGH (ref 10–40)
BILIRUB SERPL-MCNC: 0.6 MG/DL — SIGNIFICANT CHANGE UP (ref 0.2–1.2)
BUN SERPL-MCNC: 29 MG/DL — HIGH (ref 7–23)
CALCIUM SERPL-MCNC: 8.2 MG/DL — LOW (ref 8.4–10.5)
CHLORIDE SERPL-SCNC: 95 MMOL/L — LOW (ref 96–108)
CO2 SERPL-SCNC: 26 MMOL/L — SIGNIFICANT CHANGE UP (ref 22–31)
CREAT SERPL-MCNC: 3.65 MG/DL — HIGH (ref 0.5–1.3)
EGFR: 12 ML/MIN/1.73M2 — LOW
EGFR: 12 ML/MIN/1.73M2 — LOW
GLUCOSE SERPL-MCNC: 119 MG/DL — HIGH (ref 70–99)
HCT VFR BLD CALC: 32 % — LOW (ref 34.5–45)
HGB BLD-MCNC: 10 G/DL — LOW (ref 11.5–15.5)
MAGNESIUM SERPL-MCNC: 2.2 MG/DL — SIGNIFICANT CHANGE UP (ref 1.6–2.6)
MCHC RBC-ENTMCNC: 31.3 G/DL — LOW (ref 32–36)
MCHC RBC-ENTMCNC: 32.2 PG — SIGNIFICANT CHANGE UP (ref 27–34)
MCV RBC AUTO: 102.9 FL — HIGH (ref 80–100)
NRBC BLD AUTO-RTO: 2 /100 WBCS — HIGH (ref 0–0)
PHOSPHATE SERPL-MCNC: 3.4 MG/DL — SIGNIFICANT CHANGE UP (ref 2.5–4.5)
PLATELET # BLD AUTO: 217 K/UL — SIGNIFICANT CHANGE UP (ref 150–400)
POTASSIUM SERPL-MCNC: 4.3 MMOL/L — SIGNIFICANT CHANGE UP (ref 3.5–5.3)
POTASSIUM SERPL-SCNC: 4.3 MMOL/L — SIGNIFICANT CHANGE UP (ref 3.5–5.3)
PROT SERPL-MCNC: 5.8 G/DL — LOW (ref 6–8.3)
RBC # BLD: 3.11 M/UL — LOW (ref 3.8–5.2)
RBC # FLD: 17.7 % — HIGH (ref 10.3–14.5)
SODIUM SERPL-SCNC: 137 MMOL/L — SIGNIFICANT CHANGE UP (ref 135–145)
WBC # BLD: 7.06 K/UL — SIGNIFICANT CHANGE UP (ref 3.8–10.5)
WBC # FLD AUTO: 7.06 K/UL — SIGNIFICANT CHANGE UP (ref 3.8–10.5)

## 2025-02-27 PROCEDURE — 99233 SBSQ HOSP IP/OBS HIGH 50: CPT

## 2025-02-27 PROCEDURE — 99233 SBSQ HOSP IP/OBS HIGH 50: CPT | Mod: GC

## 2025-02-27 RX ADMIN — Medication 650 MILLIGRAM(S): at 14:30

## 2025-02-27 RX ADMIN — METOPROLOL SUCCINATE 150 MILLIGRAM(S): 50 TABLET, EXTENDED RELEASE ORAL at 21:26

## 2025-02-27 RX ADMIN — Medication 1 TABLET(S): at 11:51

## 2025-02-27 RX ADMIN — SEVELAMER HYDROCHLORIDE 800 MILLIGRAM(S): 800 TABLET ORAL at 08:08

## 2025-02-27 RX ADMIN — Medication 650 MILLIGRAM(S): at 11:52

## 2025-02-27 RX ADMIN — BUMETANIDE 2 MILLIGRAM(S): 1 TABLET ORAL at 05:15

## 2025-02-27 RX ADMIN — Medication 650 MILLIGRAM(S): at 05:08

## 2025-02-27 RX ADMIN — APIXABAN 2.5 MILLIGRAM(S): 2.5 TABLET, FILM COATED ORAL at 09:28

## 2025-02-27 RX ADMIN — METOPROLOL SUCCINATE 150 MILLIGRAM(S): 50 TABLET, EXTENDED RELEASE ORAL at 05:15

## 2025-02-27 RX ADMIN — APIXABAN 2.5 MILLIGRAM(S): 2.5 TABLET, FILM COATED ORAL at 21:27

## 2025-02-27 RX ADMIN — Medication 650 MILLIGRAM(S): at 21:26

## 2025-02-27 RX ADMIN — Medication 650 MILLIGRAM(S): at 07:01

## 2025-02-27 RX ADMIN — SEVELAMER HYDROCHLORIDE 800 MILLIGRAM(S): 800 TABLET ORAL at 21:26

## 2025-02-27 RX ADMIN — Medication 650 MILLIGRAM(S): at 00:31

## 2025-02-27 RX ADMIN — Medication 650 MILLIGRAM(S): at 14:00

## 2025-02-27 RX ADMIN — Medication 1 APPLICATION(S): at 06:34

## 2025-02-27 RX ADMIN — SEVELAMER HYDROCHLORIDE 800 MILLIGRAM(S): 800 TABLET ORAL at 11:51

## 2025-02-27 NOTE — PROGRESS NOTE ADULT - PROBLEM SELECTOR PLAN 5
UA obtained 2/26 for new AMS/ hallucinations, no dysuria or urinary symptoms - positive for leuk esterase,   WBC, and bacteria    Plan:  -F/u Urine culture  -Empiric tx with CTX x3 days  -Monitor vitals and WBC UA obtained 2/26 for new AMS/ hallucinations, no dysuria or urinary symptoms - positive for leuk esterase,   WBC, and bacteria  -Hx of pan-sensitive E. Coli in April 2024    Plan:  -F/u Urine culture  -Empiric tx with CTX x3 days  -Monitor vitals and WBC

## 2025-02-27 NOTE — PROGRESS NOTE ADULT - ASSESSMENT
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       #Acute kidney injury superimposed on CKD.    Found to have worsening renal function on admission so procedure cancelled. Baseline 4.6 in Aug 2024 and 5.5 in 1/25. Cr on admission 2/12 was 8.42.   - RAP elevated to 15 on TTE from 2/13 and she appeared significantly volume up on exam  - was on Bumex 4mg IV BID with poor UOP, now on 2mg IV Bumex  - s/p hd access, and now on HD    #  Paroxysmal atrial fibrillation.   ·  Plan: Admitted for cardioversion but unable to do because given significant volume overload  - She is usually on 5mg BID of Eliquis but her weight has decreased to 56.5kg after HD and her Cr is >1.5, so her dose has been reduced to 2.5mg BID  - Currently on Metoprolol succinate 150mg BID, max dose is usually 200mg in 24 hours  - Off Amiodarone  - plan for /DCCV on Friday (discussed with EP)    # Chronic heart failure with preserved ejection fraction. -Severe MR  - TTE in 11/2024 showed progression to moderate to severe MR.  Left ventricular systolic function was normal with an ejection fraction of 55 % by Garcia's method of disks.  - TTE now with LVEF of 41%, global hypokinesis, severe MR, RAP of 15.   - Structural Heart evaluation noted. Will aggressively diurese and repeat TTE when euvolemic   - Eventual GDMT  - S/p LHC/RHC on 2/24 with elevated filling pressures: mRA 17, PCWP 30, CI 2.14.  - LHC with 75% mRCA disease otherwise non obstructive   - eliquis resumed   - Needs high intensity statin for her CAD. will hold on ASA for now given anemia

## 2025-02-27 NOTE — PROGRESS NOTE ADULT - SUBJECTIVE AND OBJECTIVE BOX
[facility] DIVISION OF KIDNEY DISEASES AND HYPERTENSION -- FOLLOW UP NOTE  --------------------------------------------------------------------------------  Chief Complaint:    24 hour events/subjective:        PAST HISTORY  --------------------------------------------------------------------------------  No significant changes to PMH, PSH, FHx, SHx, unless otherwise noted    ALLERGIES & MEDICATIONS  --------------------------------------------------------------------------------  Allergies    No Known Allergies    Intolerances      Standing Inpatient Medications  apixaban 2.5 milliGRAM(s) Oral every 12 hours  buMETAnide Injectable 2 milliGRAM(s) IV Push daily  chlorhexidine 2% Cloths 1 Application(s) Topical <User Schedule>  influenza  Vaccine (HIGH DOSE) 0.5 milliLiter(s) IntraMuscular once  metoprolol succinate  milliGRAM(s) Oral <User Schedule>  Nephro-teresita 1 Tablet(s) Oral daily  sevelamer carbonate 800 milliGRAM(s) Oral three times a day with meals  sodium bicarbonate 650 milliGRAM(s) Oral four times a day    PRN Inpatient Medications  acetaminophen     Tablet .. 650 milliGRAM(s) Oral every 6 hours PRN  melatonin 3 milliGRAM(s) Oral at bedtime PRN  OLANZapine 2.5 milliGRAM(s) Oral every 12 hours PRN      REVIEW OF SYSTEMS  --------------------------------------------------------------------------------  Gen: No weight changes, fatigue, fevers/chills, weakness  Skin: No rashes  Head/Eyes/Ears/Mouth: No headache; Normal hearing; Normal vision w/o blurriness; No sinus pain/discomfort, sore throat  Respiratory: No dyspnea, cough, wheezing, hemoptysis  CV: No chest pain, PND, orthopnea  GI: No abdominal pain, diarrhea, constipation, nausea, vomiting, melena, hematochezia  : No increased frequency, dysuria, hematuria, nocturia  MSK: No joint pain/swelling; no back pain; no edema  Neuro: No dizziness/lightheadedness, weakness, seizures, numbness, tingling  Heme: No easy bruising or bleeding  Endo: No heat/cold intolerance  Psych: No significant nervousness, anxiety, stress, depression    All other systems were reviewed and are negative, except as noted.    VITALS/PHYSICAL EXAM  --------------------------------------------------------------------------------  T(C): 36.8 (02-27-25 @ 04:40), Max: 36.8 (02-27-25 @ 04:40)  HR: 87 (02-27-25 @ 04:40) (66 - 108)  BP: 97/66 (02-27-25 @ 04:40) (97/66 - 109/66)  RR: 16 (02-27-25 @ 04:40) (16 - 18)  SpO2: 92% (02-27-25 @ 04:40) (92% - 99%)  Wt(kg): --    Weight (kg): 56.2 (02-27-25 @ 08:37)      02-26-25 @ 07:01  -  02-27-25 @ 07:00  --------------------------------------------------------  IN: 240 mL / OUT: 1500 mL / NET: -1260 mL    02-27-25 @ 07:01  -  02-27-25 @ 10:59  --------------------------------------------------------  IN: 240 mL / OUT: 0 mL / NET: 240 mL      Physical Exam:  	Gen: NAD, well-appearing  	HEENT: PERRL, supple neck, clear oropharynx  	Pulm: CTA B/L  	CV: RRR, S1S2; no rub  	Back: No spinal or CVA tenderness; no sacral edema  	Abd: +BS, soft, nontender/nondistended  	: No suprapubic tenderness  	UE: Warm, FROM, no clubbing, intact strength; no edema; no asterixis  	LE: Warm, FROM, no clubbing, intact strength; no edema  	Neuro: No focal deficits, intact gait  	Psych: Normal affect and mood  	Skin: Warm, without rashes  	Vascular access:    LABS/STUDIES  --------------------------------------------------------------------------------              10.0   7.06  >-----------<  217      [02-27-25 @ 06:04]              32.0     137  |  95  |  29  ----------------------------<  119      [02-27-25 @ 06:04]  4.3   |  26  |  3.65        Ca     8.2     [02-27-25 @ 06:04]      Mg     2.2     [02-27-25 @ 06:04]      Phos  3.4     [02-27-25 @ 06:04]    TPro  5.8  /  Alb  3.5  /  TBili  0.6  /  DBili  x   /  AST  45  /  ALT  143  /  AlkPhos  110  [02-27-25 @ 06:04]          Creatinine Trend:  SCr 3.65 [02-27 @ 06:04]  SCr 4.78 [02-26 @ 07:22]  SCr 3.76 [02-25 @ 09:04]  SCr 3.23 [02-25 @ 06:30]  SCr 5.52 [02-24 @ 05:28]    Urinalysis - [02-27-25 @ 06:04]      Color  / Appearance  / SG  / pH       Gluc 119 / Ketone   / Bili  / Urobili        Blood  / Protein  / Leuk Est  / Nitrite       RBC  / WBC  / Hyaline  / Gran  / Sq Epi  / Non Sq Epi  / Bacteria       Iron 44, TIBC --, %sat --      [02-19-25 @ 04:22]  Ferritin 101      [11-22-24 @ 21:13]  PTH -- (Ca 8.1)      [11-22-24 @ 21:13]   595  TSH 0.65      [04-09-24 @ 00:27]    HBsAb Nonreact      [02-20-25 @ 05:47]  HBsAg Nonreact      [02-20-25 @ 05:47]  HBcAb Nonreact      [02-20-25 @ 05:47]  HCV 0.09, Nonreact      [02-20-25 @ 05:47]

## 2025-02-27 NOTE — CONSULT NOTE ADULT - SUBJECTIVE AND OBJECTIVE BOX
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 Now on HD access via Right chest permacath, vascular surgery consulted for long term HD access creation. Pt reports she's right handed, has no implantable cardiac devices, no hx of upper extremity surgery, pt was amenable to the HD access, aware of nephro rec for long term HD, pt pending DCCV tomorrow with cardiology.       ROS: 10-system review is otherwise negative except HPI above.      PAST MEDICAL & SURGICAL HISTORY:  Renal Calculus      Ureteral Calculus      Acute Renal Failure  9/2009      Wrist Fracture  CYNTHIA      Collar Bone Fracture      Rib Fractures      Kidney Stone removal  3/15/2011      Atrial fibrillation with RVR      C Section      Percutaneous Stone Extraction  Right      S/P Left Percutaneuos Stone extraction  01/26/2010, 04/20/2010      History of cardioversion        FAMILY HISTORY:      SOCIAL HISTORY:      ALLERGIES: No Known Allergies      HOME MEDICATIONS:     CURRENT MEDICATIONS  MEDICATIONS (STANDING): apixaban 2.5 milliGRAM(s) Oral every 12 hours  buMETAnide Injectable 2 milliGRAM(s) IV Push daily  influenza  Vaccine (HIGH DOSE) 0.5 milliLiter(s) IntraMuscular once  metoprolol succinate  milliGRAM(s) Oral <User Schedule>  Nephro-teresita 1 Tablet(s) Oral daily  sevelamer carbonate 800 milliGRAM(s) Oral three times a day with meals  sodium bicarbonate 650 milliGRAM(s) Oral four times a day    MEDICATIONS (PRN):acetaminophen     Tablet .. 650 milliGRAM(s) Oral every 6 hours PRN Temp greater or equal to 38C (100.4F), Mild Pain (1 - 3)  melatonin 3 milliGRAM(s) Oral at bedtime PRN Insomnia  OLANZapine 2.5 milliGRAM(s) Oral every 12 hours PRN Agitation/hallucinations    --------------------------------------------------------------------------------------------    Vitals:   T(C): 36.3 (02-27-25 @ 20:14), Max: 36.8 (02-27-25 @ 04:40)  HR: 93 (02-27-25 @ 20:14) (82 - 118)  BP: 99/67 (02-27-25 @ 20:14) (97/66 - 127/85)  RR: 18 (02-27-25 @ 20:14) (16 - 20)  SpO2: 98% (02-27-25 @ 20:14) (92% - 98%)  CAPILLARY BLOOD GLUCOSE    02-26 @ 07:01  -  02-27 @ 07:00  --------------------------------------------------------  IN:    Oral Fluid: 240 mL  Total IN: 240 mL    OUT:    Other (mL): 1500 mL  Total OUT: 1500 mL    Total NET: -1260 mL      02-27 @ 07:01  -  02-27 @ 22:04  --------------------------------------------------------  IN:    Oral Fluid: 480 mL  Total IN: 480 mL    OUT:    Other (mL): 1500 mL  Total OUT: 1500 mL    Total NET: -1020 mL        Height (cm): 157.5 (02-24 @ 16:46)  Weight (kg): 56.2 (02-27 @ 08:37)  BMI (kg/m2): 22.7 (02-27 @ 08:37)  BSA (m2): 1.56 (02-27 @ 08:37)    PHYSICAL EXAM:   General: NAD, sitting in a chair  Neuro: no focal neurologic defects, interactive and appropriate   Resp: breathing comfortably on RA  Vascular: Radial and brachial arteries palpable bilaterally, right chest permacath present   Skin: Intact, no breakdown upper extremities bilaterally   Musculoskeletal: Atraumatic, all 4 extremities moving spontaneously, no limitations  --------------------------------------------------------------------------------------------    LABS  CBC (02-27 @ 06:04)                              10.0[L]                         7.06    )----------------(  217        --    % Neutrophils, --    % Lymphocytes, ANC: --                                  32.0[L]    BMP (02-27 @ 06:04)             137     |  95[L]   |  29[H] 		Ca++ --      Ca 8.2[L]             ---------------------------------( 119[H]		Mg 2.2                4.3     |  26      |  3.65[H]			Ph 3.4       LFTs (02-27 @ 06:04)      TPro 5.8[L] / Alb 3.5 / TBili 0.6 / DBili -- / AST 45[H] / [H] / AlkPhos 110            --------------------------------------------------------------------------------------------    MICROBIOLOGY  Urinalysis (02-27 @ 06:04):     Color:  / Appearance:  / SG:  / pH:  / Gluc: 119[H] / Ketones:  / Bili:  / Urobili:  / Protein : / Nitrites:  / Leuk.Est:  / RBC:  / WBC:  / Sq Epi:  / Non Sq Epi:  / Bacteria          --------------------------------------------------------------------------------------------    IMAGING  < from: Xray Chest 1 View- PORTABLE-Urgent (Xray Chest 1 View- PORTABLE-Urgent .) (02.19.25 @ 15:47) >    CLINICAL INDICATION: new permacath placement    TECHNIQUE: Single frontal, portable view of the chest was obtained.    COMPARISON: Chest x-ray 1/7/2025.    FINDINGS:  Right IJ dialysis catheter terminating within the SVC.  The heart is magnified by technique.  Bilateral perihilar opacities.  There is no pneumothorax or pleural effusion.    IMPRESSION:  Right IJ dialysis catheter terminating in the SVC  Pulmonary vascular congestion    < end of copied text >

## 2025-02-27 NOTE — PROGRESS NOTE ADULT - SUBJECTIVE AND OBJECTIVE BOX
24H hour events:     MEDICATIONS:  apixaban 2.5 milliGRAM(s) Oral every 12 hours  buMETAnide Injectable 2 milliGRAM(s) IV Push daily  metoprolol succinate  milliGRAM(s) Oral <User Schedule>        acetaminophen     Tablet .. 650 milliGRAM(s) Oral every 6 hours PRN  melatonin 3 milliGRAM(s) Oral at bedtime PRN  OLANZapine 2.5 milliGRAM(s) Oral every 12 hours PRN        chlorhexidine 2% Cloths 1 Application(s) Topical <User Schedule>  influenza  Vaccine (HIGH DOSE) 0.5 milliLiter(s) IntraMuscular once  Nephro-teresita 1 Tablet(s) Oral daily  sodium bicarbonate 650 milliGRAM(s) Oral four times a day      REVIEW OF SYSTEMS:  Complete 12 point ROS negative.    PHYSICAL EXAM:  T(C): 36.8 (02-27-25 @ 04:40), Max: 36.8 (02-27-25 @ 04:40)  HR: 87 (02-27-25 @ 04:40) (66 - 108)  BP: 97/66 (02-27-25 @ 04:40) (97/66 - 109/66)  RR: 16 (02-27-25 @ 04:40) (16 - 18)  SpO2: 92% (02-27-25 @ 04:40) (92% - 99%)  Wt(kg): --  I&O's Summary    26 Feb 2025 07:01  -  27 Feb 2025 07:00  --------------------------------------------------------  IN: 240 mL / OUT: 1500 mL / NET: -1260 mL    27 Feb 2025 07:01  -  27 Feb 2025 10:32  --------------------------------------------------------  IN: 240 mL / OUT: 0 mL / NET: 240 mL        Appearance: Normal	  HEENT: PERRL, EOMI	  Cardiovascular: Normal S1 S2, No JVD, No murmurs  Respiratory: Lungs clear to auscultation	  Psychiatry: A & O x 3, Mood & affect appropriate  Gastrointestinal:  Soft, Non-tender, + BS	  Skin: No rashes, No ecchymoses, No cyanosis	  Neurologic: Grossly intact  Extremities: No clubbing, cyanosis or edema  Vascular: Peripheral pulses palpable 2+ bilaterally        LABS:	 	    CBC Full  -  ( 27 Feb 2025 06:04 )  WBC Count : 7.06 K/uL  Hemoglobin : 10.0 g/dL  Hematocrit : 32.0 %  Platelet Count - Automated : 217 K/uL  Mean Cell Volume : 102.9 fl  Mean Cell Hemoglobin : 32.2 pg  Mean Cell Hemoglobin Concentration : 31.3 g/dL  Auto Neutrophil # : x  Auto Lymphocyte # : x  Auto Monocyte # : x  Auto Eosinophil # : x  Auto Basophil # : x  Auto Neutrophil % : x  Auto Lymphocyte % : x  Auto Monocyte % : x  Auto Eosinophil % : x  Auto Basophil % : x    02-27    137  |  95[L]  |  29[H]  ----------------------------<  119[H]  4.3   |  26  |  3.65[H]  02-26    139  |  93[L]  |  49[H]  ----------------------------<  86  4.6   |  24  |  4.78[H]    Ca    8.2[L]      27 Feb 2025 06:04  Ca    8.9      26 Feb 2025 07:22  Phos  3.4     02-27  Phos  4.9     02-26  Mg     2.2     02-27  Mg     2.4     02-26    TPro  5.8[L]  /  Alb  3.5  /  TBili  0.6  /  DBili  x   /  AST  45[H]  /  ALT  143[H]  /  AlkPhos  110  02-27  TPro  6.5  /  Alb  3.9  /  TBili  0.8  /  DBili  x   /  AST  90[H]  /  ALT  204[H]  /  AlkPhos  122[H]  02-26      proBNP:   Lipid Profile:   HgA1c:   TSH:       CARDIAC MARKERS:          TELEMETRY: 	    ECG:  	  RADIOLOGY:  OTHER: 	    PREVIOUS DIAGNOSTIC TESTING:    [ ] Echocardiogram:    [ ]  Catheterization:  [ ] Stress Test:  	  	  ASSESSMENT/PLAN: 	     24H hour events: Pt without complaint, no acute events overnight. Tele: Afib with VHR  bpm (up to 110's at times)     MEDICATIONS:  apixaban 2.5 milliGRAM(s) Oral every 12 hours  buMETAnide Injectable 2 milliGRAM(s) IV Push daily  metoprolol succinate  milliGRAM(s) Oral <User Schedule>  acetaminophen     Tablet .. 650 milliGRAM(s) Oral every 6 hours PRN  melatonin 3 milliGRAM(s) Oral at bedtime PRN  OLANZapine 2.5 milliGRAM(s) Oral every 12 hours PRN  chlorhexidine 2% Cloths 1 Application(s) Topical <User Schedule>  influenza  Vaccine (HIGH DOSE) 0.5 milliLiter(s) IntraMuscular once  Nephro-teresita 1 Tablet(s) Oral daily  sodium bicarbonate 650 milliGRAM(s) Oral four times a day      REVIEW OF SYSTEMS:  Complete 12 point ROS negative.    PHYSICAL EXAM:  T(C): 36.8 (02-27-25 @ 04:40), Max: 36.8 (02-27-25 @ 04:40)  HR: 87 (02-27-25 @ 04:40) (66 - 108)  BP: 97/66 (02-27-25 @ 04:40) (97/66 - 109/66)  RR: 16 (02-27-25 @ 04:40) (16 - 18)  SpO2: 92% (02-27-25 @ 04:40) (92% - 99%)  Wt(kg): --  I&O's Summary    26 Feb 2025 07:01  -  27 Feb 2025 07:00  --------------------------------------------------------  IN: 240 mL / OUT: 1500 mL / NET: -1260 mL    27 Feb 2025 07:01  -  27 Feb 2025 10:32  --------------------------------------------------------  IN: 240 mL / OUT: 0 mL / NET: 240 mL        Appearance: Normal	  HEENT: PERRL, EOMI	  Cardiovascular: Normal S1 S2, Irregularly irregular, No JVD, No murmurs  Respiratory: Lungs clear to auscultation	  Psychiatry: A & O x 3 (forgetful at times), Mood & affect appropriate  Gastrointestinal: Soft, Non-tender, + BS	  Skin: Right chest wall tunneled HD catheter dressing site C/D/I  Neurologic: Grossly intact  Extremities: No clubbing, cyanosis or edema  Vascular: Peripheral pulses palpable 2+ bilaterally        LABS:	 	    CBC Full  -  ( 27 Feb 2025 06:04 )  WBC Count : 7.06 K/uL  Hemoglobin : 10.0 g/dL  Hematocrit : 32.0 %  Platelet Count - Automated : 217 K/uL  Mean Cell Volume : 102.9 fl  Mean Cell Hemoglobin : 32.2 pg  Mean Cell Hemoglobin Concentration : 31.3 g/dL  Auto Neutrophil # : x  Auto Lymphocyte # : x  Auto Monocyte # : x  Auto Eosinophil # : x  Auto Basophil # : x  Auto Neutrophil % : x  Auto Lymphocyte % : x  Auto Monocyte % : x  Auto Eosinophil % : x  Auto Basophil % : x    02-27    137  |  95[L]  |  29[H]  ----------------------------<  119[H]  4.3   |  26  |  3.65[H]  02-26    139  |  93[L]  |  49[H]  ----------------------------<  86  4.6   |  24  |  4.78[H]    Ca    8.2[L]      27 Feb 2025 06:04  Ca    8.9      26 Feb 2025 07:22  Phos  3.4     02-27  Phos  4.9     02-26  Mg     2.2     02-27  Mg     2.4     02-26    TPro  5.8[L]  /  Alb  3.5  /  TBili  0.6  /  DBili  x   /  AST  45[H]  /  ALT  143[H]  /  AlkPhos  110  02-27  TPro  6.5  /  Alb  3.9  /  TBili  0.8  /  DBili  x   /  AST  90[H]  /  ALT  204[H]  /  AlkPhos  122[H]  02-26    Thyroid Stimulating Hormone, Serum (04.09.24 @ 00:27)    Thyroid Stimulating Hormone, Serum: 0.65 uIU/mL        TELEMETRY: Afib with VHR  bpm (up to 110's at times)  	      < from: TTE W or WO Ultrasound Enhancing Agent (02.13.25 @ 11:46) >  CONCLUSIONS:      1. Left ventricular cavity is mildly dilated. Left ventricular systolic function is moderately decreased with an ejection fraction of 41 % by Garcia'smethod of disks. Global left ventricular hypokinesis.   2. Multiple segmental abnormalities exist. See findings.   3. Left atrium is severely dilated.   4. Severe mitral regurgitation. The mitral regurgitant jet is posteriorly directed.   5. No pericardial effusion seen.   6. Mild to moderate tricuspid regurgitation.   7. Compared to the transthoracic echocardiogram performed on 11/27/2024, LVEF has decreased, MR is increased,.   8. Estimated pulmonary artery systolic pressure is 46 mmHg, consistent with mild to moderate pulmonary hypertension.   9. Symmetric mitral valve leaflet tethering.  10. The inferior vena cava is dilated measuring 2.40 cm in diameter, (dilated >2.1cm) with abnormal inspiratory collapse (abnormal <50%) consistent with elevated right atrial pressure (~15, range 10-20mmHg).  11. There is normal LV mass and normal geometry.    ________________________________________________________________________________________  FINDINGS:     Left Ventricle:  The left ventricular cavity is mildly dilated. Left ventricular systolic function is moderately decreased with a calculated ejection fraction of 41 % by the Garcia's biplane method of disks. There is global left ventricular hypokinesis. There is normal LV mass and normal geometry. There is no evidence of a left ventricular thrombus. Analysis of left ventricular diastolic function and filling pressure is made challenging by the presence of atrial fibrillation.  LV Wall Scoring: There is diffuse hypokinesis.          Right Ventricle:  The right ventricular cavity is normal in size, with normal wall thickness and right ventricular systolic function is borderline reduced. Tricuspid annular plane systolic excursion (TAPSE) is 1.8 cm (normal >=1.7 cm). Tricuspid annulartissue Doppler S' is 8.4 cm/s (normal >10 cm/s).     Left Atrium:  The left atrium is severely dilated with an indexed volume of 112.42 ml/m².     Right Atrium:  The right atrium is normal in size with an indexed volume of 26.64 ml/m² and an indexed area of 9.31 cm²/m².     Aortic Valve:  The aortic valve appears trileaflet with normal systolic excursion. There is no aortic valve stenosis. There is mild aortic regurgitation.     Mitral Valve:  Structurally normal mitral valve with normal leaflet excursion. There is symmetric leaflet tethering. There is severe mitral regurgitation. The mitral regurgitant jet is posteriorly directed.     Tricuspid Valve:  Structurally normal tricuspid valve with normal leaflet excursion. There is mild to moderate tricuspid regurgitation. Estimated pulmonary artery systolic pressure is 46 mmHg, consistent with mild to moderate pulmonary hypertension.     Pulmonic Valve:  Structurally normal pulmonic valve with normal leaflet excursion. There is trace pulmonic regurgitation.     Pericardium:  No pericardial effusion seen.     Systemic Veins:  The inferior vena cava is dilated measuring 2.40 cm in diameter, (dilated >2.1cm) with abnormal inspiratory collapse (abnormal <50%) consistent with elevated right atrial pressure (~15, range 10-20mmHg).  ____________________________________________________________________  QUANTITATIVE DATA:  Left Ventricle Measurements: (Indexed to BSA)     IVSd (2D):   0.7 cm  LVPWd (2D):  0.7 cm  LVIDd (2D):  5.7 cm  LVIDs (2D):  3.5 cm  LV Mass:     141 g  83.6 g/m²  BiPlane LV EF%: 41 %    Aorta Measurements: (Normal range) (Indexed to BSA)     Ao Root 2.7 cm  Ao Asc: 2.8 cm       Left Atrium Measurements: (Indexed to BSA)  LA Diam 2D: 5.99 cm         Right Ventricle Measurements: Right Atrial Measurements:     TAPSE: 1.8 cm                 RA Vol:       45.00 ml                                RA Vol Index: 26.64 ml/m²       LVOT / RVOT/ Qp/Qs Data: (Indexed to BSA)  LVOT Diameter,s: 1.77 cm  LVOT Area:       2.47 cm²  LVOT Vmax:       0.70 m/s  LVOT VTI:        12.96 cm  LVOT peak grad:  2 mmHg  LVOT mean grad:  1.2 mmHg  LVOT SV:         32.1 ml  18.98 ml/m²    Aortic Valve Measurements:  AV Vmax:          1.2 m/s  AV Peak Gradient: 5.4 mmHg  AR Vmax4.20 m/s  AR PHT            34048 msec  AR [place]          0.75 m/s²  AR Decel Time     49043 msec       MR Vmax:          5.18 m/s  MR VTI:           164.20 cm  MR Mean Gradient: 81.1 mmHg  MR Peak Gradient: 107.4 mmHg       Tricuspid Valve Measurements:     TV S'             8.4 cm/s  TR Vmax:          2.8 m/s  TR Peak Gradient: 31.4 mmHg  RA Pressure:      15 mmHg  PASP:             46 mmHg    < end of copied text >

## 2025-02-27 NOTE — PROGRESS NOTE ADULT - PROBLEM SELECTOR PLAN 4
Patient gets anxious and sometimes tries to leave. Has AMA'd in the past but does not have capacity to leave.  -Now patient experiencing visual hallucinations (big bugs all over the floor), likely hospital-aquired delirium     Plan:  -Zyprexa 2.5mg prn (repeat EKG 2/25 with QTc 385)  -Delirium precautions  -Melatonin at bedtime  -If worsening, can consider psych consult

## 2025-02-27 NOTE — PROGRESS NOTE ADULT - ASSESSMENT
74-year-old female with PMHx of HTN, CKD stage 5 (pt. of Dr. Elizondo), Afib on Eliquis, anemia, nephrolithiasis, and recently diagnosed HF who presents for scheduled Afib ablation for Afib found to have NICO.    Nephrology consulted for NICO on CKD V.    Acute kidney injury superimposed on CKD-5 iso possibly over-diuresis (increased from lasix 80mg BID to torsemide 100mg BID 1/31/24)  -Pt. with hx of advanced CKD is in the setting of chronic history of nephrolithiasis/staghorn calculus. On review [facility] [place], last Scr was elevated at 6.18 on 1/30/25. Admission SCr is elevated at 8.42 without electrolyte abnormalities. Prior renal US 1/8/25 without hydronephrosis but calcifications, stones and cysts bilaterally.   USG - Kidney and bladder showed Bilateral renal calculi, no Hydronephrosis, B/L renal parenchymal disease. UPCR 4.9    RECS:  Now ESRD, on HD started last week  Has a tunnelled HD cath  HD yesterday, UF today, next HD again tomorrow    Anemia iso CKD  Complete  venofer 200mg IV with HD X 5 doses  Give PERLITA 4000  U with HD.       Cards:  ·TTE in 11/2024 showed progression to moderate to severe MR. TTE now with LVEF of 41%, global hypokinesis, severe MR, RAP of 15.   -Structural Heart evaluation noted. Once well HD and good UF, repeat TTE and re-assess  -Plan for ablation tomorrow as well dialyzed now  -daily UF to help re volume mgt, can also give bumex 2mg IV for good UO (S/p LHC/RHC on 2/24 with elevated filling pressures: mRA 17, PCWP 30, CI 2.14)    Access:  please ask Dr White from vascular to get reconsulted as given cardioversion tomorrow, can get AVF next week before dc planning or aVF as outpatient      Dc planning to [facility] HD unit- preferred unit by patient under Dr Elizondo once medically ready    d/w with Cards    Shane Mauricio MD  Office   Contact me directly via Microsoft Teams     (After 5 pm or on weekends please page the on-call fellow/attending, can check [email or web address] for schedule. Login is dominick pierre, schedule under [facility] medicine, psych, derm)         74-year-old female with PMHx of HTN, CKD stage 5 (pt. of Dr. Elizondo), Afib on Eliquis, anemia, nephrolithiasis, and recently diagnosed HF who presents for scheduled Afib ablation for Afib found to have NICO.    Nephrology consulted for NICO on CKD V.    Acute kidney injury superimposed on CKD-5 iso possibly over-diuresis (increased from lasix 80mg BID to torsemide 100mg BID 1/31/24)  -Pt. with hx of advanced CKD is in the setting of chronic history of nephrolithiasis/staghorn calculus. On review of [facility] [place], last Scr was elevated at 6.18 on 1/30/25. Admission SCr is elevated at 8.42 without electrolyte abnormalities. Prior renal US 1/8/25 without hydronephrosis but calcifications, stones and cysts bilaterally.   USG - Kidney and bladder showed Bilateral renal calculi, no Hydronephrosis, B/L renal parenchymal disease. UPCR 4.9    RECS:  Now ESRD, on HD started last week  Has a tunnelled HD cath  HD yesterday, UF today, next HD again tomorrow    Anemia iso CKD  Complete  venofer 200mg IV with HD X 5 doses  Give PERLITA 4000  U with HD.       Cards:  ·TTE in 11/2024 showed progression to moderate to severe MR. TTE now with LVEF of 41%, global hypokinesis, severe MR, RAP of 15.   -Structural Heart evaluation noted. Once well HD and good UF, repeat TTE and re-assess  -Plan for ablation tomorrow as well dialyzed now  -daily UF to help re volume mgt, can also give bumex 2mg IV for good UO (S/p LHC/RHC on 2/24 with elevated filling pressures: mRA 17, PCWP 30, CI 2.14)    Access:  please ask Dr White from vascular to get reconsulted as given cardioversion tomorrow, can get AVF outpatient whenever appropriate for EP and cards.    Dc planning to [facility] HD unit- preferred unit by patient under Dr Elizondo once medically ready    d/w with Cards    Shane Mauricio MD  Office   Contact me directly via Microsoft Teams     (After 5 pm or on weekends please page the on-call fellow/attending, can check [email or web address] for schedule. Login is dominick pierre, schedule under [facility] medicine, psych, derm)

## 2025-02-27 NOTE — PROGRESS NOTE ADULT - SUBJECTIVE AND OBJECTIVE BOX
Patient is a 74y old  Female who presents with a chief complaint of ESRD AF (23 Feb 2025 08:39)      INTERVAL HPI/OVERNIGHT EVENTS: No acute events overnight. S/p HD with removal of 1.5L. New hallucinations (auditory and visual) yesterday, zyprexa made standing. UA obtained and positive.    Patient examined at bedside this morning. Actively hallucinating, seeing bugs on the floor. Denies any other symptoms, including heart palpations or lightheadedness. Reports normal appetite and BMs. Per nurse, patient frequently anxious and roaming halls toward elevators.       Vital Signs Last 24 Hrs  T(C): 36.8 (27 Feb 2025 04:40), Max: 36.8 (27 Feb 2025 04:40)  T(F): 98.3 (27 Feb 2025 04:40), Max: 98.3 (27 Feb 2025 04:40)  HR: 87 (27 Feb 2025 04:40) (66 - 108)  BP: 97/66 (27 Feb 2025 04:40) (97/66 - 109/66)  BP(mean): --  RR: 16 (27 Feb 2025 04:40) (16 - 18)  SpO2: 92% (27 Feb 2025 04:40) (92% - 99%)    Parameters below as of 27 Feb 2025 04:40  Patient On (Oxygen Delivery Method): room air      I&O's Detail    26 Feb 2025 07:01  -  27 Feb 2025 07:00  --------------------------------------------------------  IN:    Oral Fluid: 240 mL  Total IN: 240 mL    OUT:    Other (mL): 1500 mL  Total OUT: 1500 mL    Total NET: -1260 mL      CAPILLARY BLOOD GLUCOSE        PHYSICAL EXAM:  General: NAD  HEENT: PERRL, normal sclera/conjunctiva  Neck: Supple  Lungs: CTA bilaterally  Heart: Irregular rhythm appreciated, normal S1S2, no murmurs/rubs/gallops  Abdomen: Soft, ND/NT  Extremities: b/l LE pitting edema. RFV/A site intact with no surrounding erythema or pain  Skin: Warm, well-perfused  Neuro: A&O x3, no focal deficits, experiencing visual hallucinations      LABS:                                     10.0   7.06  )-----------( 217      ( 27 Feb 2025 06:04 )             32.0     02-27    137  |  95[L]  |  29[H]  ----------------------------<  119[H]  4.3   |  26  |  3.65[H]    Ca    8.2[L]      27 Feb 2025 06:04  Phos  3.4     02-27  Mg     2.2     02-27    TPro  5.8[L]  /  Alb  3.5  /  TBili  0.6  /  DBili  x   /  AST  45[H]  /  ALT  143[H]  /  AlkPhos  110  02-27        Urinalysis Basic - ( 27 Feb 2025 06:04 )    Color: x / Appearance: x / SG: x / pH: x  Gluc: 119 mg/dL / Ketone: x  / Bili: x / Urobili: x   Blood: x / Protein: x / Nitrite: x   Leuk Esterase: x / RBC: x / WBC x   Sq Epi: x / Non Sq Epi: x / Bacteria: x        RADIOLOGY & ADDITIONAL TESTS:    < from: Cardiac Catheterization (02.24.25 @ 17:06) >  Diagnostic Conclusions:     Three vessel coronary artery disease   Normal left main coronary artery   Right dominant system   Elevated right atrial pressure (mRA 17mmHg with a V wave of 21mmHg)   Mild post-capillary pulmonary hypertension (sPAP 40mmHg, dPAP 22mmHg,  mPAP 30mmHg)  PCWP = 30mmHg with a V wave of 36mmHg   LVEDP = 20mmHg   AO = 112/73/90   PAsat = 43.25% // AOsat = 94.9% (room air)   Felix CO // CI = 3.44l/min // 2.14l/min/m2     Recommendations:     Keep right leg straight for 4 hours following removal of sheaths   Continue aggressive medical management     < end of copied text >      MEDICATIONS  (STANDING):  apixaban 5 milliGRAM(s) Oral every 12 hours  chlorhexidine 2% Cloths 1 Application(s) Topical <User Schedule>  influenza  Vaccine (HIGH DOSE) 0.5 milliLiter(s) IntraMuscular once  metoprolol succinate  milliGRAM(s) Oral <User Schedule>  sevelamer carbonate 800 milliGRAM(s) Oral three times a day with meals  sodium bicarbonate 650 milliGRAM(s) Oral four times a day    MEDICATIONS  (PRN):  acetaminophen     Tablet .. 650 milliGRAM(s) Oral every 6 hours PRN Temp greater or equal to 38C (100.4F), Mild Pain (1 - 3)  melatonin 3 milliGRAM(s) Oral at bedtime PRN Insomnia      HEALTH ISSUES - PROBLEM Dx:  Acute kidney injury superimposed on CKD    Paroxysmal atrial fibrillation    Need for prophylactic measure    Metabolic acidosis    Hypertension    Severe mitral regurgitation    Chronic HFrEF (heart failure with reduced ejection fraction)             Patient is a 74y old  Female who presents with a chief complaint of ESRD AF (23 Feb 2025 08:39)      INTERVAL HPI/OVERNIGHT EVENTS: No acute events overnight. S/p HD with removal of 1.5L. New hallucinations (auditory and visual) yesterday, zyprexa PRN. UA obtained and positive.    Patient examined at bedside this morning. Actively hallucinating, seeing bugs and hearing voices. Confused about plan. Denies other symptoms, including heart palpations or lightheadedness. Reports normal appetite and BMs.       Vital Signs Last 24 Hrs  T(C): 36.8 (27 Feb 2025 04:40), Max: 36.8 (27 Feb 2025 04:40)  T(F): 98.3 (27 Feb 2025 04:40), Max: 98.3 (27 Feb 2025 04:40)  HR: 87 (27 Feb 2025 04:40) (66 - 108)  BP: 97/66 (27 Feb 2025 04:40) (97/66 - 109/66)  BP(mean): --  RR: 16 (27 Feb 2025 04:40) (16 - 18)  SpO2: 92% (27 Feb 2025 04:40) (92% - 99%)    Parameters below as of 27 Feb 2025 04:40  Patient On (Oxygen Delivery Method): room air      I&O's Detail    26 Feb 2025 07:01  -  27 Feb 2025 07:00  --------------------------------------------------------  IN:    Oral Fluid: 240 mL  Total IN: 240 mL    OUT:    Other (mL): 1500 mL  Total OUT: 1500 mL    Total NET: -1260 mL      CAPILLARY BLOOD GLUCOSE        PHYSICAL EXAM:  General: NAD  HEENT: PERRL, normal sclera/conjunctiva  Neck: Supple  Lungs: CTA bilaterally  Heart: Irregular rhythm appreciated, normal S1S2, no murmurs/rubs/gallops  Abdomen: Soft, ND/NT  Extremities: b/l LE pitting edema, improving. RFV/A site intact with no surrounding erythema or pain  Skin: Warm, well-perfused  Neuro: A&O x3, no focal deficits, experiencing visual and auditory hallucinations      LABS:                                     10.0   7.06  )-----------( 217      ( 27 Feb 2025 06:04 )             32.0     02-27    137  |  95[L]  |  29[H]  ----------------------------<  119[H]  4.3   |  26  |  3.65[H]    Ca    8.2[L]      27 Feb 2025 06:04  Phos  3.4     02-27  Mg     2.2     02-27    TPro  5.8[L]  /  Alb  3.5  /  TBili  0.6  /  DBili  x   /  AST  45[H]  /  ALT  143[H]  /  AlkPhos  110  02-27        Urinalysis Basic - ( 27 Feb 2025 06:04 )    Color: x / Appearance: x / SG: x / pH: x  Gluc: 119 mg/dL / Ketone: x  / Bili: x / Urobili: x   Blood: x / Protein: x / Nitrite: x   Leuk Esterase: x / RBC: x / WBC x   Sq Epi: x / Non Sq Epi: x / Bacteria: x        RADIOLOGY & ADDITIONAL TESTS:    < from: Cardiac Catheterization (02.24.25 @ 17:06) >  Diagnostic Conclusions:     Three vessel coronary artery disease   Normal left main coronary artery   Right dominant system   Elevated right atrial pressure (mRA 17mmHg with a V wave of 21mmHg)   Mild post-capillary pulmonary hypertension (sPAP 40mmHg, dPAP 22mmHg,  mPAP 30mmHg)  PCWP = 30mmHg with a V wave of 36mmHg   LVEDP = 20mmHg   AO = 112/73/90   PAsat = 43.25% // AOsat = 94.9% (room air)   Felix CO // CI = 3.44l/min // 2.14l/min/m2     Recommendations:     Keep right leg straight for 4 hours following removal of sheaths   Continue aggressive medical management     < end of copied text >      MEDICATIONS  (STANDING):  apixaban 5 milliGRAM(s) Oral every 12 hours  chlorhexidine 2% Cloths 1 Application(s) Topical <User Schedule>  influenza  Vaccine (HIGH DOSE) 0.5 milliLiter(s) IntraMuscular once  metoprolol succinate  milliGRAM(s) Oral <User Schedule>  sevelamer carbonate 800 milliGRAM(s) Oral three times a day with meals  sodium bicarbonate 650 milliGRAM(s) Oral four times a day    MEDICATIONS  (PRN):  acetaminophen     Tablet .. 650 milliGRAM(s) Oral every 6 hours PRN Temp greater or equal to 38C (100.4F), Mild Pain (1 - 3)  melatonin 3 milliGRAM(s) Oral at bedtime PRN Insomnia      HEALTH ISSUES - PROBLEM Dx:  Acute kidney injury superimposed on CKD    Paroxysmal atrial fibrillation    Need for prophylactic measure    Metabolic acidosis    Hypertension    Severe mitral regurgitation    Chronic HFrEF (heart failure with reduced ejection fraction)

## 2025-02-27 NOTE — PROGRESS NOTE ADULT - SUBJECTIVE AND OBJECTIVE BOX
[facility] - Heidy Bates, Alverto Green, Kevyn King  Office Number:  [phone]    Patient resting comfortably in bed in NAD.  Laying flat with no respiratory distress.  No complaints of chest pain, dyspnea, palpitations, PND, or orthopnea.  says she is feeling better overall    ROS: negative unless otherwise mentioned.    Telemetry:  af with rvr    MEDICATIONS  (STANDING):  apixaban 2.5 milliGRAM(s) Oral every 12 hours  buMETAnide Injectable 2 milliGRAM(s) IV Push daily  chlorhexidine 2% Cloths 1 Application(s) Topical <User Schedule>  influenza  Vaccine (HIGH DOSE) 0.5 milliLiter(s) IntraMuscular once  metoprolol succinate  milliGRAM(s) Oral <User Schedule>  Nephro-teresita 1 Tablet(s) Oral daily  sevelamer carbonate 800 milliGRAM(s) Oral three times a day with meals  sodium bicarbonate 650 milliGRAM(s) Oral four times a day    MEDICATIONS  (PRN):  acetaminophen     Tablet .. 650 milliGRAM(s) Oral every 6 hours PRN Temp greater or equal to 38C (100.4F), Mild Pain (1 - 3)  melatonin 3 milliGRAM(s) Oral at bedtime PRN Insomnia  OLANZapine 2.5 milliGRAM(s) Oral every 12 hours PRN Agitation/hallucinations      Allergies    No Known Allergies    Intolerances        Vital Signs Last 24 Hrs  T(C): 36.8 (27 Feb 2025 04:40), Max: 36.8 (27 Feb 2025 04:40)  T(F): 98.3 (27 Feb 2025 04:40), Max: 98.3 (27 Feb 2025 04:40)  HR: 87 (27 Feb 2025 04:40) (66 - 108)  BP: 97/66 (27 Feb 2025 04:40) (97/66 - 109/66)  BP(mean): --  RR: 16 (27 Feb 2025 04:40) (16 - 18)  SpO2: 92% (27 Feb 2025 04:40) (92% - 99%)    Parameters below as of 27 Feb 2025 04:40  Patient On (Oxygen Delivery Method): room air        I&O's Summary    26 Feb 2025 07:01  -  27 Feb 2025 07:00  --------------------------------------------------------  IN: 240 mL / OUT: 1500 mL / NET: -1260 mL        ON EXAM:  General: NAD, awake and alert, oriented x 3  HEENT: Mucous membranes are moist, anicteric  Lungs: Non-labored, breath sounds are decreased bilaterally, No wheezing, rales or rhonchi  Cardiovascular: irregular S1 and S2, no murmurs, rubs, or gallops  Gastrointestinal: Bowel Sounds present, soft, nontender.   Lymph: trace-1+ peripheral edema. No lymphadenopathy.  Skin: No rashes or ulcers  Psych:  awake and alert    LABS: All Labs Reviewed:                        10.0   7.06  )-----------( 217      ( 27 Feb 2025 06:04 )             32.0                         10.2   7.61  )-----------( 209      ( 26 Feb 2025 07:22 )             33.3                         9.6    8.30  )-----------( 206      ( 25 Feb 2025 06:30 )             33.1     27 Feb 2025 06:04    137    |  95     |  29     ----------------------------<  119    4.3     |  26     |  3.65   26 Feb 2025 07:22    139    |  93     |  49     ----------------------------<  86     4.6     |  24     |  4.78   25 Feb 2025 09:04    137    |  92     |  36     ----------------------------<  97     4.5     |  25     |  3.76     Ca    8.2        27 Feb 2025 06:04  Ca    8.9        26 Feb 2025 07:22  Ca    8.6        25 Feb 2025 09:04  Phos  3.4       27 Feb 2025 06:04  Phos  4.9       26 Feb 2025 07:22  Phos  3.7       25 Feb 2025 06:30  Mg     2.2       27 Feb 2025 06:04  Mg     2.4       26 Feb 2025 07:22  Mg     2.0       25 Feb 2025 06:30    TPro  5.8    /  Alb  3.5    /  TBili  0.6    /  DBili  x      /  AST  45     /  ALT  143    /  AlkPhos  110    27 Feb 2025 06:04  TPro  6.5    /  Alb  3.9    /  TBili  0.8    /  DBili  x      /  AST  90     /  ALT  204    /  AlkPhos  122    26 Feb 2025 07:22  TPro  6.5    /  Alb  4.0    /  TBili  1.2    /  DBili  x      /  AST  137    /  ALT  241    /  AlkPhos  119    25 Feb 2025 09:04          Blood Culture:

## 2025-02-27 NOTE — PROGRESS NOTE ADULT - ASSESSMENT
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 Started HD due to volume overload refractory to diuresis. S/p LHC/RHC on 2/24. Plan for DCCV once volume is better controlled, followed by amiodarone.

## 2025-02-27 NOTE — PROGRESS NOTE ADULT - PROBLEM SELECTOR PLAN 3
TTE in 11/2024 showed progression to moderate to severe MR.  Left ventricular systolic function is normal with an ejection fraction of 55 % by Garcia's method of disks. Appears hypervolemic on exam. Repeat TTE during admission reveals left ventricular systolic function is moderately decreased with an ejection fraction of 41 % by Garcia's method of disks.     Plan:  -Torsemide 60 mg on non dialysis days  -S/p RHC and LHC on 2/24 showing elevated filling pressures: mRA 17, PCWP 30, CI 2.14, 75% mRCA, otherwise no obstruction; plan to continue medical management  -Patient still volume overloaded, started on Bumex 2g IV daily and plan for HD/UF daily until euvolemic  -General and structural cardiology following  -Plan to reassess LVEF once in NSR after cardioversion

## 2025-02-27 NOTE — PROGRESS NOTE ADULT - ATTENDING COMMENTS
NICO on CKD- continue HD/UF for volume optimization- has permacath- AVF as outpatient  +UA- send urine culture, start CTX x 3 days   CAD- s/p LHC with 75% mRCA- start statin- transaminitis improving  AF- /DCCV tomorrow 2/28  MR/TR- to be reassessed once volume optimized- structural heart following  Hospital delirium with hallucinations- continue Zyprexa prn- hallucinations improved this am

## 2025-02-27 NOTE — PROGRESS NOTE ADULT - PROBLEM SELECTOR PLAN 9
DVT ppx: eliquis  Diet: Dash diet, low phos diet with nepro daily and nephro-teresita daily  Dispo: pending clinical course

## 2025-02-27 NOTE — PROGRESS NOTE ADULT - PROBLEM SELECTOR PLAN 2
Admitted for cardioversion but unable to do because NICO on CKD.    Plan:  -Pending cardioversion; however, delayed because pt still volume overloaded (LHC/RHC on 2/24 with elevated filling pressures: mRA 17, PCWP 30, CI 2.14) - plan for  and cardioversion once volume status better optimized  -Eliquis 2.5 mg BID - renally dosed  -Increased toprol to 150 bid for rate control  -EP recommend d/c amiodarone given plan for tunnel cath and worsening kidney function Admitted for cardioversion but unable to do because NICO on CKD.    Plan:  -Pending cardioversion; however, delayed because pt still volume overloaded (LHC/RHC on 2/24 with elevated filling pressures: mRA 17, PCWP 30, CI 2.14) - plan for  and cardioversion once volume status better optimized, tentatively scheduled for tomorrow 2/28  -Eliquis 2.5 mg BID - renally dosed  -Increased toprol to 150 bid for rate control  -EP recommend d/c amiodarone given plan for tunnel cath and worsening kidney function

## 2025-02-27 NOTE — PROGRESS NOTE ADULT - PROBLEM SELECTOR PLAN 1
Hx/o CKD iso b/l staghorn calculus s/p removal (2009) (follows with Dr. Elizondo). Found to have worsening renal function on admission so procedure cancelled. Baseline 4.6 in Aug 2024 and 5.5 in 1/25. Cr on admission 2/12 was 8.42.     Plan:  - Strict I/Os, daily weights  - Trend BMPs, monitor renal function, renally dose meds, avoid nephrotoxins   - Nephro consulted, new dialysis started - HF on 2/19, 2/20, 2/21, 2/24, 2/25, 2/26  - Nutritioned eval appreciated, recommended low sodium, low phos diet with nepro daily and nephro-teresita daily Hx/o CKD iso b/l staghorn calculus s/p removal (2009) (follows with Dr. Elizondo). Found to have worsening renal function on admission so procedure cancelled. Baseline 4.6 in Aug 2024 and 5.5 in 1/25. Cr on admission 2/12 was 8.42.     Plan:  - Strict I/Os, daily weights  - Trend BMPs, monitor renal function, renally dose meds, avoid nephrotoxins   - Nephro consulted, new dialysis started - now with alternating HD and UF daily  - Nutritioned eval appreciated, recommended low sodium, low phos diet with nepro daily and nephro-teresita daily  - Venofer 200mg IV with HD X 5 doses and PERLITA 4000  U with HD  - Plan for AVF placement either inpatient vs outpatient

## 2025-02-27 NOTE — PROGRESS NOTE ADULT - ASSESSMENT
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 Now s/p tunneled catheter placed on 2/19/25 and started on dialysis.     1. Persistent Afib  2. NICO on CKD, now ESRD Per Renal  3. Severe MR    - She was admitted in April 2024 with the same presentation, underwent DCCV and loaded on amio, did well. Readmitted in January 2025 with the same presentation. Plan was DCCV when was she was admitted in January but left AMA.   - Structural heart evaluation appreciated, repeat TTE when euvolemic to reassess MR in sinus rhythm.   - s/p RHC/LHC 2/24, elevated filling pressures, 75%mRCA  - s/p right chest wall tunneled catheter placed on 2/19/25, s/p HD 2/19, 2/20, 2/21, 2/24, 2/26  - Continue Eliquis at 2.5mg BID (Age <80, Wt <60Kg, Crt >1.5)   - Continue aggressive diuresis, on IVP Bumex qd  - Eventual GDMT  - Amiodarone was held in the setting of AC was interrupted this admission. Will restart post /DCCV.   - NPO after MN except meds for /DCCV tomorrow (2/28/25). Patient would have to be on uninterrupted AC for minimum of 30 days post cardioversion.   - Primary team spoke with Renal, pt already has a permacath. Plan is for AVF placement after 30 days post cardioversion.    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 Now s/p tunneled catheter placed on 2/19/25 and started on dialysis.     1. Persistent Afib  2. NICO on CKD, now ESRD Per Renal  3. Severe MR    - She was admitted in April 2024 with the same presentation, underwent DCCV and loaded on amio, did well. Readmitted in January 2025 with the same presentation. Plan was DCCV when was she was admitted in January but left AMA.   - Structural heart evaluation appreciated, repeat TTE when euvolemic to reassess MR in sinus rhythm.   - s/p RHC/LHC 2/24, elevated filling pressures, 75%mRCA  - s/p right chest wall tunneled catheter placed on 2/19/25, s/p HD 2/19, 2/20, 2/21, 2/24, 2/26. Next HD 2/28  - Continue Eliquis at 2.5mg BID (Age <80, Wt <60Kg, Crt >1.5)   - Continue aggressive diuresis, on IVP Bumex qd  - Eventual GDMT  - Amiodarone was held in the setting of AC was interrupted this admission. Will restart post /DCCV.   - NPO after MN except meds for /DCCV tomorrow (2/28/25). Patient would have to be on uninterrupted AC for minimum of 30 days post cardioversion.   - Primary team spoke with Renal, pt already has a permacath. Plan is for AVF placement after 30 days post cardioversion.  - Would prefer HD after /DCCV tomorrow (2/28/25)    MAGDALENA Mata NP-C  [id number]

## 2025-02-28 ENCOUNTER — RESULT REVIEW (OUTPATIENT)
Age: 75
End: 2025-02-28

## 2025-02-28 LAB
ALBUMIN SERPL ELPH-MCNC: 3.4 G/DL — SIGNIFICANT CHANGE UP (ref 3.3–5)
ALP SERPL-CCNC: 107 U/L — SIGNIFICANT CHANGE UP (ref 40–120)
ALT FLD-CCNC: 100 U/L — HIGH (ref 10–45)
ANION GAP SERPL CALC-SCNC: 17 MMOL/L — SIGNIFICANT CHANGE UP (ref 5–17)
APTT BLD: 25.7 SEC — SIGNIFICANT CHANGE UP (ref 24.5–35.6)
AST SERPL-CCNC: 23 U/L — SIGNIFICANT CHANGE UP (ref 10–40)
BILIRUB SERPL-MCNC: 0.5 MG/DL — SIGNIFICANT CHANGE UP (ref 0.2–1.2)
BUN SERPL-MCNC: 55 MG/DL — HIGH (ref 7–23)
CALCIUM SERPL-MCNC: 7.9 MG/DL — LOW (ref 8.4–10.5)
CHLORIDE SERPL-SCNC: 97 MMOL/L — SIGNIFICANT CHANGE UP (ref 96–108)
CO2 SERPL-SCNC: 25 MMOL/L — SIGNIFICANT CHANGE UP (ref 22–31)
CREAT SERPL-MCNC: 5.36 MG/DL — HIGH (ref 0.5–1.3)
EGFR: 8 ML/MIN/1.73M2 — LOW
EGFR: 8 ML/MIN/1.73M2 — LOW
GLUCOSE SERPL-MCNC: 92 MG/DL — SIGNIFICANT CHANGE UP (ref 70–99)
HCT VFR BLD CALC: 31.1 % — LOW (ref 34.5–45)
HGB BLD-MCNC: 9.1 G/DL — LOW (ref 11.5–15.5)
INR BLD: 1.09 RATIO — SIGNIFICANT CHANGE UP (ref 0.85–1.16)
MAGNESIUM SERPL-MCNC: 2.2 MG/DL — SIGNIFICANT CHANGE UP (ref 1.6–2.6)
MCHC RBC-ENTMCNC: 29.3 G/DL — LOW (ref 32–36)
MCHC RBC-ENTMCNC: 30.5 PG — SIGNIFICANT CHANGE UP (ref 27–34)
MCV RBC AUTO: 104.4 FL — HIGH (ref 80–100)
NRBC BLD AUTO-RTO: 2 /100 WBCS — HIGH (ref 0–0)
PHOSPHATE SERPL-MCNC: 3.4 MG/DL — SIGNIFICANT CHANGE UP (ref 2.5–4.5)
PLATELET # BLD AUTO: 179 K/UL — SIGNIFICANT CHANGE UP (ref 150–400)
POTASSIUM SERPL-MCNC: 5.2 MMOL/L — SIGNIFICANT CHANGE UP (ref 3.5–5.3)
POTASSIUM SERPL-SCNC: 5.2 MMOL/L — SIGNIFICANT CHANGE UP (ref 3.5–5.3)
PROT SERPL-MCNC: 5.9 G/DL — LOW (ref 6–8.3)
PROTHROM AB SERPL-ACNC: 12.5 SEC — SIGNIFICANT CHANGE UP (ref 9.9–13.4)
RBC # BLD: 2.98 M/UL — LOW (ref 3.8–5.2)
RBC # FLD: 18.2 % — HIGH (ref 10.3–14.5)
SODIUM SERPL-SCNC: 139 MMOL/L — SIGNIFICANT CHANGE UP (ref 135–145)
WBC # BLD: 6.19 K/UL — SIGNIFICANT CHANGE UP (ref 3.8–10.5)
WBC # FLD AUTO: 6.19 K/UL — SIGNIFICANT CHANGE UP (ref 3.8–10.5)

## 2025-02-28 PROCEDURE — 99233 SBSQ HOSP IP/OBS HIGH 50: CPT | Mod: GC

## 2025-02-28 PROCEDURE — 92960 CARDIOVERSION ELECTRIC EXT: CPT

## 2025-02-28 PROCEDURE — 99232 SBSQ HOSP IP/OBS MODERATE 35: CPT | Mod: GC

## 2025-02-28 PROCEDURE — 93010 ELECTROCARDIOGRAM REPORT: CPT

## 2025-02-28 PROCEDURE — 93325 DOPPLER ECHO COLOR FLOW MAPG: CPT | Mod: 26

## 2025-02-28 PROCEDURE — 93320 DOPPLER ECHO COMPLETE: CPT | Mod: 26

## 2025-02-28 PROCEDURE — 76376 3D RENDER W/INTRP POSTPROCES: CPT | Mod: 26

## 2025-02-28 PROCEDURE — 93312 ECHO TRANSESOPHAGEAL: CPT | Mod: 26

## 2025-02-28 RX ORDER — CEFTRIAXONE 500 MG/1
1000 INJECTION, POWDER, FOR SOLUTION INTRAMUSCULAR; INTRAVENOUS EVERY 24 HOURS
Refills: 0 | Status: DISCONTINUED | OUTPATIENT
Start: 2025-02-28 | End: 2025-03-01

## 2025-02-28 RX ORDER — ATORVASTATIN CALCIUM 80 MG/1
80 TABLET, FILM COATED ORAL AT BEDTIME
Refills: 0 | Status: DISCONTINUED | OUTPATIENT
Start: 2025-02-28 | End: 2025-03-06

## 2025-02-28 RX ORDER — AMIODARONE HYDROCHLORIDE 50 MG/ML
200 INJECTION, SOLUTION INTRAVENOUS DAILY
Refills: 0 | Status: DISCONTINUED | OUTPATIENT
Start: 2025-02-28 | End: 2025-03-06

## 2025-02-28 RX ADMIN — SEVELAMER HYDROCHLORIDE 800 MILLIGRAM(S): 800 TABLET ORAL at 13:30

## 2025-02-28 RX ADMIN — APIXABAN 2.5 MILLIGRAM(S): 2.5 TABLET, FILM COATED ORAL at 21:03

## 2025-02-28 RX ADMIN — Medication 650 MILLIGRAM(S): at 05:38

## 2025-02-28 RX ADMIN — METOPROLOL SUCCINATE 150 MILLIGRAM(S): 50 TABLET, EXTENDED RELEASE ORAL at 05:38

## 2025-02-28 RX ADMIN — Medication 650 MILLIGRAM(S): at 02:38

## 2025-02-28 RX ADMIN — APIXABAN 2.5 MILLIGRAM(S): 2.5 TABLET, FILM COATED ORAL at 08:04

## 2025-02-28 RX ADMIN — BUMETANIDE 2 MILLIGRAM(S): 1 TABLET ORAL at 05:38

## 2025-02-28 RX ADMIN — Medication 3 MILLIGRAM(S): at 21:03

## 2025-02-28 RX ADMIN — SEVELAMER HYDROCHLORIDE 800 MILLIGRAM(S): 800 TABLET ORAL at 15:41

## 2025-02-28 RX ADMIN — METOPROLOL SUCCINATE 150 MILLIGRAM(S): 50 TABLET, EXTENDED RELEASE ORAL at 15:42

## 2025-02-28 RX ADMIN — CEFTRIAXONE 100 MILLIGRAM(S): 500 INJECTION, POWDER, FOR SOLUTION INTRAMUSCULAR; INTRAVENOUS at 15:52

## 2025-02-28 RX ADMIN — Medication 1 APPLICATION(S): at 13:27

## 2025-02-28 RX ADMIN — AMIODARONE HYDROCHLORIDE 200 MILLIGRAM(S): 50 INJECTION, SOLUTION INTRAVENOUS at 13:30

## 2025-02-28 RX ADMIN — Medication 1 TABLET(S): at 13:31

## 2025-02-28 RX ADMIN — Medication 650 MILLIGRAM(S): at 15:43

## 2025-02-28 RX ADMIN — ATORVASTATIN CALCIUM 80 MILLIGRAM(S): 80 TABLET, FILM COATED ORAL at 21:03

## 2025-02-28 NOTE — PACU DISCHARGE NOTE - AIRWAY PATENCY:
Quality 226: Preventive Care And Screening: Tobacco Use: Screening And Cessation Intervention: Patient screened for tobacco use and is an ex/non-smoker Quality 111:Pneumonia Vaccination Status For Older Adults: Pneumococcal vaccine (PPSV23) administered on or after patient’s 60th birthday and before the end of the measurement period Satisfactory Quality 130: Documentation Of Current Medications In The Medical Record: Current Medications Documented Detail Level: Detailed Quality 431: Preventive Care And Screening: Unhealthy Alcohol Use - Screening: Patient not identified as an unhealthy alcohol user when screened for unhealthy alcohol use using a systematic screening method Quality 110: Preventive Care And Screening: Influenza Immunization: Influenza Immunization Administered during Influenza season

## 2025-02-28 NOTE — PROGRESS NOTE ADULT - SUBJECTIVE AND OBJECTIVE BOX
24H hour events:     MEDICATIONS:  apixaban 2.5 milliGRAM(s) Oral every 12 hours  buMETAnide Injectable 2 milliGRAM(s) IV Push daily  metoprolol succinate  milliGRAM(s) Oral <User Schedule>        acetaminophen     Tablet .. 650 milliGRAM(s) Oral every 6 hours PRN  melatonin 3 milliGRAM(s) Oral at bedtime PRN  OLANZapine 2.5 milliGRAM(s) Oral every 12 hours PRN        chlorhexidine 2% Cloths 1 Application(s) Topical <User Schedule>  influenza  Vaccine (HIGH DOSE) 0.5 milliLiter(s) IntraMuscular once  Nephro-teresita 1 Tablet(s) Oral daily  sodium bicarbonate 650 milliGRAM(s) Oral four times a day      REVIEW OF SYSTEMS:  Complete 12 point ROS negative.    PHYSICAL EXAM:  T(C): 36.6 (02-28-25 @ 04:37), Max: 36.6 (02-28-25 @ 04:37)  HR: 89 (02-28-25 @ 04:37) (82 - 118)  BP: 103/69 (02-28-25 @ 04:37) (98/50 - 127/85)  RR: 16 (02-28-25 @ 04:37) (16 - 20)  SpO2: 95% (02-28-25 @ 04:37) (95% - 98%)  Wt(kg): --  I&O's Summary    27 Feb 2025 07:01  -  28 Feb 2025 07:00  --------------------------------------------------------  IN: 720 mL / OUT: 1650 mL / NET: -930 mL        Appearance: Normal	  HEENT: PERRL, EOMI	  Cardiovascular: Normal S1 S2, No JVD, No murmurs  Respiratory: Lungs clear to auscultation	  Psychiatry: A & O x 3, Mood & affect appropriate  Gastrointestinal:  Soft, Non-tender, + BS	  Skin: No rashes, No ecchymoses, No cyanosis	  Neurologic: Grossly intact  Extremities: No clubbing, cyanosis or edema  Vascular: Peripheral pulses palpable 2+ bilaterally        LABS:	 	    CBC Full  -  ( 28 Feb 2025 05:57 )  WBC Count : 6.19 K/uL  Hemoglobin : 9.1 g/dL  Hematocrit : 31.1 %  Platelet Count - Automated : 179 K/uL  Mean Cell Volume : 104.4 fl  Mean Cell Hemoglobin : 30.5 pg  Mean Cell Hemoglobin Concentration : 29.3 g/dL  Auto Neutrophil # : x  Auto Lymphocyte # : x  Auto Monocyte # : x  Auto Eosinophil # : x  Auto Basophil # : x  Auto Neutrophil % : x  Auto Lymphocyte % : x  Auto Monocyte % : x  Auto Eosinophil % : x  Auto Basophil % : x    02-28    139  |  97  |  55[H]  ----------------------------<  92  5.2   |  25  |  5.36[H]  02-27    137  |  95[L]  |  29[H]  ----------------------------<  119[H]  4.3   |  26  |  3.65[H]    Ca    7.9[L]      28 Feb 2025 05:54  Ca    8.2[L]      27 Feb 2025 06:04  Phos  3.4     02-28  Phos  3.4     02-27  Mg     2.2     02-28  Mg     2.2     02-27    TPro  5.9[L]  /  Alb  3.4  /  TBili  0.5  /  DBili  x   /  AST  23  /  ALT  100[H]  /  AlkPhos  107  02-28  TPro  5.8[L]  /  Alb  3.5  /  TBili  0.6  /  DBili  x   /  AST  45[H]  /  ALT  143[H]  /  AlkPhos  110  02-27      proBNP:   Lipid Profile:   HgA1c:   TSH:       CARDIAC MARKERS:          TELEMETRY: 	    ECG:  	  RADIOLOGY:  OTHER: 	    PREVIOUS DIAGNOSTIC TESTING:    [ ] Echocardiogram:    [ ]  Catheterization:  [ ] Stress Test:  	  	  ASSESSMENT/PLAN: 	     24H hour events: Pt without complaint, no acute events overnight, Tele: Afib with VHR 's (briefly up to 120's)     MEDICATIONS:  apixaban 2.5 milliGRAM(s) Oral every 12 hours  buMETAnide Injectable 2 milliGRAM(s) IV Push daily  metoprolol succinate  milliGRAM(s) Oral <User Schedule>  acetaminophen     Tablet .. 650 milliGRAM(s) Oral every 6 hours PRN  melatonin 3 milliGRAM(s) Oral at bedtime PRN  OLANZapine 2.5 milliGRAM(s) Oral every 12 hours PRN  chlorhexidine 2% Cloths 1 Application(s) Topical <User Schedule>  influenza  Vaccine (HIGH DOSE) 0.5 milliLiter(s) IntraMuscular once  Nephro-teresita 1 Tablet(s) Oral daily  sodium bicarbonate 650 milliGRAM(s) Oral four times a day      REVIEW OF SYSTEMS:  Complete 12 point ROS negative.    PHYSICAL EXAM:  T(C): 36.6 (02-28-25 @ 04:37), Max: 36.6 (02-28-25 @ 04:37)  HR: 89 (02-28-25 @ 04:37) (82 - 118)  BP: 103/69 (02-28-25 @ 04:37) (98/50 - 127/85)  RR: 16 (02-28-25 @ 04:37) (16 - 20)  SpO2: 95% (02-28-25 @ 04:37) (95% - 98%)  Wt(kg): --  I&O's Summary    27 Feb 2025 07:01  -  28 Feb 2025 07:00  --------------------------------------------------------  IN: 720 mL / OUT: 1650 mL / NET: -930 mL        Appearance: Normal	  HEENT: PERRL, EOMI	  Cardiovascular: Normal S1 S2, Irregularly irregular, No JVD, No murmurs  Respiratory: Lungs clear to auscultation	  Psychiatry: A & O x 3, Mood & affect appropriate  Gastrointestinal: Soft, Non-tender, + BS	  Skin: Right chest wall permacath site C/D/I	  Neurologic: Grossly intact  Extremities: No clubbing, cyanosis or edema  Vascular: Peripheral pulses palpable 2+ bilaterally        LABS:	 	    CBC Full  -  ( 28 Feb 2025 05:57 )  WBC Count : 6.19 K/uL  Hemoglobin : 9.1 g/dL  Hematocrit : 31.1 %  Platelet Count - Automated : 179 K/uL  Mean Cell Volume : 104.4 fl  Mean Cell Hemoglobin : 30.5 pg  Mean Cell Hemoglobin Concentration : 29.3 g/dL  Auto Neutrophil # : x  Auto Lymphocyte # : x  Auto Monocyte # : x  Auto Eosinophil # : x  Auto Basophil # : x  Auto Neutrophil % : x  Auto Lymphocyte % : x  Auto Monocyte % : x  Auto Eosinophil % : x  Auto Basophil % : x    02-28    139  |  97  |  55[H]  ----------------------------<  92  5.2   |  25  |  5.36[H]  02-27    137  |  95[L]  |  29[H]  ----------------------------<  119[H]  4.3   |  26  |  3.65[H]    Ca    7.9[L]      28 Feb 2025 05:54  Ca    8.2[L]      27 Feb 2025 06:04  Phos  3.4     02-28  Phos  3.4     02-27  Mg     2.2     02-28  Mg     2.2     02-27    TPro  5.9[L]  /  Alb  3.4  /  TBili  0.5  /  DBili  x   /  AST  23  /  ALT  100[H]  /  AlkPhos  107  02-28  TPro  5.8[L]  /  Alb  3.5  /  TBili  0.6  /  DBili  x   /  AST  45[H]  /  ALT  143[H]  /  AlkPhos  110  02-27        TELEMETRY: Afib with VHR 's (briefly up to 120's)

## 2025-02-28 NOTE — PROGRESS NOTE ADULT - SUBJECTIVE AND OBJECTIVE BOX
Patient is a 74y old  Female who presents with a chief complaint of ESRD AF (23 Feb 2025 08:39)      INTERVAL HPI/OVERNIGHT EVENTS: No acute events overnight. Plan for cardioversion today.    Patient examined at bedside this morning. Actively hallucinating, seeing bugs and hearing voices. Confused about plan. Denies other symptoms, including heart palpations or lightheadedness. Reports normal appetite and BMs.     Vital Signs Last 24 Hrs  T(C): 36.6 (28 Feb 2025 04:37), Max: 36.6 (28 Feb 2025 04:37)  T(F): 97.9 (28 Feb 2025 04:37), Max: 97.9 (28 Feb 2025 04:37)  HR: 89 (28 Feb 2025 04:37) (82 - 118)  BP: 103/69 (28 Feb 2025 04:37) (98/50 - 127/85)  BP(mean): --  RR: 16 (28 Feb 2025 04:37) (16 - 20)  SpO2: 95% (28 Feb 2025 04:37) (95% - 98%)    Parameters below as of 28 Feb 2025 04:37  Patient On (Oxygen Delivery Method): room air    I&O's Detail    27 Feb 2025 07:01  -  28 Feb 2025 07:00  --------------------------------------------------------  IN:    Oral Fluid: 720 mL  Total IN: 720 mL    OUT:    Other (mL): 1500 mL    Voided (mL): 150 mL  Total OUT: 1650 mL    Total NET: -930 mL        PHYSICAL EXAM:  General: NAD  HEENT: PERRL, normal sclera/conjunctiva  Neck: Supple  Lungs: CTA bilaterally  Heart: Irregular rhythm appreciated, normal S1S2, no murmurs/rubs/gallops  Abdomen: Soft, ND/NT  Extremities: b/l LE pitting edema, improving. RFV/A site intact with no surrounding erythema or pain  Skin: Warm, well-perfused  Neuro: A&O x3, no focal deficits, experiencing visual and auditory hallucinations      LABS:                                                9.1    6.19  )-----------( 179      ( 28 Feb 2025 05:57 )             31.1   02-28    139  |  97  |  55[H]  ----------------------------<  92  5.2   |  25  |  5.36[H]    Ca    7.9[L]      28 Feb 2025 05:54  Phos  3.4     02-28  Mg     2.2     02-28    TPro  5.9[L]  /  Alb  3.4  /  TBili  0.5  /  DBili  x   /  AST  23  /  ALT  100[H]  /  AlkPhos  107  02-28        Urinalysis Basic - ( 27 Feb 2025 06:04 )    Color: x / Appearance: x / SG: x / pH: x  Gluc: 119 mg/dL / Ketone: x  / Bili: x / Urobili: x   Blood: x / Protein: x / Nitrite: x   Leuk Esterase: x / RBC: x / WBC x   Sq Epi: x / Non Sq Epi: x / Bacteria: x      RADIOLOGY & ADDITIONAL TESTS:    < from: Cardiac Catheterization (02.24.25 @ 17:06) >  Diagnostic Conclusions:     Three vessel coronary artery disease   Normal left main coronary artery   Right dominant system   Elevated right atrial pressure (mRA 17mmHg with a V wave of 21mmHg)   Mild post-capillary pulmonary hypertension (sPAP 40mmHg, dPAP 22mmHg,  mPAP 30mmHg)  PCWP = 30mmHg with a V wave of 36mmHg   LVEDP = 20mmHg   AO = 112/73/90   PAsat = 43.25% // AOsat = 94.9% (room air)   Felix CO // CI = 3.44l/min // 2.14l/min/m2     Recommendations:     Keep right leg straight for 4 hours following removal of sheaths   Continue aggressive medical management     < end of copied text >      MEDICATIONS  (STANDING):  apixaban 5 milliGRAM(s) Oral every 12 hours  chlorhexidine 2% Cloths 1 Application(s) Topical <User Schedule>  influenza  Vaccine (HIGH DOSE) 0.5 milliLiter(s) IntraMuscular once  metoprolol succinate  milliGRAM(s) Oral <User Schedule>  sevelamer carbonate 800 milliGRAM(s) Oral three times a day with meals  sodium bicarbonate 650 milliGRAM(s) Oral four times a day    MEDICATIONS  (PRN):  acetaminophen     Tablet .. 650 milliGRAM(s) Oral every 6 hours PRN Temp greater or equal to 38C (100.4F), Mild Pain (1 - 3)  melatonin 3 milliGRAM(s) Oral at bedtime PRN Insomnia      HEALTH ISSUES - PROBLEM Dx:  Acute kidney injury superimposed on CKD    Paroxysmal atrial fibrillation    Need for prophylactic measure    Metabolic acidosis    Hypertension    Severe mitral regurgitation    Chronic HFrEF (heart failure with reduced ejection fraction)             Patient is a 74y old  Female who presents with a chief complaint of ESRD AF (23 Feb 2025 08:39)      INTERVAL HPI/OVERNIGHT EVENTS: No acute events overnight. Plan for cardioversion today.    Patient examined at bedside this morning. Still seems confused, unable to accurately state 's birthday on phone. States she is feeling good. Denies any symptoms, including dysuria, urinary urgency/frequency, heart palpations or lightheadedness. Reports normal appetite and BMs.     Vital Signs Last 24 Hrs  T(C): 36.6 (28 Feb 2025 04:37), Max: 36.6 (28 Feb 2025 04:37)  T(F): 97.9 (28 Feb 2025 04:37), Max: 97.9 (28 Feb 2025 04:37)  HR: 89 (28 Feb 2025 04:37) (82 - 118)  BP: 103/69 (28 Feb 2025 04:37) (98/50 - 127/85)  BP(mean): --  RR: 16 (28 Feb 2025 04:37) (16 - 20)  SpO2: 95% (28 Feb 2025 04:37) (95% - 98%)    Parameters below as of 28 Feb 2025 04:37  Patient On (Oxygen Delivery Method): room air    I&O's Detail    27 Feb 2025 07:01  -  28 Feb 2025 07:00  --------------------------------------------------------  IN:    Oral Fluid: 720 mL  Total IN: 720 mL    OUT:    Other (mL): 1500 mL    Voided (mL): 150 mL  Total OUT: 1650 mL    Total NET: -930 mL        PHYSICAL EXAM:  General: NAD  HEENT: PERRL, normal sclera/conjunctiva  Neck: Supple  Lungs: CTA bilaterally  Heart: Irregular rhythm appreciated, normal S1S2, no murmurs/rubs/gallops  Abdomen: Soft, ND/NT  Extremities: b/l LE pitting edema, improving. RFV/A site intact with no surrounding erythema or pain  Skin: Warm, well-perfused  Neuro: A&O x3, no focal deficits, experiencing visual and auditory hallucinations      LABS:                                        9.1    6.19  )-----------( 179      ( 28 Feb 2025 05:57 )             31.1   02-28    139  |  97  |  55[H]  ----------------------------<  92  5.2   |  25  |  5.36[H]    Ca    7.9[L]      28 Feb 2025 05:54  Phos  3.4     02-28  Mg     2.2     02-28    TPro  5.9[L]  /  Alb  3.4  /  TBili  0.5  /  DBili  x   /  AST  23  /  ALT  100[H]  /  AlkPhos  107  02-28        Urinalysis Basic - ( 27 Feb 2025 06:04 )    Color: x / Appearance: x / SG: x / pH: x  Gluc: 119 mg/dL / Ketone: x  / Bili: x / Urobili: x   Blood: x / Protein: x / Nitrite: x   Leuk Esterase: x / RBC: x / WBC x   Sq Epi: x / Non Sq Epi: x / Bacteria: x      RADIOLOGY & ADDITIONAL TESTS:    < from: Cardiac Catheterization (02.24.25 @ 17:06) >  Diagnostic Conclusions:     Three vessel coronary artery disease   Normal left main coronary artery   Right dominant system   Elevated right atrial pressure (mRA 17mmHg with a V wave of 21mmHg)   Mild post-capillary pulmonary hypertension (sPAP 40mmHg, dPAP 22mmHg,  mPAP 30mmHg)  PCWP = 30mmHg with a V wave of 36mmHg   LVEDP = 20mmHg   AO = 112/73/90   PAsat = 43.25% // AOsat = 94.9% (room air)   Felix CO // CI = 3.44l/min // 2.14l/min/m2     Recommendations:     Keep right leg straight for 4 hours following removal of sheaths   Continue aggressive medical management     < end of copied text >      MEDICATIONS  (STANDING):  apixaban 5 milliGRAM(s) Oral every 12 hours  chlorhexidine 2% Cloths 1 Application(s) Topical <User Schedule>  influenza  Vaccine (HIGH DOSE) 0.5 milliLiter(s) IntraMuscular once  metoprolol succinate  milliGRAM(s) Oral <User Schedule>  sevelamer carbonate 800 milliGRAM(s) Oral three times a day with meals  sodium bicarbonate 650 milliGRAM(s) Oral four times a day    MEDICATIONS  (PRN):  acetaminophen     Tablet .. 650 milliGRAM(s) Oral every 6 hours PRN Temp greater or equal to 38C (100.4F), Mild Pain (1 - 3)  melatonin 3 milliGRAM(s) Oral at bedtime PRN Insomnia      HEALTH ISSUES - PROBLEM Dx:  Acute kidney injury superimposed on CKD    Paroxysmal atrial fibrillation    Need for prophylactic measure    Metabolic acidosis    Hypertension    Severe mitral regurgitation    Chronic HFrEF (heart failure with reduced ejection fraction)

## 2025-02-28 NOTE — PROGRESS NOTE ADULT - ASSESSMENT
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 Started HD due to volume overload refractory to diuresis. S/p LHC/RHC on 2/24. Plan for DCCV once volume is better controlled, followed by amiodarone. 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 Started HD due to volume overload refractory to diuresis. S/p LHC/RHC on 2/24 showing elevated filling pressures, s/p HD/UF daily. Plan for DCCV 2/28, followed by amiodarone.

## 2025-02-28 NOTE — PROGRESS NOTE ADULT - PROBLEM SELECTOR PLAN 5
UA obtained 2/26 for new AMS/ hallucinations, no dysuria or urinary symptoms - positive for leuk esterase,   WBC, and bacteria  -Hx of pan-sensitive E. Coli in April 2024    Plan:  -F/u Urine culture  -Empiric tx with CTX x3 days  -Monitor vitals and WBC

## 2025-02-28 NOTE — PROGRESS NOTE ADULT - PROBLEM SELECTOR PLAN 2
Admitted for cardioversion but unable to do because NICO on CKD.    Plan:  -Pending cardioversion; however, delayed because pt still volume overloaded (LHC/RHC on 2/24 with elevated filling pressures: mRA 17, PCWP 30, CI 2.14) - plan for  and cardioversion once volume status better optimized, tentatively scheduled for tomorrow 2/28  -Eliquis 2.5 mg BID - renally dosed  -Increased toprol to 150 bid for rate control  -EP recommend d/c amiodarone given plan for tunnel cath and worsening kidney function Admitted for cardioversion but unable to do because NICO on CKD.    Plan:  -Cardioversion initially delayed because pt still volume overloaded (LHC/RHC on 2/24 with elevated filling pressures: mRA 17, PCWP 30, CI 2.14)   -Now s/p cardioversion 2/28, with plan to restart amiodarone  -C/w Eliquis 2.5 mg BID - renally dosed  -Increased toprol to 150 bid for rate control

## 2025-02-28 NOTE — PROGRESS NOTE ADULT - SUBJECTIVE AND OBJECTIVE BOX
[facility] DIVISION OF KIDNEY DISEASES AND HYPERTENSION -- FOLLOW UP NOTE  --------------------------------------------------------------------------------  Chief Complaint:    24 hour events/subjective:        PAST HISTORY  --------------------------------------------------------------------------------  No significant changes to PMH, PSH, FHx, SHx, unless otherwise noted    ALLERGIES & MEDICATIONS  --------------------------------------------------------------------------------  Allergies    No Known Allergies    Intolerances      Standing Inpatient Medications  apixaban 2.5 milliGRAM(s) Oral every 12 hours  buMETAnide Injectable 2 milliGRAM(s) IV Push daily  chlorhexidine 2% Cloths 1 Application(s) Topical <User Schedule>  influenza  Vaccine (HIGH DOSE) 0.5 milliLiter(s) IntraMuscular once  metoprolol succinate  milliGRAM(s) Oral <User Schedule>  Nephro-teresita 1 Tablet(s) Oral daily  sevelamer carbonate 800 milliGRAM(s) Oral three times a day with meals  sodium bicarbonate 650 milliGRAM(s) Oral four times a day    PRN Inpatient Medications  acetaminophen     Tablet .. 650 milliGRAM(s) Oral every 6 hours PRN  melatonin 3 milliGRAM(s) Oral at bedtime PRN  OLANZapine 2.5 milliGRAM(s) Oral every 12 hours PRN      REVIEW OF SYSTEMS  --------------------------------------------------------------------------------  Gen: No weight changes, fatigue, fevers/chills, weakness  Skin: No rashes  Head/Eyes/Ears/Mouth: No headache; Normal hearing; Normal vision w/o blurriness; No sinus pain/discomfort, sore throat  Respiratory: No dyspnea, cough, wheezing, hemoptysis  CV: No chest pain, PND, orthopnea  GI: No abdominal pain, diarrhea, constipation, nausea, vomiting, melena, hematochezia  : No increased frequency, dysuria, hematuria, nocturia  MSK: No joint pain/swelling; no back pain; no edema  Neuro: No dizziness/lightheadedness, weakness, seizures, numbness, tingling  Heme: No easy bruising or bleeding  Endo: No heat/cold intolerance  Psych: No significant nervousness, anxiety, stress, depression    All other systems were reviewed and are negative, except as noted.    VITALS/PHYSICAL EXAM  --------------------------------------------------------------------------------  T(C): 36.6 (02-28-25 @ 09:35), Max: 36.6 (02-28-25 @ 04:37)  HR: 89 (02-28-25 @ 09:35) (82 - 118)  BP: 103/69 (02-28-25 @ 09:35) (98/50 - 127/85)  RR: 16 (02-28-25 @ 09:35) (16 - 20)  SpO2: 95% (02-28-25 @ 09:35) (95% - 98%)  Wt(kg): --  Height (cm): 157.5 (02-28-25 @ 09:35)  Weight (kg): 59.9 (02-28-25 @ 09:35)  BMI (kg/m2): 24.1 (02-28-25 @ 09:35)  BSA (m2): 1.6 (02-28-25 @ 09:35)      02-27-25 @ 07:01  -  02-28-25 @ 07:00  --------------------------------------------------------  IN: 720 mL / OUT: 1650 mL / NET: -930 mL      Physical Exam:  	Gen: NAD, well-appearing  	HEENT: PERRL, supple neck, clear oropharynx  	Pulm: CTA B/L  	CV: RRR, S1S2; no rub  	Back: No spinal or CVA tenderness; no sacral edema  	Abd: +BS, soft, nontender/nondistended  	: No suprapubic tenderness  	UE: Warm, FROM, no clubbing, intact strength; no edema; no asterixis  	LE: Warm, FROM, no clubbing, intact strength; no edema  	Neuro: No focal deficits, intact gait  	Psych: Normal affect and mood  	Skin: Warm, without rashes  	Vascular access:    LABS/STUDIES  --------------------------------------------------------------------------------              9.1    6.19  >-----------<  179      [02-28-25 @ 05:57]              31.1     139  |  97  |  55  ----------------------------<  92      [02-28-25 @ 05:54]  5.2   |  25  |  5.36        Ca     7.9     [02-28-25 @ 05:54]      Mg     2.2     [02-28-25 @ 05:54]      Phos  3.4     [02-28-25 @ 05:54]    TPro  5.9  /  Alb  3.4  /  TBili  0.5  /  DBili  x   /  AST  23  /  ALT  100  /  AlkPhos  107  [02-28-25 @ 05:54]    PT/INR: PT 12.5 , INR 1.09       [02-28-25 @ 05:54]  PTT: 25.7       [02-28-25 @ 05:54]      Creatinine Trend:  SCr 5.36 [02-28 @ 05:54]  SCr 3.65 [02-27 @ 06:04]  SCr 4.78 [02-26 @ 07:22]  SCr 3.76 [02-25 @ 09:04]  SCr 3.23 [02-25 @ 06:30]    Urinalysis - [02-28-25 @ 05:54]      Color  / Appearance  / SG  / pH       Gluc 92 / Ketone   / Bili  / Urobili        Blood  / Protein  / Leuk Est  / Nitrite       RBC  / WBC  / Hyaline  / Gran  / Sq Epi  / Non Sq Epi  / Bacteria       Iron 44, TIBC --, %sat --      [02-19-25 @ 04:22]  Ferritin 101      [11-22-24 @ 21:13]  PTH -- (Ca 8.1)      [11-22-24 @ 21:13]   595  TSH 0.65      [04-09-24 @ 00:27]    HBsAb Nonreact      [02-20-25 @ 05:47]  HBsAg Nonreact      [02-20-25 @ 05:47]  HBcAb Nonreact      [02-20-25 @ 05:47]  HCV 0.09, Nonreact      [02-20-25 @ 05:47]

## 2025-02-28 NOTE — PROGRESS NOTE ADULT - ASSESSMENT
73 y/o female with HTN, HFrEF, severe MR, pAF on Eliquis  AF w/RVR, NICO on CKD, now pending long term HD per nephro, here for DCCV with cards for AF with RVR. Surgery consulted for long term HD access creation.     Plan:  - Protect LUE, pink band, no IVs, blood draws  - Please obtain b/l UE vein mapping   - c/w HD via permacath per nephro   - please obtain perioperative medical and cardiac risk stratification and optimization prior to surgery   - remainder of care per medicine/cards  - will follow     Vascular Surgery  [phone]   73 y/o female with HTN, HFrEF, severe MR, pAF on Eliquis  AF w/RVR, NICO on CKD, now pending long term HD per nephro, here for DCCV with cards for AF with RVR. Surgery consulted for long term HD access creation.     Plan:  - Protect LUE, pink band, no IVs, blood draws  - c/w HD via permacath per nephro   - discussed w primary team regarding outpatient f/u for AVF creation     - can f/u w Dr. White [phone] outpatient for vein mapping and operative planning  - remainder of care per medicine/cards  - vascular surgery to sign off    Vascular Surgery  [phone]

## 2025-02-28 NOTE — PROGRESS NOTE ADULT - ATTENDING COMMENTS
NICO on CKD- continue HD/UF for volume optimization- has permacath- AVF as outpatient  +UA- urine culture sent, start CTX x 3 days   CAD- s/p LHC with 75% mRCA- start high intensity statin as transaminitis improving  AF- /DCCV today  MR/TR- to be reassessed once volume optimized- structural heart following  Hospital delirium with hallucinations- continue Zyprexa prn

## 2025-02-28 NOTE — PROGRESS NOTE ADULT - SUBJECTIVE AND OBJECTIVE BOX
SUBJECTIVE: NAEO.     Vital Signs Last 24 Hrs  T(C): 36.6 (28 Feb 2025 04:37), Max: 36.6 (28 Feb 2025 04:37)  T(F): 97.9 (28 Feb 2025 04:37), Max: 97.9 (28 Feb 2025 04:37)  HR: 89 (28 Feb 2025 04:37) (82 - 118)  BP: 103/69 (28 Feb 2025 04:37) (98/50 - 127/85)  BP(mean): --  RR: 16 (28 Feb 2025 04:37) (16 - 20)  SpO2: 95% (28 Feb 2025 04:37) (95% - 98%)    Parameters below as of 28 Feb 2025 04:37  Patient On (Oxygen Delivery Method): room air        I&O's Detail    27 Feb 2025 07:01  -  28 Feb 2025 07:00  --------------------------------------------------------  IN:    Oral Fluid: 720 mL  Total IN: 720 mL    OUT:    Other (mL): 1500 mL    Voided (mL): 150 mL  Total OUT: 1650 mL    Total NET: -930 mL        PHYSICAL EXAM:   General: NAD, sitting in a chair  Neuro: no focal neurologic defects, interactive and appropriate   Resp: breathing comfortably on RA  Vascular: Radial and brachial arteries palpable bilaterally, right chest permacath present   Skin: Intact, no breakdown upper extremities bilaterally   Musculoskeletal: Atraumatic, all 4 extremities moving spontaneously, no limitations      LABS:                        9.1    6.19  )-----------( 179      ( 28 Feb 2025 05:57 )             31.1     02-28    139  |  97  |  55[H]  ----------------------------<  92  5.2   |  25  |  5.36[H]    Ca    7.9[L]      28 Feb 2025 05:54  Phos  3.4     02-28  Mg     2.2     02-28    TPro  5.9[L]  /  Alb  3.4  /  TBili  0.5  /  DBili  x   /  AST  23  /  ALT  100[H]  /  AlkPhos  107  02-28    PT/INR - ( 28 Feb 2025 05:54 )   PT: 12.5 sec;   INR: 1.09 ratio         PTT - ( 28 Feb 2025 05:54 )  PTT:25.7 sec  Urinalysis Basic - ( 28 Feb 2025 05:54 )    Color: x / Appearance: x / SG: x / pH: x  Gluc: 92 mg/dL / Ketone: x  / Bili: x / Urobili: x   Blood: x / Protein: x / Nitrite: x   Leuk Esterase: x / RBC: x / WBC x   Sq Epi: x / Non Sq Epi: x / Bacteria: x        RADIOLOGY & ADDITIONAL STUDIES:

## 2025-02-28 NOTE — PRE-ANESTHESIA EVALUATION ADULT - LAST ECHOCARDIOGRAM
TTE 2/13/25 moderately decreased LVSF EF 41%, global LV hypokinesis, severely dilated LA, severe MR, mild-mod TR, mild-mod pHTN,
TTE 2/13/25 moderately decreased LVSF EF 41%, global LV hypokinesis, severely dilated LA, severe MR, mild-mod TR, mild-mod pHTN,

## 2025-02-28 NOTE — PROGRESS NOTE ADULT - PROBLEM SELECTOR PLAN 1
Hx/o CKD iso b/l staghorn calculus s/p removal (2009) (follows with Dr. Elizondo). Found to have worsening renal function on admission so procedure cancelled. Baseline 4.6 in Aug 2024 and 5.5 in 1/25. Cr on admission 2/12 was 8.42.     Plan:  - Strict I/Os, daily weights  - Trend BMPs, monitor renal function, renally dose meds, avoid nephrotoxins   - Nephro consulted, new dialysis started - now with alternating HD and UF daily  - Nutritioned eval appreciated, recommended low sodium, low phos diet with nepro daily and nephro-teresita daily  - Venofer 200mg IV with HD X 5 doses and PERLITA 4000  U with HD  - Plan for AVF placement either inpatient vs outpatient, vascular surgery consult appreciated Hx/o CKD iso b/l staghorn calculus s/p removal (2009) (follows with Dr. Elizondo). Found to have worsening renal function on admission so procedure cancelled. Baseline 4.6 in Aug 2024 and 5.5 in 1/25. Cr on admission 2/12 was 8.42.     Plan:  - Strict I/Os, daily weights  - Trend BMPs, monitor renal function, renally dose meds, avoid nephrotoxins   - Nephro consulted, new dialysis started - now with alternating HD and UF daily  - Nutritioned eval appreciated, recommended low sodium, low phos diet with nepro daily and nephro-teresita daily  - Venofer 200mg IV with HD X 5 doses and PERLITA 4000  U with HD  - Plan for AVF placement outpatient 30 days after cardioversion, vascular surgery consult appreciated Hx/o CKD iso b/l staghorn calculus s/p removal (2009) (follows with Dr. Elizondo). Found to have worsening renal function on admission so procedure cancelled. Baseline 4.6 in Aug 2024 and 5.5 in 1/25. Cr on admission 2/12 was 8.42.     Plan:  - Strict I/Os, daily weights  - Trend BMPs, monitor renal function, renally dose meds, avoid nephrotoxins   - Nephro consulted, new dialysis started - now with alternating HD and UF daily - next HD 3/1  - Nutrition eval appreciated, recommended low sodium, low phos diet with nepro daily and nephro-teresita daily  - Venofer 200mg IV with HD X 5 doses and PERLITA 4000  U with HD  - Plan for AVF placement outpatient 30 days after cardioversion, vascular surgery consult appreciated - pending B/L UE vein mapping

## 2025-02-28 NOTE — PROGRESS NOTE ADULT - ASSESSMENT
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 Now s/p tunneled catheter placed on 2/19/25 and started on dialysis.     1. Persistent Afib  2. NICO on CKD, now ESRD Per Renal  3. Severe MR    - She was admitted in April 2024 with the same presentation, underwent DCCV and loaded on amio, did well. Readmitted in January 2025 with the same presentation. Plan was DCCV when was she was admitted in January but left AMA.   - Structural heart evaluation appreciated, repeat TTE when euvolemic to reassess MR in sinus rhythm.   - s/p RHC/LHC 2/24, elevated filling pressures, 75%mRCA  - s/p right chest wall tunneled catheter placed on 2/19/25, s/p HD 2/27 evening   - Continue Eliquis at 2.5mg BID (Age <80, Wt <60Kg, Crt >1.5)   - Continue aggressive diuresis, on IVP Bumex qd  - Eventual GDMT  - Amiodarone was held in the setting of AC was interrupted this admission. Will restart post /DCCV.   - NPO after MN except meds for /DCCV today (consent in chart). Patient would have to be on uninterrupted AC for minimum of 30 days post cardioversion.   - Primary team spoke with Renal, pt already has a permacath. Plan is for outpatient AVF placement after 30 days post cardioversion.    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 Now s/p tunneled catheter placed on 2/19/25 and started on dialysis.     1. Persistent Afib  2. NICO on CKD, now ESRD Per Renal  3. Severe MR    - She was admitted in April 2024 with the same presentation, underwent DCCV and loaded on amio, did well. Readmitted in January 2025 with the same presentation. Plan was DCCV when was she was admitted in January but left AMA.   - Structural heart evaluation appreciated, repeat TTE when euvolemic to reassess MR in sinus rhythm.   - s/p RHC/LHC 2/24, elevated filling pressures, 75%mRCA  - s/p right chest wall tunneled catheter placed on 2/19/25, s/p HD 2/27 evening   - Continue Eliquis at 2.5mg BID (Age <80, Wt <60Kg, Crt >1.5)   - Continue aggressive diuresis, on IVP Bumex qd  - Eventual GDMT  - Amiodarone was held in the setting of AC was interrupted this admission. Will restart post /DCCV.   - NPO after MN except meds for /DCCV today (consent in chart). Patient would have to be on uninterrupted AC for minimum of 30 days post cardioversion.   - Primary team spoke with Renal, pt already has a permacath. Plan is for outpatient AVF placement after 30 days post cardioversion.    Addendum: Patient underwent /DCCV today and convert back to Afib after maintaining SR for about 2 hours.  with severe MR, cardiology will contact Structural heart (Dr. Campa) to be back on board for possible mitral valve intervention. Continue AC, metoprolol and amiodarone 200mg QD. Plan discussed with primary team.      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 Now s/p tunneled catheter placed on 2/19/25 and started on dialysis.     1. Persistent Afib  2. NICO on CKD, now ESRD Per Renal  3. Severe MR    - She was admitted in April 2024 with the same presentation, underwent DCCV and loaded on amio, did well. Readmitted in January 2025 with the same presentation. Plan was DCCV when was she was admitted in January but left AMA.   - Structural heart evaluation appreciated, repeat TTE when euvolemic to reassess MR in sinus rhythm.   - s/p RHC/LHC 2/24, elevated filling pressures, 75%mRCA  - s/p right chest wall tunneled catheter placed on 2/19/25, s/p HD 2/27 evening   - Continue Eliquis at 2.5mg BID (Age <80, Wt <60Kg, Crt >1.5)   - Continue aggressive diuresis, on IVP Bumex qd  - Eventual GDMT  - Amiodarone was held in the setting of AC was interrupted this admission. Will restart post /DCCV.   - NPO after MN except meds for /DCCV today (consent in chart). Patient would have to be on uninterrupted AC for minimum of 30 days post cardioversion.   - Primary team spoke with Renal, pt already has a permacath. Plan is for outpatient AVF placement after 30 days post cardioversion.    Addendum: Patient underwent /DCCV today and convert back to Afib after maintaining SR for about 2 hours.  with severe MR, cardiology will contact Structural heart (Dr. Campa) to be back on board for possible mitral valve intervention. Continue AC, metoprolol and amiodarone 200mg QD. Follow up with Dr. Thomas as scheduled on 4/2/25 at 10:45pm. Plan discussed with primary team.      MAGDALENA Mata NP-C  [phone]

## 2025-02-28 NOTE — PROGRESS NOTE ADULT - ASSESSMENT
74-year-old female with PMHx of HTN, CKD stage 5 (pt. of Dr. Elizondo), Afib on Eliquis, anemia, nephrolithiasis, and recently diagnosed HF who presents for scheduled Afib ablation for Afib found to have NICO.    Nephrology consulted for NICO on CKD V.    Acute kidney injury superimposed on CKD-5 iso possibly over-diuresis (increased from lasix 80mg BID to torsemide 100mg BID 1/31/24)  -Pt. with hx of advanced CKD is in the setting of chronic history of nephrolithiasis/staghorn calculus. On review of labs on [facility], last Scr was elevated at 6.18 on 1/30/25. Admission SCr is elevated at 8.42 without electrolyte abnormalities. Prior renal US 1/8/25 without hydronephrosis but calcifications, stones and cysts bilaterally.   USG - Kidney and bladder showed Bilateral renal calculi, no Hydronephrosis, B/L renal parenchymal disease. UPCR 4.9    RECS:  Now ESRD, on HD started last week  Has a tunnelled HD cath  HD yesterday, UF yesterday, no urgency for HD today  Next HD tomorrow 3/1   Pt to go for cardioversion today    Anemia iso CKD  Complete  venofer 200mg IV with HD X 5 doses  Give PERLITA 4000  U with HD.       Cards:  -TTE in 11/2024 showed progression to moderate to severe MR. TTE now with LVEF of 41%, global hypokinesis, severe MR, RAP of 15.   -Structural Heart evaluation noted. Once well HD and good UF, repeat TTE and re-assess  -Plan for cardioversion today  - will need oral diuretics on non dialysis days on dc as well  -consider adding SGLT2i as well for ongoing diuresis and cardiac protection despite being ESRD    Access:  Pt has tunnelled HD catheter  given cardioversion, plan for AVF can be done outpatient  vein mapping if pt still here can be done     Dc planning to [facility] HD unit- pt has a MWF spot at [facility] confirmed per case mgt    For weekend coverage, please call Dr. Ramos Jordan( fellow) or Dr Johana Salazar( Attending)        Shane Mauricio MD  Office   Contact me directly via [employer] Teams     (After 5 pm or on weekends please page the on-call fellow/attending, can check [email or web address] for schedule. Login is dominick pierre, schedule under [facility] medicine, psych, derm)

## 2025-02-28 NOTE — PRE-ANESTHESIA EVALUATION ADULT - NSANTHPEFT_GEN_ALL_CORE
Neck FROM, MO>3cm, no gross neuro deficits
GENERAL: NAD  HEAD:  Atraumatic, Normocephalic  CHEST/LUNG: Clear to auscultation bilaterally  HEART: Normal S1/S2  EXTREMITIES: b/l LE edema  PSYCH: AAOx3

## 2025-03-01 LAB
ALBUMIN SERPL ELPH-MCNC: 3.7 G/DL — SIGNIFICANT CHANGE UP (ref 3.3–5)
ALP SERPL-CCNC: 108 U/L — SIGNIFICANT CHANGE UP (ref 40–120)
ALT FLD-CCNC: 80 U/L — HIGH (ref 10–45)
ANION GAP SERPL CALC-SCNC: 17 MMOL/L — SIGNIFICANT CHANGE UP (ref 5–17)
ANISOCYTOSIS BLD QL: SIGNIFICANT CHANGE UP
AST SERPL-CCNC: 17 U/L — SIGNIFICANT CHANGE UP (ref 10–40)
BASOPHILS # BLD AUTO: 0.11 K/UL — SIGNIFICANT CHANGE UP (ref 0–0.2)
BASOPHILS NFR BLD AUTO: 1.7 % — SIGNIFICANT CHANGE UP (ref 0–2)
BILIRUB SERPL-MCNC: 0.5 MG/DL — SIGNIFICANT CHANGE UP (ref 0.2–1.2)
BUN SERPL-MCNC: 71 MG/DL — HIGH (ref 7–23)
CALCIUM SERPL-MCNC: 7.9 MG/DL — LOW (ref 8.4–10.5)
CHLORIDE SERPL-SCNC: 96 MMOL/L — SIGNIFICANT CHANGE UP (ref 96–108)
CO2 SERPL-SCNC: 25 MMOL/L — SIGNIFICANT CHANGE UP (ref 22–31)
CREAT SERPL-MCNC: 6.06 MG/DL — HIGH (ref 0.5–1.3)
CULTURE RESULTS: ABNORMAL
EGFR: 7 ML/MIN/1.73M2 — LOW
EGFR: 7 ML/MIN/1.73M2 — LOW
ELLIPTOCYTES BLD QL SMEAR: SLIGHT — SIGNIFICANT CHANGE UP
EOSINOPHIL # BLD AUTO: 0.06 K/UL — SIGNIFICANT CHANGE UP (ref 0–0.5)
EOSINOPHIL NFR BLD AUTO: 0.9 % — SIGNIFICANT CHANGE UP (ref 0–6)
GLUCOSE SERPL-MCNC: 128 MG/DL — HIGH (ref 70–99)
HCT VFR BLD CALC: 32.5 % — LOW (ref 34.5–45)
HGB BLD-MCNC: 9.8 G/DL — LOW (ref 11.5–15.5)
LYMPHOCYTES # BLD AUTO: 1.83 K/UL — SIGNIFICANT CHANGE UP (ref 1–3.3)
LYMPHOCYTES # BLD AUTO: 28.7 % — SIGNIFICANT CHANGE UP (ref 13–44)
MACROCYTES BLD QL: SLIGHT — SIGNIFICANT CHANGE UP
MAGNESIUM SERPL-MCNC: 2.3 MG/DL — SIGNIFICANT CHANGE UP (ref 1.6–2.6)
MANUAL SMEAR VERIFICATION: SIGNIFICANT CHANGE UP
MCHC RBC-ENTMCNC: 30.2 G/DL — LOW (ref 32–36)
MCHC RBC-ENTMCNC: 31.7 PG — SIGNIFICANT CHANGE UP (ref 27–34)
MCV RBC AUTO: 105.2 FL — HIGH (ref 80–100)
METAMYELOCYTES # FLD: 0.9 % — HIGH (ref 0–0)
METAMYELOCYTES NFR BLD: 0.9 % — HIGH (ref 0–0)
MONOCYTES # BLD AUTO: 0.44 K/UL — SIGNIFICANT CHANGE UP (ref 0–0.9)
MONOCYTES NFR BLD AUTO: 6.9 % — SIGNIFICANT CHANGE UP (ref 2–14)
NEUTROPHILS # BLD AUTO: 3.78 K/UL — SIGNIFICANT CHANGE UP (ref 1.8–7.4)
NEUTROPHILS NFR BLD AUTO: 58.3 % — SIGNIFICANT CHANGE UP (ref 43–77)
NEUTS BAND # BLD: 0.9 % — SIGNIFICANT CHANGE UP (ref 0–8)
NEUTS BAND NFR BLD: 0.9 % — SIGNIFICANT CHANGE UP (ref 0–8)
NRBC # BLD: 3 /100 WBCS — HIGH (ref 0–0)
NRBC BLD-RTO: 3 /100 WBCS — HIGH (ref 0–0)
PHOSPHATE SERPL-MCNC: 3.8 MG/DL — SIGNIFICANT CHANGE UP (ref 2.5–4.5)
PLAT MORPH BLD: NORMAL — SIGNIFICANT CHANGE UP
PLATELET # BLD AUTO: 222 K/UL — SIGNIFICANT CHANGE UP (ref 150–400)
POIKILOCYTOSIS BLD QL AUTO: SLIGHT — SIGNIFICANT CHANGE UP
POLYCHROMASIA BLD QL SMEAR: SLIGHT — SIGNIFICANT CHANGE UP
POTASSIUM SERPL-MCNC: 4.9 MMOL/L — SIGNIFICANT CHANGE UP (ref 3.5–5.3)
POTASSIUM SERPL-SCNC: 4.9 MMOL/L — SIGNIFICANT CHANGE UP (ref 3.5–5.3)
PROT SERPL-MCNC: 6.4 G/DL — SIGNIFICANT CHANGE UP (ref 6–8.3)
RBC # BLD: 3.09 M/UL — LOW (ref 3.8–5.2)
RBC # FLD: 18.3 % — HIGH (ref 10.3–14.5)
RBC BLD AUTO: ABNORMAL
SCHISTOCYTES BLD QL AUTO: SLIGHT — SIGNIFICANT CHANGE UP
SODIUM SERPL-SCNC: 138 MMOL/L — SIGNIFICANT CHANGE UP (ref 135–145)
SPECIMEN SOURCE: SIGNIFICANT CHANGE UP
TSH SERPL-MCNC: 2.67 UIU/ML — SIGNIFICANT CHANGE UP (ref 0.27–4.2)
VARIANT LYMPHS # BLD: 1.7 % — SIGNIFICANT CHANGE UP (ref 0–6)
VARIANT LYMPHS NFR BLD MANUAL: 1.7 % — SIGNIFICANT CHANGE UP (ref 0–6)
WBC # BLD: 6.38 K/UL — SIGNIFICANT CHANGE UP (ref 3.8–10.5)
WBC # FLD AUTO: 6.38 K/UL — SIGNIFICANT CHANGE UP (ref 3.8–10.5)

## 2025-03-01 PROCEDURE — 99232 SBSQ HOSP IP/OBS MODERATE 35: CPT

## 2025-03-01 PROCEDURE — 99233 SBSQ HOSP IP/OBS HIGH 50: CPT

## 2025-03-01 RX ORDER — VANCOMYCIN HCL IN 5 % DEXTROSE 1.5G/250ML
1000 PLASTIC BAG, INJECTION (ML) INTRAVENOUS ONCE
Refills: 0 | Status: COMPLETED | OUTPATIENT
Start: 2025-03-01 | End: 2025-03-01

## 2025-03-01 RX ORDER — OLANZAPINE 10 MG/1
2.5 TABLET ORAL ONCE
Refills: 0 | Status: COMPLETED | OUTPATIENT
Start: 2025-03-01 | End: 2025-03-01

## 2025-03-01 RX ADMIN — Medication 650 MILLIGRAM(S): at 15:34

## 2025-03-01 RX ADMIN — CEFTRIAXONE 100 MILLIGRAM(S): 500 INJECTION, POWDER, FOR SOLUTION INTRAMUSCULAR; INTRAVENOUS at 15:34

## 2025-03-01 RX ADMIN — APIXABAN 2.5 MILLIGRAM(S): 2.5 TABLET, FILM COATED ORAL at 09:26

## 2025-03-01 RX ADMIN — Medication 250 MILLIGRAM(S): at 18:13

## 2025-03-01 RX ADMIN — AMIODARONE HYDROCHLORIDE 200 MILLIGRAM(S): 50 INJECTION, SOLUTION INTRAVENOUS at 05:17

## 2025-03-01 RX ADMIN — SEVELAMER HYDROCHLORIDE 800 MILLIGRAM(S): 800 TABLET ORAL at 15:36

## 2025-03-01 RX ADMIN — Medication 650 MILLIGRAM(S): at 00:17

## 2025-03-01 RX ADMIN — Medication 1 TABLET(S): at 15:36

## 2025-03-01 RX ADMIN — OLANZAPINE 2.5 MILLIGRAM(S): 10 TABLET ORAL at 10:25

## 2025-03-01 RX ADMIN — Medication 1 APPLICATION(S): at 07:48

## 2025-03-01 RX ADMIN — METOPROLOL SUCCINATE 150 MILLIGRAM(S): 50 TABLET, EXTENDED RELEASE ORAL at 15:36

## 2025-03-01 RX ADMIN — SEVELAMER HYDROCHLORIDE 800 MILLIGRAM(S): 800 TABLET ORAL at 09:27

## 2025-03-01 RX ADMIN — BUMETANIDE 2 MILLIGRAM(S): 1 TABLET ORAL at 05:18

## 2025-03-01 RX ADMIN — OLANZAPINE 2.5 MILLIGRAM(S): 10 TABLET ORAL at 15:51

## 2025-03-01 RX ADMIN — Medication 650 MILLIGRAM(S): at 05:19

## 2025-03-01 RX ADMIN — METOPROLOL SUCCINATE 150 MILLIGRAM(S): 50 TABLET, EXTENDED RELEASE ORAL at 05:18

## 2025-03-01 RX ADMIN — ATORVASTATIN CALCIUM 80 MILLIGRAM(S): 80 TABLET, FILM COATED ORAL at 21:32

## 2025-03-01 RX ADMIN — APIXABAN 2.5 MILLIGRAM(S): 2.5 TABLET, FILM COATED ORAL at 21:32

## 2025-03-01 NOTE — PROGRESS NOTE ADULT - PROBLEM SELECTOR PLAN 5
UA obtained 2/26 for new AMS/ hallucinations, no dysuria or urinary symptoms - positive for leuk esterase,   WBC, and bacteria  -Hx of pan-sensitive E. Coli in April 2024    Plan:  -F/u Urine culture  -Empiric tx with CTX x3 days (2/28 - 3/2)  -Monitor vitals and WBC

## 2025-03-01 NOTE — PHYSICAL THERAPY INITIAL EVALUATION ADULT - PATIENT PROFILE REVIEW, REHAB EVAL
Not on file    Drug use: Unknown    Sexual activity: Not on file     Other Topics Concern    Not on file     Social History Narrative    No narrative on file         Any recent diagnostic tests, hospital reports, office notes or consultation letters were reviewed prior to and during the visit. Review of Systems   Constitutional: Negative. HENT: Negative. Eyes: Negative. Respiratory: Negative. Cardiovascular: Negative. Gastrointestinal: Negative. Endocrine: Negative. Genitourinary: Negative. Musculoskeletal: Negative. Skin: Negative. Allergic/Immunologic: Negative. Neurological: Negative. Hematological: Negative. Psychiatric/Behavioral: Negative. Physical Exam   Constitutional: He is oriented to person, place, and time. He appears well-developed and well-nourished. Non-toxic appearance. No distress. HENT:   Head: Normocephalic and atraumatic. Right Ear: Hearing, tympanic membrane and external ear normal. No drainage, swelling or tenderness. No middle ear effusion. No decreased hearing is noted. Left Ear: Hearing, tympanic membrane, external ear and ear canal normal. No drainage, swelling or tenderness. No middle ear effusion. No decreased hearing is noted. Nose: Nose normal. No mucosal edema, rhinorrhea, sinus tenderness, nasal deformity or septal deviation. No epistaxis. Right sinus exhibits no maxillary sinus tenderness and no frontal sinus tenderness. Left sinus exhibits no maxillary sinus tenderness and no frontal sinus tenderness. Mouth/Throat: Uvula is midline and oropharynx is clear and moist. No oral lesions. Normal dentition. No dental caries. No oropharyngeal exudate. Eyes: Conjunctivae and EOM are normal. Pupils are equal, round, and reactive to light. Right eye exhibits no discharge and no exudate. Left eye exhibits no discharge and no exudate. Right conjunctiva is not injected. Left conjunctiva is not injected. No scleral icterus. knee: Normal. He exhibits normal range of motion and no deformity. No tenderness found. Left knee: He exhibits normal range of motion and no deformity. No tenderness found. Right ankle: Normal. He exhibits normal range of motion and no deformity. No tenderness. Achilles tendon normal.        Left ankle: Normal. He exhibits normal range of motion and no deformity. No tenderness. Achilles tendon normal.   Lymphadenopathy:        Head (right side): No submental, no tonsillar and no occipital adenopathy present. Head (left side): No submental, no tonsillar and no occipital adenopathy present. He has no cervical adenopathy. He has no axillary adenopathy. Right: No inguinal adenopathy present. Left: No inguinal adenopathy present. Neurological: He is alert and oriented to person, place, and time. He has normal strength. He displays normal reflexes. No cranial nerve deficit or sensory deficit. He exhibits normal muscle tone. He displays a negative Romberg sign. Coordination and gait normal.   Skin: Skin is warm, dry and intact. No rash noted. He is not diaphoretic. No cyanosis or erythema. No pallor. Nails show no clubbing. Psychiatric: He has a normal mood and affect. His behavior is normal. Judgment and thought content normal.         1. Routine general medical examination at a health care facility  Age appropriate teaching done. Continue all treatments. Immunizations and health maintenance as needed   Lipid Panel    Comprehensive Metabolic Panel    Vitamin D 25 Hydroxy    TSH without Reflex   2. Screening for HIV (human immunodeficiency virus) Condition stable continue the medications, treatments, will check labs as appropriate  HIV Screen   3. Anxiety Condition stable continue the medications, treatments, will check labs as appropriate     4. Psychophysiological insomnia Condition stable continue the medications, treatments, will check labs as appropriate     5.  Attention yes

## 2025-03-01 NOTE — PROGRESS NOTE ADULT - PROBLEM SELECTOR PLAN 3
TTE in 11/2024 showed progression to moderate to severe MR.  Left ventricular systolic function is normal with an ejection fraction of 55 % by Garcia's method of disks. Appears hypervolemic on exam. Repeat TTE during admission reveals left ventricular systolic function is moderately decreased with an ejection fraction of 41 % by Garcia's method of disks.     Plan:  -Torsemide 60 mg on non dialysis days  -S/p RHC and LHC on 2/24 showing elevated filling pressures: mRA 17, PCWP 30, CI 2.14, 75% mRCA, otherwise no obstruction; plan to continue medical management  - s/p cardioversion 2/28 showed EF 45-50% and MR  -Patient still volume overloaded, started on Bumex 2g IV daily and plan for HD/UF daily  -General and structural cardiology following - recommend outpatient f/u and no further inpatient interventions TTE in 11/2024 showed progression to moderate to severe MR.  Left ventricular systolic function is normal with an ejection fraction of 55 % by Garcia's method of disks. Appears hypervolemic on exam. Repeat TTE during admission reveals left ventricular systolic function is moderately decreased with an ejection fraction of 41 % by Garcia's method of disks.     Plan:  -Torsemide 60 mg on non dialysis days  -S/p RHC and LHC on 2/24 showing elevated filling pressures: mRA 17, PCWP 30, CI 2.14, 75% mRCA, otherwise no obstruction; plan to continue medical management  - s/p cardioversion 2/28 showed EF 45-50% and MR  -Patient still volume overloaded, started on Bumex 2g IV daily and plan for HD/UF daily  -Started atorvastatin 80mg  -General and structural cardiology following - recommend outpatient f/u and no further inpatient interventions

## 2025-03-01 NOTE — PHYSICAL THERAPY INITIAL EVALUATION ADULT - PERTINENT HX OF CURRENT PROBLEM, REHAB EVAL
73 y/o female with HTN, HFrEF, severe MR, pAF on Eliquis  AF w/RVR, NICO on CKD, now pending long term HD per nephro, here for DCCV with cards for AF with RVR. Surgery consulted for long term HD access creation.

## 2025-03-01 NOTE — PROGRESS NOTE ADULT - ASSESSMENT
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 Started HD due to volume overload refractory to diuresis. S/p LHC/RHC on 2/24 showing elevated filling pressures, s/p HD/UF daily. S/p DCCV 2/28; however, pt went back into AFib, started back on amiodarone.

## 2025-03-01 NOTE — PROGRESS NOTE ADULT - SUBJECTIVE AND OBJECTIVE BOX
Patient is a 74y old  Female who presents with a chief complaint of ESRD AF (23 Feb 2025 08:39)      INTERVAL HPI/OVERNIGHT EVENTS: No acute events overnight. Had cardioversion yesterday but then went back into Afib. Had HD yesterday.    Patient examined at bedside this morning. Still seems confused, unable to accurately state 's birthday on phone. States she is feeling good. Denies any symptoms, including dysuria, urinary urgency/frequency, heart palpations or lightheadedness. Reports normal appetite and BMs.     Vital Signs Last 24 Hrs  T(C): 36.6 (01 Mar 2025 04:54), Max: 36.8 (28 Feb 2025 12:51)  T(F): 97.8 (01 Mar 2025 04:54), Max: 98.3 (28 Feb 2025 12:51)  HR: 101 (01 Mar 2025 04:54) (60 - 101)  BP: 114/84 (01 Mar 2025 04:54) (100/55 - 157/85)  BP(mean): 102 (28 Feb 2025 09:35) (102 - 102)  RR: 18 (01 Mar 2025 04:54) (16 - 18)  SpO2: 97% (01 Mar 2025 04:54) (95% - 100%)    Parameters below as of 28 Feb 2025 21:27  Patient On (Oxygen Delivery Method): room air    I&O's Detail    28 Feb 2025 07:01  -  01 Mar 2025 07:00  --------------------------------------------------------  IN:    Oral Fluid: 720 mL  Total IN: 720 mL    OUT:  Total OUT: 0 mL    Total NET: 720 mL      CAPILLARY BLOOD GLUCOSE        PHYSICAL EXAM:  General: NAD  HEENT: PERRL, normal sclera/conjunctiva  Neck: Supple  Lungs: CTA bilaterally  Heart: Irregular rhythm appreciated, normal S1S2, no murmurs/rubs/gallops  Abdomen: Soft, ND/NT  Extremities: b/l LE pitting edema, improving. RFV/A site intact with no surrounding erythema or pain  Skin: Warm, well-perfused  Neuro: A&O x3, no focal deficits, experiencing visual and auditory hallucinations      LABS:                          9.8    6.38  )-----------( 222      ( 01 Mar 2025 06:47 )             32.5     03-01    138  |  96  |  71[H]  ----------------------------<  128[H]  4.9   |  25  |  6.06[H]    Ca    7.9[L]      01 Mar 2025 06:46  Phos  3.8     03-01  Mg     2.3     03-01    TPro  6.4  /  Alb  3.7  /  TBili  0.5  /  DBili  x   /  AST  17  /  ALT  80[H]  /  AlkPhos  108  03-01          Urinalysis Basic - ( 27 Feb 2025 06:04 )    Color: x / Appearance: x / SG: x / pH: x  Gluc: 119 mg/dL / Ketone: x  / Bili: x / Urobili: x   Blood: x / Protein: x / Nitrite: x   Leuk Esterase: x / RBC: x / WBC x   Sq Epi: x / Non Sq Epi: x / Bacteria: x      RADIOLOGY & ADDITIONAL TESTS:    < from: Cardiac Catheterization (02.24.25 @ 17:06) >  Diagnostic Conclusions:     Three vessel coronary artery disease   Normal left main coronary artery   Right dominant system   Elevated right atrial pressure (mRA 17mmHg with a V wave of 21mmHg)   Mild post-capillary pulmonary hypertension (sPAP 40mmHg, dPAP 22mmHg,  mPAP 30mmHg)  PCWP = 30mmHg with a V wave of 36mmHg   LVEDP = 20mmHg   AO = 112/73/90   PAsat = 43.25% // AOsat = 94.9% (room air)   Felix CO // CI = 3.44l/min // 2.14l/min/m2     Recommendations:     Keep right leg straight for 4 hours following removal of sheaths   Continue aggressive medical management     < end of copied text >      < from:  W or WO Ultrasound Enhancing Agent (02.28.25 @ 09:41) >     CONCLUSIONS:      1. Left ventricular cavity is mildly dilated. Left ventricular systolic function is mildly decreased with an ejection fraction visually estimated at 45 to 50 %.   2. Normal right ventricular cavity size and borderline reduced right ventricular systolic function.   3. Severe biatrial dilatation.   4. No evidence of left atrial or left atrial appendage thrombus.   5. Patent foramen ovale by color flow Doppler.   6. Moderate to severe mitral regurgitation at a blood pressure of 90/60 mmHg. Systolic flow reversal in the pulmonary veins, which is consistent with severe mitral regurgitation. The mechanism is function secondary to mitral annulus dilatation and left ventricular dysfunction. Difficult to assess mitral regurgitation due to rapid atrial fibrillation.   7. Moderate tricuspid regurgitation.   8. No pericardial effusion seen.   9. Compared to the transthoracic echocardiogram performed on 2/13/2025, there have been no significant interval changes.    < end of copied text >        MEDICATIONS  (STANDING):  apixaban 5 milliGRAM(s) Oral every 12 hours  chlorhexidine 2% Cloths 1 Application(s) Topical <User Schedule>  influenza  Vaccine (HIGH DOSE) 0.5 milliLiter(s) IntraMuscular once  metoprolol succinate  milliGRAM(s) Oral <User Schedule>  sevelamer carbonate 800 milliGRAM(s) Oral three times a day with meals  sodium bicarbonate 650 milliGRAM(s) Oral four times a day    MEDICATIONS  (PRN):  acetaminophen     Tablet .. 650 milliGRAM(s) Oral every 6 hours PRN Temp greater or equal to 38C (100.4F), Mild Pain (1 - 3)  melatonin 3 milliGRAM(s) Oral at bedtime PRN Insomnia      HEALTH ISSUES - PROBLEM Dx:  Acute kidney injury superimposed on CKD    Paroxysmal atrial fibrillation    Need for prophylactic measure    Metabolic acidosis    Hypertension    Severe mitral regurgitation    Chronic HFrEF (heart failure with reduced ejection fraction)             Patient is a 74y old  Female who presents with a chief complaint of ESRD AF (23 Feb 2025 08:39)      INTERVAL HPI/OVERNIGHT EVENTS: No acute events overnight. Had cardioversion yesterday but then went back into Afib.     Patient examined at bedside this morning. She is agitated, pacing and saying she is in a rush to go to her uncle's wake. States she is feeling fine. Denies any symptoms, including dysuria, urinary urgency/frequency, heart palpations or lightheadedness.    Vital Signs Last 24 Hrs  T(C): 36.6 (01 Mar 2025 04:54), Max: 36.8 (28 Feb 2025 12:51)  T(F): 97.8 (01 Mar 2025 04:54), Max: 98.3 (28 Feb 2025 12:51)  HR: 101 (01 Mar 2025 04:54) (60 - 101)  BP: 114/84 (01 Mar 2025 04:54) (100/55 - 157/85)  BP(mean): 102 (28 Feb 2025 09:35) (102 - 102)  RR: 18 (01 Mar 2025 04:54) (16 - 18)  SpO2: 97% (01 Mar 2025 04:54) (95% - 100%)    Parameters below as of 28 Feb 2025 21:27  Patient On (Oxygen Delivery Method): room air    I&O's Detail    28 Feb 2025 07:01  -  01 Mar 2025 07:00  --------------------------------------------------------  IN:    Oral Fluid: 720 mL  Total IN: 720 mL    OUT:  Total OUT: 0 mL    Total NET: 720 mL      CAPILLARY BLOOD GLUCOSE        PHYSICAL EXAM:  General: NAD  HEENT: PERRL, normal sclera/conjunctiva  Neck: Supple  Lungs: CTA bilaterally  Heart: Irregular rhythm appreciated, normal S1S2, no murmurs/rubs/gallops  Abdomen: Soft, ND/NT  Extremities: b/l LE pitting edema, improving. RFV/A site intact with no surrounding erythema or pain  Skin: Warm, well-perfused  Neuro: A&O x3, no focal deficits, experiencing visual and auditory hallucinations, agitated and confused      LABS:                          9.8    6.38  )-----------( 222      ( 01 Mar 2025 06:47 )             32.5     03-01    138  |  96  |  71[H]  ----------------------------<  128[H]  4.9   |  25  |  6.06[H]    Ca    7.9[L]      01 Mar 2025 06:46  Phos  3.8     03-01  Mg     2.3     03-01    TPro  6.4  /  Alb  3.7  /  TBili  0.5  /  DBili  x   /  AST  17  /  ALT  80[H]  /  AlkPhos  108  03-01        Urinalysis Basic - ( 27 Feb 2025 06:04 )    Color: x / Appearance: x / SG: x / pH: x  Gluc: 119 mg/dL / Ketone: x  / Bili: x / Urobili: x   Blood: x / Protein: x / Nitrite: x   Leuk Esterase: x / RBC: x / WBC x   Sq Epi: x / Non Sq Epi: x / Bacteria: x      RADIOLOGY & ADDITIONAL TESTS:    < from: Cardiac Catheterization (02.24.25 @ 17:06) >  Diagnostic Conclusions:     Three vessel coronary artery disease   Normal left main coronary artery   Right dominant system   Elevated right atrial pressure (mRA 17mmHg with a V wave of 21mmHg)   Mild post-capillary pulmonary hypertension (sPAP 40mmHg, dPAP 22mmHg,  mPAP 30mmHg)  PCWP = 30mmHg with a V wave of 36mmHg   LVEDP = 20mmHg   AO = 112/73/90   PAsat = 43.25% // AOsat = 94.9% (room air)   Felix CO // CI = 3.44l/min // 2.14l/min/m2     Recommendations:     Keep right leg straight for 4 hours following removal of sheaths   Continue aggressive medical management     < end of copied text >      < from:  W or WO Ultrasound Enhancing Agent (02.28.25 @ 09:41) >     CONCLUSIONS:      1. Left ventricular cavity is mildly dilated. Left ventricular systolic function is mildly decreased with an ejection fraction visually estimated at 45 to 50 %.   2. Normal right ventricular cavity size and borderline reduced right ventricular systolic function.   3. Severe biatrial dilatation.   4. No evidence of left atrial or left atrial appendage thrombus.   5. Patent foramen ovale by color flow Doppler.   6. Moderate to severe mitral regurgitation at a blood pressure of 90/60 mmHg. Systolic flow reversal in the pulmonary veins, which is consistent with severe mitral regurgitation. The mechanism is function secondary to mitral annulus dilatation and left ventricular dysfunction. Difficult to assess mitral regurgitation due to rapid atrial fibrillation.   7. Moderate tricuspid regurgitation.   8. No pericardial effusion seen.   9. Compared to the transthoracic echocardiogram performed on 2/13/2025, there have been no significant interval changes.    < end of copied text >        MEDICATIONS  (STANDING):  apixaban 5 milliGRAM(s) Oral every 12 hours  chlorhexidine 2% Cloths 1 Application(s) Topical <User Schedule>  influenza  Vaccine (HIGH DOSE) 0.5 milliLiter(s) IntraMuscular once  metoprolol succinate  milliGRAM(s) Oral <User Schedule>  sevelamer carbonate 800 milliGRAM(s) Oral three times a day with meals  sodium bicarbonate 650 milliGRAM(s) Oral four times a day    MEDICATIONS  (PRN):  acetaminophen     Tablet .. 650 milliGRAM(s) Oral every 6 hours PRN Temp greater or equal to 38C (100.4F), Mild Pain (1 - 3)  melatonin 3 milliGRAM(s) Oral at bedtime PRN Insomnia      HEALTH ISSUES - PROBLEM Dx:  Acute kidney injury superimposed on CKD    Paroxysmal atrial fibrillation    Need for prophylactic measure    Metabolic acidosis    Hypertension    Severe mitral regurgitation    Chronic HFrEF (heart failure with reduced ejection fraction)

## 2025-03-01 NOTE — PROGRESS NOTE ADULT - PROBLEM SELECTOR PLAN 2
Admitted for cardioversion but unable to do because NICO on CKD.    Plan:  -Cardioversion initially delayed because pt still volume overloaded (LHC/RHC on 2/24 with elevated filling pressures: mRA 17, PCWP 30, CI 2.14)   -Now s/p cardioversion 2/28, however back in Afib now - per EP, pt not good candidate for ablation 2/2 MR  -Restarted amiodarone 2/28  -C/w Eliquis 2.5 mg BID - renally dosed  -Increased toprol to 150 bid for rate control

## 2025-03-01 NOTE — PROGRESS NOTE ADULT - ASSESSMENT
74-year-old female with PMHx of HTN, CKD stage 5 (pt. of Dr. Elizondo), Afib on Eliquis, anemia, nephrolithiasis, and recently diagnosed HF who presents for scheduled Afib ablation for Afib found to have -     Nephrology consulted for NICO on CKD V.    Acute kidney injury superimposed on CKD-5 iso possibly over-diuresis  --> now ESRD  -Pt. with hx of advanced CKD is in the setting of chronic history of nephrolithiasis/staghorn calculus. On review of labs on [place], last Scr was elevated at 6.18 on 1/30/25. Admission SCr is elevated at 8.42 without electrolyte abnormalities. Prior renal US 1/8/25 without hydronephrosis but calcifications, stones and cysts bilaterally.   USG - Kidney and bladder showed Bilateral renal calculi, no Hydronephrosis, B/L renal parenchymal disease. UPCR 4.9  -plan hd today  #access- Has a tunnelled HD cath; eventually neeeds avf  #  Anemia iso CKD  Complete  venofer 200mg IV with HD X 5 doses  Give PERLITA 4000  U with HD.   # Cardiology follow up. hypervolemia- cont diuretics  # Dc planning to [facility] HD unit

## 2025-03-01 NOTE — PROGRESS NOTE ADULT - PROBLEM SELECTOR PLAN 9
DVT ppx: eliquis  Diet: Dash diet, low phos diet with nepro daily and nephro-teresita daily  Dispo: pending clinical course DVT ppx: eliquis  Diet: Dash diet, low phos diet with nepro daily and nephro-terseita daily  Dispo: pending clinical course, PT eval pending

## 2025-03-01 NOTE — PROVIDER CONTACT NOTE (OTHER) - ACTION/TREATMENT ORDERED:
Okay for patient to urinate in hat.
Provider made aware. Provider assessed patient at bedside. Pt was going to AMA, IV removed. Provider spoke with spouse. Pt placed on Constant observation. Patient refusing new IV placement.
Zyprexa ordered
Okay for patient to urinate in hat.
will come at bedside.

## 2025-03-01 NOTE — PROGRESS NOTE ADULT - PROBLEM SELECTOR PLAN 1
Hx/o CKD iso b/l staghorn calculus s/p removal (2009) (follows with Dr. Elizondo). Found to have worsening renal function on admission so procedure cancelled. Baseline 4.6 in Aug 2024 and 5.5 in 1/25. Cr on admission 2/12 was 8.42.     Plan:  - Strict I/Os, daily weights  - Trend BMPs, monitor renal function, renally dose meds, avoid nephrotoxins   - Nephro consulted, new dialysis started - now with alternating HD and UF daily - next HD 3/1  - Nutrition eval appreciated, recommended low sodium, low phos diet with nepro daily and nephro-teresita daily  - Venofer 200mg IV with HD X 5 doses and PERLITA 4000  U with HD  - Plan for AVF placement outpatient 30 days after cardioversion, vascular surgery consult appreciated - pending B/L UE vein mapping

## 2025-03-01 NOTE — PROGRESS NOTE ADULT - ASSESSMENT
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       #Acute kidney injury superimposed on CKD.    Found to have worsening renal function on admission so procedure cancelled. Baseline 4.6 in Aug 2024 and 5.5 in 1/25. Cr on admission 2/12 was 8.42.   - RAP elevated to 15 on TTE from 2/13 and she appeared significantly volume up on exam  - was on Bumex 4mg IV BID with poor UOP, now on 2mg IV Bumex  - s/p hd access, and now on HD    #  Paroxysmal atrial fibrillation.   ·  Plan: Admitted for cardioversion but unable to do because given significant volume overload  - She is usually on 5mg BID of Eliquis but her weight has decreased to 56.5kg after HD and her Cr is >1.5, so her dose has been reduced to 2.5mg BID  - Currently on Metoprolol succinate 150mg BID, max dose is usually 200mg in 24 hours  - Off Amiodarone  - 02/28- /DCCV -reverted back to AF Amiodarone resumed    # Chronic heart failure with preserved ejection fraction. -Severe MR  - TTE in 11/2024 showed progression to moderate to severe MR.  Left ventricular systolic function was normal with an ejection fraction of 55 % by Garcia's method of disks.  - TTE now with LVEF of 41%, global hypokinesis, severe MR, RAP of 15.   - Structural Heart evaluation noted. Will aggressively diurese and repeat TTE when euvolemic   - Eventual GDMT  - S/p LHC/RHC on 2/24 with elevated filling pressures: mRA 17, PCWP 30, CI 2.14.  - LHC with 75% mRCA disease otherwise non obstructive   - eliquis resumed   - Needs high intensity statin for her CAD. will hold on ASA for now given anemia   _structural Heart Dr Campa consideration for Valve intervention

## 2025-03-01 NOTE — PROGRESS NOTE ADULT - ATTENDING COMMENTS
NICO on CKD- continue HD/UF for volume optimization- has permacath- AVF as outpatient  +UA- urine culture sent, continue CTX x 3 days   CAD- s/p LHC with 75% mRCA- start high intensity statin as transaminitis improving  AF- s/p /DCCV on 2/28 but now back in AF. Continue amiodarone  MR/TR- to be reassessed once volume optimized- structural heart following  Hospital delirium with hallucinations- continue Zyprexa prn    Rest of care per plan above  D/W HS4

## 2025-03-01 NOTE — PROGRESS NOTE ADULT - SUBJECTIVE AND OBJECTIVE BOX
[facility] DIVISION OF KIDNEY DISEASES AND HYPERTENSION -- FOLLOW UP NOTE  --------------------------------------------------------------------------------  Chief Complaint:/subjective:  no complaints  wants to get home  stated she is anxious    24 hour events:  due for hd today      PAST HISTORY  --------------------------------------------------------------------------------  No significant changes to PMH, PSH, FHx, SHx, unless otherwise noted    ALLERGIES & MEDICATIONS  --------------------------------------------------------------------------------  Allergies    No Known Allergies    Intolerances      Standing Inpatient Medications  aMIOdarone    Tablet 200 milliGRAM(s) Oral daily  apixaban 2.5 milliGRAM(s) Oral every 12 hours  atorvastatin 80 milliGRAM(s) Oral at bedtime  buMETAnide Injectable 2 milliGRAM(s) IV Push daily  cefTRIAXone   IVPB 1000 milliGRAM(s) IV Intermittent every 24 hours  chlorhexidine 2% Cloths 1 Application(s) Topical <User Schedule>  influenza  Vaccine (HIGH DOSE) 0.5 milliLiter(s) IntraMuscular once  metoprolol succinate  milliGRAM(s) Oral <User Schedule>  Nephro-teresita 1 Tablet(s) Oral daily  sevelamer carbonate 800 milliGRAM(s) Oral three times a day with meals  sodium bicarbonate 650 milliGRAM(s) Oral four times a day    PRN Inpatient Medications  acetaminophen     Tablet .. 650 milliGRAM(s) Oral every 6 hours PRN  melatonin 3 milliGRAM(s) Oral at bedtime PRN  OLANZapine 2.5 milliGRAM(s) Oral every 12 hours PRN      REVIEW OF SYSTEMS  --------------------------------------------------------------------------------  Gen: No weight changes, fatigue, fevers/chills, weakness  Skin: No rashes  Head/Eyes/Ears/Mouth: No headache;   Respiratory: No dyspnea, cough  CV: No chest pain, PND, orthopnea  GI: No abdominal pain, diarrhea, constipation, nausea, vomiting  : No increased frequency, dysuria, hematuria, nocturia  MSK: No joint pain/swelling; no back pain; no edema  Neuro: No dizziness/lightheadedness, weakness  Heme: No easy bruising or bleeding  Psych: No significant nervousness, anxiety, stress, depression    All other systems were reviewed and are negative, except as noted.    VITALS/PHYSICAL EXAM  --------------------------------------------------------------------------------  T(C): 36 (03-01-25 @ 12:05), Max: 36.6 (03-01-25 @ 04:54)  HR: 99 (03-01-25 @ 12:05) (78 - 101)  BP: 111/67 (03-01-25 @ 12:05) (100/64 - 114/84)  RR: 18 (03-01-25 @ 12:05) (16 - 18)  SpO2: 96% (03-01-25 @ 12:05) (96% - 99%)  Wt(kg): --  Adult Advanced Hemodynamics Last 24 Hrs  ABP: --  ABP(mean): --  CVP(mm Hg): --  CO: --  CI: --  PA: --  PA(mean): --  PCWP: --  SVR: --  SVRI: --  Height (cm): 157.5 (02-28-25 @ 09:35)  Weight (kg): 59.9 (02-28-25 @ 09:35)  BMI (kg/m2): 24.1 (02-28-25 @ 09:35)  BSA (m2): 1.6 (02-28-25 @ 09:35)      02-28-25 @ 07:01  -  03-01-25 @ 07:00  --------------------------------------------------------  IN: 720 mL / OUT: 0 mL / NET: 720 mL      Physical Exam:  	Gen: NAD,   	HEENT:  , no jvp  	Pulm: CTA B/L  	CV: RRR, S1S2; no rub  	Back:  no sacral edema  	Abd: +BS, soft,    	: No suprapubic tenderness  	Ext: no edema  	Psych: anxious  	Skin: Warm,    	Vascular access: r permacath    LABS/STUDIES  --------------------------------------------------------------------------------              9.8    6.38  >-----------<  222      [03-01-25 @ 06:47]              32.5     Hemoglobin: 9.8 g/dL (03-01-25 @ 06:47)  Hemoglobin: 9.1 g/dL (02-28-25 @ 05:57)    Platelet Count - Automated: 222 K/uL (03-01-25 @ 06:47)  Platelet Count - Automated: 179 K/uL (02-28-25 @ 05:57)    138  |  96  |  71  ----------------------------<  128      [03-01-25 @ 06:46]  4.9   |  25  |  6.06        Ca     7.9     [03-01-25 @ 06:46]      Mg     2.3     [03-01-25 @ 06:46]      Phos  3.8     [03-01-25 @ 06:46]    TPro  6.4  /  Alb  3.7  /  TBili  0.5  /  DBili  x   /  AST  17  /  ALT  80  /  AlkPhos  108  [03-01-25 @ 06:46]    PT/INR: PT 12.5 , INR 1.09       [02-28-25 @ 05:54]  PTT: 25.7       [02-28-25 @ 05:54]      Creatinine Trend:  SCr 6.06 [03-01 @ 06:46]  SCr 5.36 [02-28 @ 05:54]  SCr 3.65 [02-27 @ 06:04]  SCr 4.78 [02-26 @ 07:22]  SCr 3.76 [02-25 @ 09:04]    Urinalysis - [03-01-25 @ 06:46]      Color  / Appearance  / SG  / pH       Gluc 128 / Ketone   / Bili  / Urobili        Blood  / Protein  / Leuk Est  / Nitrite       RBC  / WBC  / Hyaline  / Gran  / Sq Epi  / Non Sq Epi  / Bacteria       Iron 44, TIBC --, %sat --      [02-19-25 @ 04:22]  Ferritin 101      [11-22-24 @ 21:13]  PTH -- (Ca 8.1)      [11-22-24 @ 21:13]   595  TSH 2.67      [03-01-25 @ 06:47]    HBsAb Nonreact      [02-20-25 @ 05:47]  HBsAg Nonreact      [02-20-25 @ 05:47]  HBcAb Nonreact      [02-20-25 @ 05:47]  HCV 0.09, Nonreact      [02-20-25 @ 05:47]

## 2025-03-01 NOTE — PROGRESS NOTE ADULT - SUBJECTIVE AND OBJECTIVE BOX
MR#[id number]  PATIENT NAME:ELIZABETH PARSONS    DATE OF SERVICE: 03-01-25 @ 07:23  Patient was seen and examined by Aki Carias MD on    03-01-25 @ 07:23 .  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    Of Note:  Confirmed with  who is responsible for pt's care administers her medications consistently.  Her last dose of Eliquis was this am. Given at 10am.      TTE 4/2024  CONCLUSIONS:      1. Left ventricular wall thickness is normal. Left ventricular systolic function is normal with an ejection fraction of 76 % by 3D. There are no regional wall motion abnormalities seen.   2. No evidence of left atrial or left atrial appendage thrombus. Ultrasound enhancing agent was utilized to visualize the left atrial appendage.   3. The left atrium is severely dilated.   4. Moderate mitral regurgitation at a blood pressure of 132/71 mmHg. The mitral regurgitant jet is centrally directed. Mechanism of mitral regurgitation: Apryl Type I (normal leaflet motion with dilated annulus). PISA : later pisa radius 0.4, medial 0.37, Alisaing velocity 38.5cm/sec MR VTI 136cm MR Vmax 548 cm/s. The ERO 0.3sqcm and Reg. Vol 38cc. 3D vena contracta area was 0.35sqcm.   5. No pericardial effusion seen.   6. Normal right ventricular cavity size, with normal wall thickness, and normal systolic function.     (12 Feb 2025 12:50)      - s/p RHC/LHC 2/24, elevated filling pressures, 75%mRCA    INTERIM EVENTS:Patient seen at bedside ,interim events noted.  Patient resting comfortably in bed in NAD.  Laying flat with no respiratory distress.  No complaints of chest pain, dyspnea, palpitations, PND, or orthopnea.  says she is feeling better overall  03/01-Had /DCCV but reverted back to AF     PMH -reviewed admission note, no change since admission  MEDICATIONS  (STANDING):  aMIOdarone    Tablet 200 milliGRAM(s) Oral daily  apixaban 2.5 milliGRAM(s) Oral every 12 hours  atorvastatin 80 milliGRAM(s) Oral at bedtime  buMETAnide Injectable 2 milliGRAM(s) IV Push daily  cefTRIAXone   IVPB 1000 milliGRAM(s) IV Intermittent every 24 hours  chlorhexidine 2% Cloths 1 Application(s) Topical <User Schedule>  influenza  Vaccine (HIGH DOSE) 0.5 milliLiter(s) IntraMuscular once  metoprolol succinate  milliGRAM(s) Oral <User Schedule>  Nephro-teresita 1 Tablet(s) Oral daily  sevelamer carbonate 800 milliGRAM(s) Oral three times a day with meals  sodium bicarbonate 650 milliGRAM(s) Oral four times a day    MEDICATIONS  (PRN):  acetaminophen     Tablet .. 650 milliGRAM(s) Oral every 6 hours PRN Temp greater or equal to 38C (100.4F), Mild Pain (1 - 3)  melatonin 3 milliGRAM(s) Oral at bedtime PRN Insomnia  OLANZapine 2.5 milliGRAM(s) Oral every 12 hours PRN Agitation/hallucinations            REVIEW OF SYSTEMS:  Constitutional: [ ] fever, [ ]weight loss,  [ x]fatigue [ ]weight gain  Eyes: [ ] visual changes  Respiratory: [ ]shortness of breath;  [ ] cough, [ ]wheezing, [ ]chills, [ ]hemoptysis  Cardiovascular: [ ] chest pain, [ ]palpitations, [ ]dizziness,  [ ]leg swelling[ ]orthopnea[ ]PND  Gastrointestinal: [ ] abdominal pain, [ ]nausea, [ ]vomiting,  [ ]diarrhea [ ]Constipation [ ]Melena  Genitourinary: [ ] dysuria, [ ] hematuria [ ]Zelaya  Neurologic: [ ] headaches [ ] tremors[ ]weakness [ ]Paralysis Right[ ] Left[ ]  Skin: [ ] itching, [ ]burning, [ ] rashes  Endocrine: [ ] heat or cold intolerance  Musculoskeletal: [ ] joint pain or swelling; [ ] muscle, back, or extremity pain  Psychiatric: [ ] depression, [ ]anxiety, [ ]mood swings, or [ ]difficulty sleeping  Hematologic: [ ] easy bruising, [ ] bleeding gums    [ ] All remaining systems negative except as per above.   [ ]Unable to obtain.  [x] No change in ROS since admission      Vital Signs Last 24 Hrs  T(C): 36.6 (01 Mar 2025 04:54), Max: 36.8 (28 Feb 2025 12:51)  T(F): 97.8 (01 Mar 2025 04:54), Max: 98.3 (28 Feb 2025 12:51)  HR: 101 (01 Mar 2025 04:54) (60 - 101)  BP: 114/84 (01 Mar 2025 04:54) (100/55 - 157/85)  BP(mean): 102 (28 Feb 2025 09:35) (102 - 102)  RR: 18 (01 Mar 2025 04:54) (16 - 18)  SpO2: 97% (01 Mar 2025 04:54) (95% - 100%)    Parameters below as of 28 Feb 2025 21:27  Patient On (Oxygen Delivery Method): room air      I&O's Summary    28 Feb 2025 07:01  -  01 Mar 2025 07:00  --------------------------------------------------------  IN: 720 mL / OUT: 0 mL / NET: 720 mL        PHYSICAL EXAM:  General: No acute distress BMI-24  HEENT: EOMI, PERRL  Neck: Supple, [ ] JVD  Lungs: Equal air entry bilaterally; [ ] rales [ ] wheezing [ ] rhonchi  Heart: Irregular rate and rhythm; [x ] murmur   2/6 [ x] systolic [ ] diastolic [ ] radiation[ ] rubs [ ]  gallops  Abdomen: Nontender, bowel sounds present  Extremities: No clubbing, cyanosis, [ ] edema [ ]Pulses  equal and intact  Nervous system:  Alert & Oriented X3, no focal deficits  Psychiatric: Normal affect  Skin: No rashes or lesions    LABS:  03-01    138  |  96  |  71[H]  ----------------------------<  128[H]  4.9   |  25  |  6.06[H]    Ca    7.9[L]      01 Mar 2025 06:46  Phos  3.8     03-01  Mg     2.3     03-01    TPro  6.4  /  Alb  3.7  /  TBili  0.5  /  DBili  x   /  AST  17  /  ALT  80[H]  /  AlkPhos  108  03-01    Creatinine Trend: 6.06<--, 5.36<--, 3.65<--, 4.78<--, 3.76<--, 3.23<--                        9.8    6.38  )-----------( 222      ( 01 Mar 2025 06:47 )             32.5     PT/INR - ( 28 Feb 2025 05:54 )   PT: 12.5 sec;   INR: 1.09 ratio         PTT - ( 28 Feb 2025 05:54 )  PTT:25.7 sec         W or WO Ultrasound Enhancing Agent (02.28.25 @ 09:41) >  CONCLUSIONS:      1. Left ventricular cavity is mildly dilated. Left ventricular systolic function is mildly decreased with an ejection fraction visually estimated at 45 to 50 %.   2. Normal right ventricular cavity size and borderline reduced right ventricular systolic function.   3. Severe biatrial dilatation.   4. No evidence of left atrial or left atrial appendage thrombus.   5. Patent foramen ovale by color flow Doppler.   6. Moderate to severe mitral regurgitation at a blood pressure of 90/60 mmHg. Systolic flow reversal in the pulmonary veins, which is consistent with severe mitral regurgitation. The mechanism is function secondary to mitral annulus dilatation and left ventricular dysfunction. Difficult to assess mitral regurgitation due to rapid atrial fibrillation.   7. Moderate tricuspid regurgitation.   8. No pericardial effusion seen.   9. Compared to the transthoracic echocardiogram performed on 2/13/2025, there have been no significant interval changes.

## 2025-03-02 LAB
ALBUMIN SERPL ELPH-MCNC: 3.6 G/DL — SIGNIFICANT CHANGE UP (ref 3.3–5)
ALP SERPL-CCNC: 97 U/L — SIGNIFICANT CHANGE UP (ref 40–120)
ALT FLD-CCNC: 59 U/L — HIGH (ref 10–45)
ANION GAP SERPL CALC-SCNC: 14 MMOL/L — SIGNIFICANT CHANGE UP (ref 5–17)
AST SERPL-CCNC: 15 U/L — SIGNIFICANT CHANGE UP (ref 10–40)
BASOPHILS # BLD AUTO: 0.05 K/UL — SIGNIFICANT CHANGE UP (ref 0–0.2)
BASOPHILS NFR BLD AUTO: 0.9 % — SIGNIFICANT CHANGE UP (ref 0–2)
BILIRUB SERPL-MCNC: 0.4 MG/DL — SIGNIFICANT CHANGE UP (ref 0.2–1.2)
BUN SERPL-MCNC: 43 MG/DL — HIGH (ref 7–23)
CALCIUM SERPL-MCNC: 7.9 MG/DL — LOW (ref 8.4–10.5)
CHLORIDE SERPL-SCNC: 97 MMOL/L — SIGNIFICANT CHANGE UP (ref 96–108)
CO2 SERPL-SCNC: 26 MMOL/L — SIGNIFICANT CHANGE UP (ref 22–31)
CREAT SERPL-MCNC: 4.33 MG/DL — HIGH (ref 0.5–1.3)
EGFR: 10 ML/MIN/1.73M2 — LOW
EGFR: 10 ML/MIN/1.73M2 — LOW
EOSINOPHIL # BLD AUTO: 0.12 K/UL — SIGNIFICANT CHANGE UP (ref 0–0.5)
EOSINOPHIL NFR BLD AUTO: 2.3 % — SIGNIFICANT CHANGE UP (ref 0–6)
GLUCOSE SERPL-MCNC: 98 MG/DL — SIGNIFICANT CHANGE UP (ref 70–99)
HCT VFR BLD CALC: 30.9 % — LOW (ref 34.5–45)
HGB BLD-MCNC: 9.3 G/DL — LOW (ref 11.5–15.5)
IMM GRANULOCYTES NFR BLD AUTO: 0.6 % — SIGNIFICANT CHANGE UP (ref 0–0.9)
LYMPHOCYTES # BLD AUTO: 1.43 K/UL — SIGNIFICANT CHANGE UP (ref 1–3.3)
LYMPHOCYTES # BLD AUTO: 27.1 % — SIGNIFICANT CHANGE UP (ref 13–44)
MAGNESIUM SERPL-MCNC: 2.1 MG/DL — SIGNIFICANT CHANGE UP (ref 1.6–2.6)
MCHC RBC-ENTMCNC: 30.1 G/DL — LOW (ref 32–36)
MCHC RBC-ENTMCNC: 31.4 PG — SIGNIFICANT CHANGE UP (ref 27–34)
MCV RBC AUTO: 104.4 FL — HIGH (ref 80–100)
MONOCYTES # BLD AUTO: 0.76 K/UL — SIGNIFICANT CHANGE UP (ref 0–0.9)
MONOCYTES NFR BLD AUTO: 14.4 % — HIGH (ref 2–14)
NEUTROPHILS # BLD AUTO: 2.89 K/UL — SIGNIFICANT CHANGE UP (ref 1.8–7.4)
NEUTROPHILS NFR BLD AUTO: 54.7 % — SIGNIFICANT CHANGE UP (ref 43–77)
NRBC BLD AUTO-RTO: 0 /100 WBCS — SIGNIFICANT CHANGE UP (ref 0–0)
PHOSPHATE SERPL-MCNC: 4.2 MG/DL — SIGNIFICANT CHANGE UP (ref 2.5–4.5)
PLATELET # BLD AUTO: 163 K/UL — SIGNIFICANT CHANGE UP (ref 150–400)
POTASSIUM SERPL-MCNC: 4.6 MMOL/L — SIGNIFICANT CHANGE UP (ref 3.5–5.3)
POTASSIUM SERPL-SCNC: 4.6 MMOL/L — SIGNIFICANT CHANGE UP (ref 3.5–5.3)
PROT SERPL-MCNC: 6 G/DL — SIGNIFICANT CHANGE UP (ref 6–8.3)
RBC # BLD: 2.96 M/UL — LOW (ref 3.8–5.2)
RBC # FLD: 18.2 % — HIGH (ref 10.3–14.5)
SODIUM SERPL-SCNC: 137 MMOL/L — SIGNIFICANT CHANGE UP (ref 135–145)
VANCOMYCIN FLD-MCNC: 12.5 UG/ML — SIGNIFICANT CHANGE UP
WBC # BLD: 5.28 K/UL — SIGNIFICANT CHANGE UP (ref 3.8–10.5)
WBC # FLD AUTO: 5.28 K/UL — SIGNIFICANT CHANGE UP (ref 3.8–10.5)

## 2025-03-02 PROCEDURE — 99232 SBSQ HOSP IP/OBS MODERATE 35: CPT

## 2025-03-02 RX ORDER — METOPROLOL SUCCINATE 50 MG/1
200 TABLET, EXTENDED RELEASE ORAL DAILY
Refills: 0 | Status: DISCONTINUED | OUTPATIENT
Start: 2025-03-02 | End: 2025-03-06

## 2025-03-02 RX ADMIN — APIXABAN 2.5 MILLIGRAM(S): 2.5 TABLET, FILM COATED ORAL at 20:03

## 2025-03-02 RX ADMIN — Medication 650 MILLIGRAM(S): at 05:21

## 2025-03-02 RX ADMIN — OLANZAPINE 2.5 MILLIGRAM(S): 10 TABLET ORAL at 01:58

## 2025-03-02 RX ADMIN — BUMETANIDE 2 MILLIGRAM(S): 1 TABLET ORAL at 05:21

## 2025-03-02 RX ADMIN — Medication 1 APPLICATION(S): at 08:29

## 2025-03-02 RX ADMIN — SEVELAMER HYDROCHLORIDE 800 MILLIGRAM(S): 800 TABLET ORAL at 17:19

## 2025-03-02 RX ADMIN — Medication 1 TABLET(S): at 11:10

## 2025-03-02 RX ADMIN — ATORVASTATIN CALCIUM 80 MILLIGRAM(S): 80 TABLET, FILM COATED ORAL at 20:03

## 2025-03-02 RX ADMIN — AMIODARONE HYDROCHLORIDE 200 MILLIGRAM(S): 50 INJECTION, SOLUTION INTRAVENOUS at 05:21

## 2025-03-02 RX ADMIN — OLANZAPINE 2.5 MILLIGRAM(S): 10 TABLET ORAL at 18:00

## 2025-03-02 RX ADMIN — APIXABAN 2.5 MILLIGRAM(S): 2.5 TABLET, FILM COATED ORAL at 08:36

## 2025-03-02 RX ADMIN — SEVELAMER HYDROCHLORIDE 800 MILLIGRAM(S): 800 TABLET ORAL at 08:36

## 2025-03-02 RX ADMIN — SEVELAMER HYDROCHLORIDE 800 MILLIGRAM(S): 800 TABLET ORAL at 11:10

## 2025-03-02 RX ADMIN — Medication 650 MILLIGRAM(S): at 11:10

## 2025-03-02 RX ADMIN — Medication 650 MILLIGRAM(S): at 16:51

## 2025-03-02 RX ADMIN — Medication 650 MILLIGRAM(S): at 00:05

## 2025-03-02 RX ADMIN — Medication 650 MILLIGRAM(S): at 13:45

## 2025-03-02 RX ADMIN — METOPROLOL SUCCINATE 150 MILLIGRAM(S): 50 TABLET, EXTENDED RELEASE ORAL at 05:22

## 2025-03-02 RX ADMIN — Medication 650 MILLIGRAM(S): at 17:19

## 2025-03-02 NOTE — PROGRESS NOTE ADULT - SUBJECTIVE AND OBJECTIVE BOX
****[facility] PGY-2*****    CHIEF COMPLAINT:    Overnight Events:  Interval Events:    OBJECTIVE:  ICU Vital Signs Last 24 Hrs  T(C): 36.5 (02 Mar 2025 05:18), Max: 36.5 (02 Mar 2025 05:18)  T(F): 97.7 (02 Mar 2025 05:18), Max: 97.7 (02 Mar 2025 05:18)  HR: 103 (02 Mar 2025 05:18) (90 - 115)  BP: 109/71 (02 Mar 2025 05:18) (104/68 - 132/88)  BP(mean): --  ABP: --  ABP(mean): --  RR: 18 (02 Mar 2025 05:18) (18 - 18)  SpO2: 95% (02 Mar 2025 05:18) (95% - 99%)    O2 Parameters below as of 02 Mar 2025 05:18  Patient On (Oxygen Delivery Method): room air              03-01 @ 07:01  -  03-02 @ 07:00  --------------------------------------------------------  IN: 1020 mL / OUT: 2000 mL / NET: -980 mL      CAPILLARY BLOOD GLUCOSE          PHYSICAL EXAM  General:   Head:    Eyes:   Neck:   Cardiac:   Lungs:   Abdomen:   Extremities:   Neuro  Psych:   Skin:  Etc:      HOSPITAL MEDICATIONS:  MEDICATIONS  (STANDING):  aMIOdarone    Tablet 200 milliGRAM(s) Oral daily  apixaban 2.5 milliGRAM(s) Oral every 12 hours  atorvastatin 80 milliGRAM(s) Oral at bedtime  buMETAnide Injectable 2 milliGRAM(s) IV Push daily  chlorhexidine 2% Cloths 1 Application(s) Topical <User Schedule>  influenza  Vaccine (HIGH DOSE) 0.5 milliLiter(s) IntraMuscular once  metoprolol succinate  milliGRAM(s) Oral <User Schedule>  Nephro-teresita 1 Tablet(s) Oral daily  sevelamer carbonate 800 milliGRAM(s) Oral three times a day with meals  sodium bicarbonate 650 milliGRAM(s) Oral four times a day    MEDICATIONS  (PRN):  acetaminophen     Tablet .. 650 milliGRAM(s) Oral every 6 hours PRN Temp greater or equal to 38C (100.4F), Mild Pain (1 - 3)  melatonin 3 milliGRAM(s) Oral at bedtime PRN Insomnia  OLANZapine 2.5 milliGRAM(s) Oral every 12 hours PRN Agitation/hallucinations      LABS:                        9.3    5.28  )-----------( 163      ( 02 Mar 2025 06:10 )             30.9     Hgb Trend: 9.3<--, 9.8<--, 9.1<--, 10.0<--, 10.2<--  03-02    137  |  97  |  43[H]  ----------------------------<  98  4.6   |  26  |  4.33[H]    Ca    7.9[L]      02 Mar 2025 06:09  Phos  4.2     03-02  Mg     2.1     03-02    TPro  6.0  /  Alb  3.6  /  TBili  0.4  /  DBili  x   /  AST  15  /  ALT  59[H]  /  AlkPhos  97  03-02    Creatinine Trend: 4.33<--, 6.06<--, 5.36<--, 3.65<--, 4.78<--, 3.76<--    Urinalysis Basic - ( 02 Mar 2025 06:09 )    Color: x / Appearance: x / SG: x / pH: x  Gluc: 98 mg/dL / Ketone: x  / Bili: x / Urobili: x   Blood: x / Protein: x / Nitrite: x   Leuk Esterase: x / RBC: x / WBC x   Sq Epi: x / Non Sq Epi: x / Bacteria: x            MICROBIOLOGY:     Culture - Urine (collected 28 Feb 2025 14:06)  Source: Clean Catch Clean Catch (Midstream)  Final Report (01 Mar 2025 22:37):    >100,000 CFU/ml Staphylococcus epidermidis "Susceptibilities not    performed"        RADIOLOGY:  [ ] Reviewed by me   ****[facility] PGY-2*****    CHIEF COMPLAINT: AFib/rvr    Overnight Events: NA  Interval Events: Patient had no acute concerns.    They denied fevers/chills, shortness of breath/chest pain, abdomin pain, constipation/diarrehea, any issues with urination.    OBJECTIVE:  ICU Vital Signs Last 24 Hrs  T(C): 36.5 (02 Mar 2025 05:18), Max: 36.5 (02 Mar 2025 05:18)  T(F): 97.7 (02 Mar 2025 05:18), Max: 97.7 (02 Mar 2025 05:18)  HR: 103 (02 Mar 2025 05:18) (90 - 115)  BP: 109/71 (02 Mar 2025 05:18) (104/68 - 132/88)  BP(mean): --  ABP: --  ABP(mean): --  RR: 18 (02 Mar 2025 05:18) (18 - 18)  SpO2: 95% (02 Mar 2025 05:18) (95% - 99%)    O2 Parameters below as of 02 Mar 2025 05:18  Patient On (Oxygen Delivery Method): room air              03-01 @ 07:01  -  03-02 @ 07:00  --------------------------------------------------------  IN: 1020 mL / OUT: 2000 mL / NET: -980 mL      CAPILLARY BLOOD GLUCOSE          PHYSICAL EXAM  General: Well appearing  Head: NA  Eyes: NA  Neck: No overt distension, minor JVD  Cardiac: Irregular  Lungs: Clear lung fields moves air well  Abdomen: Non tender non distended  Extremities: Mild +1 LE edema bilaterally  Neuro: Ao3  Psych: Appropriate affect  Skin: NA  Etc: NA    HOSPITAL MEDICATIONS:  MEDICATIONS  (STANDING):  aMIOdarone    Tablet 200 milliGRAM(s) Oral daily  apixaban 2.5 milliGRAM(s) Oral every 12 hours  atorvastatin 80 milliGRAM(s) Oral at bedtime  buMETAnide Injectable 2 milliGRAM(s) IV Push daily  chlorhexidine 2% Cloths 1 Application(s) Topical <User Schedule>  influenza  Vaccine (HIGH DOSE) 0.5 milliLiter(s) IntraMuscular once  metoprolol succinate  milliGRAM(s) Oral <User Schedule>  Nephro-teresita 1 Tablet(s) Oral daily  sevelamer carbonate 800 milliGRAM(s) Oral three times a day with meals  sodium bicarbonate 650 milliGRAM(s) Oral four times a day    MEDICATIONS  (PRN):  acetaminophen     Tablet .. 650 milliGRAM(s) Oral every 6 hours PRN Temp greater or equal to 38C (100.4F), Mild Pain (1 - 3)  melatonin 3 milliGRAM(s) Oral at bedtime PRN Insomnia  OLANZapine 2.5 milliGRAM(s) Oral every 12 hours PRN Agitation/hallucinations      LABS:                        9.3    5.28  )-----------( 163      ( 02 Mar 2025 06:10 )             30.9     Hgb Trend: 9.3<--, 9.8<--, 9.1<--, 10.0<--, 10.2<--  03-02    137  |  97  |  43[H]  ----------------------------<  98  4.6   |  26  |  4.33[H]    Ca    7.9[L]      02 Mar 2025 06:09  Phos  4.2     03-02  Mg     2.1     03-02    TPro  6.0  /  Alb  3.6  /  TBili  0.4  /  DBili  x   /  AST  15  /  ALT  59[H]  /  AlkPhos  97  03-02    Creatinine Trend: 4.33<--, 6.06<--, 5.36<--, 3.65<--, 4.78<--, 3.76<--    Urinalysis Basic - ( 02 Mar 2025 06:09 )    Color: x / Appearance: x / SG: x / pH: x  Gluc: 98 mg/dL / Ketone: x  / Bili: x / Urobili: x   Blood: x / Protein: x / Nitrite: x   Leuk Esterase: x / RBC: x / WBC x   Sq Epi: x / Non Sq Epi: x / Bacteria: x            MICROBIOLOGY:     Culture - Urine (collected 28 Feb 2025 14:06)  Source: Clean Catch Clean Catch (Midstream)  Final Report (01 Mar 2025 22:37):    >100,000 CFU/ml Staphylococcus epidermidis "Susceptibilities not    performed"        RADIOLOGY:  [ ] Reviewed by me

## 2025-03-02 NOTE — PROGRESS NOTE ADULT - ASSESSMENT
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 Now s/p tunneled catheter placed on 2/19/25 and started on dialysis.     1. Persistent Afib  2. NICO on CKD, now ESRD Per Renal  3. Severe MR    - Patient underwent /DCCV and convert back to Afib after maintaining SR for about 2 hours.  with severe MR, cardiology will contact Structural heart (Dr. Campa) to be back on board for possible mitral valve intervention. Continue AC, metoprolol and amiodarone 200mg QD. Follow up with Dr. Thomas as scheduled on 4/2/25 at 10:45pm.  - increase metoprolol to 200 mg daily for improved rate control   - Structural heart evaluation appreciated, repeat TTE when euvolemic to reassess MR in sinus rhythm.   - s/p RHC/LHC 2/24, elevated filling pressures, 75%mRCA  - s/p right chest wall tunneled catheter placed on 2/19/25, s/p HD 2/27 evening   - Continue Eliquis at 2.5mg BID (Age <80, Wt <60Kg, Crt >1.5)   - Continue aggressive diuresis, on IVP Bumex qd  - Eventual GDMT  - Primary team spoke with Renal, pt already has a permacath. Plan is for outpatient AVF placement after 30 days post cardioversion.

## 2025-03-02 NOTE — PROGRESS NOTE ADULT - SUBJECTIVE AND OBJECTIVE BOX
Patient seen and examined at bedside.    Overnight Events: No acute events, remains in atrial fibrillation, intermittent rapid rates     Current Meds:  acetaminophen     Tablet .. 650 milliGRAM(s) Oral every 6 hours PRN  aMIOdarone    Tablet 200 milliGRAM(s) Oral daily  apixaban 2.5 milliGRAM(s) Oral every 12 hours  atorvastatin 80 milliGRAM(s) Oral at bedtime  buMETAnide Injectable 2 milliGRAM(s) IV Push daily  chlorhexidine 2% Cloths 1 Application(s) Topical <User Schedule>  influenza  Vaccine (HIGH DOSE) 0.5 milliLiter(s) IntraMuscular once  melatonin 3 milliGRAM(s) Oral at bedtime PRN  metoprolol succinate  milliGRAM(s) Oral <User Schedule>  Nephro-teresita 1 Tablet(s) Oral daily  OLANZapine 2.5 milliGRAM(s) Oral every 12 hours PRN  sevelamer carbonate 800 milliGRAM(s) Oral three times a day with meals  sodium bicarbonate 650 milliGRAM(s) Oral four times a day      Vitals:  T(F): 97.7 (03-02), Max: 97.7 (03-02)  HR: 103 (03-02) (90 - 115)  BP: 109/71 (03-02) (104/68 - 132/88)  RR: 18 (03-02)  SpO2: 95% (03-02)  I&O's Summary    01 Mar 2025 07:01  -  02 Mar 2025 07:00  --------------------------------------------------------  IN: 1020 mL / OUT: 2000 mL / NET: -980 mL        Physical Exam:  Appearance: No acute distress; well appearing  Eyes: PERRL, EOMI, pink conjunctiva  HEENT: Normal oral mucosa  Cardiovascular: irreg tach, S1, S2, no murmurs, rubs, or gallops; no edema; no JVD  Respiratory: Clear to auscultation bilaterally  Gastrointestinal: soft, non-tender, non-distended with normal bowel sounds  Musculoskeletal: No clubbing; no joint deformity   Neurologic: Non-focal  Lymphatic: No lymphadenopathy  Psychiatry: AAOx3, mood & affect appropriate  Skin: No rashes, ecchymoses, or cyanosis                          9.3    5.28  )-----------( 163      ( 02 Mar 2025 06:10 )             30.9     03-02    137  |  97  |  43[H]  ----------------------------<  98  4.6   |  26  |  4.33[H]    Ca    7.9[L]      02 Mar 2025 06:09  Phos  4.2     03-02  Mg     2.1     03-02    TPro  6.0  /  Alb  3.6  /  TBili  0.4  /  DBili  x   /  AST  15  /  ALT  59[H]  /  AlkPhos  97  03-02

## 2025-03-02 NOTE — PROGRESS NOTE ADULT - SUBJECTIVE AND OBJECTIVE BOX
MR#[id number]  PATIENT NAME:ELIZABETH PARSONS    DATE OF SERVICE: 03-02-25 @ 08:55  Patient was seen and examined by Aki Carias MD on    03-02-25 @ 08:55 .  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    Of Note:  Confirmed with  who is responsible for pt's care administers her medications consistently.  Her last dose of Eliquis was this am. Given at 10am.      TTE 4/2024  CONCLUSIONS:      1. Left ventricular wall thickness is normal. Left ventricular systolic function is normal with an ejection fraction of 76 % by 3D. There are no regional wall motion abnormalities seen.   2. No evidence of left atrial or left atrial appendage thrombus. Ultrasound enhancing agent was utilized to visualize the left atrial appendage.   3. The left atrium is severely dilated.   4. Moderate mitral regurgitation at a blood pressure of 132/71 mmHg. The mitral regurgitant jet is centrally directed. Mechanism of mitral regurgitation: Apryl Type I (normal leaflet motion with dilated annulus). PISA : later pisa radius 0.4, medial 0.37, Alisaing velocity 38.5cm/sec MR VTI 136cm MR Vmax 548 cm/s. The ERO 0.3sqcm and Reg. Vol 38cc. 3D vena contracta area was 0.35sqcm.   5. No pericardial effusion seen.   6. Normal right ventricular cavity size, with normal wall thickness, and normal systolic function      INTERIM EVENTS:Patient seen at bedside ,interim events noted.  - s/p RHC/LHC 2/24, elevated filling pressures, 75%mRCA    INTERIM EVENTS:Patient seen at bedside ,interim events noted.  Patient resting comfortably in bed in NAD.  Laying flat with no respiratory distress.  No complaints of chest pain, dyspnea, palpitations, PND, or orthopnea.  says she is feeling better overall  03/01-Had /DCCV but reverted back to AF   03/02-Atrial Fib no dyspnea     PMH -reviewed admission note, no change since admission  MEDICATIONS  (STANDING):  aMIOdarone    Tablet 200 milliGRAM(s) Oral daily  apixaban 2.5 milliGRAM(s) Oral every 12 hours  atorvastatin 80 milliGRAM(s) Oral at bedtime  buMETAnide Injectable 2 milliGRAM(s) IV Push daily  chlorhexidine 2% Cloths 1 Application(s) Topical <User Schedule>  influenza  Vaccine (HIGH DOSE) 0.5 milliLiter(s) IntraMuscular once  metoprolol succinate  milliGRAM(s) Oral <User Schedule>  Nephro-teresita 1 Tablet(s) Oral daily  sevelamer carbonate 800 milliGRAM(s) Oral three times a day with meals  sodium bicarbonate 650 milliGRAM(s) Oral four times a day    MEDICATIONS  (PRN):  acetaminophen     Tablet .. 650 milliGRAM(s) Oral every 6 hours PRN Temp greater or equal to 38C (100.4F), Mild Pain (1 - 3)  melatonin 3 milliGRAM(s) Oral at bedtime PRN Insomnia  OLANZapine 2.5 milliGRAM(s) Oral every 12 hours PRN Agitation/hallucinations            REVIEW OF SYSTEMS:  Constitutional: [ ] fever, [ ]weight loss,  [ ]fatigue [ ]weight gain  Eyes: [ ] visual changes  Respiratory: [ ]shortness of breath;  [ ] cough, [ ]wheezing, [ ]chills, [ ]hemoptysis  Cardiovascular: [ ] chest pain, [ ]palpitations, [ ]dizziness,  [ ]leg swelling[ ]orthopnea[ ]PND  Gastrointestinal: [ ] abdominal pain, [ ]nausea, [ ]vomiting,  [ ]diarrhea [ ]Constipation [ ]Melena  Genitourinary: [ ] dysuria, [ ] hematuria [ ]Zelaya  Neurologic: [ ] headaches [ ] tremors[ ]weakness [ ]Paralysis Right[ ] Left[ ]  Skin: [ ] itching, [ ]burning, [ ] rashes  Endocrine: [ ] heat or cold intolerance  Musculoskeletal: [ ] joint pain or swelling; [ ] muscle, back, or extremity pain  Psychiatric: [ ] depression, [ ]anxiety, [ ]mood swings, or [ ]difficulty sleeping  Hematologic: [ ] easy bruising, [ ] bleeding gums    [ ] All remaining systems negative except as per above.   [ ]Unable to obtain.  [x] No change in ROS since admission      Vital Signs Last 24 Hrs  T(C): 36.5 (02 Mar 2025 05:18), Max: 36.5 (02 Mar 2025 05:18)  T(F): 97.7 (02 Mar 2025 05:18), Max: 97.7 (02 Mar 2025 05:18)  HR: 103 (02 Mar 2025 05:18) (90 - 115)  BP: 109/71 (02 Mar 2025 05:18) (104/68 - 132/88)  BP(mean): --  RR: 18 (02 Mar 2025 05:18) (18 - 18)  SpO2: 95% (02 Mar 2025 05:18) (95% - 99%)    Parameters below as of 02 Mar 2025 05:18  Patient On (Oxygen Delivery Method): room air      I&O's Summary    01 Mar 2025 07:01  -  02 Mar 2025 07:00  --------------------------------------------------------  IN: 1020 mL / OUT: 2000 mL / NET: -980 mL        PHYSICAL EXAM:  General: No acute distress BMI-  HEENT: EOMI, PERRL  Neck: Supple, [ ] JVD  Lungs: Equal air entry bilaterally; [ ] rales [ ] wheezing [ ] rhonchi  Heart: Regular rate and rhythm; [x ] murmur   2/6 [ x] systolic [ ] diastolic [ ] radiation[ ] rubs [ ]  gallops  Abdomen: Nontender, bowel sounds present  Extremities: No clubbing, cyanosis, [ ] edema [ ]Pulses  equal and intact  Nervous system:  Alert & Oriented X3, no focal deficits  Psychiatric: Normal affect  Skin: No rashes or lesions    LABS:  03-02    137  |  97  |  43[H]  ----------------------------<  98  4.6   |  26  |  4.33[H]    Ca    7.9[L]      02 Mar 2025 06:09  Phos  4.2     03-02  Mg     2.1     03-02    TPro  6.0  /  Alb  3.6  /  TBili  0.4  /  DBili  x   /  AST  15  /  ALT  59[H]  /  AlkPhos  97  03-02    Creatinine Trend: 4.33<--, 6.06<--, 5.36<--, 3.65<--, 4.78<--, 3.76<--                        9.3    5.28  )-----------( 163      ( 02 Mar 2025 06:10 )             30.9          W or WO Ultrasound Enhancing Agent (02.28.25 @ 09:41) >  CONCLUSIONS:      1. Left ventricular cavity is mildly dilated. Left ventricular systolic function is mildly decreased with an ejection fraction visually estimated at 45 to 50 %.   2. Normal right ventricular cavity size and borderline reduced right ventricular systolic function.   3. Severe biatrial dilatation.   4. No evidence of left atrial or left atrial appendage thrombus.   5. Patent foramen ovale by color flow Doppler.   6. Moderate to severe mitral regurgitation at a blood pressure of 90/60 mmHg. Systolic flow reversal in the pulmonary veins, which is consistent with severe mitral regurgitation. The mechanism is function secondary to mitral annulus dilatation and left ventricular dysfunction. Difficult to assess mitral regurgitation due to rapid atrial fibrillation.   7. Moderate tricuspid regurgitation.   8. No pericardial effusion seen.   9. Compared to the transthoracic echocardiogram performed on 2/13/2025, there have been no significant interval changes.

## 2025-03-02 NOTE — PROGRESS NOTE ADULT - ATTENDING COMMENTS
NICO on CKD- continue HD/UF for volume optimization- has permacath- AVF as outpatient  +UA- urine culture sent, growing Staph epidermidis. Switched to vanc by level, check level today  CAD- s/p LHC with 75% mRCA- start high intensity statin as transaminitis improving  AF- s/p /DCCV on 2/28 but now back in AF. Continue amiodarone  MR/TR- to be reassessed once volume optimized- structural heart following  Hospital delirium with hallucinations and UTI- continue Zyprexa prn and abx above    Rest of care per plan above  D/W HS4

## 2025-03-02 NOTE — PROGRESS NOTE ADULT - ASSESSMENT
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       #Acute kidney injury superimposed on CKD.    Found to have worsening renal function on admission so procedure cancelled. Baseline 4.6 in Aug 2024 and 5.5 in 1/25. Cr on admission 2/12 was 8.42.   - RAP elevated to 15 on TTE from 2/13 and she appeared significantly volume up on exam  - was on Bumex 4mg IV BID with poor UOP, now on 2mg IV Bumex  - s/p hd access, and now on HD    #  Paroxysmal atrial fibrillation.   ·  Plan: Admitted for cardioversion but unable to do because given significant volume overload  - She is usually on 5mg BID of Eliquis but her weight has decreased to 56.5kg after HD and her Cr is >1.5, so her dose has been reduced to 2.5mg BID  - Currently on Metoprolol succinate 150mg BID, max dose is usually 200mg in 24 hours  - Off Amiodarone  - 02/28- /DCCV -reverted back to AF Amiodarone resumed  03/02-Remains in AF    # Chronic heart failure with preserved ejection fraction. -Severe MR  - TTE in 11/2024 showed progression to moderate to severe MR.  Left ventricular systolic function was normal with an ejection fraction of 55 % by Garcia's method of disks.  - TTE now with LVEF of 41%, global hypokinesis, severe MR, RAP of 15.   - Structural Heart evaluation noted. Will aggressively diurese and repeat TTE when euvolemic   - Eventual GDMT  - S/p LHC/RHC on 2/24 with elevated filling pressures: mRA 17, PCWP 30, CI 2.14.  - LHC with 75% mRCA disease otherwise non obstructive   - eliquis resumed   - Needs high intensity statin for her CAD. will hold on ASA for now given anemia   _structural Heart Dr Campa consideration for Valve intervention as outpatient  -To continue HD/UF is still not euvolemic

## 2025-03-02 NOTE — PROGRESS NOTE ADULT - PROBLEM SELECTOR PLAN 3
TTE in 11/2024 showed progression to moderate to severe MR.  Left ventricular systolic function is normal with an ejection fraction of 55 % by Garcia's method of disks. Appears hypervolemic on exam. Repeat TTE during admission reveals left ventricular systolic function is moderately decreased with an ejection fraction of 41 % by Garcia's method of disks.     Plan:  -Torsemide 60 mg on non dialysis days  -S/p RHC and LHC on 2/24 showing elevated filling pressures: mRA 17, PCWP 30, CI 2.14, 75% mRCA, otherwise no obstruction; plan to continue medical management  - s/p cardioversion 2/28 showed EF 45-50% and MR  -Patient still volume overloaded, started on Bumex 2g IV daily and plan for HD/UF daily  -Started atorvastatin 80mg  -General and structural cardiology following - recommend outpatient f/u and no further inpatient interventions

## 2025-03-02 NOTE — PROGRESS NOTE ADULT - ATTENDING COMMENTS
Rates in AF still high. Will increase metoprolol succinate from 150 to 200 mg daily. /CV in am and also to evaluate MV

## 2025-03-02 NOTE — PROGRESS NOTE ADULT - PROBLEM SELECTOR PLAN 9
DVT ppx: eliquis  Diet: Dash diet, low phos diet with nepro daily and nephro-teresita daily  Dispo: pending clinical course, PT eval pending

## 2025-03-03 LAB
ALBUMIN SERPL ELPH-MCNC: 3.7 G/DL — SIGNIFICANT CHANGE UP (ref 3.3–5)
ALP SERPL-CCNC: 101 U/L — SIGNIFICANT CHANGE UP (ref 40–120)
ALT FLD-CCNC: 51 U/L — HIGH (ref 10–45)
ANION GAP SERPL CALC-SCNC: 16 MMOL/L — SIGNIFICANT CHANGE UP (ref 5–17)
APTT BLD: 29.4 SEC — SIGNIFICANT CHANGE UP (ref 24.5–35.6)
AST SERPL-CCNC: 12 U/L — SIGNIFICANT CHANGE UP (ref 10–40)
BASOPHILS # BLD AUTO: 0.05 K/UL — SIGNIFICANT CHANGE UP (ref 0–0.2)
BASOPHILS NFR BLD AUTO: 0.8 % — SIGNIFICANT CHANGE UP (ref 0–2)
BILIRUB SERPL-MCNC: 0.4 MG/DL — SIGNIFICANT CHANGE UP (ref 0.2–1.2)
BUN SERPL-MCNC: 66 MG/DL — HIGH (ref 7–23)
CALCIUM SERPL-MCNC: 8.1 MG/DL — LOW (ref 8.4–10.5)
CHLORIDE SERPL-SCNC: 98 MMOL/L — SIGNIFICANT CHANGE UP (ref 96–108)
CO2 SERPL-SCNC: 27 MMOL/L — SIGNIFICANT CHANGE UP (ref 22–31)
CREAT SERPL-MCNC: 5.61 MG/DL — HIGH (ref 0.5–1.3)
EGFR: 7 ML/MIN/1.73M2 — LOW
EGFR: 7 ML/MIN/1.73M2 — LOW
EOSINOPHIL # BLD AUTO: 0.1 K/UL — SIGNIFICANT CHANGE UP (ref 0–0.5)
EOSINOPHIL NFR BLD AUTO: 1.7 % — SIGNIFICANT CHANGE UP (ref 0–6)
GLUCOSE SERPL-MCNC: 113 MG/DL — HIGH (ref 70–99)
HCT VFR BLD CALC: 30.7 % — LOW (ref 34.5–45)
HGB BLD-MCNC: 9.4 G/DL — LOW (ref 11.5–15.5)
IMM GRANULOCYTES NFR BLD AUTO: 0.3 % — SIGNIFICANT CHANGE UP (ref 0–0.9)
LYMPHOCYTES # BLD AUTO: 1.21 K/UL — SIGNIFICANT CHANGE UP (ref 1–3.3)
LYMPHOCYTES # BLD AUTO: 20.5 % — SIGNIFICANT CHANGE UP (ref 13–44)
MAGNESIUM SERPL-MCNC: 2.3 MG/DL — SIGNIFICANT CHANGE UP (ref 1.6–2.6)
MCHC RBC-ENTMCNC: 30.6 G/DL — LOW (ref 32–36)
MCHC RBC-ENTMCNC: 32.5 PG — SIGNIFICANT CHANGE UP (ref 27–34)
MCV RBC AUTO: 106.2 FL — HIGH (ref 80–100)
MONOCYTES # BLD AUTO: 0.74 K/UL — SIGNIFICANT CHANGE UP (ref 0–0.9)
MONOCYTES NFR BLD AUTO: 12.5 % — SIGNIFICANT CHANGE UP (ref 2–14)
NEUTROPHILS # BLD AUTO: 3.78 K/UL — SIGNIFICANT CHANGE UP (ref 1.8–7.4)
NEUTROPHILS NFR BLD AUTO: 64.2 % — SIGNIFICANT CHANGE UP (ref 43–77)
NRBC BLD AUTO-RTO: 0 /100 WBCS — SIGNIFICANT CHANGE UP (ref 0–0)
PHOSPHATE SERPL-MCNC: 5.2 MG/DL — HIGH (ref 2.5–4.5)
PLATELET # BLD AUTO: 160 K/UL — SIGNIFICANT CHANGE UP (ref 150–400)
POTASSIUM SERPL-MCNC: 5 MMOL/L — SIGNIFICANT CHANGE UP (ref 3.5–5.3)
POTASSIUM SERPL-SCNC: 5 MMOL/L — SIGNIFICANT CHANGE UP (ref 3.5–5.3)
PROT SERPL-MCNC: 6.3 G/DL — SIGNIFICANT CHANGE UP (ref 6–8.3)
RBC # BLD: 2.89 M/UL — LOW (ref 3.8–5.2)
RBC # FLD: 18.2 % — HIGH (ref 10.3–14.5)
SODIUM SERPL-SCNC: 141 MMOL/L — SIGNIFICANT CHANGE UP (ref 135–145)
VANCOMYCIN FLD-MCNC: 10 UG/ML — SIGNIFICANT CHANGE UP
WBC # BLD: 5.9 K/UL — SIGNIFICANT CHANGE UP (ref 3.8–10.5)
WBC # FLD AUTO: 5.9 K/UL — SIGNIFICANT CHANGE UP (ref 3.8–10.5)

## 2025-03-03 PROCEDURE — 99232 SBSQ HOSP IP/OBS MODERATE 35: CPT | Mod: GC

## 2025-03-03 PROCEDURE — 99233 SBSQ HOSP IP/OBS HIGH 50: CPT | Mod: 57

## 2025-03-03 PROCEDURE — 99221 1ST HOSP IP/OBS SF/LOW 40: CPT

## 2025-03-03 RX ORDER — APIXABAN 2.5 MG/1
2.5 TABLET, FILM COATED ORAL EVERY 12 HOURS
Refills: 0 | Status: DISCONTINUED | OUTPATIENT
Start: 2025-03-03 | End: 2025-03-03

## 2025-03-03 RX ORDER — HEPARIN SODIUM 1000 [USP'U]/ML
4500 INJECTION INTRAVENOUS; SUBCUTANEOUS EVERY 6 HOURS
Refills: 0 | Status: DISCONTINUED | OUTPATIENT
Start: 2025-03-03 | End: 2025-03-03

## 2025-03-03 RX ORDER — HEPARIN SODIUM 1000 [USP'U]/ML
INJECTION INTRAVENOUS; SUBCUTANEOUS
Qty: 25000 | Refills: 0 | Status: DISCONTINUED | OUTPATIENT
Start: 2025-03-03 | End: 2025-03-03

## 2025-03-03 RX ORDER — VANCOMYCIN HCL IN 5 % DEXTROSE 1.5G/250ML
500 PLASTIC BAG, INJECTION (ML) INTRAVENOUS ONCE
Refills: 0 | Status: COMPLETED | OUTPATIENT
Start: 2025-03-03 | End: 2025-03-03

## 2025-03-03 RX ORDER — HEPARIN SODIUM 1000 [USP'U]/ML
INJECTION INTRAVENOUS; SUBCUTANEOUS
Qty: 25000 | Refills: 0 | Status: DISCONTINUED | OUTPATIENT
Start: 2025-03-03 | End: 2025-03-04

## 2025-03-03 RX ORDER — HEPARIN SODIUM 1000 [USP'U]/ML
2000 INJECTION INTRAVENOUS; SUBCUTANEOUS EVERY 6 HOURS
Refills: 0 | Status: DISCONTINUED | OUTPATIENT
Start: 2025-03-03 | End: 2025-03-03

## 2025-03-03 RX ORDER — HEPARIN SODIUM 1000 [USP'U]/ML
2000 INJECTION INTRAVENOUS; SUBCUTANEOUS EVERY 6 HOURS
Refills: 0 | Status: DISCONTINUED | OUTPATIENT
Start: 2025-03-03 | End: 2025-03-05

## 2025-03-03 RX ORDER — HEPARIN SODIUM 1000 [USP'U]/ML
4500 INJECTION INTRAVENOUS; SUBCUTANEOUS EVERY 6 HOURS
Refills: 0 | Status: DISCONTINUED | OUTPATIENT
Start: 2025-03-03 | End: 2025-03-05

## 2025-03-03 RX ADMIN — Medication 1 TABLET(S): at 11:14

## 2025-03-03 RX ADMIN — APIXABAN 2.5 MILLIGRAM(S): 2.5 TABLET, FILM COATED ORAL at 09:25

## 2025-03-03 RX ADMIN — Medication 650 MILLIGRAM(S): at 05:56

## 2025-03-03 RX ADMIN — Medication 1 APPLICATION(S): at 09:24

## 2025-03-03 RX ADMIN — OLANZAPINE 2.5 MILLIGRAM(S): 10 TABLET ORAL at 23:39

## 2025-03-03 RX ADMIN — SEVELAMER HYDROCHLORIDE 800 MILLIGRAM(S): 800 TABLET ORAL at 17:20

## 2025-03-03 RX ADMIN — Medication 650 MILLIGRAM(S): at 11:13

## 2025-03-03 RX ADMIN — Medication 650 MILLIGRAM(S): at 15:00

## 2025-03-03 RX ADMIN — Medication 650 MILLIGRAM(S): at 17:20

## 2025-03-03 RX ADMIN — AMIODARONE HYDROCHLORIDE 200 MILLIGRAM(S): 50 INJECTION, SOLUTION INTRAVENOUS at 04:56

## 2025-03-03 RX ADMIN — Medication 100 MILLIGRAM(S): at 23:39

## 2025-03-03 RX ADMIN — SEVELAMER HYDROCHLORIDE 800 MILLIGRAM(S): 800 TABLET ORAL at 09:22

## 2025-03-03 RX ADMIN — Medication 650 MILLIGRAM(S): at 23:38

## 2025-03-03 RX ADMIN — METOPROLOL SUCCINATE 200 MILLIGRAM(S): 50 TABLET, EXTENDED RELEASE ORAL at 04:57

## 2025-03-03 RX ADMIN — Medication 650 MILLIGRAM(S): at 00:39

## 2025-03-03 RX ADMIN — Medication 650 MILLIGRAM(S): at 14:14

## 2025-03-03 RX ADMIN — SEVELAMER HYDROCHLORIDE 800 MILLIGRAM(S): 800 TABLET ORAL at 12:08

## 2025-03-03 RX ADMIN — HEPARIN SODIUM 1100 UNIT(S)/HR: 1000 INJECTION INTRAVENOUS; SUBCUTANEOUS at 23:40

## 2025-03-03 RX ADMIN — Medication 650 MILLIGRAM(S): at 04:56

## 2025-03-03 RX ADMIN — Medication 3 MILLIGRAM(S): at 00:39

## 2025-03-03 RX ADMIN — Medication 3 MILLIGRAM(S): at 23:39

## 2025-03-03 RX ADMIN — OLANZAPINE 2.5 MILLIGRAM(S): 10 TABLET ORAL at 11:12

## 2025-03-03 RX ADMIN — ATORVASTATIN CALCIUM 80 MILLIGRAM(S): 80 TABLET, FILM COATED ORAL at 23:39

## 2025-03-03 RX ADMIN — BUMETANIDE 2 MILLIGRAM(S): 1 TABLET ORAL at 04:58

## 2025-03-03 NOTE — CONSULT NOTE ADULT - SUBJECTIVE AND OBJECTIVE BOX
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 s/p RHC/LHC 2/24, elevated filling pressures, 75%mRCA. s/p tunneled catheter placed on 2/19/25 and started on dialysis. Patient underwent /DCCV on 2/28/25 with ERAF (early return of Afib) after maintaining SR for 2 hours. Evaluated by Structural Heart. Now CT Surgery Dr. Zain Mccormack consulted for evaluation of MR.   Patient assessed by CTS team; patient's  at bedside. No acute distress. Denies acute chest pain, palpitations, or shortness of breath.    Past Medical and Surgical History:  Renal Calculus  Ureteral Calculus  Acute Renal Failure, 9/2009  Wrist Fracture, CYNTHIA  Collar Bone Fracture  Rib Fractures  Kidney Stone removal, 3/15/2011  Atrial fibrillation with RVR  C Section  Percutaneous Stone Extraction, Right  S/P Left Percutaneuos Stone extraction, 01/26/2010, 04/20/2010  History of cardioversion    Active Medications:  aMIOdarone    Tablet 200 milliGRAM(s) Oral daily  apixaban 2.5 milliGRAM(s) Oral every 12 hours  atorvastatin 80 milliGRAM(s) Oral at bedtime  buMETAnide Injectable 2 milliGRAM(s) IV Push daily  chlorhexidine 2% Cloths 1 Application(s) Topical <User Schedule>  influenza  Vaccine (HIGH DOSE) 0.5 milliLiter(s) IntraMuscular once  metoprolol succinate  milliGRAM(s) Oral daily  Nephro-teresita 1 Tablet(s) Oral daily  sevelamer carbonate 800 milliGRAM(s) Oral three times a day with meals  sodium bicarbonate 650 milliGRAM(s) Oral four times a day  acetaminophen     Tablet .. 650 milliGRAM(s) Oral every 6 hours PRN Temp greater or equal to 38C (100.4F), Mild Pain (1 - 3)  melatonin 3 milliGRAM(s) Oral at bedtime PRN Insomnia  OLANZapine 2.5 milliGRAM(s) Oral every 12 hours PRN Agitation/hallucinations    Allergies: No Known Allergies    Social History:  , lives with ; retired  denies toxic habits    Review of Systems:  GENERAL:  Fevers[] chills[] sweats[] fatigue[] weight loss[] weight gain []                                        NEURO:  seizures []  syncope []  confusion []                                                                                  EYES: glasses[]  blurry vision[]  discharge[] pain[] glaucoma []                                                                            ENMT:  difficulty hearing []  vertigo[]  dysphagia[] epistaxis[] recent dental work []                                      CV:  chest pain[] palpitations[] BOO [] diaphoresis [] edema[]                                                                                             RESPIRATORY:  wheezing[] SOB[] cough [] sputum[] hemoptysis[]                                                                    GI:  nausea[]  vomiting []  diarrhea[] constipation [] melena []                                                                        : hematuria[ ]  dysuria[ ] urgency[] incontinence[]                                                                                              MUSCULOSKELETAL  arthritis[ ]  joint swelling [ ] muscle weakness [ ]                                                                  SKIN/BREAST:  rash[ ] itching [ ]  hair loss[ ] masses[ ]                                                                                                PSYCH:  dementia [ ] depression [ ] anxiety[ ]                                                                                                                  HEME/LYMPH:  bruises easily[ ] enlarged lymph nodes[ ] tender lymph nodes[ ]                                                 ENDOCRINE:  cold intolerance[ ] heat intolerance[ ] polydipsia[ ]      Physical Exam:  Vital Signs Last 24 Hrs  T(C): 36.3 (03 Mar 2025 04:53), Max: 36.8 (02 Mar 2025 12:50)  T(F): 97.3 (03 Mar 2025 04:53), Max: 98.2 (02 Mar 2025 12:50)  HR: 105 (03 Mar 2025 04:53) (89 - 105)  BP: 109/72 (03 Mar 2025 04:53) (104/69 - 109/72)  BP(mean): 94 (02 Mar 2025 20:55) (94 - 94)  RR: 18 (03 Mar 2025 04:53) (18 - 18)  SpO2: 95% (03 Mar 2025 04:53) (95% - 97%)  Parameters below as of 02 Mar 2025 20:55 | Patient On (Oxygen Delivery Method): room air    GEN: no acute distress  HEENT: NCAT, neck supple and nontender  CVS: s1, s2  RESP: nonlabored respirations with no use of accessory muscles  GI: abdomen soft, +bowel sounds, non-tender  EXT: FROM, +LLE edema; denies calf tenderness, +peripheral pulses intact  NEURO: AAOx3                                                          Labs:             9.4    5.90  )-----------( 160      ( 03 Mar 2025 06:25 )             30.7   03-03  141  |  98  |  66[H]  ----------------------------<  113[H]  5.0   |  27  |  5.61[H]  Ca    8.1[L]      03 Mar 2025 06:25  Phos  5.2     03-03  Mg     2.3     03-03  TPro  6.3  /  Alb  3.7  /  TBili  0.4  /  DBili  x   /  AST  12  /  ALT  51[H]  /  AlkPhos  101  03-03

## 2025-03-03 NOTE — CONSULT NOTE ADULT - ASSESSMENT
Assessment: 75 y/o female with HTN, HFrEF, severe MR, pAF on Eliquis  AF w/RVR, NICO on CKD, now pending long term HD per nephro, here for DCCV with cards for AF with RVR. Surgery consulted for long term HD access creation.     Plan:  - Protect LUE, pink band, no IVs, blood draws  - Please obtain b/l UE vein mapping   - c/w HD via permacath per nephro   - please obtain perioperative medical and cardiac risk stratification and optimization prior to surgery   - remainder of care per medicine/cards  - will follow   - d/w fellow on behalf of attending     Nader Dickens MD, PGY4   Vascular Surgery   [id number] 
75 y/o Female with Severe MR
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       #  Acute kidney injury superimposed on CKD.   ·  Plan: Hx/o CKD iso b/l staghorn calculus s/p removal (2009) (follows with Dr. Elizondo). Found to have worsening renal function on admission so procedure cancelled. Baseline 4.6 in Aug 2024 and 5.5 in 1/25. Cr on admission 2/12 was 8.42.   - Bladder scan     #  Paroxysmal atrial fibrillation.   ·  Plan: Admitted for cardioversion but unable to do because NICO on CKD.  -C/w home eliquis 5 mg BID   -C/w home metoprolol 50 mg BID     # Chronic heart failure with preserved ejection fraction.   ·  Plan: TTE in 11/2024 showed progression to moderate to severe MR.  Left ventricular systolic function is normal with an ejection fraction of 55 % by Garcia's method of disks. Appears hypervolemic on exam  -Bumex 3 mg BID   -Hold home torsemide   -Structural Heart evaluation    
Pt. with NICO on CKD.
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 s/p RHC/LHC 2/24, elevated filling pressures, 75%mRCA. s/p tunneled catheter placed on 2/19/25 and started on dialysis. Patient underwent /DCCV on 2/28/25 with ERAF (early return of Afib) after maintaining SR for 2 hours. Evaluated by Structural Heart. Now CT Surgery Dr. Zain Mccormack consulted for evaluation of MR.   Patient assessed by CTS team; patient's  at bedside. No acute distress. Denies acute chest pain, palpitations, or shortness of breath.

## 2025-03-03 NOTE — PROGRESS NOTE ADULT - PROBLEM SELECTOR PLAN 6
Mild transaminitis with , .  -Likely congestive hepatopathy    Plan:  -CTM daily  -Hold starting statin iso transaminitis Mild transaminitis with , .  -Likely congestive hepatopathy    Plan:  -CTM daily

## 2025-03-03 NOTE — PROGRESS NOTE ADULT - ATTENDING COMMENTS
NICO on CKD- continue HD/UF for volume optimization- has permacath- AVF as outpatient  +UA- urine culture growing Staph epidermidis- continue vancomycin by level  CAD- s/p LHC with 75% mRCA- continue high intensity statin  AF with severe MR- s/p /DCCV on 2/28 with conversion back to AF- on amiodarone- evaluating for MVR/MAZE/LAAL- CTS and structural heart following  Hospital delirium with hallucinations and UTI- continue Zyprexa prn and abx above

## 2025-03-03 NOTE — PROGRESS NOTE ADULT - ASSESSMENT
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 s/p RHC/LHC 2/24, elevated filling pressures, 75%mRCA. s/p tunneled catheter placed on 2/19/25 and started on dialysis. Patient underwent /DCCV on 2/28/25 with ERAF (early return of Afib) after maintaining SR for 2 hours.     1. Persistent Afib  2. NICO on CKD, now ESRD, s/p HD 3/1  3. Severe MR    - Primary team spoke with Renal, pt already has a permacath. Plan is for outpatient AVF placement after 30 days post cardioversion.  - Patient underwent /DCCV and convert back to Afib after maintaining SR for about 2 hours.   -  with severe MR, structural heart recommended outpatient follow   - Will have CT surgery consult for possible MV surgery/MAZE/LAAL  - Continue uninterrupted AC given recent DCCV  - Continue metoprolol and amiodarone 200mg QD.   - Follow up with Dr. Thomas as scheduled on 4/2/25 at 10:45pm.  - Continue aggressive diuresis, on IVP Bumex QD  - Eventual GDMT  - d/w primary team.     MAGDALENA Mata NP-C  Can reach me via Teams

## 2025-03-03 NOTE — PROGRESS NOTE ADULT - SUBJECTIVE AND OBJECTIVE BOX
Subjective  No acute events reported overnight.  At the time of examination she is moving around her room putting on makeup. She is not having any cardiopulmonary complaints at rest or upon minimal exertion.  Denies any lightheaded sensation, dizzy or palpitation.  No pain at groin site.  Currently on a heparin drip    Physical examination  No apparent distress, alert and oriented x 3, appropriate affect  JVD is not elevated, supple, no lymphadenopathy  Clear to auscultation bilaterally  Irregular irregular with 2 out of 6 holosystolic murmur at apex and left lower sternal border  Positive bowel sound, soft, nontender, nondistended, no mass and guarding or rebound ¢ no clubbing cyanosis   1+ lower extremity/pedal edema bilaterally.  Moving all extremities spontaneously  Right groin site ecchymosis with no hematoma/bruit  2+ DP/PT pulses    Assessment/plan  74-year-old female with past medical history significant for    Cardiomyopathy  Paroxysmal atrial fibrillation status post DCCV on 4/2004   Acute on chronic renal insufficiency  Acute on chronic decompensated/systolic heart failure  Mitral regurgitation, functional  Tricuspid regurgitation, function    The patient presented with atrial fibrillation with RVR and acute on chronic kidney disease in the setting of acute on chronic diastolic/systolic heart failure.    --If patient is going to be considered for surgery during her acute hospitalization or if needed to help guide her medical management a repeat right heart catheterization will be performed in hte coming days (spoke to EP, would prefer that AC not be discontinued).  Indications and details for the right heart catheterization were reviewed.  Benefits and risks were explained.  Risks include but not limited to infection, bleeding, arrhythmia, TIA/stroke, hemodynamic instability, vascular injury, need for urgent surgery and death.  --Patient found to have severe stenosis of right coronary artery.  Discussion was had with cardiology team.  The patient is not and prior to hospitalization having anginal symptoms.  It is not clear if the patient's coronary artery disease is contributing to the patient's clinical state.  Due to the patient's frailty, comorbidities and and need for anticoagulation therapy at this time plan for medical management of her obstructive coronary artery disease.  --Patient status post cardiac catheterization on 2/24/2025.    --Continue aggressive dialysis.    --Uptitrate goal-directed medical therapy.  Once the patient is euvolemic and on maximal tolerated goal-directed medical therapy it is recommended that a TTE be performed to assess the degree of mitral and tricuspid valve regurgitation.   Patient has atrial functional mitral regurgitaiton.  --It was explained to the patient and her  that if she has significant mitral and tricuspid valve regurgitation following medical optimization/cardioversion she will be reassessed for possible surgery  (MVR/MAZE/LAAL) or a percutaneous catheter-based intervention.  Indications and details of these percutaneous catheter-based interventions were gone over.  --Continue metoprolol succinate 200 mg daily last year and amiodarone 200 mg daily.  --Continue Eliquis 2.5mg PO BID.  Closely monitor for signs and symptoms of bleeding.  --Continue atorvastatin 80mg daily  --Continue telemetry monitoring.    All questions and concerns of the patient were reviewed    Findings and plan were discussed with cardiac surgery/Dr. Mccormack and EP/Dr. Thomas    Attestation Statements:   25 minutes spent on total encounter. The necessity of the time spent during the encounter on this date of service was due to: education, assessment and coordination of care.

## 2025-03-03 NOTE — CONSULT NOTE ADULT - NS ATTEND AMEND GEN_ALL_CORE FT
Events that transpired leading to the patient's current hospitalization along with the patient's hospital course was gone over.  The patient's past medical history/surgical status family history social history was gone over.  Agree with 14 point review of systems and physical examination as noted above.    TTE on 2/13/2025 demonstrated left ventricular cavity is mildly dilated. Left ventricular systolic function is moderately decreased with an ejection fraction of 41 % by Garcia's method of disks. Global left ventricular hypokinesis. Multiple segmental abnormalities exist. Left atrium is severely dilated. Severe mitral regurgitation. The mitral regurgitant jet is posteriorly directed. No pericardial effusion seen. Mild to moderate tricuspid regurgitation. Compared to the transthoracic echocardiogram performed on 11/27/2024, LVEF has decreased, MR is increased. Estimated pulmonary artery systolic pressure is 46 mmHg, consistent with mild to moderate pulmonary hypertension. Symmetric mitral valve leaflet tethering.  The inferior vena cava is dilated measuring 2.40 cm in diameter, (dilated >2.1cm) with abnormal inspiratory collapse (abnormal <50%) consistent with elevated right atrial pressure (~15, range 10-20mmHg).  There is normal LV mass and normal geometry.    74-year-old female (poor historian) with past medical history significant for hypertension, severe mitral regurgitation, paroxysmal atrial fibrillation status post cardioversion/Eliquis and chronic kidney disease who presents with worsening renal insufficiency (baseline serum creatinine 6.5, creatinine on 2/13/2025 is 8.47, azotemic).  Patient also noted to be in acute on chronic systolic and diastolic heart failure.  Recommend patient be aggressively diuresed.  Once the patient is euvolemic and on goal-directed medical therapy it is recommended that she undergo a cardiac catheterization (right heart catheterization left heart catheterization/coronary angiogram) to assess her volume status and her reduced ejection fraction/wall motion abnormalities.  A repeat TTE should be performed at that time to reassess the severity of her mitral valve regurgitation.  If she is found to have moderate–severe or greater mitral regurgitation it is recommended that a  be performed to assess her eccentric mitral regurgitant jet to determine if the patient is a candidate for mitral transcatheter [id number] repair.      Time-based billing (NON-critical care).   90 minutes spent on total encounter. The necessity of the time spent during the encounter on this date of service was due to: education, assessment and coordination of care.
Mrs Rodrigues has MR and permanent afib.    She would benefit from MVR and Afib ablation surgery.    She could go the route of MTEER and transcatheter afib ablation, LA appendage occlusion, but the efficacy of such may not be the same as with surgery.    She is considering her options

## 2025-03-03 NOTE — PROGRESS NOTE ADULT - PROBLEM SELECTOR PLAN 1
Hx/o CKD iso b/l staghorn calculus s/p removal (2009) (follows with Dr. Elizondo). Found to have worsening renal function on admission so procedure cancelled. Baseline 4.6 in Aug 2024 and 5.5 in 1/25. Cr on admission 2/12 was 8.42.     Plan:  - Strict I/Os, daily weights  - Trend BMPs, monitor renal function, renally dose meds, avoid nephrotoxins  - Nephro consulted, new dialysis started - now with alternating HD and UF daily  - Nutrition eval appreciated, recommended low sodium, low phos diet with nepro daily and nephro-teresita daily  - Venofer 200mg IV with HD X 5 doses and PERLITA 4000  U with HD  - Plan for AVF placement outpatient 30 days after cardioversion, vascular surgery consult appreciated - pending B/L UE vein mapping Hx/o CKD iso b/l staghorn calculus s/p removal (2009) (follows with Dr. Elizondo). Found to have worsening renal function on admission so procedure cancelled. Baseline 4.6 in Aug 2024 and 5.5 in 1/25. Cr on admission 2/12 was 8.42.     Plan:  - Strict I/Os, daily weights  - Trend BMPs, monitor renal function, renally dose meds, avoid nephrotoxins  - Nephro consulted, new dialysis started - s/p alternating HD and UF daily, now on HD MWF  - Nutrition eval appreciated, recommended low sodium, low phos diet with nepro daily and nephro-teresita daily  - Venofer 200mg IV with HD X 5 doses and PERLITA 4000 U with HD  - Plan for AVF placement outpatient 30 days after cardioversion, vascular surgery consult appreciated

## 2025-03-03 NOTE — PROGRESS NOTE ADULT - ASSESSMENT
74-year-old female with PMHx of HTN, CKD stage 5 (pt. of Dr. Elizondo), Afib on Eliquis, anemia, nephrolithiasis, and recently diagnosed HF who presents for scheduled Afib ablation for Afib.    1. Newly deemed ESRD now on dialysis  Underwent RIJ TDC placement followed by 1st HD treatement on 2/19/25. Last HD was on 3/1. Pt. is euvolemic on exam and clinically stable. Labs reviewed. Plan for maintenance HD today. Given cardioversion, plan for AVF can be done outpatient. Vein mapping if pt still here can be done. Monitor labs, and VS. Dose meds for ESRD/HD. Dc planning to [facility] HD unit- pt has a MWF spot at [facility], will need oral diuretics on non dialysis days on dc as well.    2. Anemia noted  -Complete  venofer 200mg IV with HD X 5 doses.  -Give PERLITA with HD.   -Transfusion as per primary team.    If you have any questions, please feel free to contact me  Albertina Frederick  Nephrology Fellow  [facility]/Page [phone]  (After 5pm or on weekends please page the on-call fellow)

## 2025-03-03 NOTE — CONSULT NOTE ADULT - CONSULT REQUESTED DATE/TIME
14-Feb-2025 18:25
13-Feb-2025 16:53
27-Feb-2025 22:04
12-Feb-2025 21:11
03-Mar-2025 09:14
13-Feb-2025 08:25

## 2025-03-03 NOTE — PROGRESS NOTE ADULT - PROBLEM SELECTOR PLAN 5
UA obtained 2/26 for new AMS/ hallucinations, no dysuria or urinary symptoms - positive for leuk esterase,   WBC, and bacteria  -Hx of pan-sensitive E. Coli in April 2024    Plan:  -S/p empiric tx with CTX x3 days (2/28 - 3/1)  -Urine culture showed staph epi - started Vanc 3/1  -Monitor vitals and WBC

## 2025-03-03 NOTE — CONSULT NOTE ADULT - CONSULT REASON
Mitral Regurgitation
NICO on CKD
MR evaluation
Eval for long term HD access
permacath placement for new dialysis
AF

## 2025-03-03 NOTE — PROGRESS NOTE ADULT - SUBJECTIVE AND OBJECTIVE BOX
Patient is a 74y old  Female who presents with a chief complaint of AF ESRD (02 Mar 2025 08:55)      INTERVAL HPI/OVERNIGHT EVENTS: No acute events overnight. EP increased metoprolol to 200mg daily, Vanc started based on urine culture sensitivities. Pending /CV after HD today.    Patient examined at bedside this morning. No acute concerns. She denies dysuria, abdominal pain, fevers, chills, heart palpations, chest pain, SOB, or any other symptoms.        Vital Signs Last 24 Hrs  T(C): 36.3 (03 Mar 2025 04:53), Max: 36.8 (02 Mar 2025 12:50)  T(F): 97.3 (03 Mar 2025 04:53), Max: 98.2 (02 Mar 2025 12:50)  HR: 105 (03 Mar 2025 04:53) (89 - 105)  BP: 109/72 (03 Mar 2025 04:53) (104/69 - 109/72)  BP(mean): 94 (02 Mar 2025 20:55) (94 - 94)  RR: 18 (03 Mar 2025 04:53) (18 - 18)  SpO2: 95% (03 Mar 2025 04:53) (95% - 97%)    Parameters below as of 02 Mar 2025 20:55  Patient On (Oxygen Delivery Method): room air    I&O's Detail    02 Mar 2025 07:01  -  03 Mar 2025 07:00  --------------------------------------------------------  IN:    Oral Fluid: 480 mL  Total IN: 480 mL    OUT:  Total OUT: 0 mL    Total NET: 480 mL      CAPILLARY BLOOD GLUCOSE        PHYSICAL EXAM:  General: NAD  HEENT: PERRL, normal sclera/conjunctiva  Neck: Supple  Lungs: CTA bilaterally  Heart: Irregular rhythm appreciated, normal S1S2, no murmurs/rubs/gallops  Abdomen: Soft, ND/NT  Extremities: b/l LE pitting edema, improving. RFV/A site intact with no surrounding erythema or pain  Skin: Warm, well-perfused  Neuro: A&O x3, no focal deficits, experiencing visual and auditory hallucinations, agitated and confused      LABS:                          9.4    5.90  )-----------( 160      ( 03 Mar 2025 06:25 )             30.7       03-03    141  |  98  |  66[H]  ----------------------------<  113[H]  5.0   |  27  |  5.61[H]    Ca    8.1[L]      03 Mar 2025 06:25  Phos  5.2     03-03  Mg     2.3     03-03    TPro  6.3  /  Alb  3.7  /  TBili  0.4  /  DBili  x   /  AST  12  /  ALT  51[H]  /  AlkPhos  101  03-03    Urinalysis Basic - ( 03 Mar 2025 06:25 )    Color: x / Appearance: x / SG: x / pH: x  Gluc: 113 mg/dL / Ketone: x  / Bili: x / Urobili: x   Blood: x / Protein: x / Nitrite: x   Leuk Esterase: x / RBC: x / WBC x   Sq Epi: x / Non Sq Epi: x / Bacteria: x        RADIOLOGY & ADDITIONAL TESTS:      acetaminophen     Tablet .. 650 milliGRAM(s) Oral every 6 hours PRN  aMIOdarone    Tablet 200 milliGRAM(s) Oral daily  apixaban 2.5 milliGRAM(s) Oral every 12 hours  atorvastatin 80 milliGRAM(s) Oral at bedtime  buMETAnide Injectable 2 milliGRAM(s) IV Push daily  chlorhexidine 2% Cloths 1 Application(s) Topical <User Schedule>  influenza  Vaccine (HIGH DOSE) 0.5 milliLiter(s) IntraMuscular once  melatonin 3 milliGRAM(s) Oral at bedtime PRN  metoprolol succinate  milliGRAM(s) Oral daily  Nephro-teresita 1 Tablet(s) Oral daily  OLANZapine 2.5 milliGRAM(s) Oral every 12 hours PRN  sevelamer carbonate 800 milliGRAM(s) Oral three times a day with meals  sodium bicarbonate 650 milliGRAM(s) Oral four times a day      HEALTH ISSUES - PROBLEM Dx:  Acute kidney injury superimposed on CKD    Paroxysmal atrial fibrillation    Need for prophylactic measure    Metabolic acidosis    Hypertension    Severe mitral regurgitation    Chronic HFrEF (heart failure with reduced ejection fraction)    Transaminitis    Anxiety    Urinary tract infection               Patient is a 74y old  Female who presents with a chief complaint of AF ESRD (02 Mar 2025 08:55)      INTERVAL HPI/OVERNIGHT EVENTS: No acute events overnight. EP increased metoprolol to 200mg daily, Vanc started based on urine culture sensitivities. Pending C tomorrow to evaluate for MV procedure. Planned for HD today.    Patient examined at bedside this morning. No acute concerns. She denies dysuria, abdominal pain, fevers, chills, heart palpations, chest pain, SOB, or any other symptoms.        Vital Signs Last 24 Hrs  T(C): 36.3 (03 Mar 2025 04:53), Max: 36.8 (02 Mar 2025 12:50)  T(F): 97.3 (03 Mar 2025 04:53), Max: 98.2 (02 Mar 2025 12:50)  HR: 105 (03 Mar 2025 04:53) (89 - 105)  BP: 109/72 (03 Mar 2025 04:53) (104/69 - 109/72)  BP(mean): 94 (02 Mar 2025 20:55) (94 - 94)  RR: 18 (03 Mar 2025 04:53) (18 - 18)  SpO2: 95% (03 Mar 2025 04:53) (95% - 97%)    Parameters below as of 02 Mar 2025 20:55  Patient On (Oxygen Delivery Method): room air    I&O's Detail    02 Mar 2025 07:01  -  03 Mar 2025 07:00  --------------------------------------------------------  IN:    Oral Fluid: 480 mL  Total IN: 480 mL    OUT:  Total OUT: 0 mL    Total NET: 480 mL      CAPILLARY BLOOD GLUCOSE        PHYSICAL EXAM:  General: NAD  HEENT: PERRL, normal sclera/conjunctiva  Neck: Supple  Lungs: CTA bilaterally  Heart: Irregular rhythm appreciated, normal S1S2, no murmurs/rubs/gallops  Abdomen: Soft, ND/NT  Extremities: b/l LE pitting edema, improving. RFV/A site intact with no surrounding erythema or pain  Skin: Warm, well-perfused  Neuro: A&O x3, no focal deficits, experiencing visual and auditory hallucinations, agitated and confused      LABS:                          9.4    5.90  )-----------( 160      ( 03 Mar 2025 06:25 )             30.7       03-03    141  |  98  |  66[H]  ----------------------------<  113[H]  5.0   |  27  |  5.61[H]    Ca    8.1[L]      03 Mar 2025 06:25  Phos  5.2     03-03  Mg     2.3     03-03    TPro  6.3  /  Alb  3.7  /  TBili  0.4  /  DBili  x   /  AST  12  /  ALT  51[H]  /  AlkPhos  101  03-03    Urinalysis Basic - ( 03 Mar 2025 06:25 )    Color: x / Appearance: x / SG: x / pH: x  Gluc: 113 mg/dL / Ketone: x  / Bili: x / Urobili: x   Blood: x / Protein: x / Nitrite: x   Leuk Esterase: x / RBC: x / WBC x   Sq Epi: x / Non Sq Epi: x / Bacteria: x        RADIOLOGY & ADDITIONAL TESTS:      acetaminophen     Tablet .. 650 milliGRAM(s) Oral every 6 hours PRN  aMIOdarone    Tablet 200 milliGRAM(s) Oral daily  apixaban 2.5 milliGRAM(s) Oral every 12 hours  atorvastatin 80 milliGRAM(s) Oral at bedtime  buMETAnide Injectable 2 milliGRAM(s) IV Push daily  chlorhexidine 2% Cloths 1 Application(s) Topical <User Schedule>  influenza  Vaccine (HIGH DOSE) 0.5 milliLiter(s) IntraMuscular once  melatonin 3 milliGRAM(s) Oral at bedtime PRN  metoprolol succinate  milliGRAM(s) Oral daily  Nephro-teresita 1 Tablet(s) Oral daily  OLANZapine 2.5 milliGRAM(s) Oral every 12 hours PRN  sevelamer carbonate 800 milliGRAM(s) Oral three times a day with meals  sodium bicarbonate 650 milliGRAM(s) Oral four times a day      HEALTH ISSUES - PROBLEM Dx:  Acute kidney injury superimposed on CKD    Paroxysmal atrial fibrillation    Need for prophylactic measure    Metabolic acidosis    Hypertension    Severe mitral regurgitation    Chronic HFrEF (heart failure with reduced ejection fraction)    Transaminitis    Anxiety    Urinary tract infection

## 2025-03-03 NOTE — PROGRESS NOTE ADULT - PROBLEM SELECTOR PLAN 2
Admitted for cardioversion but unable to do because NICO on CKD.    Plan:  -Cardioversion initially delayed because pt still volume overloaded (LHC/RHC on 2/24 with elevated filling pressures: mRA 17, PCWP 30, CI 2.14)   -Now s/p cardioversion 2/28, however back in Afib now - per EP, pt not good candidate for ablation 2/2 MR  -Restarted amiodarone 2/28  -C/w Eliquis 2.5 mg BID - renally dosed  -Increased toprol to 200mg daily for rate control Admitted for cardioversion but initially unable to do because NICO on CKD.    Plan:  -Cardioversion initially delayed because pt still volume overloaded (LHC/RHC on 2/24 with elevated filling pressures: mRA 17, PCWP 30, CI 2.14)   -Now s/p cardioversion 2/28, however back in Afib now - per EP, pt not good candidate for ablation 2/2 MR  -EP recs appreciated  -Restarted amiodarone 2/28  -C/w Eliquis 2.5 mg BID - renally dosed  -Increased toprol to 200mg daily for rate control

## 2025-03-03 NOTE — PROGRESS NOTE ADULT - PROBLEM SELECTOR PLAN 3
TTE in 11/2024 showed progression to moderate to severe MR.  Left ventricular systolic function is normal with an ejection fraction of 55 % by Garcia's method of disks. Appears hypervolemic on exam. Repeat TTE during admission reveals left ventricular systolic function is moderately decreased with an ejection fraction of 41 % by Garcia's method of disks.     Plan:  -Torsemide 60 mg on non dialysis days  -S/p RHC and LHC on 2/24 showing elevated filling pressures: mRA 17, PCWP 30, CI 2.14, 75% mRCA, otherwise no obstruction; plan to continue medical management  - s/p cardioversion 2/28 showed EF 45-50% and MR  -Patient still volume overloaded, started on Bumex 2g IV daily and plan for HD/UF daily  -Started atorvastatin 80mg  -General and structural cardiology following - recommend outpatient f/u and no further inpatient interventions TTE in 11/2024 showed progression to moderate to severe MR.  Left ventricular systolic function is normal with an ejection fraction of 55 % by Garcia's method of disks. Appears hypervolemic on exam. Repeat TTE during admission reveals left ventricular systolic function is moderately decreased with an ejection fraction of 41 % by Garcia's method of disks.     Plan:  -S/p RHC and LHC on 2/24 showing elevated filling pressures: mRA 17, PCWP 30, CI 2.14, 75% mRCA, otherwise no obstruction; plan to continue medical management  - s/p cardioversion 2/28 showed EF 45-50% and MR  -Patient still volume overloaded, started on Bumex 2g IV daily and had HD/UF daily - now on HD MWF  -Started atorvastatin 80mg  -General cardiology, structural cardiology, and CTS following  -Plan for RHC 3/3 now that patient is more euvolemic to evaluate for possible surgery (MVR/MAZE/LAAL) or percutaneous catheter-based intervention

## 2025-03-03 NOTE — PROGRESS NOTE ADULT - SUBJECTIVE AND OBJECTIVE BOX
[facility] Division of Kidney Diseases & Hypertension  FOLLOW UP NOTE  [phone]--------------------------------------------------------------------------------  Chief Complaint: Newly deemed ESRD now on HD    24 hour events/subjective: Pt seen and evaluated this afternoon. Reports feeling well, endorses no complaints.    PAST HISTORY  --------------------------------------------------------------------------------  No significant changes to PMH, PSH, FHx, SHx, unless otherwise noted    ALLERGIES & MEDICATIONS  --------------------------------------------------------------------------------  Allergies    No Known Allergies    Intolerances    Standing Inpatient Medications  aMIOdarone    Tablet 200 milliGRAM(s) Oral daily  atorvastatin 80 milliGRAM(s) Oral at bedtime  buMETAnide Injectable 2 milliGRAM(s) IV Push daily  chlorhexidine 2% Cloths 1 Application(s) Topical <User Schedule>  heparin  Infusion.  Unit(s)/Hr IV Continuous <Continuous>  influenza  Vaccine (HIGH DOSE) 0.5 milliLiter(s) IntraMuscular once  metoprolol succinate  milliGRAM(s) Oral daily  Nephro-teresita 1 Tablet(s) Oral daily  sevelamer carbonate 800 milliGRAM(s) Oral three times a day with meals  sodium bicarbonate 650 milliGRAM(s) Oral four times a day  vancomycin  IVPB 500 milliGRAM(s) IV Intermittent once    PRN Inpatient Medications  acetaminophen     Tablet .. 650 milliGRAM(s) Oral every 6 hours PRN  heparin   Injectable 4500 Unit(s) IV Push every 6 hours PRN  heparin   Injectable 2000 Unit(s) IV Push every 6 hours PRN  melatonin 3 milliGRAM(s) Oral at bedtime PRN  OLANZapine 2.5 milliGRAM(s) Oral every 12 hours PRN    REVIEW OF SYSTEMS  --------------------------------------------------------------------------------  see above    VITALS/PHYSICAL EXAM  --------------------------------------------------------------------------------  T(C): 36.7 (03-03-25 @ 11:21), Max: 36.7 (03-03-25 @ 11:21)  HR: 79 (03-03-25 @ 11:21) (79 - 105)  BP: 124/84 (03-03-25 @ 11:21) (106/72 - 124/84)  RR: 18 (03-03-25 @ 11:21) (18 - 18)  SpO2: 97% (03-03-25 @ 11:21) (95% - 97%)  Wt(kg): --    03-02-25 @ 07:01  -  03-03-25 @ 07:00  --------------------------------------------------------  IN: 480 mL / OUT: 0 mL / NET: 480 mL    03-03-25 @ 07:01  -  03-03-25 @ 15:34  --------------------------------------------------------  IN: 240 mL / OUT: 0 mL / NET: 240 mL    Physical Exam:  	Gen: NAD, Ill-appearing  	HEENT: PERRL, MMLEOPOLDO  	Pulm: CTA B/L  	CV: RRR, S1S2;  	Abd: +BS, soft, nontender/nondistended  	: No suprapubic tenderness              Extremities: no bilateral LE edema noted              Neuro: Awake  	Skin: Warm and dry  	[facility] access: [facility]     LABS/STUDIES  --------------------------------------------------------------------------------              9.4    5.90  >-----------<  160      [03-03-25 @ 06:25]              30.7     141  |  98  |  66  ----------------------------<  113      [03-03-25 @ 06:25]  5.0   |  27  |  5.61        Ca     8.1     [03-03-25 @ 06:25]      Mg     2.3     [03-03-25 @ 06:25]      Phos  5.2     [03-03-25 @ 06:25]    TPro  6.3  /  Alb  3.7  /  TBili  0.4  /  DBili  x   /  AST  12  /  ALT  51  /  AlkPhos  101  [03-03-25 @ 06:25]    Creatinine Trend:  SCr 5.61 [03-03 @ 06:25]  SCr 4.33 [03-02 @ 06:09]  SCr 6.06 [03-01 @ 06:46]  SCr 5.36 [02-28 @ 05:54]  SCr 3.65 [02-27 @ 06:04]    TSH 2.67      [03-01-25 @ 06:47]

## 2025-03-03 NOTE — CONSULT NOTE ADULT - PROBLEM SELECTOR RECOMMENDATION 9
Patient seen and assessed at bedside by CT Surgery team  Discussed current conditions, surgical options, risk vs. benefits explained;  at bedside  Please STOP Eliquis; can consider AC with heparin gtt  Patient will need RHC tomorrow  Attending Dr. Mccormack to review imagings and further discuss with patient at bedside  Continue global care as per Primary team  CTS to follow  Above plan as per d/w CTS Attending Dr. Mccormack  ---  Contact CTS, @ [phone], for any questions

## 2025-03-03 NOTE — PROGRESS NOTE ADULT - SUBJECTIVE AND OBJECTIVE BOX
24H hour events:     MEDICATIONS:  aMIOdarone    Tablet 200 milliGRAM(s) Oral daily  apixaban 2.5 milliGRAM(s) Oral every 12 hours  buMETAnide Injectable 2 milliGRAM(s) IV Push daily  metoprolol succinate  milliGRAM(s) Oral daily        acetaminophen     Tablet .. 650 milliGRAM(s) Oral every 6 hours PRN  melatonin 3 milliGRAM(s) Oral at bedtime PRN  OLANZapine 2.5 milliGRAM(s) Oral every 12 hours PRN      atorvastatin 80 milliGRAM(s) Oral at bedtime    chlorhexidine 2% Cloths 1 Application(s) Topical <User Schedule>  influenza  Vaccine (HIGH DOSE) 0.5 milliLiter(s) IntraMuscular once  Nephro-teresita 1 Tablet(s) Oral daily  sodium bicarbonate 650 milliGRAM(s) Oral four times a day      REVIEW OF SYSTEMS:  Complete 12 point ROS negative.    PHYSICAL EXAM:  T(C): 36.3 (03-03-25 @ 04:53), Max: 36.8 (03-02-25 @ 12:50)  HR: 105 (03-03-25 @ 04:53) (89 - 105)  BP: 109/72 (03-03-25 @ 04:53) (104/69 - 109/72)  RR: 18 (03-03-25 @ 04:53) (18 - 18)  SpO2: 95% (03-03-25 @ 04:53) (95% - 97%)  Wt(kg): --  I&O's Summary    02 Mar 2025 07:01  -  03 Mar 2025 07:00  --------------------------------------------------------  IN: 480 mL / OUT: 0 mL / NET: 480 mL        Appearance: Normal	  HEENT: PERRL, EOMI	  Cardiovascular: Normal S1 S2, No JVD, No murmurs  Respiratory: Lungs clear to auscultation	  Psychiatry: A & O x 3, Mood & affect appropriate  Gastrointestinal:  Soft, Non-tender, + BS	  Skin: No rashes, No ecchymoses, No cyanosis	  Neurologic: Grossly intact  Extremities: No clubbing, cyanosis or edema  Vascular: Peripheral pulses palpable 2+ bilaterally        LABS:	 	    CBC Full  -  ( 03 Mar 2025 06:25 )  WBC Count : 5.90 K/uL  Hemoglobin : 9.4 g/dL  Hematocrit : 30.7 %  Platelet Count - Automated : 160 K/uL  Mean Cell Volume : 106.2 fl  Mean Cell Hemoglobin : 32.5 pg  Mean Cell Hemoglobin Concentration : 30.6 g/dL  Auto Neutrophil # : x  Auto Lymphocyte # : x  Auto Monocyte # : x  Auto Eosinophil # : x  Auto Basophil # : x  Auto Neutrophil % : x  Auto Lymphocyte % : x  Auto Monocyte % : x  Auto Eosinophil % : x  Auto Basophil % : x    03-03    141  |  98  |  66[H]  ----------------------------<  113[H]  5.0   |  27  |  5.61[H]  03-02    137  |  97  |  43[H]  ----------------------------<  98  4.6   |  26  |  4.33[H]    Ca    8.1[L]      03 Mar 2025 06:25  Ca    7.9[L]      02 Mar 2025 06:09  Phos  5.2     03-03  Phos  4.2     03-02  Mg     2.3     03-03  Mg     2.1     03-02    TPro  6.3  /  Alb  3.7  /  TBili  0.4  /  DBili  x   /  AST  12  /  ALT  51[H]  /  AlkPhos  101  03-03  TPro  6.0  /  Alb  3.6  /  TBili  0.4  /  DBili  x   /  AST  15  /  ALT  59[H]  /  AlkPhos  97  03-02      proBNP:   Lipid Profile:   HgA1c:   TSH:       CARDIAC MARKERS:          TELEMETRY: 	    ECG:  	  RADIOLOGY:  OTHER: 	    PREVIOUS DIAGNOSTIC TESTING:    [ ] Echocardiogram:    [ ]  Catheterization:  [ ] Stress Test:  	  	  ASSESSMENT/PLAN: 	     24H hour events: Pt without complaint, no acute events overnight, Tele: Afib with VHR 's          MEDICATIONS:  aMIOdarone    Tablet 200 milliGRAM(s) Oral daily  apixaban 2.5 milliGRAM(s) Oral every 12 hours  buMETAnide Injectable 2 milliGRAM(s) IV Push daily  metoprolol succinate  milliGRAM(s) Oral daily  acetaminophen     Tablet .. 650 milliGRAM(s) Oral every 6 hours PRN  melatonin 3 milliGRAM(s) Oral at bedtime PRN  OLANZapine 2.5 milliGRAM(s) Oral every 12 hours PRN  atorvastatin 80 milliGRAM(s) Oral at bedtime  chlorhexidine 2% Cloths 1 Application(s) Topical <User Schedule>  influenza  Vaccine (HIGH DOSE) 0.5 milliLiter(s) IntraMuscular once  Nephro-teresita 1 Tablet(s) Oral daily  sodium bicarbonate 650 milliGRAM(s) Oral four times a day      REVIEW OF SYSTEMS:  Complete 12 point ROS negative.    PHYSICAL EXAM:  T(C): 36.3 (03-03-25 @ 04:53), Max: 36.8 (03-02-25 @ 12:50)  HR: 105 (03-03-25 @ 04:53) (89 - 105)  BP: 109/72 (03-03-25 @ 04:53) (104/69 - 109/72)  RR: 18 (03-03-25 @ 04:53) (18 - 18)  SpO2: 95% (03-03-25 @ 04:53) (95% - 97%)  Wt(kg): --  I&O's Summary    02 Mar 2025 07:01  -  03 Mar 2025 07:00  --------------------------------------------------------  IN: 480 mL / OUT: 0 mL / NET: 480 mL        Appearance: Normal	  HEENT: PERRL, EOMI	  Cardiovascular: Normal S1 S2, Irregularly irregular, No JVD  Respiratory: Lungs clear to auscultation	  Psychiatry: A & O x 3, Mood & affect appropriate  Gastrointestinal: Soft, Non-tender, + BS	  Skin: Right chest wall permacath site C/D/I  Neurologic: Grossly intact  Extremities: No clubbing, cyanosis or edema  Vascular: Peripheral pulses palpable 2+ bilaterally        LABS:	 	    CBC Full  -  ( 03 Mar 2025 06:25 )  WBC Count : 5.90 K/uL  Hemoglobin : 9.4 g/dL  Hematocrit : 30.7 %  Platelet Count - Automated : 160 K/uL  Mean Cell Volume : 106.2 fl  Mean Cell Hemoglobin : 32.5 pg  Mean Cell Hemoglobin Concentration : 30.6 g/dL  Auto Neutrophil # : x  Auto Lymphocyte # : x  Auto Monocyte # : x  Auto Eosinophil # : x  Auto Basophil # : x  Auto Neutrophil % : x  Auto Lymphocyte % : x  Auto Monocyte % : x  Auto Eosinophil % : x  Auto Basophil % : x    03-03    141  |  98  |  66[H]  ----------------------------<  113[H]  5.0   |  27  |  5.61[H]  03-02    137  |  97  |  43[H]  ----------------------------<  98  4.6   |  26  |  4.33[H]    Ca    8.1[L]      03 Mar 2025 06:25  Ca    7.9[L]      02 Mar 2025 06:09  Phos  5.2     03-03  Phos  4.2     03-02  Mg     2.3     03-03  Mg     2.1     03-02    TPro  6.3  /  Alb  3.7  /  TBili  0.4  /  DBili  x   /  AST  12  /  ALT  51[H]  /  AlkPhos  101  03-03  TPro  6.0  /  Alb  3.6  /  TBili  0.4  /  DBili  x   /  AST  15  /  ALT  59[H]  /  AlkPhos  97  03-02    Thyroid Stimulating Hormone, Serum in AM (03.01.25 @ 06:47)    Thyroid Stimulating Hormone, Serum: 2.67 uIU/mL      TELEMETRY: Afib with VHR 's

## 2025-03-04 LAB
ALBUMIN SERPL ELPH-MCNC: 3.6 G/DL — SIGNIFICANT CHANGE UP (ref 3.3–5)
ALP SERPL-CCNC: 90 U/L — SIGNIFICANT CHANGE UP (ref 40–120)
ALT FLD-CCNC: 41 U/L — SIGNIFICANT CHANGE UP (ref 10–45)
ANION GAP SERPL CALC-SCNC: 14 MMOL/L — SIGNIFICANT CHANGE UP (ref 5–17)
APTT BLD: 36.4 SEC — HIGH (ref 24.5–35.6)
APTT BLD: >200 SEC — CRITICAL HIGH (ref 24.5–35.6)
AST SERPL-CCNC: 16 U/L — SIGNIFICANT CHANGE UP (ref 10–40)
BASOPHILS # BLD AUTO: 0.03 K/UL — SIGNIFICANT CHANGE UP (ref 0–0.2)
BASOPHILS NFR BLD AUTO: 0.6 % — SIGNIFICANT CHANGE UP (ref 0–2)
BILIRUB SERPL-MCNC: 0.6 MG/DL — SIGNIFICANT CHANGE UP (ref 0.2–1.2)
BLD GP AB SCN SERPL QL: NEGATIVE — SIGNIFICANT CHANGE UP
BUN SERPL-MCNC: 35 MG/DL — HIGH (ref 7–23)
CALCIUM SERPL-MCNC: 8.1 MG/DL — LOW (ref 8.4–10.5)
CHLORIDE SERPL-SCNC: 94 MMOL/L — LOW (ref 96–108)
CO2 SERPL-SCNC: 27 MMOL/L — SIGNIFICANT CHANGE UP (ref 22–31)
CREAT SERPL-MCNC: 3.3 MG/DL — HIGH (ref 0.5–1.3)
EGFR: 14 ML/MIN/1.73M2 — LOW
EGFR: 14 ML/MIN/1.73M2 — LOW
EOSINOPHIL # BLD AUTO: 0.08 K/UL — SIGNIFICANT CHANGE UP (ref 0–0.5)
EOSINOPHIL NFR BLD AUTO: 1.5 % — SIGNIFICANT CHANGE UP (ref 0–6)
GLUCOSE SERPL-MCNC: 175 MG/DL — HIGH (ref 70–99)
HCT VFR BLD CALC: 27.7 % — LOW (ref 34.5–45)
HCT VFR BLD CALC: 28.5 % — LOW (ref 34.5–45)
HCT VFR BLD CALC: 30.1 % — LOW (ref 34.5–45)
HGB BLD-MCNC: 8.3 G/DL — LOW (ref 11.5–15.5)
HGB BLD-MCNC: 8.6 G/DL — LOW (ref 11.5–15.5)
HGB BLD-MCNC: 9.1 G/DL — LOW (ref 11.5–15.5)
IMM GRANULOCYTES NFR BLD AUTO: 0.6 % — SIGNIFICANT CHANGE UP (ref 0–0.9)
INR BLD: 1.2 RATIO — HIGH (ref 0.85–1.16)
LYMPHOCYTES # BLD AUTO: 0.98 K/UL — LOW (ref 1–3.3)
LYMPHOCYTES # BLD AUTO: 18.1 % — SIGNIFICANT CHANGE UP (ref 13–44)
MAGNESIUM SERPL-MCNC: 2.1 MG/DL — SIGNIFICANT CHANGE UP (ref 1.6–2.6)
MCHC RBC-ENTMCNC: 30 G/DL — LOW (ref 32–36)
MCHC RBC-ENTMCNC: 30.2 G/DL — LOW (ref 32–36)
MCHC RBC-ENTMCNC: 30.2 G/DL — LOW (ref 32–36)
MCHC RBC-ENTMCNC: 31.4 PG — SIGNIFICANT CHANGE UP (ref 27–34)
MCHC RBC-ENTMCNC: 31.5 PG — SIGNIFICANT CHANGE UP (ref 27–34)
MCHC RBC-ENTMCNC: 31.6 PG — SIGNIFICANT CHANGE UP (ref 27–34)
MCV RBC AUTO: 104.2 FL — HIGH (ref 80–100)
MCV RBC AUTO: 104.8 FL — HIGH (ref 80–100)
MCV RBC AUTO: 104.9 FL — HIGH (ref 80–100)
MONOCYTES # BLD AUTO: 0.68 K/UL — SIGNIFICANT CHANGE UP (ref 0–0.9)
MONOCYTES NFR BLD AUTO: 12.6 % — SIGNIFICANT CHANGE UP (ref 2–14)
NEUTROPHILS # BLD AUTO: 3.61 K/UL — SIGNIFICANT CHANGE UP (ref 1.8–7.4)
NEUTROPHILS NFR BLD AUTO: 66.6 % — SIGNIFICANT CHANGE UP (ref 43–77)
NRBC BLD AUTO-RTO: 0 /100 WBCS — SIGNIFICANT CHANGE UP (ref 0–0)
PHOSPHATE SERPL-MCNC: 3.9 MG/DL — SIGNIFICANT CHANGE UP (ref 2.5–4.5)
PLATELET # BLD AUTO: 123 K/UL — LOW (ref 150–400)
PLATELET # BLD AUTO: 123 K/UL — LOW (ref 150–400)
PLATELET # BLD AUTO: 138 K/UL — LOW (ref 150–400)
POTASSIUM SERPL-MCNC: 4.7 MMOL/L — SIGNIFICANT CHANGE UP (ref 3.5–5.3)
POTASSIUM SERPL-SCNC: 4.7 MMOL/L — SIGNIFICANT CHANGE UP (ref 3.5–5.3)
PROT SERPL-MCNC: 6.1 G/DL — SIGNIFICANT CHANGE UP (ref 6–8.3)
PROTHROM AB SERPL-ACNC: 13.7 SEC — HIGH (ref 9.9–13.4)
RBC # BLD: 2.64 M/UL — LOW (ref 3.8–5.2)
RBC # BLD: 2.72 M/UL — LOW (ref 3.8–5.2)
RBC # BLD: 2.89 M/UL — LOW (ref 3.8–5.2)
RBC # FLD: 17.6 % — HIGH (ref 10.3–14.5)
RBC # FLD: 17.8 % — HIGH (ref 10.3–14.5)
RBC # FLD: 17.8 % — HIGH (ref 10.3–14.5)
RH IG SCN BLD-IMP: POSITIVE — SIGNIFICANT CHANGE UP
SODIUM SERPL-SCNC: 135 MMOL/L — SIGNIFICANT CHANGE UP (ref 135–145)
WBC # BLD: 5.41 K/UL — SIGNIFICANT CHANGE UP (ref 3.8–10.5)
WBC # BLD: 5.6 K/UL — SIGNIFICANT CHANGE UP (ref 3.8–10.5)
WBC # BLD: 5.61 K/UL — SIGNIFICANT CHANGE UP (ref 3.8–10.5)
WBC # FLD AUTO: 5.41 K/UL — SIGNIFICANT CHANGE UP (ref 3.8–10.5)
WBC # FLD AUTO: 5.6 K/UL — SIGNIFICANT CHANGE UP (ref 3.8–10.5)
WBC # FLD AUTO: 5.61 K/UL — SIGNIFICANT CHANGE UP (ref 3.8–10.5)

## 2025-03-04 PROCEDURE — 93451 RIGHT HEART CATH: CPT | Mod: 26

## 2025-03-04 PROCEDURE — 99233 SBSQ HOSP IP/OBS HIGH 50: CPT

## 2025-03-04 PROCEDURE — 99232 SBSQ HOSP IP/OBS MODERATE 35: CPT | Mod: GC

## 2025-03-04 PROCEDURE — 99233 SBSQ HOSP IP/OBS HIGH 50: CPT | Mod: GC

## 2025-03-04 PROCEDURE — 99233 SBSQ HOSP IP/OBS HIGH 50: CPT | Mod: 25

## 2025-03-04 RX ORDER — OLANZAPINE 10 MG/1
2.5 TABLET ORAL EVERY 8 HOURS
Refills: 0 | Status: DISCONTINUED | OUTPATIENT
Start: 2025-03-04 | End: 2025-03-06

## 2025-03-04 RX ORDER — HEPARIN SODIUM 1000 [USP'U]/ML
1300 INJECTION INTRAVENOUS; SUBCUTANEOUS
Qty: 25000 | Refills: 0 | Status: DISCONTINUED | OUTPATIENT
Start: 2025-03-04 | End: 2025-03-05

## 2025-03-04 RX ADMIN — HEPARIN SODIUM 1300 UNIT(S)/HR: 1000 INJECTION INTRAVENOUS; SUBCUTANEOUS at 20:03

## 2025-03-04 RX ADMIN — SEVELAMER HYDROCHLORIDE 800 MILLIGRAM(S): 800 TABLET ORAL at 11:19

## 2025-03-04 RX ADMIN — BUMETANIDE 2 MILLIGRAM(S): 1 TABLET ORAL at 06:22

## 2025-03-04 RX ADMIN — Medication 650 MILLIGRAM(S): at 06:21

## 2025-03-04 RX ADMIN — Medication 1 APPLICATION(S): at 08:19

## 2025-03-04 RX ADMIN — Medication 650 MILLIGRAM(S): at 11:19

## 2025-03-04 RX ADMIN — HEPARIN SODIUM 1300 UNIT(S)/HR: 1000 INJECTION INTRAVENOUS; SUBCUTANEOUS at 10:12

## 2025-03-04 RX ADMIN — ATORVASTATIN CALCIUM 80 MILLIGRAM(S): 80 TABLET, FILM COATED ORAL at 21:51

## 2025-03-04 RX ADMIN — METOPROLOL SUCCINATE 200 MILLIGRAM(S): 50 TABLET, EXTENDED RELEASE ORAL at 06:22

## 2025-03-04 RX ADMIN — SEVELAMER HYDROCHLORIDE 800 MILLIGRAM(S): 800 TABLET ORAL at 17:50

## 2025-03-04 RX ADMIN — Medication 1 TABLET(S): at 11:20

## 2025-03-04 RX ADMIN — OLANZAPINE 2.5 MILLIGRAM(S): 10 TABLET ORAL at 20:22

## 2025-03-04 RX ADMIN — OLANZAPINE 2.5 MILLIGRAM(S): 10 TABLET ORAL at 08:52

## 2025-03-04 RX ADMIN — AMIODARONE HYDROCHLORIDE 200 MILLIGRAM(S): 50 INJECTION, SOLUTION INTRAVENOUS at 06:21

## 2025-03-04 NOTE — PROVIDER CONTACT NOTE (OTHER) - NAME OF MD/NP/PA/DO NOTIFIED:
Anthony Bruner
Sydni Santillan Resident
Barbra Gupta
Meera Wills
Resident Danielle Moran
Resident Danielle Moran

## 2025-03-04 NOTE — PROGRESS NOTE ADULT - ATTENDING COMMENTS
NICO on CKD- continue HD/UF for volume optimization- has permacath- AVF as outpatient  Urine culture grew Staph epidermidis- completed course of vancomycin  CAD- s/p LHC with 75% mRCA- continue high intensity statin  AF with severe MR- s/p /DCCV on 2/28 with conversion back to AF- on amiodarone- evaluating for MVR/MAZE/LAAL- CTS and structural heart following- repeat RHC today  Hospital delirium- continue prn Zyprexa

## 2025-03-04 NOTE — PROGRESS NOTE ADULT - SUBJECTIVE AND OBJECTIVE BOX
[facility] - Heidy Bates, Alverto Green, Kevyn King  Office Number:  [phone]    Patient resting comfortably in bed in NAD.  Laying flat with no respiratory distress.  No complaints of chest pain, dyspnea, palpitations, PND, or orthopnea.  mildly confused this am    ROS: negative unless otherwise mentioned.    Telemetry:  af    MEDICATIONS  (STANDING):  aMIOdarone    Tablet 200 milliGRAM(s) Oral daily  atorvastatin 80 milliGRAM(s) Oral at bedtime  buMETAnide Injectable 2 milliGRAM(s) IV Push daily  chlorhexidine 2% Cloths 1 Application(s) Topical <User Schedule>  heparin  Infusion.  Unit(s)/Hr (11 mL/Hr) IV Continuous <Continuous>  influenza  Vaccine (HIGH DOSE) 0.5 milliLiter(s) IntraMuscular once  metoprolol succinate  milliGRAM(s) Oral daily  Nephro-teresita 1 Tablet(s) Oral daily  sevelamer carbonate 800 milliGRAM(s) Oral three times a day with meals  sodium bicarbonate 650 milliGRAM(s) Oral four times a day    MEDICATIONS  (PRN):  acetaminophen     Tablet .. 650 milliGRAM(s) Oral every 6 hours PRN Temp greater or equal to 38C (100.4F), Mild Pain (1 - 3)  heparin   Injectable 4500 Unit(s) IV Push every 6 hours PRN For aPTT less than 40  heparin   Injectable 2000 Unit(s) IV Push every 6 hours PRN For aPTT between 40 - 57  melatonin 3 milliGRAM(s) Oral at bedtime PRN Insomnia  OLANZapine 2.5 milliGRAM(s) Oral every 8 hours PRN Agitation      Allergies    No Known Allergies    Intolerances        Vital Signs Last 24 Hrs  T(C): 36.6 (04 Mar 2025 05:41), Max: 36.7 (03 Mar 2025 11:21)  T(F): 97.9 (04 Mar 2025 05:41), Max: 98.1 (03 Mar 2025 11:21)  HR: 132 (04 Mar 2025 05:41) (79 - 132)  BP: 125/87 (04 Mar 2025 05:41) (108/55 - 133/78)  BP(mean): --  RR: 18 (04 Mar 2025 05:41) (18 - 18)  SpO2: 100% (03 Mar 2025 22:53) (97% - 100%)    Parameters below as of 04 Mar 2025 05:41  Patient On (Oxygen Delivery Method): room air        I&O's Summary    03 Mar 2025 07:01  -  04 Mar 2025 07:00  --------------------------------------------------------  IN: 720 mL / OUT: 0 mL / NET: 720 mL        ON EXAM:    General: NAD, awake and alert, oriented x 3  HEENT: Mucous membranes are moist, anicteric  Lungs: Non-labored, breath sounds are clear bilaterally, No wheezing, rales or rhonchi  Cardiovascular: irregular, S1 and S2, +SM at apex, no rubs, or gallops  Gastrointestinal: Bowel Sounds present, soft, nontender.   Lymph: No peripheral edema. No lymphadenopathy.  Skin: No rashes or ulcers  Psych:  Mood & affect appropriate    LABS: All Labs Reviewed:                        8.3    5.41  )-----------( 123      ( 04 Mar 2025 05:51 )             27.7                         9.4    5.90  )-----------( 160      ( 03 Mar 2025 06:25 )             30.7                         9.3    5.28  )-----------( 163      ( 02 Mar 2025 06:10 )             30.9     04 Mar 2025 05:50    135    |  94     |  35     ----------------------------<  175    4.7     |  27     |  3.30   03 Mar 2025 06:25    141    |  98     |  66     ----------------------------<  113    5.0     |  27     |  5.61   02 Mar 2025 06:09    137    |  97     |  43     ----------------------------<  98     4.6     |  26     |  4.33     Ca    8.1        04 Mar 2025 05:50  Ca    8.1        03 Mar 2025 06:25  Ca    7.9        02 Mar 2025 06:09  Phos  3.9       04 Mar 2025 05:50  Phos  5.2       03 Mar 2025 06:25  Phos  4.2       02 Mar 2025 06:09  Mg     2.1       04 Mar 2025 05:50  Mg     2.3       03 Mar 2025 06:25  Mg     2.1       02 Mar 2025 06:09    TPro  6.1    /  Alb  3.6    /  TBili  0.6    /  DBili  x      /  AST  16     /  ALT  41     /  AlkPhos  90     04 Mar 2025 05:50  TPro  6.3    /  Alb  3.7    /  TBili  0.4    /  DBili  x      /  AST  12     /  ALT  51     /  AlkPhos  101    03 Mar 2025 06:25  TPro  6.0    /  Alb  3.6    /  TBili  0.4    /  DBili  x      /  AST  15     /  ALT  59     /  AlkPhos  97     02 Mar 2025 06:09    PTT - ( 04 Mar 2025 05:51 )  PTT:>200.0 sec      Blood Culture: Organism --  Gram Stain Blood -- Gram Stain --  Specimen Source Clean Catch Clean Catch (Midstream)  Culture-Blood --

## 2025-03-04 NOTE — PROGRESS NOTE ADULT - SUBJECTIVE AND OBJECTIVE BOX
[facility] Division of Kidney Diseases & Hypertension  FOLLOW UP NOTE  [phone]--------------------------------------------------------------------------------  Chief Complaint: Newly deemed ESRD now on HD    24 hour events/subjective: Pt seen and evaluated this afternoon. Reports feeling well, endorses no complaints. Denies any headaches, nausea, vomiting, fevers/chills, chest pain, palpitations, SOB, abdominal pain.    PAST HISTORY  --------------------------------------------------------------------------------  No significant changes to PMH, PSH, FHx, SHx, unless otherwise noted    ALLERGIES & MEDICATIONS  --------------------------------------------------------------------------------  Allergies    No Known Allergies    Intolerances    Standing Inpatient Medications  aMIOdarone    Tablet 200 milliGRAM(s) Oral daily  atorvastatin 80 milliGRAM(s) Oral at bedtime  buMETAnide Injectable 2 milliGRAM(s) IV Push daily  chlorhexidine 2% Cloths 1 Application(s) Topical <User Schedule>  heparin  Infusion.  Unit(s)/Hr IV Continuous <Continuous>  influenza  Vaccine (HIGH DOSE) 0.5 milliLiter(s) IntraMuscular once  metoprolol succinate  milliGRAM(s) Oral daily  Nephro-teresita 1 Tablet(s) Oral daily  sevelamer carbonate 800 milliGRAM(s) Oral three times a day with meals  sodium bicarbonate 650 milliGRAM(s) Oral four times a day    PRN Inpatient Medications  acetaminophen     Tablet .. 650 milliGRAM(s) Oral every 6 hours PRN  heparin   Injectable 4500 Unit(s) IV Push every 6 hours PRN  heparin   Injectable 2000 Unit(s) IV Push every 6 hours PRN  melatonin 3 milliGRAM(s) Oral at bedtime PRN  OLANZapine 2.5 milliGRAM(s) Oral every 12 hours PRN    REVIEW OF SYSTEMS  --------------------------------------------------------------------------------  see above    VITALS/PHYSICAL EXAM  --------------------------------------------------------------------------------  T(C): 36.6 (03-04-25 @ 05:41), Max: 36.7 (03-03-25 @ 11:21)  HR: 132 (03-04-25 @ 05:41) (79 - 132)  BP: 125/87 (03-04-25 @ 05:41) (108/55 - 133/78)  RR: 18 (03-04-25 @ 05:41) (18 - 18)  SpO2: 100% (03-03-25 @ 22:53) (97% - 100%)  Wt(kg): --    03-03-25 @ 07:01  -  03-04-25 @ 07:00  --------------------------------------------------------  IN: 720 mL / OUT: 0 mL / NET: 720 mL    Physical Exam:  	Gen: NAD, Ill-appearing  	HEENT: PERRL, MMM  	Pulm: CTA B/L  	CV: RRR, S1S2;  	Abd: +BS, soft, nontender/nondistended  	: No suprapubic tenderness              Extremities: +Trace bilateral LE edema noted              Neuro: Awake  	Skin: Warm and dry  	Vascular access: [facility]     LABS/STUDIES  --------------------------------------------------------------------------------              8.3    5.41  >-----------<  123      [03-04-25 @ 05:51]              27.7     135  |  94  |  35  ----------------------------<  175      [03-04-25 @ 05:50]  4.7   |  27  |  3.30        Ca     8.1     [03-04-25 @ 05:50]      Mg     2.1     [03-04-25 @ 05:50]      Phos  3.9     [03-04-25 @ 05:50]    TPro  6.1  /  Alb  3.6  /  TBili  0.6  /  DBili  x   /  AST  16  /  ALT  41  /  AlkPhos  90  [03-04-25 @ 05:50]      PTT: >200.0      [03-04-25 @ 05:51]    Creatinine Trend:  SCr 3.30 [03-04 @ 05:50]  SCr 5.61 [03-03 @ 06:25]  SCr 4.33 [03-02 @ 06:09]  SCr 6.06 [03-01 @ 06:46]  SCr 5.36 [02-28 @ 05:54]    TSH 2.67      [03-01-25 @ 06:47]

## 2025-03-04 NOTE — PROGRESS NOTE ADULT - ASSESSMENT
74-year-old female who presented with atrial fibrillation with RVR and acute on chronic kidney disease in the setting of acute on chronic diastolic/systolic heart failure. Her past medical history significant for:     Cardiomyopathy  Paroxysmal atrial fibrillation status post DCCV on 4/2004   Acute on chronic renal insufficiency  Acute on chronic decompensated/systolic heart failure  Mitral regurgitation, functional  Tricuspid regurgitation, function    - She is being evaluated by the Structural Team for severe MR and consideration for Surgical Mitral Valve Repair vs Mitral Transcatheter Edge to Edge Repair.   - RHC planned with Dr. Campa today  - Newly diagnosed ESRD started on HD with plan for outpt AVF placement   - Final treatment plan for MR pending    SOFÍA Calix NP  [id number]  TEAMS

## 2025-03-04 NOTE — PROVIDER CONTACT NOTE (OTHER) - DATE AND TIME:
01-Mar-2025 15:48
04-Mar-2025 02:00
19-Feb-2025 11:10
15-Feb-2025 18:57
15-Feb-2025 16:17
12-Feb-2025 22:09

## 2025-03-04 NOTE — PROGRESS NOTE ADULT - NS ATTEND AMEND GEN_ALL_CORE FT
Events that transpired over last 24 hours were reviewed.  The patient reports that she is clinically doing better.  Denies any cardiopulmonary complaints at rest or upon exertion.  Patient is scheduled to get tunneled HD catheter with plan to initiate hemodialysis on 2/19/2025.  Agree with 14 point review of systems and physical examination as noted above.    Once the patient is felt to be medically optimized/euvolemic plan is for patient to undergo right and left heart cardiac catheterization to assist in medical management and to determine potential etiology of the patient's cardiomyopathy.  Indications and details of the procedure reviewed.  Benefits and risk were explained.  Risks include but not limited to infection, bleeding, arrhythmia, TIA/stroke, hemodynamic instability, vascular injury, need for urgent surgery and death.  Once the patient is medically optimized/euvolemic plan to get a repeat TTE to reassess degree of mitral/tricuspid valvular regurgitation.    All questions and concerns of the patient were addressed.    Findings and plan were discussed with cardiology team.
Events that transpired over last 24 hours were reviewed.  The patient was seen ambulating in her room without any cardiopulmonary complaints.  She reports that she has been ambulating with her family outside the room without any issues.  Denies any swelling in her legs.  Denies any lightheaded sensation of dizziness or palpitations.  Agree with 14 point review of systems and physical examination noted above.  Plan for patient to undergo right heart catheterization later today.  Indications and details of the procedure reviewed.  Benefits and risks were explained.  Risks include but not limited to infection, bleeding, arrhythmia, TIA/stroke, hemodynamic instability, vascular injury, need for urgent surgery and death.  Patient has been followed by multidisciplinary heart team.  She has severe atrial functional mitral regurgitation.  Patient has been assessed by cardiac surgical team.  Consideration could be made for performing mitral transcatheter [id number] repai if the patient is symptomatic.  If patient does not have a satisfactory result from percutaneous mitral transcatheter [id number] repair she could always undergo surgery.    All questions and concerns of the patient, her  and sister were addressed.    Attestation Statements:   35 minutes spent on total encounter. The necessity of the time spent during the encounter on this date of service was due to: education, assessment and coordination of care.
Events that transpired over last 24 hours were reviewed.  Patient noted to be intermittently confused.  The patient's  is at her bedside who notes that she has underlying dementia and during the hospital she has had worsening delirium.  She does not have any cardiopulmonary complaints at rest or upon minimal exertion.  He notes that prior to her hospitalization she also did not experience any cardiopulmonary complaints.  Unable to obtain review of systems due to patient's mental status.  Agree with 14 point review of systems as noted above.    Plan for patient to undergo right and left heart catheterization and coronary angiogram later today.  Indications and details of the procedure were reviewed with the patient and her .  Benefits and risks of the procedure were reviewed.  Risks include but not limited to infection, bleeding, arrhythmia, TIA/stroke, Hemovac instability, vascular injury, need for urgent surgery and death.    Based upon patient's hemodynamics will determine appropriate timing for patient to undergo  cardioversion with subsequent repeat TTE to assess degree of mitral and tricuspid valve regurgitation once the patient is on maximal tolerated goal-directed medical therapy/euvolemic/sinus rhythm.    Continue telemetry monitoring.    Assessment and plan were discussed with cardiology/Dr. Moody    Attestation Statements:   35 minutes spent on total encounter. The necessity of the time spent during the encounter on this date of service was due to: education, assessment and coordination of care.
agree with assessment and plan

## 2025-03-04 NOTE — PROVIDER CONTACT NOTE (OTHER) - ASSESSMENT
Patient is A&Ox4, no dizziness, no distress, VSS, see in flow sheets. Patient able to ambulate to bathroom and void independently.
Patient is A&Ox4, no dizziness, no distress, VSS, see in flow sheets. Patient able to ambulate to bathroom and void independently.
Patient A&O x 4, on RA. Back from IR s/p permacath placement on Right chest wall.  Noted permacath dressing saturated with blood with little active bleeding noted on side of dressing.
pt refusing tele monitor. AOx3, pt disoriented. Pt educated on importance of tele monitor and continues to refuse.
Pt A&Ox4, agitated. Pt stated going home and attempting to leave unit

## 2025-03-04 NOTE — PROGRESS NOTE ADULT - ASSESSMENT
74-year-old female with PMHx of HTN, CKD stage 5 (pt. of Dr. Elizondo), Afib on Eliquis, anemia, nephrolithiasis, and recently diagnosed HF who presents for scheduled Afib ablation for Afib.    1. Newly deemed ESRD now on dialysis  Underwent RIJ TDC placement followed by 1st HD treatement on 2/19/25. Last HD was on 3/3. Pt. is euvolemic on exam and clinically stable. Labs reviewed. Discontinue sodium bicarb. Plan for maintenance HD 3/5. Given cardioversion, plan for AVF can be done outpatient. Vein mapping if pt still here can be done. Monitor labs, and VS. Dose meds for ESRD/HD. Dc planning to [facility] HD unit- pt has a MWF spot at [facility], will need oral diuretics on non dialysis days on dc as well.    2. Anemia noted  -Complete  venofer 200mg IV with HD X 5 doses.  -Give PERLITA with HD.   -Transfusion as per primary team.    If you have any questions, please feel free to contact me  Albertina Frederick  Nephrology Fellow  [employer]/Page [phone]  (After 5pm or on weekends please page the on-call fellow)

## 2025-03-04 NOTE — PROVIDER CONTACT NOTE (OTHER) - SITUATION
Indwelling Zelaya Cath Order
Pt agitated and attempting to leave unit
Pt. with increasing agitation. Attempting to rip IV at. Yelling at family.
pt refusing tele monitor
Indwelling Zelaya Cath Order
Noted saturated dressing s/p permacath placement

## 2025-03-04 NOTE — PROGRESS NOTE ADULT - PROBLEM SELECTOR PLAN 1
Hx/o CKD iso b/l staghorn calculus s/p removal (2009) (follows with Dr. Elizondo). Found to have worsening renal function on admission so procedure cancelled. Baseline 4.6 in Aug 2024 and 5.5 in 1/25. Cr on admission 2/12 was 8.42.     Plan:  - Strict I/Os, daily weights  - Trend BMPs, monitor renal function, renally dose meds, avoid nephrotoxins  - Nephro consulted, new dialysis started - s/p alternating HD and UF daily, now on HD MWF  - Nutrition eval appreciated, recommended low sodium, low phos diet with nepro daily and nephro-teresita daily  - Venofer 200mg IV with HD X 5 doses and PERLITA 4000 U with HD  - Plan for AVF placement outpatient 30 days after cardioversion, vascular surgery consult appreciated

## 2025-03-04 NOTE — PROGRESS NOTE ADULT - ATTENDING COMMENTS
ckd 5> now started on dialysis  cardioverted for afib this admission and now on AC  Right heart cath is being repeated today and decision on treating her for her AR and MR will be made in the coming few days   Dialysis was done 3/3 with fluid removal> will repeat tomorrow per schedule   may need UF sessions in between dialysis depending on right heart cath results    jesus manuel severino  nephrology attending   please contact me on TEAMS   Office- [phone]

## 2025-03-04 NOTE — PROGRESS NOTE ADULT - SUBJECTIVE AND OBJECTIVE BOX
HPI/Interval Hx    Pt. ambulating in room, remains on a heparin gtt. Offering no complaints, no acute events noted. The Structural Team is following for evaluation of MRMatt     MEDICATIONS  (STANDING):  aMIOdarone    Tablet 200 milliGRAM(s) Oral daily  atorvastatin 80 milliGRAM(s) Oral at bedtime  buMETAnide Injectable 2 milliGRAM(s) IV Push daily  chlorhexidine 2% Cloths 1 Application(s) Topical <User Schedule>  heparin  Infusion.  Unit(s)/Hr (11 mL/Hr) IV Continuous <Continuous>  influenza  Vaccine (HIGH DOSE) 0.5 milliLiter(s) IntraMuscular once  metoprolol succinate  milliGRAM(s) Oral daily  Nephro-teresita 1 Tablet(s) Oral daily  sevelamer carbonate 800 milliGRAM(s) Oral three times a day with meals  sodium bicarbonate 650 milliGRAM(s) Oral four times a day    MEDICATIONS  (PRN):  acetaminophen     Tablet .. 650 milliGRAM(s) Oral every 6 hours PRN Temp greater or equal to 38C (100.4F), Mild Pain (1 - 3)  heparin   Injectable 4500 Unit(s) IV Push every 6 hours PRN For aPTT less than 40  heparin   Injectable 2000 Unit(s) IV Push every 6 hours PRN For aPTT between 40 - 57  melatonin 3 milliGRAM(s) Oral at bedtime PRN Insomnia  OLANZapine 2.5 milliGRAM(s) Oral every 8 hours PRN Agitation      PAST MEDICAL & SURGICAL HISTORY:  Renal Calculus      Ureteral Calculus      Acute Renal Failure  2009      Wrist Fracture  CYNTHIA      Collar Bone Fracture      Rib Fractures      Kidney Stone removal  3/15/2011      Atrial fibrillation with RVR      C Section      Percutaneous Stone Extraction  Right      S/P Left Percutaneuos Stone extraction  2010, 2010      History of cardioversion    Review of Systems  CONSTITUTIONAL: No weakness, fevers or chills  EYES/ENT: No visual changes;  No vertigo or throat pain   NECK: No pain or stiffness  RESPIRATORY: No cough, wheezing, hemoptysis; No shortness of breath  CARDIOVASCULAR: No chest pain or palpitations  GASTROINTESTINAL: No abdominal or epigastric pain. No nausea, vomiting, or hematemesis; No diarrhea or constipation. No melena or hematochezia.  GENITOURINARY: No dysuria, frequency or hematuria  NEUROLOGICAL: No numbness or weakness  SKIN: No itching, burning, rashes, or lesions   All other review of systems is negative unless indicated above.    Physical Exam  General: A/ox 3, No acute Distress  Neck: Supple, NO JVD  Cardiac: S1 S2, II/VI LLSB  Pulmonary: CTAB, Breathing unlabored, No Rhonchi/Rales/Wheezing  Abdomen: Soft, Non -tender, +BS x 4 quads  Extremities: No Rashes, No edema  Neuro: A/o x 3, No focal deficits    Vital Signs Last 24 Hrs  T(C): 36.6 (04 Mar 2025 11:17), Max: 36.6 (04 Mar 2025 05:41)  T(F): 97.9 (04 Mar 2025 11:17), Max: 97.9 (04 Mar 2025 05:41)  HR: 97 (04 Mar 2025 11:) (82 - 132)  BP: 122/77 (04 Mar 2025 11:17) (108/55 - 133/78)  BP(mean): --  RR: 18 (04 Mar 2025 11:17) (18 - 18)  SpO2: 97% (04 Mar 2025 11:17) (97% - 100%)    Parameters below as of 04 Mar 2025 11:17  Patient On (Oxygen Delivery Method): room air                            8.3    5.41  )-----------( 123      ( 04 Mar 2025 05:51 )             27.7     03-04    135  |  94[L]  |  35[H]  ----------------------------<  175[H]  4.7   |  27  |  3.30[H]    Ca    8.1[L]      04 Mar 2025 05:50  Phos  3.9     03-04  Mg     2.1     03-04    TPro  6.1  /  Alb  3.6  /  TBili  0.6  /  DBili  x   /  AST  16  /  ALT  41  /  AlkPhos  90  03-04      Telemetry: AFib 97    EKG: < from: 12 Lead ECG (25 @ 10:47) >  Ventricular Rate 69 BPM    Atrial Rate 69 BPM    P-R Interval 138 ms    QRS Duration 74 ms    Q-T Interval 428 ms    QTC Calculation(Bazett) 458 ms    P Axis 63 degrees    R Axis -1 degrees    T Axis -23 degrees    Diagnosis Line SINUS RHYTHM WITH PREMATURE ATRIAL COMPLEXES  NONSPECIFIC ST AND T WAVE ABNORMALITY  ABNORMAL ECG  WHEN COMPARED WITH ECG OF 2025 08:11,  sinus has replaced AF  Confirmed by MD IONA, DANG ([id number]) on 3/1/2025 5:30:53 PM    < end of copied text >      Other  < from:  W or WO Ultrasound Enhancing Agent (25 @ 09:41) >  ADDENDUM TO PRIOR ECHOCARDIOGRAM REPORT    Reason for amended report: Study information.    Yonatan Warren MD on 2025 at 5:11:54 PM                       TRANSESOPHAGEAL ECHOCARDIOGRAM REPORT  ________________________________________________________________________________                                      _______       Pt. Name:       ADRIENNE PARSONS  Study Date:    2025  MRN:            [id number]            YOB: 1950  Accession #:    [id number]           Age:           74 years  Account#:       [id number]        Gender:        F  Heart Rate:     108 bpm             Height:        62.00 in (157.48 cm)  Rhythm:         atrial fibrillation Weight:        123.00 lb (55.79 kg)  Blood Pressure: 120/73 mmHg        BSA/BMI:       1.55 m² / 22.50 kg/m²  ________________________________________________________________________________________  Referring Physician:    [id number] Edmond Haddad  Interpreting Physician: Yonatan Warren MD  Fellow (Performing):    Dong Stout MD  Fellow (Interpreting):  Dong Stout MD    CPT:               ECHO TRANSESOPH W/O CON - 90459.m;DOPPLER ECHO COMP W SPECT -                     90225.m;DOPPL ECHO COLOR FLOW - 67309.m;3D RECONST W/O    WKSTATION - 46051.m  Indication(s):     Unspecified atrial fibrillation - I48.91  Procedure:         Transesophageal echocardiogram performed with 2D, M-mode and                     complete spectral and color flow Doppler. Real time and full                volume 3-dimensional imaging performed at the echo machine.  Ordering Location: [facility]  Admission Status:  Inpatient    _______________________________________________________________________________________     CONCLUSIONS:      1. Left ventricular cavity is mildly dilated. Left ventricular systolic function is mildly decreased with an ejection fraction visually estimated at 45 to 50 %.   2. Normal right ventricular cavity size and borderline reduced right ventricular systolic function.   3. Severe biatrial dilatation.   4. No evidence of left atrial or left atrial appendage thrombus.   5. Patent foramen ovale by color flow Doppler.   6. Moderate to severe mitral regurgitation at a blood pressure of 90/60 mmHg. Systolic flow reversal in the pulmonary veins, which is consistent with severe mitral regurgitation. The mechanism is function secondary to mitral annulus dilatation and left ventricular dysfunction. Difficult to assess mitral regurgitation due to rapid atrial fibrillation.   7. Moderate tricuspid regurgitation.   8. No pericardial effusion seen.   9. Compared to the transthoracic echocardiogram performed on 2025, there have been no significant interval changes.    ____________________________________________________________________   Procedure:  After discussion of the risks and benefits of the , an informed consent was obtained. Study was performed with anesthesia (please see anesthesia record).  The adult Espinoza  probe was introduced and passed into the esophagus. The  probe was passed without difficulty. Images were obtained with the patient in a left lateral decubitus position. Image quality was good. The patient's vital signs; including heart rate, blood pressure, and oxygen saturation; remained stable throughout the procedure. The patient tolerated the procedure well and without complications.  ________________________________________________________________________________________  Cardioversion Procedure:  After the transesophageal echocardiogram demonstrated no left atrial appendage thrombus, it was decided to proceed with cardioversion.     ________________________________________________________________________________________  FINDINGS:     Left Ventricle:  The left ventricular cavity is mildly dilated. Left ventricular systolic function is mildly decreased with an ejection fraction visually estimated at 45 to 50%.     Right Ventricle:  The right ventricular cavity is normal in size and right ventricular systolic function is borderline reduced. A venous catheter is visualized.     Left Atrium:  The left atrium is severely dilated. There is minimal (grade 1) spontaneous echo contrast located in the left atrial appendage and minimal (grade 1) spontaneous echo contrast located in the left atrium. There is no evidence of left atrial or left atrial appendage thrombus. The left atrial appendage emptying velocity is low at 31 cm/s (normal >40cm/s).     Right Atrium:  The right atrium is severely dilated. There is no evidence of right atrial or right atrial appendage thrombus.     Interatrial Septum:  There is a patent foramen ovale by color flow Doppler.     Aortic Valve:  The aortic valve appears trileaflet. There is mild calcification of the aortic valve leaflets. There is no aortic valve stenosis. There is mild aortic regurgitation.     Mitral Valve:  Structurally normal mitral valve with normal leaflet excursion. There is mitral valve thickening of the anterior and posterior leaflets. There is no mitral valve stenosis. There is moderate to severe mitral regurgitation at a blood pressure of 90/60 mmHg. Systolic flow reversal is noted in the pulmonary veins consistent with severe mitral regurgitation. The mechanism is function secondary to mitral annulus dilatation and left ventricular dysfunction. Difficult to assess mitral regurgitation due to rapid atrial fibrillation.     Tricuspid Valve:  The tricuspid valve leaflets appear normal. There is no evidence of tricuspid stenosis. There is moderate tricuspid regurgitation.     Pulmonic Valve:  The pulmonic valve was not well visualized.     Aorta:  There is mild non-mobile atheroma in the visualized portions of the descending aorta.     Pericardium:  No pericardial effusion seen.    < end of copied text >    < from: Cardiac Catheterization (25 @ 17:06) >  Study Date:     2025   Name:           ADRIENNE PARSONS   :            1950   (74 years)   Gender:         female   MR#:            [id number]   [facility]#:           [id number]   Patient Class:  Inpatient     Cath Lab Report    Diagnostic Cardiologist:       Ta Campa MD   Referring Physician:           Shan Moody MD     Procedures Performed   Procedures:              1.    Arterial Access - Right Femoral     2.    Venous Access - Right Femoral   3.    Ultrasound Guided Access   4.    Diagnostic Coronary Angiography   5.    Left Heart Cath   6.    RHC     Indications:               Cardiomyopathy   Mitral Regurgitation   Tricuspid Regurgitation   Congestive heart failure with cardiomyopathy     Lab Visit Indication:      cardiomyopathy, Mitral Regurgitation,  Tricuspid Regurgitation, CardmyUS    Diagnostic Conclusions:     Three vessel coronary artery disease   Normal left main coronary artery   Right dominant system   Elevated right atrial pressure (mRA 17mmHg with a V wave of 21mmHg)   Mild post-capillary pulmonary hypertension (sPAP 40mmHg, dPAP 22mmHg,  mPAP 30mmHg)  PCWP = 30mmHg with a V wave of 36mmHg   LVEDP = 20mmHg   AO = 112/73/90   PAsat = 43.25% // AOsat = 94.9% (room air)   Felix CO // CI = 3.44l/min // 2.14l/min/m2     Recommendations:     Keep right leg straight for 4 hours following removal of sheaths   Continue aggressive medical management     Findings discussed with cardiology/Dr. Moody     Procedure Narrative:   The risks and alternatives ofthe procedures and conscious sedation  were explained to the patient and informed consent was  obtained. The patient was brought to the cath lab and placed on the  exam table.  Access   Right femoral artery:     Patient: ADRIENNE PARSONS          MRN: [id number]  Study Date: 2025   05:06 PM      Page 1 of 5          The puncture site was infiltrated with 1% Lidocaine. Vascular access  was obtained using modified seldinger technique.  Right femoral vein:   The puncture site was infiltrated with1% Lidocaine. Vascular access  was obtained using modified seldinger technique.    Coronary Angiography   Left Coronary System:   A catheter was positioned into the vessel ostia under fluoroscopic  guidance. Angiograms were obtained in multiple views.  Right Coronary System:   A catheter was positioned into the vessel ostia under fluoroscopic  guidance. Angiograms were obtained in multiple views.    Right Heart Cath   Measurements of pressures were obtained.      Diagnostic Findings:     CoronaryAngiography   The coronary circulation is right dominant.      LM   Left main artery: The segment is visually normal in size and  structure.    LAD   First diagonal: There is a 35 % stenosis. Proximal left anterior  descending: There is a 40 % stenosis. Mid left anterior  descending: There is a 25 % stenosis. Distal left anterior descending:  There is a 10 % stenosis.    CX   Mid circumflex: There is a 30 % stenosis.      RCA   Proximal right coronary artery: There is a 15 % stenosis. Mid right  coronary artery: There is a 75 % stenosis.      < end of copied text >

## 2025-03-04 NOTE — PROGRESS NOTE ADULT - SUBJECTIVE AND OBJECTIVE BOX
24H hour events:     MEDICATIONS:  aMIOdarone    Tablet 200 milliGRAM(s) Oral daily  buMETAnide Injectable 2 milliGRAM(s) IV Push daily  heparin   Injectable 4500 Unit(s) IV Push every 6 hours PRN  heparin   Injectable 2000 Unit(s) IV Push every 6 hours PRN  heparin  Infusion.  Unit(s)/Hr IV Continuous <Continuous>  metoprolol succinate  milliGRAM(s) Oral daily        acetaminophen     Tablet .. 650 milliGRAM(s) Oral every 6 hours PRN  melatonin 3 milliGRAM(s) Oral at bedtime PRN  OLANZapine 2.5 milliGRAM(s) Oral every 8 hours PRN      atorvastatin 80 milliGRAM(s) Oral at bedtime    chlorhexidine 2% Cloths 1 Application(s) Topical <User Schedule>  influenza  Vaccine (HIGH DOSE) 0.5 milliLiter(s) IntraMuscular once  Nephro-teresita 1 Tablet(s) Oral daily  sodium bicarbonate 650 milliGRAM(s) Oral four times a day      REVIEW OF SYSTEMS:  Complete 12 point ROS negative.    PHYSICAL EXAM:  T(C): 36.6 (03-04-25 @ 05:41), Max: 36.7 (03-03-25 @ 11:21)  HR: 132 (03-04-25 @ 05:41) (79 - 132)  BP: 125/87 (03-04-25 @ 05:41) (108/55 - 133/78)  RR: 18 (03-04-25 @ 05:41) (18 - 18)  SpO2: 100% (03-03-25 @ 22:53) (97% - 100%)  Wt(kg): --  I&O's Summary    03 Mar 2025 07:01  -  04 Mar 2025 07:00  --------------------------------------------------------  IN: 720 mL / OUT: 0 mL / NET: 720 mL        Appearance: Normal	  HEENT: PERRL, EOMI	  Cardiovascular: Normal S1 S2, No JVD, No murmurs  Respiratory: Lungs clear to auscultation	  Psychiatry: A & O x 3, Mood & affect appropriate  Gastrointestinal:  Soft, Non-tender, + BS	  Skin: No rashes, No ecchymoses, No cyanosis	  Neurologic: Grossly intact  Extremities: No clubbing, cyanosis or edema  Vascular: Peripheral pulses palpable 2+ bilaterally        LABS:	 	    CBC Full  -  ( 04 Mar 2025 05:51 )  WBC Count : 5.41 K/uL  Hemoglobin : 8.3 g/dL  Hematocrit : 27.7 %  Platelet Count - Automated : 123 K/uL  Mean Cell Volume : 104.9 fl  Mean Cell Hemoglobin : 31.4 pg  Mean Cell Hemoglobin Concentration : 30.0 g/dL  Auto Neutrophil # : x  Auto Lymphocyte # : x  Auto Monocyte # : x  Auto Eosinophil # : x  Auto Basophil # : x  Auto Neutrophil % : x  Auto Lymphocyte % : x  Auto Monocyte % : x  Auto Eosinophil % : x  Auto Basophil % : x    03-04    135  |  94[L]  |  35[H]  ----------------------------<  175[H]  4.7   |  27  |  3.30[H]  03-03    141  |  98  |  66[H]  ----------------------------<  113[H]  5.0   |  27  |  5.61[H]    Ca    8.1[L]      04 Mar 2025 05:50  Ca    8.1[L]      03 Mar 2025 06:25  Phos  3.9     03-04  Phos  5.2     03-03  Mg     2.1     03-04  Mg     2.3     03-03    TPro  6.1  /  Alb  3.6  /  TBili  0.6  /  DBili  x   /  AST  16  /  ALT  41  /  AlkPhos  90  03-04  TPro  6.3  /  Alb  3.7  /  TBili  0.4  /  DBili  x   /  AST  12  /  ALT  51[H]  /  AlkPhos  101  03-03      proBNP:   Lipid Profile:   HgA1c:   TSH:       CARDIAC MARKERS:          TELEMETRY: 	    ECG:  	  RADIOLOGY:  OTHER: 	    PREVIOUS DIAGNOSTIC TESTING:    [ ] Echocardiogram:    [ ]  Catheterization:  [ ] Stress Test:  	  	  ASSESSMENT/PLAN: 	     24H hour events: Pt without complaint, currently refused to be on tele (off tele since 2am).     MEDICATIONS:  aMIOdarone    Tablet 200 milliGRAM(s) Oral daily  buMETAnide Injectable 2 milliGRAM(s) IV Push daily  heparin   Injectable 4500 Unit(s) IV Push every 6 hours PRN  heparin   Injectable 2000 Unit(s) IV Push every 6 hours PRN  heparin  Infusion.  Unit(s)/Hr IV Continuous <Continuous>  metoprolol succinate  milliGRAM(s) Oral daily  acetaminophen     Tablet .. 650 milliGRAM(s) Oral every 6 hours PRN  melatonin 3 milliGRAM(s) Oral at bedtime PRN  OLANZapine 2.5 milliGRAM(s) Oral every 8 hours PRN  atorvastatin 80 milliGRAM(s) Oral at bedtime  chlorhexidine 2% Cloths 1 Application(s) Topical <User Schedule>  influenza  Vaccine (HIGH DOSE) 0.5 milliLiter(s) IntraMuscular once  Nephro-teresita 1 Tablet(s) Oral daily  sodium bicarbonate 650 milliGRAM(s) Oral four times a day      REVIEW OF SYSTEMS:  Complete 12 point ROS negative.    PHYSICAL EXAM:  T(C): 36.6 (03-04-25 @ 05:41), Max: 36.7 (03-03-25 @ 11:21)  HR: 132 (03-04-25 @ 05:41) (79 - 132)  BP: 125/87 (03-04-25 @ 05:41) (108/55 - 133/78)  RR: 18 (03-04-25 @ 05:41) (18 - 18)  SpO2: 100% (03-03-25 @ 22:53) (97% - 100%)  Wt(kg): --  I&O's Summary    03 Mar 2025 07:01  -  04 Mar 2025 07:00  --------------------------------------------------------  IN: 720 mL / OUT: 0 mL / NET: 720 mL        Appearance: Normal	  HEENT: PERRL, EOMI	  Cardiovascular: Normal S1 S2, Irregularly irregular, No JVD  Respiratory: Lungs clear to auscultation	  Psychiatry: A & O x 3 (forgetul), Mood & affect appropriate  Gastrointestinal: Soft, Non-tender, + BS	  Skin: No rashes, No ecchymoses, No cyanosis	  Neurologic: Grossly intact  Extremities: No clubbing, cyanosis or edema  Vascular: Peripheral pulses palpable 2+ bilaterally        LABS:	 	    CBC Full  -  ( 04 Mar 2025 05:51 )  WBC Count : 5.41 K/uL  Hemoglobin : 8.3 g/dL  Hematocrit : 27.7 %  Platelet Count - Automated : 123 K/uL  Mean Cell Volume : 104.9 fl  Mean Cell Hemoglobin : 31.4 pg  Mean Cell Hemoglobin Concentration : 30.0 g/dL  Auto Neutrophil # : x  Auto Lymphocyte # : x  Auto Monocyte # : x  Auto Eosinophil # : x  Auto Basophil # : x  Auto Neutrophil % : x  Auto Lymphocyte % : x  Auto Monocyte % : x  Auto Eosinophil % : x  Auto Basophil % : x    03-04    135  |  94[L]  |  35[H]  ----------------------------<  175[H]  4.7   |  27  |  3.30[H]  03-03    141  |  98  |  66[H]  ----------------------------<  113[H]  5.0   |  27  |  5.61[H]    Ca    8.1[L]      04 Mar 2025 05:50  Ca    8.1[L]      03 Mar 2025 06:25  Phos  3.9     03-04  Phos  5.2     03-03  Mg     2.1     03-04  Mg     2.3     03-03    TPro  6.1  /  Alb  3.6  /  TBili  0.6  /  DBili  x   /  AST  16  /  ALT  41  /  AlkPhos  90  03-04  TPro  6.3  /  Alb  3.7  /  TBili  0.4  /  DBili  x   /  AST  12  /  ALT  51[H]  /  AlkPhos  101  03-03    Thyroid Stimulating Hormone, Serum in AM (03.01.25 @ 06:47)    Thyroid Stimulating Hormone, Serum: 2.67 uIU/mL      TELEMETRY: currently refused to be on tele (off tele since 2am). 	      < from:  W or WO Ultrasound Enhancing Agent (02.28.25 @ 09:41) >  CONCLUSIONS:      1. Left ventricular cavity is mildly dilated. Left ventricular systolic function is mildly decreased with an ejection fraction visually estimated at 45 to 50 %.   2. Normal right ventricular cavity size and borderline reduced right ventricular systolic function.   3. Severe biatrial dilatation.   4. No evidence of left atrial or left atrial appendage thrombus.   5. Patent foramen ovale by color flow Doppler.   6. Moderate to severe mitral regurgitation at a blood pressure of 90/60 mmHg. Systolic flow reversal in the pulmonary veins, which is consistent with severe mitral regurgitation. The mechanism is function secondary to mitral annulus dilatation and left ventricular dysfunction. Difficult to assess mitral regurgitation due to rapid atrial fibrillation.   7. Moderate tricuspid regurgitation.   8. No pericardial effusion seen.   9. Compared to the transthoracic echocardiogram performed on 2/13/2025, there have been no significant interval changes.    ____________________________________________________________________   Procedure:  After discussion of the risks and benefits of the , an informed consent was obtained. Study was performed with anesthesia (please see anesthesia record).  The adult Espinoza  probe was introduced and passed into the esophagus. The  probe was passed without difficulty. Images were obtained with the patient in a left lateral decubitus position. Image quality was good. The patient's vital signs; including heart rate, blood pressure, and oxygen saturation; remained stable throughout the procedure. The patient tolerated the procedure well and without complications.  ________________________________________________________________________________________  Cardioversion Procedure:  After the transesophageal echocardiogram demonstrated no left atrial appendage thrombus, it was decided to proceed with cardioversion.     ________________________________________________________________________________________  FINDINGS:     Left Ventricle:  The left ventricular cavity is mildly dilated. Left ventricular systolic function is mildly decreased with an ejection fraction visually estimated at 45 to 50%.     Right Ventricle:  The right ventricular cavity is normal in size and right ventricular systolic function is borderline reduced. A venous catheter is visualized.     Left Atrium:  The left atrium is severely dilated. There is minimal (grade 1) spontaneous echo contrast located in the left atrial appendage and minimal (grade 1) spontaneous echo contrast located in the left atrium. There is no evidence of left atrial or left atrial appendage thrombus. The left atrial appendage emptying velocity is low at 31 cm/s (normal >40cm/s).     Right Atrium:  The right atrium is severely dilated. There is no evidence of right atrial or right atrial appendage thrombus.     Interatrial Septum:  There is a patent foramen ovale by color flow Doppler.     Aortic Valve:  The aortic valve appears trileaflet. There is mild calcification of the aortic valve leaflets. There is no aortic valve stenosis. There is mild aortic regurgitation.     Mitral Valve:  Structurally normal mitral valve with normal leaflet excursion. There is mitral valve thickening of the anterior and posterior leaflets. There is no mitral valve stenosis. There is moderate to severe mitral regurgitation at a blood pressure of 90/60 mmHg. Systolic flow reversal is noted in the pulmonary veins consistent with severe mitral regurgitation. The mechanism is function secondary to mitral annulus dilatation and left ventricular dysfunction. Difficult to assess mitral regurgitation due to rapid atrial fibrillation.     Tricuspid Valve:  The tricuspid valve leaflets appear normal. There is no evidence of tricuspid stenosis. There is moderate tricuspid regurgitation.     Pulmonic Valve:  The pulmonic valve was not well visualized.     Aorta:  There is mild non-mobile atheroma in the visualized portions of the descending aorta.     Pericardium:  No pericardial effusion seen.  ____________________________________________________________________  QUANTITATIVE DATA:  Left Ventricle Measurements: (Indexed to BSA)     Visualized LV EF%: 45 to 50%    Aorta Measurements: (Normal range) (Indexed to BSA)     Ao Root d    3.33 cm (2.7 - 3.3 cm)2.14 cm/m²  Ao Asc prox: 3.16 cm                2.03 cm/m²                LVOT / RVOT/ Qp/Qs Data: (Indexed to BSA)  LVOT Diameter,s: 1.91 cm  LVOT Area:       2.87 cm²  LVOT Vmax:       0.95 m/s  LVOT Vmn:        0.655 m/s  LVOT VTI:        15.91 cm  LVOT peak grad:  4 mmHg  LVOT SV:         45.6 ml    29.32 ml/m²  LVOT CO:         5.61 l/min 3.61 l/min/m²    Aortic Valve Measurements:  AV Vmax:                1.9 m/s  AV Peak Gradient:       13.9 mmHg  AV Mean Gradient:       6.5 mmHg  AV VTI:               31.5 cm  AV VTI Ratio:           0.50  AoV EOA, Contin:        1.45 cm²  AoV EOA, Contin i:      0.93 cm²/m²  AoV Dimensionless Index 0.50    Mitral Valve Measurements:     MV VTI, zara       0.171 m   MR Vmax:            4.61 m/s  MV Zara d, A4C     3.20 cm   MR VTI:             139.73 cm  MV Zara d, A2C     3.40 cm   MR Peak Gradient:   85.1 mmHg  MV Stroke Volume: 146.40 ml MR 3D Vena Contrac: 0.47 cm  MV Zara Area       8.54 cm²  MR Reg Volume:      100.81 ml/beat             MR Reg Fraction:    68.86 %                              MR PISA Radius:     0.53 cm                              MR Aliasing Misbah:    31.20 cm/s                              MR Inst Flow Rate:  55.07 ml/s       Tricuspid Valve Measurements:     TR Vmax:          2.2 m/s  TR Peak Gradient: 18.5 mmHg    < end of copied text >

## 2025-03-04 NOTE — PROGRESS NOTE ADULT - ASSESSMENT
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 s/p RHC/LHC 2/24, elevated filling pressures, 75%mRCA. s/p tunneled catheter placed on 2/19/25 and started on dialysis. Patient underwent /DCCV on 2/28/25 with ERAF (early return of Afib) after maintaining SR for 2 hours.     1. Persistent Afib  2. NICO on CKD, now ESRD, s/p HD 3/1  3. Severe MR    - Primary team spoke with Renal, pt already has a permacath. Plan is for outpatient AVF placement after 30 days post cardioversion.  - Patient underwent /DCCV on 2/28/25 and convert back to Afib after maintaining SR for about 2 hours.   - AC switched from eliquis to Heparin in preparation for RHC today (please limit interruption on AC given recent DCCV)   - Continue metoprolol and amiodarone 200mg QD.   - Follow up with Dr. Thomas as scheduled on 4/2/25 at 10:45pm.  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 s/p RHC/LHC 2/24, elevated filling pressures, 75%mRCA. s/p tunneled catheter placed on 2/19/25 and started on dialysis. Patient underwent /DCCV on 2/28/25 with ERAF (early return of Afib) after maintaining SR for 2 hours.     1. Persistent Afib  2. NICO on CKD, now ESRD, s/p HD 3/1  3. Severe MR    - Primary team spoke with Renal, pt already has a permacath. Plan is for outpatient AVF placement after 30 days post cardioversion.  - Patient underwent /DCCV on 2/28/25 and convert back to Afib after maintaining SR for about 2 hours.   - AC switched from eliquis to Heparin in preparation for RHC today (please limit interruption on AC given recent DCCV)   - Continue metoprolol and amiodarone 200mg QD.   - Continue aggressive diuresis, on IVP Bumex QD  -  with severe MR, CT surgery consulted for possible MV surgery/MAZE/LAAL  - No further EP workup as inpatient. EP will sign off. Reconsult as needed.    - Follow up with Dr. Thomas as scheduled on 4/2/25 at 10:45pm.    MAGDALENA Mata NP-C  [id number]

## 2025-03-04 NOTE — PROGRESS NOTE ADULT - SUBJECTIVE AND OBJECTIVE BOX
Patient is a 74y old  Female who presents with a chief complaint of AF ESRD (02 Mar 2025 08:55)      INTERVAL HPI/OVERNIGHT EVENTS: No acute events overnight. Refusing tele this morning, disoriented. Planned for RHC today.    Patient examined at bedside this morning. No acute concerns. She denies dysuria, abdominal pain, fevers, chills, heart palpations, chest pain, SOB, or any other symptoms.      Vital Signs Last 24 Hrs  T(C): 36.6 (04 Mar 2025 05:41), Max: 36.7 (03 Mar 2025 11:21)  T(F): 97.9 (04 Mar 2025 05:41), Max: 98.1 (03 Mar 2025 11:21)  HR: 132 (04 Mar 2025 05:41) (79 - 132)  BP: 125/87 (04 Mar 2025 05:41) (108/55 - 133/78)  BP(mean): --  RR: 18 (04 Mar 2025 05:41) (18 - 18)  SpO2: 100% (03 Mar 2025 22:53) (97% - 100%)    Parameters below as of 04 Mar 2025 05:41  Patient On (Oxygen Delivery Method): room air    I&O's Detail    03 Mar 2025 07:01  -  04 Mar 2025 07:00  --------------------------------------------------------  IN:    Oral Fluid: 720 mL  Total IN: 720 mL    OUT:    Voided (mL): 0 mL  Total OUT: 0 mL    Total NET: 720 mL      CAPILLARY BLOOD GLUCOSE          PHYSICAL EXAM:  General: NAD  HEENT: PERRL, normal sclera/conjunctiva  Neck: Supple  Lungs: CTA bilaterally  Heart: Irregular rhythm appreciated, normal S1S2, no murmurs/rubs/gallops  Abdomen: Soft, ND/NT  Extremities: b/l LE pitting edema, improving. RFV/A site intact with no surrounding erythema or pain  Skin: Warm, well-perfused  Neuro: A&O x3 but waxing/waning disorientation, no focal deficits      LABS:                            8.3    5.41  )-----------( 123      ( 04 Mar 2025 05:51 )             27.7       03-04    135  |  94[L]  |  35[H]  ----------------------------<  175[H]  4.7   |  27  |  3.30[H]    Ca    8.1[L]      04 Mar 2025 05:50  Phos  3.9     03-04  Mg     2.1     03-04    TPro  6.1  /  Alb  3.6  /  TBili  0.6  /  DBili  x   /  AST  16  /  ALT  41  /  AlkPhos  90  03-04        Color: x / Appearance: x / SG: x / pH: x  Gluc: 113 mg/dL / Ketone: x  / Bili: x / Urobili: x   Blood: x / Protein: x / Nitrite: x   Leuk Esterase: x / RBC: x / WBC x   Sq Epi: x / Non Sq Epi: x / Bacteria: x        RADIOLOGY & ADDITIONAL TESTS:      acetaminophen     Tablet .. 650 milliGRAM(s) Oral every 6 hours PRN  aMIOdarone    Tablet 200 milliGRAM(s) Oral daily  apixaban 2.5 milliGRAM(s) Oral every 12 hours  atorvastatin 80 milliGRAM(s) Oral at bedtime  buMETAnide Injectable 2 milliGRAM(s) IV Push daily  chlorhexidine 2% Cloths 1 Application(s) Topical <User Schedule>  influenza  Vaccine (HIGH DOSE) 0.5 milliLiter(s) IntraMuscular once  melatonin 3 milliGRAM(s) Oral at bedtime PRN  metoprolol succinate  milliGRAM(s) Oral daily  Nephro-teresita 1 Tablet(s) Oral daily  OLANZapine 2.5 milliGRAM(s) Oral every 12 hours PRN  sevelamer carbonate 800 milliGRAM(s) Oral three times a day with meals  sodium bicarbonate 650 milliGRAM(s) Oral four times a day      HEALTH ISSUES - PROBLEM Dx:  Acute kidney injury superimposed on CKD    Paroxysmal atrial fibrillation    Need for prophylactic measure    Metabolic acidosis    Hypertension    Severe mitral regurgitation    Chronic HFrEF (heart failure with reduced ejection fraction)    Transaminitis    Anxiety    Urinary tract infection

## 2025-03-04 NOTE — PROGRESS NOTE ADULT - ASSESSMENT
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       #Acute kidney injury superimposed on CKD.   - initially presenting for scheduled dccv  -  Found to have worsening renal function on admission so procedure cancelled. Baseline 4.6 in Aug 2024 and 5.5 in 1/25. Cr on admission 2/12 was 8.42.   - RAP elevated to 15 on TTE from 2/13 and she appeared significantly volume up on exam  - was on Bumex 4mg IV BID with poor UOP, now on 2mg IV Bumex  - s/p hd access, and now on HD    #  Paroxysmal atrial fibrillation.   ·  Admitted for cardioversion but unable to do because given significant volume overload  - 02/28- /DCCV -reverted back to AF Amiodarone resumed, and remains in af  - cont bb  - ac with heparin gtt for now    # Chronic heart failure with preserved ejection fraction. -Severe MR  - TTE in 11/2024 showed progression to moderate to severe MR.  Left ventricular systolic function was normal with an ejection fraction of 55 % by Garcia's method of disks.  - TTE now with LVEF of 41%, global hypokinesis, severe MR, RAP of 15.   - Structural Heart evaluation noted. Would be ideal to re-evaluate lv function and mr on gdmt and in sr.  - S/p LHC/RHC on 2/24 with elevated filling pressures: mRA 17, PCWP 30, CI 2.14.  - LHC with 75% mRCA disease otherwise non obstructive   - cont high intensity statin for her CAD. will hold on ASA for now given anemia   - plan for rhc today to re-evaluate pressures with HD.   - CTS evaluation called    - will follow with you

## 2025-03-04 NOTE — PROGRESS NOTE ADULT - ASSESSMENT
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 Started HD due to volume overload refractory to diuresis. S/p LHC/RHC on 2/24 showing elevated filling pressures, s/p HD/UF daily. S/p DCCV 2/28; however, pt went back into AFib, started back on amiodarone. Now planned for RHC 3/4 for evaluation of structural heart intervention for MR.

## 2025-03-04 NOTE — PROVIDER CONTACT NOTE (OTHER) - RECOMMENDATIONS
Okay for patient to urinate in hat.
IR called and provider notified.
Okay for patient to urinate in hat.
Awaiting provider orders
Notify provider

## 2025-03-04 NOTE — PROGRESS NOTE ADULT - PROBLEM SELECTOR PLAN 3
TTE in 11/2024 showed progression to moderate to severe MR.  Left ventricular systolic function is normal with an ejection fraction of 55 % by Garcia's method of disks. Appears hypervolemic on exam. Repeat TTE during admission reveals left ventricular systolic function is moderately decreased with an ejection fraction of 41 % by Garcia's method of disks.     Plan:  -S/p RHC and LHC on 2/24 showing elevated filling pressures: mRA 17, PCWP 30, CI 2.14, 75% mRCA, otherwise no obstruction; plan to continue medical management  - s/p cardioversion 2/28 showed EF 45-50% and MR  -Patient still volume overloaded, started on Bumex 2g IV daily and had HD/UF daily - now on HD MWF  -Started atorvastatin 80mg  -General cardiology, structural cardiology, and CTS following  -Plan for RHC 3/4 now that patient is more euvolemic to evaluate for possible surgery (MVR/MAZE/LAAL) or percutaneous catheter-based intervention

## 2025-03-04 NOTE — PROVIDER CONTACT NOTE (OTHER) - REASON
Indwelling Zelaya Cath Order
Noted saturated dressing s/p permacath placement
pt refusing tele monitor
Agitation
Pt agitated and attempting to leave unit
Indwelling Zelaya Cath Order

## 2025-03-04 NOTE — PROGRESS NOTE ADULT - PROBLEM SELECTOR PLAN 2
Admitted for cardioversion but initially unable to do because NICO on CKD.    Plan:  -Cardioversion initially delayed because pt still volume overloaded (LHC/RHC on 2/24 with elevated filling pressures: mRA 17, PCWP 30, CI 2.14)   -Now s/p cardioversion 2/28, however back in Afib now - per EP, pt not good candidate for ablation 2/2 MR  -EP recs appreciated  -Restarted amiodarone 2/28  -C/w Eliquis 2.5 mg BID - renally dosed  -Increased toprol to 200mg daily for rate control

## 2025-03-05 LAB
ANION GAP SERPL CALC-SCNC: 20 MMOL/L — HIGH (ref 5–17)
APTT BLD: 131.4 SEC — CRITICAL HIGH (ref 24.5–35.6)
APTT BLD: 61.6 SEC — HIGH (ref 24.5–35.6)
BUN SERPL-MCNC: 61 MG/DL — HIGH (ref 7–23)
CALCIUM SERPL-MCNC: 8.1 MG/DL — LOW (ref 8.4–10.5)
CHLORIDE SERPL-SCNC: 91 MMOL/L — LOW (ref 96–108)
CO2 SERPL-SCNC: 23 MMOL/L — SIGNIFICANT CHANGE UP (ref 22–31)
CREAT SERPL-MCNC: 4.52 MG/DL — HIGH (ref 0.5–1.3)
EGFR: 10 ML/MIN/1.73M2 — LOW
EGFR: 10 ML/MIN/1.73M2 — LOW
GLUCOSE SERPL-MCNC: 85 MG/DL — SIGNIFICANT CHANGE UP (ref 70–99)
HCT VFR BLD CALC: 29.3 % — LOW (ref 34.5–45)
HCT VFR BLD CALC: 29.5 % — LOW (ref 34.5–45)
HGB BLD-MCNC: 8.7 G/DL — LOW (ref 11.5–15.5)
HGB BLD-MCNC: 8.9 G/DL — LOW (ref 11.5–15.5)
INR BLD: 1.33 RATIO — HIGH (ref 0.85–1.16)
MAGNESIUM SERPL-MCNC: 2.2 MG/DL — SIGNIFICANT CHANGE UP (ref 1.6–2.6)
MCHC RBC-ENTMCNC: 29.7 G/DL — LOW (ref 32–36)
MCHC RBC-ENTMCNC: 30.2 G/DL — LOW (ref 32–36)
MCHC RBC-ENTMCNC: 30.8 PG — SIGNIFICANT CHANGE UP (ref 27–34)
MCHC RBC-ENTMCNC: 30.9 PG — SIGNIFICANT CHANGE UP (ref 27–34)
MCV RBC AUTO: 102.1 FL — HIGH (ref 80–100)
MCV RBC AUTO: 103.9 FL — HIGH (ref 80–100)
NRBC BLD AUTO-RTO: 0 /100 WBCS — SIGNIFICANT CHANGE UP (ref 0–0)
NRBC BLD AUTO-RTO: 0 /100 WBCS — SIGNIFICANT CHANGE UP (ref 0–0)
PHOSPHATE SERPL-MCNC: 5.9 MG/DL — HIGH (ref 2.5–4.5)
PLATELET # BLD AUTO: 151 K/UL — SIGNIFICANT CHANGE UP (ref 150–400)
PLATELET # BLD AUTO: 162 K/UL — SIGNIFICANT CHANGE UP (ref 150–400)
POTASSIUM SERPL-MCNC: 5.4 MMOL/L — HIGH (ref 3.5–5.3)
POTASSIUM SERPL-SCNC: 5.4 MMOL/L — HIGH (ref 3.5–5.3)
PROTHROM AB SERPL-ACNC: 15.2 SEC — HIGH (ref 9.9–13.4)
RBC # BLD: 2.82 M/UL — LOW (ref 3.8–5.2)
RBC # BLD: 2.89 M/UL — LOW (ref 3.8–5.2)
RBC # FLD: 17.7 % — HIGH (ref 10.3–14.5)
RBC # FLD: 17.7 % — HIGH (ref 10.3–14.5)
SODIUM SERPL-SCNC: 134 MMOL/L — LOW (ref 135–145)
WBC # BLD: 6.27 K/UL — SIGNIFICANT CHANGE UP (ref 3.8–10.5)
WBC # BLD: 6.31 K/UL — SIGNIFICANT CHANGE UP (ref 3.8–10.5)
WBC # FLD AUTO: 6.27 K/UL — SIGNIFICANT CHANGE UP (ref 3.8–10.5)
WBC # FLD AUTO: 6.31 K/UL — SIGNIFICANT CHANGE UP (ref 3.8–10.5)

## 2025-03-05 PROCEDURE — 99232 SBSQ HOSP IP/OBS MODERATE 35: CPT | Mod: GC

## 2025-03-05 RX ORDER — APIXABAN 2.5 MG/1
2.5 TABLET, FILM COATED ORAL EVERY 12 HOURS
Refills: 0 | Status: DISCONTINUED | OUTPATIENT
Start: 2025-03-05 | End: 2025-03-06

## 2025-03-05 RX ADMIN — Medication 1 APPLICATION(S): at 12:05

## 2025-03-05 RX ADMIN — OLANZAPINE 2.5 MILLIGRAM(S): 10 TABLET ORAL at 22:20

## 2025-03-05 RX ADMIN — ATORVASTATIN CALCIUM 80 MILLIGRAM(S): 80 TABLET, FILM COATED ORAL at 22:21

## 2025-03-05 RX ADMIN — SEVELAMER HYDROCHLORIDE 800 MILLIGRAM(S): 800 TABLET ORAL at 12:05

## 2025-03-05 RX ADMIN — APIXABAN 2.5 MILLIGRAM(S): 2.5 TABLET, FILM COATED ORAL at 22:21

## 2025-03-05 RX ADMIN — HEPARIN SODIUM 1300 UNIT(S)/HR: 1000 INJECTION INTRAVENOUS; SUBCUTANEOUS at 05:10

## 2025-03-05 NOTE — PROGRESS NOTE ADULT - SUBJECTIVE AND OBJECTIVE BOX
MR#[id number]  PATIENT NAME:ELIZABETH PARSONS    DATE OF SERVICE: 03-05-25 @ 06:00  Patient was seen and examined by Aki Carias MD on    03-05-25 @ 06:00 .  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    INTERIM EVENTS:Patient seen at bedside ,interim events noted.  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 1300 Unit(s)/Hr (13 mL/Hr) IV Continuous <Continuous>  influenza  Vaccine (HIGH DOSE) 0.5 milliLiter(s) IntraMuscular once  metoprolol succinate  milliGRAM(s) Oral daily  Nephro-teresita 1 Tablet(s) Oral daily  sevelamer carbonate 800 milliGRAM(s) Oral three times a day with meals    MEDICATIONS  (PRN):  acetaminophen     Tablet .. 650 milliGRAM(s) Oral every 6 hours PRN Temp greater or equal to 38C (100.4F), Mild Pain (1 - 3)  heparin   Injectable 4500 Unit(s) IV Push every 6 hours PRN For aPTT less than 40  heparin   Injectable 2000 Unit(s) IV Push every 6 hours PRN For aPTT between 40 - 57  melatonin 3 milliGRAM(s) Oral at bedtime PRN Insomnia  OLANZapine 2.5 milliGRAM(s) Oral every 8 hours PRN Agitation            REVIEW OF SYSTEMS:  Constitutional: [ ] fever, [ ]weight loss,  [x ]fatigue [ ]weight gain  Eyes: [ ] visual changes  Respiratory: [ ]shortness of breath;  [ ] cough, [ ]wheezing, [ ]chills, [ ]hemoptysis  Cardiovascular: [ ] chest pain, [ ]palpitations, [ ]dizziness,  [ ]leg swelling[ ]orthopnea[ ]PND  Gastrointestinal: [ ] abdominal pain, [ ]nausea, [ ]vomiting,  [ ]diarrhea [ ]Constipation [ ]Melena  Genitourinary: [ ] dysuria, [ ] hematuria [ ]Zelaya  Neurologic: [ ] headaches [ ] tremors[ ]weakness [ ]Paralysis Right[ ] Left[ ]  Skin: [ ] itching, [ ]burning, [ ] rashes  Endocrine: [ ] heat or cold intolerance  Musculoskeletal: [ ] joint pain or swelling; [ ] muscle, back, or extremity pain  Psychiatric: [ ] depression, [ ]anxiety, [ ]mood swings, or [ ]difficulty sleeping  Hematologic: [ ] easy bruising, [ ] bleeding gums    [ ] All remaining systems negative except as per above.   [ ]Unable to obtain.  [x] No change in ROS since admission      Vital Signs Last 24 Hrs  T(C): 36.8 (05 Mar 2025 04:53), Max: 36.8 (05 Mar 2025 04:53)  T(F): 98.3 (05 Mar 2025 04:53), Max: 98.3 (05 Mar 2025 04:53)  HR: 87 (05 Mar 2025 04:53) (83 - 109)  BP: 115/81 (05 Mar 2025 04:53) (106/74 - 157/67)  RR: 18 (05 Mar 2025 04:53) (16 - 19)  SpO2: 97% (05 Mar 2025 04:53) (93% - 100%)    Parameters below as of 05 Mar 2025 04:53  Patient On (Oxygen Delivery Method): room air      I&O's Summary    03 Mar 2025 07:01  -  04 Mar 2025 07:00  --------------------------------------------------------  IN: 720 mL / OUT: 0 mL / NET: 720 mL    04 Mar 2025 07:01  -  05 Mar 2025 06:00  --------------------------------------------------------  IN: 120 mL / OUT: 0 mL / NET: 120 mL        PHYSICAL EXAM:  General: No acute distress BMI-24  HEENT: EOMI, PERRL  Neck: Supple, [ x] JVD  Lungs: Equal air entry bilaterally; [ ] rales [ ] wheezing [ ] rhonchi  Heart: Irregular rate and rhythm; [x ] murmur   2/6 [ x] systolic [ ] diastolic [ ] radiation[ ] rubs [ ]  gallops  Abdomen: Nontender, bowel sounds present  Extremities: No clubbing, cyanosis, [ x] edema [ ]Pulses  equal and intact  Nervous system:  Alert & Oriented X3, no focal deficits  Psychiatric: Normal affect  Skin: No rashes or lesions    LABS:  03-04    135  |  94[L]  |  35[H]  ----------------------------<  175[H]  4.7   |  27  |  3.30[H]    Ca    8.1[L]      04 Mar 2025 05:50  Phos  3.9     03-04  Mg     2.1     03-04    TPro  6.1  /  Alb  3.6  /  TBili  0.6  /  DBili  x   /  AST  16  /  ALT  41  /  AlkPhos  90  03-04    Creatinine Trend: 3.30<--, 5.61<--, 4.33<--, 6.06<--, 5.36<--, 3.65<--                        8.9    6.27  )-----------( 151      ( 05 Mar 2025 02:16 )             29.5     PT/INR - ( 04 Mar 2025 09:25 )   PT: 13.7 sec;   INR: 1.20 ratio         PTT - ( 05 Mar 2025 02:16 )  PTT:61.6 sec    Cardiac Catheterization (03.04.25 @ 16:45) >  Diagnostic Conclusions:     Elevated right atrial pressure (mRA 20mmHg with a V wave of 24mmHg)   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

## 2025-03-05 NOTE — PROGRESS NOTE ADULT - ATTENDING COMMENTS
NICO on CKD- continue HD/UF for volume optimization- has permacath- AVF as outpatient  Urine culture grew Staph epidermidis- completed course of vancomycin  CAD- s/p LHC with 75% mRCA- continue high intensity statin  AF with severe MR- s/p /DCCV on 2/28 with conversion back to AF- on amiodarone  S/p repeat RHC for evaluation for MVR/MAZE/LAAL- CTS and structural heart following- no plan for surgical intervention at present- resume [facility] delirium- continue prn Zyprexa  D/C planning pending dialysis arrangements

## 2025-03-05 NOTE — CHART NOTE - NSCHARTNOTEFT_GEN_A_CORE
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    Of Note:  Confirmed with  who is responsible for pt's care administers her medications consistently.  Her last dose of Eliquis was this am. Given at 10am.      TTE 4/2024  CONCLUSIONS:      1. Left ventricular wall thickness is normal. Left ventricular systolic function is normal with an ejection fraction of 76 % by 3D. There are no regional wall motion abnormalities seen.   2. No evidence of left atrial or left atrial appendage thrombus. Ultrasound enhancing agent was utilized to visualize the left atrial appendage.   3. The left atrium is severely dilated.   4. Moderate mitral regurgitation at a blood pressure of 132/71 mmHg. The mitral regurgitant jet is centrally directed. Mechanism of mitral regurgitation: Apryl Type I (normal leaflet motion with dilated annulus). PISA : later pisa radius 0.4, medial 0.37, Alisaing velocity 38.5cm/sec MR VTI 136cm MR Vmax 548 cm/s. The ERO 0.3sqcm and Reg. Vol 38cc. 3D vena contracta area was 0.35sqcm.   5. No pericardial effusion seen.   6. Normal right ventricular cavity size, with normal wall thickness, and normal systolic function.    Basic Metabolic Panel - STAT (02.12.25 @ 13:13)    Sodium: 141 mmol/L    Potassium: 3.7 mmol/L    Chloride: 96 mmol/L    Carbon Dioxide: 18 mmol/L    Anion Gap: 27 mmol/L    Blood Urea Nitrogen: 99 mg/dL    Creatinine: 8.42 mg/dL    Glucose: 115 mg/dL    Calcium: 7.4 mg/dL    eGFR: 5: The estimated glomerular filtration rate (eGFR) calculation is based on  the 2021 CKD-EPI creatinine equation, which is validated in male and  female population 18 years of age and older (N Engl J Med 2021;  [id number]). mL/min/1.73m2    Complete Blood Count (02.12.25 @ 13:13)    Auto NRBC: 0 /100 WBCs    WBC Count: 5.93 K/uL    RBC Count: 3.32 M/uL    Hemoglobin: 10.0 g/dL    Hematocrit: 32.1 %    Mean Cell Volume: 96.7 fl    Mean Cell Hemoglobin: 30.1 pg    Mean Cell Hemoglobin Conc: 31.2 g/dL    Red Cell Distrib Width: 17.3 %    Platelet Count - Automated: 173 K/uL    1. Afib  2. CKD  3. HTN    - Patient for elective cardioversion today  - BUN/Cr noted to be elevated at 99/8.42 today from 92/6.27  - Lasix was recently changed to Torsemide 100mg qd  - Cardioversion cancelled, admit patient to hospitalist on telemetry (Called in to office)  - Cardiology Consult, ?overdiurese/NICO on CKD  - Rec Structural evaluation to assess MR  - Nephrology Consult called (Dr Kari Villalobos) to see patient, ? need for dialysis  - Monitor BMP levels  - Seen at bedside with EP attdg  - Will follow    #May reach me via teams
Ms. Rodrigues's right heart catheterization was quite concerning for elevated right atrial pressures and low cardiac index.    I would suggest a period of medical therapy for some months and repeated assessments involving TTE and RHC to determine her clinical and volume status.    At present, her hemodynamics would make surgery somewhat riskier.
NUTRITION FOLLOW UP NOTE    PATIENT SEEN FOR: follow up    SOURCE: [] Patient  [x] Current Medical Record  [x] RN  [] Family/support person at bedside  [x] Patient unavailable/inappropriate (off unit x 2 attempts to visit) [] Other:    CHART REVIEWED/EVENTS NOTED.  [] No changes to nutrition care plan to note  [x] Nutrition Status:  -HD initiated   -s/p RHC/LHC ; pending cardioversion  -Hallucinating today per MD notes    DIET ORDER:   Diet, Regular:   DASH/TLC {Sodium & Cholesterol Restricted} (DASH)  Low Sodium  No Concentrated Phosphorus (25)    CURRENT DIET ORDER IS:  [] Appropriate:  [] Inadequate:  [x] Other: see recommendations below    NUTRITION INTAKE/PROVISION:  [x] PO: Pt with fair PO intake, mostly of outside foods (% per RN flowsheets)  -no N/V/D/C documented  [] Enteral Nutrition:  [] Parenteral Nutrition:    ANTHROPOMETRICS:  Drug Dosing Weight  Height (cm): 157.5 (2025 16:46)  Weight (kg): 59.9 (2025 16:46)  BMI (kg/m2): 24.1 (2025 16:46)  BSA (m2): 1.6 (2025 16:46)  Weights:   Daily Weight in k.8 (), Weight in k.5 (), Weight in k.3 (), Weight in k (), Weight in k (), Weight in k.2 (), Weight in k.3 ()  *anticipate continued wt loss 2/2 IV diuresis and HD-related fluid shifts     MEDICATIONS:  MEDICATIONS  (STANDING):  apixaban 2.5 milliGRAM(s) Oral every 12 hours  buMETAnide Injectable 2 milliGRAM(s) IV Push daily  chlorhexidine 2% Cloths 1 Application(s) Topical <User Schedule>  influenza  Vaccine (HIGH DOSE) 0.5 milliLiter(s) IntraMuscular once  metoprolol succinate  milliGRAM(s) Oral <User Schedule>  sevelamer carbonate 800 milliGRAM(s) Oral three times a day with meals  sodium bicarbonate 650 milliGRAM(s) Oral four times a day    MEDICATIONS  (PRN):  acetaminophen     Tablet .. 650 milliGRAM(s) Oral every 6 hours PRN Temp greater or equal to 38C (100.4F), Mild Pain (1 - 3)  melatonin 3 milliGRAM(s) Oral at bedtime PRN Insomnia  OLANZapine 2.5 milliGRAM(s) Oral every 12 hours PRN Agitation/hallucinations    NUTRITIONALLY PERTINENT LABS:   Na139 mmol/L Glu 86 mg/dL K+ 4.6 mmol/L Cr  4.78 mg/dL[H] BUN 49 mg/dL[H]  Phos 4.9 mg/dL[H]  Alb 3.9 g/dL  U/L[H] AST 90 U/L[H] Alkaline Phosphatase 122 U/L[H]    NUTRITIONALLY PERTINENT MEDICATIONS/LABS:  [x] Reviewed  [x] Relevant notes on medications/labs:  -potassium WNL, hyperphosphatemia noted - ordered for Renvela  -IV Bumex    EDEMA:  [x] Reviewed  [x] Relevant notes: BLE 2+ edema (RN flowsheets )    GI/ I&O:  [x] Reviewed  [x] Relevant notes: last BM  per RN flowsheets; not ordered for bowel regimen  [] Other:    SKIN:   [x] No pressure injuries documented, per nursing flowsheet  [] Pressure injury previously noted  [] Change in pressure injury documentation:  [] Other:    ESTIMATED NEEDS:  [] No change:  [x] Updated: with consideration for new HD  Energy: 6992-8986.5  kcal/day (30-35 kcal/kg)  Protein: 79.1-90.4  g/day (1.4-1.6 g/kg)  Fluid:   ml/day or [x] defer to team  Based on: lowest wt 56.5 kg ()    NUTRITION DIAGNOSIS:  [x] Prior Dx: 1) increased nutrient needs and 2) food and nutrition related knowledge deficit  [] New Dx:    EDUCATION:  [x] Yes: Hemodialysis Diet packet left at bedside  [] Not appropriate/warranted    NUTRITION CARE PLAN:  1. Diet: recommend Low Sodium, No Concentrated Phosphorus diet  2. Supplements: consider trial of Nepro 1x/day (420 kcal, 19 g Pro/8oz) to assist in meeting increased nutrient needs  3. Multivitamin/mineral supplementation: recommend Nephro-teresita daily  4. Reinforce diet education at f/u and PRN    [] Achieved - Continue current nutrition intervention(s)  [] Current medical condition precludes nutrition intervention at this time.    MONITORING AND EVALUATION:   RD remains available upon request and will follow up per protocol.    Katiuska Perdue MPH, RD, CDN  Available on [place] TEAMS
Pt and family members agreed to get HD. Consent was obtained. Explained various modalities of dialysis and transplant options were explained in detail.     Primary team was informed to get IR on board to obtain HD access - Preferably Tunnelled HD cath. ( If not possible to place permacath now, will monitor her off the HD for now and assess daily for HD requirement over the weekend )
Patient seen and examined on 2/13/2025 at 10am.   FOr full consult note-please refer to note dated 2/12/2025 (11pm).     # NICO on CKD vs CKD progression.   Baseline serum creatinine 6.5 in Jan 2025. However, noted that serum creatinine was up to 8.4 before getting Afib ablation. No diarrhea/vomiting.  Patient is azotemic, but not uremic. No metabolic derangement. No immediate indication for dialysis. We will monitor closely.     # Hypervolemia - worsening lower extremity swelling over the past 2 weeks. Home torsemide 100mg daily did not seem to work well for her.  - We will do IV bumex 4mg BID.     # Metabolic acidosis - secondary to CKD. Continue sodium bicarb tabs.     # Medication monitoring encounter: medication dose reviewed. Please dose medications to CrCl<15    The rest of the recommendations as per fellow's note.    Tiffanie Ureña MD  Attending Nephrologist  [phone] or via [employer]

## 2025-03-05 NOTE — PROGRESS NOTE ADULT - ASSESSMENT
74-year-old female with PMHx of HTN, CKD stage 5 (pt. of Dr. Elizondo), Afib on Eliquis, anemia, nephrolithiasis, and recently diagnosed HF who presents for scheduled Afib ablation for Afib.    1. Newly deemed ESRD now on dialysis  Underwent RIJ TDC placement followed by 1st HD treatement on 2/19/25. Last HD was on 3/3. Pt. is clinically stable. Labs reviewed. LHC reviewed and elevated filling pressures noted. Pt seen on dialysis today, tolerating treatment and TDC working well. Will increase UF goal, and plan for PUF 3/6. Given cardioversion, plan for AVF can be done outpatient. Vein mapping if pt still here can be done. Monitor labs, and VS. Dose meds for ESRD/HD. Dc planning to [facility] HD unit- pt has a MWF spot at [facility], will need oral diuretics on non dialysis days on dc as well.    2. Anemia noted  -Complete  venofer 200mg IV with HD X 5 doses.  -Give PERLITA with HD.   -Transfusion as per primary team.    3. Mild hyperkalemia - plan for HD as above     If you have any questions, please feel free to contact me  Albertina Frederick  Nephrology Fellow  [employer] Co/Page [phone]  (After 5pm or on weekends please page the on-call fellow)

## 2025-03-05 NOTE — PROGRESS NOTE ADULT - SUBJECTIVE AND OBJECTIVE BOX
[facility] Division of Kidney Diseases & Hypertension  FOLLOW UP NOTE  [phone]--------------------------------------------------------------------------------  Chief Complaint: Newly deemed ESRD now on HD    24 hour events/subjective: Pt seen and evaluated this morning in the HD unit. Tolerating HD treatment and TDC working well. Reports feeling well, endorses no complaints. Denies any headaches, nausea, vomiting, fevers/chills, chest pain, palpitations, SOB, abdominal pain.    PAST HISTORY  --------------------------------------------------------------------------------  No significant changes to PMH, PSH, FHx, SHx, unless otherwise noted    ALLERGIES & MEDICATIONS  --------------------------------------------------------------------------------  Allergies    No Known Allergies    Intolerances      Standing Inpatient Medications  aMIOdarone    Tablet 200 milliGRAM(s) Oral daily  atorvastatin 80 milliGRAM(s) Oral at bedtime  buMETAnide Injectable 2 milliGRAM(s) IV Push daily  chlorhexidine 2% Cloths 1 Application(s) Topical <User Schedule>  heparin  Infusion. 1300 Unit(s)/Hr IV Continuous <Continuous>  influenza  Vaccine (HIGH DOSE) 0.5 milliLiter(s) IntraMuscular once  metoprolol succinate  milliGRAM(s) Oral daily  Nephro-teresita 1 Tablet(s) Oral daily  sevelamer carbonate 800 milliGRAM(s) Oral three times a day with meals    PRN Inpatient Medications  acetaminophen     Tablet .. 650 milliGRAM(s) Oral every 6 hours PRN  heparin   Injectable 4500 Unit(s) IV Push every 6 hours PRN  heparin   Injectable 2000 Unit(s) IV Push every 6 hours PRN  melatonin 3 milliGRAM(s) Oral at bedtime PRN  OLANZapine 2.5 milliGRAM(s) Oral every 8 hours PRN      REVIEW OF SYSTEMS  --------------------------------------------------------------------------------  see above    VITALS/PHYSICAL EXAM  --------------------------------------------------------------------------------  T(C): 36.2 (03-05-25 @ 07:06), Max: 36.8 (03-05-25 @ 04:53)  HR: 78 (03-05-25 @ 07:06) (78 - 109)  BP: 127/90 (03-05-25 @ 07:06) (106/74 - 157/67)  RR: 18 (03-05-25 @ 07:06) (16 - 19)  SpO2: 97% (03-05-25 @ 07:06) (93% - 100%)  Wt(kg): --    03-04-25 @ 07:01  -  03-05-25 @ 07:00  --------------------------------------------------------  IN: 276 mL / OUT: 0 mL / NET: 276 mL    03-05-25 @ 07:01  -  03-05-25 @ 10:28  --------------------------------------------------------  IN: 240 mL / OUT: 0 mL / NET: 240 mL    Physical Exam:  	Gen: NAD, Ill-appearing  	HEENT: PERRL, MMM  	Pulm: CTA B/L  	CV: RRR, S1S2;  	Abd: +BS, soft, nontender/nondistended  	: No suprapubic tenderness              Extremities: +Trace bilateral LE edema noted              Neuro: Awake  	Skin: Warm and dry  	Vascular access: [facility]     LABS/STUDIES  --------------------------------------------------------------------------------              8.7    6.31  >-----------<  162      [03-05-25 @ 09:41]              29.3     134  |  91  |  61  ----------------------------<  85      [03-05-25 @ 09:41]  5.4   |  23  |  4.52        Ca     8.1     [03-05-25 @ 09:41]      Mg     2.2     [03-05-25 @ 09:41]      Phos  5.9     [03-05-25 @ 09:41]    TPro  6.1  /  Alb  3.6  /  TBili  0.6  /  DBili  x   /  AST  16  /  ALT  41  /  AlkPhos  90  [03-04-25 @ 05:50]    PT/INR: PT 13.7 , INR 1.20       [03-04-25 @ 09:25]  PTT: 61.6       [03-05-25 @ 02:16]      Creatinine Trend:  SCr 4.52 [03-05 @ 09:41]  SCr 3.30 [03-04 @ 05:50]  SCr 5.61 [03-03 @ 06:25]  SCr 4.33 [03-02 @ 06:09]  SCr 6.06 [03-01 @ 06:46]    Urinalysis - [03-05-25 @ 09:41]      Color  / Appearance  / SG  / pH       Gluc 85 / Ketone   / Bili  / Urobili        Blood  / Protein  / Leuk Est  / Nitrite       RBC  / WBC  / Hyaline  / Gran  / Sq Epi  / Non Sq Epi  / Bacteria       TSH 2.67      [03-01-25 @ 06:47]

## 2025-03-05 NOTE — PROGRESS NOTE ADULT - ATTENDING COMMENTS
ckd 5 > now esrd   seen on dialysis. tolerating well   right heart cath yesterday showed elevated right sided pressures   dialysis today with 2.5 L fluid removal   plan for UF session tomorrow     jesus manuel severino  nephrology attending   please contact me on TEAMS   Office- [phone]

## 2025-03-05 NOTE — PROGRESS NOTE ADULT - PROBLEM SELECTOR PLAN 5
UA obtained 2/26 for new AMS/ hallucinations, no dysuria or urinary symptoms - positive for leuk esterase,   WBC, and bacteria  -Hx of pan-sensitive E. Coli in April 2024    Plan:  -S/p empiric tx with CTX x3 days (2/28 - 3/1)  -Urine culture showed staph epi - started Vanc 3/1  -Monitor vitals and WBC UA obtained 2/26 for new AMS/ hallucinations, no dysuria or urinary symptoms - positive for leuk esterase,   WBC, and bacteria  -Hx of pan-sensitive E. Coli in April 2024    Plan:  -S/p empiric tx with CTX x3 days (2/28 - 3/1)  -Urine culture showed staph epi - s/p Vanc (3/1-3/3)  -Monitor vitals and WBC

## 2025-03-05 NOTE — PROGRESS NOTE ADULT - ASSESSMENT
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 Started HD due to volume overload refractory to diuresis. S/p LHC/RHC on 2/24 showing elevated filling pressures, s/p HD/UF daily. S/p DCCV 2/28; however, pt went back into AFib, started back on amiodarone. Now planned for RHC 3/4 for evaluation of structural heart intervention for MR.

## 2025-03-05 NOTE — PROGRESS NOTE ADULT - PROBLEM SELECTOR PLAN 3
TTE in 11/2024 showed progression to moderate to severe MR.  Left ventricular systolic function is normal with an ejection fraction of 55 % by Garcia's method of disks. Appears hypervolemic on exam. Repeat TTE during admission reveals left ventricular systolic function is moderately decreased with an ejection fraction of 41 % by Garcia's method of disks.     Plan:  -S/p RHC and LHC on 2/24 showing elevated filling pressures: mRA 17, PCWP 30, CI 2.14, 75% mRCA, otherwise no obstruction; plan to continue medical management  - s/p cardioversion 2/28 showed EF 45-50% and MR  -Patient still volume overloaded, started on Bumex 2g IV daily and had HD/UF daily - now on HD MWF  -Started atorvastatin 80mg  -General cardiology, structural cardiology, and CTS following  -S/p RHC 3/4 now that patient is more euvolemic to evaluate for possible surgery (MVR/MAZE/LAAL) or percutaneous catheter-based intervention TTE in 11/2024 showed progression to moderate to severe MR.  Left ventricular systolic function is normal with an ejection fraction of 55 % by Garcia's method of disks. Appears hypervolemic on exam. Repeat TTE during admission reveals left ventricular systolic function is moderately decreased with an ejection fraction of 41 % by Garcia's method of disks.     Plan:  -S/p RHC and LHC on 2/24 showing elevated filling pressures: mRA 17, PCWP 30, CI 2.14, 75% mRCA, otherwise no obstruction; plan to continue medical management  - s/p cardioversion 2/28 showed EF 45-50% and MR  -Patient still volume overloaded, started on Bumex 2g IV daily and had HD/UF daily - now on HD MWF  -Will need diuretics PO on non-HD days on dc  -Started atorvastatin 80mg  -General cardiology, structural cardiology, and CTS following  -S/p RHC 3/4 - no surgical intervention offered

## 2025-03-05 NOTE — PROGRESS NOTE ADULT - SUBJECTIVE AND OBJECTIVE BOX
Patient is a 74y old  Female who presents with a chief complaint of AF ESRD (02 Mar 2025 08:55)      INTERVAL HPI/OVERNIGHT EVENTS: No acute events overnight. S/p RHC, in HD this morning.    Patient examined at bedside this morning. No acute concerns. She denies dysuria, abdominal pain, fevers, chills, heart palpations, chest pain, SOB, or any other symptoms.    Vital Signs Last 24 Hrs  T(C): 36.8 (05 Mar 2025 04:53), Max: 36.8 (05 Mar 2025 04:53)  T(F): 98.3 (05 Mar 2025 04:53), Max: 98.3 (05 Mar 2025 04:53)  HR: 87 (05 Mar 2025 04:53) (83 - 109)  BP: 115/81 (05 Mar 2025 04:53) (106/74 - 157/67)  BP(mean): --  RR: 18 (05 Mar 2025 04:53) (16 - 19)  SpO2: 97% (05 Mar 2025 04:53) (93% - 100%)    Parameters below as of 05 Mar 2025 04:53  Patient On (Oxygen Delivery Method): room air    I&O's Detail    04 Mar 2025 07:01  -  05 Mar 2025 07:00  --------------------------------------------------------  IN:    IV PiggyBack: 156 mL    Oral Fluid: 120 mL  Total IN: 276 mL    OUT:  Total OUT: 0 mL    Total NET: 276 mL    CAPILLARY BLOOD GLUCOSE        PHYSICAL EXAM:  General: NAD  HEENT: PERRL, normal sclera/conjunctiva  Neck: Supple  Lungs: CTA bilaterally  Heart: Irregular rhythm appreciated, normal S1S2, no murmurs/rubs/gallops  Abdomen: Soft, ND/NT  Extremities: b/l LE pitting edema, improving. RFV/A site intact with no surrounding erythema or pain  Skin: Warm, well-perfused  Neuro: A&O x3 but waxing/waning disorientation, no focal deficits      LABS:                            8.9    6.27  )-----------( 151      ( 05 Mar 2025 02:16 )             29.5   03-04    135  |  94[L]  |  35[H]  ----------------------------<  175[H]  4.7   |  27  |  3.30[H]    Ca    8.1[L]      04 Mar 2025 05:50  Phos  3.9     03-04  Mg     2.1     03-04    TPro  6.1  /  Alb  3.6  /  TBili  0.6  /  DBili  x   /  AST  16  /  ALT  41  /  AlkPhos  90  03-04      Color: x / Appearance: x / SG: x / pH: x  Gluc: 113 mg/dL / Ketone: x  / Bili: x / Urobili: x   Blood: x / Protein: x / Nitrite: x   Leuk Esterase: x / RBC: x / WBC x   Sq Epi: x / Non Sq Epi: x / Bacteria: x        RADIOLOGY & ADDITIONAL TESTS:      acetaminophen     Tablet .. 650 milliGRAM(s) Oral every 6 hours PRN  aMIOdarone    Tablet 200 milliGRAM(s) Oral daily  apixaban 2.5 milliGRAM(s) Oral every 12 hours  atorvastatin 80 milliGRAM(s) Oral at bedtime  buMETAnide Injectable 2 milliGRAM(s) IV Push daily  chlorhexidine 2% Cloths 1 Application(s) Topical <User Schedule>  influenza  Vaccine (HIGH DOSE) 0.5 milliLiter(s) IntraMuscular once  melatonin 3 milliGRAM(s) Oral at bedtime PRN  metoprolol succinate  milliGRAM(s) Oral daily  Nephro-teresita 1 Tablet(s) Oral daily  OLANZapine 2.5 milliGRAM(s) Oral every 12 hours PRN  sevelamer carbonate 800 milliGRAM(s) Oral three times a day with meals  sodium bicarbonate 650 milliGRAM(s) Oral four times a day      HEALTH ISSUES - PROBLEM Dx:  Acute kidney injury superimposed on CKD    Paroxysmal atrial fibrillation    Need for prophylactic measure    Metabolic acidosis    Hypertension    Severe mitral regurgitation    Chronic HFrEF (heart failure with reduced ejection fraction)    Transaminitis    Anxiety    Urinary tract infection               Patient is a 74y old  Female who presents with a chief complaint of AF ESRD (02 Mar 2025 08:55)      INTERVAL HPI/OVERNIGHT EVENTS: No acute events overnight. S/p RHC, in HD this morning.    Patient examined at HD this morning. No acute concerns. She denies dysuria, abdominal pain, fevers, chills, heart palpations, chest pain, SOB, or any other symptoms.    Vital Signs Last 24 Hrs  T(C): 36.8 (05 Mar 2025 04:53), Max: 36.8 (05 Mar 2025 04:53)  T(F): 98.3 (05 Mar 2025 04:53), Max: 98.3 (05 Mar 2025 04:53)  HR: 87 (05 Mar 2025 04:53) (83 - 109)  BP: 115/81 (05 Mar 2025 04:53) (106/74 - 157/67)  BP(mean): --  RR: 18 (05 Mar 2025 04:53) (16 - 19)  SpO2: 97% (05 Mar 2025 04:53) (93% - 100%)    Parameters below as of 05 Mar 2025 04:53  Patient On (Oxygen Delivery Method): room air    I&O's Detail    04 Mar 2025 07:01  -  05 Mar 2025 07:00  --------------------------------------------------------  IN:    IV PiggyBack: 156 mL    Oral Fluid: 120 mL  Total IN: 276 mL    OUT:  Total OUT: 0 mL    Total NET: 276 mL    CAPILLARY BLOOD GLUCOSE        PHYSICAL EXAM:  General: NAD  HEENT: PERRL, normal sclera/conjunctiva  Neck: Supple  Lungs: CTA bilaterally  Heart: Irregular rhythm appreciated, normal S1S2, no murmurs/rubs/gallops  Abdomen: Soft, ND/NT  Extremities: b/l LE pitting edema, improving. RFV/A site intact with no surrounding erythema or pain  Skin: Warm, well-perfused  Neuro: A&O x3 but waxing/waning disorientation, no focal deficits      LABS:                            8.9    6.27  )-----------( 151      ( 05 Mar 2025 02:16 )             29.5     03-05    134[L]  |  91[L]  |  61[H]  ----------------------------<  85  5.4[H]   |  23  |  4.52[H]    Ca    8.1[L]      05 Mar 2025 09:41  Phos  5.9     03-05  Mg     2.2     03-05    TPro  6.1  /  Alb  3.6  /  TBili  0.6  /  DBili  x   /  AST  16  /  ALT  41  /  AlkPhos  90  03-04        Color: x / Appearance: x / SG: x / pH: x  Gluc: 113 mg/dL / Ketone: x  / Bili: x / Urobili: x   Blood: x / Protein: x / Nitrite: x   Leuk Esterase: x / RBC: x / WBC x   Sq Epi: x / Non Sq Epi: x / Bacteria: x        RADIOLOGY & ADDITIONAL TESTS:      acetaminophen     Tablet .. 650 milliGRAM(s) Oral every 6 hours PRN  aMIOdarone    Tablet 200 milliGRAM(s) Oral daily  apixaban 2.5 milliGRAM(s) Oral every 12 hours  atorvastatin 80 milliGRAM(s) Oral at bedtime  buMETAnide Injectable 2 milliGRAM(s) IV Push daily  chlorhexidine 2% Cloths 1 Application(s) Topical <User Schedule>  influenza  Vaccine (HIGH DOSE) 0.5 milliLiter(s) IntraMuscular once  melatonin 3 milliGRAM(s) Oral at bedtime PRN  metoprolol succinate  milliGRAM(s) Oral daily  Nephro-teresita 1 Tablet(s) Oral daily  OLANZapine 2.5 milliGRAM(s) Oral every 12 hours PRN  sevelamer carbonate 800 milliGRAM(s) Oral three times a day with meals  sodium bicarbonate 650 milliGRAM(s) Oral four times a day      HEALTH ISSUES - PROBLEM Dx:  Acute kidney injury superimposed on CKD    Paroxysmal atrial fibrillation    Need for prophylactic measure    Metabolic acidosis    Hypertension    Severe mitral regurgitation    Chronic HFrEF (heart failure with reduced ejection fraction)    Transaminitis    Anxiety    Urinary tract infection

## 2025-03-05 NOTE — PROGRESS NOTE ADULT - PROBLEM SELECTOR PLAN 7
C/w home sodium bicarb 650 mg 4x a day Home sodium bicarb 650 mg 4x a day.    Plan:  -Dc'd sodium bicarb per renal recs

## 2025-03-05 NOTE — PROGRESS NOTE ADULT - ASSESSMENT
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       #Acute kidney injury superimposed on CKD.   - initially presenting for scheduled dccv  -  Found to have worsening renal function on admission so procedure cancelled. Baseline 4.6 in Aug 2024 and 5.5 in 1/25. Cr on admission 2/12 was 8.42.   - RAP elevated to 15 on TTE from 2/13 and she appeared significantly volume up on exam  - was on Bumex 4mg IV BID with poor UOP, now on 2mg IV Bumex  - s/p hd access, and now on HD    #  Paroxysmal atrial fibrillation.   ·  Admitted for cardioversion but unable to do because given significant volume overload  - 02/28- /DCCV -reverted back to AF Amiodarone resumed, and remains in af  - cont bb  - ac with heparin gtt for now    # Chronic heart failure with preserved ejection fraction. -Severe MR  - TTE in 11/2024 showed progression to moderate to severe MR.  Left ventricular systolic function was normal with an ejection fraction of 55 % by Garcia's method of disks.  - TTE now with LVEF of 41%, global hypokinesis, severe MR, RAP of 15.   - Structural Heart evaluation noted. Would be ideal to re-evaluate lv function and mr on gdmt and in sr.  - S/p LHC/RHC on 2/24 with elevated filling pressures: mRA 17, PCWP 30, CI 2.14.  - LHC with 75% mRCA disease otherwise non obstructive   - cont high intensity statin for her CAD. 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

## 2025-03-06 ENCOUNTER — TRANSCRIPTION ENCOUNTER (OUTPATIENT)
Age: 75
End: 2025-03-06

## 2025-03-06 LAB
ANION GAP SERPL CALC-SCNC: 17 MMOL/L — SIGNIFICANT CHANGE UP (ref 5–17)
BUN SERPL-MCNC: 48 MG/DL — HIGH (ref 7–23)
CALCIUM SERPL-MCNC: 8 MG/DL — LOW (ref 8.4–10.5)
CHLORIDE SERPL-SCNC: 99 MMOL/L — SIGNIFICANT CHANGE UP (ref 96–108)
CO2 SERPL-SCNC: 25 MMOL/L — SIGNIFICANT CHANGE UP (ref 22–31)
CREAT SERPL-MCNC: 3.88 MG/DL — HIGH (ref 0.5–1.3)
EGFR: 11 ML/MIN/1.73M2 — LOW
EGFR: 11 ML/MIN/1.73M2 — LOW
GLUCOSE SERPL-MCNC: 114 MG/DL — HIGH (ref 70–99)
HCT VFR BLD CALC: 28.9 % — LOW (ref 34.5–45)
HGB BLD-MCNC: 8.8 G/DL — LOW (ref 11.5–15.5)
MAGNESIUM SERPL-MCNC: 2.1 MG/DL — SIGNIFICANT CHANGE UP (ref 1.6–2.6)
MCHC RBC-ENTMCNC: 30.4 G/DL — LOW (ref 32–36)
MCHC RBC-ENTMCNC: 31.9 PG — SIGNIFICANT CHANGE UP (ref 27–34)
MCV RBC AUTO: 104.7 FL — HIGH (ref 80–100)
NRBC BLD AUTO-RTO: 0 /100 WBCS — SIGNIFICANT CHANGE UP (ref 0–0)
PHOSPHATE SERPL-MCNC: 5.1 MG/DL — HIGH (ref 2.5–4.5)
PLATELET # BLD AUTO: 172 K/UL — SIGNIFICANT CHANGE UP (ref 150–400)
POTASSIUM SERPL-MCNC: 4.1 MMOL/L — SIGNIFICANT CHANGE UP (ref 3.5–5.3)
POTASSIUM SERPL-SCNC: 4.1 MMOL/L — SIGNIFICANT CHANGE UP (ref 3.5–5.3)
RBC # BLD: 2.76 M/UL — LOW (ref 3.8–5.2)
RBC # FLD: 17.7 % — HIGH (ref 10.3–14.5)
SODIUM SERPL-SCNC: 141 MMOL/L — SIGNIFICANT CHANGE UP (ref 135–145)
WBC # BLD: 6.06 K/UL — SIGNIFICANT CHANGE UP (ref 3.8–10.5)
WBC # FLD AUTO: 6.06 K/UL — SIGNIFICANT CHANGE UP (ref 3.8–10.5)

## 2025-03-06 PROCEDURE — 99239 HOSP IP/OBS DSCHRG MGMT >30: CPT

## 2025-03-06 PROCEDURE — 99232 SBSQ HOSP IP/OBS MODERATE 35: CPT | Mod: GC

## 2025-03-06 RX ORDER — TORSEMIDE 10 MG
1 TABLET ORAL
Refills: 0 | DISCHARGE

## 2025-03-06 RX ORDER — APIXABAN 2.5 MG/1
1 TABLET, FILM COATED ORAL
Qty: 60 | Refills: 0
Start: 2025-03-06 | End: 2025-04-04

## 2025-03-06 RX ORDER — METOPROLOL SUCCINATE 50 MG/1
1 TABLET, EXTENDED RELEASE ORAL
Qty: 30 | Refills: 0
Start: 2025-03-06 | End: 2025-04-04

## 2025-03-06 RX ORDER — AMIODARONE HYDROCHLORIDE 50 MG/ML
1 INJECTION, SOLUTION INTRAVENOUS
Qty: 30 | Refills: 0
Start: 2025-03-06 | End: 2025-04-04

## 2025-03-06 RX ORDER — BUMETANIDE 1 MG/1
1 TABLET ORAL
Qty: 17 | Refills: 0
Start: 2025-03-06 | End: 2025-04-04

## 2025-03-06 RX ORDER — ATORVASTATIN CALCIUM 80 MG/1
1 TABLET, FILM COATED ORAL
Qty: 30 | Refills: 0
Start: 2025-03-06 | End: 2025-04-04

## 2025-03-06 RX ADMIN — Medication 1 APPLICATION(S): at 12:48

## 2025-03-06 RX ADMIN — Medication 650 MILLIGRAM(S): at 13:31

## 2025-03-06 RX ADMIN — OLANZAPINE 2.5 MILLIGRAM(S): 10 TABLET ORAL at 07:32

## 2025-03-06 RX ADMIN — SEVELAMER HYDROCHLORIDE 800 MILLIGRAM(S): 800 TABLET ORAL at 12:02

## 2025-03-06 RX ADMIN — Medication 1 TABLET(S): at 12:30

## 2025-03-06 RX ADMIN — Medication 650 MILLIGRAM(S): at 02:27

## 2025-03-06 RX ADMIN — Medication 650 MILLIGRAM(S): at 03:30

## 2025-03-06 RX ADMIN — APIXABAN 2.5 MILLIGRAM(S): 2.5 TABLET, FILM COATED ORAL at 05:15

## 2025-03-06 NOTE — PROGRESS NOTE ADULT - PROBLEM SELECTOR PROBLEM 1
Acute kidney injury superimposed on CKD

## 2025-03-06 NOTE — PROGRESS NOTE ADULT - ATTENDING COMMENTS
74-year-old female with PMHx of HTN, CKD stage 5, Afib on Eliquis, anemia, nephrolithiasis, and recently diagnosed HF who presents for scheduled Afib ablation for Afib. started on dialysis on 2/19/25.     also undergoing work up for valvular surgery. right heart cath showed elevated pressures.   s/p pure ultrafiltration today     being discharged to [facility] under dr. brodie severino  nephrology attending   please contact me on TEAMS   Office- [phone]

## 2025-03-06 NOTE — PHARMACOTHERAPY INTERVENTION NOTE - COMMENTS
Heart Failure Medication Education      HFmrEF (EF = 45-50% on 2/28/25)  Guideline directed medical therapy as below (name/dose/frequency):   Diuretic: bumetanide 2mg PO daily on non-HD days only (T/Th/Sat/Sun)  BB: metoprolol succinate ER 200mg once a day  ARNI/ACE-I/ARB: patient is ESRD on HD  MRA: patient is ESRD on HD  SGLT2i: patient is ESRD on HD  Hydralazine/Nitrate: --     HFrEF – Recommend diuretics, BB, ARNI preferred/ACE-I/ARB, MRA, & SGLT2i     Counseled patient/caregiver on above medication names (brand/generic), indication, and possible side effects and provided medication cards.     Additional medications counseled on:   amiodarone 200 mg oral tablet: 1 tab(s) orally once a day  ·	Provided medication cards. Educated that patient will have to follow up regularly with outpatient cardiologist/PCP to monitor thyroid function tests, liver function tests, pulmonary function tests, and ophthalmology. Patient questions and concerns were answered and addressed. Patient demonstrated understanding.  apixaban 2.5 mg oral tablet: 1 tab(s) orally every 12 hours  ·	Dose reduced from home 5mg PO 2 times a day for renal function (patient new to HD this admission)    Counseled patient to observe and obtain daily weights and to notify doctor if >2-3 lbs/day or >5lbs/week weight gain, increased short of breath or using more pillows at nighttime. Answered all of the patient’s questions to the best of my ability. Patient exhibited understanding of heart failure medication regimen and management. Patient understood importance of compliance and to follow up with cardiologist outpatient after discharge.     -Was the patient offered Meds to Beds? Yes  -Was medication coverage confirmed for any new medications? No    Chelly Martins PharmD, BCPS  Clinical Pharmacy Specialist  Available on [employer]  Cell: [phone]

## 2025-03-06 NOTE — PROGRESS NOTE ADULT - SUBJECTIVE AND OBJECTIVE BOX
PROGRESS NOTE:     Patient is a 74y old  Female who presents with a chief complaint of AF ESRD (06 Mar 2025 06:54)      SUBJECTIVE / OVERNIGHT EVENTS:    OVERNIGHT: No acute overnight events.      Patient was examined at bedside and feels well this morning. Denies fever, chills, chest pain, SOB, nausea, vomiting. ROS otherwise negative and pt is amenable to current treatment plan.      REVIEW OF SYSTEMS:    CONSTITUTIONAL:  No weakness, fevers, or chills  EYES/ENT:  No visual changes, vertigo, or throat pain   NECK:  No pain or stiffness  RESPIRATORY:  No SOB, cough, wheezing, or hemoptysis  CARDIOVASCULAR:  No chest pain or palpitations  GASTROINTESTINAL:  No abdominal pain, nausea, vomiting, or hematemesis; No diarrhea or constipation; No melena or hematochezia.  GENITOURINARY:  No dysuria, change in frequency, or hematuria  NEUROLOGICAL:  No numbness or weakness  SKIN:  No itching or rashes      MEDICATIONS  (STANDING):  aMIOdarone    Tablet 200 milliGRAM(s) Oral daily  apixaban 2.5 milliGRAM(s) Oral every 12 hours  atorvastatin 80 milliGRAM(s) Oral at bedtime  buMETAnide Injectable 2 milliGRAM(s) IV Push daily  chlorhexidine 2% Cloths 1 Application(s) Topical <User Schedule>  influenza  Vaccine (HIGH DOSE) 0.5 milliLiter(s) IntraMuscular once  metoprolol succinate  milliGRAM(s) Oral daily  Nephro-teresita 1 Tablet(s) Oral daily  sevelamer carbonate 800 milliGRAM(s) Oral three times a day with meals    MEDICATIONS  (PRN):  acetaminophen     Tablet .. 650 milliGRAM(s) Oral every 6 hours PRN Temp greater or equal to 38C (100.4F), Mild Pain (1 - 3)  melatonin 3 milliGRAM(s) Oral at bedtime PRN Insomnia  OLANZapine 2.5 milliGRAM(s) Oral every 8 hours PRN Agitation      CAPILLARY BLOOD GLUCOSE        I&O's Summary    05 Mar 2025 07:01  -  06 Mar 2025 07:00  --------------------------------------------------------  IN: 480 mL / OUT: 2500 mL / NET: -2020 mL        PHYSICAL EXAM:  Vital Signs Last 24 Hrs  T(C): 36.7 (06 Mar 2025 04:40), Max: 37.5 (05 Mar 2025 11:57)  T(F): 98.1 (06 Mar 2025 04:40), Max: 99.5 (05 Mar 2025 11:57)  HR: 94 (06 Mar 2025 04:40) (94 - 111)  BP: 113/71 (06 Mar 2025 04:40) (113/71 - 131/80)  BP(mean): --  RR: 18 (06 Mar 2025 04:40) (18 - 18)  SpO2: 99% (06 Mar 2025 04:40) (94% - 99%)    Parameters below as of 06 Mar 2025 04:40  Patient On (Oxygen Delivery Method): room air        CONSTITUTIONAL: NAD; well-developed  HEENT: PERRL, clear conjunctiva  RESPIRATORY: Normal respiratory effort; lungs are clear to auscultation bilaterally; No crackles/rhonchi/wheezing  CARDIOVASCULAR: Regular rate and rhythm, normal S1 and S2, no murmur/rub/gallop; No lower extremity edema; Peripheral pulses are 2+ bilaterally  ABDOMEN: Nontender to palpation, normoactive bowel sounds, no rebound/guarding; No hepatosplenomegaly  MUSCULOSKELETAL: No clubbing or cyanosis of digits; no joint swelling or tenderness to palpation  EXTREMITY: Lower extremities non-tender to palpation; non-erythematous B/L  NEURO: A&Ox3; no focal deficits   PSYCH: Normal mood; affect appropriate    LABS:                        8.7    6.31  )-----------( 162      ( 05 Mar 2025 09:41 )             29.3     03-05    134[L]  |  91[L]  |  61[H]  ----------------------------<  85  5.4[H]   |  23  |  4.52[H]    Ca    8.1[L]      05 Mar 2025 09:41  Phos  5.9     03-05  Mg     2.2     03-05      PT/INR - ( 05 Mar 2025 09:41 )   PT: 15.2 sec;   INR: 1.33 ratio         PTT - ( 05 Mar 2025 09:41 )  PTT:131.4 sec      Urinalysis Basic - ( 05 Mar 2025 09:41 )    Color: x / Appearance: x / SG: x / pH: x  Gluc: 85 mg/dL / Ketone: x  / Bili: x / Urobili: x   Blood: x / Protein: x / Nitrite: x   Leuk Esterase: x / RBC: x / WBC x   Sq Epi: x / Non Sq Epi: x / Bacteria: x          RADIOLOGY & ADDITIONAL TESTS:    N/A PROGRESS NOTE:     Patient is a 74y old  Female who presents with a chief complaint of AF ESRD (06 Mar 2025 06:54)      SUBJECTIVE / OVERNIGHT EVENTS:    OVERNIGHT: No acute overnight events.      Patient was examined at bedside and feels well this morning, though confused.    REVIEW OF SYSTEMS:    CONSTITUTIONAL:  No weakness, fevers, or chills  EYES/ENT:  No visual changes, vertigo, or throat pain   NECK:  No pain or stiffness  RESPIRATORY:  No SOB, cough, wheezing, or hemoptysis  CARDIOVASCULAR:  No chest pain or palpitations  GASTROINTESTINAL:  No abdominal pain, nausea, vomiting, or hematemesis; No diarrhea or constipation; No melena or hematochezia.  GENITOURINARY:  No dysuria, change in frequency, or hematuria  NEUROLOGICAL:  No numbness or weakness  SKIN:  No itching or rashes      MEDICATIONS  (STANDING):  aMIOdarone    Tablet 200 milliGRAM(s) Oral daily  apixaban 2.5 milliGRAM(s) Oral every 12 hours  atorvastatin 80 milliGRAM(s) Oral at bedtime  buMETAnide Injectable 2 milliGRAM(s) IV Push daily  chlorhexidine 2% Cloths 1 Application(s) Topical <User Schedule>  influenza  Vaccine (HIGH DOSE) 0.5 milliLiter(s) IntraMuscular once  metoprolol succinate  milliGRAM(s) Oral daily  Nephro-teresita 1 Tablet(s) Oral daily  sevelamer carbonate 800 milliGRAM(s) Oral three times a day with meals    MEDICATIONS  (PRN):  acetaminophen     Tablet .. 650 milliGRAM(s) Oral every 6 hours PRN Temp greater or equal to 38C (100.4F), Mild Pain (1 - 3)  melatonin 3 milliGRAM(s) Oral at bedtime PRN Insomnia  OLANZapine 2.5 milliGRAM(s) Oral every 8 hours PRN Agitation      CAPILLARY BLOOD GLUCOSE        I&O's Summary    05 Mar 2025 07:01  -  06 Mar 2025 07:00  --------------------------------------------------------  IN: 480 mL / OUT: 2500 mL / NET: -2020 mL        PHYSICAL EXAM:  Vital Signs Last 24 Hrs  T(C): 36.7 (06 Mar 2025 04:40), Max: 37.5 (05 Mar 2025 11:57)  T(F): 98.1 (06 Mar 2025 04:40), Max: 99.5 (05 Mar 2025 11:57)  HR: 94 (06 Mar 2025 04:40) (94 - 111)  BP: 113/71 (06 Mar 2025 04:40) (113/71 - 131/80)  BP(mean): --  RR: 18 (06 Mar 2025 04:40) (18 - 18)  SpO2: 99% (06 Mar 2025 04:40) (94% - 99%)    Parameters below as of 06 Mar 2025 04:40  Patient On (Oxygen Delivery Method): room air        CONSTITUTIONAL: NAD; well-developed  HEENT: PERRL, clear conjunctiva  RESPIRATORY: Normal respiratory effort; lungs are clear to auscultation bilaterally; No crackles/rhonchi/wheezing  CARDIOVASCULAR: Regular rate and rhythm, normal S1 and S2, no murmur/rub/gallop; 2+LE edema; Peripheral pulses are 2+ bilaterally  ABDOMEN: Nontender to palpation, normoactive bowel sounds, no rebound/guarding; No hepatosplenomegaly  MUSCULOSKELETAL: No clubbing or cyanosis of digits; no joint swelling or tenderness to palpation  EXTREMITY: Lower extremities non-tender to palpation; non-erythematous B/L  NEURO: A&Ox3; no focal deficits   PSYCH: Normal mood; affect appropriate    LABS:                        8.7    6.31  )-----------( 162      ( 05 Mar 2025 09:41 )             29.3     03-05    134[L]  |  91[L]  |  61[H]  ----------------------------<  85  5.4[H]   |  23  |  4.52[H]    Ca    8.1[L]      05 Mar 2025 09:41  Phos  5.9     03-05  Mg     2.2     03-05      PT/INR - ( 05 Mar 2025 09:41 )   PT: 15.2 sec;   INR: 1.33 ratio         PTT - ( 05 Mar 2025 09:41 )  PTT:131.4 sec      Urinalysis Basic - ( 05 Mar 2025 09:41 )    Color: x / Appearance: x / SG: x / pH: x  Gluc: 85 mg/dL / Ketone: x  / Bili: x / Urobili: x   Blood: x / Protein: x / Nitrite: x   Leuk Esterase: x / RBC: x / WBC x   Sq Epi: x / Non Sq Epi: x / Bacteria: x          RADIOLOGY & ADDITIONAL TESTS:    N/A

## 2025-03-06 NOTE — PROGRESS NOTE ADULT - PROBLEM SELECTOR PLAN 9
DVT ppx: eliquis  Diet: Dash diet, low phos diet with nepro daily and nephro-teresita daily  Dispo: pending clinical course, PT eval pending DVT ppx: eliquis  Diet: Dash diet, low phos diet with nepro daily and nephro-teresita daily  Dispo: pending clinical course

## 2025-03-06 NOTE — PROGRESS NOTE ADULT - PROBLEM SELECTOR PROBLEM 2
Paroxysmal atrial fibrillation

## 2025-03-06 NOTE — PROGRESS NOTE ADULT - REASON FOR ADMISSION
AF ESRD
AF ESRD
af, chf
mitral and tricuspid regurgitation
AF ESRD
ESRD AF
hf, af
mitral regurgitation
AF
AF
AF ESRD
AF
AF ESRD
NICO on CKD
AF ESRD
CHF exacerbation
NICO on CKD
Afib
Afib
NICO on CKD
NICO on CKD
Afib

## 2025-03-06 NOTE — PROGRESS NOTE ADULT - ASSESSMENT
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       #Acute kidney injury superimposed on CKD.   - initially presenting for scheduled dccv  -  Found to have worsening renal function on admission so procedure cancelled. Baseline 4.6 in Aug 2024 and 5.5 in 1/25. Cr on admission 2/12 was 8.42.   - RAP elevated to 15 on TTE from 2/13 and she appeared significantly volume up on exam  - was on Bumex 4mg IV BID with poor UOP, now on 2mg IV Bumex  - s/p hd access, and now on HD    #  Paroxysmal atrial fibrillation.   ·  Admitted for cardioversion but unable to do because given significant volume overload  - 02/28- /DCCV -reverted back to AF Amiodarone resumed, and remains in af  - cont bb  - ac with heparin gtt for now    # Chronic heart failure with preserved ejection fraction. -Severe MR  - TTE in 11/2024 showed progression to moderate to severe MR.  Left ventricular systolic function was normal with an ejection fraction of 55 % by Garcia's method of disks.  - TTE now with LVEF of 41%, global hypokinesis, severe MR, RAP of 15.   - Structural Heart evaluation noted. Would be ideal to re-evaluate lv function and mr on gdmt and in sr.  - S/p LHC/RHC on 2/24 with elevated filling pressures: mRA 17, PCWP 30, CI 2.14.  - LHC with 75% mRCA disease otherwise non obstructive   - cont high intensity statin for her CAD. 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

## 2025-03-06 NOTE — PROGRESS NOTE ADULT - TIME BILLING
- Review of records, telemetry, vital signs and daily labs.   - General and cardiovascular physical examination.  - Generation of cardiovascular treatment plan and completion of note .  - Coordination of care.      Patient was seen and examined by me on  03/01/2025 ,interim events noted,labs and radiology studies reviewed.  Aki Carias MD,FACC.  [address] [place], [place] [id number].  [phone]  Availabe to call or text on Microsoft TEAMS.
- Review of records, telemetry, vital signs and daily labs.   - General and cardiovascular physical examination.  - Generation of cardiovascular treatment plan and completion of note .  - Coordination of care.      Patient was seen and examined by me on 02/22/2025,interim events noted,labs and radiology studies reviewed.  Aki Carias MD,FACC.  [address] [facility].  [phone]  Availabe to call or text on Microsoft TEAMS.
- Review of records, telemetry, vital signs and daily labs.   - General and cardiovascular physical examination.  - Generation of cardiovascular treatment plan and completion of note .  - Coordination of care.      Patient was seen and examined by me on 03/05/2025,interim events noted,labs and radiology studies reviewed.  Aki Carias MD,FACC.  [address] [place], [place] [id number].  [phone]  Availabe to call or text on Microsoft TEAMS.
- Review of records, telemetry, vital signs and daily labs.   - General and cardiovascular physical examination.  - Generation of cardiovascular treatment plan and completion of note .  - Coordination of care.      Patient was seen and examined by me on 02/23/2025,interim events noted,labs and radiology studies reviewed.  Aki Carias MD,FACC.  [address] [facility].  [phone]  Availabe to call or text on Microsoft TEAMS.
- Review of records, telemetry, vital signs and daily labs.   - General and cardiovascular physical examination.  - Generation of cardiovascular treatment plan and completion of note .  - Coordination of care.      Patient was seen and examined by me on  03/06/2025 ,interim events noted,labs and radiology studies reviewed.  Aki Carias MD,FACC.  [address] [place], [place] [id number].  [phone]  Availabe to call or text on Microsoft TEAMS.
- Review of records, telemetry, vital signs and daily labs.   - General and cardiovascular physical examination.  - Generation of cardiovascular treatment plan and completion of note .  - Coordination of care.      Patient was seen and examined by me on 02/15/2025 ,interim events noted,labs and radiology studies reviewed.  Aki Carias MD,FACC.  [address] [place], [place] [id number].  [phone]  Availabe to call or text on Microsoft TEAMS.
- Review of records, telemetry, vital signs and daily labs.   - General and cardiovascular physical examination.  - Generation of cardiovascular treatment plan and completion of note .  - Coordination of care.      Patient was seen and examined by me on 02/19/2025 ,interim events noted,labs and radiology studies reviewed.  Aki Carias MD,FACC.  [address] [place], [place] [id number].  [phone]  Availabe to call or text on Microsoft TEAMS.
education, assessment and coordination of care.
- Review of records, telemetry, vital signs and daily labs.   - General and cardiovascular physical examination.  - Generation of cardiovascular treatment plan and completion of note .  - Coordination of care.      Patient was seen and examined by me on  03/02/2025 ,interim events noted,labs and radiology studies reviewed.  Aki Carias MD,FACC.  [address] [place], [place] [id number].  [phone]  Availabe to call or text on Microsoft TEAMS.
- Review of records, telemetry, vital signs and daily labs.   - General and cardiovascular physical examination.  - Generation of cardiovascular treatment plan and completion of note .  - Coordination of care.      Patient was seen and examined by me on 02/16/2025,interim events noted,labs and radiology studies reviewed.  Aki Carias MD,FACC.  [address] [place], [place] [id number].  [phone]  Availabe to call or text on Microsoft TEAMS.
- Review of records, telemetry, vital signs and daily labs.   - General and cardiovascular physical examination.  - Generation of cardiovascular treatment plan and completion of note .  - Coordination of care.      Patient was seen and examined by me on  02/17/2025 ,interim events noted,labs and radiology studies reviewed.  Aki Carias MD,FACC.  [address] [place], [place] [id number].  [phone]  Availabe to call or text on Microsoft TEAMS.
Time spent on review of vitals, physical exam, documentation, and discussion of plan of care with patient, patient family, resident, consultants and multidisciplinary team.
Time spent on review of vitals, physical exam, documentation, and discussion of plan of care with patient, resident, consultants and multidisciplinary team.
Time spent on review of vitals, physical exam, documentation, and discussion of plan of care with patient, patient family, resident, consultants and multidisciplinary team.
chart reviewing, history taking, physical exam, assessment and documentation, including speaking to specialist/SW/CM regarding the management.
Time spent on review of vitals, physical exam, documentation, and discussion of plan of care with patient, resident, consultants and multidisciplinary team.
Time spent on review of vitals, physical exam, documentation, and discussion of plan of care with patient, resident, consultants and multidisciplinary team.
chart reviewing, history taking, physical exam, assessment and documentation, including speaking to specialist/SW/CM regarding the management.
Time spent on review of vitals, physical exam, documentation, and discussion of plan of care with patient, patient family, resident, consultants and multidisciplinary team.
chart reviewing, history taking, physical exam, assessment and documentation, including speaking to specialist/SW/CM regarding the management.
Time spent on review of vitals, physical exam, documentation, and discussion of plan of care with patient, resident, consultants and multidisciplinary team.
Time spent on review of vitals, physical exam, documentation, and discussion of plan of care with patient, resident, consultants and multidisciplinary team.
Time spent on review of vitals, physical exam, documentation, and discussion of plan of care with patient, patient family, resident, consultants and multidisciplinary team.
Time-based billing (NON-critical care).     The necessity of the time spent during the encounter on this date of service was due to:     - Ordering, reviewing, and interpreting labs, testing, and imaging.  - Independently obtaining a review of systems and performing a physical exam  - Reviewing prior hospitalization and where necessary, outpatient records.  - Counselling and educating patient and/or family regarding interpretation of aforementioned items and plan of care.
Time spent on review of vitals, physical exam, documentation, and discussion of plan of care with patient, resident, consultants and multidisciplinary team.

## 2025-03-06 NOTE — PROGRESS NOTE ADULT - PROBLEM SELECTOR PLAN 5
UA obtained 2/26 for new AMS/ hallucinations, no dysuria or urinary symptoms - positive for leuk esterase,   WBC, and bacteria  -Hx of pan-sensitive E. Coli in April 2024    Plan:  -S/p empiric tx with CTX x3 days (2/28 - 3/1)  -Urine culture showed staph epi - s/p Vanc (3/1-3/3)  -Monitor vitals and WBC

## 2025-03-06 NOTE — DISCHARGE NOTE NURSING/CASE MANAGEMENT/SOCIAL WORK - NSDCCRNAME_GEN_ALL_CORE_FT
[facility] ([address] [place] [phone])

## 2025-03-06 NOTE — PROGRESS NOTE ADULT - SUBJECTIVE AND OBJECTIVE BOX
MR#[id number]  PATIENT NAME:ELIZABETH PARSONS    DATE OF SERVICE: 03-06-25 @ 06:55  Patient was seen and examined by Aki Carias MD on    03-06-25 @ 06:55 .  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      INTERIM EVENTS:Patient seen at bedside ,interim events noted.    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   03/06-No surgical plan      PMH -reviewed admission note, no change since admission  MEDICATIONS  (STANDING):  aMIOdarone    Tablet 200 milliGRAM(s) Oral daily  apixaban 2.5 milliGRAM(s) Oral every 12 hours  atorvastatin 80 milliGRAM(s) Oral at bedtime  buMETAnide Injectable 2 milliGRAM(s) IV Push daily  chlorhexidine 2% Cloths 1 Application(s) Topical <User Schedule>  influenza  Vaccine (HIGH DOSE) 0.5 milliLiter(s) IntraMuscular once  metoprolol succinate  milliGRAM(s) Oral daily  Nephro-teresita 1 Tablet(s) Oral daily  sevelamer carbonate 800 milliGRAM(s) Oral three times a day with meals    MEDICATIONS  (PRN):  acetaminophen     Tablet .. 650 milliGRAM(s) Oral every 6 hours PRN Temp greater or equal to 38C (100.4F), Mild Pain (1 - 3)  melatonin 3 milliGRAM(s) Oral at bedtime PRN Insomnia  OLANZapine 2.5 milliGRAM(s) Oral every 8 hours PRN Agitation            REVIEW OF SYSTEMS:  Constitutional: [ ] fever, [ ]weight loss,  [x ]fatigue [ ]weight gain  Eyes: [ ] visual changes  Respiratory: [ ]shortness of breath;  [ ] cough, [ ]wheezing, [ ]chills, [ ]hemoptysis  Cardiovascular: [ ] chest pain, [ ]palpitations, [ ]dizziness,  [ ]leg swelling[ ]orthopnea[ ]PND  Gastrointestinal: [ ] abdominal pain, [ ]nausea, [ ]vomiting,  [ ]diarrhea [ ]Constipation [ ]Melena  Genitourinary: [ ] dysuria, [ ] hematuria [ ]Zelaya  Neurologic: [ ] headaches [ ] tremors[ ]weakness [ ]Paralysis Right[ ] Left[ ]  Skin: [ ] itching, [ ]burning, [ ] rashes  Endocrine: [ ] heat or cold intolerance  Musculoskeletal: [ ] joint pain or swelling; [ ] muscle, back, or extremity pain  Psychiatric: [x ] depression, [ ]anxiety, [x ]mood swings, or [ ]difficulty sleeping  Hematologic: [ ] easy bruising, [ ] bleeding gums    [ ] All remaining systems negative except as per above.   [ ]Unable to obtain.  [x] No change in ROS since admission      Vital Signs Last 24 Hrs  T(C): 36.7 (06 Mar 2025 04:40), Max: 37.5 (05 Mar 2025 11:57)  T(F): 98.1 (06 Mar 2025 04:40), Max: 99.5 (05 Mar 2025 11:57)  HR: 94 (06 Mar 2025 04:40) (78 - 111)  BP: 113/71 (06 Mar 2025 04:40) (113/71 - 131/80)  RR: 18 (06 Mar 2025 04:40) (18 - 18)  SpO2: 99% (06 Mar 2025 04:40) (94% - 99%)    Parameters below as of 06 Mar 2025 04:40  Patient On (Oxygen Delivery Method): room air      I&O's Summary    04 Mar 2025 07:01  -  05 Mar 2025 07:00  --------------------------------------------------------  IN: 276 mL / OUT: 0 mL / NET: 276 mL    05 Mar 2025 07:01  -  06 Mar 2025 06:55  --------------------------------------------------------  IN: 480 mL / OUT: 2500 mL / NET: -2020 mL        PHYSICAL EXAM:  General: No acute distress BMI-24  HEENT: EOMI, PERRL  Neck: Supple, [ ] JVD  Lungs: Equal air entry bilaterally; [ ] rales [ ] wheezing [ ] rhonchi  Heart: Regular rate and rhythm; [x ] murmur   2/6 [ x] systolic [ ] diastolic [ ] radiation[ ] rubs [ ]  gallops  Abdomen: Nontender, bowel sounds present  Extremities: No clubbing, cyanosis, [ ] edema [ ]Pulses  equal and intact  Nervous system:  Alert & Oriented X3, no focal deficits  Psychiatric: Normal affect  Skin: No rashes or lesions    LABS:  03-05    134[L]  |  91[L]  |  61[H]  ----------------------------<  85  5.4[H]   |  23  |  4.52[H]    Ca    8.1[L]      05 Mar 2025 09:41  Phos  5.9     03-05  Mg     2.2     03-05      Creatinine Trend: 4.52<--, 3.30<--, 5.61<--, 4.33<--, 6.06<--, 5.36<--                        8.7    6.31  )-----------( 162      ( 05 Mar 2025 09:41 )             29.3     PT/INR - ( 05 Mar 2025 09:41 )   PT: 15.2 sec;   INR: 1.33 ratio         PTT - ( 05 Mar 2025 09:41 )  PTT:131.4 sec      Cardiac Catheterization (03.04.25 @ 16:45) >  Diagnostic Conclusions:     Elevated right atrial pressure (mRA 20mmHg with a V wave of 24mmHg)   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

## 2025-03-06 NOTE — PROGRESS NOTE ADULT - PROBLEM SELECTOR PROBLEM 7
Metabolic acidosis
Metabolic acidosis
Anxiety
Metabolic acidosis
Anxiety

## 2025-03-06 NOTE — PROGRESS NOTE ADULT - SUBJECTIVE AND OBJECTIVE BOX
[facility] Division of Kidney Diseases & Hypertension  FOLLOW UP NOTE  [phone]--------------------------------------------------------------------------------  Chief Complaint: Newly deemed ESRD now on HD    24 hour events/subjective: Pt seen and evaluated this morning. Reports feeling well, endorses no complaints. Denies any headaches, nausea, vomiting, fevers/chills, chest pain, palpitations, SOB, abdominal pain.    PAST HISTORY  --------------------------------------------------------------------------------  No significant changes to PMH, PSH, FHx, SHx, unless otherwise noted    ALLERGIES & MEDICATIONS  --------------------------------------------------------------------------------  Allergies    No Known Allergies    Intolerances    Standing Inpatient Medications  aMIOdarone    Tablet 200 milliGRAM(s) Oral daily  apixaban 2.5 milliGRAM(s) Oral every 12 hours  atorvastatin 80 milliGRAM(s) Oral at bedtime  buMETAnide Injectable 2 milliGRAM(s) IV Push daily  chlorhexidine 2% Cloths 1 Application(s) Topical <User Schedule>  influenza  Vaccine (HIGH DOSE) 0.5 milliLiter(s) IntraMuscular once  metoprolol succinate  milliGRAM(s) Oral daily  Nephro-teresita 1 Tablet(s) Oral daily  sevelamer carbonate 800 milliGRAM(s) Oral three times a day with meals    PRN Inpatient Medications  acetaminophen     Tablet .. 650 milliGRAM(s) Oral every 6 hours PRN  melatonin 3 milliGRAM(s) Oral at bedtime PRN  OLANZapine 2.5 milliGRAM(s) Oral every 8 hours PRN    REVIEW OF SYSTEMS  --------------------------------------------------------------------------------  see above    VITALS/PHYSICAL EXAM  --------------------------------------------------------------------------------  T(C): 36.6 (03-06-25 @ 08:00), Max: 37.5 (03-05-25 @ 11:57)  HR: 90 (03-06-25 @ 08:00) (90 - 111)  BP: 114/75 (03-06-25 @ 08:00) (113/71 - 123/75)  RR: 16 (03-06-25 @ 08:00) (16 - 18)  SpO2: 97% (03-06-25 @ 08:00) (94% - 99%)  Wt(kg): --    03-05-25 @ 07:01  -  03-06-25 @ 07:00  --------------------------------------------------------  IN: 480 mL / OUT: 2500 mL / NET: -2020 mL    Physical Exam:  	Gen: NAD, Ill-appearing  	HEENT: PERRL, MMM  	Pulm: CTA B/L  	CV: RRR, S1S2;  	Abd: +BS, soft, nontender/nondistended  	: No suprapubic tenderness              Extremities: +Trace bilateral LE edema noted              Neuro: Awake  	Skin: Warm and dry  	Vascular access: RIJ TDC     LABS/STUDIES  --------------------------------------------------------------------------------              8.8    6.06  >-----------<  172      [03-06-25 @ 08:40]              28.9     141  |  99  |  48  ----------------------------<  114      [03-06-25 @ 08:40]  4.1   |  25  |  3.88        Ca     8.0     [03-06-25 @ 08:40]      Mg     2.1     [03-06-25 @ 08:40]      Phos  5.1     [03-06-25 @ 08:40]      PT/INR: PT 15.2 , INR 1.33       [03-05-25 @ 09:41]  PTT: 131.4      [03-05-25 @ 09:41]    Creatinine Trend:  SCr 3.88 [03-06 @ 08:40]  SCr 4.52 [03-05 @ 09:41]  SCr 3.30 [03-04 @ 05:50]  SCr 5.61 [03-03 @ 06:25]  SCr 4.33 [03-02 @ 06:09]    TSH 2.67      [03-01-25 @ 06:47]

## 2025-03-06 NOTE — PROGRESS NOTE ADULT - ATTENDING COMMENTS
NICO on CKD- continue HD/UF per renal- has permacath- AVF as outpatient  CAD- s/p [facility] with 75% mRCA- continue high intensity statin  AF with severe MR- s/p /DCCV on 2/28 with conversion back to AF- on amiodarone, resume DOAC  S/p repeat RHC for evaluation for MVR/MAZE/LAAL- CTS and structural heart following- no plan for surgical intervention at present- plan to continue medical therapy for several months and reassess fluid status with TTE and RHC   Hospital delirium- continue prn Zyprexa  D/C planning pending dialysis arrangements- 45 minutes spent discharging the patient

## 2025-03-06 NOTE — PROGRESS NOTE ADULT - PROBLEM SELECTOR PLAN 3
TTE in 11/2024 showed progression to moderate to severe MR.  Left ventricular systolic function is normal with an ejection fraction of 55 % by Garcia's method of disks. Appears hypervolemic on exam. Repeat TTE during admission reveals left ventricular systolic function is moderately decreased with an ejection fraction of 41 % by Garica's method of disks.     Plan:  -S/p RHC and LHC on 2/24 showing elevated filling pressures: mRA 17, PCWP 30, CI 2.14, 75% mRCA, otherwise no obstruction; plan to continue medical management  - s/p cardioversion 2/28 showed EF 45-50% and MR  -Patient still volume overloaded, started on Bumex 2g IV daily and had HD/UF daily - now on HD MWF  -Will need diuretics PO on non-HD days on dc  -Started atorvastatin 80mg  -General cardiology, structural cardiology, and CTS following  -S/p RHC 3/4 - no surgical intervention offered

## 2025-03-06 NOTE — DISCHARGE NOTE NURSING/CASE MANAGEMENT/SOCIAL WORK - PATIENT PORTAL LINK FT
You can access the FollowMyHealth Patient Portal offered by [facility] by registering at the following website: [email or web address]. By joining [facility]’s FollowMyHealth portal, you will also be able to view your health information using other applications (apps) compatible with our system.

## 2025-03-06 NOTE — PROGRESS NOTE ADULT - ATTENDING SUPERVISION STATEMENT
Fellow
Resident
No
Resident

## 2025-03-06 NOTE — DISCHARGE NOTE NURSING/CASE MANAGEMENT/SOCIAL WORK - NSDCCRTYPESERV_GEN_ALL_CORE_FT
HD MWF 2:30PM, please show up at 1:30PM for first HD session tomorrow 3/7. Following sessions 2:30PM

## 2025-03-06 NOTE — PROGRESS NOTE ADULT - PROBLEM SELECTOR PROBLEM 8
Hypertension
Need for prophylactic measure
Hypertension
Need for prophylactic measure

## 2025-03-06 NOTE — DISCHARGE NOTE NURSING/CASE MANAGEMENT/SOCIAL WORK - NSDCFUADDAPPT_GEN_ALL_CORE_FT
APPTS ARE READY TO BE MADE: [X] YES    Best Family or Patient Contact (if needed):    Additional Information about above appointments (if needed):    1: PCP  2: Cardiology  3: Nephrology - follow up with Dr. Elizondo for ESRD requiring dialysis  4: Vascular surgery - Dr. White for AVF placement    Other comments or requests:   If you need a new PCP, Please make an appointment with General Internal Medicine, [address] [place] [address], [address], [place] [address]. Please call [phone] to make an appointment

## 2025-03-06 NOTE — DISCHARGE NOTE NURSING/CASE MANAGEMENT/SOCIAL WORK - NSDCPEFALRISK_GEN_ALL_CORE
For information on Fall & Injury Prevention, visit: [email or web address] OR  [email or web address] OR  https://www.cdc.gov/steadi/patient.html

## 2025-03-06 NOTE — PROGRESS NOTE ADULT - ASSESSMENT
74-year-old female with PMHx of HTN, CKD stage 5 (pt. of Dr. Elizondo), Afib on Eliquis, anemia, nephrolithiasis, and recently diagnosed HF who presents for scheduled Afib ablation for Afib.    1. Newly deemed ESRD now on dialysis  Underwent RIJ TDC placement followed by 1st HD treatement on 2/19/25. Last HD was on 3/5. Pt. is clinically stable. Labs reviewed. LHC reviewed and elevated filling pressures noted. Plan for PUF today and maintenance HD on 3/7. Given cardioversion, plan for AVF can be done outpatient. Vein mapping if pt still here can be done. Monitor labs, and VS. Dose meds for ESRD/HD. Dc planning to [facility] HD unit- pt has a MWF spot at [facility], will need oral diuretics on non dialysis days on dc as well.    2. Anemia noted  -Complete  venofer 200mg IV with HD X 5 doses.  -Give PERLITA with HD.   -Transfusion as per primary team.    3. Mild hyperkalemia - now resolved     If you have any questions, please feel free to contact me  Albertina Frederick  Nephrology Fellow  List[facility]/Page [phone]  (After 5pm or on weekends please page the on-call fellow)

## 2025-03-07 ENCOUNTER — NON-APPOINTMENT (OUTPATIENT)
Age: 75
End: 2025-03-07

## 2025-03-07 VITALS
SYSTOLIC BLOOD PRESSURE: 129 MMHG | OXYGEN SATURATION: 97 % | RESPIRATION RATE: 18 BRPM | DIASTOLIC BLOOD PRESSURE: 73 MMHG | HEART RATE: 70 BPM | TEMPERATURE: 98 F

## 2025-03-11 ENCOUNTER — TRANSCRIPTION ENCOUNTER (OUTPATIENT)
Age: 75
End: 2025-03-11

## 2025-03-11 PROBLEM — I48.91 UNSPECIFIED ATRIAL FIBRILLATION: Chronic | Status: ACTIVE | Noted: 2025-02-12

## 2025-03-11 LAB
HGB BLDA-MCNC: 8.9 G/DL — LOW (ref 11.7–16.1)
HGB FLD-MCNC: 8.8 G/DL — LOW (ref 11.7–16.5)
HGB FLD-MCNC: 8.9 G/DL — LOW (ref 11.7–16.5)
HGB FLD-MCNC: 9.2 G/DL — LOW (ref 11.7–16.5)
OXYHGB MFR BLDA: 91.6 % — SIGNIFICANT CHANGE UP (ref 90–95)
OXYHGB MFR BLDMV: 35.8 % — SIGNIFICANT CHANGE UP
OXYHGB MFR BLDMV: 40.8 % — SIGNIFICANT CHANGE UP
OXYHGB MFR BLDMV: 43.5 % — SIGNIFICANT CHANGE UP
SAO2 % BLD: 36.2 % — LOW (ref 60–90)
SAO2 % BLD: 41.9 % — LOW (ref 60–90)
SAO2 % BLD: 44.6 % — LOW (ref 60–90)
SAO2 % BLDA: 94.9 % — SIGNIFICANT CHANGE UP (ref 94–98)

## 2025-03-13 ENCOUNTER — TRANSCRIPTION ENCOUNTER (OUTPATIENT)
Age: 75
End: 2025-03-13

## 2025-03-26 PROCEDURE — 84156 ASSAY OF PROTEIN URINE: CPT

## 2025-03-26 PROCEDURE — 84132 ASSAY OF SERUM POTASSIUM: CPT

## 2025-03-26 PROCEDURE — 84100 ASSAY OF PHOSPHORUS: CPT

## 2025-03-26 PROCEDURE — 84300 ASSAY OF URINE SODIUM: CPT

## 2025-03-26 PROCEDURE — 85018 HEMOGLOBIN: CPT

## 2025-03-26 PROCEDURE — 82947 ASSAY GLUCOSE BLOOD QUANT: CPT

## 2025-03-26 PROCEDURE — 80048 BASIC METABOLIC PNL TOTAL CA: CPT

## 2025-03-26 PROCEDURE — 82330 ASSAY OF CALCIUM: CPT

## 2025-03-26 PROCEDURE — 82570 ASSAY OF URINE CREATININE: CPT

## 2025-03-26 PROCEDURE — 83935 ASSAY OF URINE OSMOLALITY: CPT

## 2025-03-26 PROCEDURE — 85610 PROTHROMBIN TIME: CPT

## 2025-03-26 PROCEDURE — 76937 US GUIDE VASCULAR ACCESS: CPT

## 2025-03-26 PROCEDURE — 87641 MR-STAPH DNA AMP PROBE: CPT

## 2025-03-26 PROCEDURE — C1769: CPT

## 2025-03-26 PROCEDURE — 83735 ASSAY OF MAGNESIUM: CPT

## 2025-03-26 PROCEDURE — 93306 TTE W/DOPPLER COMPLETE: CPT

## 2025-03-26 PROCEDURE — 97162 PT EVAL MOD COMPLEX 30 MIN: CPT

## 2025-03-26 PROCEDURE — 99261: CPT

## 2025-03-26 PROCEDURE — 81003 URINALYSIS AUTO W/O SCOPE: CPT

## 2025-03-26 PROCEDURE — 87640 STAPH A DNA AMP PROBE: CPT

## 2025-03-26 PROCEDURE — 85730 THROMBOPLASTIN TIME PARTIAL: CPT

## 2025-03-26 PROCEDURE — 93320 DOPPLER ECHO COMPLETE: CPT

## 2025-03-26 PROCEDURE — 36415 COLL VENOUS BLD VENIPUNCTURE: CPT

## 2025-03-26 PROCEDURE — C1894: CPT

## 2025-03-26 PROCEDURE — 84295 ASSAY OF SERUM SODIUM: CPT

## 2025-03-26 PROCEDURE — C1889: CPT

## 2025-03-26 PROCEDURE — 86705 HEP B CORE ANTIBODY IGM: CPT

## 2025-03-26 PROCEDURE — C1752: CPT

## 2025-03-26 PROCEDURE — 80074 ACUTE HEPATITIS PANEL: CPT

## 2025-03-26 PROCEDURE — 36558 INSERT TUNNELED CV CATH: CPT

## 2025-03-26 PROCEDURE — 83540 ASSAY OF IRON: CPT

## 2025-03-26 PROCEDURE — 93005 ELECTROCARDIOGRAM TRACING: CPT

## 2025-03-26 PROCEDURE — 93451 RIGHT HEART CATH: CPT

## 2025-03-26 PROCEDURE — 85027 COMPLETE CBC AUTOMATED: CPT

## 2025-03-26 PROCEDURE — 81001 URINALYSIS AUTO W/SCOPE: CPT

## 2025-03-26 PROCEDURE — 82962 GLUCOSE BLOOD TEST: CPT

## 2025-03-26 PROCEDURE — 93312 ECHO TRANSESOPHAGEAL: CPT

## 2025-03-26 PROCEDURE — 84133 ASSAY OF URINE POTASSIUM: CPT

## 2025-03-26 PROCEDURE — 85025 COMPLETE CBC W/AUTO DIFF WBC: CPT

## 2025-03-26 PROCEDURE — 92960 CARDIOVERSION ELECTRIC EXT: CPT

## 2025-03-26 PROCEDURE — 80202 ASSAY OF VANCOMYCIN: CPT

## 2025-03-26 PROCEDURE — 86704 HEP B CORE ANTIBODY TOTAL: CPT

## 2025-03-26 PROCEDURE — 87077 CULTURE AEROBIC IDENTIFY: CPT

## 2025-03-26 PROCEDURE — 83605 ASSAY OF LACTIC ACID: CPT

## 2025-03-26 PROCEDURE — 82803 BLOOD GASES ANY COMBINATION: CPT

## 2025-03-26 PROCEDURE — 80053 COMPREHEN METABOLIC PANEL: CPT

## 2025-03-26 PROCEDURE — 82435 ASSAY OF BLOOD CHLORIDE: CPT

## 2025-03-26 PROCEDURE — 93970 EXTREMITY STUDY: CPT

## 2025-03-26 PROCEDURE — C1887: CPT

## 2025-03-26 PROCEDURE — 86900 BLOOD TYPING SEROLOGIC ABO: CPT

## 2025-03-26 PROCEDURE — 76770 US EXAM ABDO BACK WALL COMP: CPT

## 2025-03-26 PROCEDURE — 86901 BLOOD TYPING SEROLOGIC RH(D): CPT

## 2025-03-26 PROCEDURE — 71045 X-RAY EXAM CHEST 1 VIEW: CPT

## 2025-03-26 PROCEDURE — 93325 DOPPLER ECHO COLOR FLOW MAPG: CPT

## 2025-03-26 PROCEDURE — 86850 RBC ANTIBODY SCREEN: CPT

## 2025-03-26 PROCEDURE — 84443 ASSAY THYROID STIM HORMONE: CPT

## 2025-03-26 PROCEDURE — 87340 HEPATITIS B SURFACE AG IA: CPT

## 2025-03-26 PROCEDURE — 87086 URINE CULTURE/COLONY COUNT: CPT

## 2025-03-26 PROCEDURE — 85014 HEMATOCRIT: CPT

## 2025-03-26 PROCEDURE — 86706 HEP B SURFACE ANTIBODY: CPT

## 2025-03-26 PROCEDURE — 76376 3D RENDER W/INTRP POSTPROCES: CPT

## 2025-03-26 PROCEDURE — 84540 ASSAY OF URINE/UREA-N: CPT

## 2025-03-26 PROCEDURE — 77001 FLUOROGUIDE FOR VEIN DEVICE: CPT

## 2025-03-31 ENCOUNTER — OUTPATIENT (OUTPATIENT)
Dept: OUTPATIENT SERVICES | Facility: HOSPITAL | Age: 75
LOS: 1 days | End: 2025-03-31
Payer: MEDICARE

## 2025-03-31 VITALS — OXYGEN SATURATION: 98 % | WEIGHT: 132.06 LBS | RESPIRATION RATE: 16 BRPM | TEMPERATURE: 98 F | HEIGHT: 62 IN

## 2025-03-31 DIAGNOSIS — I48.91 UNSPECIFIED ATRIAL FIBRILLATION: ICD-10-CM

## 2025-03-31 DIAGNOSIS — Z92.89 PERSONAL HISTORY OF OTHER MEDICAL TREATMENT: Chronic | ICD-10-CM

## 2025-03-31 DIAGNOSIS — N18.6 END STAGE RENAL DISEASE: ICD-10-CM

## 2025-03-31 DIAGNOSIS — Z01.818 ENCOUNTER FOR OTHER PREPROCEDURAL EXAMINATION: ICD-10-CM

## 2025-03-31 LAB
ALBUMIN SERPL ELPH-MCNC: 4.1 G/DL — SIGNIFICANT CHANGE UP (ref 3.3–5)
ALP SERPL-CCNC: 103 U/L — SIGNIFICANT CHANGE UP (ref 40–120)
ALT FLD-CCNC: 28 U/L — SIGNIFICANT CHANGE UP (ref 10–45)
ANION GAP SERPL CALC-SCNC: 21 MMOL/L — HIGH (ref 5–17)
AST SERPL-CCNC: 20 U/L — SIGNIFICANT CHANGE UP (ref 10–40)
BILIRUB SERPL-MCNC: 0.7 MG/DL — SIGNIFICANT CHANGE UP (ref 0.2–1.2)
BUN SERPL-MCNC: 62 MG/DL — HIGH (ref 7–23)
CALCIUM SERPL-MCNC: 9.1 MG/DL — SIGNIFICANT CHANGE UP (ref 8.4–10.5)
CHLORIDE SERPL-SCNC: 96 MMOL/L — SIGNIFICANT CHANGE UP (ref 96–108)
CO2 SERPL-SCNC: 22 MMOL/L — SIGNIFICANT CHANGE UP (ref 22–31)
CREAT SERPL-MCNC: 5.53 MG/DL — HIGH (ref 0.5–1.3)
EGFR: 8 ML/MIN/1.73M2 — LOW
EGFR: 8 ML/MIN/1.73M2 — LOW
GLUCOSE SERPL-MCNC: 94 MG/DL — SIGNIFICANT CHANGE UP (ref 70–99)
HCT VFR BLD CALC: 34 % — LOW (ref 34.5–45)
HGB BLD-MCNC: 10.2 G/DL — LOW (ref 11.5–15.5)
MCHC RBC-ENTMCNC: 30 G/DL — LOW (ref 32–36)
MCHC RBC-ENTMCNC: 32.5 PG — SIGNIFICANT CHANGE UP (ref 27–34)
MCV RBC AUTO: 108.3 FL — HIGH (ref 80–100)
NRBC BLD AUTO-RTO: 0 /100 WBCS — SIGNIFICANT CHANGE UP (ref 0–0)
PLATELET # BLD AUTO: 196 K/UL — SIGNIFICANT CHANGE UP (ref 150–400)
POTASSIUM SERPL-MCNC: 5.6 MMOL/L — HIGH (ref 3.5–5.3)
POTASSIUM SERPL-SCNC: 5.6 MMOL/L — HIGH (ref 3.5–5.3)
PROT SERPL-MCNC: 6.6 G/DL — SIGNIFICANT CHANGE UP (ref 6–8.3)
RBC # BLD: 3.14 M/UL — LOW (ref 3.8–5.2)
RBC # FLD: 18.2 % — HIGH (ref 10.3–14.5)
SODIUM SERPL-SCNC: 139 MMOL/L — SIGNIFICANT CHANGE UP (ref 135–145)
WBC # BLD: 8.82 K/UL — SIGNIFICANT CHANGE UP (ref 3.8–10.5)
WBC # FLD AUTO: 8.82 K/UL — SIGNIFICANT CHANGE UP (ref 3.8–10.5)

## 2025-03-31 PROCEDURE — G0463: CPT

## 2025-03-31 PROCEDURE — 85027 COMPLETE CBC AUTOMATED: CPT

## 2025-03-31 PROCEDURE — 80053 COMPREHEN METABOLIC PANEL: CPT

## 2025-03-31 NOTE — H&P PST ADULT - ASSESSMENT
DASI: able to go up one flight of stairs or walk 1 blocks with some difficulty  Loose teeth: denies

## 2025-03-31 NOTE — H&P PST ADULT - HISTORY OF PRESENT ILLNESS
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 She underwent RIJ TDC placement followed by 1st HD treatement on 2/19/25 currently on dialysis M/W/F. Today she presents to PST for scheduled Creation Left Radiocephalic AV Fistula Possible Ballistic Transposition Fistula on 4/16/25.

## 2025-03-31 NOTE — H&P PST ADULT - PROBLEM SELECTOR PLAN 1
Creation Left Radiocephalic AV Fistula Possible Ballistic Transposition Fistula on 4/16/25.   ECHO on file   Pre-op education provided - all questions answered. Pt verbalized understanding

## 2025-04-02 ENCOUNTER — NON-APPOINTMENT (OUTPATIENT)
Age: 75
End: 2025-04-02

## 2025-04-02 ENCOUNTER — APPOINTMENT (OUTPATIENT)
Dept: ELECTROPHYSIOLOGY | Facility: CLINIC | Age: 75
End: 2025-04-02
Payer: MEDICARE

## 2025-04-02 VITALS
HEIGHT: 62 IN | OXYGEN SATURATION: 98 % | SYSTOLIC BLOOD PRESSURE: 126 MMHG | BODY MASS INDEX: 23.92 KG/M2 | HEART RATE: 69 BPM | DIASTOLIC BLOOD PRESSURE: 84 MMHG | WEIGHT: 130 LBS

## 2025-04-02 DIAGNOSIS — I34.0 NONRHEUMATIC MITRAL (VALVE) INSUFFICIENCY: ICD-10-CM

## 2025-04-02 DIAGNOSIS — N18.9 CHRONIC KIDNEY DISEASE, UNSPECIFIED: ICD-10-CM

## 2025-04-02 PROCEDURE — 93000 ELECTROCARDIOGRAM COMPLETE: CPT

## 2025-04-02 PROCEDURE — 99215 OFFICE O/P EST HI 40 MIN: CPT

## 2025-04-02 RX ORDER — FERROUS SULFATE TAB EC 325 MG (65 MG FE EQUIVALENT) 325 (65 FE) MG
325 (65 FE) TABLET DELAYED RESPONSE ORAL DAILY
Refills: 0 | Status: ACTIVE | COMMUNITY

## 2025-04-04 RX ORDER — BUMETANIDE 2 MG/1
2 TABLET ORAL
Qty: 90 | Refills: 3 | Status: ACTIVE | COMMUNITY
Start: 1900-01-01 | End: 1900-01-01

## 2025-04-07 RX ORDER — ATORVASTATIN CALCIUM 80 MG/1
80 TABLET, FILM COATED ORAL DAILY
Qty: 90 | Refills: 3 | Status: ACTIVE | COMMUNITY
Start: 1900-01-01 | End: 1900-01-01

## 2025-04-08 ENCOUNTER — APPOINTMENT (OUTPATIENT)
Dept: VASCULAR SURGERY | Facility: CLINIC | Age: 75
End: 2025-04-08
Payer: MEDICARE

## 2025-04-08 PROCEDURE — 99214 OFFICE O/P EST MOD 30 MIN: CPT

## 2025-04-10 ENCOUNTER — APPOINTMENT (OUTPATIENT)
Dept: CARDIOLOGY | Facility: CLINIC | Age: 75
End: 2025-04-10
Payer: MEDICARE

## 2025-04-10 VITALS
DIASTOLIC BLOOD PRESSURE: 74 MMHG | WEIGHT: 128 LBS | BODY MASS INDEX: 23.41 KG/M2 | HEART RATE: 84 BPM | OXYGEN SATURATION: 98 % | SYSTOLIC BLOOD PRESSURE: 120 MMHG

## 2025-04-10 DIAGNOSIS — I42.9 CARDIOMYOPATHY, UNSPECIFIED: ICD-10-CM

## 2025-04-10 DIAGNOSIS — I05.0 RHEUMATIC MITRAL STENOSIS: ICD-10-CM

## 2025-04-10 DIAGNOSIS — E78.5 HYPERLIPIDEMIA, UNSPECIFIED: ICD-10-CM

## 2025-04-10 DIAGNOSIS — I48.0 PAROXYSMAL ATRIAL FIBRILLATION: ICD-10-CM

## 2025-04-10 PROCEDURE — 99215 OFFICE O/P EST HI 40 MIN: CPT

## 2025-04-16 ENCOUNTER — TRANSCRIPTION ENCOUNTER (OUTPATIENT)
Age: 75
End: 2025-04-16

## 2025-04-16 ENCOUNTER — APPOINTMENT (OUTPATIENT)
Dept: VASCULAR SURGERY | Facility: HOSPITAL | Age: 75
End: 2025-04-16
Payer: MEDICARE

## 2025-04-16 ENCOUNTER — OUTPATIENT (OUTPATIENT)
Dept: OUTPATIENT SERVICES | Facility: HOSPITAL | Age: 75
LOS: 1 days | End: 2025-04-16
Payer: MEDICARE

## 2025-04-16 VITALS
HEIGHT: 62 IN | HEART RATE: 81 BPM | OXYGEN SATURATION: 98 % | TEMPERATURE: 98 F | DIASTOLIC BLOOD PRESSURE: 83 MMHG | WEIGHT: 132.06 LBS | SYSTOLIC BLOOD PRESSURE: 138 MMHG | RESPIRATION RATE: 16 BRPM

## 2025-04-16 VITALS
DIASTOLIC BLOOD PRESSURE: 94 MMHG | OXYGEN SATURATION: 95 % | TEMPERATURE: 98 F | SYSTOLIC BLOOD PRESSURE: 119 MMHG | RESPIRATION RATE: 16 BRPM | HEART RATE: 109 BPM

## 2025-04-16 DIAGNOSIS — Z92.89 PERSONAL HISTORY OF OTHER MEDICAL TREATMENT: Chronic | ICD-10-CM

## 2025-04-16 LAB — POTASSIUM BLDV-SCNC: 4 MMOL/L — SIGNIFICANT CHANGE UP (ref 3.5–5.1)

## 2025-04-16 PROCEDURE — C1889: CPT

## 2025-04-16 PROCEDURE — 84132 ASSAY OF SERUM POTASSIUM: CPT

## 2025-04-16 PROCEDURE — 36821 AV FUSION DIRECT ANY SITE: CPT | Mod: LT

## 2025-04-16 PROCEDURE — 36820 AV FUSION/FOREARM VEIN: CPT | Mod: LT

## 2025-04-16 DEVICE — SURGIFOAM 8 X 12.5CM X 10MM (100): Type: IMPLANTABLE DEVICE | Site: LEFT | Status: FUNCTIONAL

## 2025-04-16 DEVICE — ARISTA 3GR: Type: IMPLANTABLE DEVICE | Site: LEFT | Status: FUNCTIONAL

## 2025-04-16 DEVICE — CLIP APPLIER COVIDIEN SURGICLIP III 9" SM: Type: IMPLANTABLE DEVICE | Site: LEFT | Status: FUNCTIONAL

## 2025-04-16 RX ORDER — ONDANSETRON HCL/PF 4 MG/2 ML
4 VIAL (ML) INJECTION ONCE
Refills: 0 | Status: DISCONTINUED | OUTPATIENT
Start: 2025-04-16 | End: 2025-04-16

## 2025-04-16 RX ORDER — LIDOCAINE HCL/PF 10 MG/ML
0.2 VIAL (ML) INJECTION ONCE
Refills: 0 | Status: DISCONTINUED | OUTPATIENT
Start: 2025-04-16 | End: 2025-04-16

## 2025-04-16 RX ORDER — FENTANYL CITRATE-0.9 % NACL/PF 100MCG/2ML
25 SYRINGE (ML) INTRAVENOUS
Refills: 0 | Status: DISCONTINUED | OUTPATIENT
Start: 2025-04-16 | End: 2025-04-16

## 2025-04-16 RX ORDER — OXYCODONE HYDROCHLORIDE 30 MG/1
1 TABLET ORAL
Qty: 5 | Refills: 0
Start: 2025-04-16

## 2025-04-16 RX ORDER — INFLUENZA A VIRUS A/IDAHO/07/2018 (H1N1) ANTIGEN (MDCK CELL DERIVED, PROPIOLACTONE INACTIVATED, INFLUENZA A VIRUS A/INDIANA/08/2018 (H3N2) ANTIGEN (MDCK CELL DERIVED, PROPIOLACTONE INACTIVATED), INFLUENZA B VIRUS B/SINGAPORE/INFTT-16-0610/2016 ANTIGEN (MDCK CELL DERIVED, PROPIOLACTONE INACTIVATED), INFLUENZA B VIRUS B/IOWA/06/2017 ANTIGEN (MDCK CELL DERIVED, PROPIOLACTONE INACTIVATED) 15; 15; 15; 15 UG/.5ML; UG/.5ML; UG/.5ML; UG/.5ML
0.5 INJECTION, SUSPENSION INTRAMUSCULAR ONCE
Refills: 0 | Status: DISCONTINUED | OUTPATIENT
Start: 2025-04-16 | End: 2025-04-30

## 2025-04-16 RX ORDER — FENTANYL CITRATE-0.9 % NACL/PF 100MCG/2ML
50 SYRINGE (ML) INTRAVENOUS
Refills: 0 | Status: DISCONTINUED | OUTPATIENT
Start: 2025-04-16 | End: 2025-04-16

## 2025-04-16 RX ORDER — CEFAZOLIN SODIUM IN 0.9 % NACL 3 G/100 ML
2000 INTRAVENOUS SOLUTION, PIGGYBACK (ML) INTRAVENOUS ONCE
Refills: 0 | Status: COMPLETED | OUTPATIENT
Start: 2025-04-16 | End: 2025-04-16

## 2025-04-16 NOTE — ASU DISCHARGE PLAN (ADULT/PEDIATRIC) - FINANCIAL ASSISTANCE
[facility] provides services at a reduced cost to those who are determined to be eligible through [facility]’s financial assistance program. Information regarding [facility]’s financial assistance program can be found by going to [email or web address] or by calling [phone].

## 2025-04-16 NOTE — PRE-ANESTHESIA EVALUATION ADULT - LAST ECHOCARDIOGRAM
02/28/25 : EF 45-50, severe biatrial dilatation, PFO by color flow doppler, mod-sevee MR, moderate TR; 02/13/25 TTE: estimated PASP 46 mmHg, mild-mod pHTN

## 2025-04-16 NOTE — ASU DISCHARGE PLAN (ADULT/PEDIATRIC) - CARE PROVIDER_API CALL
Mook White  Vascular Surgery  [address] [facility] [address] [facility], [address]  [place] [address]  Phone: [phone]  Fax: [phone]  Follow Up Time: 2 weeks

## 2025-04-16 NOTE — PRE-ANESTHESIA EVALUATION ADULT - WEIGHT IN LBS
Sw attempted to reach the patient's daughter to address consult of NH placement.  SW could not reach the patient's daughter by phone.  The patient's daughter voicemail was full and no message could be left.  It should be noted that the patient was marked ready for discharge, so no information could be provided to the patient's family regarding nursing home placement, and the patient declined discussion on nursing home placement.      [employer] delivered requested DME (rolling walker).   132

## 2025-04-16 NOTE — ASU DISCHARGE PLAN (ADULT/PEDIATRIC) - ASU DC SPECIAL INSTRUCTIONSFT
WOUND CARE: You may remove the operative dressing 48 hours after surgery.  BATHING: Please do not submerge wound underwater. You may shower and/or sponge bathe.  ACTIVITY: No heavy lifting or straining. Otherwise, you may return to your usual level of physical activity. If you are taking narcotic pain medication (such as oxycodone), do NOT drive a car, operate machinery or make important decisions.  DIET: Return to your usual diet.  NOTIFY YOUR SURGEON IF: You have any bleeding that does not stop, any pus draining from your wound, any fever (over 100.4 F) or chills, persistent nausea/vomiting, persistent diarrhea, or if your pain is not controlled on your discharge pain medications.  FOLLOW-UP: Please call to make a follow-up appointment with Dr. White within two weeks of discharge.  PAIN CONTROL:  1. You may take Tylenol for pain. Use as directed. The maximum dose of Tylenol for 24 hours is 4000 mg. Please do not exceed that amount.  2. A prescription for oxycodone was sent to your pharmacy, please use as directed for severe pain.  MEDICATIONS: You may resume your Eliquis tonight.

## 2025-05-01 ENCOUNTER — APPOINTMENT (OUTPATIENT)
Dept: VASCULAR SURGERY | Facility: CLINIC | Age: 75
End: 2025-05-01
Payer: MEDICARE

## 2025-05-01 DIAGNOSIS — Z99.2 END STAGE RENAL DISEASE: ICD-10-CM

## 2025-05-01 DIAGNOSIS — Z99.2 DEPENDENCE ON RENAL DIALYSIS: ICD-10-CM

## 2025-05-01 DIAGNOSIS — T82.898A OTHER SPECIFIED COMPLICATION OF VASCULAR PROSTHETIC DEVICES, IMPLANTS AND GRAFTS, INITIAL ENCOUNTER: ICD-10-CM

## 2025-05-01 DIAGNOSIS — Z98.890 OTHER SPECIFIED POSTPROCEDURAL STATES: ICD-10-CM

## 2025-05-01 DIAGNOSIS — N18.6 END STAGE RENAL DISEASE: ICD-10-CM

## 2025-05-01 PROCEDURE — 93990 DOPPLER FLOW TESTING: CPT

## 2025-05-01 PROCEDURE — 99024 POSTOP FOLLOW-UP VISIT: CPT

## 2025-05-08 ENCOUNTER — APPOINTMENT (OUTPATIENT)
Dept: NEPHROLOGY | Facility: CLINIC | Age: 75
End: 2025-05-08

## 2025-05-08 ENCOUNTER — APPOINTMENT (OUTPATIENT)
Dept: CARDIOTHORACIC SURGERY | Facility: CLINIC | Age: 75
End: 2025-05-08

## 2025-05-08 VITALS
HEART RATE: 99 BPM | OXYGEN SATURATION: 97 % | HEIGHT: 62 IN | RESPIRATION RATE: 16 BRPM | WEIGHT: 126 LBS | SYSTOLIC BLOOD PRESSURE: 119 MMHG | BODY MASS INDEX: 23.19 KG/M2 | DIASTOLIC BLOOD PRESSURE: 63 MMHG

## 2025-05-08 DIAGNOSIS — E78.5 HYPERLIPIDEMIA, UNSPECIFIED: ICD-10-CM

## 2025-05-08 DIAGNOSIS — I34.0 NONRHEUMATIC MITRAL (VALVE) INSUFFICIENCY: ICD-10-CM

## 2025-05-08 DIAGNOSIS — I48.91 UNSPECIFIED ATRIAL FIBRILLATION: ICD-10-CM

## 2025-05-08 DIAGNOSIS — R06.00 DYSPNEA, UNSPECIFIED: ICD-10-CM

## 2025-05-08 DIAGNOSIS — R01.1 CARDIAC MURMUR, UNSPECIFIED: ICD-10-CM

## 2025-05-08 DIAGNOSIS — Z79.01 LONG TERM (CURRENT) USE OF ANTICOAGULANTS: ICD-10-CM

## 2025-05-08 DIAGNOSIS — I05.0 RHEUMATIC MITRAL STENOSIS: ICD-10-CM

## 2025-05-08 DIAGNOSIS — I25.10 ATHEROSCLEROTIC HEART DISEASE OF NATIVE CORONARY ARTERY W/OUT ANGINA PECTORIS: ICD-10-CM

## 2025-05-08 DIAGNOSIS — I42.9 CARDIOMYOPATHY, UNSPECIFIED: ICD-10-CM

## 2025-05-08 DIAGNOSIS — I38 ENDOCARDITIS, VALVE UNSPECIFIED: ICD-10-CM

## 2025-05-08 DIAGNOSIS — R93.1 ABNORMAL FINDINGS ON DIAGNOSTIC IMAGING OF HEART AND CORONARY CIRCULATION: ICD-10-CM

## 2025-05-08 PROCEDURE — G2211 COMPLEX E/M VISIT ADD ON: CPT

## 2025-05-08 PROCEDURE — 99215 OFFICE O/P EST HI 40 MIN: CPT

## 2025-05-14 PROBLEM — R93.1 ABNORMAL ECHOCARDIOGRAM: Status: ACTIVE | Noted: 2025-05-14

## 2025-05-14 PROBLEM — I48.91 ATRIAL FIBRILLATION, UNSPECIFIED TYPE: Status: ACTIVE | Noted: 2025-05-14

## 2025-05-14 PROBLEM — Z79.01 ANTICOAGULANT LONG-TERM USE: Status: ACTIVE | Noted: 2025-05-14

## 2025-05-14 PROBLEM — R01.1 HEART MURMUR: Status: ACTIVE | Noted: 2025-05-14

## 2025-05-14 PROBLEM — I38 VALVULAR HEART DISEASE: Status: ACTIVE | Noted: 2025-05-14

## 2025-05-14 PROBLEM — I25.10 CAD (CORONARY ARTERY DISEASE): Status: ACTIVE | Noted: 2025-05-14

## 2025-05-14 PROBLEM — R06.00 DYSPNEA: Status: ACTIVE | Noted: 2025-05-14

## 2025-05-15 ENCOUNTER — APPOINTMENT (OUTPATIENT)
Dept: ENDOVASCULAR SURGERY | Facility: CLINIC | Age: 75
End: 2025-05-15
Payer: MEDICARE

## 2025-05-15 ENCOUNTER — RESULT REVIEW (OUTPATIENT)
Age: 75
End: 2025-05-15

## 2025-05-15 VITALS
OXYGEN SATURATION: 100 % | SYSTOLIC BLOOD PRESSURE: 125 MMHG | DIASTOLIC BLOOD PRESSURE: 79 MMHG | HEIGHT: 62 IN | TEMPERATURE: 97.3 F | WEIGHT: 130 LBS | BODY MASS INDEX: 23.92 KG/M2 | HEART RATE: 80 BPM | RESPIRATION RATE: 18 BRPM

## 2025-05-15 DIAGNOSIS — N18.6 END STAGE RENAL DISEASE: ICD-10-CM

## 2025-05-15 DIAGNOSIS — Z99.2 END STAGE RENAL DISEASE: ICD-10-CM

## 2025-05-15 PROCEDURE — 36902Z: CUSTOM | Mod: 58

## 2025-05-15 PROCEDURE — 37607 LIG/BANDING ANGIOACS AV FSTL: CPT | Mod: 58,59

## 2025-05-15 PROCEDURE — 36215Z: CUSTOM | Mod: 58,59

## 2025-05-27 ENCOUNTER — RX RENEWAL (OUTPATIENT)
Age: 75
End: 2025-05-27

## 2025-06-24 ENCOUNTER — APPOINTMENT (OUTPATIENT)
Dept: ENDOVASCULAR SURGERY | Facility: CLINIC | Age: 75
End: 2025-06-24
Payer: MEDICARE

## 2025-06-24 ENCOUNTER — RESULT REVIEW (OUTPATIENT)
Age: 75
End: 2025-06-24

## 2025-06-24 PROCEDURE — 36902Z: CUSTOM | Mod: 58

## 2025-06-24 PROCEDURE — 76937 US GUIDE VASCULAR ACCESS: CPT

## 2025-06-24 RX ORDER — METOPROLOL TARTRATE 75 MG/1
TABLET, FILM COATED ORAL
Refills: 0 | Status: ACTIVE | COMMUNITY

## 2025-07-01 ENCOUNTER — RX CHANGE (OUTPATIENT)
Age: 75
End: 2025-07-01

## 2025-07-08 ENCOUNTER — INPATIENT (INPATIENT)
Facility: HOSPITAL | Age: 75
LOS: 1 days | Discharge: HOME | DRG: 684 | End: 2025-07-10
Attending: GENERAL PRACTICE | Admitting: GENERAL PRACTICE
Payer: MEDICARE

## 2025-07-08 VITALS
RESPIRATION RATE: 20 BRPM | TEMPERATURE: 98 F | DIASTOLIC BLOOD PRESSURE: 73 MMHG | HEIGHT: 64 IN | WEIGHT: 130.07 LBS | OXYGEN SATURATION: 97 % | SYSTOLIC BLOOD PRESSURE: 107 MMHG | HEART RATE: 82 BPM

## 2025-07-08 DIAGNOSIS — I48.20 CHRONIC ATRIAL FIBRILLATION, UNSPECIFIED: ICD-10-CM

## 2025-07-08 DIAGNOSIS — E78.5 HYPERLIPIDEMIA, UNSPECIFIED: ICD-10-CM

## 2025-07-08 DIAGNOSIS — N18.6 END STAGE RENAL DISEASE: ICD-10-CM

## 2025-07-08 DIAGNOSIS — Z92.89 PERSONAL HISTORY OF OTHER MEDICAL TREATMENT: Chronic | ICD-10-CM

## 2025-07-08 DIAGNOSIS — R19.7 DIARRHEA, UNSPECIFIED: ICD-10-CM

## 2025-07-08 DIAGNOSIS — I10 ESSENTIAL (PRIMARY) HYPERTENSION: ICD-10-CM

## 2025-07-08 DIAGNOSIS — I50.22 CHRONIC SYSTOLIC (CONGESTIVE) HEART FAILURE: ICD-10-CM

## 2025-07-08 DIAGNOSIS — D64.9 ANEMIA, UNSPECIFIED: ICD-10-CM

## 2025-07-08 LAB
ACANTHOCYTES BLD QL SMEAR: SLIGHT — SIGNIFICANT CHANGE UP
ALBUMIN SERPL ELPH-MCNC: 2.2 G/DL — LOW (ref 3.3–5)
ALP SERPL-CCNC: 37 U/L — LOW (ref 40–120)
ALT FLD-CCNC: 5 U/L — LOW (ref 10–45)
ANION GAP SERPL CALC-SCNC: 17 MMOL/L — SIGNIFICANT CHANGE UP (ref 5–17)
ANISOCYTOSIS BLD QL: SLIGHT — SIGNIFICANT CHANGE UP
APTT BLD: 25.9 SEC — LOW (ref 26.1–36.8)
AST SERPL-CCNC: 5 U/L — LOW (ref 10–40)
BASOPHILS # BLD AUTO: 0.03 K/UL — SIGNIFICANT CHANGE UP (ref 0–0.2)
BASOPHILS # BLD MANUAL: 0.06 K/UL — SIGNIFICANT CHANGE UP (ref 0–0.2)
BASOPHILS NFR BLD AUTO: 0.4 % — SIGNIFICANT CHANGE UP (ref 0–2)
BASOPHILS NFR BLD MANUAL: 0.8 % — SIGNIFICANT CHANGE UP (ref 0–2)
BILIRUB SERPL-MCNC: 0.3 MG/DL — SIGNIFICANT CHANGE UP (ref 0.2–1.2)
BUN SERPL-MCNC: 51 MG/DL — HIGH (ref 7–23)
CALCIUM SERPL-MCNC: 5.2 MG/DL — CRITICAL LOW (ref 8.4–10.5)
CHLORIDE SERPL-SCNC: 110 MMOL/L — HIGH (ref 96–108)
CO2 SERPL-SCNC: 12 MMOL/L — LOW (ref 22–31)
CREAT SERPL-MCNC: 4.5 MG/DL — HIGH (ref 0.5–1.3)
DACRYOCYTES BLD QL SMEAR: SLIGHT — SIGNIFICANT CHANGE UP
EGFR: 10 ML/MIN/1.73M2 — LOW
EGFR: 10 ML/MIN/1.73M2 — LOW
EOSINOPHIL # BLD AUTO: 0.03 K/UL — SIGNIFICANT CHANGE UP (ref 0–0.5)
EOSINOPHIL # BLD MANUAL: 0 K/UL — SIGNIFICANT CHANGE UP (ref 0–0.5)
EOSINOPHIL NFR BLD AUTO: 0.4 % — SIGNIFICANT CHANGE UP (ref 0–6)
EOSINOPHIL NFR BLD MANUAL: 0 % — SIGNIFICANT CHANGE UP (ref 0–6)
GIANT PLATELETS BLD QL SMEAR: PRESENT
GLUCOSE SERPL-MCNC: 57 MG/DL — LOW (ref 70–99)
HCT VFR BLD CALC: 18.1 % — CRITICAL LOW (ref 34.5–45)
HCT VFR BLD CALC: 21.5 % — LOW (ref 34.5–45)
HGB BLD-MCNC: 5.6 G/DL — CRITICAL LOW (ref 11.5–15.5)
HGB BLD-MCNC: 6.9 G/DL — CRITICAL LOW (ref 11.5–15.5)
HYPOCHROMIA BLD QL: SLIGHT — SIGNIFICANT CHANGE UP
IMM GRANULOCYTES # BLD AUTO: 0.07 K/UL — SIGNIFICANT CHANGE UP (ref 0–0.07)
IMM GRANULOCYTES NFR BLD AUTO: 1 % — HIGH (ref 0–0.9)
INR BLD: 1.39 RATIO — HIGH (ref 0.85–1.16)
LYMPHOCYTES # BLD AUTO: 1.19 K/UL — SIGNIFICANT CHANGE UP (ref 1–3.3)
LYMPHOCYTES # BLD MANUAL: 1.4 K/UL — SIGNIFICANT CHANGE UP (ref 1–3.3)
LYMPHOCYTES NFR BLD AUTO: 17 % — SIGNIFICANT CHANGE UP (ref 13–44)
LYMPHOCYTES NFR BLD MANUAL: 20 % — SIGNIFICANT CHANGE UP (ref 13–44)
MACROCYTES BLD QL: SLIGHT — SIGNIFICANT CHANGE UP
MAGNESIUM SERPL-MCNC: 1.3 MG/DL — LOW (ref 1.6–2.6)
MANUAL METAMYELOCYTE #: 0.12 K/UL — HIGH (ref 0–0)
MANUAL MYELOCYTE #: 0.12 K/UL — HIGH (ref 0–0)
MCHC RBC-ENTMCNC: 30.9 G/DL — LOW (ref 32–36)
MCHC RBC-ENTMCNC: 31.1 PG — SIGNIFICANT CHANGE UP (ref 27–34)
MCHC RBC-ENTMCNC: 32.1 G/DL — SIGNIFICANT CHANGE UP (ref 32–36)
MCHC RBC-ENTMCNC: 33.5 PG — SIGNIFICANT CHANGE UP (ref 27–34)
MCV RBC AUTO: 108.4 FL — HIGH (ref 80–100)
MCV RBC AUTO: 96.8 FL — SIGNIFICANT CHANGE UP (ref 80–100)
METAMYELOCYTES # FLD: 1.7 % — HIGH (ref 0–0)
METAMYELOCYTES NFR BLD: 1.7 % — HIGH (ref 0–0)
MONOCYTES # BLD AUTO: 0.5 K/UL — SIGNIFICANT CHANGE UP (ref 0–0.9)
MONOCYTES # BLD MANUAL: 0.35 K/UL — SIGNIFICANT CHANGE UP (ref 0–0.9)
MONOCYTES NFR BLD AUTO: 7.1 % — SIGNIFICANT CHANGE UP (ref 2–14)
MONOCYTES NFR BLD MANUAL: 5 % — SIGNIFICANT CHANGE UP (ref 2–14)
MYELOCYTES NFR BLD: 1.7 % — HIGH (ref 0–0)
NEUTROPHILS # BLD AUTO: 5.2 K/UL — SIGNIFICANT CHANGE UP (ref 1.8–7.4)
NEUTROPHILS # BLD MANUAL: 4.97 K/UL — SIGNIFICANT CHANGE UP (ref 1.8–7.4)
NEUTROPHILS NFR BLD AUTO: 74.1 % — SIGNIFICANT CHANGE UP (ref 43–77)
NEUTROPHILS NFR BLD MANUAL: 70.8 % — SIGNIFICANT CHANGE UP (ref 43–77)
NRBC # BLD AUTO: 0 K/UL — SIGNIFICANT CHANGE UP (ref 0–0)
NRBC # BLD AUTO: 0.02 K/UL — HIGH (ref 0–0)
NRBC # FLD: 0 K/UL — SIGNIFICANT CHANGE UP (ref 0–0)
NRBC # FLD: 0.02 K/UL — HIGH (ref 0–0)
NRBC BLD AUTO-RTO: 0 /100 WBCS — SIGNIFICANT CHANGE UP (ref 0–0)
NRBC BLD AUTO-RTO: 0 /100 WBCS — SIGNIFICANT CHANGE UP (ref 0–0)
OVALOCYTES BLD QL SMEAR: SLIGHT — SIGNIFICANT CHANGE UP
PHOSPHATE SERPL-MCNC: 3 MG/DL — SIGNIFICANT CHANGE UP (ref 2.5–4.5)
PLAT MORPH BLD: ABNORMAL
PLATELET # BLD AUTO: 185 K/UL — SIGNIFICANT CHANGE UP (ref 150–400)
PLATELET # BLD AUTO: 207 K/UL — SIGNIFICANT CHANGE UP (ref 150–400)
PMV BLD: 10 FL — SIGNIFICANT CHANGE UP (ref 7–13)
PMV BLD: 10.2 FL — SIGNIFICANT CHANGE UP (ref 7–13)
POIKILOCYTOSIS BLD QL AUTO: SLIGHT — SIGNIFICANT CHANGE UP
POTASSIUM SERPL-MCNC: 3.6 MMOL/L — SIGNIFICANT CHANGE UP (ref 3.5–5.3)
POTASSIUM SERPL-SCNC: 3.6 MMOL/L — SIGNIFICANT CHANGE UP (ref 3.5–5.3)
PROT SERPL-MCNC: 3.3 G/DL — LOW (ref 6–8.3)
PROTHROM AB SERPL-ACNC: 15.8 SEC — HIGH (ref 9.9–13.4)
RBC # BLD: 1.67 M/UL — LOW (ref 3.8–5.2)
RBC # BLD: 2.22 M/UL — LOW (ref 3.8–5.2)
RBC # FLD: 17 % — HIGH (ref 10.3–14.5)
RBC # FLD: 21.5 % — HIGH (ref 10.3–14.5)
RBC BLD AUTO: ABNORMAL
SODIUM SERPL-SCNC: 139 MMOL/L — SIGNIFICANT CHANGE UP (ref 135–145)
WBC # BLD: 5 K/UL — SIGNIFICANT CHANGE UP (ref 3.8–10.5)
WBC # BLD: 7.02 K/UL — SIGNIFICANT CHANGE UP (ref 3.8–10.5)
WBC # FLD AUTO: 5 K/UL — SIGNIFICANT CHANGE UP (ref 3.8–10.5)
WBC # FLD AUTO: 7.02 K/UL — SIGNIFICANT CHANGE UP (ref 3.8–10.5)

## 2025-07-08 PROCEDURE — 71045 X-RAY EXAM CHEST 1 VIEW: CPT

## 2025-07-08 PROCEDURE — 83735 ASSAY OF MAGNESIUM: CPT

## 2025-07-08 PROCEDURE — 99285 EMERGENCY DEPT VISIT HI MDM: CPT

## 2025-07-08 PROCEDURE — 86923 COMPATIBILITY TEST ELECTRIC: CPT

## 2025-07-08 PROCEDURE — 85730 THROMBOPLASTIN TIME PARTIAL: CPT

## 2025-07-08 PROCEDURE — 85610 PROTHROMBIN TIME: CPT

## 2025-07-08 PROCEDURE — 93005 ELECTROCARDIOGRAM TRACING: CPT

## 2025-07-08 PROCEDURE — 86850 RBC ANTIBODY SCREEN: CPT

## 2025-07-08 PROCEDURE — 93010 ELECTROCARDIOGRAM REPORT: CPT

## 2025-07-08 PROCEDURE — 36415 COLL VENOUS BLD VENIPUNCTURE: CPT

## 2025-07-08 PROCEDURE — 80053 COMPREHEN METABOLIC PANEL: CPT

## 2025-07-08 PROCEDURE — 85025 COMPLETE CBC W/AUTO DIFF WBC: CPT

## 2025-07-08 PROCEDURE — 84100 ASSAY OF PHOSPHORUS: CPT

## 2025-07-08 PROCEDURE — 71045 X-RAY EXAM CHEST 1 VIEW: CPT | Mod: 26

## 2025-07-08 PROCEDURE — 86900 BLOOD TYPING SEROLOGIC ABO: CPT

## 2025-07-08 PROCEDURE — 86901 BLOOD TYPING SEROLOGIC RH(D): CPT

## 2025-07-08 RX ORDER — METOPROLOL SUCCINATE 50 MG/1
200 TABLET, EXTENDED RELEASE ORAL DAILY
Refills: 0 | Status: DISCONTINUED | OUTPATIENT
Start: 2025-07-08 | End: 2025-07-10

## 2025-07-08 RX ORDER — APIXABAN 5 MG/1
2.5 TABLET, FILM COATED ORAL EVERY 12 HOURS
Refills: 0 | Status: DISCONTINUED | OUTPATIENT
Start: 2025-07-08 | End: 2025-07-10

## 2025-07-08 RX ORDER — FERROUS SULFATE 137(45) MG
325 TABLET, EXTENDED RELEASE ORAL DAILY
Refills: 0 | Status: DISCONTINUED | OUTPATIENT
Start: 2025-07-08 | End: 2025-07-10

## 2025-07-08 RX ORDER — OXYCODONE HYDROCHLORIDE 30 MG/1
5 TABLET ORAL EVERY 6 HOURS
Refills: 0 | Status: DISCONTINUED | OUTPATIENT
Start: 2025-07-08 | End: 2025-07-10

## 2025-07-08 RX ORDER — ATORVASTATIN CALCIUM 80 MG/1
80 TABLET, FILM COATED ORAL AT BEDTIME
Refills: 0 | Status: DISCONTINUED | OUTPATIENT
Start: 2025-07-08 | End: 2025-07-10

## 2025-07-08 RX ORDER — ACETAMINOPHEN 500 MG/5ML
650 LIQUID (ML) ORAL EVERY 6 HOURS
Refills: 0 | Status: DISCONTINUED | OUTPATIENT
Start: 2025-07-08 | End: 2025-07-10

## 2025-07-08 RX ORDER — AMIODARONE HYDROCHLORIDE 50 MG/ML
200 INJECTION, SOLUTION INTRAVENOUS DAILY
Refills: 0 | Status: DISCONTINUED | OUTPATIENT
Start: 2025-07-08 | End: 2025-07-10

## 2025-07-08 RX ORDER — MELATONIN 5 MG
3 TABLET ORAL AT BEDTIME
Refills: 0 | Status: DISCONTINUED | OUTPATIENT
Start: 2025-07-08 | End: 2025-07-10

## 2025-07-08 RX ORDER — MAGNESIUM, ALUMINUM HYDROXIDE 200-200 MG
30 TABLET,CHEWABLE ORAL EVERY 4 HOURS
Refills: 0 | Status: DISCONTINUED | OUTPATIENT
Start: 2025-07-08 | End: 2025-07-10

## 2025-07-08 RX ORDER — ONDANSETRON HCL/PF 4 MG/2 ML
4 VIAL (ML) INJECTION EVERY 8 HOURS
Refills: 0 | Status: DISCONTINUED | OUTPATIENT
Start: 2025-07-08 | End: 2025-07-10

## 2025-07-08 RX ORDER — SEVELAMER HYDROCHLORIDE 800 MG/1
800 TABLET ORAL
Refills: 0 | Status: DISCONTINUED | OUTPATIENT
Start: 2025-07-08 | End: 2025-07-10

## 2025-07-08 RX ORDER — CALCIUM CARBONATE 750 MG/1
1 TABLET ORAL
Refills: 0 | Status: DISCONTINUED | OUTPATIENT
Start: 2025-07-08 | End: 2025-07-10

## 2025-07-08 RX ORDER — DIPHENHYDRAMINE HCL 12.5MG/5ML
25 ELIXIR ORAL ONCE
Refills: 0 | Status: COMPLETED | OUTPATIENT
Start: 2025-07-08 | End: 2025-07-08

## 2025-07-08 RX ADMIN — Medication 650 MILLIGRAM(S): at 17:36

## 2025-07-08 RX ADMIN — Medication 650 MILLIGRAM(S): at 18:30

## 2025-07-08 RX ADMIN — ATORVASTATIN CALCIUM 80 MILLIGRAM(S): 80 TABLET, FILM COATED ORAL at 22:33

## 2025-07-08 RX ADMIN — SEVELAMER HYDROCHLORIDE 800 MILLIGRAM(S): 800 TABLET ORAL at 17:37

## 2025-07-08 RX ADMIN — Medication 25 MILLIGRAM(S): at 14:35

## 2025-07-08 RX ADMIN — APIXABAN 2.5 MILLIGRAM(S): 5 TABLET, FILM COATED ORAL at 17:39

## 2025-07-08 NOTE — CONSULT NOTE ADULT - SUBJECTIVE AND OBJECTIVE BOX
[facility] DIVISION OF KIDNEY DISEASES AND HYPERTENSION -- [phone]  -- INITIAL CONSULT NOTE  --------------------------------------------------------------------------------  HPI:        PAST HISTORY  --------------------------------------------------------------------------------  PAST MEDICAL & SURGICAL HISTORY:  Renal Calculus      Ureteral Calculus      Acute Renal Failure  9/2009      Wrist Fracture  CYNTHIA      Collar Bone Fracture      Rib Fractures      Kidney Stone removal  3/15/2011      Atrial fibrillation with RVR      C Section      Percutaneous Stone Extraction  Right      S/P Left Percutaneuos Stone extraction  01/26/2010, 04/20/2010      History of cardioversion        FAMILY HISTORY:    PAST SOCIAL HISTORY:    ALLERGIES & MEDICATIONS  --------------------------------------------------------------------------------  Allergies    No Known Allergies    Intolerances      Standing Inpatient Medications    PRN Inpatient Medications      REVIEW OF SYSTEMS  --------------------------------------------------------------------------------  Gen: No fevers/chills  Skin: No rashes  Head/Eyes/Ears: No HA  Respiratory: No SOB,  CV: No CP  GI: No abdominal pain, diarrhea, N/V  : No dysuria, hematuria  MSK: No edema  Heme: No easy bruising or bleeding  Psych: No significant depression    All other systems were reviewed and are negative, except as noted.    VITALS/PHYSICAL EXAM  --------------------------------------------------------------------------------  T(C): 36.4 (07-08-25 @ 10:50), Max: 36.4 (07-08-25 @ 09:54)  HR: 75 (07-08-25 @ 10:50) (75 - 82)  BP: 116/65 (07-08-25 @ 10:50) (107/73 - 116/65)  RR: 18 (07-08-25 @ 10:50) (18 - 20)  SpO2: 98% (07-08-25 @ 10:50) (97% - 98%)  Wt(kg): --  Height (cm): 162.6 (07-08-25 @ 09:54)  Weight (kg): 59 (07-08-25 @ 09:54)  BMI (kg/m2): 22.3 (07-08-25 @ 09:54)  BSA (m2): 1.63 (07-08-25 @ 09:54)        Physical Exam:  Gen: NAD  Pulm: CTA B/L  CV: RRR, S1S2;  Abd: +BS, soft  : No suprapubic tenderness  Extremities: no bilateral LE edema noted.   Neuro: Awake, alert.  Skin: Warm  Vascular access:    LABS/STUDIES  --------------------------------------------------------------------------------              5.6    7.02  >-----------<  207      [07-08-25 @ 11:14]              18.1     139  |  110  |  51  ----------------------------<  57      [07-08-25 @ 11:14]  3.6   |  12  |  4.50        Ca     5.2     [07-08-25 @ 11:14]      Mg     1.3     [07-08-25 @ 11:14]      Phos  3.0     [07-08-25 @ 11:14]    TPro  3.3  /  Alb  2.2  /  TBili  0.3  /  DBili  x   /  AST  5   /  ALT  5   /  AlkPhos  37  [07-08-25 @ 11:14]    PT/INR: PT 15.8 , INR 1.39       [07-08-25 @ 11:14]  PTT: 25.9       [07-08-25 @ 11:14]      Creatinine Trend:  SCr 4.50 [07-08 @ 11:14]    Urinalysis - [07-08-25 @ 11:14]      Color  / Appearance  / SG  / pH       Gluc 57 / Ketone   / Bili  / Urobili        Blood  / Protein  / Leuk Est  / Nitrite       RBC  / WBC  / Hyaline  / Gran  / Sq Epi  / Non Sq Epi  / Bacteria       Iron 44, TIBC --, %sat --      [02-19-25 @ 04:22]  Ferritin 101      [11-22-24 @ 21:13]  PTH -- (Ca 8.1)      [11-22-24 @ 21:13]   595  TSH 2.67      [03-01-25 @ 06:47]    HBsAb Nonreact      [02-20-25 @ 05:47]  HBsAg Nonreact      [02-20-25 @ 05:47]  HBcAb Nonreact      [02-20-25 @ 05:47]  HCV 0.09, Nonreact      [02-20-25 @ 05:47]  HIV Nonreact      [04-09-24 @ 20:21]    Free Light Chains: kappa 5.19, lambda 2.81, ratio = 1.85      [04-10 @ 07:11]  Immunofixation Serum:   Weak  IgG Kappa Band Identified      Reference Range: None Detected      [04-10-24 @ 07:11]     [facility] DIVISION OF KIDNEY DISEASES AND HYPERTENSION -- [phone]  -- INITIAL CONSULT NOTE  --------------------------------------------------------------------------------  HPI:      74-year-old female with PMHx of HTN, ESRD, Afib on Eliquis, anemia, nephrolithiasis  Initiated dialysis on 2/19/25    Currently dialyzes at       PAST HISTORY  --------------------------------------------------------------------------------  PAST MEDICAL & SURGICAL HISTORY:  Renal Calculus      Ureteral Calculus      Acute Renal Failure  9/2009      Wrist Fracture  CYNTHIA      Collar Bone Fracture      Rib Fractures      Kidney Stone removal  3/15/2011      Atrial fibrillation with RVR      C Section      Percutaneous Stone Extraction  Right      S/P Left Percutaneuos Stone extraction  01/26/2010, 04/20/2010      History of cardioversion        FAMILY HISTORY:    PAST SOCIAL HISTORY:    ALLERGIES & MEDICATIONS  --------------------------------------------------------------------------------  Allergies    No Known Allergies    Intolerances      Standing Inpatient Medications    PRN Inpatient Medications      REVIEW OF SYSTEMS  --------------------------------------------------------------------------------  Gen: No fevers/chills  Skin: No rashes  Head/Eyes/Ears: No HA  Respiratory: No SOB,  CV: No CP  GI: No abdominal pain, diarrhea, N/V  : No dysuria, hematuria  MSK: No edema  Heme: No easy bruising or bleeding  Psych: No significant depression    All other systems were reviewed and are negative, except as noted.    VITALS/PHYSICAL EXAM  --------------------------------------------------------------------------------  T(C): 36.4 (07-08-25 @ 10:50), Max: 36.4 (07-08-25 @ 09:54)  HR: 75 (07-08-25 @ 10:50) (75 - 82)  BP: 116/65 (07-08-25 @ 10:50) (107/73 - 116/65)  RR: 18 (07-08-25 @ 10:50) (18 - 20)  SpO2: 98% (07-08-25 @ 10:50) (97% - 98%)  Wt(kg): --  Height (cm): 162.6 (07-08-25 @ 09:54)  Weight (kg): 59 (07-08-25 @ 09:54)  BMI (kg/m2): 22.3 (07-08-25 @ 09:54)  BSA (m2): 1.63 (07-08-25 @ 09:54)        Physical Exam:  Gen: NAD  Pulm: CTA B/L  CV: RRR, S1S2;  Abd: +BS, soft  : No suprapubic tenderness  Extremities: no bilateral LE edema noted.   Neuro: Awake, alert.  Skin: Warm  Vascular access:    LABS/STUDIES  --------------------------------------------------------------------------------              5.6    7.02  >-----------<  207      [07-08-25 @ 11:14]              18.1     139  |  110  |  51  ----------------------------<  57      [07-08-25 @ 11:14]  3.6   |  12  |  4.50        Ca     5.2     [07-08-25 @ 11:14]      Mg     1.3     [07-08-25 @ 11:14]      Phos  3.0     [07-08-25 @ 11:14]    TPro  3.3  /  Alb  2.2  /  TBili  0.3  /  DBili  x   /  AST  5   /  ALT  5   /  AlkPhos  37  [07-08-25 @ 11:14]    PT/INR: PT 15.8 , INR 1.39       [07-08-25 @ 11:14]  PTT: 25.9       [07-08-25 @ 11:14]      Creatinine Trend:  SCr 4.50 [07-08 @ 11:14]    Urinalysis - [07-08-25 @ 11:14]      Color  / Appearance  / SG  / pH       Gluc 57 / Ketone   / Bili  / Urobili        Blood  / Protein  / Leuk Est  / Nitrite       RBC  / WBC  / Hyaline  / Gran  / Sq Epi  / Non Sq Epi  / Bacteria       Iron 44, TIBC --, %sat --      [02-19-25 @ 04:22]  Ferritin 101      [11-22-24 @ 21:13]  PTH -- (Ca 8.1)      [11-22-24 @ 21:13]   595  TSH 2.67      [03-01-25 @ 06:47]    HBsAb Nonreact      [02-20-25 @ 05:47]  HBsAg Nonreact      [02-20-25 @ 05:47]  HBcAb Nonreact      [02-20-25 @ 05:47]  HCV 0.09, Nonreact      [02-20-25 @ 05:47]  HIV Nonreact      [04-09-24 @ 20:21]    Free Light Chains: kappa 5.19, lambda 2.81, ratio = 1.85      [04-10 @ 07:11]  Immunofixation Serum:   Weak  IgG Kappa Band Identified      Reference Range: None Detected      [04-10-24 @ 07:11]     [facility] DIVISION OF KIDNEY DISEASES AND HYPERTENSION -- [phone]  -- INITIAL CONSULT NOTE  --------------------------------------------------------------------------------  HPI:      74-year-old female with PMHx of HTN, ESRD on dialysis MWF, Afib on Eliquis, anemia, nephrolithiasis came in today as she missed her scheduled dialysis session on Monday ( 7/7) because of loose stools that have now resolved.   she didn't feel well last night with some nausea and came to the ED    Labs in the ED show a Hemoglobin of 5.2     Currently dialyzes at [facility]   Dr. Jacob Yeager   3.5 hours    feels okay   denies any shortness of breath   denies any black stools or blood losses           PAST HISTORY  --------------------------------------------------------------------------------  PAST MEDICAL & SURGICAL HISTORY:  Renal Calculus      Ureteral Calculus      Acute Renal Failure  9/2009      Wrist Fracture  CYNTHIA      Collar Bone Fracture      Rib Fractures      Kidney Stone removal  3/15/2011      Atrial fibrillation with RVR      C Section      Percutaneous Stone Extraction  Right      S/P Left Percutaneuos Stone extraction  01/26/2010, 04/20/2010      History of cardioversion        FAMILY HISTORY:    PAST SOCIAL HISTORY:    ALLERGIES & MEDICATIONS  --------------------------------------------------------------------------------  Allergies    No Known Allergies    Intolerances      Standing Inpatient Medications    PRN Inpatient Medications      REVIEW OF SYSTEMS  --------------------------------------------------------------------------------  Gen: No fevers/chills  Skin: No rashes  Head/Eyes/Ears: No HA  Respiratory: No SOB,  CV: No CP  GI: No abdominal pain, diarrhea, N/V  : No dysuria, hematuria  MSK: No edema  Heme: No easy bruising or bleeding  Psych: No significant depression    All other systems were reviewed and are negative, except as noted.    VITALS/PHYSICAL EXAM  --------------------------------------------------------------------------------  T(C): 36.4 (07-08-25 @ 10:50), Max: 36.4 (07-08-25 @ 09:54)  HR: 75 (07-08-25 @ 10:50) (75 - 82)  BP: 116/65 (07-08-25 @ 10:50) (107/73 - 116/65)  RR: 18 (07-08-25 @ 10:50) (18 - 20)  SpO2: 98% (07-08-25 @ 10:50) (97% - 98%)  Wt(kg): --  Height (cm): 162.6 (07-08-25 @ 09:54)  Weight (kg): 59 (07-08-25 @ 09:54)  BMI (kg/m2): 22.3 (07-08-25 @ 09:54)  BSA (m2): 1.63 (07-08-25 @ 09:54)        Physical Exam    Gen: NAD  Pulm: CTA B/L  CV: RRR, S1S2;  Abd: +BS, soft  : No suprapubic tenderness  Extremities: 1+ LE edema noted.   Neuro: Awake, alert.  Skin: Warm  Vascular access: right IJ permcath    LABS/STUDIES  --------------------------------------------------------------------------------              5.6    7.02  >-----------<  207      [07-08-25 @ 11:14]              18.1     139  |  110  |  51  ----------------------------<  57      [07-08-25 @ 11:14]  3.6   |  12  |  4.50        Ca     5.2     [07-08-25 @ 11:14]      Mg     1.3     [07-08-25 @ 11:14]      Phos  3.0     [07-08-25 @ 11:14]    TPro  3.3  /  Alb  2.2  /  TBili  0.3  /  DBili  x   /  AST  5   /  ALT  5   /  AlkPhos  37  [07-08-25 @ 11:14]    PT/INR: PT 15.8 , INR 1.39       [07-08-25 @ 11:14]  PTT: 25.9       [07-08-25 @ 11:14]      Creatinine Trend:  SCr 4.50 [07-08 @ 11:14]    Urinalysis - [07-08-25 @ 11:14]      Color  / Appearance  / SG  / pH       Gluc 57 / Ketone   / Bili  / Urobili        Blood  / Protein  / Leuk Est  / Nitrite       RBC  / WBC  / Hyaline  / Gran  / Sq Epi  / Non Sq Epi  / Bacteria       Iron 44, TIBC --, %sat --      [02-19-25 @ 04:22]  Ferritin 101      [11-22-24 @ 21:13]  PTH -- (Ca 8.1)      [11-22-24 @ 21:13]   595  TSH 2.67      [03-01-25 @ 06:47]    HBsAb Nonreact      [02-20-25 @ 05:47]  HBsAg Nonreact      [02-20-25 @ 05:47]  HBcAb Nonreact      [02-20-25 @ 05:47]  HCV 0.09, Nonreact      [02-20-25 @ 05:47]  HIV Nonreact      [04-09-24 @ 20:21]    Free Light Chains: kappa 5.19, lambda 2.81, ratio = 1.85      [04-10 @ 07:11]  Immunofixation Serum:   Weak  IgG Kappa Band Identified      Reference Range: None Detected      [04-10-24 @ 07:11]     [facility] DIVISION OF KIDNEY DISEASES AND HYPERTENSION -- [phone]  -- INITIAL CONSULT NOTE  --------------------------------------------------------------------------------  HPI:patient is a  75 y/o woman poor historian with PMH of HTN, 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. patient has a LUE AVF, thrill +  and R tunnelled IJ. patient reporst thst she missed HD yesterday because patient had one epiosode of diarrhea which resolved now. Last HD on 7/4. Patient was sent from outpatient HD unit to in to resume HD    74-year-old female with PMHx of HTN, ESRD on dialysis MWF, Afib on Eliquis, anemia, nephrolithiasis came in today as she missed her scheduled dialysis session on Monday ( 7/7) because of loose stools that have now resolved.   she didn't feel well last night with some nausea and came to the ED    Labs in the ED show a Hemoglobin of 5.2     Currently dialyzes at [facility]   Dr. Jacob Yeager   3.5 hours    feels okay   denies any shortness of breath   denies any black stools or blood losses         PAST HISTORY  --------------------------------------------------------------------------------  PAST MEDICAL & SURGICAL HISTORY:  Renal Calculus      Ureteral Calculus      Acute Renal Failure  9/2009      Wrist Fracture  CYNTHIA      Collar Bone Fracture      Rib Fractures      Kidney Stone removal  3/15/2011      Atrial fibrillation with RVR      C Section      Percutaneous Stone Extraction  Right      S/P Left Percutaneuos Stone extraction  01/26/2010, 04/20/2010      History of cardioversion        FAMILY HISTORY:    PAST SOCIAL HISTORY:    ALLERGIES & MEDICATIONS  --------------------------------------------------------------------------------  Allergies    No Known Allergies    Intolerances      Standing Inpatient Medications    PRN Inpatient Medications      REVIEW OF SYSTEMS  --------------------------------------------------------------------------------  Gen: No fevers/chills  Skin: No rashes  Head/Eyes/Ears: No HA  Respiratory: No SOB,  CV: No CP  GI: No abdominal pain, diarrhea, N/V  : No dysuria, hematuria  MSK: No edema  Heme: No easy bruising or bleeding  Psych: No significant depression    All other systems were reviewed and are negative, except as noted.    VITALS/PHYSICAL EXAM  --------------------------------------------------------------------------------  T(C): 36.4 (07-08-25 @ 10:50), Max: 36.4 (07-08-25 @ 09:54)  HR: 75 (07-08-25 @ 10:50) (75 - 82)  BP: 116/65 (07-08-25 @ 10:50) (107/73 - 116/65)  RR: 18 (07-08-25 @ 10:50) (18 - 20)  SpO2: 98% (07-08-25 @ 10:50) (97% - 98%)  Wt(kg): --  Height (cm): 162.6 (07-08-25 @ 09:54)  Weight (kg): 59 (07-08-25 @ 09:54)  BMI (kg/m2): 22.3 (07-08-25 @ 09:54)  BSA (m2): 1.63 (07-08-25 @ 09:54)        Physical Exam    Gen: NAD  Pulm: CTA B/L  CV: RRR, S1S2;  Abd: +BS, soft  : No suprapubic tenderness  Extremities: 1+ LE edema noted.   Neuro: Awake, alert.  Skin: Warm  Vascular access: right IJ permcath    LABS/STUDIES  --------------------------------------------------------------------------------              5.6    7.02  >-----------<  207      [07-08-25 @ 11:14]              18.1     139  |  110  |  51  ----------------------------<  57      [07-08-25 @ 11:14]  3.6   |  12  |  4.50        Ca     5.2     [07-08-25 @ 11:14]      Mg     1.3     [07-08-25 @ 11:14]      Phos  3.0     [07-08-25 @ 11:14]    TPro  3.3  /  Alb  2.2  /  TBili  0.3  /  DBili  x   /  AST  5   /  ALT  5   /  AlkPhos  37  [07-08-25 @ 11:14]    PT/INR: PT 15.8 , INR 1.39       [07-08-25 @ 11:14]  PTT: 25.9       [07-08-25 @ 11:14]      Creatinine Trend:  SCr 4.50 [07-08 @ 11:14]    Urinalysis - [07-08-25 @ 11:14]      Color  / Appearance  / SG  / pH       Gluc 57 / Ketone   / Bili  / Urobili        Blood  / Protein  / Leuk Est  / Nitrite       RBC  / WBC  / Hyaline  / Gran  / Sq Epi  / Non Sq Epi  / Bacteria       Iron 44, TIBC --, %sat --      [02-19-25 @ 04:22]  Ferritin 101      [11-22-24 @ 21:13]  PTH -- (Ca 8.1)      [11-22-24 @ 21:13]   595  TSH 2.67      [03-01-25 @ 06:47]    HBsAb Nonreact      [02-20-25 @ 05:47]  HBsAg Nonreact      [02-20-25 @ 05:47]  HBcAb Nonreact      [02-20-25 @ 05:47]  HCV 0.09, Nonreact      [02-20-25 @ 05:47]  HIV Nonreact      [04-09-24 @ 20:21]    Free Light Chains: kappa 5.19, lambda 2.81, ratio = 1.85      [04-10 @ 07:11]  Immunofixation Serum:   Weak  IgG Kappa Band Identified      Reference Range: None Detected      [04-10-24 @ 07:11]

## 2025-07-08 NOTE — ED PROVIDER NOTE - PROGRESS NOTE DETAILS
Jayson Hankins DO (EM Attending)  Nephro fellow c/s in regards to missed HD. Will see patient Jayson Hankins DO (EM Attending)  Patient with hemoglobin of 5.6.  Spoke with nephrology.  Recommending giving 1 unit PRBC.  Patient to be admitted for dialysis.  Patient was consented for dialysis by nephrology team.

## 2025-07-08 NOTE — ED PROVIDER NOTE - CLINICAL SUMMARY MEDICAL DECISION MAKING FREE TEXT BOX
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 Patient states she woke up yesterday and had 2 episodes of loose watery stools, nonbloody in nature.  States that she felt "lousy" and did not go to dialysis.  Accompanied by  at bedside who states that they tried to call the dialysis center today and there was no appointment for her.  States she overall feels general malaise however denies any fevers, chills, chest pain, shortness of breath, abdominal pain, nausea or vomiting or diarrhea today.  Patient had a full dialysis session on Friday with no issues.    Jayson Hankins DO (EM Attending)   Physical Exam:    Gen: NAD, AOx3  Head: NCAT  HEENT: EOMI, PEERLA  Lung: CTAB  CV: RRR  Abd: soft, NT, ND, no guarding, no rigidity, no rebound tenderness, no CVA tenderness   MSK: no visible deformities, ROM normal in UE/LE, no midline ttp to entire spine, pelvis stable  Neuro: No focal sensory or motor deficits. Sensation intact to light touch all extremities.  Skin: +R chest wall port clean dry and intact    Vital signs remarkable for stable BP, afebrile, not hypoxic. EKG showing afib with PVCs at HR of 101.   Plan for basic Labs, plan for basic labs, coags, magnesium phosphorus, chest x-ray.  Will consult nephrology.  Patient will require admission for missed dialysis.  Differential diagnosis includes but limited infectious etiology versus anemia versus metabolic derangement.  Final dispo pending labs imaging.  Assessment

## 2025-07-08 NOTE — ED ADULT NURSE NOTE - NSFALLHARMRISKINTERV_ED_ALL_ED
Assistance OOB with selected safe patient handling equipment if applicable/Assistance with ambulation/Communicate risk of Fall with Harm to all staff, patient, and family/Monitor gait and stability/Provide visual cue: red socks, yellow wristband, yellow gown, etc/Reinforce activity limits and safety measures with patient and family/Bed in lowest position, wheels locked, appropriate side rails in place/Call bell, personal items and telephone in reach/Instruct patient to call for assistance before getting out of bed/chair/stretcher/Non-slip footwear applied when patient is off stretcher/[place] to call system/Physically safe environment - no spills, clutter or unnecessary equipment/Purposeful Proactive Rounding/Room/bathroom lighting operational, light cord in reach

## 2025-07-08 NOTE — H&P ADULT - SPO2 (%)
After Visit Summary   8/28/2018    Muriel Diaz    MRN: [id number]           Patient Information     Date Of Birth          1963        Visit Information        Provider Department      8/28/2018 1:00 PM SH INFUSION CHAIR 6 [facility]        Today's Diagnoses     Malignant neoplasm of upper-inner quadrant of left breast in female, estrogen receptor positive (H)    -  1       Follow-ups after your visit        Your next 10 appointments already scheduled     Sep 04, 2018  8:00 AM CDT   Level 4 with SH INFUSION CHAIR 4   [facility] ([facility])    [facility]  [address]  [place] [address]   [phone]            Sep 11, 2018 10:30 AM CDT   Level 4 with SH INFUSION CHAIR 10   [facility] ([facility])    [facility]  [address]  [place] [address]   [phone]            Sep 18, 2018  9:00 AM CDT   Level 4 with SH INFUSION CHAIR 18   [facility] ([facility])    [facility]  [address]  [place] [address]   [phone]            Sep 18, 2018  9:30 AM CDT   Return Visit with Patience Mckeon MD   [facility] ([facility])    [facility]  [address]  [place] [address] [phone]              Who to contact     If you have questions or need follow up information about today's clinic visit or your schedule please contact [facility] directly at [phone].  Normal or non-critical lab and imaging results will be communicated to you by MyChart, letter or phone within 4 business days after the clinic has received the results. If you do not hear from us within 7 days, please contact the clinic through [employer]  "or phone. If you have a critical or abnormal lab result, we will notify you by phone as soon as possible.  Submit refill requests through [employer] or call your pharmacy and they will forward the refill request to us. Please allow 3 business days for your refill to be completed.          Additional Information About Your Visit        Care EveryWhere ID     This is your Care EveryWhere ID. This could be used by other organizations to access your [facility] medical records  [id number]        Your Vitals Were     Pulse Temperature Respirations Height Pulse Oximetry BMI (Body Mass Index)    59 98.3  F (36.8  C) (Oral) 20 1.6 m (5' 2.99\") 97% 18.21 kg/m2       Blood Pressure from Last 3 Encounters:   08/28/18 142/88   08/28/18 142/88   08/21/18 135/87    Weight from Last 3 Encounters:   08/28/18 46.6 kg (102 lb 12.8 oz)   08/28/18 46.6 kg (102 lb 12.8 oz)   08/21/18 46 kg (101 lb 6.4 oz)              We Performed the Following     CBC with platelets diff        Primary Care Provider Office Phone # Fax #    Isabel Vickie Landin [phone] [address] [place] [address]        Equal Access to Services     FRANCISCA HUNT : Hadii aad ku hadasho Soomaali, waaxda luqadaha, qaybta kaalmada adeegyada, waxay simiin haylupen jennifer gupta. So [facility] [phone].    ATENCIÓN: Si habla español, tiene a chowdary disposición servicios gratuitos de asistencia lingüística. Llame al [phone].    We comply with applicable federal civil rights laws and [place] laws. We do not discriminate on the basis of race, color, national origin, age, disability, sex, sexual orientation, or gender identity.            Thank you!     Thank you for choosing [facility]  for your care. Our goal is always to provide you with excellent care. Hearing back from our patients is one way we can continue to improve our services. Please take a few minutes to complete the written survey that " you may receive in the mail after your visit with us. Thank you!             Your Updated Medication List - Protect others around you: Learn how to safely use, store and throw away your medicines at www.disposemymeds.org.          This list is accurate as of 8/28/18  3:40 PM.  Always use your most recent med list.                   Brand Name Dispense Instructions for use Diagnosis    atenolol 25 MG tablet    TENORMIN    30 tablet    TAKE ONE TABLET BY MOUTH ONE TIME DAILY    Hypertension goal BP (blood pressure) < 140/90       hydrochlorothiazide 12.5 MG Tabs tablet     90 tablet    Take 1 tablet (12.5 mg) by mouth daily    Hypertension goal BP (blood pressure) < 140/90       lidocaine-prilocaine cream    EMLA    30 g    Apply topically as needed for moderate pain Apply to port site 1 hour prior to accessing    Malignant neoplasm of left breast (H)       LORazepam 0.5 MG tablet    ATIVAN    30 tablet    Take 1 tablet (0.5 mg) by mouth every 4 hours as needed (Anxiety, Nausea/Vomiting or Sleep)    Malignant neoplasm of upper-inner quadrant of left breast in female, estrogen receptor positive (H)       prochlorperazine 10 MG tablet    COMPAZINE    30 tablet    Take 1 tablet (10 mg) by mouth every 6 hours as needed (Nausea/Vomiting)    Malignant neoplasm of upper-inner quadrant of left breast in female, estrogen receptor positive (H)       SOMA PO      Take 350 mg by mouth 3 times daily        VITAMIN D (CHOLECALCIFEROL) PO      Take 2,000 Units by mouth daily (2 x 1000 units = 2000 unit dose)        zolpidem 5 MG tablet    AMBIEN    45 tablet    TAKE ONE TABLET BY MOUTH AT BEDTIME AS NEEDED FOR SLEEP . May repeat x 1 PRN    Insomnia due to medical condition          98

## 2025-07-08 NOTE — H&P ADULT - NSHPPHYSICALEXAM_GEN_ALL_CORE
Height (cm): 162.6  Weight (kg): 59  BMI (kg/m2): 22.3  Vital Signs Last 24 HrsT(C): 36.8 (07-08-25 @ 13:20)  T(F): 98.2 (07-08-25 @ 13:20), Max: 98.2 (07-08-25 @ 13:20)  HR: 75 (07-08-25 @ 10:50) (75 - 82)  BP: 116/65 (07-08-25 @ 10:50)  RR: 16 (07-08-25 @ 13:20) (16 - 20)  SpO2: 98% (07-08-25 @ 13:20) (97% - 98%)      GEN: A&O X 2-3 , NAD , comfortable, pleasant, calm    HEENT: NCAT, PERRL, MMM, hearing intact  Neck: supple , no JVD appreciated   CVS: S1S2 , Irregular , No M/R/G appreciated  PULM: CTA B/L,  no W/R/R appreciated  ABD.: soft. non tender, non distended,  bowel sounds present  Extrem: intact pulses , no edema     left arm ecchymosis, post AVF , + right chest wall permcath   Derm: No rash , no ecchymoses, dry skin   PSYCH : normal mood,  no delusion not anxious

## 2025-07-08 NOTE — H&P ADULT - PROBLEM SELECTOR PLAN 2
Hold Eliquis last dose 4/12/25  continue Amiodarone and Metoprolol likely anemia of chronic disease, worsening  doubtful bleeding / post op from AVF  no reports of other bleeding / GIB reported  procrit per renal  patient already on Iron supplement > continue  check anemia panel  patient never had colonoscopy  >> to discuss further given age..   monitor Hgl post transfusion..

## 2025-07-08 NOTE — H&P ADULT - HISTORY OF PRESENT ILLNESS
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 also s/p Creation Left Radiocephalic AV Fistula (04/2025), presents for missed  HD  patient reportedly had diarrhea all day on Monday so missed her HD> was supposed to go in instead today but no available slots in HD unit so sent to ED   patient already seen in ED by nephro and HD being arrange  patient also found to have significant anemia  patient denies any gross bleeding, black or dark/ red stools  ( occasional has little hemorrhoidal bleeding)   patient states her diarrhea was normal color and self resolved.. no vomiting reported..   patient reports never having had colonoscopy !!

## 2025-07-08 NOTE — ED ADULT NURSE NOTE - OBJECTIVE STATEMENT
1020 74 yr old WF brought to ER by  for further eval and tx of nausea. Missed dialysis yesterday and PMD could not arrange fo dialysis today.  told pt to come to Er for further eval and tx. A&Ox4. Denies chest pain, SOB, palp. Feels weak when ambulating. Skin W&D. AV fist L arm +Thrill 1020 74 yr old WF brought to ER by  for further eval and tx of nausea. Missed dialysis yesterday and PMD could not arrange for dialysis today.  told pt to come to ER for further eval and tx. A&Ox4. Denies chest pain, SOB, palp. Feels weak when ambulating. Skin W&D. AV fist L arm +Thrill. R chest dialysis cath noted. Dressing intact.. Fall risk precautions maintained

## 2025-07-08 NOTE — H&P ADULT - ASSESSMENT
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 also s/p Creation Left Radiocephalic AV Fistula (04/2025), presents for missed  HD  patient reportedly had diarrhea all day on Monday so missed her HD> was supposed to go in instead today but no available slots in HD unit so sent to ED   patient already seen in ED by nephro and HD being arrange  patient also found to have significant anemia  patient denies any gross bleeding, black or dark/ red stools  ( occasional has little hemorrhoidal bleeding)   patient states her diarrhea was normal color and self resolved.. no vomiting reported..   patient reports never having had colonoscopy !!

## 2025-07-08 NOTE — ED PROVIDER NOTE - NS ED ATTENDING STATEMENT MOD
I have seen and examined this patient and fully participated in the care of this patient as the teaching attending.  The service was shared with the CURLY.  I reviewed and verified the documentation.

## 2025-07-08 NOTE — ED PROVIDER NOTE - ATTENDING CONTRIBUTION TO CARE
I performed a history and physical exam of the patient and discussed their management with the resident/fellow/student. I have reviewed the resident/fellow/student note and agree with the documented findings and plan of care, except as noted. I have personally performed a substantive portion of the visit including all aspects of the medical decision making. My medical decision making and observations are found above. Please refer to any progress notes for updates on clinical course. My notes supersedes the above resident/fellow/student note in case of discrepancy. Jayson Hankins DO (EM Attending)    SEE MDM

## 2025-07-08 NOTE — H&P ADULT - PROBLEM SELECTOR PLAN 1
Creation Left Radiocephalic AV Fistula Possible Ballistic Transposition Fistula on 4/16/25.   ECHO on file   Pre-op education provided - all questions answered. Pt verbalized understanding nephrology on board, appreciated  HD as per renal  monitor vitals, labs / electrolytes  access management and use per renal  procrit  Continue Current medications otherwise and monitor.  supportive care   to reinstate outpatient HD

## 2025-07-08 NOTE — H&P ADULT - NSHPREVIEWOFSYSTEMS_GEN_ALL_CORE
GEN: no fever, no chills, no pain  RESP: no SOB, no cough, no sputum  CVS: no chest pain, no palpitations, no edema  GI: no abdominal pain, no nausea, no vomiting, no constipation, no diarrhea  : no dysuria, no frequency  NEURO: no headache, no dizziness  Derm : some itching, no rash, + dry skin

## 2025-07-08 NOTE — CONSULT NOTE ADULT - ASSESSMENT
ESRD on HD admitted after missing 1 session of dialysis, now found to have new onset anemia    ESRD on HD-     Last dialysis 7/4. Missed 7/7  Plan for dialysis today.   3.5 hours/ 3K/ 2 L off   she is getting a blood transfusion as well.     Hypocalcemia-   corrected calcium 8.4   check 25-OH Vitamin D, PTH  Continue Sevelamer 800 mg TID     Anemia- rule out bleed   check iron studies  will find out PERLITA dosage and re-start     jesus manuel severino  nephrology attending   please contact me on TEAMS   Office- [phone]   ESRD on HD admitted after missing 1 session of dialysis, now found to have new onset anemia    ESRD on HD- MWF     Last dialysis 7/4. Missed 7/7  Plan for dialysis today.   3.5 hours/ 3K/ 2 L off   she is getting a blood transfusion as well.     Hypocalcemia-   corrected calcium 8.4   check 25-OH Vitamin D, PTH  Continue Sevelamer 800 mg TID     Anemia- rule out bleed   check iron studies  will find out PERLITA dosage and re-start     jesus manuel severino  nephrology attending   please contact me on TEAMS   Office- [phone]

## 2025-07-08 NOTE — ED CLERICAL - BED REQUESTED
Date: 5/10/2019    Patient Name: Jono Khan          To Whom it may concern: The above patient was seen at the [facility] for treatment of a medical condition. This patient should be excused from attending work through 5/12/19.     The
08-Jul-2025 12:54

## 2025-07-08 NOTE — H&P ADULT - PROBLEM SELECTOR PLAN 5
EF had improved as noted  not in fluid overload  monitor   supportive care  Continue Current medications otherwise and monitor.  cardio follow up as outpatient

## 2025-07-09 LAB
A1C WITH ESTIMATED AVERAGE GLUCOSE RESULT: 5.7 % — HIGH (ref 4–5.6)
ALBUMIN SERPL ELPH-MCNC: 3.5 G/DL — SIGNIFICANT CHANGE UP (ref 3.3–5)
ALP SERPL-CCNC: 62 U/L — SIGNIFICANT CHANGE UP (ref 40–120)
ALT FLD-CCNC: 7 U/L — LOW (ref 10–45)
ANION GAP SERPL CALC-SCNC: 15 MMOL/L — SIGNIFICANT CHANGE UP (ref 5–17)
AST SERPL-CCNC: 12 U/L — SIGNIFICANT CHANGE UP (ref 10–40)
BILIRUB SERPL-MCNC: 0.6 MG/DL — SIGNIFICANT CHANGE UP (ref 0.2–1.2)
BUN SERPL-MCNC: 36 MG/DL — HIGH (ref 7–23)
CALCIUM SERPL-MCNC: 9 MG/DL — SIGNIFICANT CHANGE UP (ref 8.4–10.5)
CHLORIDE SERPL-SCNC: 98 MMOL/L — SIGNIFICANT CHANGE UP (ref 96–108)
CHOLEST SERPL-MCNC: 119 MG/DL — SIGNIFICANT CHANGE UP
CO2 SERPL-SCNC: 23 MMOL/L — SIGNIFICANT CHANGE UP (ref 22–31)
CREAT SERPL-MCNC: 4.05 MG/DL — HIGH (ref 0.5–1.3)
EGFR: 11 ML/MIN/1.73M2 — LOW
EGFR: 11 ML/MIN/1.73M2 — LOW
ESTIMATED AVERAGE GLUCOSE: 117 MG/DL — HIGH (ref 68–114)
FERRITIN SERPL-MCNC: 570 NG/ML — HIGH (ref 13–330)
FOLATE SERPL-MCNC: 6.9 NG/ML — SIGNIFICANT CHANGE UP
GLUCOSE SERPL-MCNC: 95 MG/DL — SIGNIFICANT CHANGE UP (ref 70–99)
HCT VFR BLD CALC: 23.8 % — LOW (ref 34.5–45)
HDLC SERPL-MCNC: 40 MG/DL — LOW
HGB BLD-MCNC: 7.8 G/DL — LOW (ref 11.5–15.5)
IRON SATN MFR SERPL: 106 UG/DL — SIGNIFICANT CHANGE UP (ref 30–160)
IRON SATN MFR SERPL: 34 % — SIGNIFICANT CHANGE UP (ref 14–50)
LDLC SERPL-MCNC: 59 MG/DL — SIGNIFICANT CHANGE UP
LIPID PNL WITH DIRECT LDL SERPL: 59 MG/DL — SIGNIFICANT CHANGE UP
MCHC RBC-ENTMCNC: 31.2 PG — SIGNIFICANT CHANGE UP (ref 27–34)
MCHC RBC-ENTMCNC: 32.8 G/DL — SIGNIFICANT CHANGE UP (ref 32–36)
MCV RBC AUTO: 95.2 FL — SIGNIFICANT CHANGE UP (ref 80–100)
NONHDLC SERPL-MCNC: 79 MG/DL — SIGNIFICANT CHANGE UP
NRBC # BLD AUTO: 0 K/UL — SIGNIFICANT CHANGE UP (ref 0–0)
NRBC # FLD: 0 K/UL — SIGNIFICANT CHANGE UP (ref 0–0)
NRBC BLD AUTO-RTO: 0 /100 WBCS — SIGNIFICANT CHANGE UP (ref 0–0)
PLATELET # BLD AUTO: 167 K/UL — SIGNIFICANT CHANGE UP (ref 150–400)
PMV BLD: 9.7 FL — SIGNIFICANT CHANGE UP (ref 7–13)
POTASSIUM SERPL-MCNC: 4.9 MMOL/L — SIGNIFICANT CHANGE UP (ref 3.5–5.3)
POTASSIUM SERPL-SCNC: 4.9 MMOL/L — SIGNIFICANT CHANGE UP (ref 3.5–5.3)
PROT SERPL-MCNC: 5.3 G/DL — LOW (ref 6–8.3)
RBC # BLD: 2.5 M/UL — LOW (ref 3.8–5.2)
RBC # FLD: 22.8 % — HIGH (ref 10.3–14.5)
SODIUM SERPL-SCNC: 136 MMOL/L — SIGNIFICANT CHANGE UP (ref 135–145)
TIBC SERPL-MCNC: 308 UG/DL — SIGNIFICANT CHANGE UP (ref 220–430)
TRIGL SERPL-MCNC: 112 MG/DL — SIGNIFICANT CHANGE UP
UIBC SERPL-MCNC: 203 UG/DL — SIGNIFICANT CHANGE UP (ref 110–370)
VIT B12 SERPL-MCNC: 538 PG/ML — SIGNIFICANT CHANGE UP (ref 232–1245)
WBC # BLD: 6.51 K/UL — SIGNIFICANT CHANGE UP (ref 3.8–10.5)
WBC # FLD AUTO: 6.51 K/UL — SIGNIFICANT CHANGE UP (ref 3.8–10.5)

## 2025-07-09 PROCEDURE — 86850 RBC ANTIBODY SCREEN: CPT

## 2025-07-09 PROCEDURE — 80053 COMPREHEN METABOLIC PANEL: CPT

## 2025-07-09 PROCEDURE — 71045 X-RAY EXAM CHEST 1 VIEW: CPT

## 2025-07-09 PROCEDURE — 86901 BLOOD TYPING SEROLOGIC RH(D): CPT

## 2025-07-09 PROCEDURE — 84100 ASSAY OF PHOSPHORUS: CPT

## 2025-07-09 PROCEDURE — 82728 ASSAY OF FERRITIN: CPT

## 2025-07-09 PROCEDURE — 83036 HEMOGLOBIN GLYCOSYLATED A1C: CPT

## 2025-07-09 PROCEDURE — 86923 COMPATIBILITY TEST ELECTRIC: CPT

## 2025-07-09 PROCEDURE — 83735 ASSAY OF MAGNESIUM: CPT

## 2025-07-09 PROCEDURE — 82607 VITAMIN B-12: CPT

## 2025-07-09 PROCEDURE — 93005 ELECTROCARDIOGRAM TRACING: CPT

## 2025-07-09 PROCEDURE — 80061 LIPID PANEL: CPT

## 2025-07-09 PROCEDURE — 83550 IRON BINDING TEST: CPT

## 2025-07-09 PROCEDURE — 83540 ASSAY OF IRON: CPT

## 2025-07-09 PROCEDURE — 82746 ASSAY OF FOLIC ACID SERUM: CPT

## 2025-07-09 PROCEDURE — 85027 COMPLETE CBC AUTOMATED: CPT

## 2025-07-09 PROCEDURE — 85025 COMPLETE CBC W/AUTO DIFF WBC: CPT

## 2025-07-09 PROCEDURE — 85610 PROTHROMBIN TIME: CPT

## 2025-07-09 PROCEDURE — 36415 COLL VENOUS BLD VENIPUNCTURE: CPT

## 2025-07-09 PROCEDURE — 86900 BLOOD TYPING SEROLOGIC ABO: CPT

## 2025-07-09 PROCEDURE — 85730 THROMBOPLASTIN TIME PARTIAL: CPT

## 2025-07-09 RX ORDER — DIPHENHYDRAMINE HCL 12.5MG/5ML
25 ELIXIR ORAL ONCE
Refills: 0 | Status: COMPLETED | OUTPATIENT
Start: 2025-07-09 | End: 2025-07-09

## 2025-07-09 RX ORDER — FOLIC ACID 1 MG/1
1 TABLET ORAL DAILY
Refills: 0 | Status: DISCONTINUED | OUTPATIENT
Start: 2025-07-09 | End: 2025-07-10

## 2025-07-09 RX ADMIN — Medication 25 MILLIGRAM(S): at 03:43

## 2025-07-09 RX ADMIN — Medication 325 MILLIGRAM(S): at 12:06

## 2025-07-09 RX ADMIN — APIXABAN 2.5 MILLIGRAM(S): 5 TABLET, FILM COATED ORAL at 17:21

## 2025-07-09 RX ADMIN — SEVELAMER HYDROCHLORIDE 800 MILLIGRAM(S): 800 TABLET ORAL at 17:21

## 2025-07-09 RX ADMIN — AMIODARONE HYDROCHLORIDE 200 MILLIGRAM(S): 50 INJECTION, SOLUTION INTRAVENOUS at 05:21

## 2025-07-09 RX ADMIN — SEVELAMER HYDROCHLORIDE 800 MILLIGRAM(S): 800 TABLET ORAL at 08:03

## 2025-07-09 RX ADMIN — APIXABAN 2.5 MILLIGRAM(S): 5 TABLET, FILM COATED ORAL at 05:20

## 2025-07-09 RX ADMIN — ATORVASTATIN CALCIUM 80 MILLIGRAM(S): 80 TABLET, FILM COATED ORAL at 21:21

## 2025-07-09 RX ADMIN — METOPROLOL SUCCINATE 200 MILLIGRAM(S): 50 TABLET, EXTENDED RELEASE ORAL at 05:21

## 2025-07-09 RX ADMIN — SEVELAMER HYDROCHLORIDE 800 MILLIGRAM(S): 800 TABLET ORAL at 12:06

## 2025-07-09 NOTE — PROVIDER CONTACT NOTE (CRITICAL VALUE NOTIFICATION) - SITUATION
Pt is A&Ox4, s/p HD today 7/8 w/ chief c/o of flavia after missed HD yesterday. 1 U PRBCs infused prior to HD, new hbg 6.9

## 2025-07-09 NOTE — PROVIDER CONTACT NOTE (OTHER) - SITUATION
Pt is A&Ox4, undergoing their 2nd 1/2 U PRBC infusion. Provider was reached out to to clarify AM lab draw time

## 2025-07-09 NOTE — PROGRESS NOTE ADULT - ASSESSMENT
_________________________________________________________________________________________  ========>>  M E D I C A L   A T T E N D I N G    F O L L O W  U P  N O T E  <<=========  -----------------------------------------------------------------------------------------------------    - Patient seen and examined by me earlier today.   - In summary,  ADRIENNE PARSONS is a 74y year old woman admitted for HD, anemia   - Patient today overall doing ok, comfortable, eating OK.  overall doing ok .. asking about going home     ==================>> REVIEW OF SYSTEM <<=================    GEN: no fever, no chills, no pain  RESP: no SOB, no cough, no sputum  CVS: no chest pain, no palpitations  GI: no abdominal pain, no nausea, no constipation, no diarrhea  : no dysuria, no frequency  Neuro: no headache, no dizziness    ==================>> PHYSICAL EXAM <<=================    GEN: A&O X 3 , NAD , comfortable, pleasant, calm   HEENT: NCAT, PERRL, MMM, hearing intact  CVS: S1S2 , regular , No M/R/G appreciated  PULM: CTA B/L,  no W/R/R appreciated  ABD.: soft. non tender, non distended,  bowel sounds present  Extrem: intact pulses , no edema           ( Note written / Date of service 07-09-25 ( This is certified to be the same as "ENTERED" date above ( for billing purposes)))    ==================>> MEDICATIONS <<====================    MEDICATIONS  (STANDING):  aMIOdarone    Tablet 200 milliGRAM(s) Oral daily  apixaban 2.5 milliGRAM(s) Oral every 12 hours  atorvastatin 80 milliGRAM(s) Oral at bedtime  calcium carbonate    500 mG (Tums) Chewable 1 Tablet(s) Chew four times a day  ferrous    sulfate 325 milliGRAM(s) Oral daily  metoprolol succinate  milliGRAM(s) Oral daily  sevelamer carbonate 800 milliGRAM(s) Oral three times a day with meals    MEDICATIONS  (PRN):  acetaminophen     Tablet .. 650 milliGRAM(s) Oral every 6 hours PRN Temp greater or equal to 38C (100.4F), Mild Pain (1 - 3)  aluminum hydroxide/magnesium hydroxide/simethicone Suspension 30 milliLiter(s) Oral every 4 hours PRN Dyspepsia  melatonin 3 milliGRAM(s) Oral at bedtime PRN Insomnia  ondansetron Injectable 4 milliGRAM(s) IV Push every 8 hours PRN Nausea and/or Vomiting  oxyCODONE    IR 5 milliGRAM(s) Oral every 6 hours PRN Severe Pain (7 - 10)    ___________  Active diet:  Diet, Regular:   For patients receiving Renal Replacement - No Protein Restr, No Conc K, No Conc Phos, Low Sodium (RENAL)  ___________________    ==================>> VITAL SIGNS <<==================    T(C): 36.7 (07-09-25 @ 09:23), Max: 36.9 (07-09-25 @ 03:00)  HR: 89 (07-09-25 @ 09:23) (68 - 94)  BP: 107/72 (07-09-25 @ 09:23) (92/57 - 132/80)  RR: 18 (07-09-25 @ 09:23) (16 - 20)  SpO2: 97% (07-09-25 @ 09:23) (94% - 100%)     I&O's Summary    08 Jul 2025 07:01  -  09 Jul 2025 07:00  --------------------------------------------------------  IN: 800 mL / OUT: 1800 mL / NET: -1000 mL     ==================>> LAB AND IMAGING <<==================                        7.8    6.51  )-----------( 167      ( 09 Jul 2025 08:34 )             23.8        Hemoglobin:   7.8 <<==,  6.9 <<==,  5.6 <<==    07-09    136  |  98  |  36[H]  ----------------------------<  95  4.9   |  23  |  4.05[H]    Ca    9.0      09 Jul 2025 08:34  Phos  3.0     07-08  Mg     1.3     07-08    TPro  5.3[L]  /  Alb  3.5  /  TBili  0.6  /  DBili  x   /  AST  12  /  ALT  7[L]  /  AlkPhos  62  07-09    PT/INR - ( 08 Jul 2025 11:14 )   PT: 15.8 sec;   INR: 1.39 ratio    PTT - ( 08 Jul 2025 11:14 )  PTT:25.9 sec              Lipid profile:  (07-09-25)     Total: 119     LDL  : (p)     HDL  :40     TG   :112         07-09-25 @ 08:34  Iron:     106 ug/dL  Iron %:   34 %  TIBC:     308 ug/dL       07-09-25 @ 08:34  Vit B12:  538 pg/mL  Folate:   6.9 ng/mL > supplement ordered    ___________________________________________________________________________________  ===============>>  A S S E S S M E N T   A N D   P L A N <<===============  ------------------------------------------------------------------------------------------    patient is a  73 y/o woman poor historian with PMH of HTN, 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 also s/p Creation Left Radiocephalic AV Fistula (04/2025), presents for missed  HD  patient reportedly had diarrhea all day on Monday so missed her HD> was supposed to go in instead today but no available slots in HD unit so sent to ED   patient already seen in ED by nephro and HD being arrange  patient also found to have significant anemia  patient denies any gross bleeding, black or dark/ red stools  ( occasional has little hemorrhoidal bleeding)   patient states her diarrhea was normal color and self resolved.. no vomiting reported..   patient reports never having had colonoscopy !!          Problem/Plan - 1:  ·  Problem: ESRD (end stage renal disease).  ·  Plan: nephrology on board, appreciated  HD as per renal  monitor vitals, labs / electrolytes  access management and use per renal  procrit  Continue Current medications otherwise and monitor.  supportive care   to reinstate outpatient HD.     Problem/Plan - 2:  ·  Problem: Anemia.  ·  Plan: likely anemia of chronic disease, worsening  doubtful bleeding / post op from AVF  no reports of other bleeding / GIB reported  procrit per renal  patient already on Iron supplement > continue  anemia panel as above   patient never had colonoscopy  >> I recommended  to patient and  to schedule for screening colonoscopy > to discuss further with primary doc as outpatient   monitor Hgl post transfusion..     Problem/Plan - 3:  ·  Problem: Acute diarrhea.   ·  Plan: unclear etiology  self resolved  if recurrens > check GI PCR / culture.     Problem/Plan - 4:  ·  Problem: Chronic atrial fibrillation.   ·  Plan: stable  continue BB  continue Eliquis  monitor  cardio follow up as outpatient.     Problem/Plan - 5:  ·  Problem: Chronic systolic heart failure.   ·  Plan: EF had improved as noted  not in fluid overload  monitor   supportive care  Continue Current medications otherwise and monitor.  cardio follow up as outpatient.     Problem/Plan - 6:  ·  Problem: HTN (hypertension).   ·  Plan: Continue home medications and monitor.     Problem/Plan - 7:  ·  Problem: HLD (hyperlipidemia).   ·  Plan: statin.    Estimate Discharge : today or tomorrow     --------------------------------------------  Case discussed with patient   Education given on findings and plan of care  ___________________________  HMatt Szymanski D.O.  Pager: [phone]     _________________________________________________________________________________________  ========>>  M E D I C A L   A T T E N D I N G    F O L L O W  U P  N O T E  <<=========  -----------------------------------------------------------------------------------------------------    - Patient seen and examined by me earlier today.   - In summary,  ADRIENNE PARSONS is a 74y year old woman admitted for HD, anemia   - Patient today overall doing ok, comfortable, eating OK.  overall doing ok .. asking about going home     ==================>> REVIEW OF SYSTEM <<=================    GEN: no fever, no chills, no pain  RESP: no SOB, no cough, no sputum  CVS: no chest pain, no palpitations  GI: no abdominal pain, no nausea, no constipation, no diarrhea  : no dysuria, no frequency  Neuro: no headache, no dizziness    ==================>> PHYSICAL EXAM <<=================    GEN: A&O X 3 , NAD , comfortable, pleasant, calm   HEENT: NCAT, PERRL, MMM, hearing intact  CVS: S1S2 , regular , No M/R/G appreciated  PULM: CTA B/L,  no W/R/R appreciated  ABD.: soft. non tender, non distended,  bowel sounds present  Extrem: intact pulses , no edema           ( Note written / Date of service 07-09-25 ( This is certified to be the same as "ENTERED" date above ( for billing purposes)))    ==================>> MEDICATIONS <<====================    MEDICATIONS  (STANDING):  aMIOdarone    Tablet 200 milliGRAM(s) Oral daily  apixaban 2.5 milliGRAM(s) Oral every 12 hours  atorvastatin 80 milliGRAM(s) Oral at bedtime  calcium carbonate    500 mG (Tums) Chewable 1 Tablet(s) Chew four times a day  ferrous    sulfate 325 milliGRAM(s) Oral daily  metoprolol succinate  milliGRAM(s) Oral daily  sevelamer carbonate 800 milliGRAM(s) Oral three times a day with meals    MEDICATIONS  (PRN):  acetaminophen     Tablet .. 650 milliGRAM(s) Oral every 6 hours PRN Temp greater or equal to 38C (100.4F), Mild Pain (1 - 3)  aluminum hydroxide/magnesium hydroxide/simethicone Suspension 30 milliLiter(s) Oral every 4 hours PRN Dyspepsia  melatonin 3 milliGRAM(s) Oral at bedtime PRN Insomnia  ondansetron Injectable 4 milliGRAM(s) IV Push every 8 hours PRN Nausea and/or Vomiting  oxyCODONE    IR 5 milliGRAM(s) Oral every 6 hours PRN Severe Pain (7 - 10)    ___________  Active diet:  Diet, Regular:   For patients receiving Renal Replacement - No Protein Restr, No Conc K, No Conc Phos, Low Sodium (RENAL)  ___________________    ==================>> VITAL SIGNS <<==================    T(C): 36.7 (07-09-25 @ 09:23), Max: 36.9 (07-09-25 @ 03:00)  HR: 89 (07-09-25 @ 09:23) (68 - 94)  BP: 107/72 (07-09-25 @ 09:23) (92/57 - 132/80)  RR: 18 (07-09-25 @ 09:23) (16 - 20)  SpO2: 97% (07-09-25 @ 09:23) (94% - 100%)     I&O's Summary    08 Jul 2025 07:01  -  09 Jul 2025 07:00  --------------------------------------------------------  IN: 800 mL / OUT: 1800 mL / NET: -1000 mL     ==================>> LAB AND IMAGING <<==================                        7.8    6.51  )-----------( 167      ( 09 Jul 2025 08:34 )             23.8        Hemoglobin:   7.8 <<==,  6.9 <<==,  5.6 <<==    07-09    136  |  98  |  36[H]  ----------------------------<  95  4.9   |  23  |  4.05[H]    Ca    9.0      09 Jul 2025 08:34  Phos  3.0     07-08  Mg     1.3     07-08    TPro  5.3[L]  /  Alb  3.5  /  TBili  0.6  /  DBili  x   /  AST  12  /  ALT  7[L]  /  AlkPhos  62  07-09    PT/INR - ( 08 Jul 2025 11:14 )   PT: 15.8 sec;   INR: 1.39 ratio    PTT - ( 08 Jul 2025 11:14 )  PTT:25.9 sec              Lipid profile:  (07-09-25)     Total: 119     LDL  : (p)     HDL  :40     TG   :112         07-09-25 @ 08:34  Iron:     106 ug/dL  Iron %:   34 %  TIBC:     308 ug/dL       07-09-25 @ 08:34  Vit B12:  538 pg/mL  Folate:   6.9 ng/mL > supplement ordered    ___________________________________________________________________________________  ===============>>  A S S E S S M E N T   A N D   P L A N <<===============  ------------------------------------------------------------------------------------------    patient is a  73 y/o woman poor historian with PMH of HTN, 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 also s/p Creation Left Radiocephalic AV Fistula (04/2025), presents for missed  HD  patient reportedly had diarrhea all day on Monday so missed her HD> was supposed to go in instead today but no available slots in HD unit so sent to ED   patient already seen in ED by nephro and HD being arrange  patient also found to have significant anemia  patient denies any gross bleeding, black or dark/ red stools  ( occasional has little hemorrhoidal bleeding)   patient states her diarrhea was normal color and self resolved.. no vomiting reported..   patient reports never having had colonoscopy !!          Problem/Plan - 1:  ·  Problem: ESRD (end stage renal disease).  ·  Plan: nephrology on board, appreciated  HD as per renal  monitor vitals, labs / electrolytes  access management and use per renal  procrit  Continue Current medications otherwise and monitor.  supportive care   to reinstate outpatient HD.     Problem/Plan - 2:  ·  Problem: Anemia.  ·  Plan: likely anemia of chronic disease, worsening  doubtful bleeding / post op from AVF  no reports of other bleeding / GIB reported  procrit per renal  patient already on Iron supplement > continue  anemia panel as above   patient never had colonoscopy  >> I recommended  to patient and  to schedule for screening colonoscopy > to discuss further with primary doc as outpatient ( patient preference to do as OP)   monitor Hgl post transfusion..     Problem/Plan - 3:  ·  Problem: Acute diarrhea.   ·  Plan: unclear etiology  self resolved  if recurrens > check GI PCR / culture.     Problem/Plan - 4:  ·  Problem: Chronic atrial fibrillation.   ·  Plan: stable  continue BB  continue Eliquis  monitor  cardio follow up as outpatient.     Problem/Plan - 5:  ·  Problem: Chronic systolic heart failure.   ·  Plan: EF had improved as noted  not in fluid overload  monitor   supportive care  Continue Current medications otherwise and monitor.  cardio follow up as outpatient.     Problem/Plan - 6:  ·  Problem: HTN (hypertension).   ·  Plan: Continue home medications and monitor.     Problem/Plan - 7:  ·  Problem: HLD (hyperlipidemia).   ·  Plan: statin.    Estimate Discharge : today or tomorrow     --------------------------------------------  Case discussed with patient   Education given on findings and plan of care  ___________________________  H. ANAHI Szymanski.  Pager: [phone]

## 2025-07-09 NOTE — PROVIDER CONTACT NOTE (OTHER) - ACTION/TREATMENT ORDERED:
As per provider, okay to draw 0600 AM labs at 0800/0830; or 2 hr after blood tx finishes. Will continue to monitor.

## 2025-07-09 NOTE — PROVIDER CONTACT NOTE (CRITICAL VALUE NOTIFICATION) - ACTION/TREATMENT ORDERED:
As per provider, 2 1/2 units PRBCs ordered. Will continue to monitor hgb post transfusion, no further orders @ this time

## 2025-07-10 ENCOUNTER — TRANSCRIPTION ENCOUNTER (OUTPATIENT)
Age: 75
End: 2025-07-10

## 2025-07-10 VITALS
SYSTOLIC BLOOD PRESSURE: 115 MMHG | HEART RATE: 79 BPM | DIASTOLIC BLOOD PRESSURE: 74 MMHG | OXYGEN SATURATION: 98 % | RESPIRATION RATE: 18 BRPM | TEMPERATURE: 98 F

## 2025-07-10 LAB
ANION GAP SERPL CALC-SCNC: 13 MMOL/L — SIGNIFICANT CHANGE UP (ref 5–17)
BUN SERPL-MCNC: 28 MG/DL — HIGH (ref 7–23)
CALCIUM SERPL-MCNC: 8.4 MG/DL — SIGNIFICANT CHANGE UP (ref 8.4–10.5)
CHLORIDE SERPL-SCNC: 102 MMOL/L — SIGNIFICANT CHANGE UP (ref 96–108)
CO2 SERPL-SCNC: 25 MMOL/L — SIGNIFICANT CHANGE UP (ref 22–31)
CREAT SERPL-MCNC: 3.39 MG/DL — HIGH (ref 0.5–1.3)
EGFR: 13 ML/MIN/1.73M2 — LOW
EGFR: 13 ML/MIN/1.73M2 — LOW
GLUCOSE SERPL-MCNC: 113 MG/DL — HIGH (ref 70–99)
HCT VFR BLD CALC: 23.4 % — LOW (ref 34.5–45)
HGB BLD-MCNC: 7.1 G/DL — LOW (ref 11.5–15.5)
MCHC RBC-ENTMCNC: 30 PG — SIGNIFICANT CHANGE UP (ref 27–34)
MCHC RBC-ENTMCNC: 30.3 G/DL — LOW (ref 32–36)
MCV RBC AUTO: 98.7 FL — SIGNIFICANT CHANGE UP (ref 80–100)
MRSA PCR RESULT.: SIGNIFICANT CHANGE UP
NRBC # BLD AUTO: 0.02 K/UL — HIGH (ref 0–0)
NRBC # FLD: 0.02 K/UL — HIGH (ref 0–0)
NRBC BLD AUTO-RTO: 0 /100 WBCS — SIGNIFICANT CHANGE UP (ref 0–0)
PLATELET # BLD AUTO: 170 K/UL — SIGNIFICANT CHANGE UP (ref 150–400)
PMV BLD: 9.6 FL — SIGNIFICANT CHANGE UP (ref 7–13)
POTASSIUM SERPL-MCNC: 4.3 MMOL/L — SIGNIFICANT CHANGE UP (ref 3.5–5.3)
POTASSIUM SERPL-SCNC: 4.3 MMOL/L — SIGNIFICANT CHANGE UP (ref 3.5–5.3)
RBC # BLD: 2.37 M/UL — LOW (ref 3.8–5.2)
RBC # FLD: 23.3 % — HIGH (ref 10.3–14.5)
S AUREUS DNA NOSE QL NAA+PROBE: SIGNIFICANT CHANGE UP
SODIUM SERPL-SCNC: 140 MMOL/L — SIGNIFICANT CHANGE UP (ref 135–145)
WBC # BLD: 6.36 K/UL — SIGNIFICANT CHANGE UP (ref 3.8–10.5)
WBC # FLD AUTO: 6.36 K/UL — SIGNIFICANT CHANGE UP (ref 3.8–10.5)

## 2025-07-10 PROCEDURE — 82746 ASSAY OF FOLIC ACID SERUM: CPT

## 2025-07-10 PROCEDURE — P9016: CPT

## 2025-07-10 PROCEDURE — 86900 BLOOD TYPING SEROLOGIC ABO: CPT

## 2025-07-10 PROCEDURE — 85025 COMPLETE CBC W/AUTO DIFF WBC: CPT

## 2025-07-10 PROCEDURE — 71045 X-RAY EXAM CHEST 1 VIEW: CPT

## 2025-07-10 PROCEDURE — 85610 PROTHROMBIN TIME: CPT

## 2025-07-10 PROCEDURE — 80048 BASIC METABOLIC PNL TOTAL CA: CPT

## 2025-07-10 PROCEDURE — 85027 COMPLETE CBC AUTOMATED: CPT

## 2025-07-10 PROCEDURE — 86850 RBC ANTIBODY SCREEN: CPT

## 2025-07-10 PROCEDURE — 86985 SPLIT BLOOD OR PRODUCTS: CPT

## 2025-07-10 PROCEDURE — 80053 COMPREHEN METABOLIC PANEL: CPT

## 2025-07-10 PROCEDURE — 80061 LIPID PANEL: CPT

## 2025-07-10 PROCEDURE — 86901 BLOOD TYPING SEROLOGIC RH(D): CPT

## 2025-07-10 PROCEDURE — P9011: CPT

## 2025-07-10 PROCEDURE — 82607 VITAMIN B-12: CPT

## 2025-07-10 PROCEDURE — 87641 MR-STAPH DNA AMP PROBE: CPT

## 2025-07-10 PROCEDURE — 83036 HEMOGLOBIN GLYCOSYLATED A1C: CPT

## 2025-07-10 PROCEDURE — 83550 IRON BINDING TEST: CPT

## 2025-07-10 PROCEDURE — 36430 TRANSFUSION BLD/BLD COMPNT: CPT

## 2025-07-10 PROCEDURE — 86923 COMPATIBILITY TEST ELECTRIC: CPT

## 2025-07-10 PROCEDURE — 99285 EMERGENCY DEPT VISIT HI MDM: CPT

## 2025-07-10 PROCEDURE — 83540 ASSAY OF IRON: CPT

## 2025-07-10 PROCEDURE — 84100 ASSAY OF PHOSPHORUS: CPT

## 2025-07-10 PROCEDURE — 85730 THROMBOPLASTIN TIME PARTIAL: CPT

## 2025-07-10 PROCEDURE — 36415 COLL VENOUS BLD VENIPUNCTURE: CPT

## 2025-07-10 PROCEDURE — 93005 ELECTROCARDIOGRAM TRACING: CPT

## 2025-07-10 PROCEDURE — 83735 ASSAY OF MAGNESIUM: CPT

## 2025-07-10 PROCEDURE — 87640 STAPH A DNA AMP PROBE: CPT

## 2025-07-10 PROCEDURE — 82728 ASSAY OF FERRITIN: CPT

## 2025-07-10 PROCEDURE — 99261: CPT

## 2025-07-10 RX ORDER — ACETAMINOPHEN 500 MG/5ML
2 LIQUID (ML) ORAL
Qty: 0 | Refills: 0 | DISCHARGE
Start: 2025-07-10

## 2025-07-10 RX ORDER — AMIODARONE HYDROCHLORIDE 50 MG/ML
1 INJECTION, SOLUTION INTRAVENOUS
Qty: 0 | Refills: 0 | DISCHARGE
Start: 2025-07-10

## 2025-07-10 RX ORDER — AMIODARONE HYDROCHLORIDE 50 MG/ML
1 INJECTION, SOLUTION INTRAVENOUS
Qty: 30 | Refills: 0
Start: 2025-07-10 | End: 2025-08-08

## 2025-07-10 RX ORDER — FOLIC ACID 1 MG/1
1 TABLET ORAL
Qty: 30 | Refills: 0
Start: 2025-07-10 | End: 2025-08-08

## 2025-07-10 RX ORDER — MELATONIN 5 MG
1 TABLET ORAL
Qty: 0 | Refills: 0 | DISCHARGE
Start: 2025-07-10

## 2025-07-10 RX ADMIN — APIXABAN 2.5 MILLIGRAM(S): 5 TABLET, FILM COATED ORAL at 05:20

## 2025-07-10 RX ADMIN — Medication 1 APPLICATION(S): at 05:22

## 2025-07-10 RX ADMIN — SEVELAMER HYDROCHLORIDE 800 MILLIGRAM(S): 800 TABLET ORAL at 09:32

## 2025-07-10 RX ADMIN — AMIODARONE HYDROCHLORIDE 200 MILLIGRAM(S): 50 INJECTION, SOLUTION INTRAVENOUS at 05:20

## 2025-07-10 RX ADMIN — METOPROLOL SUCCINATE 200 MILLIGRAM(S): 50 TABLET, EXTENDED RELEASE ORAL at 05:20

## 2025-07-10 NOTE — PROGRESS NOTE ADULT - PROBLEM SELECTOR PLAN 1
ESRD on HD admitted after missing 1 session of dialysis, now found to have new onset anemia    ESRD on HD- MWF   Last HD on 7/9    Hypocalcemia  check 25-OH Vitamin D, PTH  Continue Sevelamer 800 mg TID     Anemia- rule out bleed   check iron studies  Patient is on Mircera and venopher at outpatient HD unit

## 2025-07-10 NOTE — DISCHARGE NOTE PROVIDER - NSDCCPCAREPLAN_GEN_ALL_CORE_FT
PRINCIPAL DISCHARGE DIAGNOSIS  Diagnosis: ESRD on dialysis  Assessment and Plan of Treatment: Cleared to resume regular outpatient dialysis schedule   Follow up with your nephrologist to continue receiving proper anemia management including Procrit during dialysis      SECONDARY DISCHARGE DIAGNOSES  Diagnosis: Anemia  Assessment and Plan of Treatment: Received blood transfusions   Follow up with your nephrologist to continue receiving proper anemia management including Procrit during dialysis    Diagnosis: Acute diarrhea  Assessment and Plan of Treatment: Now resolved    Diagnosis: Chronic atrial fibrillation  Assessment and Plan of Treatment: Atrial fibrillation is the most common heart rhythm problem and puts you at risk for stroke and heart attack. It is beneficial to control your blood pressure, limit yourself to no more than 1-2 alcoholic drinks per day, reduce caffeine intake, seek treatment for an overactive thyroid gland, and engage in regular exercise. If you experience your heart racing or beating unusually, chest tightness or pain, lightheadedness, faintness, or shortness of breath—especially during exercise—call your doctor immediately. It is important to take your heart medication as prescribed. If you are on anticoagulation therapy, it is crucial to take it as directed; you may also need blood work to monitor drug levels.      Diagnosis: Chronic systolic heart failure  Assessment and Plan of Treatment: Weigh yourself daily.  If you gain 3 lbs in 3 days or 5 lbs in a week, call your healthcare provider.  Do not consume foods or drinks containing more than 2000 mg of sodium per day.  Call your healthcare provider if you experience any swelling or increased swelling in your feet, ankles, and/or stomach.  Take all your medications as directed. If you become dizzy, call your healthcare provider.

## 2025-07-10 NOTE — DISCHARGE NOTE PROVIDER - NSDCFUSCHEDAPPT_GEN_ALL_CORE_FT
Royce Nelson  [facility] Physician [facility]  ENDOVASCULAR [address]   Scheduled Appointment: 07/15/2025    Dina Yarbrough  [facility]  CTSURG [address]  Scheduled Appointment: 08/14/2025    Ta Campa  [facility]  CTSURG [address]  Scheduled Appointment: 08/14/2025    Bassem King  [facility]  CARDIOLOGY [address]  Scheduled Appointment: 09/18/2025

## 2025-07-10 NOTE — CHART NOTE - NSCHARTNOTEFT_GEN_A_CORE
MEDICINE PA NOTE    AM CBC with hemoglobin 7.1.  Per nephrology, there is no objection to discharging the patient with instructions to follow up with her nephrologist at her dialysis center and to continue Procrit during dialysis.
Pt CBC post 1unit PRBC 6.9 from 5.6  1/2 unit over 3 hours x2 ordered d/t ESRD on HD status, dialyzed in evening of 7/8   Per RN, pt w/ complaints of generalized itchiness during second 1/2 unit, however not new. No erythema, rash, lesions.   Pt states has been having ongoing itching throughout day, prior to start of blood transfusions. Per chart review, pt has received IV benadryl prior    Vital Signs Last 24 Hrs  T(C): 36.9 (09 Jul 2025 03:45), Max: 36.9 (09 Jul 2025 03:00)  T(F): 98.4 (09 Jul 2025 03:45), Max: 98.4 (09 Jul 2025 03:00)  HR: 80 (09 Jul 2025 03:45) (68 - 91)  BP: 119/76 (09 Jul 2025 03:45) (92/57 - 120/73)  BP(mean): 82 (08 Jul 2025 10:50) (82 - 82)  RR: 17 (09 Jul 2025 03:45) (16 - 20)  SpO2: 98% (09 Jul 2025 03:45) (94% - 100%)    Parameters below as of 09 Jul 2025 03:45  Patient On (Oxygen Delivery Method): room air      Benadryl 25mg IV x1 ordered for symptomatic relief  F/u post CBC w AM labs  Care remains ongoing  Will endorse to day team    -Supriya Oliveros PA-C  [id number]

## 2025-07-10 NOTE — PROGRESS NOTE ADULT - ASSESSMENT
74-year-old female with PMHx of HTN, CKD stage 5 (pt. of Dr. Elizondo), Afib on Eliquis, anemia, nephrolithiasis, and recently diagnosed HF who presents for scheduled Afib ablation for Afib.

## 2025-07-10 NOTE — DISCHARGE NOTE PROVIDER - CARE PROVIDER_API CALL
Mikhail Yeager  Nephrology  [address], [address]  [place] [address]  Phone: [phone]  Fax: [phone]  Follow Up Time: 1-3 days    Bassem King  Cardiovascular Disease  [address] [place] [address]  Phone: [phone]  Fax: [phone]  Follow Up Time: 2 weeks

## 2025-07-10 NOTE — PROGRESS NOTE ADULT - SUBJECTIVE AND OBJECTIVE BOX
[facility] Division of Kidney Diseases & Hypertension  FOLLOW UP NOTE  [phone]--------------------------------------------------------------------------------  Chief Complaint:    24 hour events/subjective:    PAST HISTORY  --------------------------------------------------------------------------------  No significant changes to PMH, PSH, FHx, SHx from H&P unless otherwise noted.    ALLERGIES & MEDICATIONS  --------------------------------------------------------------------------------  Allergies    No Known Allergies    Intolerances      Standing Inpatient Medications  aMIOdarone    Tablet 200 milliGRAM(s) Oral daily  apixaban 2.5 milliGRAM(s) Oral every 12 hours  atorvastatin 80 milliGRAM(s) Oral at bedtime  calcium carbonate    500 mG (Tums) Chewable 1 Tablet(s) Chew four times a day  chlorhexidine 2% Cloths 1 Application(s) Topical <User Schedule>  ferrous    sulfate 325 milliGRAM(s) Oral daily  folic acid 1 milliGRAM(s) Oral daily  metoprolol succinate  milliGRAM(s) Oral daily  sevelamer carbonate 800 milliGRAM(s) Oral three times a day with meals    PRN Inpatient Medications  acetaminophen     Tablet .. 650 milliGRAM(s) Oral every 6 hours PRN  aluminum hydroxide/magnesium hydroxide/simethicone Suspension 30 milliLiter(s) Oral every 4 hours PRN  melatonin 3 milliGRAM(s) Oral at bedtime PRN  ondansetron Injectable 4 milliGRAM(s) IV Push every 8 hours PRN  oxyCODONE    IR 5 milliGRAM(s) Oral every 6 hours PRN      REVIEW OF SYSTEMS  --------------------------------------------------------------------------------  Gen: No fevers/chills  Head/Eyes/Ears: No HA  Respiratory: No dyspnea  CV: No chest pain  GI: No abdominal discomfort  : No dysuria  MSK: No edema    All other systems were reviewed and are negative, except as noted above.    VITALS/PHYSICAL EXAM  --------------------------------------------------------------------------------  T(C): 36.7 (07-10-25 @ 09:10), Max: 36.7 (07-10-25 @ 04:52)  HR: 79 (07-10-25 @ 09:10) (72 - 80)  BP: 115/74 (07-10-25 @ 09:10) (98/63 - 117/81)  RR: 18 (07-10-25 @ 09:10) (17 - 18)  SpO2: 98% (07-10-25 @ 09:10) (97% - 98%)  Wt(kg): --        07-09-25 @ 07:01  -  07-10-25 @ 07:00  --------------------------------------------------------  IN: 480 mL / OUT: 0 mL / NET: 480 mL    07-10-25 @ 07:01  -  07-10-25 @ 15:46  --------------------------------------------------------  IN: 150 mL / OUT: 0 mL / NET: 150 mL      Physical Exam:  Gen: NAD  Pulm: CTA B/L  CV: RRR, S1S2;  Abd: +BS, soft  : No suprapubic tenderness  Extremities: no bilateral LE edema.  Neuro: Awake, alert  Skin: Warm  Vascular access:    LABS/STUDIES  --------------------------------------------------------------------------------              7.1    6.36  >-----------<  170      [07-10-25 @ 05:32]              23.4     140  |  102  |  28  ----------------------------<  113      [07-10-25 @ 05:32]  4.3   |  25  |  3.39        Ca     8.4     [07-10-25 @ 05:32]    TPro  5.3  /  Alb  3.5  /  TBili  0.6  /  DBili  x   /  AST  12  /  ALT  7   /  AlkPhos  62  [07-09-25 @ 08:34]          Creatinine Trend:  SCr 3.39 [07-10 @ 05:32]  SCr 4.05 [07-09 @ 08:34]  SCr 4.50 [07-08 @ 11:14]    Urinalysis - [07-10-25 @ 05:32]      Color  / Appearance  / SG  / pH       Gluc 113 / Ketone   / Bili  / Urobili        Blood  / Protein  / Leuk Est  / Nitrite       RBC  / WBC  / Hyaline  / Gran  / Sq Epi  / Non Sq Epi  / Bacteria       Iron 106, TIBC 308, %sat 34      [07-09-25 @ 08:34]  Ferritin 570      [07-09-25 @ 08:34]  Lipid: chol 119, , HDL 40, LDL --      [07-09-25 @ 08:34]

## 2025-07-10 NOTE — DISCHARGE NOTE NURSING/CASE MANAGEMENT/SOCIAL WORK - FINANCIAL ASSISTANCE
[facility] provides services at a reduced cost to those who are determined to be eligible through [facility]’s financial assistance program. Information regarding [facility]’s financial assistance program can be found by going to [email or web address] or by calling [phone].

## 2025-07-10 NOTE — DISCHARGE NOTE NURSING/CASE MANAGEMENT/SOCIAL WORK - PATIENT PORTAL LINK FT
You can access the FollowMyHealth Patient Portal offered by [facility] by registering at the following website: [email or web address]. By joining [employer]’s FollowMyHealth portal, you will also be able to view your health information using other applications (apps) compatible with our system.

## 2025-07-10 NOTE — DISCHARGE NOTE PROVIDER - PROVIDER TOKENS
PROVIDER:[TOKEN:[[id number]],FOLLOWUP:[1-3 days]],PROVIDER:[TOKEN:[[id number]],FOLLOWUP:[2 weeks]]

## 2025-07-10 NOTE — DISCHARGE NOTE PROVIDER - NSDCMRMEDTOKEN_GEN_ALL_CORE_FT
acetaminophen 325 mg oral tablet: 2 tab(s) orally every 6 hours As needed Temp greater or equal to 38C (100.4F), Mild Pain (1 - 3)  amiodarone 200 mg oral tablet: 1 tab(s) orally once a day  apixaban 2.5 mg oral tablet: 1 tab(s) orally every 12 hours  atorvastatin 80 mg oral tablet: 1 tab(s) orally once a day (at bedtime)  bumetanide 2 mg oral tablet: 1 tab(s) orally Tuesday, Thursday, Saturday, Sunday Only on non-hemodialysis days  calcium (as carbonate) 600 mg oral tablet: 1 tab(s) orally 4 times a day  ferrous sulfate 325 mg (65 mg elemental iron) oral tablet: 1 tab(s) orally once a day  folic acid 1 mg oral tablet: 1 tab(s) orally once a day  melatonin 3 mg oral tablet: 1 tab(s) orally once a day (at bedtime) As needed Insomnia  metoprolol succinate 100 mg oral tablet, extended release: 2 tab(s) orally once a day  sevelamer carbonate 800 mg oral tablet: 1 tab(s) orally 3 times a day (with meals)

## 2025-07-10 NOTE — DISCHARGE NOTE PROVIDER - NSDCCAREPROVSEEN_GEN_ALL_CORE_FT
Pieter Szymanski Pieter Moran  Ordering Physician  Reji Hutchins  Team [facility]  Team [facility] [employer] [facility]  Ad[facility] Practice[facility]      [ Greater than 35 min spent for discharge services.   KRISTEN Szymasnki ]       ( Note written / Date of service is the same as last day of patient stay  in the hospital ( for billing purposes)))

## 2025-07-10 NOTE — PROGRESS NOTE ADULT - ASSESSMENT
M E D I C A L   A T T E N D I N G    F O L L O W    U P   N O T E  (07-10-25 )                                     ------------------------------------------------------------------------------------------------    patient evaluated by me, case discussed with team, chart, medications, and physical exam reviewed, labs / tests  and vitals reviewed by me, as bellow.   Patient is stable for discharge today. patient feels ok, no SOB, no other complains of.. wans to go home..     pre nephro team, procrit not to be given today, but tomorrow at her dialysis ...   Patient to follow up with  PMD, nephrologist, GI as discussed   See discharge document for full note (discussed with ACP).  [Greater than 35 min spent for these services. ]          ( Note written / Date of service 07-10-25 ( This is certified to be the same as "ENTERED" date above ( for billing purposes)))    ==================>> MEDICATIONS <<====================    aMIOdarone    Tablet 200 milliGRAM(s) Oral daily  apixaban 2.5 milliGRAM(s) Oral every 12 hours  atorvastatin 80 milliGRAM(s) Oral at bedtime  calcium carbonate    500 mG (Tums) Chewable 1 Tablet(s) Chew four times a day  chlorhexidine 2% Cloths 1 Application(s) Topical <User Schedule>  ferrous    sulfate 325 milliGRAM(s) Oral daily  folic acid 1 milliGRAM(s) Oral daily  metoprolol succinate  milliGRAM(s) Oral daily  sevelamer carbonate 800 milliGRAM(s) Oral three times a day with meals    MEDICATIONS  (PRN):  acetaminophen     Tablet .. 650 milliGRAM(s) Oral every 6 hours PRN Temp greater or equal to 38C (100.4F), Mild Pain (1 - 3)  aluminum hydroxide/magnesium hydroxide/simethicone Suspension 30 milliLiter(s) Oral every 4 hours PRN Dyspepsia  melatonin 3 milliGRAM(s) Oral at bedtime PRN Insomnia  ondansetron Injectable 4 milliGRAM(s) IV Push every 8 hours PRN Nausea and/or Vomiting  oxyCODONE    IR 5 milliGRAM(s) Oral every 6 hours PRN Severe Pain (7 - 10)    ___________  Active diet:  Diet, Regular:   For patients receiving Renal Replacement - No Protein Restr, No Conc K, No Conc Phos, Low Sodium (RENAL)  ___________________    ==================>> VITAL SIGNS <<==================    T(C): 36.7 (07-10-25 @ 09:10), Max: 36.7 (07-10-25 @ 04:52)  HR: 79 (07-10-25 @ 09:10) (71 - 80)  BP: 115/74 (07-10-25 @ 09:10) (98/63 - 126/65)  BP(mean): --  RR: 18 (07-10-25 @ 09:10) (17 - 18)  SpO2: 98% (07-10-25 @ 09:10) (97% - 98%)       I&O's Summary    09 Jul 2025 07:01  -  10 Jul 2025 07:00  --------------------------------------------------------  IN: 480 mL / OUT: 0 mL / NET: 480 mL    10 Jul 2025 07:01  -  10 Jul 2025 11:48  --------------------------------------------------------  IN: 150 mL / OUT: 0 mL / NET: 150 mL       ==================>> LAB AND IMAGING <<==================                        7.1    6.36  )-----------( 170      ( 10 Jul 2025 05:32 )             23.4        07-10    140  |  102  |  28[H]  ----------------------------<  113[H]  4.3   |  25  |  3.39[H]    Ca    8.4      10 Jul 2025 05:32    TPro  5.3[L]  /  Alb  3.5  /  TBili  0.6  /  DBili  x   /  AST  12  /  ALT  7[L]  /  AlkPhos  62  07-09      Lipid profile:  (07-09-25)     Total: 119     LDL  : (p)     HDL  :40     TG   :112     HgA1C:   (07-09-25)          (07-09-25)      5.7    WBC count:   6.36 <<== ,  6.51 <<== ,  5.00 <<== ,  7.02 <<==   Hemoglobin:   7.1 <<==,  7.8 <<==,  6.9 <<==,  5.6 <<==  platelets:  170 <==, 167 <==, 185 <==, 207 <==    Creatinine:  3.39  <<==, 4.05  <<==, 4.50  <<==  Sodium:   140  <==, 136  <==, 139  <==       AST:          12(07-09) <== , 5(07-08) <==      ALT:        7(07-09)  <== , 5(07-08)  <==      AP:        62(07-09)  <=, 37(07-08)  <=     Bili:        0.6(07-09)  <=, 0.3(07-08)  <=        ( Note written / Date of service 07-10-25 ( This is certified to be the same as "ENTERED" date above ( for billing Purposes )))

## 2025-07-10 NOTE — DISCHARGE NOTE PROVIDER - HOSPITAL COURSE
HPI:  74-year-old female with a pertinent history of hypertension, end-stage renal disease (ESRD) on hemodialysis (MWF at [facility], Dr. Jacob Yeager), chronic atrial fibrillation on apixaban, chronic systolic heart failure (with prior improved ejection fraction), anemia, nephrolithiasis, and hyperlipidemia. The patient presented to the ED after missing her scheduled dialysis session on 7/7 due to self-limited diarrhea and associated symptoms of malaise and nausea.    Hospital Course:  On admission, initial laboratories revealed a significant anemia with hemoglobin of 5.2 g/dL. She received blood transfusions with close monitoring of hemoglobin and hematocrit. Anemia panel was sent; it was noted that the patient has never undergone colonoscopy and was advised to pursue outpatient screening per discussion with the patient and her .    The patient’s prior history of chronic atrial fibrillation was managed with home apixaban and beta blocker; telemetry monitoring during admission showed no arrhythmic complications. Her history of chronic systolic heart failure remains stable with no evidence of volume overload on exam or imaging; she was continued on guideline-directed medical therapy and diuresis as indicated. Hypertension and hyperlipidemia were managed with continuation of her home medications (including statin).    Her episode of acute diarrhea improved spontaneously without intervention, and GI symptoms resolved during admission. She tolerated oral intake without further GI complaints, and hemodynamics remained stable throughout hospitalization.    At discharge, she was hemodynamically stable, with stable hemoglobin post-transfusion, at her baseline functional status, and cleared to resume regular outpatient dialysis schedule. She was instructed to follow up with nephrology, cardiology, and primary care. Patient and family were advised on the importance of maintaining dialysis schedule and close monitoring for any recurrent symptoms.    Important Medication Changes and Reason: see medication reconciliation     Active or Pending Issues Requiring Follow-up: PCP, nephrology, cardiology    Advanced Directives:   [x] Full code  [ ] DNR  [ ] Hospice    Discharge Diagnoses:  ESRD on HD  anemia  acute diarrhea  chronic AF  chronic systolic HF

## 2025-07-11 ENCOUNTER — TRANSCRIPTION ENCOUNTER (OUTPATIENT)
Age: 75
End: 2025-07-11

## 2025-07-15 ENCOUNTER — APPOINTMENT (OUTPATIENT)
Dept: ENDOVASCULAR SURGERY | Facility: CLINIC | Age: 75
End: 2025-07-15
Payer: MEDICARE

## 2025-07-15 PROCEDURE — 36589 REMOVAL TUNNELED CV CATH: CPT | Mod: 58

## 2025-07-16 ENCOUNTER — APPOINTMENT (OUTPATIENT)
Dept: CARE COORDINATION | Facility: HOME HEALTH | Age: 75
End: 2025-07-16
Payer: MEDICARE

## 2025-07-16 PROBLEM — I50.9 CHF (CONGESTIVE HEART FAILURE): Status: ACTIVE | Noted: 2025-07-16

## 2025-07-16 PROCEDURE — 99349 HOME/RES VST EST MOD MDM 40: CPT | Mod: 93

## 2025-07-16 RX ORDER — METOPROLOL SUCCINATE 100 MG/1
100 TABLET, EXTENDED RELEASE ORAL
Refills: 0 | Status: ACTIVE | COMMUNITY

## 2025-07-16 RX ORDER — AMIODARONE HYDROCHLORIDE 200 MG/1
200 TABLET ORAL
Refills: 0 | Status: ACTIVE | COMMUNITY

## 2025-07-22 ENCOUNTER — APPOINTMENT (OUTPATIENT)
Dept: CARDIOLOGY | Facility: CLINIC | Age: 75
End: 2025-07-22

## 2025-07-24 ENCOUNTER — TRANSCRIPTION ENCOUNTER (OUTPATIENT)
Age: 75
End: 2025-07-24

## 2025-07-29 ENCOUNTER — APPOINTMENT (OUTPATIENT)
Dept: CARDIOLOGY | Facility: CLINIC | Age: 75
End: 2025-07-29
Payer: MEDICARE

## 2025-07-29 VITALS
OXYGEN SATURATION: 98 % | SYSTOLIC BLOOD PRESSURE: 119 MMHG | WEIGHT: 130 LBS | DIASTOLIC BLOOD PRESSURE: 63 MMHG | HEART RATE: 91 BPM | BODY MASS INDEX: 23.92 KG/M2 | HEIGHT: 62 IN

## 2025-07-29 DIAGNOSIS — I34.0 NONRHEUMATIC MITRAL (VALVE) INSUFFICIENCY: ICD-10-CM

## 2025-07-29 DIAGNOSIS — I48.20 CHRONIC ATRIAL FIBRILLATION, UNSP: ICD-10-CM

## 2025-07-29 PROCEDURE — 99214 OFFICE O/P EST MOD 30 MIN: CPT

## 2025-07-29 PROCEDURE — 93000 ELECTROCARDIOGRAM COMPLETE: CPT

## 2025-08-03 PROBLEM — N39.0 ACUTE LOWER UTI: Status: ACTIVE | Noted: 2024-08-26

## 2025-08-04 ENCOUNTER — INPATIENT (INPATIENT)
Facility: HOSPITAL | Age: 75
LOS: 1 days | Discharge: HOME | DRG: 948 | End: 2025-08-06
Attending: GENERAL PRACTICE | Admitting: GENERAL PRACTICE
Payer: MEDICARE

## 2025-08-04 VITALS — HEIGHT: 64 IN | WEIGHT: 128.97 LBS

## 2025-08-04 DIAGNOSIS — D64.9 ANEMIA, UNSPECIFIED: ICD-10-CM
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
## 2025-08-04 DIAGNOSIS — M79.18 MYALGIA, OTHER SITE: ICD-10-CM

## 2025-08-04 DIAGNOSIS — Z92.89 PERSONAL HISTORY OF OTHER MEDICAL TREATMENT: Chronic | ICD-10-CM

## 2025-08-04 DIAGNOSIS — R41.82 ALTERED MENTAL STATUS, UNSPECIFIED: ICD-10-CM

## 2025-08-04 DIAGNOSIS — I10 ESSENTIAL (PRIMARY) HYPERTENSION: ICD-10-CM

## 2025-08-04 DIAGNOSIS — I48.20 CHRONIC ATRIAL FIBRILLATION, UNSPECIFIED: ICD-10-CM

## 2025-08-04 DIAGNOSIS — N18.6 END STAGE RENAL DISEASE: ICD-10-CM

## 2025-08-04 LAB
ADD ON TEST-SPECIMEN IN LAB: SIGNIFICANT CHANGE UP
ALBUMIN SERPL ELPH-MCNC: 4.2 G/DL — SIGNIFICANT CHANGE UP (ref 3.3–5)
ALP SERPL-CCNC: 87 U/L — SIGNIFICANT CHANGE UP (ref 40–120)
ALT FLD-CCNC: 15 U/L — SIGNIFICANT CHANGE UP (ref 10–45)
ANION GAP SERPL CALC-SCNC: 23 MMOL/L — HIGH (ref 5–17)
ANISOCYTOSIS BLD QL: SLIGHT — SIGNIFICANT CHANGE UP
APTT BLD: 31.4 SEC — SIGNIFICANT CHANGE UP (ref 26.1–36.8)
AST SERPL-CCNC: 22 U/L — SIGNIFICANT CHANGE UP (ref 10–40)
BASOPHILS # BLD AUTO: 0.08 K/UL — SIGNIFICANT CHANGE UP (ref 0–0.2)
BASOPHILS # BLD MANUAL: 0.06 K/UL — SIGNIFICANT CHANGE UP (ref 0–0.2)
BASOPHILS NFR BLD AUTO: 1 % — SIGNIFICANT CHANGE UP (ref 0–2)
BASOPHILS NFR BLD MANUAL: 0.8 % — SIGNIFICANT CHANGE UP (ref 0–2)
BILIRUB SERPL-MCNC: 0.5 MG/DL — SIGNIFICANT CHANGE UP (ref 0.2–1.2)
BUN SERPL-MCNC: 59 MG/DL — HIGH (ref 7–23)
CALCIUM SERPL-MCNC: 10.9 MG/DL — HIGH (ref 8.4–10.5)
CHLORIDE SERPL-SCNC: 93 MMOL/L — LOW (ref 96–108)
CK SERPL-CCNC: 131 U/L — SIGNIFICANT CHANGE UP (ref 25–170)
CO2 SERPL-SCNC: 22 MMOL/L — SIGNIFICANT CHANGE UP (ref 22–31)
CREAT SERPL-MCNC: 7.22 MG/DL — HIGH (ref 0.5–1.3)
DACRYOCYTES BLD QL SMEAR: SLIGHT — SIGNIFICANT CHANGE UP
EGFR: 5 ML/MIN/1.73M2 — LOW
EGFR: 5 ML/MIN/1.73M2 — LOW
EOSINOPHIL # BLD AUTO: 0.05 K/UL — SIGNIFICANT CHANGE UP (ref 0–0.5)
EOSINOPHIL # BLD MANUAL: 0.13 K/UL — SIGNIFICANT CHANGE UP (ref 0–0.5)
EOSINOPHIL NFR BLD AUTO: 0.6 % — SIGNIFICANT CHANGE UP (ref 0–6)
EOSINOPHIL NFR BLD MANUAL: 1.7 % — SIGNIFICANT CHANGE UP (ref 0–6)
FLUAV AG NPH QL: SIGNIFICANT CHANGE UP
FLUBV AG NPH QL: SIGNIFICANT CHANGE UP
GLUCOSE SERPL-MCNC: 117 MG/DL — HIGH (ref 70–99)
HCT VFR BLD CALC: 30 % — LOW (ref 34.5–45)
HGB BLD-MCNC: 9 G/DL — LOW (ref 11.5–15.5)
IMM GRANULOCYTES # BLD AUTO: 0.05 K/UL — SIGNIFICANT CHANGE UP (ref 0–0.07)
IMM GRANULOCYTES NFR BLD AUTO: 0.6 % — SIGNIFICANT CHANGE UP (ref 0–0.9)
INR BLD: 1.87 RATIO — HIGH (ref 0.85–1.16)
LYMPHOCYTES # BLD AUTO: 1.63 K/UL — SIGNIFICANT CHANGE UP (ref 1–3.3)
LYMPHOCYTES # BLD MANUAL: 1.72 K/UL — SIGNIFICANT CHANGE UP (ref 1–3.3)
LYMPHOCYTES NFR BLD AUTO: 20.9 % — SIGNIFICANT CHANGE UP (ref 13–44)
LYMPHOCYTES NFR BLD MANUAL: 22 % — SIGNIFICANT CHANGE UP (ref 13–44)
MACROCYTES BLD QL: SLIGHT — SIGNIFICANT CHANGE UP
MANUAL NRBC #: 0.16 K/UL — HIGH (ref 0–0)
MCHC RBC-ENTMCNC: 30 G/DL — LOW (ref 32–36)
MCHC RBC-ENTMCNC: 33.5 PG — SIGNIFICANT CHANGE UP (ref 27–34)
MCV RBC AUTO: 111.5 FL — HIGH (ref 80–100)
MONOCYTES # BLD AUTO: 0.8 K/UL — SIGNIFICANT CHANGE UP (ref 0–0.9)
MONOCYTES # BLD MANUAL: 0.33 K/UL — SIGNIFICANT CHANGE UP (ref 0–0.9)
MONOCYTES NFR BLD AUTO: 10.2 % — SIGNIFICANT CHANGE UP (ref 2–14)
MONOCYTES NFR BLD MANUAL: 4.2 % — SIGNIFICANT CHANGE UP (ref 2–14)
NEUTROPHILS # BLD AUTO: 5.2 K/UL — SIGNIFICANT CHANGE UP (ref 1.8–7.4)
NEUTROPHILS # BLD MANUAL: 5.57 K/UL — SIGNIFICANT CHANGE UP (ref 1.8–7.4)
NEUTROPHILS NFR BLD AUTO: 66.7 % — SIGNIFICANT CHANGE UP (ref 43–77)
NEUTROPHILS NFR BLD MANUAL: 71.3 % — SIGNIFICANT CHANGE UP (ref 43–77)
NRBC # BLD AUTO: 0.5 K/UL — HIGH (ref 0–0)
NRBC # BLD: 2 /100 WBCS — HIGH (ref 0–0)
NRBC # FLD: 0.5 K/UL — HIGH (ref 0–0)
NRBC BLD AUTO-RTO: 6 /100 WBCS — HIGH (ref 0–0)
NRBC BLD-RTO: 2 /100 WBCS — HIGH (ref 0–0)
OVALOCYTES BLD QL SMEAR: SLIGHT — SIGNIFICANT CHANGE UP
PLAT MORPH BLD: NORMAL — SIGNIFICANT CHANGE UP
PLATELET # BLD AUTO: 217 K/UL — SIGNIFICANT CHANGE UP (ref 150–400)
PMV BLD: 10 FL — SIGNIFICANT CHANGE UP (ref 7–13)
POIKILOCYTOSIS BLD QL AUTO: SLIGHT — SIGNIFICANT CHANGE UP
POLYCHROMASIA BLD QL SMEAR: SLIGHT — SIGNIFICANT CHANGE UP
POTASSIUM SERPL-MCNC: 5.5 MMOL/L — HIGH (ref 3.5–5.3)
POTASSIUM SERPL-SCNC: 5.5 MMOL/L — HIGH (ref 3.5–5.3)
PROT SERPL-MCNC: 6.6 G/DL — SIGNIFICANT CHANGE UP (ref 6–8.3)
PROTHROM AB SERPL-ACNC: 21.2 SEC — HIGH (ref 9.9–13.4)
RBC # BLD: 2.69 M/UL — LOW (ref 3.8–5.2)
RBC # FLD: 20.1 % — HIGH (ref 10.3–14.5)
RBC BLD AUTO: ABNORMAL
RSV RNA NPH QL NAA+NON-PROBE: SIGNIFICANT CHANGE UP
SARS-COV-2 RNA SPEC QL NAA+PROBE: SIGNIFICANT CHANGE UP
SODIUM SERPL-SCNC: 138 MMOL/L — SIGNIFICANT CHANGE UP (ref 135–145)
SOURCE RESPIRATORY: SIGNIFICANT CHANGE UP
WBC # BLD: 7.81 K/UL — SIGNIFICANT CHANGE UP (ref 3.8–10.5)
WBC # FLD AUTO: 7.81 K/UL — SIGNIFICANT CHANGE UP (ref 3.8–10.5)

## 2025-08-04 PROCEDURE — 85025 COMPLETE CBC W/AUTO DIFF WBC: CPT

## 2025-08-04 PROCEDURE — 70450 CT HEAD/BRAIN W/O DYE: CPT | Mod: 26,XU

## 2025-08-04 PROCEDURE — 70498 CT ANGIOGRAPHY NECK: CPT

## 2025-08-04 PROCEDURE — 80053 COMPREHEN METABOLIC PANEL: CPT

## 2025-08-04 PROCEDURE — 85730 THROMBOPLASTIN TIME PARTIAL: CPT

## 2025-08-04 PROCEDURE — 70496 CT ANGIOGRAPHY HEAD: CPT | Mod: 26

## 2025-08-04 PROCEDURE — 70498 CT ANGIOGRAPHY NECK: CPT | Mod: 26

## 2025-08-04 PROCEDURE — 93010 ELECTROCARDIOGRAM REPORT: CPT

## 2025-08-04 PROCEDURE — 84484 ASSAY OF TROPONIN QUANT: CPT

## 2025-08-04 PROCEDURE — 99285 EMERGENCY DEPT VISIT HI MDM: CPT | Mod: FS

## 2025-08-04 PROCEDURE — 87637 SARSCOV2&INF A&B&RSV AMP PRB: CPT

## 2025-08-04 PROCEDURE — 72125 CT NECK SPINE W/O DYE: CPT | Mod: 26

## 2025-08-04 PROCEDURE — 71045 X-RAY EXAM CHEST 1 VIEW: CPT

## 2025-08-04 PROCEDURE — 82550 ASSAY OF CK (CPK): CPT

## 2025-08-04 PROCEDURE — 71045 X-RAY EXAM CHEST 1 VIEW: CPT | Mod: 26

## 2025-08-04 PROCEDURE — 70496 CT ANGIOGRAPHY HEAD: CPT

## 2025-08-04 PROCEDURE — 72125 CT NECK SPINE W/O DYE: CPT

## 2025-08-04 PROCEDURE — 85610 PROTHROMBIN TIME: CPT

## 2025-08-04 PROCEDURE — 70450 CT HEAD/BRAIN W/O DYE: CPT

## 2025-08-04 RX ORDER — METOPROLOL SUCCINATE 50 MG/1
100 TABLET, EXTENDED RELEASE ORAL DAILY
Refills: 0 | Status: DISCONTINUED | OUTPATIENT
Start: 2025-08-04 | End: 2025-08-06

## 2025-08-04 RX ORDER — ATORVASTATIN CALCIUM 80 MG/1
1 TABLET, FILM COATED ORAL
Refills: 0 | DISCHARGE

## 2025-08-04 RX ORDER — BUMETANIDE 1 MG/1
1 TABLET ORAL
Refills: 0 | DISCHARGE

## 2025-08-04 RX ORDER — APIXABAN 5 MG/1
2.5 TABLET, FILM COATED ORAL EVERY 12 HOURS
Refills: 0 | Status: DISCONTINUED | OUTPATIENT
Start: 2025-08-04 | End: 2025-08-06

## 2025-08-04 RX ORDER — APIXABAN 5 MG/1
1 TABLET, FILM COATED ORAL
Refills: 0 | DISCHARGE

## 2025-08-04 RX ORDER — ONDANSETRON HCL/PF 4 MG/2 ML
4 VIAL (ML) INJECTION EVERY 8 HOURS
Refills: 0 | Status: DISCONTINUED | OUTPATIENT
Start: 2025-08-04 | End: 2025-08-06

## 2025-08-04 RX ORDER — CALCIUM CARBONATE 750 MG/1
1 TABLET ORAL
Refills: 0 | DISCHARGE

## 2025-08-04 RX ORDER — SEVELAMER HYDROCHLORIDE 800 MG/1
800 TABLET ORAL
Refills: 0 | Status: DISCONTINUED | OUTPATIENT
Start: 2025-08-04 | End: 2025-08-06

## 2025-08-04 RX ORDER — FERROUS SULFATE 137(45) MG
1 TABLET, EXTENDED RELEASE ORAL
Refills: 0 | DISCHARGE

## 2025-08-04 RX ORDER — FERROUS SULFATE 137(45) MG
0 TABLET, EXTENDED RELEASE ORAL
Refills: 0 | DISCHARGE

## 2025-08-04 RX ORDER — FOLIC ACID 1 MG/1
1 TABLET ORAL DAILY
Refills: 0 | Status: DISCONTINUED | OUTPATIENT
Start: 2025-08-04 | End: 2025-08-06

## 2025-08-04 RX ORDER — CALCIUM CARBONATE 750 MG/1
1 TABLET ORAL
Refills: 0 | Status: DISCONTINUED | OUTPATIENT
Start: 2025-08-04 | End: 2025-08-06

## 2025-08-04 RX ORDER — METOPROLOL SUCCINATE 50 MG/1
2 TABLET, EXTENDED RELEASE ORAL
Refills: 0 | DISCHARGE

## 2025-08-04 RX ORDER — MELATONIN 5 MG
3 TABLET ORAL AT BEDTIME
Refills: 0 | Status: DISCONTINUED | OUTPATIENT
Start: 2025-08-04 | End: 2025-08-06

## 2025-08-04 RX ORDER — ACETAMINOPHEN 500 MG/5ML
650 LIQUID (ML) ORAL EVERY 6 HOURS
Refills: 0 | Status: DISCONTINUED | OUTPATIENT
Start: 2025-08-04 | End: 2025-08-06

## 2025-08-04 RX ORDER — FERROUS SULFATE 137(45) MG
325 TABLET, EXTENDED RELEASE ORAL DAILY
Refills: 0 | Status: DISCONTINUED | OUTPATIENT
Start: 2025-08-04 | End: 2025-08-06

## 2025-08-04 RX ORDER — FOLIC ACID 1 MG/1
1 TABLET ORAL
Refills: 0 | DISCHARGE

## 2025-08-04 RX ORDER — MAGNESIUM, ALUMINUM HYDROXIDE 200-200 MG
30 TABLET,CHEWABLE ORAL EVERY 4 HOURS
Refills: 0 | Status: DISCONTINUED | OUTPATIENT
Start: 2025-08-04 | End: 2025-08-06

## 2025-08-04 RX ORDER — SEVELAMER HYDROCHLORIDE 800 MG/1
1 TABLET ORAL
Refills: 0 | DISCHARGE

## 2025-08-04 RX ORDER — BUMETANIDE 1 MG/1
2 TABLET ORAL
Refills: 0 | Status: DISCONTINUED | OUTPATIENT
Start: 2025-08-04 | End: 2025-08-06

## 2025-08-05 ENCOUNTER — TRANSCRIPTION ENCOUNTER (OUTPATIENT)
Age: 75
End: 2025-08-05

## 2025-08-05 LAB
ALBUMIN SERPL ELPH-MCNC: 3.7 G/DL — SIGNIFICANT CHANGE UP (ref 3.3–5)
ALP SERPL-CCNC: 75 U/L — SIGNIFICANT CHANGE UP (ref 40–120)
ALT FLD-CCNC: 12 U/L — SIGNIFICANT CHANGE UP (ref 10–45)
ANION GAP SERPL CALC-SCNC: 17 MMOL/L — SIGNIFICANT CHANGE UP (ref 5–17)
AST SERPL-CCNC: 14 U/L — SIGNIFICANT CHANGE UP (ref 10–40)
BILIRUB SERPL-MCNC: 0.5 MG/DL — SIGNIFICANT CHANGE UP (ref 0.2–1.2)
BUN SERPL-MCNC: 21 MG/DL — SIGNIFICANT CHANGE UP (ref 7–23)
CALCIUM SERPL-MCNC: 9.6 MG/DL — SIGNIFICANT CHANGE UP (ref 8.4–10.5)
CHLORIDE SERPL-SCNC: 100 MMOL/L — SIGNIFICANT CHANGE UP (ref 96–108)
CHOLEST SERPL-MCNC: 115 MG/DL — SIGNIFICANT CHANGE UP
CK SERPL-CCNC: 60 U/L — SIGNIFICANT CHANGE UP (ref 25–170)
CO2 SERPL-SCNC: 24 MMOL/L — SIGNIFICANT CHANGE UP (ref 22–31)
CREAT SERPL-MCNC: 3.84 MG/DL — HIGH (ref 0.5–1.3)
EGFR: 12 ML/MIN/1.73M2 — LOW
EGFR: 12 ML/MIN/1.73M2 — LOW
GLUCOSE SERPL-MCNC: 64 MG/DL — LOW (ref 70–99)
HCT VFR BLD CALC: 27.7 % — LOW (ref 34.5–45)
HDLC SERPL-MCNC: 52 MG/DL — SIGNIFICANT CHANGE UP
HGB BLD-MCNC: 8.1 G/DL — LOW (ref 11.5–15.5)
LDLC SERPL-MCNC: 45 MG/DL — SIGNIFICANT CHANGE UP
LIPID PNL WITH DIRECT LDL SERPL: 45 MG/DL — SIGNIFICANT CHANGE UP
MCHC RBC-ENTMCNC: 29.2 G/DL — LOW (ref 32–36)
MCHC RBC-ENTMCNC: 32.9 PG — SIGNIFICANT CHANGE UP (ref 27–34)
MCV RBC AUTO: 112.6 FL — HIGH (ref 80–100)
NONHDLC SERPL-MCNC: 63 MG/DL — SIGNIFICANT CHANGE UP
NRBC # BLD AUTO: 0.12 K/UL — HIGH (ref 0–0)
NRBC # FLD: 0.12 K/UL — HIGH (ref 0–0)
NRBC BLD AUTO-RTO: 2 /100 WBCS — HIGH (ref 0–0)
PLATELET # BLD AUTO: 154 K/UL — SIGNIFICANT CHANGE UP (ref 150–400)
PMV BLD: 10.2 FL — SIGNIFICANT CHANGE UP (ref 7–13)
POTASSIUM SERPL-MCNC: 3.9 MMOL/L — SIGNIFICANT CHANGE UP (ref 3.5–5.3)
POTASSIUM SERPL-SCNC: 3.9 MMOL/L — SIGNIFICANT CHANGE UP (ref 3.5–5.3)
PROT SERPL-MCNC: 5.7 G/DL — LOW (ref 6–8.3)
RBC # BLD: 2.46 M/UL — LOW (ref 3.8–5.2)
RBC # FLD: 20.2 % — HIGH (ref 10.3–14.5)
SODIUM SERPL-SCNC: 141 MMOL/L — SIGNIFICANT CHANGE UP (ref 135–145)
TRIGL SERPL-MCNC: 94 MG/DL — SIGNIFICANT CHANGE UP
WBC # BLD: 5.09 K/UL — SIGNIFICANT CHANGE UP (ref 3.8–10.5)
WBC # FLD AUTO: 5.09 K/UL — SIGNIFICANT CHANGE UP (ref 3.8–10.5)

## 2025-08-05 PROCEDURE — 36415 COLL VENOUS BLD VENIPUNCTURE: CPT

## 2025-08-05 PROCEDURE — 85610 PROTHROMBIN TIME: CPT

## 2025-08-05 PROCEDURE — 80061 LIPID PANEL: CPT

## 2025-08-05 PROCEDURE — 87040 BLOOD CULTURE FOR BACTERIA: CPT

## 2025-08-05 PROCEDURE — 85730 THROMBOPLASTIN TIME PARTIAL: CPT

## 2025-08-05 PROCEDURE — 82550 ASSAY OF CK (CPK): CPT

## 2025-08-05 PROCEDURE — 85025 COMPLETE CBC W/AUTO DIFF WBC: CPT

## 2025-08-05 PROCEDURE — 80053 COMPREHEN METABOLIC PANEL: CPT

## 2025-08-05 PROCEDURE — 70450 CT HEAD/BRAIN W/O DYE: CPT

## 2025-08-05 PROCEDURE — 85027 COMPLETE CBC AUTOMATED: CPT

## 2025-08-05 PROCEDURE — 84484 ASSAY OF TROPONIN QUANT: CPT

## 2025-08-05 PROCEDURE — 71045 X-RAY EXAM CHEST 1 VIEW: CPT

## 2025-08-05 PROCEDURE — 97161 PT EVAL LOW COMPLEX 20 MIN: CPT

## 2025-08-05 PROCEDURE — 93005 ELECTROCARDIOGRAM TRACING: CPT

## 2025-08-05 PROCEDURE — 99223 1ST HOSP IP/OBS HIGH 75: CPT | Mod: GC

## 2025-08-05 PROCEDURE — 87637 SARSCOV2&INF A&B&RSV AMP PRB: CPT

## 2025-08-05 PROCEDURE — 70498 CT ANGIOGRAPHY NECK: CPT

## 2025-08-05 PROCEDURE — 72125 CT NECK SPINE W/O DYE: CPT

## 2025-08-05 PROCEDURE — 70496 CT ANGIOGRAPHY HEAD: CPT

## 2025-08-05 RX ORDER — ATORVASTATIN CALCIUM 80 MG/1
80 TABLET, FILM COATED ORAL AT BEDTIME
Refills: 0 | Status: DISCONTINUED | OUTPATIENT
Start: 2025-08-05 | End: 2025-08-06

## 2025-08-05 RX ORDER — APIXABAN 5 MG/1
2.5 TABLET, FILM COATED ORAL ONCE
Refills: 0 | Status: COMPLETED | OUTPATIENT
Start: 2025-08-05 | End: 2025-08-05

## 2025-08-05 RX ORDER — CALAMINE 8% AND ZINC OXIDE 8% 160 MG/ML
1 LOTION TOPICAL
Refills: 0 | Status: DISCONTINUED | OUTPATIENT
Start: 2025-08-05 | End: 2025-08-06

## 2025-08-05 RX ADMIN — CALCIUM CARBONATE 1 TABLET(S): 750 TABLET ORAL at 06:23

## 2025-08-05 RX ADMIN — APIXABAN 2.5 MILLIGRAM(S): 5 TABLET, FILM COATED ORAL at 12:56

## 2025-08-05 RX ADMIN — SEVELAMER HYDROCHLORIDE 800 MILLIGRAM(S): 800 TABLET ORAL at 09:00

## 2025-08-05 RX ADMIN — CALCIUM CARBONATE 1 TABLET(S): 750 TABLET ORAL at 17:44

## 2025-08-05 RX ADMIN — CALCIUM CARBONATE 1 TABLET(S): 750 TABLET ORAL at 12:55

## 2025-08-05 RX ADMIN — ATORVASTATIN CALCIUM 80 MILLIGRAM(S): 80 TABLET, FILM COATED ORAL at 01:37

## 2025-08-05 RX ADMIN — FOLIC ACID 1 MILLIGRAM(S): 1 TABLET ORAL at 12:55

## 2025-08-05 RX ADMIN — SEVELAMER HYDROCHLORIDE 800 MILLIGRAM(S): 800 TABLET ORAL at 12:55

## 2025-08-05 RX ADMIN — APIXABAN 2.5 MILLIGRAM(S): 5 TABLET, FILM COATED ORAL at 17:44

## 2025-08-05 RX ADMIN — Medication 650 MILLIGRAM(S): at 16:06

## 2025-08-05 RX ADMIN — ATORVASTATIN CALCIUM 80 MILLIGRAM(S): 80 TABLET, FILM COATED ORAL at 22:31

## 2025-08-05 RX ADMIN — BUMETANIDE 2 MILLIGRAM(S): 1 TABLET ORAL at 06:23

## 2025-08-05 RX ADMIN — Medication 650 MILLIGRAM(S): at 17:06

## 2025-08-05 RX ADMIN — SEVELAMER HYDROCHLORIDE 800 MILLIGRAM(S): 800 TABLET ORAL at 17:44

## 2025-08-05 RX ADMIN — CALCIUM CARBONATE 1 TABLET(S): 750 TABLET ORAL at 23:34

## 2025-08-05 RX ADMIN — METOPROLOL SUCCINATE 100 MILLIGRAM(S): 50 TABLET, EXTENDED RELEASE ORAL at 06:23

## 2025-08-05 RX ADMIN — APIXABAN 2.5 MILLIGRAM(S): 5 TABLET, FILM COATED ORAL at 01:42

## 2025-08-05 RX ADMIN — Medication 1 APPLICATION(S): at 22:38

## 2025-08-05 RX ADMIN — CALAMINE 8% AND ZINC OXIDE 8% 1 APPLICATION(S): 160 LOTION TOPICAL at 22:37

## 2025-08-05 RX ADMIN — CALCIUM CARBONATE 1 TABLET(S): 750 TABLET ORAL at 01:37

## 2025-08-05 RX ADMIN — Medication 325 MILLIGRAM(S): at 12:55

## 2025-08-06 ENCOUNTER — TRANSCRIPTION ENCOUNTER (OUTPATIENT)
Age: 75
End: 2025-08-06

## 2025-08-06 VITALS
DIASTOLIC BLOOD PRESSURE: 78 MMHG | SYSTOLIC BLOOD PRESSURE: 126 MMHG | OXYGEN SATURATION: 100 % | TEMPERATURE: 97 F | HEART RATE: 58 BPM | WEIGHT: 121.25 LBS | RESPIRATION RATE: 18 BRPM

## 2025-08-06 LAB
ANION GAP SERPL CALC-SCNC: 17 MMOL/L — SIGNIFICANT CHANGE UP (ref 5–17)
BUN SERPL-MCNC: 42 MG/DL — HIGH (ref 7–23)
CALCIUM SERPL-MCNC: 9.2 MG/DL — SIGNIFICANT CHANGE UP (ref 8.4–10.5)
CHLORIDE SERPL-SCNC: 100 MMOL/L — SIGNIFICANT CHANGE UP (ref 96–108)
CO2 SERPL-SCNC: 23 MMOL/L — SIGNIFICANT CHANGE UP (ref 22–31)
CREAT SERPL-MCNC: 5.97 MG/DL — HIGH (ref 0.5–1.3)
EGFR: 7 ML/MIN/1.73M2 — LOW
EGFR: 7 ML/MIN/1.73M2 — LOW
GLUCOSE SERPL-MCNC: 106 MG/DL — HIGH (ref 70–99)
HCT VFR BLD CALC: 27.3 % — LOW (ref 34.5–45)
HGB BLD-MCNC: 8.2 G/DL — LOW (ref 11.5–15.5)
MCHC RBC-ENTMCNC: 30 G/DL — LOW (ref 32–36)
MCHC RBC-ENTMCNC: 34 PG — SIGNIFICANT CHANGE UP (ref 27–34)
MCV RBC AUTO: 113.3 FL — HIGH (ref 80–100)
NRBC # BLD AUTO: 0.1 K/UL — HIGH (ref 0–0)
NRBC # FLD: 0.1 K/UL — HIGH (ref 0–0)
NRBC BLD AUTO-RTO: 2 /100 WBCS — HIGH (ref 0–0)
PLATELET # BLD AUTO: 163 K/UL — SIGNIFICANT CHANGE UP (ref 150–400)
PMV BLD: 10 FL — SIGNIFICANT CHANGE UP (ref 7–13)
POTASSIUM SERPL-MCNC: 4.3 MMOL/L — SIGNIFICANT CHANGE UP (ref 3.5–5.3)
POTASSIUM SERPL-SCNC: 4.3 MMOL/L — SIGNIFICANT CHANGE UP (ref 3.5–5.3)
RBC # BLD: 2.41 M/UL — LOW (ref 3.8–5.2)
RBC # FLD: 20.2 % — HIGH (ref 10.3–14.5)
SODIUM SERPL-SCNC: 140 MMOL/L — SIGNIFICANT CHANGE UP (ref 135–145)
TSH SERPL-MCNC: 1.77 UIU/ML — SIGNIFICANT CHANGE UP (ref 0.27–4.2)
WBC # BLD: 5.18 K/UL — SIGNIFICANT CHANGE UP (ref 3.8–10.5)
WBC # FLD AUTO: 5.18 K/UL — SIGNIFICANT CHANGE UP (ref 3.8–10.5)

## 2025-08-06 PROCEDURE — 72125 CT NECK SPINE W/O DYE: CPT

## 2025-08-06 PROCEDURE — 80053 COMPREHEN METABOLIC PANEL: CPT

## 2025-08-06 PROCEDURE — 71045 X-RAY EXAM CHEST 1 VIEW: CPT

## 2025-08-06 PROCEDURE — 84443 ASSAY THYROID STIM HORMONE: CPT

## 2025-08-06 PROCEDURE — 80061 LIPID PANEL: CPT

## 2025-08-06 PROCEDURE — 97161 PT EVAL LOW COMPLEX 20 MIN: CPT

## 2025-08-06 PROCEDURE — 87637 SARSCOV2&INF A&B&RSV AMP PRB: CPT

## 2025-08-06 PROCEDURE — 70450 CT HEAD/BRAIN W/O DYE: CPT

## 2025-08-06 PROCEDURE — 93005 ELECTROCARDIOGRAM TRACING: CPT

## 2025-08-06 PROCEDURE — 70496 CT ANGIOGRAPHY HEAD: CPT

## 2025-08-06 PROCEDURE — 84484 ASSAY OF TROPONIN QUANT: CPT

## 2025-08-06 PROCEDURE — 87040 BLOOD CULTURE FOR BACTERIA: CPT

## 2025-08-06 PROCEDURE — 99285 EMERGENCY DEPT VISIT HI MDM: CPT

## 2025-08-06 PROCEDURE — 85610 PROTHROMBIN TIME: CPT

## 2025-08-06 PROCEDURE — 82550 ASSAY OF CK (CPK): CPT

## 2025-08-06 PROCEDURE — 85025 COMPLETE CBC W/AUTO DIFF WBC: CPT

## 2025-08-06 PROCEDURE — 80048 BASIC METABOLIC PNL TOTAL CA: CPT

## 2025-08-06 PROCEDURE — 85027 COMPLETE CBC AUTOMATED: CPT

## 2025-08-06 PROCEDURE — 36415 COLL VENOUS BLD VENIPUNCTURE: CPT

## 2025-08-06 PROCEDURE — 70498 CT ANGIOGRAPHY NECK: CPT

## 2025-08-06 PROCEDURE — 99232 SBSQ HOSP IP/OBS MODERATE 35: CPT

## 2025-08-06 PROCEDURE — 85730 THROMBOPLASTIN TIME PARTIAL: CPT

## 2025-08-06 RX ORDER — ASPIRIN 325 MG
1 TABLET ORAL
Qty: 30 | Refills: 0
Start: 2025-08-06 | End: 2025-09-04

## 2025-08-06 RX ORDER — ASPIRIN 325 MG
81 TABLET ORAL DAILY
Refills: 0 | Status: DISCONTINUED | OUTPATIENT
Start: 2025-08-06 | End: 2025-08-06

## 2025-08-06 RX ORDER — AMOXICILLIN AND CLAVULANATE POTASSIUM 500; 125 MG/1; MG/1
1 TABLET, FILM COATED ORAL
Refills: 0 | DISCHARGE

## 2025-08-06 RX ADMIN — CALCIUM CARBONATE 1 TABLET(S): 750 TABLET ORAL at 06:13

## 2025-08-06 RX ADMIN — SEVELAMER HYDROCHLORIDE 800 MILLIGRAM(S): 800 TABLET ORAL at 17:54

## 2025-08-06 RX ADMIN — Medication 650 MILLIGRAM(S): at 13:34

## 2025-08-06 RX ADMIN — APIXABAN 2.5 MILLIGRAM(S): 5 TABLET, FILM COATED ORAL at 17:54

## 2025-08-06 RX ADMIN — METOPROLOL SUCCINATE 100 MILLIGRAM(S): 50 TABLET, EXTENDED RELEASE ORAL at 06:14

## 2025-08-06 RX ADMIN — CALCIUM CARBONATE 1 TABLET(S): 750 TABLET ORAL at 17:54

## 2025-08-06 RX ADMIN — APIXABAN 2.5 MILLIGRAM(S): 5 TABLET, FILM COATED ORAL at 06:14

## 2025-08-06 RX ADMIN — Medication 1 APPLICATION(S): at 06:14

## 2025-08-06 RX ADMIN — SEVELAMER HYDROCHLORIDE 800 MILLIGRAM(S): 800 TABLET ORAL at 10:56

## 2025-08-07 ENCOUNTER — TRANSCRIPTION ENCOUNTER (OUTPATIENT)
Age: 75
End: 2025-08-07

## 2025-08-10 LAB
CULTURE RESULTS: SIGNIFICANT CHANGE UP
SPECIMEN SOURCE: SIGNIFICANT CHANGE UP

## 2025-08-13 ENCOUNTER — OUTPATIENT (OUTPATIENT)
Dept: OUTPATIENT SERVICES | Facility: HOSPITAL | Age: 75
LOS: 1 days | End: 2025-08-13
Payer: MEDICARE

## 2025-08-13 DIAGNOSIS — Z92.89 PERSONAL HISTORY OF OTHER MEDICAL TREATMENT: Chronic | ICD-10-CM

## 2025-08-13 DIAGNOSIS — I35.0 NONRHEUMATIC AORTIC (VALVE) STENOSIS: ICD-10-CM

## 2025-08-14 ENCOUNTER — APPOINTMENT (OUTPATIENT)
Dept: CARDIOTHORACIC SURGERY | Facility: CLINIC | Age: 75
End: 2025-08-14
Payer: MEDICARE

## 2025-08-14 ENCOUNTER — RESULT REVIEW (OUTPATIENT)
Age: 75
End: 2025-08-14

## 2025-08-14 VITALS
BODY MASS INDEX: 23.92 KG/M2 | SYSTOLIC BLOOD PRESSURE: 136 MMHG | RESPIRATION RATE: 16 BRPM | WEIGHT: 130 LBS | HEART RATE: 71 BPM | HEIGHT: 62 IN | OXYGEN SATURATION: 97 % | DIASTOLIC BLOOD PRESSURE: 85 MMHG

## 2025-08-14 DIAGNOSIS — I38 ENDOCARDITIS, VALVE UNSPECIFIED: ICD-10-CM

## 2025-08-14 DIAGNOSIS — I25.10 ATHEROSCLEROTIC HEART DISEASE OF NATIVE CORONARY ARTERY W/OUT ANGINA PECTORIS: ICD-10-CM

## 2025-08-14 DIAGNOSIS — I42.9 CARDIOMYOPATHY, UNSPECIFIED: ICD-10-CM

## 2025-08-14 DIAGNOSIS — R93.1 ABNORMAL FINDINGS ON DIAGNOSTIC IMAGING OF HEART AND CORONARY CIRCULATION: ICD-10-CM

## 2025-08-14 DIAGNOSIS — I48.20 CHRONIC ATRIAL FIBRILLATION, UNSP: ICD-10-CM

## 2025-08-14 DIAGNOSIS — R01.1 CARDIAC MURMUR, UNSPECIFIED: ICD-10-CM

## 2025-08-14 DIAGNOSIS — Z79.01 LONG TERM (CURRENT) USE OF ANTICOAGULANTS: ICD-10-CM

## 2025-08-14 PROCEDURE — G2211 COMPLEX E/M VISIT ADD ON: CPT

## 2025-08-14 PROCEDURE — 99215 OFFICE O/P EST HI 40 MIN: CPT

## 2025-08-14 PROCEDURE — 93306 TTE W/DOPPLER COMPLETE: CPT | Mod: 26

## 2025-08-14 PROCEDURE — 93306 TTE W/DOPPLER COMPLETE: CPT

## 2025-08-19 ENCOUNTER — APPOINTMENT (OUTPATIENT)
Dept: VASCULAR SURGERY | Facility: CLINIC | Age: 75
End: 2025-08-19
Payer: MEDICARE

## 2025-08-19 DIAGNOSIS — Z98.890 OTHER SPECIFIED POSTPROCEDURAL STATES: ICD-10-CM

## 2025-08-19 DIAGNOSIS — Z99.2 DEPENDENCE ON RENAL DIALYSIS: ICD-10-CM

## 2025-08-19 DIAGNOSIS — N18.6 END STAGE RENAL DISEASE: ICD-10-CM

## 2025-08-19 DIAGNOSIS — Z99.2 END STAGE RENAL DISEASE: ICD-10-CM

## 2025-08-19 PROCEDURE — 93990 DOPPLER FLOW TESTING: CPT

## 2025-09-08 ENCOUNTER — RX CHANGE (OUTPATIENT)
Age: 75
End: 2025-09-08

## 2025-09-09 ENCOUNTER — RX CHANGE (OUTPATIENT)
Age: 75
End: 2025-09-09

## 2025-09-09 RX ORDER — SEVELAMER CARBONATE 800 MG/1
800 TABLET, FILM COATED ORAL
Qty: 270 | Refills: 3 | Status: ACTIVE | COMMUNITY
Start: 1900-01-01 | End: 1900-01-01

## 2025-09-18 ENCOUNTER — APPOINTMENT (OUTPATIENT)
Dept: CARDIOLOGY | Facility: CLINIC | Age: 75
End: 2025-09-18

## (undated) DEVICE — DRSG STERISTRIPS 0.5 X 4"

## (undated) DEVICE — SUT SILK 3-0 18" TIES

## (undated) DEVICE — VESSEL LOOP MINI-BLUE 0.075" X 16"

## (undated) DEVICE — SUT POLYSORB 3-0 30" V-20 UNDYED

## (undated) DEVICE — STAPLER SKIN VISI-STAT 35 WIDE

## (undated) DEVICE — VESSEL LOOP MAXI-BLUE 0.120" X 16"

## (undated) DEVICE — GLV 7.5 PROTEXIS (WHITE)

## (undated) DEVICE — SUT PROLENE 6-0 4-30" BV-1

## (undated) DEVICE — POSITIONER FOAM EGG CRATE ULNAR 2PCS (PINK)

## (undated) DEVICE — DRAPE INSTRUMENT POUCH 6.75" X 11"

## (undated) DEVICE — BLADE SCALPEL SAFETYLOCK #15

## (undated) DEVICE — GOWN XL

## (undated) DEVICE — DRSG COBAN 4"

## (undated) DEVICE — PACK AV FISTULA

## (undated) DEVICE — CLAMP BULLDOG MIDI 45 DEGREE (GREEN) DISP

## (undated) DEVICE — BLADE SCALPEL SAFETYLOCK #11

## (undated) DEVICE — SUT BIOSYN 4-0 18" P-12

## (undated) DEVICE — CLAMP BULLDOG MINI STRAIGHT (GREEN) DISP

## (undated) DEVICE — WARMING BLANKET LOWER ADULT

## (undated) DEVICE — SOL INJ NS 0.9% 500ML 1-PORT

## (undated) DEVICE — MEDICATION LABELS W MARKER

## (undated) DEVICE — PREP CHLORAPREP HI-LITE ORANGE 26ML

## (undated) DEVICE — SUT SOFSILK 4-0 18" TIES

## (undated) DEVICE — SOL IRR POUR H2O 250ML

## (undated) DEVICE — SOL IRR POUR NS 0.9% 500ML

## (undated) DEVICE — CLAMP BULLDOG MIDI STRAIGHT (YELLOW) DISP

## (undated) DEVICE — DRSG OPSITE 13.75 X 4"

## (undated) DEVICE — DRSG TEGADERM 6 X 8"

## (undated) DEVICE — DRSG COBAN 6"

## (undated) DEVICE — SUT SOFSILK 2-0 18" TIES

## (undated) DEVICE — SUCTION YANKAUER NO CONTROL VENT

## (undated) DEVICE — DRAPE TOWEL BLUE 17" X 24"

## (undated) DEVICE — SPECIMEN CONTAINER 100ML

## (undated) DEVICE — VENODYNE/SCD SLEEVE CALF MEDIUM

## (undated) DEVICE — DRAPE LIGHT HANDLE COVER (BLUE)